# Patient Record
Sex: FEMALE | Race: WHITE | NOT HISPANIC OR LATINO | Employment: OTHER | URBAN - METROPOLITAN AREA
[De-identification: names, ages, dates, MRNs, and addresses within clinical notes are randomized per-mention and may not be internally consistent; named-entity substitution may affect disease eponyms.]

---

## 2017-01-06 ENCOUNTER — OFFICE VISIT (OUTPATIENT)
Dept: FAMILY MEDICINE CLINIC | Facility: CLINIC | Age: 77
End: 2017-01-06

## 2017-01-06 VITALS
TEMPERATURE: 97.7 F | HEIGHT: 64 IN | HEART RATE: 80 BPM | SYSTOLIC BLOOD PRESSURE: 126 MMHG | DIASTOLIC BLOOD PRESSURE: 74 MMHG | OXYGEN SATURATION: 98 % | BODY MASS INDEX: 18.95 KG/M2 | WEIGHT: 111 LBS

## 2017-01-06 DIAGNOSIS — L25.9 CONTACT DERMATITIS, UNSPECIFIED CONTACT DERMATITIS TYPE, UNSPECIFIED TRIGGER: Primary | ICD-10-CM

## 2017-01-06 PROCEDURE — 99213 OFFICE O/P EST LOW 20 MIN: CPT | Performed by: NURSE PRACTITIONER

## 2017-01-06 RX ORDER — METHYLPREDNISOLONE 4 MG/1
TABLET ORAL
Qty: 21 TABLET | Refills: 0 | Status: SHIPPED | OUTPATIENT
Start: 2017-01-06 | End: 2017-06-19

## 2017-01-06 RX ORDER — HYDROXYZINE HYDROCHLORIDE 25 MG/1
25 TABLET, FILM COATED ORAL 3 TIMES DAILY PRN
Qty: 30 TABLET | Refills: 0 | Status: SHIPPED | OUTPATIENT
Start: 2017-01-06 | End: 2017-11-24 | Stop reason: ALTCHOICE

## 2017-01-06 NOTE — MR AVS SNAPSHOT
Bailee Garza   1/6/2017 11:30 AM   Office Visit    Provider:  TERESA Lopez   Department:  [facility]   Dept Phone:  [phone]                Your Full Care Plan              Today's Medication Changes          These changes are accurate as of: 1/6/17 11:42 AM.  If you have any questions, ask your nurse or doctor.               New Medication(s)Ordered:     hydrOXYzine 25 MG tablet   Commonly known as:  ATARAX   Take 1 tablet by mouth 3 (Three) Times a Day As Needed for itching.   Started by:  TERESA Lopez       MethylPREDNISolone 4 MG tablet   Commonly known as:  MEDROL (IBAN)   Take as directed on package instructions.   Started by:  TERESA Lopez            Where to Get Your Medications      These medications were sent to [address] AT [facility] [phone] [facility] [phone] [address], [place] [address]     Phone:  [phone]     hydrOXYzine 25 MG tablet    MethylPREDNISolone 4 MG tablet                  Your Updated Medication List          This list is accurate as of: 1/6/17 11:42 AM.  Always use your most recent med list.                hydrOXYzine 25 MG tablet   Commonly known as:  ATARAX   Take 1 tablet by mouth 3 (Three) Times a Day As Needed for itching.       MethylPREDNISolone 4 MG tablet   Commonly known as:  MEDROL (IBAN)   Take as directed on package instructions.       SM FIBER LAXATIVE 500 MG tablet tablet   Generic drug:  methylcellulose (Laxative)       SYNTHROID 100 MCG tablet   Generic drug:  levothyroxine   Take 1 tablet by mouth Daily.       traMADol-acetaminophen 37.5-325 MG per tablet   Commonly known as:  ULTRACET   Take one tablet in am, one tablet at noon and 2 tablets at hs by po route       triamterene-hydrochlorothiazide 75-50 MG per tablet   Commonly known as:  MAXZIDE   Take 1 tablet by  "mouth Daily.               You Were Diagnosed With        Codes Comments    Rash    -  Primary ICD-10-CM: R21  ICD-9-CM: 782.1       Instructions     None    Patient Instructions History      [employer] Signup     [facility] [employer] allows you to send messages to your doctor, view your test results, renew your prescriptions, schedule appointments, and more. To sign up, go to [employer] and click on the Sign Up Now link in the New User? box. Enter your [employer] Activation Code exactly as it appears below along with the last four digits of your Social Security Number and your Date of Birth () to complete the sign-up process. If you do not sign up before the expiration date, you must request a new code.    [employer] Activation Code: [email or web address]  Expires: 2017  5:37 AM    If you have questions, you can email [email or web address] or call [phone] to talk to our [employer] staff. Remember, [employer] is NOT to be used for urgent needs. For medical emergencies, dial 911.               Other Info from Your Visit           Your Appointments     2017  8:30 AM EDT   Follow Up with Eddie Araya MD   [facility] (--)    [address] [place] [id number] [phone]           Arrive 15 minutes prior to appointment.              Allergies     Ketek [Telithromycin]      Nsaids      Sulphur [Sulfur]        Reason for Visit     Rash both legs, before mary      Vital Signs     Blood Pressure Pulse Temperature Height Weight Last Menstrual Period    126/74 80 97.7 °F (36.5 °C) 64\" (162.6 cm) 111 lb (50.3 kg) (Approximate)    Oxygen Saturation Body Mass Index Smoking Status             98% 19.05 kg/m2 Never Smoker         Problems and Diagnoses Noted     Rash and nonspecific skin eruption    -  Primary      "

## 2017-01-06 NOTE — PROGRESS NOTES
Subjective   Bailee Garza is a 76 y.o. female.     History of Present Illness   Bailee Garza 76 y.o. female presents for evaluation of a rash involving the bilateral lower extremitites. Rash has been present for: 2 weeks. Lesions are erythematous, and are described as papular. Rash has changed over time. Rash is pruritic. Associated symptoms  .rash started after working in the yard.  Patient denies:current pustular drainage..  Treatment to date includes: OTC Hydrocortisone cream with improvement. Symptoms are stable. Patient has not had contacts with similar rash. Patient has not had new exposures (soaps, lotions, laundry detergents, foods, medications, plants, insects or animals).    The following portions of the patient's history were reviewed and updated as appropriate: allergies, current medications, past family history, past medical history, past social history, past surgical history and problem list.    Review of Systems   Skin: Positive for rash.       Objective   Physical Exam   Constitutional: She is oriented to person, place, and time. She appears well-developed and well-nourished.   HENT:   Head: Normocephalic and atraumatic.   Pulmonary/Chest: Effort normal.   Neurological: She is alert and oriented to person, place, and time.   Skin: Skin is warm and dry. Rash noted. Rash is maculopapular. There is erythema.   Patient has maculopapular eruption to BLE with some raised linear lesions.     Psychiatric: She has a normal mood and affect. Judgment normal.   Vitals reviewed.      Assessment/Plan   Bailee was seen today for rash.    Diagnoses and all orders for this visit:    Contact dermatitis, unspecified contact dermatitis type, unspecified trigger  -     MethylPREDNISolone (MEDROL, IBAN,) 4 MG tablet; Take as directed on package instructions.  -     hydrOXYzine (ATARAX) 25 MG tablet; Take 1 tablet by mouth 3 (Three) Times a Day As Needed for itching.

## 2017-01-06 NOTE — LETTER
January 6, 2017     Patient: Bailee Garza   YOB: 1940   Date of Visit: 1/6/2017       To Whom It May Concern:    It is my medical opinion that Bailee Garza may return to work on 1/9/17.           Sincerely,        TERESA Lopez    CC: No Recipients

## 2017-01-09 ENCOUNTER — TELEPHONE (OUTPATIENT)
Dept: GASTROENTEROLOGY | Facility: CLINIC | Age: 77
End: 2017-01-09

## 2017-01-09 NOTE — TELEPHONE ENCOUNTER
Spoke with pt and informed her that Dr Zamora said that she will not be due a Colonoscopy until 2019 unless she is having problems. Pt denied any current issues.  Informed pt that if she does develop any GI issues to call and make an appt with Dr Zamora. Voiced understanding.

## 2017-01-09 NOTE — TELEPHONE ENCOUNTER
----- Message from Lawrence Zamora MD sent at 12/12/2016  6:31 PM EST -----  Regarding: RE: OA PAPERWORK  She last had a c/s in 1/14. Her dad had colon cancer. We usually perform c/s every 5 yrs with this history. Have her f/u with me in the office to discuss further. [facility]  ----- Message -----     From: Fabby Schmid RN     Sent: 12/12/2016   9:29 AM       To: Lawrence Zamora MD  Subject: OA PAPERWORK                                     Open Access/Recall paperwork scanned in under the media tab.  Please review and return to Fabby.

## 2017-03-20 ENCOUNTER — OFFICE VISIT (OUTPATIENT)
Dept: FAMILY MEDICINE CLINIC | Facility: CLINIC | Age: 77
End: 2017-03-20

## 2017-03-20 VITALS
RESPIRATION RATE: 16 BRPM | SYSTOLIC BLOOD PRESSURE: 124 MMHG | WEIGHT: 110 LBS | DIASTOLIC BLOOD PRESSURE: 77 MMHG | BODY MASS INDEX: 18.78 KG/M2 | HEART RATE: 81 BPM | HEIGHT: 64 IN | TEMPERATURE: 97.9 F

## 2017-03-20 DIAGNOSIS — G89.29 CHRONIC LEFT-SIDED LOW BACK PAIN WITHOUT SCIATICA: ICD-10-CM

## 2017-03-20 DIAGNOSIS — M54.50 CHRONIC LEFT-SIDED LOW BACK PAIN WITHOUT SCIATICA: ICD-10-CM

## 2017-03-20 PROCEDURE — 99212 OFFICE O/P EST SF 10 MIN: CPT | Performed by: FAMILY MEDICINE

## 2017-05-18 ENCOUNTER — RESULTS ENCOUNTER (OUTPATIENT)
Dept: FAMILY MEDICINE CLINIC | Facility: CLINIC | Age: 77
End: 2017-05-18

## 2017-05-18 DIAGNOSIS — I10 ESSENTIAL HYPERTENSION: ICD-10-CM

## 2017-05-18 DIAGNOSIS — N18.30 CHRONIC KIDNEY DISEASE, STAGE III (MODERATE) (HCC): ICD-10-CM

## 2017-06-16 LAB
ALBUMIN SERPL-MCNC: 4.5 G/DL (ref 3.5–5.2)
ALBUMIN/GLOB SERPL: 1.7 G/DL
ALP SERPL-CCNC: 76 U/L (ref 39–117)
ALT SERPL-CCNC: 20 U/L (ref 1–33)
AST SERPL-CCNC: 26 U/L (ref 1–32)
BASOPHILS # BLD AUTO: 0.06 10*3/MM3 (ref 0–0.2)
BASOPHILS NFR BLD AUTO: 1.2 % (ref 0–1.5)
BILIRUB SERPL-MCNC: 0.3 MG/DL (ref 0.1–1.2)
BUN SERPL-MCNC: 38 MG/DL (ref 8–23)
BUN/CREAT SERPL: 31.1 (ref 7–25)
CALCIUM SERPL-MCNC: 9.5 MG/DL (ref 8.6–10.5)
CHLORIDE SERPL-SCNC: 101 MMOL/L (ref 98–107)
CO2 SERPL-SCNC: 24.9 MMOL/L (ref 22–29)
CREAT SERPL-MCNC: 1.22 MG/DL (ref 0.57–1)
EOSINOPHIL # BLD AUTO: 0.01 10*3/MM3 (ref 0–0.7)
EOSINOPHIL NFR BLD AUTO: 0.2 % (ref 0.3–6.2)
ERYTHROCYTE [DISTWIDTH] IN BLOOD BY AUTOMATED COUNT: 13.3 % (ref 11.7–13)
GLOBULIN SER CALC-MCNC: 2.7 GM/DL
GLUCOSE SERPL-MCNC: 89 MG/DL (ref 65–99)
HCT VFR BLD AUTO: 40.1 % (ref 35.6–45.5)
HGB BLD-MCNC: 12.8 G/DL (ref 11.9–15.5)
IMM GRANULOCYTES # BLD: 0 10*3/MM3 (ref 0–0.03)
IMM GRANULOCYTES NFR BLD: 0 % (ref 0–0.5)
LYMPHOCYTES # BLD AUTO: 1.12 10*3/MM3 (ref 0.9–4.8)
LYMPHOCYTES NFR BLD AUTO: 22.7 % (ref 19.6–45.3)
MCH RBC QN AUTO: 30.3 PG (ref 26.9–32)
MCHC RBC AUTO-ENTMCNC: 31.9 G/DL (ref 32.4–36.3)
MCV RBC AUTO: 94.8 FL (ref 80.5–98.2)
MONOCYTES # BLD AUTO: 0.37 10*3/MM3 (ref 0.2–1.2)
MONOCYTES NFR BLD AUTO: 7.5 % (ref 5–12)
NEUTROPHILS # BLD AUTO: 3.37 10*3/MM3 (ref 1.9–8.1)
NEUTROPHILS NFR BLD AUTO: 68.4 % (ref 42.7–76)
PLATELET # BLD AUTO: 224 10*3/MM3 (ref 140–500)
POTASSIUM SERPL-SCNC: 4.1 MMOL/L (ref 3.5–5.2)
PROT SERPL-MCNC: 7.2 G/DL (ref 6–8.5)
RBC # BLD AUTO: 4.23 10*6/MM3 (ref 3.9–5.2)
SODIUM SERPL-SCNC: 141 MMOL/L (ref 136–145)
WBC # BLD AUTO: 4.93 10*3/MM3 (ref 4.5–10.7)

## 2017-06-19 ENCOUNTER — OFFICE VISIT (OUTPATIENT)
Dept: FAMILY MEDICINE CLINIC | Facility: CLINIC | Age: 77
End: 2017-06-19

## 2017-06-19 VITALS
DIASTOLIC BLOOD PRESSURE: 74 MMHG | HEIGHT: 64 IN | HEART RATE: 73 BPM | WEIGHT: 108 LBS | TEMPERATURE: 97.7 F | SYSTOLIC BLOOD PRESSURE: 116 MMHG | RESPIRATION RATE: 16 BRPM | BODY MASS INDEX: 18.44 KG/M2

## 2017-06-19 DIAGNOSIS — N18.30 CHRONIC KIDNEY DISEASE, STAGE III (MODERATE) (HCC): ICD-10-CM

## 2017-06-19 DIAGNOSIS — G89.29 CHRONIC LEFT-SIDED LOW BACK PAIN WITHOUT SCIATICA: Primary | ICD-10-CM

## 2017-06-19 DIAGNOSIS — L98.9 SKIN LESION OF CHEST WALL: ICD-10-CM

## 2017-06-19 DIAGNOSIS — M54.50 CHRONIC LEFT-SIDED LOW BACK PAIN WITHOUT SCIATICA: Primary | ICD-10-CM

## 2017-06-19 PROCEDURE — 99214 OFFICE O/P EST MOD 30 MIN: CPT | Performed by: FAMILY MEDICINE

## 2017-06-19 NOTE — PROGRESS NOTES
"Chief Complaint   Patient presents with   • Pain       Subjective   This patient presents the office to refill her medication for her chronic back pain.  She continues to take tramadol as prescribed.  She had a refill on June 11, 2017.  Today she forgot to bring her pill bottle and she will remember to do that at future CSA visits.  Shaji report has been reviewed.  The medication is effective in no side effects are reported. Labs reviewed and show stable chronic kidney dysfunction. She is concerned about a cyst on her left upper chest just below the collar bone. She has been picking at the spot.      Social History   Substance Use Topics   • Smoking status: Never Smoker   • Smokeless tobacco: Never Used   • Alcohol use No       Review of Systems   Cardiovascular: Negative for chest pain.   Musculoskeletal: Positive for back pain.       Objective   /74  Pulse 73  Temp 97.7 °F (36.5 °C) (Oral)   Resp 16  Ht 64\" (162.6 cm)  Wt 108 lb (49 kg)  LMP  (Approximate)  BMI 18.54 kg/m2  Body mass index is 18.54 kg/(m^2).  Physical Exam   Constitutional: No distress.   Pulmonary/Chest:       Musculoskeletal:        Arms:  Vitals reviewed.      Data Reviewed:    CMP:  Lab Results   Component Value Date    GLU 89 06/16/2017    BUN 38 (H) 06/16/2017    CREATININE 1.22 (H) 06/16/2017    EGFRIFNONA 43 (L) 06/16/2017    EGFRIFAFRI 52 (L) 06/16/2017     06/16/2017    K 4.1 06/16/2017     06/16/2017    CALCIUM 9.5 06/16/2017    PROTENTOTREF 7.2 06/16/2017    ALBUMIN 4.50 06/16/2017    LABGLOBREF 2.7 06/16/2017    BILITOT 0.3 06/16/2017    ALKPHOS 76 06/16/2017    AST 26 06/16/2017    ALT 20 06/16/2017     CBC w/ diff:   Lab Results   Component Value Date    WBC 4.93 06/16/2017    RBC 4.23 06/16/2017    HGB 12.8 06/16/2017    HCT 40.1 06/16/2017    MCV 94.8 06/16/2017    MCH 30.3 06/16/2017    MCHC 31.9 (L) 06/16/2017    RDW 13.3 (H) 06/16/2017     06/16/2017    NEUTRORELPCT 68.4 06/16/2017    " LYMPHORELPCT 22.7 06/16/2017    MONORELPCT 7.5 06/16/2017    EOSRELPCT 0.2 (L) 06/16/2017    BASORELPCT 1.2 06/16/2017     TSH:  Lab Results   Component Value Date    TSH 2.350 11/18/2016       Assessment/Plan     Problem List Items Addressed This Visit        Nervous and Auditory    Chronic back pain - Primary    Relevant Medications    traMADol-acetaminophen (ULTRACET) 37.5-325 MG per tablet       Genitourinary    Chronic kidney disease, stage III (moderate)      Other Visit Diagnoses     Skin lesion of chest wall       Wear a bandaid over the pick lesion, if it is not resolved in one week then make a follow up appt.        Outpatient Encounter Prescriptions as of 6/19/2017   Medication Sig Dispense Refill   • hydrOXYzine (ATARAX) 25 MG tablet Take 1 tablet by mouth 3 (Three) Times a Day As Needed for itching. 30 tablet 0   • Methylcellulose, Laxative, (SM FIBER LAXATIVE) 500 MG tablet Take 1 tablet by oral route daily     • Multiple Vitamins-Minerals (PRESERVISION AREDS PO) Take  by mouth.     • SYNTHROID 100 MCG tablet Take 1 tablet by mouth Daily. 30 tablet 11   • traMADol-acetaminophen (ULTRACET) 37.5-325 MG per tablet Take one tablet po q am, one tablet po q noon and 2 tablets po q hs as needed for pain 110 tablet 2   • triamterene-hydrochlorothiazide (MAXZIDE) 75-50 MG per tablet Take 1 tablet by mouth Daily. 30 tablet 11   • [DISCONTINUED] traMADol-acetaminophen (ULTRACET) 37.5-325 MG per tablet Take one tablet po q am, one tablet po q noon and 2 tablets po q hs as needed for pain 110 tablet 2   • [DISCONTINUED] MethylPREDNISolone (MEDROL, IBAN,) 4 MG tablet Take as directed on package instructions. 21 tablet 0     No facility-administered encounter medications on file as of 6/19/2017.        No orders of the defined types were placed in this encounter.      Continue with current treatment plan.         F/U in 3 months for CSA visit

## 2017-07-11 ENCOUNTER — OFFICE VISIT (OUTPATIENT)
Dept: RETAIL CLINIC | Facility: CLINIC | Age: 77
End: 2017-07-11

## 2017-07-11 VITALS
TEMPERATURE: 98 F | HEART RATE: 76 BPM | SYSTOLIC BLOOD PRESSURE: 100 MMHG | DIASTOLIC BLOOD PRESSURE: 68 MMHG | RESPIRATION RATE: 20 BRPM | OXYGEN SATURATION: 97 %

## 2017-07-11 DIAGNOSIS — T15.92XA FOREIGN BODY OF LEFT EYE, INITIAL ENCOUNTER: Primary | ICD-10-CM

## 2017-07-11 PROCEDURE — 99213 OFFICE O/P EST LOW 20 MIN: CPT | Performed by: NURSE PRACTITIONER

## 2017-07-11 NOTE — PATIENT INSTRUCTIONS
[facility] and appt made. Understands to go right away. Understands failure to go to [facility] could result in loss of vision.

## 2017-07-11 NOTE — PROGRESS NOTES
Subjective   Bailee Garza is a 77 y.o. female.     HPI Comments: Reports she was walking her dog 3 days ago. A bug flew into her eye and feels like the bug is still in there. Denies any loss of vision, double vision    Foreign Body in Eye   Incident onset: 3 days ago. Suspected object: bug. Intake: left eye. The incident was reported. The incident was witnessed/reported by the patient. Pertinent negatives include no abdominal pain, chest pain, cough, difficulty breathing, drooling, fever, sore throat or vomiting.        The following portions of the patient's history were reviewed and updated as appropriate: allergies, current medications, past family history, past medical history, past social history, past surgical history and problem list.    Review of Systems   Constitutional: Negative.  Negative for chills and fever.   HENT: Negative.  Negative for drooling and sore throat.    Eyes: Positive for discharge and redness. Negative for photophobia, pain, itching and visual disturbance.        Left eye watering. Redness. Feels like bug is still in her eye   Respiratory: Negative.  Negative for cough.    Cardiovascular: Negative.  Negative for chest pain.   Gastrointestinal: Negative.  Negative for abdominal pain and vomiting.   Endocrine: Negative.    Genitourinary: Negative.    Musculoskeletal: Negative.    Skin: Negative.    Allergic/Immunologic: Negative.    Neurological: Negative.    Hematological: Negative.    Psychiatric/Behavioral: Negative.        Objective   Physical Exam   Constitutional: She is oriented to person, place, and time. Vital signs are normal. She appears well-developed and well-nourished.   HENT:   Head: Normocephalic and atraumatic.   Right Ear: Hearing, tympanic membrane, external ear and ear canal normal.   Left Ear: Hearing, tympanic membrane, external ear and ear canal normal.   Nose: Nose normal. Right sinus exhibits no maxillary sinus tenderness and no frontal sinus tenderness. Left  sinus exhibits no maxillary sinus tenderness and no frontal sinus tenderness.   Mouth/Throat: Uvula is midline, oropharynx is clear and moist and mucous membranes are normal. No tonsillar exudate.   Eyes: Pupils are equal, round, and reactive to light. Right eye exhibits no discharge. Left eye exhibits no discharge. Right conjunctiva is not injected. Left conjunctiva is injected.   Left lid everted no foreign body seen with the naked eye.    Neck: Trachea normal and normal range of motion. Neck supple.   Cardiovascular: Normal rate, regular rhythm, S1 normal, S2 normal and normal heart sounds.    Pulmonary/Chest: Effort normal and breath sounds normal.   Abdominal: Soft. Normal appearance and bowel sounds are normal. There is no tenderness.   Musculoskeletal: Normal range of motion.   Lymphadenopathy:     She has no cervical adenopathy.   Neurological: She is alert and oriented to person, place, and time.   Skin: Skin is warm, dry and intact. No rash noted.   Psychiatric: She has a normal mood and affect. Her speech is normal and behavior is normal.   Vitals reviewed.      Assessment/Plan   Problems Addressed this Visit     None      Visit Diagnoses     Foreign body of left eye, initial encounter    -  Primary

## 2017-08-03 ENCOUNTER — OFFICE VISIT (OUTPATIENT)
Dept: FAMILY MEDICINE CLINIC | Facility: CLINIC | Age: 77
End: 2017-08-03

## 2017-08-03 VITALS
OXYGEN SATURATION: 98 % | WEIGHT: 109 LBS | HEART RATE: 68 BPM | TEMPERATURE: 97.6 F | SYSTOLIC BLOOD PRESSURE: 110 MMHG | HEIGHT: 64 IN | BODY MASS INDEX: 18.61 KG/M2 | DIASTOLIC BLOOD PRESSURE: 62 MMHG | RESPIRATION RATE: 16 BRPM

## 2017-08-03 DIAGNOSIS — N39.0 ACUTE UTI: Primary | ICD-10-CM

## 2017-08-03 LAB
BILIRUB BLD-MCNC: NEGATIVE MG/DL
CLARITY, POC: CLEAR
COLOR UR: YELLOW
GLUCOSE UR STRIP-MCNC: NEGATIVE MG/DL
KETONES UR QL: NEGATIVE
LEUKOCYTE EST, POC: ABNORMAL
NITRITE UR-MCNC: NEGATIVE MG/ML
PH UR: 6 [PH] (ref 5–8)
PROT UR STRIP-MCNC: NEGATIVE MG/DL
RBC # UR STRIP: ABNORMAL /UL
SP GR UR: 1.02 (ref 1–1.03)
UROBILINOGEN UR QL: NORMAL

## 2017-08-03 PROCEDURE — 99213 OFFICE O/P EST LOW 20 MIN: CPT | Performed by: NURSE PRACTITIONER

## 2017-08-03 PROCEDURE — 81003 URINALYSIS AUTO W/O SCOPE: CPT | Performed by: NURSE PRACTITIONER

## 2017-08-03 RX ORDER — NITROFURANTOIN 25; 75 MG/1; MG/1
100 CAPSULE ORAL 2 TIMES DAILY
Qty: 14 CAPSULE | Refills: 0 | Status: SHIPPED | OUTPATIENT
Start: 2017-08-03 | End: 2017-08-10

## 2017-08-03 NOTE — PROGRESS NOTES
Subjective   Bailee Garza is a 77 y.o. female.     History of Present Illness   Bailee Garza 77 y.o.female complains of urinary symptoms. she complains of dysuria, urgency and frequency. She has had symptoms for a few days. The symptoms are moderate.  Patient denies fever, flank pain, gross blood in urine, nausea, vomiting and abdominal pain.  Patient has tried  nothing for relief of symptoms.  Patient does not have a history of recurrent UTI. Patient does not have a history of pyelonephritis.         The following portions of the patient's history were reviewed and updated as appropriate: allergies, current medications, past family history, past medical history, past social history, past surgical history and problem list.    Review of Systems   Constitutional: Positive for chills. Negative for fever.   Genitourinary: Positive for dysuria, frequency and urgency. Negative for decreased urine volume, flank pain and pelvic pain.       Objective   Physical Exam   Constitutional: She is oriented to person, place, and time. She appears well-developed and well-nourished.   Pulmonary/Chest: Effort normal.   Abdominal: There is no CVA tenderness.   Neurological: She is alert and oriented to person, place, and time.   Psychiatric: She has a normal mood and affect. Judgment normal.   Nursing note and vitals reviewed.      Assessment/Plan   Bailee was seen today for urinary tract infection.    Diagnoses and all orders for this visit:    Acute UTI  -     POCT urinalysis dipstick, automated  -     Urine Culture  -     nitrofurantoin, macrocrystal-monohydrate, (MACROBID) 100 MG capsule; Take 1 capsule by mouth 2 (Two) Times a Day for 7 days. For UTI

## 2017-08-06 LAB
BACTERIA UR CULT: ABNORMAL
BACTERIA UR CULT: ABNORMAL
OTHER ANTIBIOTIC SUSC ISLT: ABNORMAL

## 2017-10-11 ENCOUNTER — OFFICE VISIT (OUTPATIENT)
Dept: FAMILY MEDICINE CLINIC | Facility: CLINIC | Age: 77
End: 2017-10-11

## 2017-10-11 VITALS
HEIGHT: 64 IN | SYSTOLIC BLOOD PRESSURE: 103 MMHG | WEIGHT: 113 LBS | HEART RATE: 75 BPM | TEMPERATURE: 96.9 F | DIASTOLIC BLOOD PRESSURE: 65 MMHG | BODY MASS INDEX: 19.29 KG/M2 | RESPIRATION RATE: 16 BRPM

## 2017-10-11 DIAGNOSIS — Z23 IMMUNIZATION DUE: ICD-10-CM

## 2017-10-11 DIAGNOSIS — M25.561 ARTHRALGIA OF RIGHT KNEE: Primary | ICD-10-CM

## 2017-10-11 PROCEDURE — 90662 IIV NO PRSV INCREASED AG IM: CPT | Performed by: FAMILY MEDICINE

## 2017-10-11 PROCEDURE — 90471 IMMUNIZATION ADMIN: CPT | Performed by: FAMILY MEDICINE

## 2017-10-11 PROCEDURE — 99213 OFFICE O/P EST LOW 20 MIN: CPT | Performed by: FAMILY MEDICINE

## 2017-10-11 NOTE — PROGRESS NOTES
"Chief Complaint   Patient presents with   • Joint Swelling     knee       Subjective   This patient presents the office with swollen right medial knee.  It has been noticeable for a long time.  Recently she just bought a brace for the knee which does help the condition.  It did cause some distal edema of her foot and ankle on the same side.  She will begin to use that only during the day and leave it off at night.  If her symptoms do not resolve satisfactorily, then we will refer her to Dr. Mann whom she has seen in the past.  I have reviewed and updated her medications, medical history and problem list during today's office visit.        Social History   Substance Use Topics   • Smoking status: Never Smoker   • Smokeless tobacco: Never Used   • Alcohol use No       Review of Systems   Cardiovascular: Positive for leg swelling.       Objective   /65  Pulse 75  Temp 96.9 °F (36.1 °C) (Oral)   Resp 16  Ht 64\" (162.6 cm)  Wt 113 lb (51.3 kg)  BMI 19.4 kg/m2  Body mass index is 19.4 kg/(m^2).  Physical Exam   Constitutional: No distress.   Musculoskeletal:        Legs:  Vitals reviewed.      Data Reviewed:        Assessment/Plan     Problem List Items Addressed This Visit        Musculoskeletal and Integument    Arthralgia of right knee      Other Visit Diagnoses     Immunization due    -  Primary    Relevant Orders    Flu Vaccine High Dose PF 65YR+ ([id number]) (Completed)          Outpatient Encounter Prescriptions as of 10/11/2017   Medication Sig Dispense Refill   • hydrOXYzine (ATARAX) 25 MG tablet Take 1 tablet by mouth 3 (Three) Times a Day As Needed for itching. 30 tablet 0   • Methylcellulose, Laxative, (SM FIBER LAXATIVE) 500 MG tablet Take 1 tablet by oral route daily     • Multiple Vitamins-Minerals (PRESERVISION AREDS PO) Take  by mouth.     • SYNTHROID 100 MCG tablet Take 1 tablet by mouth Daily. 30 tablet 11   • traMADol-acetaminophen (ULTRACET) 37.5-325 MG per tablet Take one tablet po q am, " one tablet po q noon and 2 tablets po q hs as needed for pain 110 tablet 2   • triamterene-hydrochlorothiazide (MAXZIDE) 75-50 MG per tablet Take 1 tablet by mouth Daily. 30 tablet 11     No facility-administered encounter medications on file as of 10/11/2017.        Orders Placed This Encounter   Procedures   • Flu Vaccine High Dose PF 65YR+ ([id number])       Continue with current treatment plan.         RTC as needed or sooner if symptoms worsen or change

## 2017-10-13 ENCOUNTER — OFFICE VISIT (OUTPATIENT)
Dept: FAMILY MEDICINE CLINIC | Facility: CLINIC | Age: 77
End: 2017-10-13

## 2017-10-13 VITALS
HEIGHT: 64 IN | BODY MASS INDEX: 19.29 KG/M2 | HEART RATE: 97 BPM | DIASTOLIC BLOOD PRESSURE: 67 MMHG | OXYGEN SATURATION: 94 % | TEMPERATURE: 98.5 F | SYSTOLIC BLOOD PRESSURE: 125 MMHG | WEIGHT: 113 LBS | RESPIRATION RATE: 18 BRPM

## 2017-10-13 DIAGNOSIS — J40 BRONCHITIS: Primary | ICD-10-CM

## 2017-10-13 LAB
EXPIRATION DATE: NORMAL
INTERNAL CONTROL: NORMAL
Lab: NORMAL

## 2017-10-13 PROCEDURE — 87804 INFLUENZA ASSAY W/OPTIC: CPT | Performed by: NURSE PRACTITIONER

## 2017-10-13 PROCEDURE — 99213 OFFICE O/P EST LOW 20 MIN: CPT | Performed by: NURSE PRACTITIONER

## 2017-10-13 RX ORDER — CEFDINIR 300 MG/1
600 CAPSULE ORAL DAILY
Qty: 20 CAPSULE | Refills: 0 | Status: SHIPPED | OUTPATIENT
Start: 2017-10-13 | End: 2017-10-23

## 2017-10-13 RX ORDER — PREDNISONE 20 MG/1
20 TABLET ORAL 2 TIMES DAILY
Qty: 10 TABLET | Refills: 0 | Status: SHIPPED | OUTPATIENT
Start: 2017-10-13 | End: 2017-11-24

## 2017-10-13 NOTE — PROGRESS NOTES
Subjective   Bailee Garza is a 77 y.o. female.     History of Present Illness   Bailee Garza 77 y.o. female who presents for evaluation of cough. Symptoms include productive cough and exertional SOA.  Onset of symptoms was 2 days ago, gradually worsening since that time. Patient denies fever.   Evaluation to date: none Treatment to date:  coug drops.  Patient states it started after flu vaccine and believes it is related.     The following portions of the patient's history were reviewed and updated as appropriate: allergies, current medications, past family history, past medical history, past social history, past surgical history and problem list.    Review of Systems   Constitutional: Positive for chills. Negative for fever.   HENT: Negative for congestion, postnasal drip and sinus pain.    Respiratory: Positive for cough, chest tightness and shortness of breath.    Musculoskeletal: Positive for myalgias.       Objective   Physical Exam   Constitutional: She is oriented to person, place, and time. She appears well-developed and well-nourished.   Cardiovascular: Normal rate and regular rhythm.    Pulmonary/Chest: Effort normal and breath sounds normal.   Neurological: She is alert and oriented to person, place, and time.   Psychiatric: She has a normal mood and affect. Judgment normal.   Nursing note and vitals reviewed.      Assessment/Plan   Bailee was seen today for uri.    Diagnoses and all orders for this visit:    Bronchitis  -     cefdinir (OMNICEF) 300 MG capsule; Take 2 capsules by mouth Daily for 10 days.  -     predniSONE (DELTASONE) 20 MG tablet; Take 1 tablet by mouth 2 (Two) Times a Day.  -     POC Influenza A / B

## 2017-11-02 ENCOUNTER — OFFICE VISIT (OUTPATIENT)
Dept: FAMILY MEDICINE CLINIC | Facility: CLINIC | Age: 77
End: 2017-11-02

## 2017-11-02 VITALS
WEIGHT: 113 LBS | TEMPERATURE: 96.9 F | DIASTOLIC BLOOD PRESSURE: 66 MMHG | RESPIRATION RATE: 16 BRPM | HEART RATE: 72 BPM | BODY MASS INDEX: 19.29 KG/M2 | HEIGHT: 64 IN | SYSTOLIC BLOOD PRESSURE: 108 MMHG

## 2017-11-02 DIAGNOSIS — I10 ESSENTIAL HYPERTENSION: ICD-10-CM

## 2017-11-02 DIAGNOSIS — E03.9 HYPOTHYROIDISM, ACQUIRED: ICD-10-CM

## 2017-11-02 DIAGNOSIS — J06.9 ACUTE URI: Primary | ICD-10-CM

## 2017-11-02 DIAGNOSIS — N18.30 CHRONIC KIDNEY DISEASE, STAGE III (MODERATE) (HCC): ICD-10-CM

## 2017-11-02 DIAGNOSIS — G89.29 CHRONIC LEFT-SIDED LOW BACK PAIN WITHOUT SCIATICA: ICD-10-CM

## 2017-11-02 DIAGNOSIS — M54.50 CHRONIC LEFT-SIDED LOW BACK PAIN WITHOUT SCIATICA: ICD-10-CM

## 2017-11-02 PROCEDURE — 99214 OFFICE O/P EST MOD 30 MIN: CPT | Performed by: FAMILY MEDICINE

## 2017-11-02 RX ORDER — CEFDINIR 300 MG/1
600 CAPSULE ORAL DAILY
Qty: 14 CAPSULE | Refills: 0 | Status: SHIPPED | OUTPATIENT
Start: 2017-11-02 | End: 2017-11-09

## 2017-11-02 NOTE — PROGRESS NOTES
"Chief Complaint   Patient presents with   • Back Pain       Subjective   This patient presents to the office to refill her tramadol. Pain is controlled and is rated  4 out of 10. . The medicine is effective and no side effects are reported.  Shaji report has been reviewed. The latest prescription bottle (dated September 30, 2017) has 18 pills/capsules remaining.  She also would like refill of cefdinir. She still has productive cough of yellow phlegm. She was seen and treated by Guera at our office. Medication was effective. No side effects noted.  Labs are due. BP is stable. Thyroid is stable. CKD is stable. All labs are pending        I have reviewed and updated her medications, medical history and problem list during today's office visit.        Social History   Substance Use Topics   • Smoking status: Never Smoker   • Smokeless tobacco: Never Used   • Alcohol use No       Review of Systems   Constitutional: Negative for fatigue.   HENT: Positive for congestion.    Respiratory: Positive for cough.    Musculoskeletal: Positive for back pain.   Neurological: Negative for dizziness.   Psychiatric/Behavioral: The patient is not nervous/anxious.        Objective   /66  Pulse 72  Temp 96.9 °F (36.1 °C) (Oral)   Resp 16  Ht 64\" (162.6 cm)  Wt 113 lb (51.3 kg)  BMI 19.4 kg/m2  Body mass index is 19.4 kg/(m^2).  Physical Exam   Constitutional: She appears well-developed. She is cooperative. No distress.   Slightly congested voice   Eyes: Conjunctivae and lids are normal.   Neck: Carotid bruit is not present. No tracheal deviation present.   Cardiovascular: Normal rate, regular rhythm and normal heart sounds.    No murmur heard.  Pulmonary/Chest: Effort normal and breath sounds normal.   Musculoskeletal:        Lumbar back: She exhibits tenderness. She exhibits normal range of motion.   Neurological: She is alert. She is not disoriented.   Skin: Skin is warm and dry.   Psychiatric: She has a normal mood and " affect. Her speech is normal and behavior is normal.   Vitals reviewed.      Data Reviewed:        Assessment/Plan     Problem List Items Addressed This Visit        Cardiovascular and Mediastinum    Essential hypertension       Endocrine    Hypothyroidism, acquired       Nervous and Auditory    Chronic back pain    Relevant Medications    traMADol-acetaminophen (ULTRACET) 37.5-325 MG per tablet       Genitourinary    Chronic kidney disease, stage III (moderate)      Other Visit Diagnoses     Acute URI    -  Primary    Relevant Medications    cefdinir (OMNICEF) 300 MG capsule          Outpatient Encounter Prescriptions as of 11/2/2017   Medication Sig Dispense Refill   • hydrOXYzine (ATARAX) 25 MG tablet Take 1 tablet by mouth 3 (Three) Times a Day As Needed for itching. 30 tablet 0   • Methylcellulose, Laxative, (SM FIBER LAXATIVE) 500 MG tablet Take 1 tablet by oral route daily     • Multiple Vitamins-Minerals (PRESERVISION AREDS PO) Take  by mouth.     • predniSONE (DELTASONE) 20 MG tablet Take 1 tablet by mouth 2 (Two) Times a Day. 10 tablet 0   • SYNTHROID 100 MCG tablet Take 1 tablet by mouth Daily. 30 tablet 11   • traMADol-acetaminophen (ULTRACET) 37.5-325 MG per tablet Take one tablet po q am, and 2 tablets po q hs as needed for pain 100 tablet 2   • triamterene-hydrochlorothiazide (MAXZIDE) 75-50 MG per tablet Take 1 tablet by mouth Daily. 30 tablet 11   • [DISCONTINUED] traMADol-acetaminophen (ULTRACET) 37.5-325 MG per tablet Take one tablet po q am, one tablet po q noon and 2 tablets po q hs as needed for pain 110 tablet 2   • cefdinir (OMNICEF) 300 MG capsule Take 2 capsules by mouth Daily for 7 days. 14 capsule 0     No facility-administered encounter medications on file as of 11/2/2017.        No orders of the defined types were placed in this encounter.             F/U in 3 months for csa, and regular medication visit in December after labs.

## 2017-11-24 ENCOUNTER — OFFICE VISIT (OUTPATIENT)
Dept: RETAIL CLINIC | Facility: CLINIC | Age: 77
End: 2017-11-24

## 2017-11-24 VITALS
TEMPERATURE: 97.4 F | OXYGEN SATURATION: 97 % | SYSTOLIC BLOOD PRESSURE: 122 MMHG | HEART RATE: 75 BPM | RESPIRATION RATE: 18 BRPM | DIASTOLIC BLOOD PRESSURE: 69 MMHG

## 2017-11-24 DIAGNOSIS — J06.9 ACUTE URI: Primary | ICD-10-CM

## 2017-11-24 PROCEDURE — 99213 OFFICE O/P EST LOW 20 MIN: CPT | Performed by: NURSE PRACTITIONER

## 2017-11-24 RX ORDER — PREDNISONE 20 MG/1
20 TABLET ORAL 2 TIMES DAILY
Qty: 14 TABLET | Refills: 0 | Status: SHIPPED | OUTPATIENT
Start: 2017-11-24 | End: 2017-12-01

## 2017-11-24 RX ORDER — BENZONATATE 100 MG/1
100 CAPSULE ORAL 3 TIMES DAILY PRN
Qty: 21 CAPSULE | Refills: 0 | Status: SHIPPED | OUTPATIENT
Start: 2017-11-24 | End: 2017-12-01

## 2017-11-24 NOTE — PATIENT INSTRUCTIONS
"Upper Respiratory Infection, Adult  Most upper respiratory infections (URIs) are a viral infection of the air passages leading to the lungs. A URI affects the nose, throat, and upper air passages. The most common type of URI is nasopharyngitis and is typically referred to as \"the common cold.\"  URIs run their course and usually go away on their own. Most of the time, a URI does not require medical attention, but sometimes a bacterial infection in the upper airways can follow a viral infection. This is called a secondary infection. Sinus and middle ear infections are common types of secondary upper respiratory infections.  Bacterial pneumonia can also complicate a URI. A URI can worsen asthma and chronic obstructive pulmonary disease (COPD). Sometimes, these complications can require emergency medical care and may be life threatening.   CAUSES  Almost all URIs are caused by viruses. A virus is a type of germ and can spread from one person to another.   RISKS FACTORS  You may be at risk for a URI if:   · You smoke.    · You have chronic heart or lung disease.  · You have a weakened defense (immune) system.    · You are very young or very old.    · You have nasal allergies or asthma.  · You work in crowded or poorly ventilated areas.  · You work in health care facilities or schools.  SIGNS AND SYMPTOMS   Symptoms typically develop 2-3 days after you come in contact with a cold virus. Most viral URIs last 7-10 days. However, viral URIs from the influenza virus (flu virus) can last 14-18 days and are typically more severe. Symptoms may include:   · Runny or stuffy (congested) nose.    · Sneezing.    · Cough.    · Sore throat.    · Headache.    · Fatigue.    · Fever.    · Loss of appetite.    · Pain in your forehead, behind your eyes, and over your cheekbones (sinus pain).  · Muscle aches.    DIAGNOSIS   Your health care provider may diagnose a URI by:  · Physical exam.  · Tests to check that your symptoms are not due to " another condition such as:  ¨ Strep throat.  ¨ Sinusitis.  ¨ Pneumonia.  ¨ Asthma.  TREATMENT   A URI goes away on its own with time. It cannot be cured with medicines, but medicines may be prescribed or recommended to relieve symptoms. Medicines may help:  · Reduce your fever.  · Reduce your cough.  · Relieve nasal congestion.  HOME CARE INSTRUCTIONS   · Take medicines only as directed by your health care provider.    · Gargle warm saltwater or take cough drops to comfort your throat as directed by your health care provider.  · Use a warm mist humidifier or inhale steam from a shower to increase air moisture. This may make it easier to breathe.  · Drink enough fluid to keep your urine clear or pale yellow.    · Eat soups and other clear broths and maintain good nutrition.    · Rest as needed.    · Return to work when your temperature has returned to normal or as your health care provider advises. You may need to stay home longer to avoid infecting others. You can also use a face mask and careful hand washing to prevent spread of the virus.  · Increase the usage of your inhaler if you have asthma.    · Do not use any tobacco products, including cigarettes, chewing tobacco, or electronic cigarettes. If you need help quitting, ask your health care provider.  PREVENTION   The best way to protect yourself from getting a cold is to practice good hygiene.   · Avoid oral or hand contact with people with cold symptoms.    · Wash your hands often if contact occurs.    There is no clear evidence that vitamin C, vitamin E, echinacea, or exercise reduces the chance of developing a cold. However, it is always recommended to get plenty of rest, exercise, and practice good nutrition.   SEEK MEDICAL CARE IF:   · You are getting worse rather than better.    · Your symptoms are not controlled by medicine.    · You have chills.  · You have worsening shortness of breath.  · You have brown or red mucus.  · You have yellow or brown nasal  discharge.  · You have pain in your face, especially when you bend forward.  · You have a fever.  · You have swollen neck glands.  · You have pain while swallowing.  · You have white areas in the back of your throat.  SEEK IMMEDIATE MEDICAL CARE IF:   · You have severe or persistent:    Headache.    Ear pain.    Sinus pain.    Chest pain.  · You have chronic lung disease and any of the following:    Wheezing.    Prolonged cough.    Coughing up blood.    A change in your usual mucus.  · You have a stiff neck.  · You have changes in your:    Vision.    Hearing.    Thinking.    Mood.  MAKE SURE YOU:   · Understand these instructions.  · Will watch your condition.  · Will get help right away if you are not doing well or get worse.     This information is not intended to replace advice given to you by your health care provider. Make sure you discuss any questions you have with your health care provider.     Document Released: 06/13/2002 Document Revised: 05/03/2016 Document Reviewed: 03/25/2015  [email or web address] Interactive Patient Education ©2017 [employer]

## 2017-11-24 NOTE — PROGRESS NOTES
Subjective   Bailee Garza is a 77 y.o. female.     Cough   This is a new problem. Episode onset: 6 days ago. The problem has been unchanged. The problem occurs hourly. The cough is non-productive. Associated symptoms include postnasal drip. Pertinent negatives include no chest pain, chills, ear congestion, ear pain, eye redness, fever, headaches, heartburn, hemoptysis, myalgias, nasal congestion, rash, rhinorrhea, sore throat, shortness of breath, sweats or wheezing. Nothing aggravates the symptoms. She has tried nothing for the symptoms. There is no history of asthma, bronchitis, COPD, environmental allergies or pneumonia.       The following portions of the patient's history were reviewed and updated as appropriate: allergies, current medications, past family history, past medical history, past social history, past surgical history and problem list.    Review of Systems   Constitutional: Negative for appetite change, chills, diaphoresis, fatigue and fever.   HENT: Positive for postnasal drip. Negative for congestion, ear discharge, ear pain, facial swelling, hearing loss, mouth sores, nosebleeds, rhinorrhea, sinus pressure, sneezing, sore throat, tinnitus, trouble swallowing and voice change.    Eyes: Negative for pain, discharge, redness and itching.   Respiratory: Positive for cough and chest tightness. Negative for hemoptysis, shortness of breath, wheezing and stridor.    Cardiovascular: Negative for chest pain and palpitations.   Gastrointestinal: Negative for abdominal pain, constipation, diarrhea, heartburn, nausea and vomiting.   Genitourinary: Negative for decreased urine volume.   Musculoskeletal: Negative for myalgias, neck pain and neck stiffness.   Skin: Negative for rash.   Allergic/Immunologic: Negative for environmental allergies and immunocompromised state.   Neurological: Negative for dizziness, syncope, weakness and headaches.       Objective   Physical Exam   Constitutional: She is oriented  to person, place, and time. She appears well-developed and well-nourished. She is cooperative.  Non-toxic appearance. She does not appear ill. No distress.   HENT:   Right Ear: Hearing, tympanic membrane, external ear and ear canal normal.   Left Ear: Hearing, tympanic membrane, external ear and ear canal normal.   Nose: Nose normal. Right sinus exhibits no maxillary sinus tenderness and no frontal sinus tenderness. Left sinus exhibits no maxillary sinus tenderness and no frontal sinus tenderness.   Mouth/Throat: Uvula is midline, oropharynx is clear and moist and mucous membranes are normal. She has dentures. No oral lesions. No oropharyngeal exudate, posterior oropharyngeal edema or posterior oropharyngeal erythema. Tonsils are 2+ on the right. Tonsils are 2+ on the left. No tonsillar exudate.   Eyes: Conjunctivae and lids are normal.   Cardiovascular: Normal rate, regular rhythm, S1 normal and S2 normal.    Pulmonary/Chest: Effort normal. She has no decreased breath sounds. She has wheezes in the right upper field and the left upper field. She has no rhonchi. She has no rales.   Abdominal: Bowel sounds are normal. There is no tenderness.   Lymphadenopathy:     She has no cervical adenopathy.   Neurological: She is alert and oriented to person, place, and time.   Skin: Skin is warm and dry. She is not diaphoretic. No pallor.   Vitals reviewed.      Assessment/Plan   Bailee was seen today for cough.    Diagnoses and all orders for this visit:    Acute URI  -     predniSONE (DELTASONE) 20 MG tablet; Take 1 tablet by mouth 2 (Two) Times a Day for 7 days.  -     benzonatate (TESSALON PERLES) 100 MG capsule; Take 1 capsule by mouth 3 (Three) Times a Day As Needed for Cough for up to 7 days.          -     Follow up with PCP or urgent treatment for persistent or worsening symptoms

## 2017-11-25 LAB
ALBUMIN SERPL-MCNC: 4.8 G/DL (ref 3.5–4.8)
ALBUMIN/GLOB SERPL: 1.9 {RATIO} (ref 1.2–2.2)
ALP SERPL-CCNC: 85 IU/L (ref 39–117)
ALT SERPL-CCNC: 13 IU/L (ref 0–32)
AST SERPL-CCNC: 24 IU/L (ref 0–40)
BASOPHILS # BLD AUTO: 0.1 X10E3/UL (ref 0–0.2)
BASOPHILS NFR BLD AUTO: 1 %
BILIRUB SERPL-MCNC: 0.3 MG/DL (ref 0–1.2)
BUN SERPL-MCNC: 35 MG/DL (ref 8–27)
BUN/CREAT SERPL: 28 (ref 12–28)
CALCIUM SERPL-MCNC: 9.6 MG/DL (ref 8.7–10.3)
CHLORIDE SERPL-SCNC: 96 MMOL/L (ref 96–106)
CHOLEST SERPL-MCNC: 195 MG/DL (ref 100–199)
CO2 SERPL-SCNC: 27 MMOL/L (ref 18–29)
CREAT SERPL-MCNC: 1.23 MG/DL (ref 0.57–1)
EOSINOPHIL # BLD AUTO: 0 X10E3/UL (ref 0–0.4)
EOSINOPHIL NFR BLD AUTO: 0 %
ERYTHROCYTE [DISTWIDTH] IN BLOOD BY AUTOMATED COUNT: 13.4 % (ref 12.3–15.4)
GFR SERPLBLD CREATININE-BSD FMLA CKD-EPI: 43 ML/MIN/1.73
GFR SERPLBLD CREATININE-BSD FMLA CKD-EPI: 49 ML/MIN/1.73
GLOBULIN SER CALC-MCNC: 2.5 G/DL (ref 1.5–4.5)
GLUCOSE SERPL-MCNC: 93 MG/DL (ref 65–99)
HCT VFR BLD AUTO: 41.6 % (ref 34–46.6)
HDLC SERPL-MCNC: 59 MG/DL
HGB BLD-MCNC: 13.5 G/DL (ref 11.1–15.9)
IMM GRANULOCYTES # BLD: 0 X10E3/UL (ref 0–0.1)
IMM GRANULOCYTES NFR BLD: 0 %
LDLC SERPL CALC-MCNC: 112 MG/DL (ref 0–99)
LDLC/HDLC SERPL: 1.9 RATIO UNITS (ref 0–3.2)
LYMPHOCYTES # BLD AUTO: 1 X10E3/UL (ref 0.7–3.1)
LYMPHOCYTES NFR BLD AUTO: 17 %
MCH RBC QN AUTO: 30.1 PG (ref 26.6–33)
MCHC RBC AUTO-ENTMCNC: 32.5 G/DL (ref 31.5–35.7)
MCV RBC AUTO: 93 FL (ref 79–97)
MONOCYTES # BLD AUTO: 0.7 X10E3/UL (ref 0.1–0.9)
MONOCYTES NFR BLD AUTO: 12 %
NEUTROPHILS # BLD AUTO: 4 X10E3/UL (ref 1.4–7)
NEUTROPHILS NFR BLD AUTO: 70 %
PLATELET # BLD AUTO: 229 X10E3/UL (ref 150–379)
POTASSIUM SERPL-SCNC: 3.7 MMOL/L (ref 3.5–5.2)
PROT SERPL-MCNC: 7.3 G/DL (ref 6–8.5)
RBC # BLD AUTO: 4.48 X10E6/UL (ref 3.77–5.28)
SODIUM SERPL-SCNC: 143 MMOL/L (ref 134–144)
TRIGL SERPL-MCNC: 119 MG/DL (ref 0–149)
TSH SERPL DL<=0.005 MIU/L-ACNC: 0.77 UIU/ML (ref 0.45–4.5)
VLDLC SERPL CALC-MCNC: 24 MG/DL (ref 5–40)
WBC # BLD AUTO: 5.8 X10E3/UL (ref 3.4–10.8)

## 2017-11-27 ENCOUNTER — OFFICE VISIT (OUTPATIENT)
Dept: FAMILY MEDICINE CLINIC | Facility: CLINIC | Age: 77
End: 2017-11-27

## 2017-11-27 VITALS
HEIGHT: 64 IN | DIASTOLIC BLOOD PRESSURE: 65 MMHG | SYSTOLIC BLOOD PRESSURE: 118 MMHG | TEMPERATURE: 97 F | RESPIRATION RATE: 16 BRPM | HEART RATE: 65 BPM | WEIGHT: 113 LBS | BODY MASS INDEX: 19.29 KG/M2

## 2017-11-27 DIAGNOSIS — N18.30 CHRONIC KIDNEY DISEASE, STAGE III (MODERATE) (HCC): ICD-10-CM

## 2017-11-27 DIAGNOSIS — J40 BRONCHITIS: Primary | ICD-10-CM

## 2017-11-27 DIAGNOSIS — E03.9 HYPOTHYROIDISM, ACQUIRED: ICD-10-CM

## 2017-11-27 DIAGNOSIS — I10 ESSENTIAL HYPERTENSION: ICD-10-CM

## 2017-11-27 PROCEDURE — 99214 OFFICE O/P EST MOD 30 MIN: CPT | Performed by: FAMILY MEDICINE

## 2017-11-27 RX ORDER — LEVOTHYROXINE SODIUM 100 MCG
100 TABLET ORAL DAILY
Qty: 30 TABLET | Refills: 11 | Status: SHIPPED | OUTPATIENT
Start: 2017-11-27 | End: 2017-12-18 | Stop reason: SDUPTHER

## 2017-11-27 RX ORDER — TRIAMTERENE AND HYDROCHLOROTHIAZIDE 75; 50 MG/1; MG/1
1 TABLET ORAL DAILY
Qty: 30 TABLET | Refills: 11 | Status: SHIPPED | OUTPATIENT
Start: 2017-11-27 | End: 2017-12-18 | Stop reason: SDUPTHER

## 2017-11-27 RX ORDER — CEFDINIR 300 MG/1
300 CAPSULE ORAL 2 TIMES DAILY
Qty: 20 CAPSULE | Refills: 0 | Status: SHIPPED | OUTPATIENT
Start: 2017-11-27 | End: 2017-12-07

## 2017-11-27 NOTE — PROGRESS NOTES
"Chief Complaint   Patient presents with   • Cough   • URI       Subjective   This patient presents the office with a one to two-week history of productive cough.  Recently the cough mucus has been yellow in color.  She has not run fever.  It is interfering with her work because it affects her voice quality.  She has had take off work early.  She was seen in urgent care center on Friday and treated with prednisone.  Symptoms are continuing.  No sweats or chills are noted.  She also had recent labs which have been reviewed and are seen below.  They are satisfactory.  Chronic kidney disease is stable.  Blood pressures control with current triamterene medication.  Thyroid is stable on current dose of Synthroid.  I have reviewed and updated her medications, medical history and problem list during today's office visit.        Social History   Substance Use Topics   • Smoking status: Never Smoker   • Smokeless tobacco: Never Used   • Alcohol use No       Review of Systems   Constitutional: Negative for chills and fever.   Respiratory: Positive for cough.        Objective   /65  Pulse 65  Temp 97 °F (36.1 °C) (Oral)   Resp 16  Ht 64\" (162.6 cm)  Wt 113 lb (51.3 kg)  BMI 19.4 kg/m2  Body mass index is 19.4 kg/(m^2).  Physical Exam   Constitutional: She is cooperative. No distress.   HENT:   Right Ear: Tympanic membrane normal.   Left Ear: Tympanic membrane normal.   Nose: Right sinus exhibits no maxillary sinus tenderness and no frontal sinus tenderness. Left sinus exhibits no maxillary sinus tenderness and no frontal sinus tenderness.   Mouth/Throat: Uvula is midline, oropharynx is clear and moist and mucous membranes are normal.   Eyes: Conjunctivae and lids are normal.   Neck: Carotid bruit is not present. No tracheal deviation present.   Cardiovascular: Normal rate, regular rhythm and normal heart sounds.    No murmur heard.  Pulmonary/Chest: Effort normal. She has no decreased breath sounds. She has wheezes. " She has no rhonchi. She has no rales.   Lymphadenopathy:     She has no cervical adenopathy.   Neurological: She is alert. She is not disoriented.   Skin: Skin is warm and dry.   Psychiatric: She has a normal mood and affect. Her behavior is normal.   Scratchy voice   Vitals reviewed.      Data Reviewed:    CMP:  Lab Results   Component Value Date    GLU 93 11/24/2017    BUN 35 (H) 11/24/2017    CREATININE 1.23 (H) 11/24/2017    EGFRIFNONA 43 (L) 11/24/2017    EGFRIFAFRI 49 (L) 11/24/2017     11/24/2017    K 3.7 11/24/2017    CL 96 11/24/2017    CALCIUM 9.6 11/24/2017    PROTENTOTREF 7.3 11/24/2017    ALBUMIN 4.8 11/24/2017    LABGLOBREF 2.5 11/24/2017    BILITOT 0.3 11/24/2017    ALKPHOS 85 11/24/2017    AST 24 11/24/2017    ALT 13 11/24/2017     CBC w/ diff:   Lab Results   Component Value Date    WBC 5.8 11/24/2017    RBC 4.48 11/24/2017    HGB 13.5 11/24/2017    HCT 41.6 11/24/2017    MCV 93 11/24/2017    MCH 30.1 11/24/2017    MCHC 32.5 11/24/2017    RDW 13.4 11/24/2017     11/24/2017    NEUTRORELPCT 70 11/24/2017    LYMPHORELPCT 17 11/24/2017    MONORELPCT 12 11/24/2017    EOSRELPCT 0 11/24/2017    BASORELPCT 1 11/24/2017     LIPID PANEL:  Lab Results   Component Value Date    CHLPL 195 11/24/2017    TRIG 119 11/24/2017    HDL 59 11/24/2017    VLDL 24 11/24/2017     (H) 11/24/2017    LDLHDL 1.9 11/24/2017     TSH:  Lab Results   Component Value Date    TSH 0.772 11/24/2017       Assessment/Plan     Problem List Items Addressed This Visit        Cardiovascular and Mediastinum    Essential hypertension    Relevant Medications    triamterene-hydrochlorothiazide (MAXZIDE) 75-50 MG per tablet       Endocrine    Hypothyroidism, acquired    Relevant Medications    SYNTHROID 100 MCG tablet       Genitourinary    Chronic kidney disease, stage III (moderate)    Relevant Medications    triamterene-hydrochlorothiazide (MAXZIDE) 75-50 MG per tablet      Other Visit Diagnoses     Bronchitis    -  Primary           Outpatient Encounter Prescriptions as of 11/27/2017   Medication Sig Dispense Refill   • benzonatate (TESSALON PERLES) 100 MG capsule Take 1 capsule by mouth 3 (Three) Times a Day As Needed for Cough for up to 7 days. 21 capsule 0   • Methylcellulose, Laxative, (SM FIBER LAXATIVE) 500 MG tablet Take 1 tablet by oral route daily     • Multiple Vitamins-Minerals (PRESERVISION AREDS PO) Take  by mouth.     • predniSONE (DELTASONE) 20 MG tablet Take 1 tablet by mouth 2 (Two) Times a Day for 7 days. 14 tablet 0   • SYNTHROID 100 MCG tablet Take 1 tablet by mouth Daily. 30 tablet 11   • traMADol-acetaminophen (ULTRACET) 37.5-325 MG per tablet Take one tablet po q am, and 2 tablets po q hs as needed for pain 100 tablet 2   • triamterene-hydrochlorothiazide (MAXZIDE) 75-50 MG per tablet Take 1 tablet by mouth Daily. 30 tablet 11   • [DISCONTINUED] SYNTHROID 100 MCG tablet Take 1 tablet by mouth Daily. 30 tablet 11   • [DISCONTINUED] triamterene-hydrochlorothiazide (MAXZIDE) 75-50 MG per tablet Take 1 tablet by mouth Daily. 30 tablet 11   • cefdinir (OMNICEF) 300 MG capsule Take 1 capsule by mouth 2 (Two) Times a Day for 10 days. 20 capsule 0     No facility-administered encounter medications on file as of 11/27/2017.        No orders of the defined types were placed in this encounter.             F/U in one year or RTC as needed or sooner if symptoms worsen or change

## 2017-12-12 ENCOUNTER — OFFICE VISIT (OUTPATIENT)
Dept: FAMILY MEDICINE CLINIC | Facility: CLINIC | Age: 77
End: 2017-12-12

## 2017-12-12 VITALS
HEIGHT: 64 IN | BODY MASS INDEX: 18.1 KG/M2 | OXYGEN SATURATION: 93 % | TEMPERATURE: 97.8 F | SYSTOLIC BLOOD PRESSURE: 127 MMHG | WEIGHT: 106 LBS | HEART RATE: 103 BPM | DIASTOLIC BLOOD PRESSURE: 77 MMHG | RESPIRATION RATE: 18 BRPM

## 2017-12-12 DIAGNOSIS — J18.9 PNEUMONIA OF LEFT LOWER LOBE DUE TO INFECTIOUS ORGANISM: Primary | ICD-10-CM

## 2017-12-12 PROCEDURE — 71020 XR CHEST PA AND LATERAL: CPT | Performed by: NURSE PRACTITIONER

## 2017-12-12 PROCEDURE — 99213 OFFICE O/P EST LOW 20 MIN: CPT | Performed by: NURSE PRACTITIONER

## 2017-12-12 RX ORDER — BENZONATATE 200 MG/1
200 CAPSULE ORAL 3 TIMES DAILY PRN
Qty: 30 CAPSULE | Refills: 0 | Status: ON HOLD | OUTPATIENT
Start: 2017-12-12 | End: 2017-12-19 | Stop reason: SINTOL

## 2017-12-12 RX ORDER — LEVOFLOXACIN 500 MG/1
500 TABLET, FILM COATED ORAL DAILY
Qty: 10 TABLET | Refills: 0 | Status: SHIPPED | OUTPATIENT
Start: 2017-12-12 | End: 2017-12-18 | Stop reason: ALTCHOICE

## 2017-12-12 NOTE — PROGRESS NOTES
Subjective   Bailee Garza is a 77 y.o. female.     History of Present Illness   Bailee Garza 77 y.o. female who presents for evaluation of cough. Symptoms include productive cough, cough described as deep, fatigue, shortness of breath, exertional SOA and wheezing.  Onset of symptoms was 2 months ago, unchanged since that time. Patient denies fever.   Evaluation to date: has been seen in this office 3 times since October. Has been prescribed cefdinir 3 times and prednisone.     The following portions of the patient's history were reviewed and updated as appropriate: allergies, current medications, past family history, past medical history, past social history, past surgical history and problem list.    Review of Systems   Constitutional: Negative for chills and fever.   HENT: Negative for congestion, postnasal drip, rhinorrhea, sinus pain, sinus pressure and sore throat.    Respiratory: Positive for cough, chest tightness, shortness of breath and wheezing.        Objective   Physical Exam   Constitutional: She is oriented to person, place, and time. She appears well-developed and well-nourished.   HENT:   Right Ear: Tympanic membrane, external ear and ear canal normal.   Left Ear: Tympanic membrane, external ear and ear canal normal.   Nose: Right sinus exhibits no maxillary sinus tenderness and no frontal sinus tenderness. Left sinus exhibits no maxillary sinus tenderness and no frontal sinus tenderness.   Mouth/Throat: Uvula is midline and oropharynx is clear and moist.   Cardiovascular: Normal rate and regular rhythm.    Pulmonary/Chest: Effort normal. She has wheezes in the right upper field, the right middle field, the right lower field, the left upper field and the left lower field. She has rhonchi in the left lower field.   Neurological: She is alert and oriented to person, place, and time.   Skin: Skin is warm.   Psychiatric: She has a normal mood and affect. Judgment normal.   Nursing note and vitals  reviewed.      Assessment/Plan   Bailee was seen today for uri.    Diagnoses and all orders for this visit:    Pneumonia of left lower lobe due to infectious organism  -     XR Chest PA & Lateral (In Office)  -     benzonatate (TESSALON) 200 MG capsule; Take 1 capsule by mouth 3 (Three) Times a Day As Needed for Cough.  -     levoFLOXacin (LEVAQUIN) 500 MG tablet; Take 1 tablet by mouth Daily. For Respiratory infection             No significant change in xray from 1/2016.

## 2017-12-18 ENCOUNTER — OFFICE VISIT (OUTPATIENT)
Dept: FAMILY MEDICINE CLINIC | Facility: CLINIC | Age: 77
End: 2017-12-18

## 2017-12-18 ENCOUNTER — APPOINTMENT (OUTPATIENT)
Dept: LAB | Facility: HOSPITAL | Age: 77
End: 2017-12-18

## 2017-12-18 VITALS
SYSTOLIC BLOOD PRESSURE: 118 MMHG | BODY MASS INDEX: 18.1 KG/M2 | HEIGHT: 64 IN | OXYGEN SATURATION: 95 % | DIASTOLIC BLOOD PRESSURE: 73 MMHG | RESPIRATION RATE: 16 BRPM | WEIGHT: 106 LBS | TEMPERATURE: 96.7 F | HEART RATE: 106 BPM

## 2017-12-18 DIAGNOSIS — R06.02 EXERTIONAL SHORTNESS OF BREATH: ICD-10-CM

## 2017-12-18 DIAGNOSIS — E03.9 HYPOTHYROIDISM, ACQUIRED: Primary | ICD-10-CM

## 2017-12-18 DIAGNOSIS — R63.4 ABNORMAL WEIGHT LOSS: ICD-10-CM

## 2017-12-18 DIAGNOSIS — N18.30 CHRONIC KIDNEY DISEASE, STAGE III (MODERATE) (HCC): ICD-10-CM

## 2017-12-18 DIAGNOSIS — I10 ESSENTIAL HYPERTENSION: ICD-10-CM

## 2017-12-18 DIAGNOSIS — R05.3 CHRONIC COUGH: ICD-10-CM

## 2017-12-18 DIAGNOSIS — R93.89 ABNORMAL CXR: ICD-10-CM

## 2017-12-18 PROCEDURE — 86481 TB AG RESPONSE T-CELL SUSP: CPT | Performed by: FAMILY MEDICINE

## 2017-12-18 PROCEDURE — 87116 MYCOBACTERIA CULTURE: CPT | Performed by: FAMILY MEDICINE

## 2017-12-18 PROCEDURE — 87070 CULTURE OTHR SPECIMN AEROBIC: CPT | Performed by: FAMILY MEDICINE

## 2017-12-18 PROCEDURE — 87205 SMEAR GRAM STAIN: CPT | Performed by: FAMILY MEDICINE

## 2017-12-18 PROCEDURE — 99215 OFFICE O/P EST HI 40 MIN: CPT | Performed by: FAMILY MEDICINE

## 2017-12-18 PROCEDURE — 36415 COLL VENOUS BLD VENIPUNCTURE: CPT | Performed by: FAMILY MEDICINE

## 2017-12-18 PROCEDURE — 87206 SMEAR FLUORESCENT/ACID STAI: CPT | Performed by: FAMILY MEDICINE

## 2017-12-18 RX ORDER — TRIAMTERENE AND HYDROCHLOROTHIAZIDE 75; 50 MG/1; MG/1
1 TABLET ORAL DAILY
Qty: 30 TABLET | Refills: 11 | Status: SHIPPED | OUTPATIENT
Start: 2017-12-18 | End: 2017-12-23 | Stop reason: HOSPADM

## 2017-12-18 RX ORDER — AMOXICILLIN AND CLAVULANATE POTASSIUM 875; 125 MG/1; MG/1
1 TABLET, FILM COATED ORAL 2 TIMES DAILY
Qty: 20 TABLET | Refills: 0 | Status: ON HOLD | OUTPATIENT
Start: 2017-12-18 | End: 2017-12-19 | Stop reason: ALTCHOICE

## 2017-12-18 RX ORDER — LEVOTHYROXINE SODIUM 100 MCG
100 TABLET ORAL DAILY
Qty: 30 TABLET | Refills: 11 | Status: SHIPPED | OUTPATIENT
Start: 2017-12-18 | End: 2018-12-03 | Stop reason: SDUPTHER

## 2017-12-18 NOTE — PROGRESS NOTES
"Chief Complaint   Patient presents with   • Hypothyroidism   • Cough       Subjective   She presents the office to review labs and refill medications.  Her blood pressure is controlled.  Chronic kidney disease stage III is stable.  Thyroid is controlled.  Medicines have been sent to the pharmacy.    She has continued with cough since October.  That occurred soon after her pneumonia vaccine.  Cough is productive, less than a quarter of a cup of phlegm daily.  X-ray done on December 12.  Results reviewed.  Radiographic interpretation included some changes at both lung bases and apices and emphysema cannot be ruled out.  The patient does not have history of smoking or exposure to noxious fumes.  She does have significant exertional component to her shortness of breath.  Going from one room to the next  causes her to be short winded.  She has not run fever.  Weight loss has been noted.  Referral indicated to pulmonology.  I have reviewed and updated her medications, medical history and problem list during today's office visit.        Social History   Substance Use Topics   • Smoking status: Never Smoker   • Smokeless tobacco: Never Used   • Alcohol use No       Review of Systems   Constitutional: Positive for fatigue and unexpected weight change. Negative for fever.   HENT: Negative.    Respiratory: Positive for cough and shortness of breath. Negative for wheezing.    Cardiovascular: Negative for chest pain.   Gastrointestinal: Positive for nausea.        Poor appetite   Endocrine: Negative.    Neurological: Positive for dizziness.   All other systems reviewed and are negative.      Objective   /73  Pulse 106  Temp 96.7 °F (35.9 °C) (Oral)   Resp 16  Ht 162.6 cm (64\")  Wt 48.1 kg (106 lb)  SpO2 95%  BMI 18.19 kg/m2  Body mass index is 18.19 kg/(m^2).  Physical Exam   Constitutional: She is cooperative. No distress.   Moderately ill   Eyes: Conjunctivae and lids are normal.   Neck: Carotid bruit is not " present. No tracheal deviation present.   Cardiovascular: Normal rate, regular rhythm and normal heart sounds.    No murmur heard.  Pulmonary/Chest: Effort normal. No tachypnea. She has no wheezes. She has rales in the right lower field.   Neurological: She is alert. She is not disoriented.   Skin: Skin is warm and dry.   Psychiatric: She has a normal mood and affect. Her speech is normal and behavior is normal.   Vitals reviewed.      Data Reviewed:    CMP:  Lab Results   Component Value Date    GLU 93 11/24/2017    BUN 35 (H) 11/24/2017    CREATININE 1.23 (H) 11/24/2017    EGFRIFNONA 43 (L) 11/24/2017    EGFRIFAFRI 49 (L) 11/24/2017     11/24/2017    K 3.7 11/24/2017    CL 96 11/24/2017    CALCIUM 9.6 11/24/2017    PROTENTOTREF 7.3 11/24/2017    ALBUMIN 4.8 11/24/2017    LABGLOBREF 2.5 11/24/2017    BILITOT 0.3 11/24/2017    ALKPHOS 85 11/24/2017    AST 24 11/24/2017    ALT 13 11/24/2017     CBC w/ diff:   Lab Results   Component Value Date    WBC 5.8 11/24/2017    RBC 4.48 11/24/2017    HGB 13.5 11/24/2017    HCT 41.6 11/24/2017    MCV 93 11/24/2017    MCH 30.1 11/24/2017    MCHC 32.5 11/24/2017    RDW 13.4 11/24/2017     11/24/2017    NEUTRORELPCT 70 11/24/2017    LYMPHORELPCT 17 11/24/2017    MONORELPCT 12 11/24/2017    EOSRELPCT 0 11/24/2017    BASORELPCT 1 11/24/2017     LIPID PANEL:  Lab Results   Component Value Date    CHLPL 195 11/24/2017    TRIG 119 11/24/2017    HDL 59 11/24/2017    VLDL 24 11/24/2017     (H) 11/24/2017    LDLHDL 1.9 11/24/2017     TSH:  Lab Results   Component Value Date    TSH 0.772 11/24/2017       Assessment/Plan     Problem List Items Addressed This Visit        Cardiovascular and Mediastinum    Essential hypertension    Relevant Medications    triamterene-hydrochlorothiazide (MAXZIDE) 75-50 MG per tablet       Respiratory    Chronic cough    Relevant Medications    amoxicillin-clavulanate (AUGMENTIN) 875-125 MG per tablet    Other Relevant Orders    TSPOT     Respiratory Culture - Sputum, Cough    Ambulatory Referral to Pulmonology    AFB Culture - Sputum, Cough    Exertional shortness of breath    Relevant Orders    Ambulatory Referral to Pulmonology       Endocrine    Hypothyroidism, acquired - Primary    Relevant Medications    SYNTHROID 100 MCG tablet       Genitourinary    Chronic kidney disease, stage III (moderate)    Relevant Medications    triamterene-hydrochlorothiazide (MAXZIDE) 75-50 MG per tablet       Other    Abnormal weight loss    Relevant Orders    Ambulatory Referral to Pulmonology    Abnormal CXR    Relevant Orders    Ambulatory Referral to Pulmonology          Outpatient Encounter Prescriptions as of 12/18/2017   Medication Sig Dispense Refill   • benzonatate (TESSALON) 200 MG capsule Take 1 capsule by mouth 3 (Three) Times a Day As Needed for Cough. 30 capsule 0   • Methylcellulose, Laxative, (SM FIBER LAXATIVE) 500 MG tablet Take 1 tablet by oral route daily     • Multiple Vitamins-Minerals (PRESERVISION AREDS PO) Take  by mouth.     • SYNTHROID 100 MCG tablet Take 1 tablet by mouth Daily. 30 tablet 11   • traMADol-acetaminophen (ULTRACET) 37.5-325 MG per tablet Take one tablet po q am, and 2 tablets po q hs as needed for pain 100 tablet 2   • triamterene-hydrochlorothiazide (MAXZIDE) 75-50 MG per tablet Take 1 tablet by mouth Daily. 30 tablet 11   • [DISCONTINUED] levoFLOXacin (LEVAQUIN) 500 MG tablet Take 1 tablet by mouth Daily. For Respiratory infection 10 tablet 0   • [DISCONTINUED] SYNTHROID 100 MCG tablet Take 1 tablet by mouth Daily. 30 tablet 11   • [DISCONTINUED] triamterene-hydrochlorothiazide (MAXZIDE) 75-50 MG per tablet Take 1 tablet by mouth Daily. 30 tablet 11   • amoxicillin-clavulanate (AUGMENTIN) 875-125 MG per tablet Take 1 tablet by mouth 2 (Two) Times a Day for 10 days. 20 tablet 0     No facility-administered encounter medications on file as of 12/18/2017.        Orders Placed This Encounter   Procedures   • Respiratory  Culture - Sputum, Cough     Standing Status:   Future     Standing Expiration Date:   12/18/2018   • AFB Culture - Sputum, Cough     Standing Status:   Future     Standing Expiration Date:   12/18/2018   • TSPOT     Standing Status:   Future     Standing Expiration Date:   12/18/2018   • Ambulatory Referral to Pulmonology     Referral Priority:   Urgent     Referral Type:   Consultation     Referral Reason:   Specialty Services Required     Referred to Provider:   Son Mohamud MD     Requested Specialty:   Pulmonary Disease     Number of Visits Requested:   1              RTC as needed or sooner if symptoms worsen or change

## 2017-12-19 ENCOUNTER — APPOINTMENT (OUTPATIENT)
Dept: CT IMAGING | Facility: HOSPITAL | Age: 77
End: 2017-12-19

## 2017-12-19 ENCOUNTER — HOSPITAL ENCOUNTER (INPATIENT)
Facility: HOSPITAL | Age: 77
LOS: 4 days | Discharge: HOME | End: 2017-12-23
Attending: EMERGENCY MEDICINE | Admitting: HOSPITALIST

## 2017-12-19 DIAGNOSIS — J47.1 BRONCHIECTASIS WITH ACUTE EXACERBATION (HCC): ICD-10-CM

## 2017-12-19 DIAGNOSIS — R53.1 GENERAL WEAKNESS: ICD-10-CM

## 2017-12-19 DIAGNOSIS — A41.9 SEPSIS, DUE TO UNSPECIFIED ORGANISM: ICD-10-CM

## 2017-12-19 DIAGNOSIS — J18.9 PNEUMONIA OF BOTH LOWER LOBES DUE TO INFECTIOUS ORGANISM: Primary | ICD-10-CM

## 2017-12-19 LAB
ALBUMIN SERPL-MCNC: 4.1 G/DL (ref 3.5–5.2)
ALBUMIN/GLOB SERPL: 1.2 G/DL
ALP SERPL-CCNC: 78 U/L (ref 39–117)
ALT SERPL W P-5'-P-CCNC: 14 U/L (ref 1–33)
ANION GAP SERPL CALCULATED.3IONS-SCNC: 14.3 MMOL/L
AST SERPL-CCNC: 23 U/L (ref 1–32)
B PERT DNA SPEC QL NAA+PROBE: NOT DETECTED
BASOPHILS # BLD AUTO: 0.02 10*3/MM3 (ref 0–0.2)
BASOPHILS NFR BLD AUTO: 0.3 % (ref 0–1.5)
BILIRUB SERPL-MCNC: 0.3 MG/DL (ref 0.1–1.2)
BILIRUB UR QL STRIP: NEGATIVE
BUN BLD-MCNC: 35 MG/DL (ref 8–23)
BUN/CREAT SERPL: 22.4 (ref 7–25)
C PNEUM DNA NPH QL NAA+NON-PROBE: NOT DETECTED
CALCIUM SPEC-SCNC: 10.2 MG/DL (ref 8.6–10.5)
CHLORIDE SERPL-SCNC: 98 MMOL/L (ref 98–107)
CLARITY UR: ABNORMAL
CO2 SERPL-SCNC: 28.7 MMOL/L (ref 22–29)
COLOR UR: YELLOW
CREAT BLD-MCNC: 1.56 MG/DL (ref 0.57–1)
D-LACTATE SERPL-SCNC: 1.3 MMOL/L (ref 0.5–2)
D-LACTATE SERPL-SCNC: 2.7 MMOL/L (ref 0.5–2)
DEPRECATED RDW RBC AUTO: 44.3 FL (ref 37–54)
EOSINOPHIL # BLD AUTO: 0.02 10*3/MM3 (ref 0–0.7)
EOSINOPHIL NFR BLD AUTO: 0.3 % (ref 0.3–6.2)
ERYTHROCYTE [DISTWIDTH] IN BLOOD BY AUTOMATED COUNT: 13.3 % (ref 11.7–13)
FLUAV H1 2009 PAND RNA NPH QL NAA+PROBE: NOT DETECTED
FLUAV H1 HA GENE NPH QL NAA+PROBE: NOT DETECTED
FLUAV H3 RNA NPH QL NAA+PROBE: NOT DETECTED
FLUAV SUBTYP SPEC NAA+PROBE: NOT DETECTED
FLUBV RNA ISLT QL NAA+PROBE: NOT DETECTED
GFR SERPL CREATININE-BSD FRML MDRD: 32 ML/MIN/1.73
GLOBULIN UR ELPH-MCNC: 3.5 GM/DL
GLUCOSE BLD-MCNC: 118 MG/DL (ref 65–99)
GLUCOSE UR STRIP-MCNC: NEGATIVE MG/DL
HADV DNA SPEC NAA+PROBE: NOT DETECTED
HCOV 229E RNA SPEC QL NAA+PROBE: NOT DETECTED
HCOV HKU1 RNA SPEC QL NAA+PROBE: NOT DETECTED
HCOV NL63 RNA SPEC QL NAA+PROBE: NOT DETECTED
HCOV OC43 RNA SPEC QL NAA+PROBE: NOT DETECTED
HCT VFR BLD AUTO: 40.6 % (ref 35.6–45.5)
HGB BLD-MCNC: 13.7 G/DL (ref 11.9–15.5)
HGB UR QL STRIP.AUTO: NEGATIVE
HMPV RNA NPH QL NAA+NON-PROBE: NOT DETECTED
HOLD SPECIMEN: NORMAL
HPIV1 RNA SPEC QL NAA+PROBE: NOT DETECTED
HPIV2 RNA SPEC QL NAA+PROBE: NOT DETECTED
HPIV3 RNA NPH QL NAA+PROBE: NOT DETECTED
HPIV4 P GENE NPH QL NAA+PROBE: NOT DETECTED
IMM GRANULOCYTES # BLD: 0 10*3/MM3 (ref 0–0.03)
IMM GRANULOCYTES NFR BLD: 0 % (ref 0–0.5)
INR PPP: 1.05 (ref 0.9–1.1)
KETONES UR QL STRIP: NEGATIVE
LEUKOCYTE ESTERASE UR QL STRIP.AUTO: NEGATIVE
LIPASE SERPL-CCNC: 50 U/L (ref 13–60)
LYMPHOCYTES # BLD AUTO: 0.74 10*3/MM3 (ref 0.9–4.8)
LYMPHOCYTES NFR BLD AUTO: 9.9 % (ref 19.6–45.3)
M PNEUMO IGG SER IA-ACNC: NOT DETECTED
MCH RBC QN AUTO: 31.1 PG (ref 26.9–32)
MCHC RBC AUTO-ENTMCNC: 33.7 G/DL (ref 32.4–36.3)
MCV RBC AUTO: 92.1 FL (ref 80.5–98.2)
MONOCYTES # BLD AUTO: 0.54 10*3/MM3 (ref 0.2–1.2)
MONOCYTES NFR BLD AUTO: 7.2 % (ref 5–12)
NEUTROPHILS # BLD AUTO: 6.18 10*3/MM3 (ref 1.9–8.1)
NEUTROPHILS NFR BLD AUTO: 82.3 % (ref 42.7–76)
NITRITE UR QL STRIP: NEGATIVE
NT-PROBNP SERPL-MCNC: 199.3 PG/ML (ref 0–1800)
PH UR STRIP.AUTO: 7.5 [PH] (ref 5–8)
PLATELET # BLD AUTO: 257 10*3/MM3 (ref 140–500)
PMV BLD AUTO: 10.7 FL (ref 6–12)
POTASSIUM BLD-SCNC: 4.3 MMOL/L (ref 3.5–5.2)
PROCALCITONIN SERPL-MCNC: 0.38 NG/ML (ref 0.1–0.25)
PROT SERPL-MCNC: 7.6 G/DL (ref 6–8.5)
PROT UR QL STRIP: NEGATIVE
PROTHROMBIN TIME: 13.3 SECONDS (ref 11.7–14.2)
RBC # BLD AUTO: 4.41 10*6/MM3 (ref 3.9–5.2)
RHINOVIRUS RNA SPEC NAA+PROBE: NOT DETECTED
RSV RNA NPH QL NAA+NON-PROBE: NOT DETECTED
SODIUM BLD-SCNC: 141 MMOL/L (ref 136–145)
SP GR UR STRIP: 1.01 (ref 1–1.03)
TROPONIN T SERPL-MCNC: <0.01 NG/ML (ref 0–0.03)
TSPOT INTERPRETATION: NORMAL
TSPOT NIL CONTROL: NORMAL
TSPOT PANEL A: NORMAL
TSPOT PANEL B: NORMAL
TSPOT POS CONTROL: NORMAL
UROBILINOGEN UR QL STRIP: ABNORMAL
WBC NRBC COR # BLD: 7.5 10*3/MM3 (ref 4.5–10.7)

## 2017-12-19 PROCEDURE — 84484 ASSAY OF TROPONIN QUANT: CPT | Performed by: EMERGENCY MEDICINE

## 2017-12-19 PROCEDURE — 94760 N-INVAS EAR/PLS OXIMETRY 1: CPT

## 2017-12-19 PROCEDURE — 99284 EMERGENCY DEPT VISIT MOD MDM: CPT

## 2017-12-19 PROCEDURE — 94799 UNLISTED PULMONARY SVC/PX: CPT

## 2017-12-19 PROCEDURE — 87581 M.PNEUMON DNA AMP PROBE: CPT | Performed by: EMERGENCY MEDICINE

## 2017-12-19 PROCEDURE — 84145 PROCALCITONIN (PCT): CPT | Performed by: EMERGENCY MEDICINE

## 2017-12-19 PROCEDURE — 87633 RESP VIRUS 12-25 TARGETS: CPT | Performed by: EMERGENCY MEDICINE

## 2017-12-19 PROCEDURE — 83605 ASSAY OF LACTIC ACID: CPT | Performed by: EMERGENCY MEDICINE

## 2017-12-19 PROCEDURE — 87040 BLOOD CULTURE FOR BACTERIA: CPT | Performed by: EMERGENCY MEDICINE

## 2017-12-19 PROCEDURE — 80053 COMPREHEN METABOLIC PANEL: CPT | Performed by: EMERGENCY MEDICINE

## 2017-12-19 PROCEDURE — 25810000003 SODIUM CHLORIDE 0.9 % WITH KCL 20 MEQ 20-0.9 MEQ/L-% SOLUTION: Performed by: HOSPITALIST

## 2017-12-19 PROCEDURE — 85610 PROTHROMBIN TIME: CPT | Performed by: EMERGENCY MEDICINE

## 2017-12-19 PROCEDURE — 85025 COMPLETE CBC W/AUTO DIFF WBC: CPT | Performed by: EMERGENCY MEDICINE

## 2017-12-19 PROCEDURE — 71250 CT THORAX DX C-: CPT

## 2017-12-19 PROCEDURE — 87486 CHLMYD PNEUM DNA AMP PROBE: CPT | Performed by: EMERGENCY MEDICINE

## 2017-12-19 PROCEDURE — 86481 TB AG RESPONSE T-CELL SUSP: CPT | Performed by: EMERGENCY MEDICINE

## 2017-12-19 PROCEDURE — 87798 DETECT AGENT NOS DNA AMP: CPT | Performed by: EMERGENCY MEDICINE

## 2017-12-19 PROCEDURE — 94640 AIRWAY INHALATION TREATMENT: CPT

## 2017-12-19 PROCEDURE — 25010000002 PIPERACILLIN SOD-TAZOBACTAM PER 1 G: Performed by: EMERGENCY MEDICINE

## 2017-12-19 PROCEDURE — 25010000002 METHYLPREDNISOLONE PER 125 MG: Performed by: EMERGENCY MEDICINE

## 2017-12-19 PROCEDURE — 81003 URINALYSIS AUTO W/O SCOPE: CPT | Performed by: EMERGENCY MEDICINE

## 2017-12-19 PROCEDURE — 83690 ASSAY OF LIPASE: CPT | Performed by: EMERGENCY MEDICINE

## 2017-12-19 PROCEDURE — 83880 ASSAY OF NATRIURETIC PEPTIDE: CPT | Performed by: EMERGENCY MEDICINE

## 2017-12-19 PROCEDURE — 74176 CT ABD & PELVIS W/O CONTRAST: CPT

## 2017-12-19 RX ORDER — ONDANSETRON 4 MG/1
4 TABLET, FILM COATED ORAL EVERY 6 HOURS PRN
Status: DISCONTINUED | OUTPATIENT
Start: 2017-12-19 | End: 2017-12-23 | Stop reason: HOSPADM

## 2017-12-19 RX ORDER — LEVOFLOXACIN 500 MG/1
500 TABLET, FILM COATED ORAL DAILY
COMMUNITY
Start: 2017-12-12 | End: 2017-12-23 | Stop reason: HOSPADM

## 2017-12-19 RX ORDER — SODIUM CHLORIDE 0.9 % (FLUSH) 0.9 %
1-10 SYRINGE (ML) INJECTION AS NEEDED
Status: DISCONTINUED | OUTPATIENT
Start: 2017-12-19 | End: 2017-12-23 | Stop reason: HOSPADM

## 2017-12-19 RX ORDER — BISACODYL 10 MG
10 SUPPOSITORY, RECTAL RECTAL DAILY PRN
Status: DISCONTINUED | OUTPATIENT
Start: 2017-12-19 | End: 2017-12-23 | Stop reason: HOSPADM

## 2017-12-19 RX ORDER — SODIUM CHLORIDE 9 MG/ML
125 INJECTION, SOLUTION INTRAVENOUS CONTINUOUS
Status: DISCONTINUED | OUTPATIENT
Start: 2017-12-19 | End: 2017-12-19

## 2017-12-19 RX ORDER — ONDANSETRON 4 MG/1
4 TABLET, ORALLY DISINTEGRATING ORAL EVERY 6 HOURS PRN
Status: DISCONTINUED | OUTPATIENT
Start: 2017-12-19 | End: 2017-12-23 | Stop reason: HOSPADM

## 2017-12-19 RX ORDER — BISACODYL 5 MG/1
5 TABLET, DELAYED RELEASE ORAL DAILY PRN
Status: DISCONTINUED | OUTPATIENT
Start: 2017-12-19 | End: 2017-12-23 | Stop reason: HOSPADM

## 2017-12-19 RX ORDER — CALCIUM POLYCARBOPHIL 625 MG 625 MG/1
625 TABLET ORAL DAILY
COMMUNITY
End: 2018-07-17

## 2017-12-19 RX ORDER — ONDANSETRON 2 MG/ML
4 INJECTION INTRAMUSCULAR; INTRAVENOUS EVERY 6 HOURS PRN
Status: DISCONTINUED | OUTPATIENT
Start: 2017-12-19 | End: 2017-12-23 | Stop reason: HOSPADM

## 2017-12-19 RX ORDER — IPRATROPIUM BROMIDE AND ALBUTEROL SULFATE 2.5; .5 MG/3ML; MG/3ML
3 SOLUTION RESPIRATORY (INHALATION)
Status: DISCONTINUED | OUTPATIENT
Start: 2017-12-19 | End: 2017-12-20

## 2017-12-19 RX ORDER — METHYLPREDNISOLONE SODIUM SUCCINATE 125 MG/2ML
125 INJECTION, POWDER, LYOPHILIZED, FOR SOLUTION INTRAMUSCULAR; INTRAVENOUS ONCE
Status: COMPLETED | OUTPATIENT
Start: 2017-12-19 | End: 2017-12-19

## 2017-12-19 RX ORDER — NITROGLYCERIN 0.4 MG/1
0.4 TABLET SUBLINGUAL
Status: DISCONTINUED | OUTPATIENT
Start: 2017-12-19 | End: 2017-12-23 | Stop reason: HOSPADM

## 2017-12-19 RX ORDER — ACETAMINOPHEN 325 MG/1
650 TABLET ORAL EVERY 4 HOURS PRN
Status: DISCONTINUED | OUTPATIENT
Start: 2017-12-19 | End: 2017-12-23 | Stop reason: HOSPADM

## 2017-12-19 RX ORDER — SODIUM CHLORIDE AND POTASSIUM CHLORIDE 150; 900 MG/100ML; MG/100ML
100 INJECTION, SOLUTION INTRAVENOUS CONTINUOUS
Status: DISCONTINUED | OUTPATIENT
Start: 2017-12-19 | End: 2017-12-21

## 2017-12-19 RX ORDER — IPRATROPIUM BROMIDE AND ALBUTEROL SULFATE 2.5; .5 MG/3ML; MG/3ML
3 SOLUTION RESPIRATORY (INHALATION) ONCE
Status: COMPLETED | OUTPATIENT
Start: 2017-12-19 | End: 2017-12-19

## 2017-12-19 RX ADMIN — SODIUM CHLORIDE 1428 ML: 9 INJECTION, SOLUTION INTRAVENOUS at 12:37

## 2017-12-19 RX ADMIN — DOXYCYCLINE 100 MG: 100 INJECTION, POWDER, LYOPHILIZED, FOR SOLUTION INTRAVENOUS at 15:23

## 2017-12-19 RX ADMIN — SODIUM CHLORIDE 125 ML/HR: 9 INJECTION, SOLUTION INTRAVENOUS at 11:27

## 2017-12-19 RX ADMIN — IPRATROPIUM BROMIDE AND ALBUTEROL SULFATE 3 ML: .5; 3 SOLUTION RESPIRATORY (INHALATION) at 19:40

## 2017-12-19 RX ADMIN — POTASSIUM CHLORIDE AND SODIUM CHLORIDE 100 ML/HR: 900; 150 INJECTION, SOLUTION INTRAVENOUS at 17:38

## 2017-12-19 RX ADMIN — SODIUM CHLORIDE 500 ML: 9 INJECTION, SOLUTION INTRAVENOUS at 11:25

## 2017-12-19 RX ADMIN — IPRATROPIUM BROMIDE AND ALBUTEROL SULFATE 3 ML: .5; 3 SOLUTION RESPIRATORY (INHALATION) at 11:17

## 2017-12-19 RX ADMIN — SODIUM CHLORIDE 125 ML/HR: 9 INJECTION, SOLUTION INTRAVENOUS at 14:26

## 2017-12-19 RX ADMIN — TAZOBACTAM SODIUM AND PIPERACILLIN SODIUM 3.38 G: 375; 3 INJECTION, SOLUTION INTRAVENOUS at 14:38

## 2017-12-19 RX ADMIN — METHYLPREDNISOLONE SODIUM SUCCINATE 125 MG: 125 INJECTION, POWDER, FOR SOLUTION INTRAMUSCULAR; INTRAVENOUS at 11:28

## 2017-12-19 NOTE — ED TRIAGE NOTES
"Patient to er with c/o she wad dx with pneumonia last Monday, patient reported nausea and vomiting and unable to keep her med down.\"   "

## 2017-12-19 NOTE — PLAN OF CARE
Problem: Patient Care Overview (Adult)  Goal: Plan of Care Review  Outcome: Ongoing (interventions implemented as appropriate)    12/19/17 8860   Coping/Psychosocial Response Interventions   Plan Of Care Reviewed With patient   Patient Care Overview   Progress no change   Outcome Evaluation   Outcome Summary/Follow up Plan VSS. Still feeling very weak. Continue to monitor.        Goal: Adult Individualization and Mutuality  Outcome: Ongoing (interventions implemented as appropriate)  Goal: Discharge Needs Assessment  Outcome: Ongoing (interventions implemented as appropriate)    Problem: Pneumonia (Adult)  Goal: Signs and Symptoms of Listed Potential Problems Will be Absent or Manageable (Pneumonia)  Outcome: Ongoing (interventions implemented as appropriate)

## 2017-12-19 NOTE — ED PROVIDER NOTES
EMERGENCY DEPARTMENT ENCOUNTER    CHIEF COMPLAINT  Chief Complaint: N/V  History given by: Patient  History limited by: Nothing  Room Number: 12/12  PMD: Eddie Araya MD      HPI:  Pt is a 77 y.o. female who presents to the ED after the patient developed nausea and emesis (bile) production beginning yesterday afternoon. She explains that she was unable to keep down any of her medications down so she came to the ED due to her persistent symptoms.  Patient explains that she's had a productive cough since she received a pneumonia shot at the end of October which has progressively worsened over the past two weeks. She explains that she was diagnosed with pneumonia by her PCP and she's taken Omnicef, Levaquin and started Augmentin yesterday, with no relief of her symptoms. Patient has since developed decreased appetite, secondary to the nausea / vomiting, and she's subsequently had slight weight loss of 5lbs. The patient was given a referral to a Pulmonologist although she presents to the ED due to her persistent N/V and fatigue. No other complaints at this time.      Duration:  2 days  Onset: Gradual  Timing: Constant  Radiation: None  Quality: Nauseated  Intensity/Severity: Moderate  Progression: No Change  Associated Symptoms: Nausea, vomiting, cough, weight loss, decreased appetite, fatigue  Aggravating Factors: Unknown  Alleviating Factors: None  Previous Episodes: None mentioned  Treatment before arrival: Omnicef, Levaquin and Augmentin      PAST MEDICAL HISTORY  Active Ambulatory Problems     Diagnosis Date Noted   • Chronic back pain 07/13/2009   • Essential hypertension 11/20/2007   • Hearing loss 03/18/2008   • Hypothyroidism, acquired 11/20/2007   • IBS (irritable bowel syndrome) 04/25/2013   • Rhinitis, allergic 11/20/2007   • Chronic kidney disease, stage III (moderate) 12/19/2016   • Arthralgia of right knee 10/11/2017   • Chronic cough 12/18/2017   • Abnormal weight loss 12/18/2017   • Abnormal CXR  12/18/2017   • Exertional shortness of breath 12/18/2017     Resolved Ambulatory Problems     Diagnosis Date Noted   • 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   • Back pain 11/20/2007   • Constipation 03/24/2015   • Depression 01/28/2015   • Dyspepsia 03/18/2008   • Grief reaction 07/05/2011   • Insomnia 01/28/2015   • Intervertebral disc disorder with myelopathy, cervical region 07/22/2010   • Stress 04/27/2015   • Screening breast examination 11/20/2007   • Urticaria 06/29/2015     Past Medical History:   Diagnosis Date   • 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   • Back pain 11/20/2007   • Back pain 04/03/2013   • Chronic back pain 07/13/2009   • Constipation 03/24/2015   • Depression 01/28/2015   • Dyspepsia 03/18/2008   • Essential hypertension 11/20/2007   • Grief reaction 07/05/2011   • Hearing loss 03/18/2008   • History of bone density study 09/16/2015   • Hypothyroidism, acquired 11/20/2007   • IBS (irritable bowel syndrome) 04/25/2013   • Insomnia 01/28/2015   • Intervertebral disc disorder with myelopathy, cervical region 07/22/2010   • Rhinitis, allergic 11/20/2007   • Screening breast examination 11/20/2007   • Stress 04/27/2015   • Urticaria 06/29/2015       PAST SURGICAL HISTORY  Past Surgical History:   Procedure Laterality Date   • BREAST BIOPSY Right     50+ years ago   • BREAST LUMPECTOMY     • HYSTERECTOMY         FAMILY HISTORY  Family History   Problem Relation Age of Onset   • Heart disease Mother    • Cancer Father    • Asthma Paternal Grandfather        SOCIAL HISTORY  Social History     Social History   • Marital status:      Spouse name: N/A   • Number of children: N/A   • Years of education: N/A     Occupational History   • Not on file.     Social History Main Topics   • Smoking status: Never Smoker   • Smokeless tobacco: Never Used   • Alcohol use No   • Drug use: No   • Sexual activity: Not on file     Other Topics Concern   • Not on file     Social History  Narrative       ALLERGIES  Ketek [telithromycin]; Nsaids; and Sulphur [sulfur]    REVIEW OF SYSTEMS  Review of Systems   Constitutional: Positive for appetite change (decreased), fatigue and unexpected weight change (loss). Negative for chills and fever.   HENT: Negative.  Negative for sore throat.    Eyes: Negative.    Respiratory: Positive for cough (productive).    Cardiovascular: Negative.  Negative for chest pain.   Gastrointestinal: Positive for nausea and vomiting.   Genitourinary: Negative.  Negative for dysuria.   Musculoskeletal: Negative.  Negative for back pain.   Skin: Negative.  Negative for rash.   Neurological: Negative.  Negative for headaches.       PHYSICAL EXAM  ED Triage Vitals   Temp Heart Rate Resp BP SpO2   12/19/17 1057 12/19/17 1054 12/19/17 1054 12/19/17 1054 12/19/17 1054   99.4 °F (37.4 °C) 90 22 124/70 96 %      Temp src Heart Rate Source Patient Position BP Location FiO2 (%)   12/19/17 1057 -- 12/19/17 1054 -- --   Tympanic  Sitting         Physical Exam   Constitutional: She is oriented to person, place, and time and well-developed, well-nourished, and in no distress. She appears distressed (Mild).   HENT:   Head: Normocephalic and atraumatic.   Mouth/Throat: Mucous membranes are dry.   Eyes: EOM are normal. Pupils are equal, round, and reactive to light.   Pale conjunctiva   Neck: Normal range of motion.   Cardiovascular: Normal rate, regular rhythm and normal heart sounds.    Pulmonary/Chest: Effort normal. No respiratory distress. She has wheezes in the right lower field and the left lower field. She has rhonchi in the right lower field and the left lower field.   Abdominal: Soft. She exhibits distension (softly). Bowel sounds are hypoactive. There is no tenderness. There is no rebound and no guarding.   Neurological: She is alert and oriented to person, place, and time. She has normal sensation and normal strength.   Normal neuro exam   Skin: Skin is warm and dry.   Psychiatric:  Affect normal.   Nursing note and vitals reviewed.      LAB RESULTS  Lab Results (last 24 hours)     Procedure Component Value Units Date/Time    CBC & Differential [[id number]] Collected:  12/19/17 1128    Specimen:  Blood Updated:  12/19/17 1143    Narrative:       The following orders were created for panel order CBC & Differential.  Procedure                               Abnormality         Status                     ---------                               -----------         ------                     CBC Auto Differential[[id number]]        Abnormal            Final result                 Please view results for these tests on the individual orders.    Comprehensive Metabolic Panel [[id number]]  (Abnormal) Collected:  12/19/17 1128    Specimen:  Blood Updated:  12/19/17 1209     Glucose 118 (H) mg/dL      BUN 35 (H) mg/dL      Creatinine 1.56 (H) mg/dL      Sodium 141 mmol/L      Potassium 4.3 mmol/L      Chloride 98 mmol/L      CO2 28.7 mmol/L      Calcium 10.2 mg/dL      Total Protein 7.6 g/dL      Albumin 4.10 g/dL      ALT (SGPT) 14 U/L      AST (SGOT) 23 U/L      Alkaline Phosphatase 78 U/L      Total Bilirubin 0.3 mg/dL      eGFR Non African Amer 32 (L) mL/min/1.73      Globulin 3.5 gm/dL      A/G Ratio 1.2 g/dL      BUN/Creatinine Ratio 22.4     Anion Gap 14.3 mmol/L     Narrative:       The MDRD GFR formula is only valid for adults with stable renal function between ages 18 and 70.    Protime-INR [[id number]]  (Normal) Collected:  12/19/17 1128    Specimen:  Blood Updated:  12/19/17 1159     Protime 13.3 Seconds      INR 1.05    Lipase [[id number]]  (Normal) Collected:  12/19/17 1128    Specimen:  Blood Updated:  12/19/17 1209     Lipase 50 U/L     BNP [[id number]]  (Normal) Collected:  12/19/17 1128    Specimen:  Blood Updated:  12/19/17 1209     proBNP 199.3 pg/mL     Narrative:       Among patients with dyspnea, NT-proBNP is highly sensitive for the detection of acute congestive heart failure. In  "addition NT-proBNP of <300 pg/ml effectively rules out acute congestive heart failure with 99% negative predictive value.    Troponin [[id number]]  (Normal) Collected:  12/19/17 1128    Specimen:  Blood Updated:  12/19/17 1209     Troponin T <0.010 ng/mL     Narrative:       Troponin T Reference Ranges:  Less than 0.03 ng/mL:    Negative for AMI  0.03 to 0.09 ng/mL:      Indeterminant for AMI  Greater than 0.09 ng/mL: Positive for AMI    Blood Culture - Blood, [[id number]] Collected:  12/19/17 1128    Specimen:  Blood from Arm, Right Updated:  12/19/17 1136    Lactic Acid, Plasma [[id number]]  (Abnormal) Collected:  12/19/17 1128    Specimen:  Blood Updated:  12/19/17 1159     Lactate 2.7 (C) mmol/L     Procalcitonin [[id number]]  (Abnormal) Collected:  12/19/17 1128    Specimen:  Blood Updated:  12/19/17 1214     Procalcitonin 0.38 (H) ng/mL     Narrative:       As a Marker for Sepsis (Non-Neonates):   1. <0.5 ng/mL represents a low risk of severe sepsis and/or septic shock.  1. >2 ng/mL represents a high risk of severe sepsis and/or septic shock.    As a Marker for Lower Respiratory Tract Infections that require antibiotic therapy:  PCT on Admission     Antibiotic Therapy             6-12 Hrs later  > 0.5                Strongly Recommended            >0.25 - <0.5         Recommended  0.1 - 0.25           Discouraged                   Remeasure/reassess PCT  <0.1                 Strongly Discouraged          Remeasure/reassess PCT      As 28 day mortality risk marker: \"Change in Procalcitonin Result\" (> 80 % or <=80 %) if Day 0 (or Day 1) and Day 4 values are available. Refer to http://www.[employer]-pct-calculator.com/   Change in PCT <=80 %   A decrease of PCT levels below or equal to 80 % defines a positive change in PCT test result representing a higher risk for 28-day all-cause mortality of patients diagnosed with severe sepsis or septic shock.  Change in PCT > 80 %   A decrease of PCT levels of more than 80 % " defines a negative change in PCT result representing a lower risk for 28-day all-cause mortality of patients diagnosed with severe sepsis or septic shock.                CBC Auto Differential [[id number]]  (Abnormal) Collected:  12/19/17 1128    Specimen:  Blood Updated:  12/19/17 1143     WBC 7.50 10*3/mm3      RBC 4.41 10*6/mm3      Hemoglobin 13.7 g/dL      Hematocrit 40.6 %      MCV 92.1 fL      MCH 31.1 pg      MCHC 33.7 g/dL      RDW 13.3 (H) %      RDW-SD 44.3 fl      MPV 10.7 fL      Platelets 257 10*3/mm3      Neutrophil % 82.3 (H) %      Lymphocyte % 9.9 (L) %      Monocyte % 7.2 %      Eosinophil % 0.3 %      Basophil % 0.3 %      Immature Grans % 0.0 %      Neutrophils, Absolute 6.18 10*3/mm3      Lymphocytes, Absolute 0.74 (L) 10*3/mm3      Monocytes, Absolute 0.54 10*3/mm3      Eosinophils, Absolute 0.02 10*3/mm3      Basophils, Absolute 0.02 10*3/mm3      Immature Grans, Absolute 0.00 10*3/mm3     Lactic Acid, Reflex Timer [[id number]] Collected:  12/19/17 1128    Specimen:  Blood Updated:  12/19/17 1159    Respiratory Panel, PCR - Swab, Nasopharynx [[id number]]  (Normal) Collected:  12/19/17 1129    Specimen:  Swab from Nasopharynx Updated:  12/19/17 1325     ADENOVIRUS, PCR Not Detected     Coronavirus 229E Not Detected     Coronavirus HKU1 Not Detected     Coronavirus NL63 Not Detected     Coronavirus OC43 Not Detected     Human Metapneumovirus Not Detected     Human Rhinovirus/Enterovirus Not Detected     Influenza B PCR Not Detected     Parainfluenza Virus 1 Not Detected     Parainfluenza Virus 2 Not Detected     Parainfluenza Virus 3 Not Detected     Parainfluenza Virus 4 Not Detected     Bordetella pertussis pcr Not Detected     Influenza 2009 H1N1 by PCR Not Detected     Chlamydophila pneumoniae PCR Not Detected     Mycoplasma pneumo by PCR Not Detected     Influenza A PCR Not Detected     Influenza A H3 Not Detected     Influenza A H1 Not Detected     RSV, PCR Not Detected    TSPOT [[id number]]  Collected:  12/19/17 1154    Specimen:  Blood Updated:  12/19/17 1157    Blood Culture - Blood, [[id number]] Collected:  12/19/17 1305    Specimen:  Blood from Arm, Left Updated:  12/19/17 1312    Urinalysis With / Culture If Indicated - Urine, Clean Catch [[id number]]  (Abnormal) Collected:  12/19/17 1320    Specimen:  Urine from Urine, Clean Catch Updated:  12/19/17 1333     Color, UA Yellow     Appearance, UA Cloudy (A)     pH, UA 7.5     Specific Gravity, UA 1.014     Glucose, UA Negative     Ketones, UA Negative     Bilirubin, UA Negative     Blood, UA Negative     Protein, UA Negative     Leuk Esterase, UA Negative     Nitrite, UA Negative     Urobilinogen, UA 0.2 E.U./dL    Narrative:       Urine microscopic not indicated.          I ordered the above labs and reviewed the results    RADIOLOGY  CT Chest Without Contrast         CT Abdomen Pelvis Without Contrast            1328  Reviewed CTA Chest - bibasilar pneumonia, left greater than right. Independently viewed by me. Interpreted by radiologist. Discussed with .    Reviewed CT abd/pel - Negative acute. Independently viewed by me. Interpreted by radiologist. Discussed with .    I ordered the above noted radiological studies. Interpreted by radiologist. Discussed with radiologist (). Reviewed by me in PACS.       PROCEDURES  Procedures      PROGRESS AND CONSULTS  ED Course     1111  Ordered TSPOT per family request. Also ordered labs, CT abd/pel and CTA Chest for further evaluation. I also ordered Duo-Neb breathing treatment and Solu-medrol for her dyspnea.      1202  Ordered the patient 30/kilo fluids bolus per sepsis protocol.     1348  Rechecked the patient and she is resting comfortably. I placed the patient on 2L of O2. Discussed pertinent lab and imaging results, including CTA chest shows bibasilar pneumonia and her CT abd/pel shows nothing acute so I discussed the plan to place the patient on IV antibiotics and fluids to  help treat her pneumonia. Patient agrees with plan and all questions were addressed.     1356  Placed call out to [facility] for admission.     1418  Discussed case with . He would like me to start the patient on Zosyn and Doxycycline. Reviewed history, exam, results and treatments. Discussed concerns and plan of care.  accepts pt to be admitted.      MEDICAL DECISION MAKING  Results were reviewed/discussed with the patient and they were also made aware of online access. Pt also made aware that some labs, such as cultures, will not be resulted during ER visit and follow up with PMD is necessary.     MDM  Number of Diagnoses or Management Options     Amount and/or Complexity of Data Reviewed  Clinical lab tests: reviewed and ordered (Lactic 2.7)  Tests in the radiology section of CPT®: reviewed and ordered (CTA Chest - bibasilar pneumonia, left greater than right. CT Abd/pel - negative acute)  Discussion of test results with the performing providers: yes  Decide to obtain previous medical records or to obtain history from someone other than the patient: yes  Review and summarize past medical records: yes (Patient saw her PCP 11/27, 12/12 and 12/18 and was dx with pneumonia on the 12th. She was referred to Pulmonology yesterday. Sputum culture yesterday showed normal james. )  Discuss the patient with other providers: yes (Discussed case with .)    Patient Progress  Patient progress: stable         DIAGNOSIS  Final diagnoses:   Pneumonia of both lower lobes due to infectious organism   Sepsis, due to unspecified organism       DISPOSITION  ADMISSION    Discussed treatment plan and reason for admission with pt/family and admitting physician.  Pt/family voiced understanding of the plan for admission for further testing/treatment as needed.       Latest Documented Vital Signs:  As of 2:20 PM  BP- 126/63 HR- 77 Temp- 99.5 °F (37.5 °C) (Tympanic) O2 sat- 98%    --  Documentation assistance  provided by anaya Canas for .  Information recorded by the scribe was done at my direction and has been verified and validated by me.        Myron Canas  12/19/17 4585       Ajay López MD  12/19/17 9054

## 2017-12-19 NOTE — PROGRESS NOTES
Clinical Pharmacy Services: Medication History    Bailee Garza is a 77 y.o. female presenting to [facility] for   Chief Complaint   Patient presents with   • Vomiting   • Nausea   • Fatigue       She  has a past medical history of Arthropathy of pelvic region and thigh (12/13/2012); Back pain (11/20/2007); Back pain (04/03/2013); Chronic back pain (07/13/2009); Constipation (03/24/2015); Depression (01/28/2015); Dyspepsia (03/18/2008); Essential hypertension (11/20/2007); Grief reaction (07/05/2011); Hearing loss (03/18/2008); History of bone density study (09/16/2015); Hypothyroidism, acquired (11/20/2007); IBS (irritable bowel syndrome) (04/25/2013); Insomnia (01/28/2015); Intervertebral disc disorder with myelopathy, cervical region (07/22/2010); Rhinitis, allergic (11/20/2007); Screening breast examination (11/20/2007); Stress (04/27/2015); and Urticaria (06/29/2015).    Allergies as of 12/19/2017 - Deven as Reviewed 12/19/2017   Allergen Reaction Noted   • Ketek [telithromycin]  10/13/2015   • Nsaids  10/13/2015   • Sulphur [sulfur]  10/13/2015       Medication information was obtained from: Patient, family, medication bottles, pharmacy  Pharmacy and Phone Number: [facility] [phone]    Prior to Admission Medications     Prescriptions Last Dose Informant Patient Reported? Taking?    levoFLOXacin (LEVAQUIN) 500 MG tablet 12/18/2017 Medication Bottle Yes Yes    Take 500 mg by mouth Daily.    Multiple Vitamins-Minerals (PRESERVISION AREDS PO) 12/18/2017 Self Yes Yes    Take 1 tablet by mouth Daily.    SYNTHROID 100 MCG tablet 12/18/2017 Medication Bottle No Yes    Take 1 tablet by mouth Daily.    triamterene-hydrochlorothiazide (MAXZIDE) 75-50 MG per tablet 12/18/2017 Medication Bottle No Yes    Take 1 tablet by mouth Daily.    calcium polycarbophil (FIBERCON) 625 MG tablet 12/18/2017 Self Yes Yes    Take 625 mg by mouth Daily.    traMADol-acetaminophen (ULTRACET) 37.5-325 MG per tablet  12/18/2017 Pharmacy Yes Yes    Take 1 tablet by mouth Every Morning.    traMADol-acetaminophen (ULTRACET) 37.5-325 MG per tablet 12/18/2017 Pharmacy Yes Yes    Take 2 tablets by mouth every night at bedtime.            Medication notes: None    This medication list is complete to the best of my knowledge as of 12/19/2017    Please call if questions.    Bettie Unger, Medication History Technician  12/19/2017 4:15 PM

## 2017-12-19 NOTE — CONSULTS
Group: [facility]         PULMONARY CONSULT NOTE    Patient Identification:  Bailee Garza  77 y.o.  female  1940  [id number]            Requesting physician: Dr. Anupam Le    Reason for Consultation:  Chronic cough, abnormal CT chest    CC: Cough and weakness    History of Present Illness:  77-year-old female who presents with chronic cough.  The cough has been present for many months, she says at least 3-4 months.  She has lost an undetermined amount of weight, involuntarily over the past 3 months.  In the spring and summer time she usually gardens and works around soil.  Her cough happens every day.  It is productive of yellow sputum.  Sometimes she has wheezing.  She describes moderate dyspnea on exertion.  She denies any lung disease in her parents or siblings.  She denies any recent sick contact.  She was recently prescribed 3 separate antibiotics by her primary care physician and due to the persistent cough she was referred to a pulmonologist but her appointment was going to be next week.    I reviewed history and physical dictated by Dr. Ajay López earlier today.    Review of Systems   Constitutional: Positive for appetite change, chills, diaphoresis, fatigue, fever and unexpected weight change.   HENT: Negative for ear discharge and sore throat.    Eyes: Negative for pain and visual disturbance.   Respiratory: Positive for cough, shortness of breath and wheezing.    Cardiovascular: Negative for chest pain and leg swelling.   Gastrointestinal: Negative for abdominal pain and diarrhea.   Endocrine: Negative for cold intolerance and polyuria.   Genitourinary: Negative for dysuria and hematuria.   Musculoskeletal: Negative for joint swelling and myalgias.   Skin: Negative for rash and wound.   Neurological: Positive for weakness. Negative for speech difficulty and numbness.   Hematological: Negative for adenopathy. Does not bruise/bleed easily.   Psychiatric/Behavioral: Negative  for agitation and confusion.       Past Medical History:  Past Medical History:   Diagnosis Date   • Arthropathy of pelvic region and thigh 2012    unspecified   • Back pain 2007   • Back pain 2013    unspecified   • Chronic back pain 2009   • Constipation 2015    unspecified   • Depression 2015   • Dyspepsia 2008   • Essential hypertension 2007   • 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)   • Hearing loss 2008   • History of bone density study 2015   • Hypothyroidism, acquired 2007   • IBS (irritable bowel syndrome) 2013   • Insomnia 2015    unspecified   • Intervertebral disc disorder with myelopathy, cervical region 2010   • Rhinitis, allergic 2007   • Screening breast examination 2007   • Stress 2015    other acute reactions to stress   • Urticaria 2015       Past Surgical History:  Past Surgical History:   Procedure Laterality Date   • BREAST BIOPSY Right     50+ years ago   • BREAST LUMPECTOMY     • HYSTERECTOMY          Home Meds:  Prescriptions Prior to Admission   Medication Sig Dispense Refill Last Dose   • calcium polycarbophil (FIBERCON) 625 MG tablet Take 625 mg by mouth Daily.   2017   • levoFLOXacin (LEVAQUIN) 500 MG tablet Take 500 mg by mouth Daily.   2017   • Multiple Vitamins-Minerals (PRESERVISION AREDS PO) Take 1 tablet by mouth Daily.   2017   • SYNTHROID 100 MCG tablet Take 1 tablet by mouth Daily. 30 tablet 2017   • traMADol-acetaminophen (ULTRACET) 37.5-325 MG per tablet Take 1 tablet by mouth Every Morning.   2017   • traMADol-acetaminophen (ULTRACET) 37.5-325 MG per tablet Take 2 tablets by mouth every night at bedtime.   2017   • triamterene-hydrochlorothiazide (MAXZIDE) 75-50 MG per tablet Take 1 tablet by mouth Daily. 30 tablet 11 2017       Allergies:  Allergies   Allergen Reactions   • Danielle  "[Telithromycin]    • Nsaids    • Sulphur [Sulfur]        Social History:   Social History     Social History   • Marital status:      Spouse name: N/A   • Number of children: N/A   • Years of education: N/A     Occupational History   • Not on file.     Social History Main Topics   • Smoking status: Never Smoker   • Smokeless tobacco: Never Used   • Alcohol use No   • Drug use: No   • Sexual activity: Not on file     Other Topics Concern   • Not on file     Social History Narrative       Family History:  Family History   Problem Relation Age of Onset   • Heart disease Mother    • Cancer Father    • Asthma Paternal Grandfather        Physical Exam:  /60 (BP Location: Left arm, Patient Position: Lying)  Pulse 94  Temp 97.9 °F (36.6 °C) (Oral)   Resp 18  Ht 162.6 cm (64\")  Wt 49.6 kg (109 lb 4.8 oz)  SpO2 95%  BMI 18.76 kg/m2 Body mass index is 18.76 kg/(m^2). 95% 49.6 kg (109 lb 4.8 oz)  Physical Exam   Constitutional: She appears well-developed. No distress.   HENT:   Head: Normocephalic.   Mouth/Throat: Oropharynx is clear and moist.   Eyes: Conjunctivae and EOM are normal. No scleral icterus.   Neck: No tracheal deviation present. No thyromegaly present.   Cardiovascular: Normal rate, regular rhythm and normal heart sounds.    Pulmonary/Chest: No respiratory distress. She has no wheezes. She exhibits no tenderness.   Some scattered rhonchi right greater than left   Abdominal: She exhibits no distension and no mass. There is no tenderness.   Musculoskeletal: Normal range of motion. She exhibits no deformity.   Neurological: She is alert. No cranial nerve deficit. She exhibits normal muscle tone.   Skin: Skin is warm. No rash noted. No erythema.   Psychiatric: She has a normal mood and affect. Thought content normal.       LABS:  Lab Results   Component Value Date    CALCIUM 10.2 12/19/2017     Results from last 7 days  Lab Units 12/19/17  1128   SODIUM mmol/L 141   POTASSIUM mmol/L 4.3   CHLORIDE " mmol/L 98   CO2 mmol/L 28.7   BUN mg/dL 35*   CREATININE mg/dL 1.56*   GLUCOSE mg/dL 118*   CALCIUM mg/dL 10.2   WBC 10*3/mm3 7.50   HEMOGLOBIN g/dL 13.7   PLATELETS 10*3/mm3 257   ALT (SGPT) U/L 14   AST (SGOT) U/L 23   PROBNP pg/mL 199.3   PROCALCITONIN ng/mL 0.38*     Lab Results   Component Value Date    TROPONINT <0.010 12/19/2017       Results from last 7 days  Lab Units 12/19/17  1128   TROPONIN T ng/mL <0.010       Results from last 7 days  Lab Units 12/18/17  1051   RESPCX  Heavy growth (4+) Normal Respiratory Linda   GRAM STAIN RESULT  Rare (1+) WBCs seen  Occasional Epithelial cells per low power field  Mixed bacterial morphotypes seen on Gram Stain       Results from last 7 days  Lab Units 12/19/17  1524 12/19/17  1128   PROCALCITONIN ng/mL  --  0.38*   LACTATE mmol/L 1.3 2.7*             Results from last 7 days  Lab Units 12/19/17  1128   INR  1.05       Results from last 7 days  Lab Units 12/18/17  1051   RESPCX  Heavy growth (4+) Normal Respiratory Linda   GRAM STAIN RESULT  Rare (1+) WBCs seen  Occasional Epithelial cells per low power field  Mixed bacterial morphotypes seen on Gram Stain     Lab Results   Component Value Date    TSH 0.772 11/24/2017     Estimated Creatinine Clearance: 24 mL/min (by C-G formula based on Cr of 1.56).    Results from last 7 days  Lab Units 12/19/17  1320   NITRITE UA  Negative        Imaging: I personally visualized the images of CT of the chest.  It shows bilateral bronchiectasis of the lower lobes, left greater than right.  These are chronic changes of some mild reticular nodular changes at the bases bilaterally suggestive of either chronic recurrent aspiration or else atypical mycobacterial infection.      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  She warrants bronchoscopy.  However recurrent microaspiration can also cause bronchiectasis  bilaterally.  I suggest barium esophagram study.  I have consented her for bronchoscopy.  We discussed risks, benefits and alternatives to bronchoscopy and she wishes to proceed on Thursday.  The purpose of bronchoscopy will be for microbiologic studies of the lower respiratory tract james.  Stop all antibiotics at this time.  She has already taken 3 antibiotics without any improvement.  This is most likely atypical infection.  She needs aggressive pulmonary hygiene protocol with flutter valve and scheduled DuoNeb treatments via nebulizer.          Mario Alanis MD  12/19/2017  5:22 PM      Much of this encounter note is an electronic transcription/translation of spoken language to printed text using Dragon Software.

## 2017-12-19 NOTE — H&P
Name: Bailee Garza ADMIT: 2017   : 1940  PCP: Eddie Araya MD    MRN: [id number] LOS: 0 days   AGE/SEX: 77 y.o. female  ROOM: [facility]     Chief Complaint   Patient presents with   • Vomiting   • Nausea   • Fatigue       Subjective   Patient is a 77 y.o. female who presents to [facility] with the above chief complaint.  Her symptoms started in late October.  She's been coughing pretty consistently since then.  She saw her primary care doctor after cough for 2 weeks and was diagnosed with pneumonia after chest x-ray and was placed on Omnicef.  She completed a full course of Omnicef in the outpatient setting with minimal improvement.  She continued to cough and went back to see her primary care doctor after continued and didn't go away.  This time they placed her on Levaquin.  She completed a seven-day course of Levaquin without much improvement.  She continues to have a productive cough and continues to be short of breath especially with activity.  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  At the last doctor's visit they put her on Augmentin she's been on this for about 3 or 4 days with no improvement and this is what brought her to the emergency room today.  She's had some subjective fevers and chills and was febrile in the emergency room but is otherwise been able to go about her normal activity.  She's concerned that this is related to her Pneumovax vaccine which she received a few days prior to the cough beginning.  She's tried a Liat pot at home as well as other over-the-counter remedies without any relief.    History of Present Illness    Past Medical History:   Diagnosis Date   • Arthropathy of pelvic region and thigh 2012    unspecified   • Back pain 2007   • Back pain 2013    unspecified   • Chronic back pain 2009   • Constipation 2015     unspecified   • Depression 2015   • Dyspepsia 2008   • Essential hypertension 2007   • 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)   • Hearing loss 2008   • History of bone density study 2015   • Hypothyroidism, acquired 2007   • IBS (irritable bowel syndrome) 2013   • Insomnia 2015    unspecified   • Intervertebral disc disorder with myelopathy, cervical region 2010   • Rhinitis, allergic 2007   • Screening breast examination 2007   • Stress 2015    other acute reactions to stress   • Urticaria 2015     Past Surgical History:   Procedure Laterality Date   • BREAST BIOPSY Right     50+ years ago   • BREAST LUMPECTOMY     • HYSTERECTOMY       Family History   Problem Relation Age of Onset   • Heart disease Mother    • Cancer Father    • Asthma Paternal Grandfather      Social History   Substance Use Topics   • Smoking status: Never Smoker   • Smokeless tobacco: Never Used   • Alcohol use No     Prescriptions Prior to Admission   Medication Sig Dispense Refill Last Dose   • amoxicillin-clavulanate (AUGMENTIN) 875-125 MG per tablet Take 1 tablet by mouth 2 (Two) Times a Day for 10 days. 20 tablet 0    • benzonatate (TESSALON) 200 MG capsule Take 1 capsule by mouth 3 (Three) Times a Day As Needed for Cough. 30 capsule 0 Taking   • levoFLOXacin (LEVAQUIN) 500 MG tablet Take 500 mg by mouth Daily.      • Methylcellulose, Laxative, (SM FIBER LAXATIVE) 500 MG tablet Take 1 tablet by oral route daily   Taking   • Multiple Vitamins-Minerals (PRESERVISION AREDS PO) Take  by mouth.   Taking   • SYNTHROID 100 MCG tablet Take 1 tablet by mouth Daily. 30 tablet 11    • traMADol-acetaminophen (ULTRACET) 37.5-325 MG per tablet Take one tablet po q am, and 2 tablets po q hs as needed for pain 100 tablet 2 Taking   • triamterene-hydrochlorothiazide (MAXZIDE) 75-50 MG per tablet Take 1 tablet by mouth Daily. 30 tablet  11      Allergies:  Ketek [telithromycin]; Nsaids; and Sulphur [sulfur]    Review of Systems   Constitutional: Negative for chills, fatigue and fever.   HENT: Positive for congestion. Negative for rhinorrhea and sore throat.    Eyes: Negative for photophobia, redness and visual disturbance.   Respiratory: Positive for cough, chest tightness and shortness of breath.    Cardiovascular: Negative for chest pain, palpitations and leg swelling.   Gastrointestinal: Negative for abdominal distention, abdominal pain, constipation and diarrhea.   Endocrine: Negative for polydipsia, polyphagia and polyuria.   Genitourinary: Negative for difficulty urinating, hematuria and urgency.   Musculoskeletal: Negative for arthralgias, back pain and gait problem.   Neurological: Negative for dizziness, tremors and numbness.   Hematological: Negative for adenopathy. Does not bruise/bleed easily.   Psychiatric/Behavioral: Negative for agitation, behavioral problems and confusion.        Objective    Vital Signs  Temp:  [99.4 °F (37.4 °C)-99.7 °F (37.6 °C)] 99.5 °F (37.5 °C)  Heart Rate:  [73-90] 81  Resp:  [15-22] 15  BP: (117-127)/(58-71) 121/67  SpO2:  [95 %-99 %] 96 %  on  Flow (L/min):  [2] 2;   O2 Device: nasal cannula  Body mass index is 18.02 kg/(m^2).    Physical Exam   Constitutional: She is oriented to person, place, and time. She appears well-developed and well-nourished. No distress.   HENT:   Head: Normocephalic and atraumatic.   Nose: Nose normal.   Eyes: Conjunctivae and EOM are normal. No scleral icterus.   Neck: Neck supple. No JVD present.   Cardiovascular: Normal rate, regular rhythm and normal heart sounds.    Pulmonary/Chest: No accessory muscle usage. No respiratory distress. She has no wheezes. She has rales in the right middle field, the right lower field and the left lower field.   Abdominal: Soft. Bowel sounds are normal. She exhibits no distension.   Neurological: She is alert and oriented to person, place, and  time.   Skin: Skin is warm and dry.   Psychiatric: She has a normal mood and affect. Her behavior is normal.   Nursing note and vitals reviewed.      Results Review:   I reviewed the patient's new clinical results.    Results from last 7 days  Lab Units 12/19/17  1128   WBC 10*3/mm3 7.50   HEMOGLOBIN g/dL 13.7   PLATELETS 10*3/mm3 257     Results from last 7 days  Lab Units 12/19/17  1128   SODIUM mmol/L 141   POTASSIUM mmol/L 4.3   CHLORIDE mmol/L 98   CO2 mmol/L 28.7   BUN mg/dL 35*   CREATININE mg/dL 1.56*   GLUCOSE mg/dL 118*   ALBUMIN g/dL 4.10   BILIRUBIN mg/dL 0.3   ALK PHOS U/L 78   AST (SGOT) U/L 23   ALT (SGPT) U/L 14   Estimated Creatinine Clearance: 23.1 mL/min (by C-G formula based on Cr of 1.56).  Results from last 7 days  Lab Units 12/19/17  1128   INR  1.05   TROPONIN T ng/mL <0.010   PROBNP pg/mL 199.3       Results from last 7 days  Lab Units 12/19/17  1320   NITRITE UA  Negative     CT Chest Without Contrast   Final Result      CT Abdomen Pelvis Without Contrast   Final Result        Assessment/Plan     Active Problems:    Pneumonia of both lower lobes due to infectious organism      Assessment & Plan  His had a pneumonia essentially since October.  She's been treated initially with Omnicef and then treated again with Levaquin and Augmentin and has not gotten any better.  She still has crackles on exam and her chest CT shows bibasilar changes.  I started her on Zosyn here in the hospital and will continue that treatment for the time being.  I've asked pulmonary to come by and see her.  Given the persistence with these infiltrates and pneumonia think it may warrant bronchoscopy for cultures and further evaluation.  The other possibility is that she have chronic silent aspiration issue ongoing.  I've asked speech therapy to see her for this as well.  Her chest CT was without contrast given her chronic kidney disease.  We may need to consider doing one with contrast for further imaging and evaluation  however at this point we'll continue with the present plan.  Her lactate was initially elevated in the emergency room but is now within normal limits after fluid bolus will continue IV fluids overnight.  Her pro-calcitonin is elevated consistent with a pneumonia diagnosis.      I discussed the patients findings and my recommendations with patient, family and nursing staff.          Anupam Le MD  [facility]  12/19/17  3:34 PM

## 2017-12-20 LAB
ANION GAP SERPL CALCULATED.3IONS-SCNC: 13.4 MMOL/L
BACTERIA SPEC RESP CULT: NORMAL
BACTERIA SPEC RESP CULT: NORMAL
BUN BLD-MCNC: 28 MG/DL (ref 8–23)
BUN/CREAT SERPL: 23.1 (ref 7–25)
CALCIUM SPEC-SCNC: 8.2 MG/DL (ref 8.6–10.5)
CHLORIDE SERPL-SCNC: 109 MMOL/L (ref 98–107)
CO2 SERPL-SCNC: 22.6 MMOL/L (ref 22–29)
CREAT BLD-MCNC: 1.21 MG/DL (ref 0.57–1)
DEPRECATED RDW RBC AUTO: 47.1 FL (ref 37–54)
ERYTHROCYTE [DISTWIDTH] IN BLOOD BY AUTOMATED COUNT: 13.4 % (ref 11.7–13)
GFR SERPL CREATININE-BSD FRML MDRD: 43 ML/MIN/1.73
GLUCOSE BLD-MCNC: 114 MG/DL (ref 65–99)
GRAM STN SPEC: NORMAL
HCT VFR BLD AUTO: 34 % (ref 35.6–45.5)
HGB BLD-MCNC: 10.9 G/DL (ref 11.9–15.5)
MAGNESIUM SERPL-MCNC: 1.8 MG/DL (ref 1.6–2.4)
MCH RBC QN AUTO: 31.1 PG (ref 26.9–32)
MCHC RBC AUTO-ENTMCNC: 32.1 G/DL (ref 32.4–36.3)
MCV RBC AUTO: 96.9 FL (ref 80.5–98.2)
PHOSPHATE SERPL-MCNC: 2.3 MG/DL (ref 2.5–4.5)
PLATELET # BLD AUTO: 235 10*3/MM3 (ref 140–500)
PMV BLD AUTO: 10.8 FL (ref 6–12)
POTASSIUM BLD-SCNC: 3.4 MMOL/L (ref 3.5–5.2)
RBC # BLD AUTO: 3.51 10*6/MM3 (ref 3.9–5.2)
SODIUM BLD-SCNC: 145 MMOL/L (ref 136–145)
TSPOT INTERPRETATION: NEGATIVE
TSPOT NIL CONTROL: 1
TSPOT PANEL A: 0
TSPOT PANEL B: 0
TSPOT POS CONTROL: 78
WBC NRBC COR # BLD: 9.06 10*3/MM3 (ref 4.5–10.7)

## 2017-12-20 PROCEDURE — 97161 PT EVAL LOW COMPLEX 20 MIN: CPT

## 2017-12-20 PROCEDURE — 97110 THERAPEUTIC EXERCISES: CPT

## 2017-12-20 PROCEDURE — 25810000003 SODIUM CHLORIDE 0.9 % WITH KCL 20 MEQ 20-0.9 MEQ/L-% SOLUTION: Performed by: HOSPITALIST

## 2017-12-20 PROCEDURE — 94799 UNLISTED PULMONARY SVC/PX: CPT

## 2017-12-20 PROCEDURE — 83735 ASSAY OF MAGNESIUM: CPT | Performed by: HOSPITALIST

## 2017-12-20 PROCEDURE — 84100 ASSAY OF PHOSPHORUS: CPT | Performed by: HOSPITALIST

## 2017-12-20 PROCEDURE — 85027 COMPLETE CBC AUTOMATED: CPT | Performed by: HOSPITALIST

## 2017-12-20 PROCEDURE — 80048 BASIC METABOLIC PNL TOTAL CA: CPT | Performed by: HOSPITALIST

## 2017-12-20 PROCEDURE — 92610 EVALUATE SWALLOWING FUNCTION: CPT

## 2017-12-20 RX ORDER — POTASSIUM CHLORIDE 1.5 G/1.77G
40 POWDER, FOR SOLUTION ORAL ONCE
Status: COMPLETED | OUTPATIENT
Start: 2017-12-20 | End: 2017-12-20

## 2017-12-20 RX ORDER — IPRATROPIUM BROMIDE AND ALBUTEROL SULFATE 2.5; .5 MG/3ML; MG/3ML
3 SOLUTION RESPIRATORY (INHALATION)
Status: DISCONTINUED | OUTPATIENT
Start: 2017-12-20 | End: 2017-12-23 | Stop reason: HOSPADM

## 2017-12-20 RX ORDER — LORAZEPAM 1 MG/1
1 TABLET ORAL ONCE
Status: COMPLETED | OUTPATIENT
Start: 2017-12-20 | End: 2017-12-20

## 2017-12-20 RX ADMIN — IPRATROPIUM BROMIDE AND ALBUTEROL SULFATE 3 ML: .5; 3 SOLUTION RESPIRATORY (INHALATION) at 14:51

## 2017-12-20 RX ADMIN — IPRATROPIUM BROMIDE AND ALBUTEROL SULFATE 3 ML: .5; 3 SOLUTION RESPIRATORY (INHALATION) at 07:43

## 2017-12-20 RX ADMIN — POTASSIUM CHLORIDE AND SODIUM CHLORIDE 100 ML/HR: 900; 150 INJECTION, SOLUTION INTRAVENOUS at 04:01

## 2017-12-20 RX ADMIN — POTASSIUM CHLORIDE AND SODIUM CHLORIDE 100 ML/HR: 900; 150 INJECTION, SOLUTION INTRAVENOUS at 23:38

## 2017-12-20 RX ADMIN — IPRATROPIUM BROMIDE AND ALBUTEROL SULFATE 3 ML: .5; 3 SOLUTION RESPIRATORY (INHALATION) at 19:09

## 2017-12-20 RX ADMIN — POTASSIUM CHLORIDE AND SODIUM CHLORIDE 100 ML/HR: 900; 150 INJECTION, SOLUTION INTRAVENOUS at 14:05

## 2017-12-20 RX ADMIN — LORAZEPAM 1 MG: 1 TABLET ORAL at 23:38

## 2017-12-20 RX ADMIN — IPRATROPIUM BROMIDE AND ALBUTEROL SULFATE 3 ML: .5; 3 SOLUTION RESPIRATORY (INHALATION) at 12:27

## 2017-12-20 RX ADMIN — POTASSIUM CHLORIDE 40 MEQ: 1.5 POWDER, FOR SOLUTION ORAL at 23:49

## 2017-12-20 NOTE — PROGRESS NOTES
[facility]     LOS: 1 day     Subjective:  Cough    No cp  No soa      Objective:    Vital Signs:  Temp:  [97.7 °F (36.5 °C)-98.2 °F (36.8 °C)] 97.7 °F (36.5 °C)  Heart Rate:  [] 89  Resp:  [18] 18  BP: ()/(49-65) 98/49    General Appearance: no acute distress, appears comfortable  Heart: regular rate and rhythm  Lungs: few crackles, respirations unlabored, good air entry  Abdomen: soft, non-tender, no guarding, no rebound, non-distended  Extremities: no edema, no cyanosis  Neurology: speech normal, CN grossly normal  Psychiatric: normal mood and affect    Results Review:    Glucose   Date Value Ref Range Status   12/20/2017 114 (H) 65 - 99 mg/dL Final   12/19/2017 118 (H) 65 - 99 mg/dL Final       Results from last 7 days  Lab Units 12/20/17  0442   WBC 10*3/mm3 9.06   HEMOGLOBIN g/dL 10.9*   HEMATOCRIT % 34.0*   PLATELETS 10*3/mm3 235       Results from last 7 days  Lab Units 12/20/17  0442 12/19/17  1128   SODIUM mmol/L 145 141   POTASSIUM mmol/L 3.4* 4.3   CHLORIDE mmol/L 109* 98   CO2 mmol/L 22.6 28.7   BUN mg/dL 28* 35*   CREATININE mg/dL 1.21* 1.56*   CALCIUM mg/dL 8.2* 10.2   BILIRUBIN mg/dL  --  0.3   ALK PHOS U/L  --  78   ALT (SGPT) U/L  --  14   AST (SGOT) U/L  --  23   GLUCOSE mg/dL 114* 118*       Results from last 7 days  Lab Units 12/19/17  1128   INR  1.05       Results from last 7 days  Lab Units 12/20/17  0442   MAGNESIUM mg/dL 1.8       Results from last 7 days  Lab Units 12/19/17  1128   TROPONIN T ng/mL <0.010     Cultures:  Blood Culture   Date Value Ref Range Status   12/19/2017 No growth at 24 hours  Preliminary   12/19/2017 No growth at 24 hours  Preliminary     Respiratory Culture   Date Value Ref Range Status   12/18/2017 Heavy growth (4+) Normal Respiratory Linda  Final       I have reviewed daily medications and changes in CPOE    Scheduled meds    ipratropium-albuterol 3 mL Nebulization TID - RT         sodium chloride 0.9 % with KCl 20 mEq 100  mL/hr Last Rate: 100 mL/hr (12/20/17 1405)     PRN meds  •  acetaminophen  •  bisacodyl  •  bisacodyl  •  nitroglycerin  •  ondansetron **OR** ondansetron ODT **OR** ondansetron  •  sodium chloride      Principal Problem:    Pneumonia of both lower lobes due to infectious organism  Active Problems:    Essential hypertension    Hypothyroidism, acquired    Chronic kidney disease, stage III (moderate)        Assessment/Plan:      Pneumonia of both lower lobes due to infectious organism- doxy  -bronch      Essential hypertension      Hypothyroidism, acquired      Chronic kidney disease, stage III (moderate)    >25 minutes spent, > 1/2 time spent counseling and coordination of care pneumonia, d/w Dr Sunni Worley MD  12/20/17  5:26 PM

## 2017-12-20 NOTE — THERAPY EVALUATION
"Acute Care - Speech Language Pathology   Swallow Initial Evaluation [facility]     Patient Name: Bailee Garza  : 1940  MRN: [id number]  Today's Date: 2017  Onset of Illness/Injury or Date of Surgery Date: 17            Admit Date: 2017      SPEECH-LANGUAGE PATHOLOGY EVALUATION - SWALLOW  Subjective: The patient was seen on this date for a Clinical Swallow evaluation.  Patient was alert and cooperative.  Significant history: Chronic cough (3months) w/ bilateral pneumonia, weight loss.  Pt reports vomiting due to coughing and occasionally \"reaching back in throat w/tissue to pull out mucous\".   Objective: Textures given included thin liquid, mechanical soft consistency and regular consistency.  Assessment: Difficulties were noted with none of the above consistencies;however, inconsistent vocal changes noted w/all trials, unknown if due to mucous buildup or PO trials. Pt w/timely swallow, adequate laryngeal elevation, and oral motor exam WNL.   Observations:  wet vocal quality questioned inconsistently  SLP Findings:  Patient presents with suspected minimal oropharyngeal dysphagia, without esophageal component.   Recommendations: Diet Textures: thin liquid, regular consistency food.  Medications should be taken whole with thin liquids.   Recommended Strategies: Upright for PO and may use straw. Oral care before breakfast, after all meals and PRN.  Other Recommended Evaluations: VFSS pending results of  Bronchoscopy    Dysphagia therapy is recommended. Rationale: monitor diet tolerance and assess need for instrumental exam.        Visit Dx:     ICD-10-CM ICD-9-CM   1. Pneumonia of both lower lobes due to infectious organism J18.9 483.8   2. Sepsis, due to unspecified organism A41.9 038.9     995.91   3. General weakness R53.1 780.79     Patient Active Problem List   Diagnosis   • Chronic back pain   • Essential hypertension   • Hearing loss   • Hypothyroidism, acquired   • IBS " (irritable bowel syndrome)   • Rhinitis, allergic   • Chronic kidney disease, stage III (moderate)   • Arthralgia of right knee   • Chronic cough   • Abnormal weight loss   • Abnormal CXR   • Exertional shortness of breath   • Pneumonia of both lower lobes due to infectious organism     Past Medical History:   Diagnosis Date   • Arthropathy of pelvic region and thigh 2012    unspecified   • Back pain 2007   • Back pain 2013    unspecified   • Chronic back pain 2009   • Constipation 2015    unspecified   • Depression 2015   • Dyspepsia 2008   • Essential hypertension 2007   • 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)   • Hearing loss 2008   • History of bone density study 2015   • Hypothyroidism, acquired 2007   • IBS (irritable bowel syndrome) 2013   • Insomnia 2015    unspecified   • Intervertebral disc disorder with myelopathy, cervical region 2010   • Rhinitis, allergic 2007   • Screening breast examination 2007   • Stress 2015    other acute reactions to stress   • Urticaria 2015     Past Surgical History:   Procedure Laterality Date   • BREAST BIOPSY Right     50+ years ago   • BREAST LUMPECTOMY     • HYSTERECTOMY            SWALLOW EVALUATION (last 72 hours)      Swallow Evaluation       17 1157                Rehab Evaluation    Document Type evaluation  -JT        Subjective Information no complaints;agree to therapy  -JT        Patient Effort, Rehab Treatment good  -JT        Symptoms Noted During/After Treatment fatigue  -JT        General Information    Patient Profile Review yes  -JT        Subjective Patient Observations alert, up in bed  -JT        Pertinent History Of Current Problem Pna, weight loss  -JT        Current Diet Limitations thin liquids;regular solid  -JT        Precautions/Limitations, Vision WNL  -JT        Precautions/Limitations,  Hearing other (see comments)   mild hearing loss  -JT        Prior Level of Function- Communication functional in all spheres  -JT        Prior Level of Function- Swallowing no diet consistency restrictions  -JT        Plans/Goals Discussed With patient and family;agreed upon  -JT        Barriers to Rehab hearing deficit  -JT        Clinical Impression    Patient's Goals For Discharge patient did not state  -JT        Family Goals For Discharge family did not state  -JT        Rehab Potential/Prognosis, Swallowing good, to achieve stated therapy goals  -JT        Criteria for Skilled Therapeutic Interventions Met demonstrates skilled criteria for intervention  -JT        FCM, Swallowing 7-->Level 7  -JT        Therapy Frequency PRN  -JT        Predicted Duration Therapy Interv (days) until discharge  -JT        Expected Duration Therapy Session (min) 15-30 minutes  -JT        SLP Diet Recommendation regular textures;thin liquids  -JT        Recommended Diagnostics VFSS (MBS);other (see comments)   VFSS if no findings on bronchoscopy 12/21  -JT        SLP Rec. for Method of Medication Administration meds whole with thin liquid  -JT        Monitor For Signs Of Aspiration none - silent aspiration present;gurgly voice  -JT        Anticipated Discharge Disposition home  -JT        Pain Assessment    Pain Assessment No/denies pain  -JT        Cognitive Assessment/Intervention    Current Cognitive/Communication Assessment functional  -JT        Orientation Status oriented x 4  -JT        Follows Commands/Answers Questions 100% of the time;able to follow single-step instructions  -JT        Oral Motor Structure and Function    Oral Motor Anatomy and Physiology patient demonstrates anatomy and physiology that is WNL  -JT        Dentition Assessment present and adequate  -JT        Secretion Management WNL/WFL  -JT        Mucosal Quality moist, healthy  -JT        Velar Elevation WNL (within normal limits)  -JT        Gag  "Response diminished;other (see comments)   pt \"reaches back in throat w/tissue to pull out mucous\"  -JT        Volitional Swallow no difficulties initiating volitional swallow  -JT        Volitional Cough no difficulties initiating volitional cough  -JT        Oral Musculature General Assessment WNL (within normal limits)  -JT        Clinical Swallow Exam    Mode of Presentation self fed;cup;spoon;straw  -JT        Oral Phase Results intact oral phase without signs of dysfunction  -JT        Pharyngeal Phase Results --   ? vocal changes but could be d/t mucus  -JT        Summary of Clinical Exam Pt w/possible pharyngeal dysphagia w/ questionable vocal changes with thin liquid by straw.  -JT          User Key  (r) = Recorded By, (t) = Taken By, (c) = Cosigned By    Initials Name Effective Dates    JT Marta Omalley Roberto Carlos 12/11/15 -         EDUCATION  The patient has been educated in the following areas:   Dysphagia (Swallowing Impairment).    SLP Recommendation and Plan     SLP Diet Recommendation: regular textures, thin liquids     SLP Rec. for Method of Medication Administration: meds whole with thin liquid  Monitor For Signs Of Aspiration: none - silent aspiration present, gurgly voice  Recommended Diagnostics: VFSS (MBS), other (see comments) (VFSS if no findings on bronchoscopy 12/21)  Criteria for Skilled Therapeutic Interventions Met: demonstrates skilled criteria for intervention  Anticipated Discharge Disposition: home  Rehab Potential/Prognosis, Swallowing: good, to achieve stated therapy goals  Therapy Frequency: PRN             Plan of Care Review  Plan Of Care Reviewed With: patient, family  Progress: progress toward functional goals as expected  Outcome Summary/Follow up Plan: Pt seen for bedside swallow eval. No overt s/s aspiration, however, pt did have inconsistent vocal changes w/PO trials which may/may not be related to liquids or d/t mucous buildup. Pt scheduled for bronch 12/21. Rec wait for " results. If no findings or more concern for silent asp, rec VFSS 12/22 to rule out aspiration. St to follow for POC and Diet mirela.           IP SLP Goals       12/20/17 1203          Safely Consume Diet    Safely Consume Diet- SLP, Time to Achieve by discharge  -JT      Safely Consume Diet- SLP, Outcome goal ongoing  -JT        User Key  (r) = Recorded By, (t) = Taken By, (c) = Cosigned By    Initials Name Provider Type    JUAN Azevedo Speech and Language Pathologist             SLP Outcome Measures (last 72 hours)      SLP Outcome Measures       12/20/17 1200          SLP Outcome Measures    Outcome Measure Used? Adult NOMS  -JT      FCM Scores    FCM Chosen Swallowing  -JT      Swallowing FCM Score 7  -JT        User Key  (r) = Recorded By, (t) = Taken By, (c) = Cosigned By    Initials Name Effective Dates    JUAN Azevedo 12/11/15 -            Time Calculation:         Time Calculation- SLP       12/20/17 1206          Time Calculation- SLP    SLP Start Time 1130  -JT      SLP Stop Time 1215  -JT      SLP Time Calculation (min) 45 min  -JT      SLP Received On 12/20/17  -JT        User Key  (r) = Recorded By, (t) = Taken By, (c) = Cosigned By    Initials Name Provider Type    JUAN Azevedo Speech and Language Pathologist          Therapy Charges for Today     Code Description Service Date Service Provider Modifiers Qty    54108778306  ST EVAL ORAL PHARYNG SWALLOW 3 12/20/2017 Marta Azevedo GN 1               Marta Azevedo  12/20/2017

## 2017-12-20 NOTE — PROGRESS NOTES
Dr. NOE Alanis    [address]    12/20/2017    Patient ID:  Name:  Bailee Garza  MRN:  [id number]  1940  77 y.o.  female            CC/Reason for visit: Follow-up for bronchiectasis, chronic cough    HPI: Patient is still coughing but says that she is unable to produce much sputum.  She is receiving DuoNeb scheduled.  She denies hemoptysis or fever    Vitals:  Vitals:    12/20/17 0743 12/20/17 1227 12/20/17 1308 12/20/17 1451   BP:   98/49    BP Location:   Left arm    Patient Position:   Lying    Pulse: 80 87 103 89   Resp: 18 18 18 18   Temp:   97.7 °F (36.5 °C)    TempSrc:   Oral    SpO2: 96% 98% 97%    Weight:       Height:               Body mass index is 20.91 kg/(m^2).    Intake/Output Summary (Last 24 hours) at 12/20/17 1649  Last data filed at 12/19/17 1738   Gross per 24 hour   Intake             2324 ml   Output                0 ml   Net             2324 ml       Exam:  GEN:  No distress, Alert, follows all commands  LUNGS: Some mild rhonchi over the bases right greater than left, nonlabored breathing  CV:  Normal S1S2, without murmur, no edema  ABD:  Non tender, no enlarged liver or masses  EXT:  No cyanosis or clubbing    Scheduled meds:    ipratropium-albuterol 3 mL Nebulization 4x Daily - RT     IV meds:                        sodium chloride 0.9 % with KCl 20 mEq 100 mL/hr Last Rate: 100 mL/hr (12/20/17 1405)       Data Review:   I reviewed the patient's medications and new clinical results.  Lab Results   Component Value Date    CALCIUM 8.2 (L) 12/20/2017    PHOS 2.3 (L) 12/20/2017    MG 1.8 12/20/2017       Results from last 7 days  Lab Units 12/20/17  0442 12/19/17  1128   SODIUM mmol/L 145 141   POTASSIUM mmol/L 3.4* 4.3   CHLORIDE mmol/L 109* 98   CO2 mmol/L 22.6 28.7   BUN mg/dL 28* 35*   CREATININE mg/dL 1.21* 1.56*   CALCIUM mg/dL 8.2* 10.2   BILIRUBIN mg/dL  --  0.3   ALK PHOS U/L  --  78   ALT (SGPT) U/L  --  14   AST (SGOT) U/L  --  23   GLUCOSE mg/dL 114* 118*    WBC 10*3/mm3 9.06 7.50   HEMOGLOBIN g/dL 10.9* 13.7   PLATELETS 10*3/mm3 235 257   INR   --  1.05   PROBNP pg/mL  --  199.3   PROCALCITONIN ng/mL  --  0.38*             ASSESSMENT:   Bronchiectasis  Chronic cough  Weight loss    Essential hypertension    Hypothyroidism, acquired    Chronic kidney disease, stage III (moderate)    Pneumonia of both lower lobes due to infectious organism      PLAN:  I visualized images of the CAT scan myself.  The patient has signet ring sign in her lower lobes, suggesting bronchiectasis.  Due to her chronic symptoms in the bronchiectasis, as well as the failure of recent multiple outpatient antibiotic regimens, she needs bronchoscopy tomorrow.        Mario Alanis MD  12/20/2017

## 2017-12-20 NOTE — THERAPY EVALUATION
Acute Care - Physical Therapy Initial Evaluation  [facility]     Patient Name: Bailee Garza  : 1940  MRN: [id number]  Today's Date: 2017   Onset of Illness/Injury or Date of Surgery Date: 17  Date of Referral to PT: 17  Referring Physician: Rey      Admit Date: 2017     Visit Dx:    ICD-10-CM ICD-9-CM   1. Pneumonia of both lower lobes due to infectious organism J18.9 483.8   2. Sepsis, due to unspecified organism A41.9 038.9     995.91   3. General weakness R53.1 780.79     Patient Active Problem List   Diagnosis   • Chronic back pain   • Essential hypertension   • Hearing loss   • Hypothyroidism, acquired   • IBS (irritable bowel syndrome)   • Rhinitis, allergic   • Chronic kidney disease, stage III (moderate)   • Arthralgia of right knee   • Chronic cough   • Abnormal weight loss   • Abnormal CXR   • Exertional shortness of breath   • Pneumonia of both lower lobes due to infectious organism     Past Medical History:   Diagnosis Date   • Arthropathy of pelvic region and thigh 2012    unspecified   • Back pain 2007   • Back pain 2013    unspecified   • Chronic back pain 2009   • Constipation 2015    unspecified   • Depression 2015   • Dyspepsia 2008   • Essential hypertension 2007   • 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)   • Hearing loss 2008   • History of bone density study 2015   • Hypothyroidism, acquired 2007   • IBS (irritable bowel syndrome) 2013   • Insomnia 2015    unspecified   • Intervertebral disc disorder with myelopathy, cervical region 2010   • Rhinitis, allergic 2007   • Screening breast examination 2007   • Stress 2015    other acute reactions to stress   • Urticaria 2015     Past Surgical History:   Procedure Laterality Date   • BREAST BIOPSY Right     50+ years ago   • BREAST LUMPECTOMY     •  HYSTERECTOMY            PT ASSESSMENT (last 72 hours)      PT Evaluation       12/20/17 0947 12/19/17 1544    Rehab Evaluation    Document Type evaluation  -PC     Subjective Information agree to therapy  -PC     Patient Effort, Rehab Treatment good  -PC     Patient Effort, Rehab Treatment Comment fatigued quickly  -PC     Symptoms Noted During/After Treatment none  -PC     General Information    Patient Profile Review yes  -PC     Onset of Illness/Injury or Date of Surgery Date 12/19/17  -PC     Referring Physician Rey  -PC     General Observations pt is in bed, no acute distress  -PC     Pertinent History Of Current Problem adm with PNA, sepsis, cough, work up ongoing, pt to have bronch  -PC     Prior Level of Function independent:;all household mobility;community mobility;work  -PC     Equipment Currently Used at Home  none  -KB    Plans/Goals Discussed With patient  -PC     Living Environment    Lives With  alone  -KB    Living Arrangements  house  -KB    Home Accessibility  no concerns  -KB    Stair Railings at Home  none  -KB    Type of Financial/Environmental Concern  none  -KB    Transportation Available  car;family or friend will provide  -KB    Clinical Impression    Date of Referral to PT 12/20/17  -PC     Criteria for Skilled Therapeutic Interventions Met yes;treatment indicated  -PC     Impairments Found (describe specific impairments) gait, locomotion, and balance  -PC     Rehab Potential good, to achieve stated therapy goals  -PC     Pain Assessment    Pain Assessment No/denies pain  -PC     Cognitive Assessment/Intervention    Current Cognitive/Communication Assessment functional  -PC     Orientation Status oriented x 4  -PC     Follows Commands/Answers Questions 100% of the time  -PC     Personal Safety WNL/WFL  -PC     Personal Safety Interventions fall prevention program maintained;gait belt  -PC     ROM (Range of Motion)    General ROM no range of motion deficits identified  -PC     MMT  (Manual Muscle Testing)    General MMT Assessment Detail no focal deficits, however fatigues quickly  -PC     Bed Mobility, Assessment/Treatment    Bed Mob, Supine to Sit, [place] independent  -PC     Bed Mob, Sit to Supine, [place] independent  -PC     Transfer Assessment/Treatment    Transfers, Sit-Stand [place] independent  -PC     Transfers, Stand-Sit [place] independent  -PC     Gait Assessment/Treatment    Gait, [place] Level contact guard assist  -PC     Gait, Distance (Feet) 80  -PC     Gait, Comment pt shaky on her feet, holding to IV pole, fatigued quickly  -PC     Positioning and Restraints    Pre-Treatment Position in bed  -PC     Post Treatment Position bed  -PC     In Bed supine;call light within reach;encouraged to call for assist  -PC       User Key  (r) = Recorded By, (t) = Taken By, (c) = Cosigned By    Initials Name Provider Type    CARLI Armstrong, RN Registered Nurse    PC Camille Ruiz PT Physical Therapist          Physical Therapy Education     Title: PT OT SLP Therapies (Done)     Topic: Physical Therapy (Done)     Point: Mobility training (Done)    Learning Progress Summary    Learner Readiness Method Response Comment Documented by Status   Patient Acceptance E,JULIETTE FOFANA  PC 12/20/17 0954 Done                      User Key     Initials Effective Dates Name Provider Type Discipline     12/01/15 -  Camille Ruiz, PT Physical Therapist PT                PT Recommendation and Plan  Anticipated Discharge Disposition: home  Planned Therapy Interventions: balance training, gait training  PT Frequency: daily  Plan of Care Review  Plan Of Care Reviewed With: patient  Outcome Summary/Follow up Plan: pt presents with weakness, impaired balance, and impaired activity tolerance with rapid fatigue. Will benefit from PT to address as hospital course progressess          IP PT Goals       12/20/17 0954          Gait Training PT LTG    Gait Training Goal PT LTG, Date  Established 12/20/17  -PC      Gait Training Goal PT LTG, Time to Achieve 1 wk  -PC      Gait Training Goal PT LTG, [place] Level conditional independence  -PC      Gait Training Goal PT LTG, Distance to Achieve 300 ft  -PC        User Key  (r) = Recorded By, (t) = Taken By, (c) = Cosigned By    Initials Name Provider Type    PC Camille Ruiz PT Physical Therapist                Outcome Measures       12/20/17 0900          How much help from another person do you currently need...    Turning from your back to your side while in flat bed without using bedrails? 4  -PC      Moving from lying on back to sitting on the side of a flat bed without bedrails? 4  -PC      Moving to and from a bed to a chair (including a wheelchair)? 4  -PC      Standing up from a chair using your arms (e.g., wheelchair, bedside chair)? 4  -PC      Climbing 3-5 steps with a railing? 3  -PC      To walk in hospital room? 3  -PC      AM-PAC 6 Clicks Score 22  -PC      Functional Assessment    Outcome Measure Options AM-PAC 6 Clicks Basic Mobility (PT)  -PC        User Key  (r) = Recorded By, (t) = Taken By, (c) = Cosigned By    Initials Name Provider Type    PC Camille Ruiz PT Physical Therapist           Time Calculation:         PT Charges       12/20/17 0957          Time Calculation    Start Time 0931  -PC      Stop Time 0946  -PC      Time Calculation (min) 15 min  -PC      PT Received On 12/20/17  -PC      PT - Next Appointment 12/21/17  -PC      PT Goal Re-Cert Due Date 12/27/17  -PC        User Key  (r) = Recorded By, (t) = Taken By, (c) = Cosigned By    Initials Name Provider Type    PC Camille Ruiz PT Physical Therapist          Therapy Charges for Today     Code Description Service Date Service Provider Modifiers Qty    08878922646 HC PT EVAL LOW COMPLEXITY 2 12/20/2017 Camille Ruiz, PT GP 1    30039701397 HC PT THER PROC EA 15 MIN 12/20/2017 Camille Ruiz, PT GP 1          PT G-Codes  Outcome Measure Options: AM-PAC  6 Clicks Basic Mobility (PT)      Camille Ruiz, PT  12/20/2017

## 2017-12-20 NOTE — PLAN OF CARE
Problem: Patient Care Overview (Adult)  Goal: Plan of Care Review  Outcome: Ongoing (interventions implemented as appropriate)   12/20/17 1833   Coping/Psychosocial Response Interventions   Plan Of Care Reviewed With patient   Patient Care Overview   Progress improving   Outcome Evaluation   Outcome Summary/Follow up Plan Pt scheduled for bronchoscopy tomorrow at 1240, IVF continued, NPO midnight, consent needs to be signed, will monitor.      Goal: Adult Individualization and Mutuality  Outcome: Ongoing (interventions implemented as appropriate)      Problem: Pneumonia (Adult)  Goal: Signs and Symptoms of Listed Potential Problems Will be Absent or Manageable (Pneumonia)  Outcome: Ongoing (interventions implemented as appropriate)   12/20/17 0442   Pneumonia   Problems Assessed (Pneumonia) all   Problems Present (Pneumonia) progression of infection       Problem: Infection, Risk/Actual (Adult)  Goal: Infection Prevention/Resolution  Outcome: Ongoing (interventions implemented as appropriate)   12/20/17 1833   Infection, Risk/Actual (Adult)   Infection Prevention/Resolution making progress toward outcome

## 2017-12-20 NOTE — PLAN OF CARE
Problem: Patient Care Overview (Adult)  Goal: Plan of Care Review  Outcome: Ongoing (interventions implemented as appropriate)   12/20/17 0442   Coping/Psychosocial Response Interventions   Plan Of Care Reviewed With patient   Patient Care Overview   Progress improving   Outcome Evaluation   Outcome Summary/Follow up Plan no complaints of pain, IV fluids continued, VSS, will continue to monitor.       Problem: Pneumonia (Adult)  Goal: Signs and Symptoms of Listed Potential Problems Will be Absent or Manageable (Pneumonia)  Outcome: Ongoing (interventions implemented as appropriate)      Problem: Infection, Risk/Actual (Adult)  Goal: Identify Related Risk Factors and Signs and Symptoms  Outcome: Outcome(s) achieved Date Met: 12/20/17    Goal: Infection Prevention/Resolution  Outcome: Ongoing (interventions implemented as appropriate)

## 2017-12-20 NOTE — PLAN OF CARE
Problem: Patient Care Overview (Adult)  Goal: Plan of Care Review  Outcome: Ongoing (interventions implemented as appropriate)   12/20/17 0954   Coping/Psychosocial Response Interventions   Plan Of Care Reviewed With patient   Outcome Evaluation   Outcome Summary/Follow up Plan pt presents with weakness, impaired balance, and impaired activity tolerance with rapid fatigue. Will benefit from PT to address as hospital course progressess       Problem: Inpatient Physical Therapy  Goal: Gait Training Goal LTG- PT  Outcome: Ongoing (interventions implemented as appropriate)   12/20/17 0954   Gait Training PT LTG   Gait Training Goal PT LTG, Date Established 12/20/17   Gait Training Goal PT LTG, Time to Achieve 1 wk   Gait Training Goal PT LTG, [place] Level conditional independence   Gait Training Goal PT LTG, Distance to Achieve 300 ft

## 2017-12-20 NOTE — PLAN OF CARE
Problem: Patient Care Overview (Adult)  Goal: Plan of Care Review  Outcome: Ongoing (interventions implemented as appropriate)   12/20/17 1203   Coping/Psychosocial Response Interventions   Plan Of Care Reviewed With patient;family   Patient Care Overview   Progress progress toward functional goals as expected   Outcome Evaluation   Outcome Summary/Follow up Plan Pt seen for bedside swallow eval. No overt s/s aspiration, however, pt did have inconsistent vocal changes w/PO trials which may/may not be related to liquids or d/t mucous buildup. Pt scheduled for bronch 12/21. Rec wait for results. If no findings or more concern for silent asp, rec VFSS 12/22 to rule out aspiration. St to follow for POC and Diet mirela.        Problem: Inpatient SLP  Goal: Dysphagia- Patient will safely consume diet as per recommendation with no signs/symptoms of aspiration  Outcome: Ongoing (interventions implemented as appropriate)   12/20/17 1203   Safely Consume Diet   Safely Consume Diet- SLP, Time to Achieve by discharge   Safely Consume Diet- SLP, Outcome goal ongoing

## 2017-12-20 NOTE — PROGRESS NOTES
Discharge Planning Assessment  [facility]     Patient Name: Bailee Garza  MRN: [id number]  Today's Date: 12/20/2017    Admit Date: 12/19/2017          Discharge Needs Assessment       12/20/17 1026    Living Environment    Lives With alone    Living Arrangements house    Home Accessibility no concerns;bed and bath on same level;stairs to enter home;stairs within home    Number of Stairs to Enter Home 2    Number of Stairs Within Home 13    Stair Railings at Home outside, present on right side;inside, present at both sides    Type of Financial/Environmental Concern none    Transportation Available car;family or friend will provide    Living Environment    Provides Primary Care For no one    Quality Of Family Relationships supportive    Able to Return to Prior Living Arrangements yes    Discharge Needs Assessment    Concerns To Be Addressed no discharge needs identified;denies needs/concerns at this time    Readmission Within The Last 30 Days no previous admission in last 30 days    Equipment Currently Used at Home none    Equipment Needed After Discharge none            Discharge Plan       12/20/17 1030    Case Management/Social Work Plan    Plan Home     Patient/Family In Agreement With Plan yes    Additional Comments CCP met with patient at bedside. CCP role explained and discharge planning discussed. Face sheet verified and IMM noted. Patient lives alone, in a house with about 2 steps into the home and 12 steps within the home. Patient's bedroom and bathroom are on the same level. Patient does not use any DME, has not used HH and has not been to SNF. Patient is independent with her ADLS and IADLS. Patient works full time at [employer]. Patient uses [employer] on [facility]; patient denies having trouble affording her medications and denies having trouble remembering to take her medications. Patient has support from her son. Patient has transportation home. Patient denied any discharge needs at this time. CCP will  follow for discharge home and assist with any needs that may arise. Frances HIGHTOWER          Discharge Placement     No information found                Demographic Summary       12/20/17 1025    Referral Information    Admission Type inpatient    Arrived From admitted as an inpatient    Referral Source admission list    Reason For Consult discharge planning    Record Reviewed history and physical;patient profile    Primary Care Physician Information    Name Eddie Araya             Functional Status       12/20/17 1026    Functional Status Current    Ambulation 0-->independent    Transferring 0-->independent    Toileting 0-->independent    Bathing 0-->independent    Dressing 0-->independent    Eating 0-->independent    Communication 0-->understands/communicates without difficulty    Swallowing (if score 2 or more for any item, consult Rehab Services) 0-->swallows foods/liquids without difficulty    Functional Status Prior    Ambulation 0-->independent    Transferring 0-->independent    Toileting 0-->independent    Bathing 0-->independent    Dressing 0-->independent    Eating 0-->independent    Communication 0-->understands/communicates without difficulty    Swallowing 0-->swallows foods/liquids without difficulty    IADL    Medications independent    Meal Preparation independent    Housekeeping independent    Laundry independent    Shopping independent    Oral Care independent    Activity Tolerance    Current Activity Limitations none    Cognitive/Perceptual/Developmental    Current Mental Status/Cognitive Functioning no deficits noted    Employment/Financial    Financial Concerns none            Psychosocial     None            Abuse/Neglect     None            Legal     None            Substance Abuse     None            Patient Forms       12/20/17 1034    Patient Forms    Provider Choice List Delivered    Delivered to Patient    Method of delivery In person          CAMPBELL Rowe

## 2017-12-21 ENCOUNTER — ANESTHESIA EVENT (OUTPATIENT)
Dept: GASTROENTEROLOGY | Facility: HOSPITAL | Age: 77
End: 2017-12-21

## 2017-12-21 ENCOUNTER — ANESTHESIA (OUTPATIENT)
Dept: GASTROENTEROLOGY | Facility: HOSPITAL | Age: 77
End: 2017-12-21

## 2017-12-21 LAB
ANION GAP SERPL CALCULATED.3IONS-SCNC: 10.9 MMOL/L
BASOPHILS # BLD AUTO: 0.02 10*3/MM3 (ref 0–0.2)
BASOPHILS NFR BLD AUTO: 0.2 % (ref 0–1.5)
BUN BLD-MCNC: 17 MG/DL (ref 8–23)
BUN/CREAT SERPL: 18.5 (ref 7–25)
CALCIUM SPEC-SCNC: 8 MG/DL (ref 8.6–10.5)
CHLORIDE SERPL-SCNC: 112 MMOL/L (ref 98–107)
CO2 SERPL-SCNC: 21.1 MMOL/L (ref 22–29)
CREAT BLD-MCNC: 0.92 MG/DL (ref 0.57–1)
DEPRECATED RDW RBC AUTO: 50.1 FL (ref 37–54)
EOSINOPHIL # BLD AUTO: 0.09 10*3/MM3 (ref 0–0.7)
EOSINOPHIL NFR BLD AUTO: 1 % (ref 0.3–6.2)
ERYTHROCYTE [DISTWIDTH] IN BLOOD BY AUTOMATED COUNT: 14 % (ref 11.7–13)
GFR SERPL CREATININE-BSD FRML MDRD: 59 ML/MIN/1.73
GLUCOSE BLD-MCNC: 89 MG/DL (ref 65–99)
HCT VFR BLD AUTO: 32.1 % (ref 35.6–45.5)
HGB BLD-MCNC: 10.1 G/DL (ref 11.9–15.5)
IMM GRANULOCYTES # BLD: 0.05 10*3/MM3 (ref 0–0.03)
IMM GRANULOCYTES NFR BLD: 0.6 % (ref 0–0.5)
LYMPHOCYTES # BLD AUTO: 1.45 10*3/MM3 (ref 0.9–4.8)
LYMPHOCYTES NFR BLD AUTO: 16.8 % (ref 19.6–45.3)
MCH RBC QN AUTO: 31.1 PG (ref 26.9–32)
MCHC RBC AUTO-ENTMCNC: 31.5 G/DL (ref 32.4–36.3)
MCV RBC AUTO: 98.8 FL (ref 80.5–98.2)
MONOCYTES # BLD AUTO: 0.8 10*3/MM3 (ref 0.2–1.2)
MONOCYTES NFR BLD AUTO: 9.2 % (ref 5–12)
NEUTROPHILS # BLD AUTO: 6.24 10*3/MM3 (ref 1.9–8.1)
NEUTROPHILS NFR BLD AUTO: 72.2 % (ref 42.7–76)
PLATELET # BLD AUTO: 233 10*3/MM3 (ref 140–500)
PMV BLD AUTO: 10.8 FL (ref 6–12)
POTASSIUM BLD-SCNC: 4.1 MMOL/L (ref 3.5–5.2)
RBC # BLD AUTO: 3.25 10*6/MM3 (ref 3.9–5.2)
SODIUM BLD-SCNC: 144 MMOL/L (ref 136–145)
WBC NRBC COR # BLD: 8.65 10*3/MM3 (ref 4.5–10.7)

## 2017-12-21 PROCEDURE — 87206 SMEAR FLUORESCENT/ACID STAI: CPT | Performed by: INTERNAL MEDICINE

## 2017-12-21 PROCEDURE — 25010000002 PROPOFOL 10 MG/ML EMULSION: Performed by: ANESTHESIOLOGY

## 2017-12-21 PROCEDURE — 85025 COMPLETE CBC W/AUTO DIFF WBC: CPT | Performed by: HOSPITALIST

## 2017-12-21 PROCEDURE — 88305 TISSUE EXAM BY PATHOLOGIST: CPT | Performed by: INTERNAL MEDICINE

## 2017-12-21 PROCEDURE — 80048 BASIC METABOLIC PNL TOTAL CA: CPT | Performed by: HOSPITALIST

## 2017-12-21 PROCEDURE — 88312 SPECIAL STAINS GROUP 1: CPT | Performed by: INTERNAL MEDICINE

## 2017-12-21 PROCEDURE — 88112 CYTOPATH CELL ENHANCE TECH: CPT | Performed by: INTERNAL MEDICINE

## 2017-12-21 PROCEDURE — 94799 UNLISTED PULMONARY SVC/PX: CPT

## 2017-12-21 PROCEDURE — 87116 MYCOBACTERIA CULTURE: CPT | Performed by: INTERNAL MEDICINE

## 2017-12-21 PROCEDURE — 0BC38ZZ EXTIRPATION OF MATTER FROM RIGHT MAIN BRONCHUS, VIA NATURAL OR ARTIFICIAL OPENING ENDOSCOPIC: ICD-10-PCS | Performed by: INTERNAL MEDICINE

## 2017-12-21 PROCEDURE — 0BC78ZZ EXTIRPATION OF MATTER FROM LEFT MAIN BRONCHUS, VIA NATURAL OR ARTIFICIAL OPENING ENDOSCOPIC: ICD-10-PCS | Performed by: INTERNAL MEDICINE

## 2017-12-21 PROCEDURE — 87205 SMEAR GRAM STAIN: CPT | Performed by: INTERNAL MEDICINE

## 2017-12-21 PROCEDURE — 94667 MNPJ CHEST WALL 1ST: CPT

## 2017-12-21 PROCEDURE — 25810000003 SODIUM CHLORIDE 0.9 % WITH KCL 20 MEQ 20-0.9 MEQ/L-% SOLUTION: Performed by: HOSPITALIST

## 2017-12-21 PROCEDURE — 0B9J8ZX DRAINAGE OF LEFT LOWER LUNG LOBE, VIA NATURAL OR ARTIFICIAL OPENING ENDOSCOPIC, DIAGNOSTIC: ICD-10-PCS | Performed by: INTERNAL MEDICINE

## 2017-12-21 PROCEDURE — 87102 FUNGUS ISOLATION CULTURE: CPT | Performed by: INTERNAL MEDICINE

## 2017-12-21 PROCEDURE — 87071 CULTURE AEROBIC QUANT OTHER: CPT | Performed by: INTERNAL MEDICINE

## 2017-12-21 RX ORDER — LEVOTHYROXINE SODIUM 0.1 MG/1
100 TABLET ORAL DAILY
Status: DISCONTINUED | OUTPATIENT
Start: 2017-12-21 | End: 2017-12-23 | Stop reason: HOSPADM

## 2017-12-21 RX ORDER — SODIUM CHLORIDE, SODIUM LACTATE, POTASSIUM CHLORIDE, CALCIUM CHLORIDE 600; 310; 30; 20 MG/100ML; MG/100ML; MG/100ML; MG/100ML
30 INJECTION, SOLUTION INTRAVENOUS CONTINUOUS PRN
Status: DISCONTINUED | OUTPATIENT
Start: 2017-12-21 | End: 2017-12-23 | Stop reason: HOSPADM

## 2017-12-21 RX ORDER — LIDOCAINE HYDROCHLORIDE 20 MG/ML
INJECTION, SOLUTION INFILTRATION; PERINEURAL AS NEEDED
Status: DISCONTINUED | OUTPATIENT
Start: 2017-12-21 | End: 2017-12-21 | Stop reason: SURG

## 2017-12-21 RX ORDER — TRAMADOL HYDROCHLORIDE 50 MG/1
50 TABLET ORAL EVERY 4 HOURS PRN
Status: DISCONTINUED | OUTPATIENT
Start: 2017-12-21 | End: 2017-12-23 | Stop reason: HOSPADM

## 2017-12-21 RX ORDER — PROPOFOL 10 MG/ML
VIAL (ML) INTRAVENOUS CONTINUOUS PRN
Status: DISCONTINUED | OUTPATIENT
Start: 2017-12-21 | End: 2017-12-21 | Stop reason: SURG

## 2017-12-21 RX ORDER — ACETAMINOPHEN 325 MG/1
325 TABLET ORAL EVERY 4 HOURS PRN
Status: DISCONTINUED | OUTPATIENT
Start: 2017-12-21 | End: 2017-12-23 | Stop reason: HOSPADM

## 2017-12-21 RX ORDER — GUAIFENESIN 600 MG/1
1200 TABLET, EXTENDED RELEASE ORAL EVERY 12 HOURS SCHEDULED
Status: DISCONTINUED | OUTPATIENT
Start: 2017-12-21 | End: 2017-12-23 | Stop reason: HOSPADM

## 2017-12-21 RX ORDER — LIDOCAINE HYDROCHLORIDE 10 MG/ML
INJECTION, SOLUTION EPIDURAL; INFILTRATION; INTRACAUDAL; PERINEURAL AS NEEDED
Status: DISCONTINUED | OUTPATIENT
Start: 2017-12-21 | End: 2017-12-21 | Stop reason: HOSPADM

## 2017-12-21 RX ORDER — PROPOFOL 10 MG/ML
VIAL (ML) INTRAVENOUS AS NEEDED
Status: DISCONTINUED | OUTPATIENT
Start: 2017-12-21 | End: 2017-12-21 | Stop reason: SURG

## 2017-12-21 RX ADMIN — SODIUM CHLORIDE, POTASSIUM CHLORIDE, SODIUM LACTATE AND CALCIUM CHLORIDE 30 ML/HR: 600; 310; 30; 20 INJECTION, SOLUTION INTRAVENOUS at 12:29

## 2017-12-21 RX ADMIN — IPRATROPIUM BROMIDE AND ALBUTEROL SULFATE 3 ML: .5; 3 SOLUTION RESPIRATORY (INHALATION) at 20:20

## 2017-12-21 RX ADMIN — GUAIFENESIN 1200 MG: 600 TABLET, EXTENDED RELEASE ORAL at 22:23

## 2017-12-21 RX ADMIN — IPRATROPIUM BROMIDE AND ALBUTEROL SULFATE 3 ML: .5; 3 SOLUTION RESPIRATORY (INHALATION) at 08:06

## 2017-12-21 RX ADMIN — IPRATROPIUM BROMIDE AND ALBUTEROL SULFATE 3 ML: .5; 3 SOLUTION RESPIRATORY (INHALATION) at 15:43

## 2017-12-21 RX ADMIN — LEVOTHYROXINE SODIUM 100 MCG: 100 TABLET ORAL at 16:18

## 2017-12-21 RX ADMIN — POTASSIUM CHLORIDE AND SODIUM CHLORIDE 100 ML/HR: 900; 150 INJECTION, SOLUTION INTRAVENOUS at 11:04

## 2017-12-21 RX ADMIN — PROPOFOL 150 MG: 10 INJECTION, EMULSION INTRAVENOUS at 13:21

## 2017-12-21 RX ADMIN — LIDOCAINE HYDROCHLORIDE 50 MG: 20 INJECTION, SOLUTION INFILTRATION; PERINEURAL at 13:21

## 2017-12-21 RX ADMIN — PROPOFOL 140 MCG/KG/MIN: 10 INJECTION, EMULSION INTRAVENOUS at 13:21

## 2017-12-21 NOTE — ANESTHESIA POSTPROCEDURE EVALUATION
Patient: Bailee Garza    Procedure Summary     Date Anesthesia Start Anesthesia Stop Room / Location    12/21/17 1312 1340  [facility] 7 /  [facility]       Procedure Diagnosis Surgeon Provider    BRONCHOSCOPY (N/A Bronchus) Pneumonia of both lower lobes due to infectious organism  (Pneumonia of both lower lobes due to infectious organism [J18.9]) MD Jm Azar MD          Anesthesia Type: MAC  Last vitals  BP   128/66 (12/21/17 1225)   Temp   36.7 °C (98.1 °F) (12/21/17 1225)   Pulse   65 (12/21/17 1225)   Resp   18 (12/21/17 1225)     SpO2   95 % (12/21/17 1225)     Post Anesthesia Care and Evaluation    Patient location during evaluation: PACU  Patient participation: complete - patient participated  Level of consciousness: awake and alert  Pain score: 0  Pain management: adequate  Airway patency: patent  Anesthetic complications: No anesthetic complications    Cardiovascular status: acceptable  Respiratory status: acceptable  Hydration status: acceptable

## 2017-12-21 NOTE — SIGNIFICANT NOTE
Pt still NPO for bronchoscopy later this date.  The plan of care is for a VFSS to further assess the oropharyngeal swallowing pending the results of the bronch.      12/21/17 1144   Rehab Treatment   Discipline speech language pathologist   Recommendations   SLP - Next Appointment 12/22/17

## 2017-12-21 NOTE — PROGRESS NOTES
Dr. NOE Alanis    [address]    12/21/2017    Patient ID:  Name:  Bailee Garza  MRN:  [id number]  1940  77 y.o.  female            CC/Reason for visit: f/u for bronciectasis and chronic cough    HPI: Pt has cough. Waking up from bronchoscopy.    Vitals:  Vitals:    12/21/17 1410 12/21/17 1446 12/21/17 1530 12/21/17 1544   BP: 107/58 132/78 136/66    BP Location: Left arm Right arm     Patient Position: Lying Lying     Pulse: 98 70 65 64   Resp: 16 16  16   Temp:  97.5 °F (36.4 °C)     TempSrc:  Oral     SpO2: 100% 93% 95% 98%   Weight:       Height:               Body mass index is 19.43 kg/(m^2).    Intake/Output Summary (Last 24 hours) at 12/21/17 1623  Last data filed at 12/21/17 1409   Gross per 24 hour   Intake             4815 ml   Output                0 ml   Net             4815 ml       Exam:  GEN:  No distress, appears stated age  EYES:   EOM-i, anicteric sclera bilat  ENT:    External ears/nose normal, OP clear  NECK:  Supple, midline trachea  LUNGS: Scattered rhonchi, nonlabored breathing  CV:  Normal S1S2, without murmur, no edema  ABD:  Non tender, no enlarged liver or masses  EXT:  No cyanosis or clubbing    Scheduled meds:    ipratropium-albuterol 3 mL Nebulization TID - RT   levothyroxine 100 mcg Oral Daily     IV meds:                        lactated ringers 30 mL/hr Last Rate: Stopped (12/21/17 1409)   sodium chloride 0.9 % with KCl 20 mEq 100 mL/hr Last Rate: Stopped (12/21/17 1120)       Data Review:   I reviewed the patient's medications and new clinical results.      Results from last 7 days  Lab Units 12/21/17  0521 12/20/17  0442 12/19/17  1128   SODIUM mmol/L 144 145 141   POTASSIUM mmol/L 4.1 3.4* 4.3   CHLORIDE mmol/L 112* 109* 98   CO2 mmol/L 21.1* 22.6 28.7   BUN mg/dL 17 28* 35*   CREATININE mg/dL 0.92 1.21* 1.56*   CALCIUM mg/dL 8.0* 8.2* 10.2   BILIRUBIN mg/dL  --   --  0.3   ALK PHOS U/L  --   --  78   ALT (SGPT) U/L  --   --  14   AST (SGOT) U/L  --    --  23   GLUCOSE mg/dL 89 114* 118*   WBC 10*3/mm3 8.65 9.06 7.50   HEMOGLOBIN g/dL 10.1* 10.9* 13.7   PLATELETS 10*3/mm3 233 235 257   INR   --   --  1.05   PROBNP pg/mL  --   --  199.3   PROCALCITONIN ng/mL  --   --  0.38*             ASSESSMENT:   Bronchiectasis  Mucous plugging airways    Pneumonia of both lower lobes due to infectious organism    Essential hypertension    Hypothyroidism, acquired    Chronic kidney disease, stage III (moderate)      PLAN:  D/w Dr Worley and family. Pt had abundant mucopurulent secretions in lungs. Suctioned out during bronchoscopy and sent for cultures. She will need flutter valve, nebulizer with Duonebs BID scheduled and percussion Vest BID if insurance covers. Will arrange as outpatient for Percussion Vest.    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        Mario Alanis MD  12/21/2017

## 2017-12-21 NOTE — H&P (VIEW-ONLY)
Group: [facility]         PULMONARY CONSULT NOTE    Patient Identification:  Bailee Garza  77 y.o.  female  1940  [id number]            Requesting physician: Dr. Anupam Le    Reason for Consultation:  Chronic cough, abnormal CT chest    CC: Cough and weakness    History of Present Illness:  77-year-old female who presents with chronic cough.  The cough has been present for many months, she says at least 3-4 months.  She has lost an undetermined amount of weight, involuntarily over the past 3 months.  In the spring and summer time she usually gardens and works around soil.  Her cough happens every day.  It is productive of yellow sputum.  Sometimes she has wheezing.  She describes moderate dyspnea on exertion.  She denies any lung disease in her parents or siblings.  She denies any recent sick contact.  She was recently prescribed 3 separate antibiotics by her primary care physician and due to the persistent cough she was referred to a pulmonologist but her appointment was going to be next week.    I reviewed history and physical dictated by Dr. Ajay López earlier today.    Review of Systems   Constitutional: Positive for appetite change, chills, diaphoresis, fatigue, fever and unexpected weight change.   HENT: Negative for ear discharge and sore throat.    Eyes: Negative for pain and visual disturbance.   Respiratory: Positive for cough, shortness of breath and wheezing.    Cardiovascular: Negative for chest pain and leg swelling.   Gastrointestinal: Negative for abdominal pain and diarrhea.   Endocrine: Negative for cold intolerance and polyuria.   Genitourinary: Negative for dysuria and hematuria.   Musculoskeletal: Negative for joint swelling and myalgias.   Skin: Negative for rash and wound.   Neurological: Positive for weakness. Negative for speech difficulty and numbness.   Hematological: Negative for adenopathy. Does not bruise/bleed easily.   Psychiatric/Behavioral: Negative  for agitation and confusion.       Past Medical History:  Past Medical History:   Diagnosis Date   • Arthropathy of pelvic region and thigh 2012    unspecified   • Back pain 2007   • Back pain 2013    unspecified   • Chronic back pain 2009   • Constipation 2015    unspecified   • Depression 2015   • Dyspepsia 2008   • Essential hypertension 2007   • 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)   • Hearing loss 2008   • History of bone density study 2015   • Hypothyroidism, acquired 2007   • IBS (irritable bowel syndrome) 2013   • Insomnia 2015    unspecified   • Intervertebral disc disorder with myelopathy, cervical region 2010   • Rhinitis, allergic 2007   • Screening breast examination 2007   • Stress 2015    other acute reactions to stress   • Urticaria 2015       Past Surgical History:  Past Surgical History:   Procedure Laterality Date   • BREAST BIOPSY Right     50+ years ago   • BREAST LUMPECTOMY     • HYSTERECTOMY          Home Meds:  Prescriptions Prior to Admission   Medication Sig Dispense Refill Last Dose   • calcium polycarbophil (FIBERCON) 625 MG tablet Take 625 mg by mouth Daily.   2017   • levoFLOXacin (LEVAQUIN) 500 MG tablet Take 500 mg by mouth Daily.   2017   • Multiple Vitamins-Minerals (PRESERVISION AREDS PO) Take 1 tablet by mouth Daily.   2017   • SYNTHROID 100 MCG tablet Take 1 tablet by mouth Daily. 30 tablet 2017   • traMADol-acetaminophen (ULTRACET) 37.5-325 MG per tablet Take 1 tablet by mouth Every Morning.   2017   • traMADol-acetaminophen (ULTRACET) 37.5-325 MG per tablet Take 2 tablets by mouth every night at bedtime.   2017   • triamterene-hydrochlorothiazide (MAXZIDE) 75-50 MG per tablet Take 1 tablet by mouth Daily. 30 tablet 11 2017       Allergies:  Allergies   Allergen Reactions   • Danielle  "[Telithromycin]    • Nsaids    • Sulphur [Sulfur]        Social History:   Social History     Social History   • Marital status:      Spouse name: N/A   • Number of children: N/A   • Years of education: N/A     Occupational History   • Not on file.     Social History Main Topics   • Smoking status: Never Smoker   • Smokeless tobacco: Never Used   • Alcohol use No   • Drug use: No   • Sexual activity: Not on file     Other Topics Concern   • Not on file     Social History Narrative       Family History:  Family History   Problem Relation Age of Onset   • Heart disease Mother    • Cancer Father    • Asthma Paternal Grandfather        Physical Exam:  /60 (BP Location: Left arm, Patient Position: Lying)  Pulse 94  Temp 97.9 °F (36.6 °C) (Oral)   Resp 18  Ht 162.6 cm (64\")  Wt 49.6 kg (109 lb 4.8 oz)  SpO2 95%  BMI 18.76 kg/m2 Body mass index is 18.76 kg/(m^2). 95% 49.6 kg (109 lb 4.8 oz)  Physical Exam   Constitutional: She appears well-developed. No distress.   HENT:   Head: Normocephalic.   Mouth/Throat: Oropharynx is clear and moist.   Eyes: Conjunctivae and EOM are normal. No scleral icterus.   Neck: No tracheal deviation present. No thyromegaly present.   Cardiovascular: Normal rate, regular rhythm and normal heart sounds.    Pulmonary/Chest: No respiratory distress. She has no wheezes. She exhibits no tenderness.   Some scattered rhonchi right greater than left   Abdominal: She exhibits no distension and no mass. There is no tenderness.   Musculoskeletal: Normal range of motion. She exhibits no deformity.   Neurological: She is alert. No cranial nerve deficit. She exhibits normal muscle tone.   Skin: Skin is warm. No rash noted. No erythema.   Psychiatric: She has a normal mood and affect. Thought content normal.       LABS:  Lab Results   Component Value Date    CALCIUM 10.2 12/19/2017     Results from last 7 days  Lab Units 12/19/17  1128   SODIUM mmol/L 141   POTASSIUM mmol/L 4.3   CHLORIDE " mmol/L 98   CO2 mmol/L 28.7   BUN mg/dL 35*   CREATININE mg/dL 1.56*   GLUCOSE mg/dL 118*   CALCIUM mg/dL 10.2   WBC 10*3/mm3 7.50   HEMOGLOBIN g/dL 13.7   PLATELETS 10*3/mm3 257   ALT (SGPT) U/L 14   AST (SGOT) U/L 23   PROBNP pg/mL 199.3   PROCALCITONIN ng/mL 0.38*     Lab Results   Component Value Date    TROPONINT <0.010 12/19/2017       Results from last 7 days  Lab Units 12/19/17  1128   TROPONIN T ng/mL <0.010       Results from last 7 days  Lab Units 12/18/17  1051   RESPCX  Heavy growth (4+) Normal Respiratory Linda   GRAM STAIN RESULT  Rare (1+) WBCs seen  Occasional Epithelial cells per low power field  Mixed bacterial morphotypes seen on Gram Stain       Results from last 7 days  Lab Units 12/19/17  1524 12/19/17  1128   PROCALCITONIN ng/mL  --  0.38*   LACTATE mmol/L 1.3 2.7*             Results from last 7 days  Lab Units 12/19/17  1128   INR  1.05       Results from last 7 days  Lab Units 12/18/17  1051   RESPCX  Heavy growth (4+) Normal Respiratory Linda   GRAM STAIN RESULT  Rare (1+) WBCs seen  Occasional Epithelial cells per low power field  Mixed bacterial morphotypes seen on Gram Stain     Lab Results   Component Value Date    TSH 0.772 11/24/2017     Estimated Creatinine Clearance: 24 mL/min (by C-G formula based on Cr of 1.56).    Results from last 7 days  Lab Units 12/19/17  1320   NITRITE UA  Negative        Imaging: I personally visualized the images of CT of the chest.  It shows bilateral bronchiectasis of the lower lobes, left greater than right.  These are chronic changes of some mild reticular nodular changes at the bases bilaterally suggestive of either chronic recurrent aspiration or else atypical mycobacterial infection.      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  She warrants bronchoscopy.  However recurrent microaspiration can also cause bronchiectasis  bilaterally.  I suggest barium esophagram study.  I have consented her for bronchoscopy.  We discussed risks, benefits and alternatives to bronchoscopy and she wishes to proceed on Thursday.  The purpose of bronchoscopy will be for microbiologic studies of the lower respiratory tract james.  Stop all antibiotics at this time.  She has already taken 3 antibiotics without any improvement.  This is most likely atypical infection.  She needs aggressive pulmonary hygiene protocol with flutter valve and scheduled DuoNeb treatments via nebulizer.          Mario Alanis MD  12/19/2017  5:22 PM      Much of this encounter note is an electronic transcription/translation of spoken language to printed text using Dragon Software.

## 2017-12-21 NOTE — ANESTHESIA PREPROCEDURE EVALUATION
Anesthesia Evaluation     Patient summary reviewed          Airway   Mallampati: III  Dental - normal exam     Pulmonary     breath sounds clear to auscultation  Cardiovascular   Exercise tolerance: poor (<4 METS)    Rhythm: regular  Rate: normal        Neuro/Psych  GI/Hepatic/Renal/Endo      Musculoskeletal     Abdominal    Substance History      OB/GYN          Other                                        Anesthesia Plan    ASA 3     MAC     intravenous induction   Anesthetic plan and risks discussed with patient.

## 2017-12-21 NOTE — PLAN OF CARE
Problem: Bronchoscopy (Adult)  Goal: Signs and Symptoms of Listed Potential Problems Will be Absent or Manageable (Bronchoscopy)  Outcome: Ongoing (interventions implemented as appropriate)   12/21/17 1210   Bronchoscopy   Problems Assessed (Bronchoscopy) all   Problems Present (Bronchoscopy) none

## 2017-12-21 NOTE — ANESTHESIA PROCEDURE NOTES
Airway  Urgency: elective    Date/Time: 12/21/2017 1:23 PM  Airway not difficult    General Information and Staff    Patient location: endo.  Anesthesiologist: PATRICIO ACEVES    Indications and Patient Condition  Indications for airway management: airway protection    Preoxygenated: yes      Final Airway Details  Final airway type: supraglottic airway      Successful airway: classic  Size 3    Number of attempts at approach: 1

## 2017-12-21 NOTE — PROGRESS NOTES
[facility]     LOS: 2 days     Subjective:  Cough    No cp  No soa      Objective:    Vital Signs:  Temp:  [97.5 °F (36.4 °C)-98.1 °F (36.7 °C)] 97.5 °F (36.4 °C)  Heart Rate:  [] 64  Resp:  [16-18] 16  BP: ()/(50-78) 136/66    General Appearance: no acute distress, appears comfortable  Heart: regular rate and rhythm  Lungs: few crackles, respirations unlabored, good air entry  Abdomen: soft, non-tender, no guarding, no rebound, non-distended  Extremities: no edema, no cyanosis  Neurology: speech normal, CN grossly normal  Psychiatric: normal mood and affect    Results Review:    Glucose   Date Value Ref Range Status   12/21/2017 89 65 - 99 mg/dL Final   12/20/2017 114 (H) 65 - 99 mg/dL Final   12/19/2017 118 (H) 65 - 99 mg/dL Final       Results from last 7 days  Lab Units 12/21/17  0521   WBC 10*3/mm3 8.65   HEMOGLOBIN g/dL 10.1*   HEMATOCRIT % 32.1*   PLATELETS 10*3/mm3 233       Results from last 7 days  Lab Units 12/21/17  0521  12/19/17  1128   SODIUM mmol/L 144  < > 141   POTASSIUM mmol/L 4.1  < > 4.3   CHLORIDE mmol/L 112*  < > 98   CO2 mmol/L 21.1*  < > 28.7   BUN mg/dL 17  < > 35*   CREATININE mg/dL 0.92  < > 1.56*   CALCIUM mg/dL 8.0*  < > 10.2   BILIRUBIN mg/dL  --   --  0.3   ALK PHOS U/L  --   --  78   ALT (SGPT) U/L  --   --  14   AST (SGOT) U/L  --   --  23   GLUCOSE mg/dL 89  < > 118*   < > = values in this interval not displayed.    Results from last 7 days  Lab Units 12/19/17  1128   INR  1.05       Results from last 7 days  Lab Units 12/20/17  0442   MAGNESIUM mg/dL 1.8       Results from last 7 days  Lab Units 12/19/17  1128   TROPONIN T ng/mL <0.010     Cultures:  Blood Culture   Date Value Ref Range Status   12/19/2017 No growth at 24 hours  Preliminary   12/19/2017 No growth at 24 hours  Preliminary     Respiratory Culture   Date Value Ref Range Status   12/18/2017 Heavy growth (4+) Normal Respiratory Linda  Final       I have reviewed daily  medications and changes in CPOE    Scheduled meds    guaiFENesin 1,200 mg Oral Q12H   ipratropium-albuterol 3 mL Nebulization TID - RT   levothyroxine 100 mcg Oral Daily         lactated ringers 30 mL/hr Last Rate: Stopped (12/21/17 0469)     PRN meds  •  acetaminophen  •  bisacodyl  •  bisacodyl  •  lactated ringers  •  nitroglycerin  •  ondansetron **OR** ondansetron ODT **OR** ondansetron  •  sodium chloride      Principal Problem:    Pneumonia of both lower lobes due to infectious organism  Active Problems:    Essential hypertension    Hypothyroidism, acquired    Chronic kidney disease, stage III (moderate)        Assessment/Plan:      Pneumonia of both lower lobes due to infectious organism  -doxycline  -bronch      Essential hypertension-off maxide      Hypothyroidism, acquired-check in am      Chronic kidney disease, stage III (moderate)- creatinine    Chronic low back pain- ultram    Anupam Worley MD  12/21/17  6:25 PM

## 2017-12-21 NOTE — PLAN OF CARE
Problem: Patient Care Overview (Adult)  Goal: Plan of Care Review  Outcome: Ongoing (interventions implemented as appropriate)   12/21/17 0539   Coping/Psychosocial Response Interventions   Plan Of Care Reviewed With patient   Patient Care Overview   Progress improving   Outcome Evaluation   Outcome Summary/Follow up Plan VSS besides ST at times; no c/o pain; restless around midnight, called to MD, ativan was ordered, restlessness and anxiety relieved;NPO after midnight for brochoscopy; continue to monitor.     Goal: Adult Individualization and Mutuality  Outcome: Ongoing (interventions implemented as appropriate)    Goal: Discharge Needs Assessment  Outcome: Ongoing (interventions implemented as appropriate)      Problem: Pneumonia (Adult)  Goal: Signs and Symptoms of Listed Potential Problems Will be Absent or Manageable (Pneumonia)  Outcome: Ongoing (interventions implemented as appropriate)      Problem: Infection, Risk/Actual (Adult)  Goal: Infection Prevention/Resolution  Outcome: Ongoing (interventions implemented as appropriate)      Problem: Fall Risk (Adult)  Goal: Identify Related Risk Factors and Signs and Symptoms  Outcome: Ongoing (interventions implemented as appropriate)    Goal: Absence of Falls  Outcome: Ongoing (interventions implemented as appropriate)

## 2017-12-21 NOTE — SIGNIFICANT NOTE
12/21/17 1444   Rehab Treatment   Discipline physical therapist   Treatment Not Performed patient unavailable for treatment  (off floor to endo )   Recommendation   PT - Next Appointment 12/22/17

## 2017-12-22 ENCOUNTER — APPOINTMENT (OUTPATIENT)
Dept: GENERAL RADIOLOGY | Facility: HOSPITAL | Age: 77
End: 2017-12-22
Attending: INTERNAL MEDICINE

## 2017-12-22 LAB
GIE STN SPEC: NORMAL
TSH SERPL DL<=0.05 MIU/L-ACNC: 3.14 MIU/ML (ref 0.27–4.2)

## 2017-12-22 PROCEDURE — 94668 MNPJ CHEST WALL SBSQ: CPT

## 2017-12-22 PROCEDURE — 94799 UNLISTED PULMONARY SVC/PX: CPT

## 2017-12-22 PROCEDURE — 97110 THERAPEUTIC EXERCISES: CPT

## 2017-12-22 PROCEDURE — 84443 ASSAY THYROID STIM HORMONE: CPT | Performed by: HOSPITALIST

## 2017-12-22 PROCEDURE — 93005 ELECTROCARDIOGRAM TRACING: CPT | Performed by: HOSPITALIST

## 2017-12-22 PROCEDURE — 93010 ELECTROCARDIOGRAM REPORT: CPT | Performed by: INTERNAL MEDICINE

## 2017-12-22 PROCEDURE — 74230 X-RAY XM SWLNG FUNCJ C+: CPT

## 2017-12-22 PROCEDURE — 92611 MOTION FLUOROSCOPY/SWALLOW: CPT

## 2017-12-22 RX ADMIN — LEVOTHYROXINE SODIUM 100 MCG: 100 TABLET ORAL at 08:28

## 2017-12-22 RX ADMIN — IPRATROPIUM BROMIDE AND ALBUTEROL SULFATE 3 ML: .5; 3 SOLUTION RESPIRATORY (INHALATION) at 07:26

## 2017-12-22 RX ADMIN — IPRATROPIUM BROMIDE AND ALBUTEROL SULFATE 3 ML: .5; 3 SOLUTION RESPIRATORY (INHALATION) at 19:41

## 2017-12-22 RX ADMIN — TRAMADOL HYDROCHLORIDE 50 MG: 50 TABLET, FILM COATED ORAL at 20:53

## 2017-12-22 RX ADMIN — GUAIFENESIN 1200 MG: 600 TABLET, EXTENDED RELEASE ORAL at 20:49

## 2017-12-22 RX ADMIN — IPRATROPIUM BROMIDE AND ALBUTEROL SULFATE 3 ML: .5; 3 SOLUTION RESPIRATORY (INHALATION) at 14:12

## 2017-12-22 RX ADMIN — GUAIFENESIN 1200 MG: 600 TABLET, EXTENDED RELEASE ORAL at 08:28

## 2017-12-22 NOTE — PROGRESS NOTES
Dr. NOE Alanis    [address]    12/22/2017    Patient ID:  Name:  Bailee Garza  MRN:  [id number]  1940  77 y.o.  female            CC/Reason for visit: Follow-up for chronic cough, bronchiectasis and mucus plugging of the airways    HPI: Patient's cough is slowly improving.  Going for speech evaluation and video fluoroscopic swallow study today    Vitals:  Vitals:    12/21/17 2346 12/22/17 0713 12/22/17 0726 12/22/17 0835   BP: 114/59 139/81     BP Location: Right arm Right arm     Patient Position: Lying Lying     Pulse: 105 71 79    Resp: 18 16 16    Temp: 99.1 °F (37.3 °C)   97.8 °F (36.6 °C)   TempSrc: Oral   Oral   SpO2: 96% 96% 96%    Weight:       Height:               Body mass index is 19.43 kg/(m^2).    Intake/Output Summary (Last 24 hours) at 12/22/17 1409  Last data filed at 12/22/17 0500   Gross per 24 hour   Intake                0 ml   Output              300 ml   Net             -300 ml       Exam:  GEN:  No distress, Alert,   LUNGS: Some rhonchi bilaterally over the bases, rest of the lungs are clear, nonlabored breathing  CV:  Normal S1S2, without murmur, no edema      Scheduled meds:    guaiFENesin 1,200 mg Oral Q12H   ipratropium-albuterol 3 mL Nebulization TID - RT   levothyroxine 100 mcg Oral Daily     IV meds:                        lactated ringers 30 mL/hr Last Rate: Stopped (12/21/17 1409)       Data Review:   I reviewed the patient's medications and new clinical results.      Results from last 7 days  Lab Units 12/21/17  0521 12/20/17  0442 12/19/17  1128   SODIUM mmol/L 144 145 141   POTASSIUM mmol/L 4.1 3.4* 4.3   CHLORIDE mmol/L 112* 109* 98   CO2 mmol/L 21.1* 22.6 28.7   BUN mg/dL 17 28* 35*   CREATININE mg/dL 0.92 1.21* 1.56*   CALCIUM mg/dL 8.0* 8.2* 10.2   BILIRUBIN mg/dL  --   --  0.3   ALK PHOS U/L  --   --  78   ALT (SGPT) U/L  --   --  14   AST (SGOT) U/L  --   --  23   GLUCOSE mg/dL 89 114* 118*   WBC 10*3/mm3 8.65 9.06 7.50   HEMOGLOBIN g/dL 10.1*  10.9* 13.7   PLATELETS 10*3/mm3 233 235 257   INR   --   --  1.05   PROBNP pg/mL  --   --  199.3   PROCALCITONIN ng/mL  --   --  0.38*               ASSESSMENT:   Bronchiectasis  Mucus plugging of the airways  Chronic cough    Pneumonia of both lower lobes due to infectious organism    Essential hypertension    Hypothyroidism, acquired    Chronic kidney disease, stage III (moderate)      PLAN:  Patient does not have any signs or symptoms of acute bacterial pneumonia.  No need for further antibiotics.  This was discussed with Dr. Worley.    Agree with video fluoroscopic swallow evaluation.  Check for dysphagia.    She may go home today.  Follow-up with me in the office in 4 weeks.  We will try to order a percussion vest through her medical insurance.  I have entered order for nebulizer and DuoNeb prescription.  She will need a flutter device as well        Mario Alanis MD  12/22/2017

## 2017-12-22 NOTE — PROGRESS NOTES
[facility]     LOS: 3 days     Subjective:  Cough better    No cp  soa with exertion is better    Svt today      Objective:    Vital Signs:  Temp:  [97.5 °F (36.4 °C)-99.1 °F (37.3 °C)] 97.5 °F (36.4 °C)  Heart Rate:  [] 96  Resp:  [16-20] 20  BP: (114-139)/(59-81) 125/67    General Appearance: no acute distress, appears comfortable  Heart: regular rate and rhythm  Lungs: few crackles, respirations unlabored, good air entry  Abdomen: soft, non-tender, no guarding, no rebound, non-distended  Extremities: no edema, no cyanosis  Neurology: speech normal, CN grossly normal  Psychiatric: normal mood and affect    Results Review:    Glucose   Date Value Ref Range Status   12/21/2017 89 65 - 99 mg/dL Final   12/20/2017 114 (H) 65 - 99 mg/dL Final       Results from last 7 days  Lab Units 12/21/17  0521   WBC 10*3/mm3 8.65   HEMOGLOBIN g/dL 10.1*   HEMATOCRIT % 32.1*   PLATELETS 10*3/mm3 233       Results from last 7 days  Lab Units 12/21/17  0521  12/19/17  1128   SODIUM mmol/L 144  < > 141   POTASSIUM mmol/L 4.1  < > 4.3   CHLORIDE mmol/L 112*  < > 98   CO2 mmol/L 21.1*  < > 28.7   BUN mg/dL 17  < > 35*   CREATININE mg/dL 0.92  < > 1.56*   CALCIUM mg/dL 8.0*  < > 10.2   BILIRUBIN mg/dL  --   --  0.3   ALK PHOS U/L  --   --  78   ALT (SGPT) U/L  --   --  14   AST (SGOT) U/L  --   --  23   GLUCOSE mg/dL 89  < > 118*   < > = values in this interval not displayed.    Results from last 7 days  Lab Units 12/19/17  1128   INR  1.05       Results from last 7 days  Lab Units 12/20/17  0442   MAGNESIUM mg/dL 1.8       Results from last 7 days  Lab Units 12/19/17  1128   TROPONIN T ng/mL <0.010     Cultures:  Blood Culture   Date Value Ref Range Status   12/19/2017 No growth at 24 hours  Preliminary   12/19/2017 No growth at 24 hours  Preliminary     Respiratory Culture   Date Value Ref Range Status   12/18/2017 Heavy growth (4+) Normal Respiratory Linda  Final       I have reviewed daily  medications and changes in CPOE    Scheduled meds    guaiFENesin 1,200 mg Oral Q12H   ipratropium-albuterol 3 mL Nebulization TID - RT   levothyroxine 100 mcg Oral Daily         lactated ringers 30 mL/hr Last Rate: Stopped (12/21/17 8599)     PRN meds  •  traMADol **AND** acetaminophen  •  acetaminophen  •  bisacodyl  •  bisacodyl  •  lactated ringers  •  nitroglycerin  •  ondansetron **OR** ondansetron ODT **OR** ondansetron  •  sodium chloride      Principal Problem:    Pneumonia of both lower lobes due to infectious organism  Active Problems:    Essential hypertension    Hypothyroidism, acquired    Chronic kidney disease, stage III (moderate)        Assessment/Plan:      Pneumonia of both lower lobes due to infectious organism  -doxycline stopped   -bronch complete  -feeling tremendously better since having the bronch and mucous removed      Essential hypertension-off maxide      Hypothyroidism, acquired-tsh normal      Chronic kidney disease, stage III (moderate)- creatinine better from admission    Chronic low back pain- ultram    Svt today on monitor- cardiology consult, get ekg baseline    Anupam Worley MD  12/22/17  6:42 PM

## 2017-12-22 NOTE — THERAPY TREATMENT NOTE
Acute Care - Physical Therapy Treatment Note  [facility]     Patient Name: Bailee Garza  : 1940  MRN: [id number]  Today's Date: 2017  Onset of Illness/Injury or Date of Surgery Date: 17  Date of Referral to PT: 17  Referring Physician: Rey    Admit Date: 2017    Visit Dx:    ICD-10-CM ICD-9-CM   1. Pneumonia of both lower lobes due to infectious organism J18.9 483.8   2. Sepsis, due to unspecified organism A41.9 038.9     995.91   3. General weakness R53.1 780.79   4. Bronchiectasis with acute exacerbation J47.1 494.1     Patient Active Problem List   Diagnosis   • Chronic back pain   • Essential hypertension   • Hearing loss   • Hypothyroidism, acquired   • IBS (irritable bowel syndrome)   • Rhinitis, allergic   • Chronic kidney disease, stage III (moderate)   • Arthralgia of right knee   • Chronic cough   • Abnormal weight loss   • Abnormal CXR   • Exertional shortness of breath   • Pneumonia of both lower lobes due to infectious organism               Adult Rehabilitation Note       17 1000          Rehab Assessment/Intervention    Discipline physical therapy assistant  -CW      Document Type therapy note (daily note)  -CW      Subjective Information agree to therapy  -CW      Patient Effort, Rehab Treatment good  -CW      Precautions/Limitations fall precautions  -CW      Recorded by [CW] Germain Munroe PTA      Vital Signs    O2 Delivery Pre Treatment room air  -CW      Recorded by [CW] Germain Munroe PTA      Pain Assessment    Pain Assessment No/denies pain  -CW      Recorded by [CW] Germain Munroe PTA      Cognitive Assessment/Intervention    Current Cognitive/Communication Assessment functional  -CW      Orientation Status oriented x 4  -CW      Follows Commands/Answers Questions 100% of the time  -CW      Personal Safety WNL/WFL  -CW      Personal Safety Interventions fall prevention program maintained;gait belt;muscle strengthening  facilitated;nonskid shoes/slippers when out of bed  -CW      Recorded by [CW] Germain Munroe PTA      Bed Mobility, Assessment/Treatment    Bed Mob, Supine to Sit, [place] independent  -CW      Bed Mob, Sit to Supine, [place] independent  -CW      Recorded by [CW] Germain Munroe PTA      Transfer Assessment/Treatment    Transfers, Sit-Stand [place] independent  -CW      Transfers, Stand-Sit [place] independent  -CW      Recorded by [CW] Germain Munroe PTA      Gait Assessment/Treatment    Gait, [place] Level supervision required  -CW      Gait, Distance (Feet) 200  -CW      Recorded by [CW] Germain Munroe PTA      Stairs Assessment/Treatment    Number of Stairs 12  -CW      Stairs, Handrail Location left side (ascending)  -CW      Stairs, [place] Level supervision required  -CW      Stairs, Technique Used step over step (ascending);step over step (descending)  -CW      Recorded by [CW] Germain Munroe PTA      Positioning and Restraints    Pre-Treatment Position in bed  -CW      Post Treatment Position bed  -CW      In Bed notified nsg;supine;call light within reach;encouraged to call for assist;with family/caregiver  -CW      Recorded by [CW] Germain Munroe PTA        User Key  (r) = Recorded By, (t) = Taken By, (c) = Cosigned By    Initials Name Effective Dates     Germain Munroe PTA 12/13/16 -                 IP PT Goals       12/20/17 0954          Gait Training PT LTG    Gait Training Goal PT LTG, Date Established 12/20/17  -PC      Gait Training Goal PT LTG, Time to Achieve 1 wk  -PC      Gait Training Goal PT LTG, [place] Level conditional independence  -PC      Gait Training Goal PT LTG, Distance to Achieve 300 ft  -PC        User Key  (r) = Recorded By, (t) = Taken By, (c) = Cosigned By    Initials Name Provider Type    PC Camille Ruiz PT Physical Therapist          Physical Therapy Education     Title: PT OT SLP Therapies (Done)      Topic: Physical Therapy (Done)     Point: Mobility training (Done)    Learning Progress Summary    Learner Readiness Method Response Comment Documented by Status   Patient Acceptance HAYDEN RICE DU   12/22/17 1038 Done    Acceptance ANDERSON RICE DU   12/20/17 0954 Done                      User Key     Initials Effective Dates Name Provider Type Discipline    PC 12/01/15 -  Camille Ruiz, PT Physical Therapist PT    CW 12/13/16 -  Germain Munroe PTA Physical Therapy Assistant PT                    PT Recommendation and Plan  Anticipated Discharge Disposition: home  Planned Therapy Interventions: balance training, gait training  PT Frequency: daily  Plan of Care Review  Plan Of Care Reviewed With: patient  Progress: progress toward functional goals as expected  Outcome Summary/Follow up Plan: Pt increasing with amb safety and I.  Pt able to do stair navigation with 1 HR and S           Outcome Measures       12/22/17 1000 12/20/17 0900       How much help from another person do you currently need...    Turning from your back to your side while in flat bed without using bedrails? 4  -CW 4  -PC     Moving from lying on back to sitting on the side of a flat bed without bedrails? 4  -CW 4  -PC     Moving to and from a bed to a chair (including a wheelchair)? 4  -CW 4  -PC     Standing up from a chair using your arms (e.g., wheelchair, bedside chair)? 4  -CW 4  -PC     Climbing 3-5 steps with a railing? 3  -CW 3  -PC     To walk in hospital room? 3  -CW 3  -PC     AM-PAC 6 Clicks Score 22  -CW 22  -PC     Functional Assessment    Outcome Measure Options AM-PAC 6 Clicks Basic Mobility (PT)  -CW AM-PAC 6 Clicks Basic Mobility (PT)  -PC       User Key  (r) = Recorded By, (t) = Taken By, (c) = Cosigned By    Initials Name Provider Type    PC Camille Ruiz, PT Physical Therapist    CW Germain Munroe, RADHA Physical Therapy Assistant           Time Calculation:         PT Charges       12/22/17 1039          Time Calculation     Start Time 1012  -CW      Stop Time 1039  -CW      Time Calculation (min) 27 min  -CW      PT Received On 12/22/17  -CW      PT - Next Appointment 12/23/17  -CW        User Key  (r) = Recorded By, (t) = Taken By, (c) = Cosigned By    Initials Name Provider Type    CW Germain Munroe PTA Physical Therapy Assistant          Therapy Charges for Today     Code Description Service Date Service Provider Modifiers Qty    85653940249 HC PT THER PROC EA 15 MIN 12/22/2017 Germain Munroe PTA GP 2    14119022066 HC PT THER SUPP EA 15 MIN 12/22/2017 Germain Munroe PTA GP 2          PT G-Codes  Outcome Measure Options: AM-PAC 6 Clicks Basic Mobility (PT)    Germain Munroe PTA  12/22/2017

## 2017-12-22 NOTE — PLAN OF CARE
Problem: Patient Care Overview (Adult)  Goal: Plan of Care Review   12/22/17 9423   Coping/Psychosocial Response Interventions   Plan Of Care Reviewed With patient   Patient Care Overview   Progress progress toward functional goals as expected   Outcome Evaluation   Outcome Summary/Follow up Plan VFSS shows no penetration or aspiration, likely NOT the cause of her lung infection. SLP to sign off       Problem: Inpatient SLP  Goal: Dysphagia- Patient will safely consume diet as per recommendation with no signs/symptoms of aspiration  Outcome: Outcome(s) achieved Date Met: 12/22/17

## 2017-12-22 NOTE — PLAN OF CARE
Problem: Patient Care Overview (Adult)  Goal: Plan of Care Review  Outcome: Ongoing (interventions implemented as appropriate)   12/22/17 1038   Coping/Psychosocial Response Interventions   Plan Of Care Reviewed With patient   Patient Care Overview   Progress progress toward functional goals as expected   Outcome Evaluation   Outcome Summary/Follow up Plan Pt increasing with amb safety and I. Pt able to do stair navigation with 1 HR and S

## 2017-12-22 NOTE — PROGRESS NOTES
Continued Stay Note  [facility]     Patient Name: Bailee Garza  MRN: [id number]  Today's Date: 12/22/2017    Admit Date: 12/19/2017          Discharge Plan       12/22/17 8799    Case Management/Social Work Plan    Plan Home    Additional Comments [employer] [facility] and has delivered nebulizer. All paperwork and information submitted as well as MD order for percussion vest. Per Padmini from [employer] they will measure pt for vest and deliver to pts home. Plan at [place] remains home [employer] to provide equipment....[facility]              Discharge Codes     None            Joan Landis RN

## 2017-12-22 NOTE — PLAN OF CARE
Problem: Patient Care Overview (Adult)  Goal: Plan of Care Review  Outcome: Ongoing (interventions implemented as appropriate)   12/22/17 0515   Coping/Psychosocial Response Interventions   Plan Of Care Reviewed With patient   Patient Care Overview   Progress improving   Outcome Evaluation   Outcome Summary/Follow up Plan vss; no c/o pain; mucinex was given as ordered; pulm hygiene education given; continue to monitor.     Goal: Adult Individualization and Mutuality  Outcome: Ongoing (interventions implemented as appropriate)    Goal: Discharge Needs Assessment  Outcome: Ongoing (interventions implemented as appropriate)      Problem: Pneumonia (Adult)  Goal: Signs and Symptoms of Listed Potential Problems Will be Absent or Manageable (Pneumonia)  Outcome: Ongoing (interventions implemented as appropriate)      Problem: Infection, Risk/Actual (Adult)  Goal: Infection Prevention/Resolution  Outcome: Ongoing (interventions implemented as appropriate)      Problem: Fall Risk (Adult)  Goal: Identify Related Risk Factors and Signs and Symptoms  Outcome: Ongoing (interventions implemented as appropriate)    Goal: Absence of Falls  Outcome: Ongoing (interventions implemented as appropriate)

## 2017-12-22 NOTE — MBS/VFSS/FEES
Acute Care - Speech Language Pathology   Swallow Initial Evaluation [facility] - VFSS     Patient Name: Bailee Garza  : 1940  MRN: [id number]  Today's Date: 2017  Onset of Illness/Injury or Date of Surgery Date: 17            Admit Date: 2017  SPEECH-LANGUAGE PATHOLOGY EVALUTION - VFSS  Subjective: The patient was seen on this date for a VFSS(Videofluoroscopic Swallowing Study).  Patient was alert and cooperative.    Significant history:   Objective: Risks/benefits were reviewed with the patient, and consent was obtained. The study was completed with SLP present and Radiologist review. The patient was seen in lateral view(s). Textures given included thin liquid, nectar thick liquid, puree consistency, mechanical soft consistency and regular consistency.  Assessment: Difficulties were noted with none of the above consistencies, characterized by minimal to mild residue in vallecula and pyriforms, no functional impact on swallow..   The patient demonstrated no penetration or aspiration and penetration.    Residue was minimal and mild.  Esophageal screen was performed and demonstrated functional esophageal swallow.     SLP Findings: Patient presents with functional swallow.   Comments:   Recommendations: Diet Textures: thin liquid, regular consistency food. Medications should be taken whole with thin liquids.  Recommended Strategies: Upright for PO. Oral care BID and PRN.  Other Recommended Evaluations:     Dysphagia therapy is not recommended. Rationale: Functional swallow.        Visit Dx:     ICD-10-CM ICD-9-CM   1. Pneumonia of both lower lobes due to infectious organism J18.9 483.8   2. Sepsis, due to unspecified organism A41.9 038.9     995.91   3. General weakness R53.1 780.79   4. Bronchiectasis with acute exacerbation J47.1 494.1     Patient Active Problem List   Diagnosis   • Chronic back pain   • Essential hypertension   • Hearing loss   • Hypothyroidism, acquired   • IBS  (irritable bowel syndrome)   • Rhinitis, allergic   • Chronic kidney disease, stage III (moderate)   • Arthralgia of right knee   • Chronic cough   • Abnormal weight loss   • Abnormal CXR   • Exertional shortness of breath   • Pneumonia of both lower lobes due to infectious organism     Past Medical History:   Diagnosis Date   • Arthropathy of pelvic region and thigh 2012    unspecified   • Back pain 2007   • Back pain 2013    unspecified   • Chronic back pain 2009   • Constipation 2015    unspecified   • Depression 2015   • Dyspepsia 2008   • Essential hypertension 2007   • 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)   • Hearing loss 2008   • History of bone density study 2015   • Hypothyroidism, acquired 2007   • IBS (irritable bowel syndrome) 2013   • Insomnia 2015    unspecified   • Intervertebral disc disorder with myelopathy, cervical region 2010   • Rhinitis, allergic 2007   • Screening breast examination 2007   • Stress 2015    other acute reactions to stress   • Urticaria 2015     Past Surgical History:   Procedure Laterality Date   • BREAST BIOPSY Right     50+ years ago   • BREAST LUMPECTOMY     • BRONCHOSCOPY N/A 2017    Procedure: BRONCHOSCOPY;  Surgeon: Mario Alanis MD;  Location: [facility] ENDOSCOPY;  Service:    • HYSTERECTOMY            SWALLOW EVALUATION (last 72 hours)      Swallow Evaluation       17 1300 17 1157             Rehab Evaluation    Document Type evaluation  -MT evaluation  -JT       Subjective Information no complaints  -MT no complaints;agree to therapy  -JT       Patient Effort, Rehab Treatment excellent  -MT good  -JT       Symptoms Noted During/After Treatment  fatigue  -JT       General Information    Patient Profile Review yes  -MT yes  -JT       Subjective Patient Observations  alert, up in bed  -JT        Pertinent History Of Current Problem bronchiectasis  -MT Pna, weight loss  -JT       Current Diet Limitations regular solid;thin liquids  -MT thin liquids;regular solid  -JT       Precautions/Limitations, Vision WFL  -MT WNL  -JT       Precautions/Limitations, Hearing WFL  -MT other (see comments)   mild hearing loss  -JT       Prior Level of Function- Communication functional in all spheres  -MT functional in all spheres  -JT       Prior Level of Function- Swallowing no diet consistency restrictions  -MT no diet consistency restrictions  -JT       Plans/Goals Discussed With patient  -MT patient and family;agreed upon  -JT       Barriers to Rehab none identified  -MT hearing deficit  -JT       Clinical Impression    Patient's Goals For Discharge take care of myself at home  -MT patient did not state  -JT       Family Goals For Discharge  family did not state  -JT       SLP Swallowing Diagnosis --   WFL  -MT        Rehab Potential/Prognosis, Swallowing  good, to achieve stated therapy goals  -JT       Criteria for Skilled Therapeutic Interventions Met no problems identified which require skilled intervention  -MT demonstrates skilled criteria for intervention  -JT       FCM, Swallowing 7-->Level 7  -MT 7-->Level 7  -JT       Therapy Frequency  PRN  -JT       Predicted Duration Therapy Interv (days)  until discharge  -JT       Expected Duration Therapy Session (min)  15-30 minutes  -JT       SLP Diet Recommendation regular textures;thin liquids  -MT regular textures;thin liquids  -JT       Recommended Diagnostics  VFSS (MBS);other (see comments)   VFSS if no findings on bronchoscopy 12/21  -JT       SLP Rec. for Method of Medication Administration meds whole with thin liquid  -MT meds whole with thin liquid  -JT       Monitor For Signs Of Aspiration  none - silent aspiration present;gurgly voice  -JT       Anticipated Discharge Disposition home  -MT home  -JT       Pain Assessment    Pain Assessment  No/denies pain   "-JT       Cognitive Assessment/Intervention    Current Cognitive/Communication Assessment functional  -MT functional  -JT       Orientation Status  oriented x 4  -JT       Follows Commands/Answers Questions  100% of the time;able to follow single-step instructions  -JT       Oral Motor Structure and Function    Oral Motor Anatomy and Physiology patient demonstrates anatomy and physiology that is WNL  -MT patient demonstrates anatomy and physiology that is WNL  -JT       Dentition Assessment present and adequate  -MT present and adequate  -JT       Secretion Management WNL/WFL  -MT WNL/WFL  -JT       Mucosal Quality moist, healthy  -MT moist, healthy  -JT       Velar Elevation  WNL (within normal limits)  -JT       Gag Response  diminished;other (see comments)   pt \"reaches back in throat w/tissue to pull out mucous\"  -JT       Volitional Swallow no difficulties initiating volitional swallow  -MT no difficulties initiating volitional swallow  -JT       Volitional Cough no difficulties initiating volitional cough  -MT no difficulties initiating volitional cough  -JT       Oral Musculature General Assessment  WNL (within normal limits)  -JT       Clinical Swallow Exam    Mode of Presentation  self fed;cup;spoon;straw  -JT       Oral Phase Results  intact oral phase without signs of dysfunction  -JT       Pharyngeal Phase Results  --   ? vocal changes but could be d/t mucus  -JT       Summary of Clinical Exam  Pt w/possible pharyngeal dysphagia w/ questionable vocal changes with thin liquid by straw.  -JT       Videofluoroscopic Swallowing Exam    Risks/Benefits Reviewed risks/benefits explained;agreed to eval;patient  -MT        Radiologic Views Used for Examination left lateral view  -MT        VFSS Swallow Recommendations    Eating Assistance no assistance needed with self eating  -MT        Oral Care oral care with toothbrush and dentifrice BID and PRN  -MT        Recommended Diet regular textures;thin liquids  -MT  "         User Key  (r) = Recorded By, (t) = Taken By, (c) = Cosigned By    Initials Name Effective Dates    JORDYN Patton MS CCC-SLP 04/13/15 -     JUAN Aezvedo 12/11/15 -         EDUCATION  The patient has been educated in the following areas:   Dysphagia (Swallowing Impairment).    SLP Recommendation and Plan  SLP Swallowing Diagnosis:  (WFL)  SLP Diet Recommendation: regular textures, thin liquids     SLP Rec. for Method of Medication Administration: meds whole with thin liquid        Criteria for Skilled Therapeutic Interventions Met: no problems identified which require skilled intervention  Anticipated Discharge Disposition: home                   Plan of Care Review  Plan Of Care Reviewed With: patient  Progress: progress toward functional goals as expected  Outcome Summary/Follow up Plan: VFSS shows no penetration or aspiration, likely NOT the cause of her lung infection. SLP to sign off          IP SLP Goals       12/22/17 1345 12/20/17 1203       Safely Consume Diet    Safely Consume Diet- SLP, Date Established 12/22/17  -MT      Safely Consume Diet- SLP, Time to Achieve  by discharge  -JT     Safely Consume Diet- SLP, Additional Goal VFSS to further assess, goal: tolerate regular diet  -MT      Safely Consume Diet- SLP, Outcome goal met  -MT goal ongoing  -JT       User Key  (r) = Recorded By, (t) = Taken By, (c) = Cosigned By    Initials Name Provider Type    JORDYN Patton MS CCC-SLP Speech and Language Pathologist    JUAN Azevedo Speech and Language Pathologist             SLP Outcome Measures (last 72 hours)      SLP Outcome Measures       12/22/17 1300 12/20/17 1200       SLP Outcome Measures    Outcome Measure Used?  Adult NOMS  -JT     FCM Scores    FCM Chosen Swallowing  -MT Swallowing  -JT     Swallowing FCM Score 7  -MT 7  -JT       User Key  (r) = Recorded By, (t) = Taken By, (c) = Cosigned By    Initials Name Effective Dates    JORDYN KinseyurmanMS POWELL-SLP  04/13/15 -     JUAN Marta Omalley [place] 12/11/15 -            Time Calculation:         Time Calculation- SLP       12/22/17 1347          Time Calculation- SLP    SLP Start Time 1300  -MT      SLP Stop Time 1347  -MT      SLP Time Calculation (min) 47 min  -MT      SLP Received On 12/22/17  -MT        User Key  (r) = Recorded By, (t) = Taken By, (c) = Cosigned By    Initials Name Provider Type    JORDYN Patton MS CCC-SLP Speech and Language Pathologist          Therapy Charges for Today     Code Description Service Date Service Provider Modifiers Qty    [id number] HC ST MOTION FLUORO EVAL SWALLOW 3 12/22/2017 Catherine Patton MS CCC-SLP GN 1               Catherine Patton MS CCC-ABISAI  12/22/2017

## 2017-12-23 VITALS
HEIGHT: 64 IN | TEMPERATURE: 98 F | OXYGEN SATURATION: 96 % | RESPIRATION RATE: 18 BRPM | DIASTOLIC BLOOD PRESSURE: 70 MMHG | WEIGHT: 118.6 LBS | HEART RATE: 93 BPM | BODY MASS INDEX: 20.25 KG/M2 | SYSTOLIC BLOOD PRESSURE: 120 MMHG

## 2017-12-23 LAB
BACTERIA SPEC AEROBE CULT: NORMAL
BACTERIA SPEC AEROBE CULT: NORMAL
GRAM STN SPEC: NORMAL

## 2017-12-23 PROCEDURE — 94668 MNPJ CHEST WALL SBSQ: CPT

## 2017-12-23 PROCEDURE — 94799 UNLISTED PULMONARY SVC/PX: CPT

## 2017-12-23 RX ORDER — METOPROLOL SUCCINATE 25 MG/1
12.5 TABLET, EXTENDED RELEASE ORAL DAILY
Qty: 15 TABLET | Refills: 0 | Status: SHIPPED | OUTPATIENT
Start: 2017-12-23 | End: 2018-01-22

## 2017-12-23 RX ORDER — IPRATROPIUM BROMIDE AND ALBUTEROL SULFATE 2.5; .5 MG/3ML; MG/3ML
3 SOLUTION RESPIRATORY (INHALATION)
Qty: 360 ML | Refills: 0 | Status: SHIPPED | OUTPATIENT
Start: 2017-12-23 | End: 2018-01-01 | Stop reason: HOSPADM

## 2017-12-23 RX ORDER — GUAIFENESIN 600 MG/1
1200 TABLET, EXTENDED RELEASE ORAL EVERY 12 HOURS SCHEDULED
Qty: 120 TABLET | Refills: 0 | Status: SHIPPED | OUTPATIENT
Start: 2017-12-23 | End: 2018-01-22

## 2017-12-23 RX ADMIN — IPRATROPIUM BROMIDE AND ALBUTEROL SULFATE 3 ML: .5; 3 SOLUTION RESPIRATORY (INHALATION) at 07:01

## 2017-12-23 RX ADMIN — LEVOTHYROXINE SODIUM 100 MCG: 100 TABLET ORAL at 06:44

## 2017-12-23 RX ADMIN — GUAIFENESIN 1200 MG: 600 TABLET, EXTENDED RELEASE ORAL at 09:04

## 2017-12-23 NOTE — PLAN OF CARE
Problem: Patient Care Overview (Adult)  Goal: Plan of Care Review  Outcome: Ongoing (interventions implemented as appropriate)   12/22/17 1345 12/22/17 2055 12/23/17 0518   Coping/Psychosocial Response Interventions   Plan Of Care Reviewed With --  patient --    Patient Care Overview   Progress progress toward functional goals as expected --  --    Outcome Evaluation   Outcome Summary/Follow up Plan --  --  VSS; complained of mild headache; no additional heart irregularities; possible dischange on 12/23     Goal: Adult Individualization and Mutuality  Outcome: Ongoing (interventions implemented as appropriate)   12/23/17 0518   Individualization   Patient Specific Interventions Monitor pain;      Goal: Discharge Needs Assessment  Outcome: Ongoing (interventions implemented as appropriate)      Problem: Pneumonia (Adult)  Goal: Signs and Symptoms of Listed Potential Problems Will be Absent or Manageable (Pneumonia)  Outcome: Ongoing (interventions implemented as appropriate)      Problem: Infection, Risk/Actual (Adult)  Goal: Infection Prevention/Resolution  Outcome: Ongoing (interventions implemented as appropriate)      Problem: Fall Risk (Adult)  Goal: Identify Related Risk Factors and Signs and Symptoms  Outcome: Ongoing (interventions implemented as appropriate)    Goal: Absence of Falls  Outcome: Ongoing (interventions implemented as appropriate)      Problem: Bronchoscopy (Adult)  Goal: Signs and Symptoms of Listed Potential Problems Will be Absent or Manageable (Bronchoscopy)  Outcome: Ongoing (interventions implemented as appropriate)

## 2017-12-23 NOTE — DISCHARGE SUMMARY
[facility]  [phone]    DISCHARGE SUMMARY  [facility]    Patient Identification:  Name: Bailee Garza  Age: 77 y.o.  Sex: female  :  1940  MRN: [id number]  Primary Care Physician: Eddie Araya MD    Admit date: 2017  Discharge date and time:          Pneumonia of both lower lobes due to infectious organism (atypical infection)      Essential hypertension    Hypothyroidism, acquired    Chronic kidney disease, stage III (moderate)       History of present illness from H&P:    Patient is a 77 y.o. female who presents to [facility] with the above chief complaint.  Her symptoms started in late October.  She's been coughing pretty consistently since then.  She saw her primary care doctor after cough for 2 weeks and was diagnosed with pneumonia after chest x-ray and was placed on Omnicef.  She completed a full course of Omnicef in the outpatient setting with minimal improvement.  She continued to cough and went back to see her primary care doctor after continued and didn't go away.  This time they placed her on Levaquin.  She completed a seven-day course of Levaquin without much improvement.  She continues to have a productive cough and continues to be short of breath especially with activity.  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  At the last doctor's visit they put her on Augmentin she's been on this for about 3 or 4 days with no improvement and this is what brought her to the emergency room today.  She's had some subjective fevers and chills and was febrile in the emergency room but is otherwise been able to go about her normal activity.  She's concerned that this is related to her Pneumovax vaccine which she received a few days prior to the cough beginning.  She's tried a Liat pot at home as well as other over-the-counter  remedies without any relief.    Hospital Course:     77-year-old female who is mention been on multiple antibiotics for the last month and comes in the hospital for further evaluation.  Here bronchoscopy reveals thick mucus.  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  The patient following the bronchoscopy feels much improved.  She is able to walk farther than she's walked in couple months.    Anum recommends that the patient have a percussion vest discharge.  Patient is to follow-up with Dr. Alanis or weeks.  Dr. Alanis's entered orders for duo nebs discharge.      The patient had SVT yesterday has been evaluated by [facility] cardiology for this.  We are deciding about possible echo for the patient leaves versus outpatient.  Likely the patient will be prescribed low-dose beta blocker.        Problem list:  Pneumonia of both lower lobes due to infectious organism, atypical  -doxycline stopped   -bronch complete  -feeling tremendously better since having the bronch and mucous removed      Order Current Status    Fungus Culture - Lavage, Lung, Left Lower Lobe In process    Non-gynecologic Cytology - Lavage, Lung, Left Lower Lobe In process    AFB Culture - Lavage, Lung, Left Lower Lobe Preliminary result    Blood Culture - Blood, Preliminary result    Blood Culture - Blood, Preliminary result            Essential hypertension-off maxide, and this could potentially cause mucous to be slightly thicker so would try and stay off this for now.    EP is currently stable.        Hypothyroidism, acquired-tsh normal       Chronic kidney disease, stage III (moderate)- creatinine better from admission     Chronic low back pain- ultram     Svt with aberency on monitor yesterday- EKg is ok      Consults:     Consults     Date and Time Order Name Status Description    12/22/2017 0024 Inpatient Consult to Cardiology      12/19/2017 1532 Inpatient Consult to Pulmonology Completed      2017 1356 [facility] (on-call MD unless specified) Completed             Results from last 7 days  Lab Units 17  0521   WBC 10*3/mm3 8.65   HEMOGLOBIN g/dL 10.1*   HEMATOCRIT % 32.1*   PLATELETS 10*3/mm3 233         Results from last 7 days  Lab Units 17  0521   SODIUM mmol/L 144   POTASSIUM mmol/L 4.1   CHLORIDE mmol/L 112*   CO2 mmol/L 21.1*   BUN mg/dL 17   CREATININE mg/dL 0.92   GLUCOSE mg/dL 89   CALCIUM mg/dL 8.0*       Significant Diagnostic Studies:   Lab Results   Component Value Date    WBC 8.65 2017    HGB 10.1 (L) 2017    HCT 32.1 (L) 2017     2017     Lab Results   Component Value Date     2017    K 4.1 2017     (H) 2017    CO2 21.1 (L) 2017    BUN 17 2017    CREATININE 0.92 2017    GLUCOSE 89 2017     Lab Results   Component Value Date    CALCIUM 8.0 (L) 2017     No results found for: AST, ALT, ALKPHOS  No results found for: APTT, INR  No results found for: COLORU, CLARITYU, SPECGRAV, PHUR, PROTEINUR, GLUCOSEU, KETONESU, BLOODU, NITRITE, LEUKOCYTESUR, BILIRUBINUR, UROBILINOGEN, RBCUA, WBCUA, BACTERIA  No results found for: TROPONINT, TROPONINI, BNP  No components found for: HGBA1C;2  No components found for: TSH;2    Imaging Results (all)     Procedure Component Value Units Date/Time    CT Chest Without Contrast [[id number]] Collected:  17 1359     Updated:  17 1528    Narrative:       CT SCANS OF THE CHEST, ABDOMEN, AND PELVIS WITHOUT CONTRAST     HISTORY: Chest and abdominal pain and biliary emesis. Productive cough.     FINDINGS:   The CT scan was performed as an emergency procedure through the chest,  abdomen, and pelvis without contrast and demonstrates the followin. The lungs are somewhat hyperinflated with some bibasilar changes  consistent with a combination of atelectasis and developing pneumonia,  particularly at the left base posteriorly. There is also some  minimal  involvement of the right middle lobe.     2. There is no pericardial effusion. There are several slightly  prominent mediastinal lymph nodes measuring up to 1.3 cm which are  nonspecific and likely reactive. There is no definite hilar adenopathy.  There is no axillary adenopathy.     3. The liver, spleen, pancreas, and both adrenal glands are unremarkable  without intravenous contrast. The gallbladder shows no gallstones or  wall thickening by CT scan.     4. There is a prominent peripelvic left renal cyst measuring up to 4.6  cm which is unchanged from 2013. There is also a smaller cortical  cyst. The kidneys are otherwise unremarkable. There is no evidence of  renal obstruction.     5. There is no aortic aneurysm or retroperitoneal lymphadenopathy. The  large and small bowel loops are normal in caliber and show no  inflammatory change. No abnormality is seen in the pelvis.     Radiation dose reduction techniques were utilized, including automated  exposure control and exposure modulation based on body size.     This report was finalized on 2017 3:25 PM by Dr. Kavon Celestin MD.       CT Abdomen Pelvis Without Contrast [[id number]] Collected:  17 1359     Updated:  17 1528    Narrative:       CT SCANS OF THE CHEST, ABDOMEN, AND PELVIS WITHOUT CONTRAST     HISTORY: Chest and abdominal pain and biliary emesis. Productive cough.     FINDINGS:   The CT scan was performed as an emergency procedure through the chest,  abdomen, and pelvis without contrast and demonstrates the followin. The lungs are somewhat hyperinflated with some bibasilar changes  consistent with a combination of atelectasis and developing pneumonia,  particularly at the left base posteriorly. There is also some minimal  involvement of the right middle lobe.     2. There is no pericardial effusion. There are several slightly  prominent mediastinal lymph nodes measuring up to 1.3 cm which are  nonspecific and  likely reactive. There is no definite hilar adenopathy.  There is no axillary adenopathy.     3. The liver, spleen, pancreas, and both adrenal glands are unremarkable  without intravenous contrast. The gallbladder shows no gallstones or  wall thickening by CT scan.     4. There is a prominent peripelvic left renal cyst measuring up to 4.6  cm which is unchanged from 11/23/2013. There is also a smaller cortical  cyst. The kidneys are otherwise unremarkable. There is no evidence of  renal obstruction.     5. There is no aortic aneurysm or retroperitoneal lymphadenopathy. The  large and small bowel loops are normal in caliber and show no  inflammatory change. No abnormality is seen in the pelvis.     Radiation dose reduction techniques were utilized, including automated  exposure control and exposure modulation based on body size.     This report was finalized on 12/19/2017 3:25 PM by Dr. Kavon Celestin MD.       FL Video Swallow [[id number]] Collected:  12/22/17 1642     Updated:  12/22/17 1646    Narrative:       FL VIDEO SWALLOW-     Clinical: Dysphasia     1 minute 17 seconds fluoroscopy time     FINDINGS:Functional swallow, no penetration or aspiration     Refer to full speech pathology evaluation.     This report was finalized on 12/22/2017 4:43 PM by Dr. Odin Paulino MD.             TEST  RESULTS PENDING AT DISCHARGE   Order Current Status    Fungus Culture - Lavage, Lung, Left Lower Lobe In process    Non-gynecologic Cytology - Lavage, Lung, Left Lower Lobe In process    AFB Culture - Lavage, Lung, Left Lower Lobe Preliminary result    Blood Culture - Blood, Preliminary result    Blood Culture - Blood, Preliminary result          Patient Instructions:    Bailee Garza   Home Medication Instructions FRANCO:[id number]    Printed on:12/23/17 1410   Medication Information                      calcium polycarbophil (FIBERCON) 625 MG tablet  Take 625 mg by mouth Daily.             guaiFENesin (MUCINEX) 600 MG  12 hr tablet  Take 2 tablets by mouth Every 12 (Twelve) Hours for 30 days.             ipratropium-albuterol (DUO-NEB) 0.5-2.5 mg/mL nebulizer  Take 3 mL by nebulization 3 (Three) Times a Day for 30 days.             metoprolol succinate XL (TOPROL XL) 25 MG 24 hr tablet  Take 0.5 tablets by mouth Daily for 30 days.             Multiple Vitamins-Minerals (PRESERVISION AREDS PO)  Take 1 tablet by mouth Daily.             SYNTHROID 100 MCG tablet  Take 1 tablet by mouth Daily.             traMADol-acetaminophen (ULTRACET) 37.5-325 MG per tablet  Take 1 tablet by mouth Every Morning.               No future appointments.  Follow-up Information     Follow up with Mario Alanis MD Follow up in 4 week(s).    Specialty:  Pulmonary Disease    Why:  3rd week of January    Contact information:    [address] [facility] [address]  [place] [id number] [phone]          Follow up with Eddie Araya MD .    Specialty:  Family Medicine    Contact information:    [address] [facility] [address]  [place] [id number]  [phone]          Discharge Order     Start     Ordered    12/23/17 1310  Discharge patient  Once     Comments:  Make arrangement for chest physiotherapy vest   Expected Discharge Date:  12/23/17    Discharge Disposition:  Home or Self Care        12/23/17 1310          pmd 1-2 weeks  F/u cardiology 4 weeks  F/u with pulmonary 4 weeks  Call pulmonary in 2 weeks for culture results      Total time spent discharging patient including evaluation, post hospitalization follow up,  medication and post hospitalization instructions and education, total time exceeds 30 minutes.    Signed:  Anupam Worley MD  12/23/2017  1:12 PM

## 2017-12-23 NOTE — CONSULTS
Reason for consultation: Arrhythmia  Chief complaint: Fatigue and cough/dyspnea nausea     HPI:   Patient is a 77 y.o. female who presents to [facility] with  the above chief complaint.  Her symptoms started in late October.  She has been treated with multiple rounds of antibiotics for respiratory infections.  Yesterday in the morning she went into an abnormal rhythm.  Telemetry has been noted for that.  They're both episodes of artifact noted and also episodes of SVT.  No clear ventricular tachycardia or atrial fibrillation noted.  Cardiology was consulted for same.  There is an episode of SVT which might represent atrial flutter.  There were short lived.  Since these episodes no further episodes and happened.    Patient denies any chest pain or decrease in functional capacity.  She denies any prior cardiac workup.  She denies stroke or heart failure in past.  Denies any heart attack in past.  She does take Dyazide medications for blood pressure.    She denies any symptoms of orthopnea, PND, pedal edema and syncope.    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  She underwent bronchoscopy and currently is being treated for pneumonia.  She had mucous plugging was removed.    Her current EKG shows normal sinus rhythm with normal NV and QRS intervals.         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)   • Hearing loss 2008   • History of bone density study 2015   • Hypothyroidism, acquired 2007   • IBS (irritable bowel syndrome) 2013   • Insomnia 2015     unspecified   • Intervertebral  disc disorder with myelopathy, cervical region 07/22/2010   • Rhinitis, allergic 11/20/2007   • Screening breast examination 11/20/2007   • Stress 04/27/2015     other acute reactions to stress   • Urticaria 06/29/2015          Surgical History          Past Surgical History:   Procedure Laterality Date   • BREAST BIOPSY Right       50+ years ago   • BREAST LUMPECTOMY       • HYSTERECTOMY                   Family History   Problem Relation Age of Onset   • Heart disease Mother     • Cancer Father     • Asthma Paternal Grandfather             Social History   Substance Use Topics   • Smoking status: Never Smoker   • Smokeless tobacco: Never Used   • Alcohol use No       Prescriptions Prior to Admission            Prescriptions Prior to Admission   Medication Sig Dispense Refill Last Dose   • amoxicillin-clavulanate (AUGMENTIN) 875-125 MG per tablet Take 1 tablet by mouth 2 (Two) Times a Day for 10 days. 20 tablet 0     • benzonatate (TESSALON) 200 MG capsule Take 1 capsule by mouth 3 (Three) Times a Day As Needed for Cough. 30 capsule 0 Taking   • levoFLOXacin (LEVAQUIN) 500 MG tablet Take 500 mg by mouth Daily.         • Methylcellulose, Laxative, (SM FIBER LAXATIVE) 500 MG tablet Take 1 tablet by oral route daily     Taking   • Multiple Vitamins-Minerals (PRESERVISION AREDS PO) Take  by mouth.     Taking   • SYNTHROID 100 MCG tablet Take 1 tablet by mouth Daily. 30 tablet 11     • traMADol-acetaminophen (ULTRACET) 37.5-325 MG per tablet Take one tablet po q am, and 2 tablets po q hs as needed for pain 100 tablet 2 Taking   • triamterene-hydrochlorothiazide (MAXZIDE) 75-50 MG per tablet Take 1 tablet by mouth Daily. 30 tablet 11           Allergies:  Ketek [telithromycin]; Nsaids; and Sulphur [sulfur]     Review of Systems   Constitutional: Negative for chills, and fever.   HENT: Positive for congestion. Negative for rhinorrhea and sore throat.    Eyes: Negative for photophobia, redness and visual disturbance.    Respiratory: Positive for cough, and shortness of breath.    Cardiovascular: Negative for chest pain, palpitations and leg swelling.   Gastrointestinal: Negative for abdominal distention, abdominal pain, constipation and diarrhea.   Endocrine: Negative for polydipsia, polyphagia and polyuria.   Genitourinary: Negative for difficulty urinating, hematuria and urgency.   Musculoskeletal: Negative for arthralgias, back pain and gait problem.   Neurological: Negative for dizziness, tremors and numbness.   Hematological: Negative for adenopathy. Does not bruise/bleed easily.   Psychiatric/Behavioral: Negative for agitation, behavioral problems and confusion.            Objective       Vital Signs  Temp:  [98  Heart Rate: 93  Resp:   18  BP: 120/70       Physical Exam   Constitutional: She is oriented to person, place, and time. She appears well-developed and well-nourished. No distress.   HENT:   Head: Normocephalic and atraumatic.   Nose: Nose normal.   Eyes: Conjunctivae and EOM are normal. No scleral icterus.   Neck: Neck supple. No JVD present.   Cardiovascular: Normal rate, regular rhythm and normal heart sounds.  No edema. NO JVD.   Pulmonary/Chest: No accessory muscle usage. No respiratory distress. She has no wheezes. She has b/l crackles , faint.   Abdominal: Soft. Bowel sounds are normal. She exhibits no distension.   Neurological: She is alert and oriented to person, place, and time.   Skin: Skin is warm and dry.   Psychiatric: She has a normal mood and affect. Her behavior is normal.   Nursing note and vitals reviewed.       Allergies: As noted in the system : NSAIDs, sulfur     Results Review:                        I reviewed the patient's new clinical results.     Results from last 7 days  Lab Units 12/21/17  1128   WBC 10*3/mm3 8.65   HEMOGLOBIN g/dL 10.1   PLATELETS 10*3/mm3 233      Results from last 7 days  Lab Units 12/21/17  1128   SODIUM mmol/L 144   POTASSIUM mmol/L 4.1   CHLORIDE mmol/L 112   CO2  mmol/L 28.7   BUN mg/dL 17   CREATININE mg/dL 0.92   GLUCOSE mg/dL 89                              Estimated Creatinine Clearance: 23.1 mL/min (by C-G formula based on Cr of 1.56).    Results from last 7 days  Lab Units 12/19/17  1128   INR   1.05   TROPONIN T ng/mL <0.010   PROBNP pg/mL 199.3       Results from last 7 days  Lab Units 12/19/17  1320   NITRITE UA   Negative      CT Chest Without Contrast   Final Result       CT Abdomen Pelvis Without Contrast   Final Result             Assessment/Plan       1. Pneumonia with mucous plugging: As per primary pulmonology.  2.  SVT: Patient currently not in heart failure.  Telemetry rhythm most likely representing SVT.  No ventricular arrhythmias noted.  Likely driven by respiratory infection.  Start beta blockade / Metoprolol Succinate 12.5 mg po daily. Short brief run. Safe to be discharged from cardiac standpoint with outpatient follow up.  As an outpatient patient would require a 2-D echocardiogram to look for structural abnormalities.  Currently stable and this can be performed as an outpatient.  If continues to have persistent symptoms she would also benefit from event monitor which also can be done as an outpatient.  We will wait for her respiratory infection to be treated before we perform an event monitor.   3.  Hypertension: Optimally controlled.  She appears euvolemic, being off the diuretics, continue to hold.  Start Toprol-XL                  I discussed the patients findings and my recommendations with patient, family and nursing staff.

## 2017-12-24 LAB
BACTERIA SPEC AEROBE CULT: NORMAL
BACTERIA SPEC AEROBE CULT: NORMAL

## 2017-12-26 LAB
CYTO UR: NORMAL
LAB AP CASE REPORT: NORMAL
LAB AP CLINICAL INFORMATION: NORMAL
Lab: NORMAL
PATH REPORT.FINAL DX SPEC: NORMAL
PATH REPORT.GROSS SPEC: NORMAL

## 2017-12-26 NOTE — PROGRESS NOTES
Case Management Discharge Note    Final Note: Home with family support. Nebulizer and percussion vest set up thru [facility] at pt request    Discharge Placement     No information found        Other: Other (car)    Discharge Codes: 01  Discharge to home

## 2017-12-27 LAB
FUNGUS WND CULT: ABNORMAL
FUNGUS WND CULT: ABNORMAL

## 2017-12-28 ENCOUNTER — OFFICE VISIT (OUTPATIENT)
Dept: FAMILY MEDICINE CLINIC | Facility: CLINIC | Age: 77
End: 2017-12-28

## 2017-12-28 VITALS
OXYGEN SATURATION: 99 % | SYSTOLIC BLOOD PRESSURE: 88 MMHG | HEIGHT: 64 IN | BODY MASS INDEX: 18.27 KG/M2 | TEMPERATURE: 97.7 F | DIASTOLIC BLOOD PRESSURE: 60 MMHG | WEIGHT: 107 LBS | HEART RATE: 91 BPM

## 2017-12-28 DIAGNOSIS — Z09 HOSPITAL DISCHARGE FOLLOW-UP: ICD-10-CM

## 2017-12-28 DIAGNOSIS — D64.9 LOW HEMOGLOBIN: ICD-10-CM

## 2017-12-28 DIAGNOSIS — J18.9 PNEUMONIA OF BOTH LOWER LOBES DUE TO INFECTIOUS ORGANISM: Primary | ICD-10-CM

## 2017-12-28 LAB
BASOPHILS # BLD AUTO: 0.05 10*3/MM3 (ref 0–0.2)
BASOPHILS NFR BLD AUTO: 0.6 % (ref 0–1.5)
BUN SERPL-MCNC: 25 MG/DL (ref 8–23)
BUN/CREAT SERPL: 25.8 (ref 7–25)
CALCIUM SERPL-MCNC: 9.9 MG/DL (ref 8.6–10.5)
CHLORIDE SERPL-SCNC: 103 MMOL/L (ref 98–107)
CO2 SERPL-SCNC: 26.5 MMOL/L (ref 22–29)
CREAT SERPL-MCNC: 0.97 MG/DL (ref 0.57–1)
EOSINOPHIL # BLD AUTO: 0 10*3/MM3 (ref 0–0.7)
EOSINOPHIL NFR BLD AUTO: 0 % (ref 0.3–6.2)
ERYTHROCYTE [DISTWIDTH] IN BLOOD BY AUTOMATED COUNT: 13.8 % (ref 11.7–13)
GLUCOSE SERPL-MCNC: 86 MG/DL (ref 65–99)
HCT VFR BLD AUTO: 36.3 % (ref 35.6–45.5)
HGB BLD-MCNC: 11.9 G/DL (ref 11.9–15.5)
IMM GRANULOCYTES # BLD: 0.05 10*3/MM3 (ref 0–0.03)
IMM GRANULOCYTES NFR BLD: 0.6 % (ref 0–0.5)
LYMPHOCYTES # BLD AUTO: 1.03 10*3/MM3 (ref 0.9–4.8)
LYMPHOCYTES NFR BLD AUTO: 11.6 % (ref 19.6–45.3)
MCH RBC QN AUTO: 32.7 PG (ref 26.9–32)
MCHC RBC AUTO-ENTMCNC: 32.8 G/DL (ref 32.4–36.3)
MCV RBC AUTO: 99.7 FL (ref 80.5–98.2)
MONOCYTES # BLD AUTO: 0.83 10*3/MM3 (ref 0.2–1.2)
MONOCYTES NFR BLD AUTO: 9.3 % (ref 5–12)
NEUTROPHILS # BLD AUTO: 6.95 10*3/MM3 (ref 1.9–8.1)
NEUTROPHILS NFR BLD AUTO: 77.9 % (ref 42.7–76)
PLATELET # BLD AUTO: 361 10*3/MM3 (ref 140–500)
POTASSIUM SERPL-SCNC: 4.7 MMOL/L (ref 3.5–5.2)
RBC # BLD AUTO: 3.64 10*6/MM3 (ref 3.9–5.2)
SODIUM SERPL-SCNC: 145 MMOL/L (ref 136–145)
WBC # BLD AUTO: 8.91 10*3/MM3 (ref 4.5–10.7)

## 2017-12-28 PROCEDURE — 99214 OFFICE O/P EST MOD 30 MIN: CPT | Performed by: NURSE PRACTITIONER

## 2017-12-28 NOTE — PROGRESS NOTES
Subjective   Bailee Garza is a 77 y.o. female.     History of Present Illness   Bailee Garza 77 y.o. female presents today for hosptial follow up.  she was treated [facility] for pneumonia.  I reviewed all of the labs and diagnostic testing.  The patient's medications were changed:  Current outpatient and discharge medications have been reconciled for the patient.  TERESA Lopez    she does have a follow up appointment with a specialist:      Hospital note as follows:    Patient is a 77 y.o. female who presents to [facility] with the above chief complaint.  Her symptoms started in late October.  She's been coughing pretty consistently since then.  She saw her primary care doctor after cough for 2 weeks and was diagnosed with pneumonia after chest x-ray and was placed on Omnicef.  She completed a full course of Omnicef in the outpatient setting with minimal improvement.  She continued to cough and went back to see her primary care doctor after continued and didn't go away.  This time they placed her on Levaquin.  She completed a seven-day course of Levaquin without much improvement.  She continues to have a productive cough and continues to be short of breath especially with activity.  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  At the last doctor's visit they put her on Augmentin she's been on this for about 3 or 4 days with no improvement and this is what brought her to the emergency room today.  She's had some subjective fevers and chills and was febrile in the emergency room but is otherwise been able to go about her normal activity.  She's concerned that this is related to her Pneumovax vaccine which she received a few days prior to the cough beginning.  She's tried a Liat pot at home as well as other over-the-counter remedies without any relief.  Hospital Course:   77-year-old  female who is mention been on multiple antibiotics for the last month and comes in the hospital for further evaluation.  Here bronchoscopy reveals thick mucus.  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  The patient following the bronchoscopy feels much improved.  She is able to walk farther than she's walked in couple months.  Anum recommends that the patient have a percussion vest discharge.  Patient is to follow-up with Dr. Alanis or miroslava.  Dr. Alanis's entered orders for duo nebs discharge.    The patient had SVT yesterday has been evaluated by [facility] cardiology for this.  We are deciding about possible echo for the patient leaves versus outpatient.  Likely the patient will be prescribed low-dose beta blocker.        Patient reports feeling much improvement since discharge.  She has been using incentive spirometer and nebulizer as instructed.  She denies fever, chills, shortness of breath or chest pain.  She states she does not cough as much.  She has f/u appt scheduled with pulmonologist in 3 weeks and will cardiologist in 2 weeks.  Still awaiting results of cultures from bronchoscopy.  Patient's maxide was stopped and she was startedon 12.5 mg of toprol for SVT.  Patient has been taking as prescribed. Her BP is low today.    The following portions of the patient's history were reviewed and updated as appropriate: allergies, current medications, past family history, past medical history, past social history, past surgical history and problem list.    Review of Systems   Constitutional: Negative for unexpected weight change.   Respiratory: Negative for shortness of breath.    Cardiovascular: Negative for chest pain and palpitations.   Psychiatric/Behavioral: Negative for behavioral problems.       Objective   Physical Exam   Constitutional: She is oriented to person, place, and time. She appears well-developed and well-nourished.   Cardiovascular: Normal rate  and regular rhythm.    Pulmonary/Chest: Effort normal. She has rales in the right lower field and the left lower field.   Fine crackles to bilateral bases.    Neurological: She is alert and oriented to person, place, and time.   Psychiatric: She has a normal mood and affect. Judgment normal.   Nursing note and vitals reviewed.      Assessment/Plan   Bailee was seen today for medication problem, shortness of breath and fatigue.    Diagnoses and all orders for this visit:    Pneumonia of both lower lobes due to infectious organism    Low hemoglobin  -     Basic metabolic panel  -     CBC & Differential    Hospital discharge follow-up  -     Basic metabolic panel  -     CBC & Differential        Patient to hold metoprolol until she follows up with cardiology.  Will check BMP and CBC today.  Son states they are going to purchase BP monitor.  Will f/u if BP continues to run below 100s systolic or if elevated to 130s or higher systolic or 90s diastolic.

## 2017-12-29 ENCOUNTER — APPOINTMENT (OUTPATIENT)
Dept: GENERAL RADIOLOGY | Facility: HOSPITAL | Age: 77
End: 2017-12-29

## 2017-12-29 ENCOUNTER — TELEPHONE (OUTPATIENT)
Dept: FAMILY MEDICINE CLINIC | Facility: CLINIC | Age: 77
End: 2017-12-29

## 2017-12-29 ENCOUNTER — HOSPITAL ENCOUNTER (INPATIENT)
Facility: HOSPITAL | Age: 77
LOS: 3 days | Discharge: HOME | End: 2018-01-01
Attending: EMERGENCY MEDICINE | Admitting: INTERNAL MEDICINE

## 2017-12-29 DIAGNOSIS — J18.9 HCAP (HEALTHCARE-ASSOCIATED PNEUMONIA): Primary | ICD-10-CM

## 2017-12-29 LAB
ALBUMIN SERPL-MCNC: 3.3 G/DL (ref 3.5–5.2)
ALBUMIN/GLOB SERPL: 0.8 G/DL
ALP SERPL-CCNC: 75 U/L (ref 39–117)
ALT SERPL W P-5'-P-CCNC: 35 U/L (ref 1–33)
ANION GAP SERPL CALCULATED.3IONS-SCNC: 14 MMOL/L
AST SERPL-CCNC: 35 U/L (ref 1–32)
BASOPHILS # BLD AUTO: 0.02 10*3/MM3 (ref 0–0.2)
BASOPHILS NFR BLD AUTO: 0.1 % (ref 0–1.5)
BILIRUB SERPL-MCNC: 0.6 MG/DL (ref 0.1–1.2)
BUN BLD-MCNC: 33 MG/DL (ref 8–23)
BUN/CREAT SERPL: 28.4 (ref 7–25)
CALCIUM SPEC-SCNC: 9.3 MG/DL (ref 8.6–10.5)
CHLORIDE SERPL-SCNC: 100 MMOL/L (ref 98–107)
CO2 SERPL-SCNC: 24 MMOL/L (ref 22–29)
CREAT BLD-MCNC: 1.16 MG/DL (ref 0.57–1)
D-LACTATE SERPL-SCNC: 1.2 MMOL/L (ref 0.5–2)
DEPRECATED RDW RBC AUTO: 46.8 FL (ref 37–54)
EOSINOPHIL # BLD AUTO: 0 10*3/MM3 (ref 0–0.7)
EOSINOPHIL NFR BLD AUTO: 0 % (ref 0.3–6.2)
ERYTHROCYTE [DISTWIDTH] IN BLOOD BY AUTOMATED COUNT: 13.4 % (ref 11.7–13)
FLUAV AG NPH QL: NEGATIVE
FLUBV AG NPH QL IA: NEGATIVE
GFR SERPL CREATININE-BSD FRML MDRD: 45 ML/MIN/1.73
GLOBULIN UR ELPH-MCNC: 3.9 GM/DL
GLUCOSE BLD-MCNC: 122 MG/DL (ref 65–99)
HCT VFR BLD AUTO: 34.5 % (ref 35.6–45.5)
HGB BLD-MCNC: 10.9 G/DL (ref 11.9–15.5)
IMM GRANULOCYTES # BLD: 0.08 10*3/MM3 (ref 0–0.03)
IMM GRANULOCYTES NFR BLD: 0.4 % (ref 0–0.5)
LYMPHOCYTES # BLD AUTO: 1.05 10*3/MM3 (ref 0.9–4.8)
LYMPHOCYTES NFR BLD AUTO: 4.6 % (ref 19.6–45.3)
MCH RBC QN AUTO: 30.3 PG (ref 26.9–32)
MCHC RBC AUTO-ENTMCNC: 31.6 G/DL (ref 32.4–36.3)
MCV RBC AUTO: 95.8 FL (ref 80.5–98.2)
MONOCYTES # BLD AUTO: 0.68 10*3/MM3 (ref 0.2–1.2)
MONOCYTES NFR BLD AUTO: 3 % (ref 5–12)
NEUTROPHILS # BLD AUTO: 20.77 10*3/MM3 (ref 1.9–8.1)
NEUTROPHILS NFR BLD AUTO: 91.9 % (ref 42.7–76)
PLATELET # BLD AUTO: 314 10*3/MM3 (ref 140–500)
PMV BLD AUTO: 10.8 FL (ref 6–12)
POTASSIUM BLD-SCNC: 3.6 MMOL/L (ref 3.5–5.2)
PROCALCITONIN SERPL-MCNC: 7.11 NG/ML (ref 0.1–0.25)
PROT SERPL-MCNC: 7.2 G/DL (ref 6–8.5)
RBC # BLD AUTO: 3.6 10*6/MM3 (ref 3.9–5.2)
SODIUM BLD-SCNC: 138 MMOL/L (ref 136–145)
WBC NRBC COR # BLD: 22.6 10*3/MM3 (ref 4.5–10.7)

## 2017-12-29 PROCEDURE — 71020 HC CHEST PA AND LATERAL: CPT

## 2017-12-29 PROCEDURE — 99284 EMERGENCY DEPT VISIT MOD MDM: CPT

## 2017-12-29 PROCEDURE — 80053 COMPREHEN METABOLIC PANEL: CPT | Performed by: EMERGENCY MEDICINE

## 2017-12-29 PROCEDURE — 87086 URINE CULTURE/COLONY COUNT: CPT | Performed by: EMERGENCY MEDICINE

## 2017-12-29 PROCEDURE — 25010000002 PIPERACILLIN SOD-TAZOBACTAM PER 1 G: Performed by: EMERGENCY MEDICINE

## 2017-12-29 PROCEDURE — 81001 URINALYSIS AUTO W/SCOPE: CPT | Performed by: EMERGENCY MEDICINE

## 2017-12-29 PROCEDURE — 85025 COMPLETE CBC W/AUTO DIFF WBC: CPT | Performed by: EMERGENCY MEDICINE

## 2017-12-29 PROCEDURE — 83605 ASSAY OF LACTIC ACID: CPT | Performed by: EMERGENCY MEDICINE

## 2017-12-29 PROCEDURE — 36415 COLL VENOUS BLD VENIPUNCTURE: CPT | Performed by: EMERGENCY MEDICINE

## 2017-12-29 PROCEDURE — 25010000002 VANCOMYCIN PER 500 MG: Performed by: EMERGENCY MEDICINE

## 2017-12-29 PROCEDURE — 84145 PROCALCITONIN (PCT): CPT | Performed by: EMERGENCY MEDICINE

## 2017-12-29 PROCEDURE — 87040 BLOOD CULTURE FOR BACTERIA: CPT | Performed by: EMERGENCY MEDICINE

## 2017-12-29 PROCEDURE — 87804 INFLUENZA ASSAY W/OPTIC: CPT | Performed by: EMERGENCY MEDICINE

## 2017-12-29 RX ORDER — MULTIPLE VITAMINS W/ MINERALS TAB 9MG-400MCG
1 TAB ORAL DAILY
Status: DISCONTINUED | OUTPATIENT
Start: 2017-12-30 | End: 2018-01-01 | Stop reason: HOSPADM

## 2017-12-29 RX ORDER — IPRATROPIUM BROMIDE AND ALBUTEROL SULFATE 2.5; .5 MG/3ML; MG/3ML
3 SOLUTION RESPIRATORY (INHALATION)
Status: DISCONTINUED | OUTPATIENT
Start: 2017-12-29 | End: 2017-12-29

## 2017-12-29 RX ORDER — TRAMADOL HYDROCHLORIDE 50 MG/1
50 TABLET ORAL DAILY
Status: DISCONTINUED | OUTPATIENT
Start: 2017-12-30 | End: 2018-01-01 | Stop reason: HOSPADM

## 2017-12-29 RX ORDER — NITROGLYCERIN 0.4 MG/1
0.4 TABLET SUBLINGUAL
Status: DISCONTINUED | OUTPATIENT
Start: 2017-12-29 | End: 2018-01-01 | Stop reason: HOSPADM

## 2017-12-29 RX ORDER — CALCIUM POLYCARBOPHIL 625 MG 625 MG/1
625 TABLET ORAL DAILY
Status: DISCONTINUED | OUTPATIENT
Start: 2017-12-30 | End: 2018-01-01 | Stop reason: HOSPADM

## 2017-12-29 RX ORDER — VANCOMYCIN HYDROCHLORIDE 1 G/200ML
20 INJECTION, SOLUTION INTRAVENOUS ONCE
Status: COMPLETED | OUTPATIENT
Start: 2017-12-29 | End: 2017-12-29

## 2017-12-29 RX ORDER — METOPROLOL SUCCINATE 25 MG/1
12.5 TABLET, EXTENDED RELEASE ORAL DAILY
Status: DISCONTINUED | OUTPATIENT
Start: 2017-12-30 | End: 2018-01-01 | Stop reason: HOSPADM

## 2017-12-29 RX ORDER — ACETAMINOPHEN 325 MG/1
325 TABLET ORAL DAILY
Status: DISCONTINUED | OUTPATIENT
Start: 2017-12-30 | End: 2018-01-01 | Stop reason: HOSPADM

## 2017-12-29 RX ORDER — SODIUM CHLORIDE 0.9 % (FLUSH) 0.9 %
1-10 SYRINGE (ML) INJECTION AS NEEDED
Status: DISCONTINUED | OUTPATIENT
Start: 2017-12-29 | End: 2018-01-01 | Stop reason: HOSPADM

## 2017-12-29 RX ORDER — LEVOTHYROXINE SODIUM 0.1 MG/1
100 TABLET ORAL DAILY
Status: DISCONTINUED | OUTPATIENT
Start: 2017-12-30 | End: 2018-01-01 | Stop reason: HOSPADM

## 2017-12-29 RX ORDER — GUAIFENESIN 600 MG/1
1200 TABLET, EXTENDED RELEASE ORAL EVERY 12 HOURS SCHEDULED
Status: DISCONTINUED | OUTPATIENT
Start: 2017-12-29 | End: 2018-01-01 | Stop reason: HOSPADM

## 2017-12-29 RX ORDER — IPRATROPIUM BROMIDE AND ALBUTEROL SULFATE 2.5; .5 MG/3ML; MG/3ML
3 SOLUTION RESPIRATORY (INHALATION)
Status: DISCONTINUED | OUTPATIENT
Start: 2017-12-29 | End: 2018-01-01 | Stop reason: HOSPADM

## 2017-12-29 RX ADMIN — VANCOMYCIN HYDROCHLORIDE 1000 MG: 1 INJECTION, SOLUTION INTRAVENOUS at 22:21

## 2017-12-29 RX ADMIN — TAZOBACTAM SODIUM AND PIPERACILLIN SODIUM 4.5 G: 500; 4 INJECTION, SOLUTION INTRAVENOUS at 20:44

## 2017-12-30 LAB
ALBUMIN SERPL-MCNC: 3.3 G/DL (ref 3.5–5.2)
ALBUMIN/GLOB SERPL: 0.8 G/DL
ALP SERPL-CCNC: 79 U/L (ref 39–117)
ALT SERPL W P-5'-P-CCNC: 30 U/L (ref 1–33)
ANION GAP SERPL CALCULATED.3IONS-SCNC: 15 MMOL/L
AST SERPL-CCNC: 26 U/L (ref 1–32)
BACTERIA UR QL AUTO: NORMAL /HPF
BASOPHILS # BLD AUTO: 0.02 10*3/MM3 (ref 0–0.2)
BASOPHILS NFR BLD AUTO: 0.1 % (ref 0–1.5)
BILIRUB SERPL-MCNC: 0.7 MG/DL (ref 0.1–1.2)
BILIRUB UR QL STRIP: NEGATIVE
BUN BLD-MCNC: 27 MG/DL (ref 8–23)
BUN/CREAT SERPL: 21.1 (ref 7–25)
CALCIUM SPEC-SCNC: 9.1 MG/DL (ref 8.6–10.5)
CHLORIDE SERPL-SCNC: 101 MMOL/L (ref 98–107)
CLARITY UR: ABNORMAL
CO2 SERPL-SCNC: 25 MMOL/L (ref 22–29)
COD CRY URNS QL: NORMAL /HPF
COLOR UR: YELLOW
CREAT BLD-MCNC: 1.28 MG/DL (ref 0.57–1)
DEPRECATED RDW RBC AUTO: 48.4 FL (ref 37–54)
EOSINOPHIL # BLD AUTO: 0 10*3/MM3 (ref 0–0.7)
EOSINOPHIL NFR BLD AUTO: 0 % (ref 0.3–6.2)
ERYTHROCYTE [DISTWIDTH] IN BLOOD BY AUTOMATED COUNT: 13.5 % (ref 11.7–13)
GFR SERPL CREATININE-BSD FRML MDRD: 41 ML/MIN/1.73
GLOBULIN UR ELPH-MCNC: 4 GM/DL
GLUCOSE BLD-MCNC: 105 MG/DL (ref 65–99)
GLUCOSE UR STRIP-MCNC: NEGATIVE MG/DL
HCT VFR BLD AUTO: 34.4 % (ref 35.6–45.5)
HGB BLD-MCNC: 10.7 G/DL (ref 11.9–15.5)
HGB UR QL STRIP.AUTO: NEGATIVE
HYALINE CASTS UR QL AUTO: NORMAL /LPF
IMM GRANULOCYTES # BLD: 0.08 10*3/MM3 (ref 0–0.03)
IMM GRANULOCYTES NFR BLD: 0.4 % (ref 0–0.5)
KETONES UR QL STRIP: ABNORMAL
LEUKOCYTE ESTERASE UR QL STRIP.AUTO: ABNORMAL
LYMPHOCYTES # BLD AUTO: 1.63 10*3/MM3 (ref 0.9–4.8)
LYMPHOCYTES NFR BLD AUTO: 7.8 % (ref 19.6–45.3)
MCH RBC QN AUTO: 30.1 PG (ref 26.9–32)
MCHC RBC AUTO-ENTMCNC: 31.1 G/DL (ref 32.4–36.3)
MCV RBC AUTO: 96.9 FL (ref 80.5–98.2)
MONOCYTES # BLD AUTO: 0.56 10*3/MM3 (ref 0.2–1.2)
MONOCYTES NFR BLD AUTO: 2.7 % (ref 5–12)
NEUTROPHILS # BLD AUTO: 18.55 10*3/MM3 (ref 1.9–8.1)
NEUTROPHILS NFR BLD AUTO: 89 % (ref 42.7–76)
NITRITE UR QL STRIP: NEGATIVE
PH UR STRIP.AUTO: 5.5 [PH] (ref 5–8)
PLATELET # BLD AUTO: 316 10*3/MM3 (ref 140–500)
PMV BLD AUTO: 11.3 FL (ref 6–12)
POTASSIUM BLD-SCNC: 4.2 MMOL/L (ref 3.5–5.2)
PROCALCITONIN SERPL-MCNC: 3.66 NG/ML (ref 0.1–0.25)
PROT SERPL-MCNC: 7.3 G/DL (ref 6–8.5)
PROT UR QL STRIP: ABNORMAL
RBC # BLD AUTO: 3.55 10*6/MM3 (ref 3.9–5.2)
RBC # UR: NORMAL /HPF
REF LAB TEST METHOD: NORMAL
SODIUM BLD-SCNC: 141 MMOL/L (ref 136–145)
SP GR UR STRIP: 1.02 (ref 1–1.03)
SQUAMOUS #/AREA URNS HPF: NORMAL /HPF
UROBILINOGEN UR QL STRIP: ABNORMAL
VANCOMYCIN SERPL-MCNC: 9.5 MCG/ML (ref 5–40)
WBC NRBC COR # BLD: 20.84 10*3/MM3 (ref 4.5–10.7)
WBC UR QL AUTO: NORMAL /HPF

## 2017-12-30 PROCEDURE — 94640 AIRWAY INHALATION TREATMENT: CPT

## 2017-12-30 PROCEDURE — 25010000002 VANCOMYCIN PER 500 MG: Performed by: NURSE PRACTITIONER

## 2017-12-30 PROCEDURE — 94799 UNLISTED PULMONARY SVC/PX: CPT

## 2017-12-30 PROCEDURE — 25010000002 PIPERACILLIN SOD-TAZOBACTAM PER 1 G: Performed by: NURSE PRACTITIONER

## 2017-12-30 PROCEDURE — 87449 NOS EACH ORGANISM AG IA: CPT | Performed by: NURSE PRACTITIONER

## 2017-12-30 PROCEDURE — 87081 CULTURE SCREEN ONLY: CPT | Performed by: INTERNAL MEDICINE

## 2017-12-30 PROCEDURE — 25010000002 ENOXAPARIN PER 10 MG: Performed by: INTERNAL MEDICINE

## 2017-12-30 PROCEDURE — 84145 PROCALCITONIN (PCT): CPT | Performed by: INTERNAL MEDICINE

## 2017-12-30 PROCEDURE — 80202 ASSAY OF VANCOMYCIN: CPT | Performed by: NURSE PRACTITIONER

## 2017-12-30 PROCEDURE — 85025 COMPLETE CBC W/AUTO DIFF WBC: CPT | Performed by: INTERNAL MEDICINE

## 2017-12-30 PROCEDURE — 80053 COMPREHEN METABOLIC PANEL: CPT | Performed by: INTERNAL MEDICINE

## 2017-12-30 RX ORDER — IPRATROPIUM BROMIDE AND ALBUTEROL SULFATE 2.5; .5 MG/3ML; MG/3ML
3 SOLUTION RESPIRATORY (INHALATION) EVERY 4 HOURS PRN
Status: DISCONTINUED | OUTPATIENT
Start: 2017-12-30 | End: 2018-01-01 | Stop reason: HOSPADM

## 2017-12-30 RX ORDER — VANCOMYCIN HYDROCHLORIDE 1 G/200ML
20 INJECTION, SOLUTION INTRAVENOUS ONCE
Status: COMPLETED | OUTPATIENT
Start: 2017-12-30 | End: 2017-12-30

## 2017-12-30 RX ADMIN — IPRATROPIUM BROMIDE AND ALBUTEROL SULFATE 3 ML: .5; 3 SOLUTION RESPIRATORY (INHALATION) at 20:46

## 2017-12-30 RX ADMIN — METOPROLOL SUCCINATE 12.5 MG: 25 TABLET, FILM COATED, EXTENDED RELEASE ORAL at 08:52

## 2017-12-30 RX ADMIN — ENOXAPARIN SODIUM 40 MG: 40 INJECTION SUBCUTANEOUS at 00:52

## 2017-12-30 RX ADMIN — TAZOBACTAM SODIUM AND PIPERACILLIN SODIUM 3.38 G: 375; 3 INJECTION, SOLUTION INTRAVENOUS at 06:41

## 2017-12-30 RX ADMIN — TAZOBACTAM SODIUM AND PIPERACILLIN SODIUM 3.38 G: 375; 3 INJECTION, SOLUTION INTRAVENOUS at 17:08

## 2017-12-30 RX ADMIN — IPRATROPIUM BROMIDE AND ALBUTEROL SULFATE 3 ML: .5; 3 SOLUTION RESPIRATORY (INHALATION) at 00:01

## 2017-12-30 RX ADMIN — TAZOBACTAM SODIUM AND PIPERACILLIN SODIUM 3.38 G: 375; 3 INJECTION, SOLUTION INTRAVENOUS at 00:52

## 2017-12-30 RX ADMIN — IPRATROPIUM BROMIDE AND ALBUTEROL SULFATE 3 ML: .5; 3 SOLUTION RESPIRATORY (INHALATION) at 07:53

## 2017-12-30 RX ADMIN — VANCOMYCIN HYDROCHLORIDE 1000 MG: 1 INJECTION, SOLUTION INTRAVENOUS at 14:51

## 2017-12-30 RX ADMIN — IPRATROPIUM BROMIDE AND ALBUTEROL SULFATE 3 ML: .5; 3 SOLUTION RESPIRATORY (INHALATION) at 11:21

## 2017-12-30 RX ADMIN — GUAIFENESIN 1200 MG: 600 TABLET, EXTENDED RELEASE ORAL at 08:52

## 2017-12-30 RX ADMIN — CALCIUM POLYCARBOPHIL 625 MG: 625 TABLET, FILM COATED ORAL at 08:52

## 2017-12-30 RX ADMIN — LEVOTHYROXINE SODIUM 100 MCG: 100 TABLET ORAL at 08:52

## 2017-12-30 RX ADMIN — ENOXAPARIN SODIUM 30 MG: 30 INJECTION SUBCUTANEOUS at 20:18

## 2017-12-30 RX ADMIN — GUAIFENESIN 1200 MG: 600 TABLET, EXTENDED RELEASE ORAL at 20:18

## 2017-12-30 RX ADMIN — MULTIPLE VITAMINS W/ MINERALS TAB 1 TABLET: TAB at 08:52

## 2017-12-30 RX ADMIN — IPRATROPIUM BROMIDE AND ALBUTEROL SULFATE 3 ML: .5; 3 SOLUTION RESPIRATORY (INHALATION) at 15:16

## 2017-12-31 LAB
ANION GAP SERPL CALCULATED.3IONS-SCNC: 15.8 MMOL/L
BACTERIA SPEC AEROBE CULT: NO GROWTH
BUN BLD-MCNC: 20 MG/DL (ref 8–23)
BUN/CREAT SERPL: 19.8 (ref 7–25)
CALCIUM SPEC-SCNC: 9 MG/DL (ref 8.6–10.5)
CHLORIDE SERPL-SCNC: 104 MMOL/L (ref 98–107)
CO2 SERPL-SCNC: 24.2 MMOL/L (ref 22–29)
CREAT BLD-MCNC: 1.01 MG/DL (ref 0.57–1)
DEPRECATED RDW RBC AUTO: 46.8 FL (ref 37–54)
ERYTHROCYTE [DISTWIDTH] IN BLOOD BY AUTOMATED COUNT: 13.4 % (ref 11.7–13)
GFR SERPL CREATININE-BSD FRML MDRD: 53 ML/MIN/1.73
GLUCOSE BLD-MCNC: 121 MG/DL (ref 65–99)
HCT VFR BLD AUTO: 30.9 % (ref 35.6–45.5)
HGB BLD-MCNC: 9.8 G/DL (ref 11.9–15.5)
MCH RBC QN AUTO: 30.2 PG (ref 26.9–32)
MCHC RBC AUTO-ENTMCNC: 31.7 G/DL (ref 32.4–36.3)
MCV RBC AUTO: 95.4 FL (ref 80.5–98.2)
PLATELET # BLD AUTO: 293 10*3/MM3 (ref 140–500)
PMV BLD AUTO: 11.1 FL (ref 6–12)
POTASSIUM BLD-SCNC: 3.3 MMOL/L (ref 3.5–5.2)
RBC # BLD AUTO: 3.24 10*6/MM3 (ref 3.9–5.2)
SODIUM BLD-SCNC: 144 MMOL/L (ref 136–145)
VANCOMYCIN SERPL-MCNC: 15.7 MCG/ML (ref 5–40)
WBC NRBC COR # BLD: 10.95 10*3/MM3 (ref 4.5–10.7)

## 2017-12-31 PROCEDURE — 80202 ASSAY OF VANCOMYCIN: CPT | Performed by: NURSE PRACTITIONER

## 2017-12-31 PROCEDURE — 94799 UNLISTED PULMONARY SVC/PX: CPT

## 2017-12-31 PROCEDURE — 25010000002 PIPERACILLIN SOD-TAZOBACTAM PER 1 G: Performed by: NURSE PRACTITIONER

## 2017-12-31 PROCEDURE — 80048 BASIC METABOLIC PNL TOTAL CA: CPT | Performed by: INTERNAL MEDICINE

## 2017-12-31 PROCEDURE — 85027 COMPLETE CBC AUTOMATED: CPT | Performed by: INTERNAL MEDICINE

## 2017-12-31 PROCEDURE — 25010000002 ENOXAPARIN PER 10 MG: Performed by: INTERNAL MEDICINE

## 2017-12-31 RX ORDER — VANCOMYCIN HYDROCHLORIDE 1 G/200ML
1000 INJECTION, SOLUTION INTRAVENOUS ONCE
Status: DISCONTINUED | OUTPATIENT
Start: 2017-12-31 | End: 2017-12-31

## 2017-12-31 RX ADMIN — TAZOBACTAM SODIUM AND PIPERACILLIN SODIUM 3.38 G: 375; 3 INJECTION, SOLUTION INTRAVENOUS at 15:09

## 2017-12-31 RX ADMIN — LEVOTHYROXINE SODIUM 100 MCG: 100 TABLET ORAL at 09:49

## 2017-12-31 RX ADMIN — METOPROLOL SUCCINATE 12.5 MG: 25 TABLET, FILM COATED, EXTENDED RELEASE ORAL at 09:50

## 2017-12-31 RX ADMIN — GUAIFENESIN 1200 MG: 600 TABLET, EXTENDED RELEASE ORAL at 09:49

## 2017-12-31 RX ADMIN — ENOXAPARIN SODIUM 30 MG: 30 INJECTION SUBCUTANEOUS at 21:39

## 2017-12-31 RX ADMIN — CALCIUM POLYCARBOPHIL 625 MG: 625 TABLET, FILM COATED ORAL at 09:49

## 2017-12-31 RX ADMIN — IPRATROPIUM BROMIDE AND ALBUTEROL SULFATE 3 ML: .5; 3 SOLUTION RESPIRATORY (INHALATION) at 08:27

## 2017-12-31 RX ADMIN — GUAIFENESIN 1200 MG: 600 TABLET, EXTENDED RELEASE ORAL at 21:39

## 2017-12-31 RX ADMIN — IPRATROPIUM BROMIDE AND ALBUTEROL SULFATE 3 ML: .5; 3 SOLUTION RESPIRATORY (INHALATION) at 19:42

## 2017-12-31 RX ADMIN — MULTIPLE VITAMINS W/ MINERALS TAB 1 TABLET: TAB at 09:49

## 2017-12-31 RX ADMIN — TAZOBACTAM SODIUM AND PIPERACILLIN SODIUM 3.38 G: 375; 3 INJECTION, SOLUTION INTRAVENOUS at 21:39

## 2017-12-31 RX ADMIN — IPRATROPIUM BROMIDE AND ALBUTEROL SULFATE 3 ML: .5; 3 SOLUTION RESPIRATORY (INHALATION) at 16:24

## 2017-12-31 RX ADMIN — TAZOBACTAM SODIUM AND PIPERACILLIN SODIUM 3.38 G: 375; 3 INJECTION, SOLUTION INTRAVENOUS at 00:04

## 2017-12-31 RX ADMIN — TAZOBACTAM SODIUM AND PIPERACILLIN SODIUM 3.38 G: 375; 3 INJECTION, SOLUTION INTRAVENOUS at 06:15

## 2017-12-31 RX ADMIN — IPRATROPIUM BROMIDE AND ALBUTEROL SULFATE 3 ML: .5; 3 SOLUTION RESPIRATORY (INHALATION) at 11:34

## 2018-01-01 VITALS
BODY MASS INDEX: 18.1 KG/M2 | RESPIRATION RATE: 18 BRPM | HEART RATE: 81 BPM | TEMPERATURE: 98.3 F | HEIGHT: 64 IN | SYSTOLIC BLOOD PRESSURE: 124 MMHG | OXYGEN SATURATION: 93 % | WEIGHT: 106 LBS | DIASTOLIC BLOOD PRESSURE: 70 MMHG

## 2018-01-01 LAB — MRSA SPEC QL CULT: NORMAL

## 2018-01-01 PROCEDURE — 94799 UNLISTED PULMONARY SVC/PX: CPT

## 2018-01-01 PROCEDURE — 25010000002 PIPERACILLIN SOD-TAZOBACTAM PER 1 G: Performed by: NURSE PRACTITIONER

## 2018-01-01 RX ORDER — IPRATROPIUM BROMIDE AND ALBUTEROL SULFATE 2.5; .5 MG/3ML; MG/3ML
3 SOLUTION RESPIRATORY (INHALATION)
Qty: 360 ML | Refills: 1 | Status: SHIPPED | OUTPATIENT
Start: 2018-01-01 | End: 2018-07-17

## 2018-01-01 RX ORDER — AMOXICILLIN AND CLAVULANATE POTASSIUM 875; 125 MG/1; MG/1
1 TABLET, FILM COATED ORAL EVERY 12 HOURS SCHEDULED
Qty: 10 TABLET | Refills: 0 | Status: SHIPPED | OUTPATIENT
Start: 2018-01-01 | End: 2018-01-05 | Stop reason: DRUGHIGH

## 2018-01-01 RX ADMIN — CALCIUM POLYCARBOPHIL 625 MG: 625 TABLET, FILM COATED ORAL at 09:14

## 2018-01-01 RX ADMIN — METOPROLOL SUCCINATE 12.5 MG: 25 TABLET, FILM COATED, EXTENDED RELEASE ORAL at 09:13

## 2018-01-01 RX ADMIN — GUAIFENESIN 1200 MG: 600 TABLET, EXTENDED RELEASE ORAL at 09:14

## 2018-01-01 RX ADMIN — TRAMADOL HYDROCHLORIDE 50 MG: 50 TABLET, FILM COATED ORAL at 09:13

## 2018-01-01 RX ADMIN — ACETAMINOPHEN 325 MG: 325 TABLET ORAL at 09:13

## 2018-01-01 RX ADMIN — TAZOBACTAM SODIUM AND PIPERACILLIN SODIUM 3.38 G: 375; 3 INJECTION, SOLUTION INTRAVENOUS at 06:29

## 2018-01-01 RX ADMIN — IPRATROPIUM BROMIDE AND ALBUTEROL SULFATE 3 ML: .5; 3 SOLUTION RESPIRATORY (INHALATION) at 11:18

## 2018-01-01 RX ADMIN — LEVOTHYROXINE SODIUM 100 MCG: 100 TABLET ORAL at 09:13

## 2018-01-01 RX ADMIN — IPRATROPIUM BROMIDE AND ALBUTEROL SULFATE 3 ML: .5; 3 SOLUTION RESPIRATORY (INHALATION) at 07:37

## 2018-01-01 NOTE — PLAN OF CARE
Problem: Patient Care Overview (Adult)  Goal: Plan of Care Review  Outcome: Outcome(s) achieved Date Met: 01/01/18 01/01/18 1243   Coping/Psychosocial Response Interventions   Plan Of Care Reviewed With patient   Patient Care Overview   Progress improving   Outcome Evaluation   Outcome Summary/Follow up Plan Denies SOA. Occ nonproductive cough. 02 sats > 90% on room air. Pt states is ready for discharge.     Goal: Adult Individualization and Mutuality  Outcome: Outcome(s) achieved Date Met: 01/01/18    Goal: Discharge Needs Assessment  Outcome: Outcome(s) achieved Date Met: 01/01/18 01/01/18 1243   Discharge Needs Assessment   Concerns To Be Addressed no discharge needs identified   Discharge Disposition home or self-care   Self-Care   Equipment Currently Used at Home nebulizer       Problem: Pneumonia (Adult)  Goal: Signs and Symptoms of Listed Potential Problems Will be Absent or Manageable (Pneumonia)  Outcome: Outcome(s) achieved Date Met: 01/01/18 01/01/18 1243   Pneumonia   Problems Assessed (Pneumonia) respiratory compromise   Problems Present (Pneumonia) respiratory compromise       Problem: Fall Risk (Adult)  Goal: Identify Related Risk Factors and Signs and Symptoms  Outcome: Outcome(s) achieved Date Met: 01/01/18    Goal: Absence of Falls  Outcome: Outcome(s) achieved Date Met: 01/01/18

## 2018-01-01 NOTE — DISCHARGE SUMMARY
PHYSICIAN DISCHARGE SUMMARY                                                                        Psychiatric    Date of admit: 2017  Date of Discharge:  2018    PCP: Eddie Araya MD    Discharge Diagnosis:   Active Problems:    HCAP (healthcare-associated pneumonia)  Left lower lobe pneumonia  Chronic cough  Sepsis due to pneumonia  Chronic low back pain  MIKE  Hypothyroidism  History of SVT    Secondary Diagnoses:  Past Medical History:   Diagnosis Date   • Arthropathy of pelvic region and thigh 2012    unspecified   • Back pain 2007   • Back pain 2013    unspecified   • Chronic back pain 2009   • Constipation 2015    unspecified   • Depression 2015   • Dyspepsia 2008   • Essential hypertension 2007   • 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 female presented with per H&P:    Chief Complaint: fever and cough     History of Present Illness:  Bailee Garza is a 77 y.o. female who normally follows with Dr. Alanis and was recently admitted to the hospital from 17 to 17 with bilateral lower lobe atypical pneumonia. She had been treated prior to that admission with Omnicef then Levaquin then Augmentin from her PCP for Community acquired pneumonia. While in the hospital she was treated with doxycycline, but was  not discharged on any antibiotics. She ended up having a bronchoscopy on 12/21/17 with thick mucous that was sent for culture and came back positive for Penicillium on fungal culture.      She was initially doing well at home. She went to see Dr. Alcocer for follow up after hospital and was noted to be hypotensive and encouraged to hold her beta blocker. This morning she ended up running a fever of 102.5 and developed a worse cough with minimal sputum production even with her flutter device. She reports having only thin clear secretions and a runny nose that started today. 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 She denies having any pesticides or lawn treatments.      In the ER she was found to have leukocytosis with WBC count of 22,000 is up from 8000 yesterday.  She also had an elevated pro-calcitonin of 7.11 with a normal lactic acid.  Chest x-ray showed new left lower lobe infiltrate and right middle lobe atelectasis    Patient admitted with left lower lobe pneumonia with recent hospitalization.  Penicillium on bronchoscopy positive for penicillium but felt to be contamination.  Further follow-up labs pending.  Treated with Zosyn and her leukocytosis and bacterial infectious symptoms have improved.  No significant growth in sputum culture as of yet.  Plan for discharge home with close follow-up with repeat chest x-ray in one week with Dr. Alanis.    Condition on Discharge:  Stable.      Vital Signs  Temp:  [97.7 °F (36.5 °C)-99.1 °F (37.3 °C)] 98.3 °F (36.8 °C)  Heart Rate:  [81-98] 81  Resp:  [18-20] 18  BP: (110-124)/(58-70) 124/70    Physical Exam:  General Appearance: no acute distress, appears comfortable  Heart: regular rate and rhythm  Lungs: clear to auscultation bilaterally, respirations unlabored  Abdomen: soft, non-tender, non-distended, no guarding/rebound, nl BS  Extremeties: no edema, no cyanosis  Neurology: speech normal, mental status normal, moving all four  extremeties  Mental Status: alert, oriented        --  Consults:   IP CONSULT TO PULMONOLOGY    Significant Discharge Diagnostics   Procedures Performed:         Pertinent Lab Results:    Results from last 7 days  Lab Units 12/31/17  0741 12/30/17 0612 12/29/17 1821 12/28/17  0921   SODIUM mmol/L 144 141 138 145   POTASSIUM mmol/L 3.3* 4.2 3.6 4.7   CHLORIDE mmol/L 104 101 100 103   CO2 mmol/L 24.2 25.0 24.0  --    TOTAL CO2, ARTERIAL mmol/L  --   --   --  26.5   BUN mg/dL 20 27* 33* 25*   CREATININE mg/dL 1.01* 1.28* 1.16* 0.97   GLUCOSE mg/dL 121* 105* 122*  --    CALCIUM mg/dL 9.0 9.1 9.3 9.9   AST (SGOT) U/L  --  26 35*  --    ALT (SGPT) U/L  --  30 35*  --            Results from last 7 days  Lab Units 12/31/17  0741 12/30/17 0612 12/29/17 1821 12/28/17  0921   WBC 10*3/mm3 10.95* 20.84* 22.60*  --  8.91   HEMOGLOBIN g/dL 9.8* 10.7* 10.9*  --  11.9   HEMATOCRIT % 30.9* 34.4* 34.5*  --  36.3   PLATELETS 10*3/mm3 293 316 314  --  361   MCV fL 95.4 96.9 95.8  < > 99.7*   MCH pg 30.2 30.1 30.3  < > 32.7*   MCHC g/dL 31.7* 31.1* 31.6*  < > 32.8   RDW % 13.4* 13.5* 13.4*  < > 13.8*   RDW-SD fl 46.8 48.4 46.8  < >  --    MPV fL 11.1 11.3 10.8  < >  --    NEUTROPHIL % %  --  89.0* 91.9*  < > 77.9*   LYMPHOCYTE % %  --  7.8* 4.6*  < > 11.6*   MONOCYTES % %  --  2.7* 3.0*  < > 9.3   EOSINOPHIL % %  --  0.0* 0.0*  < > 0.0*   BASOPHIL % %  --  0.1 0.1  < > 0.6   IMM GRAN % %  --  0.4 0.4  < >  --    NEUTROS ABS 10*3/mm3  --  18.55* 20.77*  < > 6.95   LYMPHS ABS 10*3/mm3  --  1.63 1.05  < > 1.03   MONOS ABS 10*3/mm3  --  0.56 0.68  < > 0.83   EOS ABS 10*3/mm3  --  0.00 0.00  < > 0.00   BASOS ABS 10*3/mm3  --  0.02 0.02  < > 0.05   IMMATURE GRANS (ABS) 10*3/mm3  --  0.08* 0.08*  < >  --    < > = values in this interval not displayed.                            Results from last 7 days  Lab Units 12/30/17  1616 12/29/17  1957 12/29/17  1821   PROCALCITONIN ng/mL 3.66*  --  7.11*   LACTATE mmol/L  --  1.2  --                 Results from last 7 days  Lab Units 12/30/17  0642 12/29/17  2347 12/29/17 2042 12/29/17 1957   BLOODCX   --   --  No growth at 2 days No growth at 2 days   URINECX   --  No growth  --   --    MRSA SCREEN CX  No Methicillin Resistant Staphylococcus aureus isolated  --   --   --        Results from last 7 days  Lab Units 12/29/17  2347   NITRITE UA  Negative   WBC UA /HPF 0-2   BACTERIA UA /HPF None Seen   SQUAM EPITHEL UA /HPF 0-2   URINECX  No growth               Imaging Results:  Imaging Results (all)     Procedure Component Value Units Date/Time    XR Chest 2 View [[id number]] Collected:  12/29/17 1843     Updated:  12/29/17 2035    Narrative:       EXAMINATION: 2 VIEWS OF THE CHEST     HISTORY: 77-year-old female with history of fever, shortness of breath,  cough with a history of hypertension.     TECHNIQUE: PA and lateral chest radiographs were obtained.      COMPARISON: Comparison is made to a prior examination dated 07/31/2013.      FINDINGS: There is hazy opacification of the base of the left lower lobe  and to a lesser degree at the base of the right lower lobe. The  cardiomediastinal silhouette is normal in appearance. There is mild mid  thoracic spine degenerative disease.       Impression:       Left lower lobe pneumonia with mild right lower lobe  atelectasis.     This report was finalized on 12/29/2017 8:32 PM by Dr. Viet Palmer MD.           --    Discharge Disposition  Home or Self Care    Discharge Medications   Bailee Garza   Home Medication Instructions FRANCO:[id number]    Printed on:01/01/18 1226   Medication Information                      amoxicillin-clavulanate (AUGMENTIN) 875-125 MG per tablet  Take 1 tablet by mouth Every 12 (Twelve) Hours.             calcium polycarbophil (FIBERCON) 625 MG tablet  Take 625 mg by mouth Daily.             guaiFENesin (MUCINEX) 600 MG 12 hr tablet  Take 2 tablets by mouth Every 12 (Twelve) Hours for 30 days.             ipratropium-albuterol  (DUO-NEB) 0.5-2.5 mg/mL nebulizer  Take 3 mL by nebulization 4 (Four) Times a Day.             metoprolol succinate XL (TOPROL XL) 25 MG 24 hr tablet  Take 0.5 tablets by mouth Daily for 30 days.             Multiple Vitamins-Minerals (PRESERVISION AREDS PO)  Take 1 tablet by mouth Daily.             SYNTHROID 100 MCG tablet  Take 1 tablet by mouth Daily.             traMADol-acetaminophen (ULTRACET) 37.5-325 MG per tablet  Take 1 tablet by mouth Every Morning.                 Discharge Diet: regular diet    Activity at Discharge:     Follow-up Information     Follow up with Eddie Araya MD .    Specialty:  Family Medicine    Contact information:    [address]  [facility] [id number] [phone]          Follow up with Mario Alanis MD Follow up in 1 week(s).    Specialty:  Pulmonary Disease    Why:  with CXR    Contact information:    [address] [address]  [place] [facility] [id number] [phone]          See primary care provider, Eddie Araya MD, in 1-2 weeks        Test Results Pending at Discharge   Order Current Status    Fungitell B-D Glucan In process    Blood Culture - Blood, Preliminary result    Blood Culture - Blood, Preliminary result           Deven Kang MD  [facility], [facility]  Pulmonary and Critical Care Medicine  01/01/18  12:26 PM    Time: greater than 30 minutes.        Total time spent discharging patient including evaluation,post hospitalization follow up,  medication and post hospitalization instructions and education total time exceeds 30 minutes.

## 2018-01-02 ENCOUNTER — TELEPHONE (OUTPATIENT)
Dept: ORTHOPEDIC SURGERY | Facility: CLINIC | Age: 78
End: 2018-01-02

## 2018-01-02 NOTE — PROGRESS NOTES
Case Management Discharge Note    Final Note: Discharged home per MD note.  GOVIND Morris    Discharge Placement     No information found             Discharge Codes: 01  Discharge to home

## 2018-01-03 ENCOUNTER — APPOINTMENT (OUTPATIENT)
Dept: GENERAL RADIOLOGY | Facility: HOSPITAL | Age: 78
End: 2018-01-03

## 2018-01-03 ENCOUNTER — TELEPHONE (OUTPATIENT)
Dept: ORTHOPEDIC SURGERY | Facility: CLINIC | Age: 78
End: 2018-01-03

## 2018-01-03 ENCOUNTER — HOSPITAL ENCOUNTER (EMERGENCY)
Facility: HOSPITAL | Age: 78
Discharge: HOME | End: 2018-01-03
Admitting: EMERGENCY MEDICINE

## 2018-01-03 VITALS
SYSTOLIC BLOOD PRESSURE: 96 MMHG | BODY MASS INDEX: 17.93 KG/M2 | RESPIRATION RATE: 16 BRPM | OXYGEN SATURATION: 96 % | DIASTOLIC BLOOD PRESSURE: 52 MMHG | HEIGHT: 64 IN | WEIGHT: 105 LBS | HEART RATE: 95 BPM | TEMPERATURE: 99.1 F

## 2018-01-03 DIAGNOSIS — S92.302A CLOSED AVULSION FRACTURE OF METATARSAL BONE OF LEFT FOOT, INITIAL ENCOUNTER: Primary | ICD-10-CM

## 2018-01-03 LAB
BACTERIA SPEC AEROBE CULT: NORMAL
BACTERIA SPEC AEROBE CULT: NORMAL
RESULT: <31 PG/ML

## 2018-01-03 PROCEDURE — 99282 EMERGENCY DEPT VISIT SF MDM: CPT

## 2018-01-03 PROCEDURE — 73630 X-RAY EXAM OF FOOT: CPT

## 2018-01-03 NOTE — ED PROVIDER NOTES
" EMERGENCY DEPARTMENT ENCOUNTER    CHIEF COMPLAINT  Chief Complaint: foot pain  History given by: patient, family  History limited by: nothing  Room Number: DIS/DISASTER  PMD: Eddie Araya MD      HPI:  Pt is a 77 y.o. female who presents complaining of left foot pain and swelling that began two nights ago while walking her dog. Pt states that she was wearing slippers when her right foot slipped out the slipper and believes that she twisted her left foot. Pt states that her pain is worse with ambulation and movement of her left foot. Pt states that she has been taking Tramadol with moderate relief.     Duration:  2.5 days  Onset: gradual  Timing: constant  Location: left foot  Radiation: none  Quality: \"pain\"  Intensity/Severity: moderate  Progression: worsening  Associated Symptoms: left foot swelling  Aggravating Factors: ambulation, movement of left foot  Alleviating Factors: none  Previous Episodes: none mentioned  Treatment before arrival: Pt states that she has been taking Tramadol with moderate relief.    PAST MEDICAL HISTORY  Active Ambulatory Problems     Diagnosis Date Noted   • Chronic back pain 07/13/2009   • Essential hypertension 11/20/2007   • Hearing loss 03/18/2008   • Hypothyroidism, acquired 11/20/2007   • IBS (irritable bowel syndrome) 04/25/2013   • Rhinitis, allergic 11/20/2007   • Chronic kidney disease, stage III (moderate) 12/19/2016   • Arthralgia of right knee 10/11/2017   • Chronic cough 12/18/2017   • Abnormal weight loss 12/18/2017   • Abnormal CXR 12/18/2017   • Exertional shortness of breath 12/18/2017   • Pneumonia of both lower lobes due to infectious organism 12/19/2017   • HCAP (healthcare-associated pneumonia) 12/29/2017     Resolved Ambulatory Problems     Diagnosis Date Noted   • 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   • Back pain 11/20/2007   • Constipation 03/24/2015   • Depression 01/28/2015   • Dyspepsia 03/18/2008   • Grief reaction 07/05/2011   • Insomnia " 01/28/2015   • Intervertebral disc disorder with myelopathy, cervical region 07/22/2010   • Stress 04/27/2015   • Screening breast examination 11/20/2007   • Urticaria 06/29/2015     Past Medical History:   Diagnosis Date   • 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   • Back pain 11/20/2007   • Back pain 04/03/2013   • Chronic back pain 07/13/2009   • Constipation 03/24/2015   • Depression 01/28/2015   • Dyspepsia 03/18/2008   • Essential hypertension 11/20/2007   • Grief reaction 07/05/2011   • Hearing loss 03/18/2008   • History of bone density study 09/16/2015   • Hypothyroidism, acquired 11/20/2007   • IBS (irritable bowel syndrome) 04/25/2013   • Insomnia 01/28/2015   • Intervertebral disc disorder with myelopathy, cervical region 07/22/2010   • Pneumonia    • Rhinitis, allergic 11/20/2007   • Screening breast examination 11/20/2007   • Stress 04/27/2015   • Urticaria 06/29/2015       PAST SURGICAL HISTORY  Past Surgical History:   Procedure Laterality Date   • BREAST BIOPSY Right     50+ years ago   • BREAST LUMPECTOMY     • BRONCHOSCOPY N/A 12/21/2017    Procedure: BRONCHOSCOPY;  Surgeon: Mario Alanis MD;  Location: [facility] ENDOSCOPY;  Service:    • HYSTERECTOMY         FAMILY HISTORY  Family History   Problem Relation Age of Onset   • Heart disease Mother    • Cancer Father    • Asthma Paternal Grandfather        SOCIAL HISTORY  Social History     Social History   • Marital status:      Spouse name: N/A   • Number of children: N/A   • Years of education: N/A     Occupational History   • Not on file.     Social History Main Topics   • Smoking status: Never Smoker   • Smokeless tobacco: Never Used   • Alcohol use No   • Drug use: No   • Sexual activity: Defer     Other Topics Concern   • Not on file     Social History Narrative   • No narrative on file       ALLERGIES  Ketek [telithromycin]; Nsaids; and Sulfa antibiotics    REVIEW OF SYSTEMS  Review of Systems   Constitutional: Negative for  chills and fever.   HENT: Negative for sore throat.    Respiratory: Negative for cough.    Gastrointestinal: Negative for nausea and vomiting.   Musculoskeletal: Positive for arthralgias (L foot). Negative for back pain.        L foot swelling       PHYSICAL EXAM  ED Triage Vitals   Temp Heart Rate Resp BP SpO2   01/03/18 1002 01/03/18 1002 01/03/18 1023 01/03/18 1023 01/03/18 1002   99.1 °F (37.3 °C) 95 16 96/52 96 %      Temp src Heart Rate Source Patient Position BP Location FiO2 (%)   01/03/18 1002 01/03/18 1002 01/03/18 1023 01/03/18 1023 --   Tympanic Monitor Sitting Right arm        Physical Exam   Constitutional: No distress.   HENT:   Head: Normocephalic and atraumatic.   Cardiovascular: Regular rhythm.    Pulses:       Dorsalis pedis pulses are 2+ on the right side   Pulmonary/Chest: No respiratory distress.   Abdominal: There is no tenderness.   Musculoskeletal:        Left ankle: She exhibits normal range of motion and no swelling. No tenderness.        Left foot: There is decreased range of motion (due to pain), tenderness and swelling. There is no deformity.   Significant soft tissue swelling and tenderness to the dorsum of left foot. The pt has pain with extension of the left great toe and left 2nd toe without obvious deformity   Skin: No rash noted.   Nursing note and vitals reviewed.      RADIOLOGY  XR Foot 3+ View Left   Final Result       Soft tissue swelling. No acute fracture is identified. There is further   clinical concern, MRI could be considered for further evaluation.       This report was finalized on 1/3/2018 12:06 PM by Dr. Omari Monson MD.          XR Ankle 3+ View Left    (Results Pending)        I ordered the above noted radiological studies. Interpreted by radiologist. Reviewed by me in PACS.       PROCEDURES  Procedures      PROGRESS AND CONSULTS  ED Course     1301- Notified pt and family of the pt's L foot XR and while there is no definite fracture, I believe that there  could be a small avulsion fracture to the base of the second metatarsal based on the pt's exam. Discussed the plan to discharge the pt home in a post-op shoe and a prescription for pain medication. Pt declined the prescription but agrees with the plan. I instructed the pt to take Tylenol or Ibuprofen in addition to her Tramadol for pain and to ice her left foot as needed. All questions were addressed.      MEDICAL DECISION MAKING  Results were reviewed/discussed with the patient and they were also made aware of online access. Pt also made aware that follow up with PMD is necessary.     MDM  Number of Diagnoses or Management Options  Closed avulsion fracture of metatarsal bone of left foot, initial encounter:      Amount and/or Complexity of Data Reviewed  Tests in the radiology section of CPT®: ordered and reviewed (L foot XR shows soft tissue swelling but no definite fracture)  Obtain history from someone other than the patient: yes (family)  Independent visualization of images, tracings, or specimens: yes    Patient Progress  Patient progress: stable         DIAGNOSIS  Final diagnoses:   Closed avulsion fracture of metatarsal bone of left foot, initial encounter       DISPOSITION  DISCHARGE    Patient discharged in stable condition.    Reviewed implications of results, diagnosis, meds, responsibility to follow up, warning signs and symptoms of possible worsening, potential complications and reasons to return to ER, including fever, worsening pain or any concerns.    Patient/Family voiced understanding of above instructions.    Discussed plan for discharge, as there is no emergent indication for admission.  Pt/family is agreeable and understands need for follow up and repeat testing.  Pt is aware that discharge does not mean that nothing is wrong but it indicates no emergency is present that requires admission and they must continue care with follow-up as given below or physician of their choice.      FOLLOW-UP  Justen Mann MD  [address] [address]  [phone]    Call  As needed, If symptoms worsen         Medication List      Notice     No changes were made to your prescriptions during this visit.            Latest Documented Vital Signs:  As of 1:35 PM  BP- 96/52 HR- 95 Temp- 99.1 °F (37.3 °C) (Tympanic) O2 sat- 96%    --  Documentation assistance provided by anaya Neil for Dr. Ramirez.  Information recorded by the anaya was done at my direction and has been verified and validated by me.     Cindy Neil  01/03/18 [id number]       Julio Cesar Ramirez MD  01/04/18 [id number]

## 2018-01-03 NOTE — TELEPHONE ENCOUNTER
Patient called stating she went to [facility] ER today for her foot because of the pain. She does have a foot fracture and is keeping appt with [facility] on Friday. They offered her pain Rx and she thought she would be ok and refused it. But now is regretting that. She is asking if [facility] would prescribe her Rx.

## 2018-01-04 ENCOUNTER — TELEPHONE (OUTPATIENT)
Dept: SOCIAL WORK | Facility: HOSPITAL | Age: 78
End: 2018-01-04

## 2018-01-04 NOTE — TELEPHONE ENCOUNTER
ER F/U phone call:   Pt states that she is doing better. She is to see her orthopedist on 1-5-18. Using ice for pain control. No other questions or concerns voiced at this time. Fabienne Diop RN

## 2018-01-04 NOTE — TELEPHONE ENCOUNTER
I'm sorry, but since I have not yet establish treatment, and am not in a position to  the patient regarding the risks and pitfalls of narcotic use, I will not be able to prescribe anything until I see her on Friday.

## 2018-01-04 NOTE — TELEPHONE ENCOUNTER
Patient returned call and was informed of reply from [facility]. She understands. She found some Tramadol she already had and took that which helped some. She will keep appt with [facility] on 1/5./Tetlin

## 2018-01-05 ENCOUNTER — OFFICE VISIT (OUTPATIENT)
Dept: ORTHOPEDIC SURGERY | Facility: CLINIC | Age: 78
End: 2018-01-05

## 2018-01-05 VITALS — WEIGHT: 105 LBS | BODY MASS INDEX: 17.93 KG/M2 | HEIGHT: 64 IN

## 2018-01-05 DIAGNOSIS — S99.922A FOOT INJURY, LEFT, INITIAL ENCOUNTER: ICD-10-CM

## 2018-01-05 DIAGNOSIS — M25.475 SWELLING OF FOOT JOINT, LEFT: ICD-10-CM

## 2018-01-05 DIAGNOSIS — M79.672 PAIN OF LEFT MIDFOOT: Primary | ICD-10-CM

## 2018-01-05 PROCEDURE — 99203 OFFICE O/P NEW LOW 30 MIN: CPT | Performed by: ORTHOPAEDIC SURGERY

## 2018-01-05 RX ORDER — CEFDINIR 300 MG/1
300 CAPSULE ORAL DAILY
COMMUNITY
End: 2018-01-18

## 2018-01-05 NOTE — PROGRESS NOTES
Patient:  Bailee Garza is a 77 y.o. female    Chief Complaint/ Reason for Visit:    Chief Complaint   Patient presents with   • Left Foot - Establish Care, Pain       HPI:  The patient presents today, accompanied by her daughter, complaining of pain in the dorsal aspect of her left midfoot.  She tells me that she had just been discharged from the hospital on Monday for after being admitted for pneumonia, and she decided she wanted to walk her dog.  Instead of putting on her walking shoes, she simply put on her house shoes.  When she was going out of the house she somehow slipped off of her right hallux shoe and then came down forcefully on her left foot.  She had the immediate onset of sharp pain and swelling in the dorsal left midfoot.  She says she thought she could tell if it out.  She called here and we worked her in for an appointment today.  In the meantime, however, she went to an urgent care facility where x-rays revealed what may be a fracture in the proximal metatarsus.  She was placed in a stiff soled shoe, but says that she actually feels better in her walking athletic shoes than she does in the stiff soled shoe.  The pain is moderate in intensity though it can escalate to severe with weightbearing.  It's constant, causing throbbing even at rest.  She has focal swelling over the dorsal midfoot.  She does not have any calf pain.  She was otherwise uninjured.  She does not have any numbness, tingling, or sensory derangements.      PMH:    Past Medical History:   Diagnosis Date   • Arthropathy of pelvic region and thigh 2012    unspecified   • Back pain 2007   • Back pain 2013    unspecified   • Chronic back pain 2009   • Constipation 2015    unspecified   • Depression 2015   • Dyspepsia 2008   • Essential hypertension 2007   • 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)   • Hearing loss 2008   • History of  bone density study 09/16/2015   • Hypothyroidism, acquired 11/20/2007   • IBS (irritable bowel syndrome) 04/25/2013   • Insomnia 01/28/2015    unspecified   • Intervertebral disc disorder with myelopathy, cervical region 07/22/2010   • Pneumonia    • Rhinitis, allergic 11/20/2007   • Screening breast examination 11/20/2007   • Stress 04/27/2015    other acute reactions to stress   • Urticaria 06/29/2015       PSH:    Past Surgical History:   Procedure Laterality Date   • BREAST BIOPSY Right     50+ years ago   • BREAST LUMPECTOMY     • BRONCHOSCOPY N/A 12/21/2017    Procedure: BRONCHOSCOPY;  Surgeon: Mario Alanis MD;  Location: [facility];  Service:    • HYSTERECTOMY         Social Hx:    Social History     Social History   • Marital status:      Spouse name: N/A   • Number of children: N/A   • Years of education: N/A     Occupational History   • Not on file.     Social History Main Topics   • Smoking status: Never Smoker   • Smokeless tobacco: Never Used   • Alcohol use No   • Drug use: No   • Sexual activity: Defer     Other Topics Concern   • Not on file     Social History Narrative       Family Hx:    Family History   Problem Relation Age of Onset   • Heart disease Mother    • Cancer Father    • Asthma Paternal Grandfather        Meds:    Current Outpatient Prescriptions:   •  calcium polycarbophil (FIBERCON) 625 MG tablet, Take 625 mg by mouth Daily., Disp: , Rfl:   •  cefdinir (OMNICEF) 300 MG capsule, Take 300 mg by mouth Daily., Disp: , Rfl:   •  guaiFENesin (MUCINEX) 600 MG 12 hr tablet, Take 2 tablets by mouth Every 12 (Twelve) Hours for 30 days., Disp: 120 tablet, Rfl: 0  •  ipratropium-albuterol (DUO-NEB) 0.5-2.5 mg/mL nebulizer, Take 3 mL by nebulization 4 (Four) Times a Day., Disp: 360 mL, Rfl: 1  •  metoprolol succinate XL (TOPROL XL) 25 MG 24 hr tablet, Take 0.5 tablets by mouth Daily for 30 days., Disp: 15 tablet, Rfl: 0  •  Multiple Vitamins-Minerals (PRESERVISION AREDS PO), Take 1  "tablet by mouth Daily., Disp: , Rfl:   •  SYNTHROID 100 MCG tablet, Take 1 tablet by mouth Daily., Disp: 30 tablet, Rfl: 11  •  traMADol-acetaminophen (ULTRACET) 37.5-325 MG per tablet, Take 1 tablet by mouth Every Morning., Disp: , Rfl:     Allergies:    Allergies   Allergen Reactions   • Ketek [Telithromycin]    • Nsaids    • Sulfa Antibiotics Hives       ROS:  Review of Systems   Constitutional: Positive for activity change and fatigue. Negative for fever.   HENT: Positive for congestion.    Respiratory: Positive for chest tightness and shortness of breath.    Musculoskeletal: Positive for arthralgias, gait problem and joint swelling.   Neurological: Positive for weakness. Negative for dizziness and syncope.   Psychiatric/Behavioral: Positive for sleep disturbance. Negative for agitation. The patient is nervous/anxious.        Vitals:    01/05/18 0850   Weight: 47.6 kg (105 lb)   Height: 162.6 cm (64\")     Body mass index is 18.02 kg/(m^2).    Physical Exam    The patient is awake, alert, and oriented ×3.  The patient is in no acute distress.  Breathing is regular and unlabored with a respiratory rate of 14/m.  Extraocular movements and pupillary responses are symmetrically intact. Sclerae are anicteric.   Hearing is within normal limits.  Speech is within normal limits.  There is no jugular venous distention.    Left foot: The patient has mild swelling with focal swelling in the dorsal left midfoot.  She has focal tenderness over the area of the proximal second metatarsal or the second TMT joint.  She does have some tenderness in the area of Lisfranc's articulation.  Alignment, rotation, and cascade of her toes appears normal and symmetrical in compared to the other foot.  She has 1+ dorsalis pedis pulses symmetrically present both feet.  Her left calf is soft and nontender.  There is no cellulitis, no lymphangitis, and no popliteal lymphadenopathy.        Radiology: X-rays: 3 views of the patient's left foot " were reviewed on the [facility], [place], operated by [facility] radiology system from January 3.  The patient has an irregularity in the proximal left second metatarsal.  This may be an incomplete fracture.  I'm also concerned that Lisfranc's articulation may be slightly wide.  There is also something that seems unusual in the first tarsometatarsal joint that could be degenerative change or possibly an occult fracture.  Comparison images were not available.          Assessment:     Diagnosis Plan   1. Pain of left midfoot  MRI Foot Left Without Contrast   2. Foot injury, left, initial encounter  MRI Foot Left Without Contrast   3. Swelling of foot joint, left  MRI Foot Left Without Contrast           Plan:  I discussed everything with the patient and her daughter.  I explained that she can wear whatever shoe she wanted for comfort, but also explained that she needed to continue to use her walker and keep all of her weight off of her left foot until we knew what was going on.  She needs an MRI to discern the nature of the injury in her left midfoot.  I'm concerned that she may have a Lisfranc injury or a variant thereof.  All of these things were discussed the patient and her daughter and they voice understanding.  The patient will follow-up once the MRI has been completed.      Orders Placed This Encounter   Procedures   • MRI Foot Left Without Contrast     Standing Status:   Future     Standing Expiration Date:   1/5/2019     Order Specific Question:   Reason for Exam:     Answer:   Exquisite pain and tenderness in the area of Lisfranc joint.  Subtle bony irregularity noted on plain imaging.  Evaluate for bony versus ligamentous injury.

## 2018-01-06 PROBLEM — M25.475 SWELLING OF FOOT JOINT, LEFT: Status: ACTIVE | Noted: 2018-01-06

## 2018-01-06 PROBLEM — M79.672 PAIN OF LEFT MIDFOOT: Status: ACTIVE | Noted: 2018-01-06

## 2018-01-06 PROBLEM — S99.922A FOOT INJURY, LEFT, INITIAL ENCOUNTER: Status: ACTIVE | Noted: 2018-01-06

## 2018-01-08 ENCOUNTER — OFFICE VISIT (OUTPATIENT)
Dept: FAMILY MEDICINE CLINIC | Facility: CLINIC | Age: 78
End: 2018-01-08

## 2018-01-08 ENCOUNTER — TELEPHONE (OUTPATIENT)
Dept: FAMILY MEDICINE CLINIC | Facility: CLINIC | Age: 78
End: 2018-01-08

## 2018-01-08 VITALS
OXYGEN SATURATION: 93 % | DIASTOLIC BLOOD PRESSURE: 72 MMHG | TEMPERATURE: 97.7 F | HEIGHT: 64 IN | BODY MASS INDEX: 17.93 KG/M2 | WEIGHT: 105 LBS | RESPIRATION RATE: 18 BRPM | HEART RATE: 79 BPM | SYSTOLIC BLOOD PRESSURE: 117 MMHG

## 2018-01-08 DIAGNOSIS — J18.9 HCAP (HEALTHCARE-ASSOCIATED PNEUMONIA): ICD-10-CM

## 2018-01-08 DIAGNOSIS — M79.672 PAIN OF LEFT MIDFOOT: ICD-10-CM

## 2018-01-08 DIAGNOSIS — J18.9 PNEUMONIA OF BOTH LOWER LOBES DUE TO INFECTIOUS ORGANISM: Primary | ICD-10-CM

## 2018-01-08 DIAGNOSIS — R06.02 EXERTIONAL SHORTNESS OF BREATH: ICD-10-CM

## 2018-01-08 DIAGNOSIS — Z09 HOSPITAL DISCHARGE FOLLOW-UP: ICD-10-CM

## 2018-01-08 DIAGNOSIS — R05.3 CHRONIC COUGH: ICD-10-CM

## 2018-01-08 LAB
BASOPHILS # BLD AUTO: 0.03 10*3/MM3 (ref 0–0.2)
BASOPHILS NFR BLD AUTO: 0.3 % (ref 0–1.5)
EOSINOPHIL # BLD AUTO: 0 10*3/MM3 (ref 0–0.7)
EOSINOPHIL NFR BLD AUTO: 0 % (ref 0.3–6.2)
ERYTHROCYTE [DISTWIDTH] IN BLOOD BY AUTOMATED COUNT: 14 % (ref 11.7–13)
HCT VFR BLD AUTO: 35.3 % (ref 35.6–45.5)
HGB BLD-MCNC: 11.1 G/DL (ref 11.9–15.5)
IMM GRANULOCYTES # BLD: 0.03 10*3/MM3 (ref 0–0.03)
IMM GRANULOCYTES NFR BLD: 0.3 % (ref 0–0.5)
LYMPHOCYTES # BLD AUTO: 1.02 10*3/MM3 (ref 0.9–4.8)
LYMPHOCYTES NFR BLD AUTO: 11.4 % (ref 19.6–45.3)
MCH RBC QN AUTO: 31.4 PG (ref 26.9–32)
MCHC RBC AUTO-ENTMCNC: 31.4 G/DL (ref 32.4–36.3)
MCV RBC AUTO: 99.7 FL (ref 80.5–98.2)
MONOCYTES # BLD AUTO: 0.51 10*3/MM3 (ref 0.2–1.2)
MONOCYTES NFR BLD AUTO: 5.7 % (ref 5–12)
NEUTROPHILS # BLD AUTO: 7.33 10*3/MM3 (ref 1.9–8.1)
NEUTROPHILS NFR BLD AUTO: 82.3 % (ref 42.7–76)
PLATELET # BLD AUTO: 417 10*3/MM3 (ref 140–500)
RBC # BLD AUTO: 3.54 10*6/MM3 (ref 3.9–5.2)
WBC # BLD AUTO: 8.92 10*3/MM3 (ref 4.5–10.7)

## 2018-01-08 PROCEDURE — 99214 OFFICE O/P EST MOD 30 MIN: CPT | Performed by: NURSE PRACTITIONER

## 2018-01-08 NOTE — TELEPHONE ENCOUNTER
----- Message from TERESA Lopez sent at 1/8/2018  6:36 AM EST -----  This nice lady is coming in today for another hospital follow up.  She has been very ill and in the hospital with some unknown respiratory pathogen.  She has pulmonologist that she is following up with but need to refer her to infectious disease.  I wanted to get a head start on this when I saw she was coming in Friday but did not get a message to you.  Can we go ahead and get the process started?

## 2018-01-08 NOTE — PROGRESS NOTES
Subjective   Bailee Garza is a 77 y.o. female.     History of Present Illness   Bailee Garza 77 y.o. female presents today for hosptial follow up.  she was treated [facility] for pneumonia.  I reviewed all of the labs and diagnostic testing.  =  Current outpatient and discharge medications have been reconciled for the patient.  Guera Alcocer, TERESA    she does have a follow up appointment with a specialist:       Hospital note as follows:  History of Present Illness:  Bailee Garza is a 77 y.o. female who normally follows with Dr. Alanis and was recently admitted to the hospital from 12/19/17 to 12/23/17 with bilateral lower lobe atypical pneumonia. She had been treated prior to that admission with Omnicef then Levaquin then Augmentin from her PCP for Community acquired pneumonia. While in the hospital she was treated with doxycycline, but was not discharged on any antibiotics. She ended up having a bronchoscopy on 12/21/17 with thick mucous that was sent for culture and came back positive for Penicillium on fungal culture.       She was initially doing well at home. She went to see Dr. Alcocer for follow up after hospital and was noted to be hypotensive and encouraged to hold her beta blocker. This morning she ended up running a fever of 102.5 and developed a worse cough with minimal sputum production even with her flutter device. She reports having only thin clear secretions and a runny nose that started today. 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 She denies having any pesticides or lawn treatments.       In the ER she was found to have leukocytosis with WBC count of 22,000 is up from 8000 yesterday.  She also had an elevated pro-calcitonin of 7.11 with a normal lactic acid.  Chest x-ray showed new left lower lobe infiltrate and right middle lobe atelectasis     Patient admitted with left lower lobe pneumonia with recent hospitalization.  Penicillium on bronchoscopy positive  for penicillium but felt to be contamination.  Further follow-up labs pending.  Treated with Zosyn and her leukocytosis and bacterial infectious symptoms have improved.  No significant growth in sputum culture as of yet.  Plan for discharge home with close follow-up with repeat chest x-ray in one week with Dr. Alanis.     Patient finished cefdinir 3 days ago.  She has appt with Dr. Alanis tomorrow.  She is scheduled to have MRI of left foot to determine extend of injury. Lesfranc fracture suspected.  Patient states she has not been resting as much as she should.  She reports feeling much improved.  She has been using albuterol, neubulizer, flutter valve and incentive spirometer as instructed.  She has some shortness of breath with exertion.      The following portions of the patient's history were reviewed and updated as appropriate: allergies, current medications, past family history, past medical history, past social history, past surgical history and problem list.    Review of Systems   Constitutional: Negative for chills and fever.   HENT: Negative for congestion, postnasal drip, sinus pain and sinus pressure.    Eyes: Negative for visual disturbance.   Respiratory: Positive for cough and shortness of breath. Negative for chest tightness and wheezing.    Cardiovascular: Negative for chest pain, palpitations and leg swelling.   Musculoskeletal: Positive for arthralgias and joint swelling.   Neurological: Negative for dizziness, light-headedness and headaches.       Objective   Physical Exam   Constitutional: She is oriented to person, place, and time. She appears well-developed and well-nourished.   Cardiovascular: Normal rate and regular rhythm.    Pulmonary/Chest: Effort normal and breath sounds normal.   Musculoskeletal:        Left foot: There is tenderness and swelling. There is normal range of motion.        Neurological: She is alert and oriented to person, place, and time.   Psychiatric: She has a normal  mood and affect. Judgment normal.   Nursing note and vitals reviewed.      Assessment/Plan   Bailee was seen today for uri.    Diagnoses and all orders for this visit:    Pneumonia of both lower lobes due to infectious organism  -     CBC & Differential    Hospital discharge follow-up    Pain of left midfoot          Will recheck CBC.

## 2018-01-09 ENCOUNTER — HOSPITAL ENCOUNTER (OUTPATIENT)
Dept: MRI IMAGING | Facility: HOSPITAL | Age: 78
Discharge: HOME | End: 2018-01-09
Attending: ORTHOPAEDIC SURGERY | Admitting: ORTHOPAEDIC SURGERY

## 2018-01-09 DIAGNOSIS — M25.475 SWELLING OF FOOT JOINT, LEFT: ICD-10-CM

## 2018-01-09 DIAGNOSIS — S99.922A FOOT INJURY, LEFT, INITIAL ENCOUNTER: ICD-10-CM

## 2018-01-09 DIAGNOSIS — M79.672 PAIN OF LEFT MIDFOOT: ICD-10-CM

## 2018-01-09 PROCEDURE — 73718 MRI LOWER EXTREMITY W/O DYE: CPT

## 2018-01-11 ENCOUNTER — TELEPHONE (OUTPATIENT)
Dept: INFECTIOUS DISEASES | Facility: CLINIC | Age: 78
End: 2018-01-11

## 2018-01-11 ENCOUNTER — TELEPHONE (OUTPATIENT)
Dept: FAMILY MEDICINE CLINIC | Facility: CLINIC | Age: 78
End: 2018-01-11

## 2018-01-11 NOTE — TELEPHONE ENCOUNTER
As we are uncertain what is causing her recurrent illness, we are trying to be thorough in the workup.  She can see pulmonology first if she wants.

## 2018-01-11 NOTE — TELEPHONE ENCOUNTER
----- Message from TERESA Lopez sent at 1/9/2018  8:12 AM EST -----  CBC improving since discharge

## 2018-01-11 NOTE — TELEPHONE ENCOUNTER
Called patient to setup new pt appt with Dr Castillo. She said she wasn't sure why her doctor sent a referral to us, she said cultures were not done last time she saw her doctor and she just didn't want to take up another doctor's time. She is going to reach out to her doctor to see if she really needs this appt and call me back. Soledad

## 2018-01-11 NOTE — TELEPHONE ENCOUNTER
Pt called and stated that she received a call from infectious disease for an appt. She stated that her f/u appt with the pulmonologist  is not until 2/5/18. And she wanted to know why or if she still needed to see the infectious disease, because pulmonary didn't grow any cultures.

## 2018-01-18 ENCOUNTER — OFFICE VISIT (OUTPATIENT)
Dept: ORTHOPEDIC SURGERY | Facility: CLINIC | Age: 78
End: 2018-01-18

## 2018-01-18 ENCOUNTER — TELEPHONE (OUTPATIENT)
Dept: ORTHOPEDIC SURGERY | Facility: CLINIC | Age: 78
End: 2018-01-18

## 2018-01-18 VITALS — BODY MASS INDEX: 18.64 KG/M2 | HEIGHT: 63 IN | TEMPERATURE: 98.3 F | WEIGHT: 105.2 LBS

## 2018-01-18 DIAGNOSIS — T14.8XXA CONTUSION OF BONE: ICD-10-CM

## 2018-01-18 DIAGNOSIS — S92.242A CLOSED DISPLACED FRACTURE OF MEDIAL CUNEIFORM OF LEFT FOOT, INITIAL ENCOUNTER: Primary | ICD-10-CM

## 2018-01-18 PROCEDURE — 99213 OFFICE O/P EST LOW 20 MIN: CPT | Performed by: ORTHOPAEDIC SURGERY

## 2018-01-18 RX ORDER — LEVOFLOXACIN 250 MG/1
250 TABLET ORAL DAILY
COMMUNITY
End: 2018-01-26

## 2018-01-18 NOTE — PROGRESS NOTES
Patient:  Bailee Garza is a 77 y.o. female    Chief Complaint/ Reason for Visit:    Chief Complaint   Patient presents with   • Left Foot - Follow-up, Pain       HPI:  Patient returns today for recheck on her left foot pain and swelling.  She had an injury on New Year's Day in which she stumbled out of one of her house shoes causing her to fall forcefully and awkwardly landing on her left foot.  She had pain and swelling.  She says that her foot actually feels quite a bit better now than it did at the previous visit.  We ordered an MRI, as I was concerned about the possibility of an occult injury in the region of the Lisfranc's ligament.  This is the location of her pain though her pain has improved to the level of mild and her swelling has reduced.  She still feels comfortable in her athletic shoes and in fact says she's been sleeping with her left shoe on because it makes her foot feel better.  Otherwise, her history of present illness has not changed substantially from the previous visit which, for review, is summarized below:    The patient presents today, accompanied by her daughter, complaining of pain in the dorsal aspect of her left midfoot.  She tells me that she had just been discharged from the hospital on Monday for after being admitted for pneumonia, and she decided she wanted to walk her dog.  Instead of putting on her walking shoes, she simply put on her house shoes.  When she was going out of the house she somehow slipped off of her right hallux shoe and then came down forcefully on her left foot.  She had the immediate onset of sharp pain and swelling in the dorsal left midfoot.  She says she thought she could tell if it out.  She called here and we worked her in for an appointment today.  In the meantime, however, she went to an urgent care facility where x-rays revealed what may be a fracture in the proximal metatarsus.  She was placed in a stiff soled shoe, but says that she actually feels  better in her walking athletic shoes than she does in the stiff soled shoe.  The pain is moderate in intensity though it can escalate to severe with weightbearing.  It's constant, causing throbbing even at rest.  She has focal swelling over the dorsal midfoot.  She does not have any calf pain.  She was otherwise uninjured.  She does not have any numbness, tingling, or sensory derangements.         PMH:    Past Medical History:   Diagnosis Date   • Arthropathy of pelvic region and thigh 2012    unspecified   • Back pain 2007   • Back pain 2013    unspecified   • Chronic back pain 2009   • Constipation 2015    unspecified   • Depression 2015   • Dyspepsia 2008   • Essential hypertension 2007   • 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)   • Hearing loss 2008   • History of bone density study 2015   • Hypothyroidism, acquired 2007   • IBS (irritable bowel syndrome) 2013   • Insomnia 2015    unspecified   • Intervertebral disc disorder with myelopathy, cervical region 2010   • Pneumonia    • Pneumonia    • Rhinitis, allergic 2007   • Screening breast examination 2007   • Stress 2015    other acute reactions to stress   • Urticaria 2015       PSH:    Past Surgical History:   Procedure Laterality Date   • BREAST BIOPSY Right     50+ years ago   • BREAST LUMPECTOMY     • BRONCHOSCOPY N/A 2017    Procedure: BRONCHOSCOPY;  Surgeon: Mario Alanis MD;  Location: [facility] ENDOSCOPY;  Service:    • HYSTERECTOMY         Social Hx:    Social History     Social History   • Marital status:      Spouse name: N/A   • Number of children: N/A   • Years of education: N/A     Occupational History   • Not on file.     Social History Main Topics   • Smoking status: Never Smoker   • Smokeless tobacco: Never Used   • Alcohol use No   • Drug use: No   • Sexual activity: Defer  "    Other Topics Concern   • Not on file     Social History Narrative       Family Hx:    Family History   Problem Relation Age of Onset   • Heart disease Mother    • Cancer Father    • Asthma Paternal Grandfather        Meds:    Current Outpatient Prescriptions:   •  calcium polycarbophil (FIBERCON) 625 MG tablet, Take 625 mg by mouth Daily., Disp: , Rfl:   •  guaiFENesin (MUCINEX) 600 MG 12 hr tablet, Take 2 tablets by mouth Every 12 (Twelve) Hours for 30 days., Disp: 120 tablet, Rfl: 0  •  ipratropium-albuterol (DUO-NEB) 0.5-2.5 mg/mL nebulizer, Take 3 mL by nebulization 4 (Four) Times a Day., Disp: 360 mL, Rfl: 1  •  levoFLOXacin (LEVAQUIN) 250 MG tablet, Take 250 mg by mouth Daily., Disp: , Rfl:   •  metoprolol succinate XL (TOPROL XL) 25 MG 24 hr tablet, Take 0.5 tablets by mouth Daily for 30 days., Disp: 15 tablet, Rfl: 0  •  Multiple Vitamins-Minerals (PRESERVISION AREDS PO), Take 1 tablet by mouth Daily., Disp: , Rfl:   •  SYNTHROID 100 MCG tablet, Take 1 tablet by mouth Daily., Disp: 30 tablet, Rfl: 11  •  traMADol-acetaminophen (ULTRACET) 37.5-325 MG per tablet, Take 1 tablet by mouth Every Morning., Disp: , Rfl:     Allergies:    Allergies   Allergen Reactions   • Ketek [Telithromycin]    • Nsaids    • Sulfa Antibiotics Hives       ROS:  Review of Systems   Constitutional: Positive for fatigue.   Respiratory: Positive for cough and shortness of breath.    Musculoskeletal: Positive for arthralgias, gait problem and joint swelling.       Vitals:    01/18/18 0903   Temp: 98.3 °F (36.8 °C)   TempSrc: Temporal Artery    Weight: 47.7 kg (105 lb 3.2 oz)   Height: 160 cm (63\")     Body mass index is 18.64 kg/(m^2).    Physical Exam    The patient is awake, alert, and oriented ×3.  The patient is in no acute distress.  Breathing is regular and unlabored with a respiratory rate of 12/m.  Extraocular movements and pupillary responses are symmetrically intact. Sclerae are anicteric.   Hearing is within normal " limits.  Speech is within normal limits.  There is no jugular venous distention.    She has an improved gait today compared previous visit.    Left foot: The patient has less swelling.  She still has tenderness dorsally in the area of the first tarsometatarsal joint.  There is no crepitus or instability.  Pulses and sensory exam remained intact.  Her left calf is soft and nontender with no venous cord.  Her skin is unremarkable.        Radiology: MRI left foot: I reviewed the images personally on the [employer] system.  I also reviewed the radiologist's report.  The patient does have a subtle impaction fracture of the medial cuneiform in the articular surface adjacent to the base of the first metatarsal.  There is some bone contusion in the base of the first metatarsal.  Lisfranc injury is not noted.  The patient has some arthritic change in the midfoot.  Essentially my reading is the same as that of the interpreting radiologist.  Comparison MRI was not available.          Assessment:     Diagnosis Plan   1. Closed displaced fracture of medial cuneiform of left foot, initial encounter     2. Contusion of bone      Proximal left first metatarsal           Plan:  I discussed everything with the patient and reviewed the findings with her.  For now, she seems to be improving wearing her very well made stiff soled comfortable supportive athletic-type shoes.  I encouraged her to continue to work on stretching and flexibility.  I used a 3-dimensional model of the foot to explain exactly what was going on and she voiced understanding.  Her questions are answered her satisfaction.  Activity precautions and recommendations were discussed as well.

## 2018-01-23 ENCOUNTER — OFFICE VISIT (OUTPATIENT)
Dept: FAMILY MEDICINE CLINIC | Facility: CLINIC | Age: 78
End: 2018-01-23

## 2018-01-23 VITALS
OXYGEN SATURATION: 98 % | HEIGHT: 63 IN | RESPIRATION RATE: 16 BRPM | HEART RATE: 90 BPM | SYSTOLIC BLOOD PRESSURE: 117 MMHG | WEIGHT: 104 LBS | TEMPERATURE: 98.4 F | DIASTOLIC BLOOD PRESSURE: 66 MMHG | BODY MASS INDEX: 18.43 KG/M2

## 2018-01-23 DIAGNOSIS — S76.012A STRAIN OF LEFT HIP, INITIAL ENCOUNTER: Primary | ICD-10-CM

## 2018-01-23 DIAGNOSIS — M70.62 TROCHANTERIC BURSITIS OF LEFT HIP: ICD-10-CM

## 2018-01-23 PROCEDURE — 99213 OFFICE O/P EST LOW 20 MIN: CPT | Performed by: NURSE PRACTITIONER

## 2018-01-23 RX ORDER — PREDNISONE 20 MG/1
TABLET ORAL
Qty: 20 TABLET | Refills: 0 | Status: SHIPPED | OUTPATIENT
Start: 2018-01-23 | End: 2018-03-09

## 2018-01-23 NOTE — PROGRESS NOTES
Subjective   Bailee Garza is a 77 y.o. female.     History of Present Illness   Patient complains of left hip pain. The symptoms began 2 weeks ago.  Pain is a result of no known event. Pain is located hip region. Discomfort is described as aching and stiffness. Symptoms are exacerbated by flexion, weight bearing and getting up to stand.  Evaluation to date: none. Therapy to date includes: nothing specific   Patient has left foot fracture for which she is seeing orthopedic specialist Dr. Mann.   Patient believes she has injured her hip due to walking on the side of her left foot to avoid pain.  She states pain was better until this weekend.  She was walking more this weekend.         Patient still has cough but reports she is otherwise feeling ok.  She has f/u appt with pulmonology next week and ID next week.   The following portions of the patient's history were reviewed and updated as appropriate: allergies, current medications, past family history, past medical history, past social history, past surgical history and problem list.    Review of Systems   Musculoskeletal: Positive for arthralgias. Negative for gait problem and joint swelling.   Neurological: Positive for weakness. Negative for numbness.       Objective   Physical Exam   Constitutional: She is oriented to person, place, and time. She appears well-developed and well-nourished.   Pulmonary/Chest: Effort normal.   Musculoskeletal:        Left hip: She exhibits decreased range of motion, decreased strength and tenderness. She exhibits no swelling.        Legs:  Decreased strength with flexion.  Pain with flexion and abduction.  Tenderness to palpation of greater trochanter.    Neurological: She is alert and oriented to person, place, and time.   Psychiatric: She has a normal mood and affect. Judgment normal.   Nursing note and vitals reviewed.      Assessment/Plan   Bailee was seen today for hip pain and cough.    Diagnoses and all orders for this  visit:    Strain of left hip, initial encounter  -     Ambulatory Referral to Physical Therapy Evaluate and treat  -     predniSONE (DELTASONE) 20 MG tablet; 3 tablets daily for 3 days, then 2 tablets daily for 4 days then 1 tablet daily for 3 days.    Trochanteric bursitis of left hip          Patient has f/u appt with Dr. Mann on , but may move her appt up as she reports increased pain in left foot.  She was at a  over the weekend and was on her feet for extended period of time. She was also wearing dress shoes. She will ice and elevate overnight and contact his office if not improved tomorrow.

## 2018-01-24 NOTE — TELEPHONE ENCOUNTER
I do not recall what her job entails.  If it were seated work only, she should be able to return on February 5, as long as she is not having any foot pain and is not walking on her foot.  If her job requires walking and standing, then it is unlikely that she would be able to return before the February 19 appointment.

## 2018-01-25 ENCOUNTER — TELEPHONE (OUTPATIENT)
Dept: ORTHOPEDIC SURGERY | Facility: CLINIC | Age: 78
End: 2018-01-25

## 2018-01-25 NOTE — TELEPHONE ENCOUNTER
"Patient called stating she is having some new swelling since Monday on the inside of her arch. She went to her PCP on Tuesday for \"hip pain\" and the doctor told her the ligament was inflamed probably from altered gait. She was put on Prednisone. Does YECENIA want to check her? Please advise.  "

## 2018-01-26 ENCOUNTER — HOSPITAL ENCOUNTER (OUTPATIENT)
Dept: CARDIOLOGY | Facility: HOSPITAL | Age: 78
Discharge: HOME | End: 2018-01-26
Attending: ORTHOPAEDIC SURGERY | Admitting: ORTHOPAEDIC SURGERY

## 2018-01-26 ENCOUNTER — OFFICE VISIT (OUTPATIENT)
Dept: INFECTIOUS DISEASES | Facility: CLINIC | Age: 78
End: 2018-01-26

## 2018-01-26 ENCOUNTER — APPOINTMENT (OUTPATIENT)
Dept: LAB | Facility: HOSPITAL | Age: 78
End: 2018-01-26

## 2018-01-26 VITALS
HEART RATE: 74 BPM | DIASTOLIC BLOOD PRESSURE: 65 MMHG | HEIGHT: 63 IN | WEIGHT: 106.8 LBS | SYSTOLIC BLOOD PRESSURE: 112 MMHG | BODY MASS INDEX: 18.92 KG/M2 | TEMPERATURE: 97.6 F

## 2018-01-26 DIAGNOSIS — M79.605 LEFT LEG PAIN: Primary | ICD-10-CM

## 2018-01-26 DIAGNOSIS — J18.9 PNEUMONIA OF BOTH LOWER LOBES DUE TO INFECTIOUS ORGANISM: Primary | ICD-10-CM

## 2018-01-26 DIAGNOSIS — M79.605 LEFT LEG PAIN: ICD-10-CM

## 2018-01-26 LAB
BASOPHILS # BLD AUTO: 0.01 10*3/MM3 (ref 0–0.2)
BASOPHILS NFR BLD AUTO: 0.1 % (ref 0–1.5)
BH CV LOWER VASCULAR LEFT COMMON FEMORAL AUGMENT: NORMAL
BH CV LOWER VASCULAR LEFT COMMON FEMORAL COMPETENT: NORMAL
BH CV LOWER VASCULAR LEFT COMMON FEMORAL COMPRESS: NORMAL
BH CV LOWER VASCULAR LEFT COMMON FEMORAL PHASIC: NORMAL
BH CV LOWER VASCULAR LEFT COMMON FEMORAL SPONT: NORMAL
BH CV LOWER VASCULAR LEFT DISTAL FEMORAL COMPRESS: NORMAL
BH CV LOWER VASCULAR LEFT GASTRONEMIUS COMPRESS: NORMAL
BH CV LOWER VASCULAR LEFT GREATER SAPH AK COMPRESS: NORMAL
BH CV LOWER VASCULAR LEFT GREATER SAPH BK COMPRESS: NORMAL
BH CV LOWER VASCULAR LEFT LESSER SAPH COMPRESS: NORMAL
BH CV LOWER VASCULAR LEFT MID FEMORAL AUGMENT: NORMAL
BH CV LOWER VASCULAR LEFT MID FEMORAL COMPETENT: NORMAL
BH CV LOWER VASCULAR LEFT MID FEMORAL COMPRESS: NORMAL
BH CV LOWER VASCULAR LEFT MID FEMORAL PHASIC: NORMAL
BH CV LOWER VASCULAR LEFT MID FEMORAL SPONT: NORMAL
BH CV LOWER VASCULAR LEFT PERONEAL COMPRESS: NORMAL
BH CV LOWER VASCULAR LEFT POPLITEAL AUGMENT: NORMAL
BH CV LOWER VASCULAR LEFT POPLITEAL COMPETENT: NORMAL
BH CV LOWER VASCULAR LEFT POPLITEAL COMPRESS: NORMAL
BH CV LOWER VASCULAR LEFT POPLITEAL PHASIC: NORMAL
BH CV LOWER VASCULAR LEFT POPLITEAL SPONT: NORMAL
BH CV LOWER VASCULAR LEFT POSTERIOR TIBIAL COMPRESS: NORMAL
BH CV LOWER VASCULAR LEFT PROXIMAL FEMORAL COMPRESS: NORMAL
BH CV LOWER VASCULAR LEFT SAPHENOFEMORAL JUNCTION AUGMENT: NORMAL
BH CV LOWER VASCULAR LEFT SAPHENOFEMORAL JUNCTION COMPETENT: NORMAL
BH CV LOWER VASCULAR LEFT SAPHENOFEMORAL JUNCTION COMPRESS: NORMAL
BH CV LOWER VASCULAR LEFT SAPHENOFEMORAL JUNCTION PHASIC: NORMAL
BH CV LOWER VASCULAR LEFT SAPHENOFEMORAL JUNCTION SPONT: NORMAL
BH CV LOWER VASCULAR RIGHT COMMON FEMORAL AUGMENT: NORMAL
BH CV LOWER VASCULAR RIGHT COMMON FEMORAL COMPETENT: NORMAL
BH CV LOWER VASCULAR RIGHT COMMON FEMORAL COMPRESS: NORMAL
BH CV LOWER VASCULAR RIGHT COMMON FEMORAL PHASIC: NORMAL
BH CV LOWER VASCULAR RIGHT COMMON FEMORAL SPONT: NORMAL
DEPRECATED RDW RBC AUTO: 50.4 FL (ref 37–54)
EOSINOPHIL # BLD AUTO: 0 10*3/MM3 (ref 0–0.7)
EOSINOPHIL NFR BLD AUTO: 0 % (ref 0.3–6.2)
ERYTHROCYTE [DISTWIDTH] IN BLOOD BY AUTOMATED COUNT: 14.2 % (ref 11.7–13)
HCT VFR BLD AUTO: 34.7 % (ref 35.6–45.5)
HGB BLD-MCNC: 10.5 G/DL (ref 11.9–15.5)
HIV1 P24 AG SER QL: NORMAL
HIV1+2 AB SER QL: NORMAL
IGA1 MFR SER: 110 MG/DL (ref 70–400)
IGG1 SER-MCNC: 719 MG/DL (ref 700–1600)
IGM SERPL-MCNC: 147 MG/DL (ref 40–230)
IMM GRANULOCYTES # BLD: 0.06 10*3/MM3 (ref 0–0.03)
IMM GRANULOCYTES NFR BLD: 0.4 % (ref 0–0.5)
LYMPHOCYTES # BLD AUTO: 0.76 10*3/MM3 (ref 0.9–4.8)
LYMPHOCYTES NFR BLD AUTO: 5.7 % (ref 19.6–45.3)
MCH RBC QN AUTO: 29.3 PG (ref 26.9–32)
MCHC RBC AUTO-ENTMCNC: 30.3 G/DL (ref 32.4–36.3)
MCV RBC AUTO: 96.9 FL (ref 80.5–98.2)
MONOCYTES # BLD AUTO: 0.37 10*3/MM3 (ref 0.2–1.2)
MONOCYTES NFR BLD AUTO: 2.8 % (ref 5–12)
NEUTROPHILS # BLD AUTO: 12.25 10*3/MM3 (ref 1.9–8.1)
NEUTROPHILS NFR BLD AUTO: 91 % (ref 42.7–76)
PLATELET # BLD AUTO: 327 10*3/MM3 (ref 140–500)
PMV BLD AUTO: 10.9 FL (ref 6–12)
PROCALCITONIN SERPL-MCNC: 0.11 NG/ML (ref 0.1–0.25)
RBC # BLD AUTO: 3.58 10*6/MM3 (ref 3.9–5.2)
WBC NRBC COR # BLD: 13.45 10*3/MM3 (ref 4.5–10.7)

## 2018-01-26 PROCEDURE — 86609 BACTERIUM ANTIBODY: CPT | Performed by: INTERNAL MEDICINE

## 2018-01-26 PROCEDURE — 85025 COMPLETE CBC W/AUTO DIFF WBC: CPT | Performed by: INTERNAL MEDICINE

## 2018-01-26 PROCEDURE — 84145 PROCALCITONIN (PCT): CPT | Performed by: INTERNAL MEDICINE

## 2018-01-26 PROCEDURE — 99205 OFFICE O/P NEW HI 60 MIN: CPT | Performed by: INTERNAL MEDICINE

## 2018-01-26 PROCEDURE — 87449 NOS EACH ORGANISM AG IA: CPT | Performed by: INTERNAL MEDICINE

## 2018-01-26 PROCEDURE — G0432 EIA HIV-1/HIV-2 SCREEN: HCPCS | Performed by: INTERNAL MEDICINE

## 2018-01-26 PROCEDURE — 87899 AGENT NOS ASSAY W/OPTIC: CPT | Performed by: INTERNAL MEDICINE

## 2018-01-26 PROCEDURE — 36415 COLL VENOUS BLD VENIPUNCTURE: CPT | Performed by: INTERNAL MEDICINE

## 2018-01-26 PROCEDURE — 93971 EXTREMITY STUDY: CPT

## 2018-01-26 PROCEDURE — 82784 ASSAY IGA/IGD/IGG/IGM EACH: CPT | Performed by: INTERNAL MEDICINE

## 2018-01-26 PROCEDURE — 90471 IMMUNIZATION ADMIN: CPT | Performed by: INTERNAL MEDICINE

## 2018-01-26 PROCEDURE — 90686 IIV4 VACC NO PRSV 0.5 ML IM: CPT | Performed by: INTERNAL MEDICINE

## 2018-01-26 PROCEDURE — 87385 HISTOPLASMA CAPSUL AG IA: CPT | Performed by: INTERNAL MEDICINE

## 2018-01-26 RX ORDER — PHENOL 1.4 %
600 AEROSOL, SPRAY (ML) MUCOUS MEMBRANE DAILY
COMMUNITY
End: 2018-03-09

## 2018-01-26 NOTE — TELEPHONE ENCOUNTER
Please call and make sure that she does not have any calf or leg swelling.  Obviously, if she does, then she should be sent for Doppler ultrasound.  If the only new swelling she has is in her foot itself, then my recommendation would be that she simply keep her follow-up appointment with me that she already has on February 19.  Thank you.

## 2018-01-26 NOTE — TELEPHONE ENCOUNTER
I spoke with patient today about return to work.  She would like a letter stating that she is unable to work until she sees YECENIA on feb. 19th.  Faxed to [employer] disability [phone] attn to [place].  I can take care of this if you approve.    She does have a second question in new telephone encounter 1/25/2018.

## 2018-01-26 NOTE — PROGRESS NOTES
"Referring Provider: Guera Alcocer, APRN  [address]  [address]  [place] [address]    Reason for Consultation: \"recurrent pneumonia\"    History of present illness:  Mrs Garza is a 76 YO who I am asked to evaluate and give opinion for \"recurrent pneumonia.\" History is obtained from the patient, stepdaughter, and review of the old medical records which I summarize/synthesize as follows: She was hospitalized here at [facility], [place] 12/19/17 with about 2 months of cough. She is concerned it all started after the Pneumovax shot.     She had a CXR done around October she tells me was positive for pneumonia so was given cefdinir with mild improvement but the cough returned so ws given levofloxacin without any change. She later received a course of Augmentin. Upon hospitalization here at [facility], [place] she had a bronch with thick mucus. 1 of 2 fungal culture returned with Penicillium and all other testing was negative including bacterial cultures and AFB cultures. She has followed in the pulmonary clinic since that time. She completed a course of doxycycline while in the hospital and discharged on 12/23/17.  Then she was readmitted 12/29-1/1/18 for persistent cough with clear sputum and fever. Labs at that time were notable for WBC 22k and procal 7.1. CXR again w/ B infiltrates. She improved with Zosyn and was discharge on Augmentin.    Her PCP has referred her to me for evaluation of \"recurrent pneumonia.\"     She says that overall she is feeling much better. She still has a daily intermittent cough. The sputum has changed from green to clear though. She is not having any fevers, sweats, or shaking chills. She did recently take about a 7 day course of levofloxacin due to the persistent cough. She is a steroid taper for some musculoskeletal pain.    Of note, she had a swallow study during the first hospital stay which was normal. She denies recurrent pneumonia. She says she hasn't been sick in 26 years. " "    PMH:  Back pain  Depression  GERD  HTN  Hypothyroidism  IBS  Lumpectomy  Hysterectomy  Appy  Bronchoscopy    Social History:    Non smoker  1 cat and 1 dog  [place]posure [place] her [place]  Lives in [place] in house alone  Works for [employer]    Family History:  Mom: CAD  Dad: colon CA    Allergies:    Sulfa (hives)  Telithromycin    Medications:    Current Outpatient Prescriptions:   •  calcium carbonate (OS-CURTIS) 600 MG tablet, Take 600 mg by mouth Daily., Disp: , Rfl:   •  calcium polycarbophil (FIBERCON) 625 MG tablet, Take 625 mg by mouth Daily., Disp: , Rfl:   •  ipratropium-albuterol (DUO-NEB) 0.5-2.5 mg/mL nebulizer, Take 3 mL by nebulization 4 (Four) Times a Day., Disp: 360 mL, Rfl: 1  •  metoprolol tartrate (LOPRESSOR) 25 MG tablet, Take 25 mg by mouth Take As Directed. .5 tab daily, Disp: , Rfl:   •  Multiple Vitamins-Minerals (PRESERVISION AREDS PO), Take 1 tablet by mouth Daily., Disp: , Rfl:   •  predniSONE (DELTASONE) 20 MG tablet, 3 tablets daily for 3 days, then 2 tablets daily for 4 days then 1 tablet daily for 3 days., Disp: 20 tablet, Rfl: 0  •  SYNTHROID 100 MCG tablet, Take 1 tablet by mouth Daily., Disp: 30 tablet, Rfl: 11  •  traMADol-acetaminophen (ULTRACET) 37.5-325 MG per tablet, Take 1 tablet by mouth Every Morning., Disp: , Rfl:     Review of Systems  All systems were reviewed and are negative unless otherwise stated above in the HPI    Objective   Vital Signs   /65  Pulse 74  Temp 97.6 °F (36.4 °C)  Ht 160 cm (62.99\")  Wt 48.4 kg (106 lb 12.8 oz)  BMI 18.92 kg/m2    Physical Exam:   General: awake, alert, NAD   Head: Normocephalic, atraumatic  Eyes: PERRL, EOMI, no scleral icterus, no conjunctival pallor, no conjunctival hemorrhages.   ENT: MMM, OP clear, no thrush. Dentures  Neck: Supple, no visible thyromegaly  Cardiovascular: NR, RR, no murmurs, rubs, or gallops; trace LE edema  Respiratory: Lungs w/ B wheezing; normal work of breathing on ambient air  GI: " Abdomen is soft, non-tender, non-distended  : no Adams catheter present  Musculoskeletal: thin musculature; no obvious joint effusions  Skin: No rashes, lesions, or embolic phenomenon; easy bruising  Neurological: Alert and oriented x 3, motor strength 5/5 in all four extremities, normal sensation  Psychiatric: Normal mood and affect   Lymph: no pre-auricular, post-auricular, submandibular, cervical, supraclavicular  LAD  Vasc: no cyanosis    Labs:     Lab Results   Component Value Date    WBC 8.92 01/08/2018    HGB 11.1 (L) 01/08/2018    HCT 35.3 (L) 01/08/2018    MCV 99.7 (H) 01/08/2018     01/08/2018       Lab Results   Component Value Date    GLUCOSE 121 (H) 12/31/2017    BUN 20 12/31/2017    CREATININE 1.01 (H) 12/31/2017    EGFRIFNONA 53 (L) 12/31/2017    EGFRIFAFRI 68 12/28/2017    BCR 19.8 12/31/2017    CO2 24.2 12/31/2017    CALCIUM 9.0 12/31/2017    PROTENTOTREF 7.3 11/24/2017    ALBUMIN 3.30 (L) 12/30/2017    LABIL2 0.8 12/30/2017    AST 26 12/30/2017    ALT 30 12/30/2017     Procal 7-->3.6  Fungitell negative  Tspot negative  LLL Cytology negative for cancer and GMS stain negative    Microbiology:  12/18 AFB Cx: negative  12/18 Respiratory culture: negative  12/19 RVP: negative  12/21 Bronch Bacterial Cx: negative  12/21 Bronch Fungal Cx: Penicillium species on culture; fungal smear negative  12/21 bronch AFB Cx: negative  12/29 BCx negative    Radiology (personally reviewed images/report):  CT chest 12/19 with some bibasilar changes consistent with either atelectasis or PNA per radiologist    Assessment/Plan   1. Bilateral lower lobe pneumonia  -she certainly had it based on labs, imaging back in 12/2017 but thankfully is overall improving  -I think the Penicillium is likely a contaminant  -new onset immunodeficiency in a 77 year old is exceedingly uncommon; HIV and chemotherapy are really the only 2 causes in this age group and HIV is very unlikely in her case  -check immunoglobulins and HIV  Ab  -check Pneumovax-23 antibodies since she was just vaccinated  -repeat CBC and procal  -repeat CT chest without contrast to see if infiltrates resolved  -repeat fungal sputum culture; if Penicillium grows again, I might treat it at that point  -check Histo Ag and repeat Fungitell (previously negative)    Thank you for this consult. ID will follow.

## 2018-01-29 ENCOUNTER — TREATMENT (OUTPATIENT)
Dept: PHYSICAL THERAPY | Facility: CLINIC | Age: 78
End: 2018-01-29

## 2018-01-29 ENCOUNTER — APPOINTMENT (OUTPATIENT)
Dept: LAB | Facility: HOSPITAL | Age: 78
End: 2018-01-29
Attending: INTERNAL MEDICINE

## 2018-01-29 DIAGNOSIS — R26.9 GAIT DIFFICULTY: ICD-10-CM

## 2018-01-29 DIAGNOSIS — M25.552 LEFT HIP PAIN: Primary | ICD-10-CM

## 2018-01-29 LAB
MYCOBACTERIUM SPEC CULT: NORMAL
NIGHT BLUE STAIN TISS: NORMAL

## 2018-01-29 PROCEDURE — 97110 THERAPEUTIC EXERCISES: CPT | Performed by: PHYSICAL THERAPIST

## 2018-01-29 PROCEDURE — 87102 FUNGUS ISOLATION CULTURE: CPT | Performed by: INTERNAL MEDICINE

## 2018-01-29 PROCEDURE — 97161 PT EVAL LOW COMPLEX 20 MIN: CPT | Performed by: PHYSICAL THERAPIST

## 2018-01-29 NOTE — TELEPHONE ENCOUNTER
Patient was called as per YECENIA's advice.  She did have calf pain.  Doppler was ordered via verbal order from YECENIA.

## 2018-01-29 NOTE — PROGRESS NOTES
Physical Therapy Initial Evaluation and Plan of Care    Patient: Bailee Garza   : 1940  Diagnosis/ICD-10 Code:  Left hip pain [M25.552]  Referring practitioner: Guera Alcocer,*  Past medical Hx reviewed: 2018     Subjective Evaluation    History of Present Illness  Date of onset: 1/3/2018  Mechanism of injury: I started having hip pain a little after fracturing my L foot (metatarsal) on new year's day.  I had just got out of the hospital for pneumonia (4 days x 2) and went to walk my dog down my driveway and in the yard and lost one of my slippers.  When hopping on one leg to get the shoe back on, I felt some foot pain.   I saw the doctor and they didn't recommend a boot or cast and to just let it heal on it's own.  Since the foot injury and favoring the foot, the hip has begun to hurt more.  I have more pain on the front on the hip (groin area).   PLOF: Independent with gait, self care and home activity.  Working at [employer].     Currently taking prednisone (dose pack) and it has really helped.  I have noticed that the hip isn't as flexible as it used to be.  I have the most difficulty with transfers to standing and walking.  I have to lead with my R leg going up stairs because the L leg feels like it's going to give out.          Patient Occupation: [employer] ( Full time)    Precautions and Work Restrictions: off work for now.  (projected return to work 2018)Quality of life: good    Pain  Current pain ratin (L hip )  At best pain ratin (At night + medication.  Leg supported)  At worst pain rating: 10 (L anterior hip)  Location: L hip groin area down thigh.    Quality: dull ache and tight  Relieving factors: medications, heat, change in position and rest    Social Support  Lives in: multiple-level home (basement with laundry.  Railings. 4 steps to enter home)  Lives with: alone    Diagnostic Tests  No diagnostic tests performed  Abnormal x-ray: Foot X-ray only at onset.  No  new x-rays recentt.      Treatments  Previous treatment: medication  Patient Goals  Patient goals for therapy: increased strength, independence with ADLs/IADLs, increased motion, improved balance and decreased pain       Objective       Passive Range of Motion   Left Hip   Flexion: WFL  Extension: with pain  Abduction: 18 degrees with pain    Strength/Myotome Testing     Left Hip   Planes of Motion   Flexion: 3+  Extension: 4-  Abduction: 3+  Adduction: 5  External rotation: 4-  Internal rotation: 4-    Right Hip   Planes of Motion   Flexion: 4+  External rotation: 4+  Internal rotation: 4+    Left Knee   Flexion: 4  Extension: 4    Right Knee   Flexion: 5  Extension: 5    Left Ankle/Foot   Dorsiflexion: 5  Plantar flexion: 4+  Inversion: 4  Eversion: 4+    Right Ankle/Foot   Dorsiflexion: 5  Plantar flexion: 5  Inversion: 5    Ambulation   Weight-Bearing Status   Weight-Bearing Status (Left): weight-bearing as tolerated   Weight-Bearing Status (Right): weight-bearing as tolerated    Assistive device used: none    Ambulation: Level Surfaces   Ambulation without assistive device: independent    Additional Level Surfaces Ambulation Details  Pt does ambulate with bilateral metatarsal bars on shoes.        Ambulation: Stairs   Ascend stairs: independent  Pattern: non-reciprocal  Railings: one rail  Descend stairs: independent  Pattern: non-reciprocal  Railings: one rail  Curbs: independent    Observational Gait   Gait: antalgic   Increased right stance time. Decreased walking speed, stride length, left stance time, left swing time and right step length.   Left foot contact pattern: heel to toe  Right foot contact pattern: heel to toe  Base of support: normal    Functional Assessment     Single Leg Stance   Left: 15 (Moderate instability noted) seconds  Right: 12 seconds    Comments  Sit to stand: x1 without UE.  With UE 10x.    TU.16 sec.  11.58 sec.          Assessment & Plan     Assessment  Impairments: abnormal  gait, abnormal or restricted ROM, activity intolerance, impaired balance, impaired physical strength, lacks appropriate home exercise program, pain with function and weight-bearing intolerance  Assessment details: Pt presents to PT with L hip pain as a result of prolonged altered gait pattern from foot injury.  Her restrictions are soft tissue in nature and demonstrates mild fear/avoidance behaviors, contributing to ongoing restriction of the hip.  She does have noted mm weakness to the hip that will improve with specific training.  Pt will benefit from skilled PT intervention to improve function and return to work unrestricted by hip pain.    Prognosis: good  Functional Limitations: walking, uncomfortable because of pain, moving in bed and unable to perform repetitive tasks  Goals  Plan Goals:  SHORT TERM GOALS: 5-6 visits  1.  Patient to be compliant with HEP and demo good efficiency with TE  2.  Report < 2 sleep disturbances 2° hip pain.     3.  Increased Lumbar and SIJ mobility to allow for improved pelvic alignment and gait mechanics (equal step length and level pelvis throughout gait).    4.  Increased hip ER/IR ROM to WFL (IR to 30°) degrees to allow for increased ease with bed mobility and squatting.  5. Pt will report minimal-no tenderness to palpation with firm pressure.   6. Pt. Able to ambulate up to 20 min with pain < 5/10 with acceptable pattern.      LONG TERM GOALS: 8-10  visits  1.  Pt. to score > 10% perceived ability on LEFS  2.  Pain level < 2/10 in hips with all activities including walking/standing > 45 min continuously.   3.  Hip  AROM to WNL to allow for return to ADL's/IADLS and functional activities without increased symptoms  4. Hip strength to 4+/5  to allow for pushing, pulling and more strenuous activities to occur without pain.    5. No palpable tenderness to the hip.   6.  Pt to perform 30 sec sit to stand 10x without UE.    7. TUG < 10 sec with acceptable gait pattern.        Plan  Therapy options: will be seen for skilled physical therapy services  Planned modality interventions: cryotherapy, electrical stimulation/Russian stimulation, iontophoresis, TENS, thermotherapy (hydrocollator packs), traction and ultrasound  Planned therapy interventions: abdominal trunk stabilization, ADL retraining, body mechanics training, balance/weight-bearing training, flexibility, functional ROM exercises, gait training, home exercise program, joint mobilization, manual therapy, neuromuscular re-education, postural training, soft tissue mobilization, spinal/joint mobilization, strengthening, stretching and therapeutic activities  Frequency: 2x week  Duration in visits: 12  Treatment plan discussed with: patient      Manual Therapy:    -     mins  65310;  Therapeutic Exercise:    15     mins  93245;     Neuromuscular Sofya:    -    mins  91839;    Therapeutic Activity:     -     mins  61756;     Gait Training:      -     mins  81931;     Ultrasound:     -     mins  95375;    Electrical Stimulation:    -     mins  25755 ( );  Dry Needling     -     mins self-pay    Timed Treatment:   15   mins   Total Treatment:     60   mins    PT SIGNATURE: SARAH Dykes License #: [id number]    DATE TREATMENT INITIATED: 1/29/2018    Initial Certification  Certification Period: 4/29/2018  I certify that the therapy services are furnished while this patient is under my care.  The services outlined above are required by this patient, and will be reviewed every 90 days.     PHYSICIAN: Guera Alcocer, TERESA      DATE:     Please sign and return via fax to [phone].. Thank you, [facility].

## 2018-01-30 LAB — RESULT: <31 PG/ML

## 2018-01-31 ENCOUNTER — HOSPITAL ENCOUNTER (OUTPATIENT)
Dept: CT IMAGING | Facility: HOSPITAL | Age: 78
Discharge: HOME | End: 2018-01-31
Attending: INTERNAL MEDICINE | Admitting: INTERNAL MEDICINE

## 2018-01-31 DIAGNOSIS — J18.9 PNEUMONIA OF BOTH LOWER LOBES DUE TO INFECTIOUS ORGANISM: ICD-10-CM

## 2018-01-31 LAB — REF LAB TEST METHOD: NORMAL

## 2018-01-31 PROCEDURE — 71250 CT THORAX DX C-: CPT

## 2018-02-01 LAB
DEPRECATED S PNEUM 1 IGG SER-MCNC: <0.1 UG/ML
DEPRECATED S PNEUM12 IGG SER-MCNC: 0.1 UG/ML
DEPRECATED S PNEUM14 IGG SER-MCNC: 10.1 UG/ML
DEPRECATED S PNEUM19 IGG SER-MCNC: 4.9 UG/ML
DEPRECATED S PNEUM23 IGG SER-MCNC: 3.2 UG/ML
DEPRECATED S PNEUM3 IGG SER-MCNC: 0.9 UG/ML
DEPRECATED S PNEUM4 IGG SER-MCNC: 0.1 UG/ML
DEPRECATED S PNEUM8 IGG SER-MCNC: <0.1 UG/ML
DEPRECATED S PNEUM9 IGG SER-MCNC: <0.1 UG/ML
MYCOBACTERIUM SPEC CULT: NORMAL
NIGHT BLUE STAIN TISS: NORMAL
PNEUMO AB TYPE 17 (17F)*: <0.1 UG/ML
PNEUMO AB TYPE 2*: 0.1 UG/ML
PNEUMO AB TYPE 20*: 1.2 UG/ML
PNEUMO AB TYPE 22 (22F)*: 0.3 UG/ML
PNEUMO AB TYPE 34 (10A)*: 0.6 UG/ML
PNEUMO AB TYPE 43 (11A)*: 0.8 UG/ML
PNEUMO AB TYPE 5*: 0.3 UG/ML
PNEUMO AB TYPE 54 (15B)*: 0.4 UG/ML
PNEUMO AB TYPE 70 (33F)*: 0.2 UG/ML
S PNEUM DA 18C IGG SER-MCNC: 0.5 UG/ML
S PNEUM DA 19A IGG SER-MCNC: 4.5 UG/ML
S PNEUM DA 6B IGG SER-MCNC: 0.7 UG/ML
S PNEUM DA 7F IGG SER-MCNC: 7.5 UG/ML
S PNEUM DA 9V IGG SER-MCNC: 1.2 UG/ML

## 2018-02-02 ENCOUNTER — TREATMENT (OUTPATIENT)
Dept: PHYSICAL THERAPY | Facility: CLINIC | Age: 78
End: 2018-02-02

## 2018-02-02 DIAGNOSIS — M25.552 LEFT HIP PAIN: Primary | ICD-10-CM

## 2018-02-02 DIAGNOSIS — R26.9 GAIT DIFFICULTY: ICD-10-CM

## 2018-02-02 PROCEDURE — 97110 THERAPEUTIC EXERCISES: CPT | Performed by: PHYSICAL THERAPIST

## 2018-02-05 ENCOUNTER — OFFICE VISIT (OUTPATIENT)
Dept: FAMILY MEDICINE CLINIC | Facility: CLINIC | Age: 78
End: 2018-02-05

## 2018-02-05 VITALS
HEIGHT: 63 IN | SYSTOLIC BLOOD PRESSURE: 112 MMHG | BODY MASS INDEX: 17.89 KG/M2 | DIASTOLIC BLOOD PRESSURE: 69 MMHG | RESPIRATION RATE: 16 BRPM | HEART RATE: 76 BPM | TEMPERATURE: 96.8 F | WEIGHT: 101 LBS

## 2018-02-05 DIAGNOSIS — M16.10 ARTHRITIS OF HIP: ICD-10-CM

## 2018-02-05 DIAGNOSIS — M25.475 SWELLING OF FOOT JOINT, LEFT: ICD-10-CM

## 2018-02-05 DIAGNOSIS — M54.50 CHRONIC MIDLINE LOW BACK PAIN WITHOUT SCIATICA: ICD-10-CM

## 2018-02-05 DIAGNOSIS — M25.552 LEFT HIP PAIN: Primary | ICD-10-CM

## 2018-02-05 DIAGNOSIS — G89.29 CHRONIC MIDLINE LOW BACK PAIN WITHOUT SCIATICA: ICD-10-CM

## 2018-02-05 PROBLEM — J18.9 PNEUMONIA OF BOTH LOWER LOBES DUE TO INFECTIOUS ORGANISM: Status: RESOLVED | Noted: 2017-12-19 | Resolved: 2018-02-05

## 2018-02-05 PROBLEM — S99.922A FOOT INJURY, LEFT, INITIAL ENCOUNTER: Status: RESOLVED | Noted: 2018-01-06 | Resolved: 2018-02-05

## 2018-02-05 PROBLEM — M79.672 PAIN OF LEFT MIDFOOT: Status: RESOLVED | Noted: 2018-01-06 | Resolved: 2018-02-05

## 2018-02-05 PROBLEM — J18.9 HCAP (HEALTHCARE-ASSOCIATED PNEUMONIA): Status: RESOLVED | Noted: 2017-12-29 | Resolved: 2018-02-05

## 2018-02-05 PROCEDURE — 99214 OFFICE O/P EST MOD 30 MIN: CPT | Performed by: FAMILY MEDICINE

## 2018-02-05 PROCEDURE — 73502 X-RAY EXAM HIP UNI 2-3 VIEWS: CPT | Performed by: FAMILY MEDICINE

## 2018-02-05 RX ORDER — METHYLPREDNISOLONE 4 MG/1
TABLET ORAL
Qty: 21 TABLET | Refills: 0 | Status: SHIPPED | OUTPATIENT
Start: 2018-02-05 | End: 2018-02-11

## 2018-02-05 NOTE — PROGRESS NOTES
"Chief Complaint   Patient presents with   • Leg Pain   • Hip Pain       Subjective   Patient is here today complaining of left hip pain.  Her pain began after a left foot fracture which occurred in early January 2018.  The foot fracture was a result walking her dog.  Since the fracture, she has been walking on the outside of her foot and she thinks this is contributing to her left hip pain. Hip pain is 7/10 and worse with walking. It causes a limp.     This patient presents to the office to refill her tramadol. Back Pain is controlled and is rated  4 out of 10. . The medicine is effective and no side effects are reported.  Shaji report has been reviewed. The latest prescription bottle (dated January 15, 2018) has 20 pills remaining. She is taking 2 in the morning and 2 at bedtime          I have reviewed and updated her medications, medical history and problem list during today's office visit.        Social History   Substance Use Topics   • Smoking status: Never Smoker   • Smokeless tobacco: Never Used   • Alcohol use No       Review of Systems   Musculoskeletal: Positive for back pain.        Left hip pain       Objective   /69  Pulse 76  Temp 96.8 °F (36 °C) (Oral)   Resp 16  Ht 160 cm (62.99\")  Wt 45.8 kg (101 lb)  BMI 17.9 kg/m2  Body mass index is 17.9 kg/(m^2).  Physical Exam   Constitutional: No distress.   Musculoskeletal:        Left hip: She exhibits decreased range of motion and tenderness.        Lumbar back: She exhibits normal range of motion and no tenderness.   Vitals reviewed.      Data Reviewed:  Views: lateral and posterior-anterior    Relevant Clinical Issues/Diagnoses/Indications for XRay: see HPI  Clinical Findings: Extremities: Hip DJD    Compared with previous XRay? No comparison or previous xray was looked at today    Date of Previous Xray:unknown date    Changes on current Xray? not applicable        Assessment/Plan     Problem List Items Addressed This Visit        Nervous " and Auditory    Chronic midline low back pain without sciatica    Relevant Medications    traMADol-acetaminophen (ULTRACET) 37.5-325 MG per tablet       Musculoskeletal and Integument    Arthritis of hip, left    Swelling of foot joint, left      Other Visit Diagnoses     Left hip pain    -  Primary    Relevant Medications    MethylPREDNISolone (MEDROL, IBAN,) 4 MG tablet    Other Relevant Orders    XR Hip With or Without Pelvis 2 - 3 View Left          Outpatient Encounter Prescriptions as of 2/5/2018   Medication Sig Dispense Refill   • calcium carbonate (OS-CURTIS) 600 MG tablet Take 600 mg by mouth Daily.     • calcium polycarbophil (FIBERCON) 625 MG tablet Take 625 mg by mouth Daily.     • ipratropium-albuterol (DUO-NEB) 0.5-2.5 mg/mL nebulizer Take 3 mL by nebulization 4 (Four) Times a Day. 360 mL 1   • metoprolol tartrate (LOPRESSOR) 25 MG tablet Take 25 mg by mouth Take As Directed. .5 tab daily     • Multiple Vitamins-Minerals (PRESERVISION AREDS PO) Take 1 tablet by mouth Daily.     • predniSONE (DELTASONE) 20 MG tablet 3 tablets daily for 3 days, then 2 tablets daily for 4 days then 1 tablet daily for 3 days. 20 tablet 0   • SYNTHROID 100 MCG tablet Take 1 tablet by mouth Daily. 30 tablet 11   • traMADol-acetaminophen (ULTRACET) 37.5-325 MG per tablet Take 2 tablets by mouth Every Morning for 90 days. 60 tablet 2   • [DISCONTINUED] traMADol-acetaminophen (ULTRACET) 37.5-325 MG per tablet Take 1 tablet by mouth Every Morning.     • MethylPREDNISolone (MEDROL, IBAN,) 4 MG tablet Take as directed on package instructions. 21 tablet 0     No facility-administered encounter medications on file as of 2/5/2018.        Orders Placed This Encounter   Procedures   • XR Hip With or Without Pelvis 2 - 3 View Left     Order Specific Question:   Reason for Exam:     Answer:   pain since January., altered gait since fracture of left foot in January       RTC as needed or sooner if symptoms worsen or change; may need ortho  referral.  3 months visit for CSA visit.        med

## 2018-02-06 ENCOUNTER — TREATMENT (OUTPATIENT)
Dept: PHYSICAL THERAPY | Facility: CLINIC | Age: 78
End: 2018-02-06

## 2018-02-06 DIAGNOSIS — R26.9 GAIT DIFFICULTY: ICD-10-CM

## 2018-02-06 DIAGNOSIS — M25.552 LEFT HIP PAIN: Primary | ICD-10-CM

## 2018-02-06 PROCEDURE — 97110 THERAPEUTIC EXERCISES: CPT | Performed by: PHYSICAL THERAPIST

## 2018-02-06 PROCEDURE — 97140 MANUAL THERAPY 1/> REGIONS: CPT | Performed by: PHYSICAL THERAPIST

## 2018-02-06 NOTE — PROGRESS NOTES
"Physical Therapy Daily Progress Note  Visits:3    Subjective : Bailee Garza reports: she received the results of her hip x-ray yesterday.  She reports that she has \"bone on bone\" hip arthritis.  She indicated that the doctor wants to provide an injection to the joint but refused, opting for alternatives.      Objective:    See Exercise, Manual, and Modality Logs for complete treatment.     Assessment/Plan:Bailee tolerated manual therapy exercises with no pain or discomfort.  She was educated on continuing with more manual therapy upon next visit.  She reported mild discomfort on her left hip anteriorly while completing activities, requiring verbal and tactile cues for correct positioning. Pt educated and performed new HEP exercise tolerating well.    Progress per Plan of Care       Manual Therapy:    9     mins  89752;  Therapeutic Exercise:    35     mins  84567;     Neuromuscular Sofya:    3    mins  68625;    Therapeutic Activity:     -     mins  54939;     Gait Training:      -     mins  14577;     Ultrasound:     -     mins  09296;    Electrical Stimulation:    -     mins  36498 ( );  Dry Needling     -     mins self-pay    Timed Treatment:   47   mins   Total Treatment:     65   mins    I was present in the PT Department guiding the student by approving, concurring, and confirming the skilled judgement for all services rendered.     Horacio Myers PT  [place] License #[id number]    Physical Therapist  "

## 2018-02-07 ENCOUNTER — TREATMENT (OUTPATIENT)
Dept: PHYSICAL THERAPY | Facility: CLINIC | Age: 78
End: 2018-02-07

## 2018-02-07 DIAGNOSIS — R26.9 GAIT DIFFICULTY: ICD-10-CM

## 2018-02-07 DIAGNOSIS — M25.552 LEFT HIP PAIN: Primary | ICD-10-CM

## 2018-02-07 PROBLEM — M51.37 DDD (DEGENERATIVE DISC DISEASE), LUMBOSACRAL: Status: ACTIVE | Noted: 2018-02-07

## 2018-02-07 PROBLEM — M51.379 DDD (DEGENERATIVE DISC DISEASE), LUMBOSACRAL: Status: ACTIVE | Noted: 2018-02-07

## 2018-02-07 PROCEDURE — 97110 THERAPEUTIC EXERCISES: CPT | Performed by: PHYSICAL THERAPIST

## 2018-02-07 PROCEDURE — 97140 MANUAL THERAPY 1/> REGIONS: CPT | Performed by: PHYSICAL THERAPIST

## 2018-02-07 NOTE — PROGRESS NOTES
Physical Therapy Daily Progress Note  Visits:4    Subjective : Bailee Greg reports: I'm feeling better with my walking.  I think the therapy has helped some.      Objective   See Exercise, Manual, and Modality Logs for complete treatment.     Assessment/Plan:Pt tolerated treatment well and is showing promise with progressive treatment.    Progress per Plan of Care       Manual Therapy:    10     mins  91414;  Therapeutic Exercise:    40     mins  88180;     Neuromuscular Sofya:    -    mins  26471;    Therapeutic Activity:     -     mins  95877;     Gait Training:      -     mins  33452;     Ultrasound:     -     mins  46673;    Electrical Stimulation:    -     mins  92953 ( );  Dry Needling     -     mins self-pay    Timed Treatment:   50   mins   Total Treatment:     63   mins      Horacio Myers PT  [place] License #[id number]    Physical Therapist

## 2018-02-09 ENCOUNTER — TELEPHONE (OUTPATIENT)
Dept: FAMILY MEDICINE CLINIC | Facility: CLINIC | Age: 78
End: 2018-02-09

## 2018-02-09 NOTE — TELEPHONE ENCOUNTER
Pt called stating her Qty in the RX was incorrect; she has been taking the Tramadol this was (Take one tablet po q am, and 2 tablets po q hs as needed for pain). SHe was getting a Qty of 100 now its 60.     Please advise

## 2018-02-12 ENCOUNTER — TELEPHONE (OUTPATIENT)
Dept: FAMILY MEDICINE CLINIC | Facility: CLINIC | Age: 78
End: 2018-02-12

## 2018-02-12 DIAGNOSIS — G89.29 CHRONIC MIDLINE LOW BACK PAIN WITHOUT SCIATICA: ICD-10-CM

## 2018-02-12 DIAGNOSIS — M54.50 CHRONIC MIDLINE LOW BACK PAIN WITHOUT SCIATICA: ICD-10-CM

## 2018-02-12 NOTE — TELEPHONE ENCOUNTER
New prescription printed, call the correction to pharmacy of choice. If she has not filled the prescription then have her send the incorrect prescription back to us. If she has filled it, then make sure she has 120 tablets for the first month and then the correct amount for month 2 and 3. Apologize to her for my mistake in the prescription. Thanks, Dr. Araya

## 2018-02-15 ENCOUNTER — TREATMENT (OUTPATIENT)
Dept: PHYSICAL THERAPY | Facility: CLINIC | Age: 78
End: 2018-02-15

## 2018-02-15 DIAGNOSIS — R26.9 GAIT DIFFICULTY: ICD-10-CM

## 2018-02-15 DIAGNOSIS — M25.552 LEFT HIP PAIN: Primary | ICD-10-CM

## 2018-02-15 PROCEDURE — 97110 THERAPEUTIC EXERCISES: CPT | Performed by: PHYSICAL THERAPIST

## 2018-02-15 NOTE — PROGRESS NOTES
Physical Therapy Daily Progress Note  Visits:5    Subjective : Bailee Garza reports: The hip is doing better but I still feel like the hip wants to give out on me a little bit.  If I don't stretch for a while it can tighten up on me.      Objective : Gait: Less antalgia overall and longer stance time on L LE.  R step length normalizing.    See Exercise, Manual, and Modality Logs for complete treatment.     Assessment/Plan:  Continue to progress program to involve more WB'ing activity.  She did tolerate progression well overall.      Progress per Plan of Care     Manual Therapy:    -     mins  06235;  Therapeutic Exercise:    23     mins  49128;     Neuromuscular Sofya:    6    mins  20526;    Therapeutic Activity:     -     mins  62895;     Gait Training:      -     mins  60496;     Ultrasound:     -     mins  04534;    Electrical Stimulation:    -     mins  41078 ( );  Dry Needling     -     mins self-pay    Timed Treatment:   29   mins   Total Treatment:     60   mins      SARAH Dykes License #[id number]    Physical Therapist

## 2018-02-19 ENCOUNTER — OFFICE VISIT (OUTPATIENT)
Dept: ORTHOPEDIC SURGERY | Facility: CLINIC | Age: 78
End: 2018-02-19

## 2018-02-19 VITALS — HEIGHT: 64 IN | BODY MASS INDEX: 17.24 KG/M2 | TEMPERATURE: 98.7 F | WEIGHT: 101 LBS

## 2018-02-19 DIAGNOSIS — M16.12 PRIMARY OSTEOARTHRITIS OF LEFT HIP: Primary | ICD-10-CM

## 2018-02-19 DIAGNOSIS — S92.245D CLOSED NONDISPLACED FRACTURE OF MEDIAL CUNEIFORM OF LEFT FOOT WITH ROUTINE HEALING, SUBSEQUENT ENCOUNTER: ICD-10-CM

## 2018-02-19 PROBLEM — S92.245A CLOSED NONDISPLACED FRACTURE OF MEDIAL CUNEIFORM OF LEFT FOOT: Status: ACTIVE | Noted: 2018-02-19

## 2018-02-19 PROCEDURE — 73630 X-RAY EXAM OF FOOT: CPT | Performed by: ORTHOPAEDIC SURGERY

## 2018-02-19 PROCEDURE — 99213 OFFICE O/P EST LOW 20 MIN: CPT | Performed by: ORTHOPAEDIC SURGERY

## 2018-02-19 NOTE — PROGRESS NOTES
Patient:  Bailee Garza is a 77 y.o. female    Chief Complaint/ Reason for Visit:    Chief Complaint   Patient presents with   • Left Foot - Follow-up   • Left Hip - Establish Care, Pain       HPI:  The patient presents today for scheduled follow-up on her left midfoot fracture.  She has an MRI confirmed a fracture of the medial cuneiform sustained an injury on New Year's Day.  She says her foot is feeling better, but today, she has a new complaint which is new to this examiner.  She says that due to the gait changes caused by her foot injury, she now is having left hip and groin pain.  It is moderate in intensity, is activity related, and is worse with walking, standing, and particularly going up steps.  She saw her primary care physician, Dr. Araya, who ordered x-rays and told her that she had arthritis in her hip.  She was then sent to physical therapy which she has already initiated for her left hip arthritis, and says that the therapy already seems to be helping her left hip pain a bit.    The pain in left hip and groin is nonradiating.  Is primarily aching.  It's exacerbated by standing and alleviated by rest.  The patient prefers to avoid oral medications for arthritic pain and possible.          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)   • Hearing loss 2008   • History of bone density study 2015   • Hypothyroidism, acquired 2007   • IBS (irritable bowel syndrome) 2013   • Insomnia 2015    unspecified   • Intervertebral disc disorder with myelopathy, cervical region 2010    • Pneumonia    • Pneumonia    • Rhinitis, allergic 11/20/2007   • Screening breast examination 11/20/2007   • Stress 04/27/2015    other acute reactions to stress   • Urticaria 06/29/2015       PSH:    Past Surgical History:   Procedure Laterality Date   • BREAST BIOPSY Right     50+ years ago   • BREAST LUMPECTOMY     • BRONCHOSCOPY N/A 12/21/2017    Procedure: BRONCHOSCOPY;  Surgeon: Mario Alanis MD;  Location: [facility] ENDOSCOPY;  Service:    • HYSTERECTOMY         Social Hx:    Social History     Social History   • Marital status:      Spouse name: N/A   • Number of children: N/A   • Years of education: N/A     Occupational History   • Not on file.     Social History Main Topics   • Smoking status: Never Smoker   • Smokeless tobacco: Never Used   • Alcohol use No   • Drug use: No   • Sexual activity: Defer     Other Topics Concern   • Not on file     Social History Narrative       Family Hx:    Family History   Problem Relation Age of Onset   • Heart disease Mother    • Cancer Father    • Asthma Paternal Grandfather        Meds:    Current Outpatient Prescriptions:   •  calcium polycarbophil (FIBERCON) 625 MG tablet, Take 625 mg by mouth Daily., Disp: , Rfl:   •  metoprolol tartrate (LOPRESSOR) 25 MG tablet, Take 25 mg by mouth Take As Directed. .5 tab daily, Disp: , Rfl:   •  Multiple Vitamins-Minerals (PRESERVISION AREDS PO), Take 1 tablet by mouth Daily., Disp: , Rfl:   •  SYNTHROID 100 MCG tablet, Take 1 tablet by mouth Daily., Disp: 30 tablet, Rfl: 11  •  traMADol-acetaminophen (ULTRACET) 37.5-325 MG per tablet, Take 2 tablets by mouth 2 (Two) Times a Day for 90 days., Disp: 120 tablet, Rfl: 2  •  calcium carbonate (OS-CURTIS) 600 MG tablet, Take 600 mg by mouth Daily., Disp: , Rfl:   •  ipratropium-albuterol (DUO-NEB) 0.5-2.5 mg/mL nebulizer, Take 3 mL by nebulization 4 (Four) Times a Day., Disp: 360 mL, Rfl: 1  •  predniSONE (DELTASONE) 20 MG tablet, 3 tablets daily for 3 days, then 2 tablets daily  "for 4 days then 1 tablet daily for 3 days., Disp: 20 tablet, Rfl: 0    Allergies:    Allergies   Allergen Reactions   • Ketek [Telithromycin]    • Nsaids    • Sulfa Antibiotics Hives       ROS:  Review of Systems    Vitals:    02/19/18 0922   Temp: 98.7 °F (37.1 °C)   TempSrc: Temporal Artery    Weight: 45.8 kg (101 lb)   Height: 162.6 cm (64\")     Body mass index is 17.34 kg/(m^2).    Physical Exam    The patient is awake, alert, and oriented ×3.  The patient is in no acute distress.  Breathing is regular and unlabored with a respiratory rate of 14/m.  Extraocular movements and pupillary responses are symmetrically intact. Sclerae are anicteric.   Hearing is within normal limits.  Speech is within normal limits.  There is no jugular venous distention.    The patient does have a limp antalgic from the left.  Leg lengths appear grossly equal.  She has no obvious atrophy or asymmetry of the musculature lower extremities.    Left hip: The patient has a positive Stinchfield test.  Logroll test is positive for left groin pain.  The patient has pain on flexion, adduction, and internal rotation.  She has no tenderness laterally over the greater trochanter.  She has no popliteal or inguinal lymphadenopathy in the left lower extremity.  She has no skin changes around left hip.    Left foot: Patient's left foot has no obvious swelling.  She has minimal tenderness in the area of the medial cuneiform.  The midfoot is stable.  Pulses are intact and regular with satisfactory amplitude.  Sensory exams intact light touch.  Left calf is soft and nontender with no venous cord.        Radiology:X-rays: 3 views of the patient's left foot were ordered and reviewed today to assess her history of persistent left midfoot pain and review for evidence of fracture healing area comparison images were not immediately available in the form of plain films.  Today's images reveal no obvious acute malalignments.  I do think there is subtle bone " density change in the medial cuneiform that suggest fracture healing, though this fracture was radiographically occult having had to have been confirmed by MRI.    X-ray report: The patient did have hip and pelvis x-rays at an outside facility.  I could not see the images, but I did review the x-ray report and it did suggest the patient has joint line narrowing of the left hip consistent with osteoarthritis.  I have asked the patient to obtain a copy of the images for my review for her next visit.        Assessment:     Diagnosis Plan   1. Primary osteoarthritis of left hip     2. Closed nondisplaced fracture of medial cuneiform of left foot with routine healing, subsequent encounter  XR Foot 3+ View Left           Plan:  I discussed everything with the patient length.  I explained that I felt physical therapy was the right thing to do for her left hip arthritis.  Hopefully they'll work with her foot as well.  She is going to bring the x-rays to the next visit so that I can review them myself.  Activity recommendations and precautions were discussed and she voiced understanding.  I will see her back in about 8 weeks to see how she is doing from the standpoint of both her left hip and her left foot.        Orders Placed This Encounter   Procedures   • XR Foot 3+ View Left     Order Specific Question:   Reason for Exam:     Answer:   foot

## 2018-02-20 ENCOUNTER — TREATMENT (OUTPATIENT)
Dept: PHYSICAL THERAPY | Facility: CLINIC | Age: 78
End: 2018-02-20

## 2018-02-20 DIAGNOSIS — R26.9 GAIT DIFFICULTY: ICD-10-CM

## 2018-02-20 DIAGNOSIS — M25.552 LEFT HIP PAIN: Primary | ICD-10-CM

## 2018-02-20 PROCEDURE — 97140 MANUAL THERAPY 1/> REGIONS: CPT | Performed by: PHYSICAL THERAPIST

## 2018-02-20 PROCEDURE — 97110 THERAPEUTIC EXERCISES: CPT | Performed by: PHYSICAL THERAPIST

## 2018-02-20 NOTE — PROGRESS NOTES
Physical Therapy Daily Progress Note  Visits:6    Subjective : Bailee Garza reports: Doing okay.  The hip still feels tight through the front but seems a little better.  I'm still undecided about getting an injection to the hip joint.    I did get back to work last night and worked 8 hours.  I had the most discomfort after sitting then standing to walk after.  I didn't take any medicine until it was time to leave.  I am back to working 5x/week.    I saw my doctor and he wants me to keep coming to PT and did not want to inject the hip until he took some images.      Objective :  See Exercise, Manual, and Modality Logs for complete treatment.     Assessment/Plan:Pt tolerated treatment well today and we will continue to focus on hip mobility improvements.  We have been able to progress her strengthening program today.      Progress per Plan of Care     Manual Therapy:    8     mins  54106;  Therapeutic Exercise:    25     mins  26626;     Neuromuscular Sofya:    8    mins  23946;    Therapeutic Activity:     -     mins  22802;     Gait Training:      -     mins  67187;     Ultrasound:     -     mins  99753;    Electrical Stimulation:    -     mins  19972 ( );  Dry Needling     -     mins self-pay    Timed Treatment:   41   mins   Total Treatment:     58   mins    I was present in the PT Department guiding the student by approving, concurring, and confirming the skilled judgement for all services rendered.     SARAH Dykes License #[id number]    Physical Therapist

## 2018-02-23 ENCOUNTER — TREATMENT (OUTPATIENT)
Dept: PHYSICAL THERAPY | Facility: CLINIC | Age: 78
End: 2018-02-23

## 2018-02-23 DIAGNOSIS — R26.9 GAIT DIFFICULTY: ICD-10-CM

## 2018-02-23 DIAGNOSIS — M25.552 LEFT HIP PAIN: Primary | ICD-10-CM

## 2018-02-23 PROCEDURE — 97140 MANUAL THERAPY 1/> REGIONS: CPT | Performed by: PHYSICAL THERAPIST

## 2018-02-23 PROCEDURE — 97110 THERAPEUTIC EXERCISES: CPT | Performed by: PHYSICAL THERAPIST

## 2018-02-23 NOTE — PROGRESS NOTES
Physical Therapy Daily Progress Note  Visits:7    Subjective : Bailee Taylorcharly reports: The hip is bothering me a little bit more today.  I realized that I cannot sit too long at work which seems to lock up the hip some.  I am able to stand more at work because the desk can move up and down.    I did try some water exercise/activity.  That felt really good on the hip joint.      Objective : more antalgic gait pattern noticed upon entering PT gym.  Shortened stance time on L, and decreased stride on the R.    See Exercise, Manual, and Modality Logs for complete treatment.     Assessment/Plan:Pt continues to respond well to PT intervention but having difficulty with prolonged hours at work.  We did provide stretching for the hip to be performed in standing to help tolerate work better.  I continue to believe that an injection to the hip will help with some joint pain alleviation but patient is still reluctant to attempt.      Progress per Plan of Care and Progress strengthening /stabilization /functional activity     Manual Therapy:    15     mins  33785;  Therapeutic Exercise:    10     mins  32377;     Neuromuscular Sofya:    -    mins  76457;    Therapeutic Activity:     -     mins  60824;     Gait Training:      -     mins  89896;     Ultrasound:     -     mins  67668;    Electrical Stimulation:    -     mins  78621 ( );  Dry Needling     -     mins self-pay    Timed Treatment:   25   mins   Total Treatment:     38   mins      SARAH Dykes License #[id number]    Physical Therapist

## 2018-02-26 ENCOUNTER — TREATMENT (OUTPATIENT)
Dept: PHYSICAL THERAPY | Facility: CLINIC | Age: 78
End: 2018-02-26

## 2018-02-26 DIAGNOSIS — R26.9 GAIT DIFFICULTY: ICD-10-CM

## 2018-02-26 DIAGNOSIS — M25.552 LEFT HIP PAIN: Primary | ICD-10-CM

## 2018-02-26 LAB — FUNGUS WND CULT: NORMAL

## 2018-02-26 PROCEDURE — 97110 THERAPEUTIC EXERCISES: CPT | Performed by: PHYSICAL THERAPIST

## 2018-02-26 PROCEDURE — 97140 MANUAL THERAPY 1/> REGIONS: CPT | Performed by: PHYSICAL THERAPIST

## 2018-02-26 NOTE — PROGRESS NOTES
Physical Therapy Daily Progress Note  Visits:8    Subjective : Bailee Greg reports: I had a tough time yesterday.  I feel like the leg wanted to buckle at least5-6 times.  I didn't have to work this weekend but still had a little trouble with the leg.  I am still not convinced that my trouble is not coming from my hip muscles Vs the joint.       Objective:  L step length 2x the distance of R step length.    See Exercise, Manual, and Modality Logs for complete treatment.     Assessment/Plan:Pt tolerated treatment well. We did emphasize gait training today to even step lengths to reduce hip irritation.  Pt reported less pain and improved walking capacity with corrected gait mechanics.        Progress per Plan of Care         Manual Therapy:    18     mins  09701;  Therapeutic Exercise:    15     mins  04254;     Neuromuscular Sofya:    3    mins  87518;    Therapeutic Activity:     -     mins  26649;     Gait Training:      3     mins  28992;     Ultrasound:     -     mins  92346;    Electrical Stimulation:    -     mins  24089 ( );  Dry Needling     -     mins self-pay    Timed Treatment:   39   mins   Total Treatment:     65   mins      SARAH Dykes License #[id number]    Physical Therapist

## 2018-03-01 ENCOUNTER — TREATMENT (OUTPATIENT)
Dept: PHYSICAL THERAPY | Facility: CLINIC | Age: 78
End: 2018-03-01

## 2018-03-01 DIAGNOSIS — R26.9 GAIT DIFFICULTY: ICD-10-CM

## 2018-03-01 DIAGNOSIS — M25.552 LEFT HIP PAIN: Primary | ICD-10-CM

## 2018-03-01 PROCEDURE — 97110 THERAPEUTIC EXERCISES: CPT | Performed by: PHYSICAL THERAPIST

## 2018-03-01 NOTE — PROGRESS NOTES
"Physical Therapy Daily Progress Note  Visits:9     Subjective : Bailee Greg reports: The last time I was here I think the deeper massage really helped the hip for the following day or so.  I still feel like the hip wants to go out on me from time to time.  I still have to use arm rails for stairs and I don't trust the L leg going up stairs.      Objective:    See Exercise, Manual, and Modality Logs for complete treatment.     Assessment/Plan:Pt responded well to treatment today and we did initiate ultrasound to the L rectus mm origin and TFL mm.  She responded nicely and we were also able to start modified stair ambulation with 4\" steps leading with the L.       Progress per Plan of Care     Manual Therapy:    -     mins  54347;  Therapeutic Exercise:    25     mins  90731;     Neuromuscular Sofya:    -    mins  97310;    Therapeutic Activity:     -     mins  17390;     Gait Trainin     mins  46841;     Ultrasound:     8     mins  68097;    Electrical Stimulation:    -     mins  24022 ( );  Dry Needling     -     mins self-pay    Timed Treatment:   38   mins   Total Treatment:     65   mins        Horacio Myers PT  [place] License #[id number]    Physical Therapist  "

## 2018-03-05 ENCOUNTER — TREATMENT (OUTPATIENT)
Dept: PHYSICAL THERAPY | Facility: CLINIC | Age: 78
End: 2018-03-05

## 2018-03-05 ENCOUNTER — TELEPHONE (OUTPATIENT)
Dept: FAMILY MEDICINE CLINIC | Facility: CLINIC | Age: 78
End: 2018-03-05

## 2018-03-05 DIAGNOSIS — R26.9 GAIT DIFFICULTY: ICD-10-CM

## 2018-03-05 DIAGNOSIS — M25.552 LEFT HIP PAIN: Primary | ICD-10-CM

## 2018-03-05 PROCEDURE — 97110 THERAPEUTIC EXERCISES: CPT | Performed by: PHYSICAL THERAPIST

## 2018-03-05 PROCEDURE — 97140 MANUAL THERAPY 1/> REGIONS: CPT | Performed by: PHYSICAL THERAPIST

## 2018-03-05 NOTE — TELEPHONE ENCOUNTER
----- Message from Lilia Holley sent at 3/5/2018  9:49 AM EST -----  Contact: [phone]  MRS. ROMERO CAME BY REQUESTING IF SHE COULD GET A PERMANENT HANDICAP STICKER FROM [facility]. SHE SAID SHE HAS ALREADY SPOKEN TO ERICA ABOUT A PREVIOUS STICKER THAT SHE HAS. JUST PASSING THE MESSAGE ALONG.      THANKS    K

## 2018-03-05 NOTE — PROGRESS NOTES
Physical Therapy Daily Progress Note  Visits:10    Subjective : Bailee Greg reports: I still go to work and I am tying to keep up with all of my exercises but its difficult to tolerate all the standing at work.  I don't know if the ultrasound was helpful or not.  I would rather not continue with that.  The hands on stretching seems to help the most.      Objective   Passive Range of Motion   Left Hip   Flexion: WFL, 125  Extension: 18 in supine, with pain at end range.  (R: 35)  Abduction: 20 degrees with pain at end range.  (R hip 40 degrees)   Hip IR: at 90/90 position: 25 Degrees (R: 40)   Hip ER: at 90/90 position: 60 degrees (R: 60)  Strength/Myotome Testing      Left Hip   Planes of Motion   Flexion: 3+ to 4-  Extension: 4-  Abduction: 3+  Adduction: 5  External rotation: 4-  Internal rotation: 4-     Right Hip   Planes of Motion   Flexion: 4+  External rotation: 4+  Internal rotation: 4+     Left Knee   Flexion: 4+  Extension: 4+     Right Knee   Flexion: 5  Extension: 5     Left Ankle/Foot   Dorsiflexion: 5  Plantar flexion: 4+  Inversion: 4  Eversion: 4+     Right Ankle/Foot   Dorsiflexion: 5  Plantar flexion: 5  Inversion: 5     Ambulation   Weight-Bearing Status   Weight-Bearing Status (Left): weight-bearing as tolerated   Weight-Bearing Status (Right): weight-bearing as tolerated    Assistive device used: none     Ambulation: Level Surfaces   Ambulation without assistive device: independent    Additional Level Surfaces Ambulation Details  Pt does ambulate with bilateral metatarsal bars on shoes.        Ambulation: Stairs   Ascend stairs: independent  Pattern: reciprocal  Railings: one rail  Descend stairs: independent  Pattern: reciprocal, noted slight antalgia but able.  Railings: one rail  Curbs: independent     Observational Gait   Gait: antalgic   Increased right stance time slightly. Average walking speed, evening stride length, left stance time, left swing time and right step length.   Left foot  contact pattern: heel to toe  Right foot contact pattern: heel to toe  Base of support: normal     Functional Assessment      Single Leg Stance   Left: 18 (Moderate instability noted) seconds  Right: 13 seconds     Comments      At eval: TU.16 sec.  11.58 sec.   3-5-18:  TU.40 sec. 11.76 Sec.  (Quality: step lengths nearly equal, min. To no noticeable limping, and limted trunk sway.      30 sec sit to stand: 10x    Functional squat: sit to stand x 20 without UE.    See Exercise, Manual, and Modality Logs for complete treatment.     Assessment & Plan     Assessment  Impairments: abnormal gait, abnormal or restricted ROM, activity intolerance, impaired physical strength, pain with function and weight-bearing intolerance  Assessment details: Brigitte has done well with PT thus far.  She demonstrates good understanding of HEP and the direction of her program but continues to have functional limitations and pain.  She believes and hopes all of her issues are originating for hip musculature but due to significant joint restriction the hip joint appears to be more apparent as source of pain.  She continues to benefit from skilled PT intervention including joint mobilization and soft tissue mobilizations.  She does have a follow up with MD and I have discussed possible medical interventions that may help promote improved function but she is still reluctant to explore.    Prognosis: fair  Functional Limitations: lifting, walking, uncomfortable because of pain and standing  Plan  Therapy options: will not be seen for skilled physical therapy services      Progress per Plan of Care and Progress strengthening /stabilization /functional activity       Manual Therapy:    12     mins  80607;  Therapeutic Exercise:    15     mins  59493;     Neuromuscular Sofya:    -    mins  36763;    Therapeutic Activity:     -     mins  69486;     Gait Training:      -     mins  53562;     Ultrasound:     -     mins  81469;    Electrical  Stimulation:    -     mins  01127 ( );  Dry Needling     -     mins self-pay    Timed Treatment:   27   mins   Total Treatment:     56   mins    EMIGDIO Myers, PT  [place] License #[id number]    Physical Therapist

## 2018-03-08 ENCOUNTER — TREATMENT (OUTPATIENT)
Dept: PHYSICAL THERAPY | Facility: CLINIC | Age: 78
End: 2018-03-08

## 2018-03-08 DIAGNOSIS — M25.552 LEFT HIP PAIN: Primary | ICD-10-CM

## 2018-03-08 DIAGNOSIS — R26.9 GAIT DIFFICULTY: ICD-10-CM

## 2018-03-08 PROCEDURE — 97140 MANUAL THERAPY 1/> REGIONS: CPT | Performed by: PHYSICAL THERAPIST

## 2018-03-08 PROCEDURE — 97110 THERAPEUTIC EXERCISES: CPT | Performed by: PHYSICAL THERAPIST

## 2018-03-08 NOTE — PROGRESS NOTES
Physical Therapy Daily Progress Note  Visits:11    Subjective : Bailee Garza reports: I think I did something to my foot (L) at work yesterday.  I have noticed some swelling in the foot as well.  I think I might have rolled it or something.  I still have hip pain and not sure how much better its going to get.      Objective:  Gait antalgia increased today with increased step length on L, and step to pattern on R with shortened step length.    See Exercise, Manual, and Modality Logs for complete treatment.     Assessment/Plan:Visit modified this date due to recent irritation of the L foot.  Ice applied with elevation to help reduce discomfort.  No WB'ing activity provided today and educated on shoe lacing technique to take pressure off the mid-foot.  She continues to be encouraged to see MD about possibility of pain relieving injection to the hip since she is not interested in taking time off of her full time job to rest the leg.  I think she is seriously considering the procedure now and reports that she will call doctors office today. Pt was gait trained on cane to help off-load L foot and hip in gait.  It also helped reduce gait abnormalities.  She was informed that having a can could help to have one as needed.      Progress per Plan of Care       Manual Therapy:    10     mins  44927;  Therapeutic Exercise:    18     mins  59256;     Neuromuscular Sofya:    -    mins  61166;    Therapeutic Activity:     -     mins  92713;     Gait Trainin     mins  13445;     Ultrasound:     -     mins  20517;    Electrical Stimulation:    -     mins  92775 ( );  Dry Needling     -     mins self-pay    Timed Treatment:   33   mins   Total Treatment:     45   mins        SARAH Dykes License #[id number]    Physical Therapist

## 2018-03-09 ENCOUNTER — CLINICAL SUPPORT (OUTPATIENT)
Dept: ORTHOPEDIC SURGERY | Facility: CLINIC | Age: 78
End: 2018-03-09

## 2018-03-09 VITALS — HEIGHT: 64 IN | TEMPERATURE: 97.3 F | WEIGHT: 103.2 LBS | BODY MASS INDEX: 17.62 KG/M2

## 2018-03-09 DIAGNOSIS — M16.12 PRIMARY OSTEOARTHRITIS OF LEFT HIP: Primary | ICD-10-CM

## 2018-03-09 PROCEDURE — 99213 OFFICE O/P EST LOW 20 MIN: CPT | Performed by: ORTHOPAEDIC SURGERY

## 2018-03-09 NOTE — PROGRESS NOTES
Patient:  Bailee Garza is a 78 y.o. female    Chief Complaint/ Reason for Visit:    Chief Complaint   Patient presents with   • Left Hip - Follow-up, Pain       HPI:  Patient returns today and wants to talk about options for the osteoarthritis in her left hip.  She says that she has been considering her prior discussions and has been thinking about an injection.  She still has moderate occasionally severe pain in left groin area.  It's exacerbated by walking and standing and by rotation.  It's alleviated by rest.  It is nonradiating.  She does not have any acute weakness.  She has had no trouble with bowel or bladder control.      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)   • Hearing loss 2008   • History of bone density study 2015   • Hypothyroidism, acquired 2007   • IBS (irritable bowel syndrome) 2013   • Insomnia 2015    unspecified   • Intervertebral disc disorder with myelopathy, cervical region 2010   • Pneumonia    • Pneumonia    • Rhinitis, allergic 2007   • Screening breast examination 2007   • Stress 2015    other acute reactions to stress   • Urticaria 2015       PSH:    Past Surgical History:   Procedure Laterality Date   • BREAST BIOPSY Right     50+ years ago   • BREAST LUMPECTOMY     • BRONCHOSCOPY N/A 2017    Procedure: BRONCHOSCOPY;  Surgeon: Mario Alanis MD;  Location: [facility] ENDOSCOPY;  Service:    • HYSTERECTOMY         Social Hx:    Social History     Social History   • Marital status:      Spouse name: N/A   • Number  "of children: N/A   • Years of education: N/A     Occupational History   • Not on file.     Social History Main Topics   • Smoking status: Never Smoker   • Smokeless tobacco: Never Used   • Alcohol use No   • Drug use: No   • Sexual activity: Defer     Other Topics Concern   • Not on file     Social History Narrative       Family Hx:    Family History   Problem Relation Age of Onset   • Heart disease Mother    • Cancer Father    • Asthma Paternal Grandfather        Meds:    Current Outpatient Prescriptions:   •  calcium polycarbophil (FIBERCON) 625 MG tablet, Take 625 mg by mouth Daily., Disp: , Rfl:   •  ipratropium-albuterol (DUO-NEB) 0.5-2.5 mg/mL nebulizer, Take 3 mL by nebulization 4 (Four) Times a Day., Disp: 360 mL, Rfl: 1  •  metoprolol tartrate (LOPRESSOR) 25 MG tablet, Take 25 mg by mouth Take As Directed. .5 tab daily, Disp: , Rfl:   •  Multiple Vitamins-Minerals (PRESERVISION AREDS PO), Take 1 tablet by mouth Daily., Disp: , Rfl:   •  SYNTHROID 100 MCG tablet, Take 1 tablet by mouth Daily., Disp: 30 tablet, Rfl: 11  •  traMADol-acetaminophen (ULTRACET) 37.5-325 MG per tablet, Take 2 tablets by mouth 2 (Two) Times a Day for 90 days., Disp: 120 tablet, Rfl: 2    Allergies:    Allergies   Allergen Reactions   • Ketek [Telithromycin]    • Nsaids    • Sulfa Antibiotics Hives       ROS:  Review of Systems   Musculoskeletal: Positive for arthralgias and gait problem.       Vitals:    03/09/18 0833   Temp: 97.3 °F (36.3 °C)   TempSrc: Temporal Artery    Weight: 46.8 kg (103 lb 3.2 oz)   Height: 162.6 cm (64\")     Body mass index is 17.71 kg/(m^2).    Physical Exam    The patient is awake, alert, and oriented ×3.  The patient is in no acute distress.  Breathing is regular and unlabored with a respiratory rate of 12/m.  Extraocular movements and pupillary responses are symmetrically intact. Sclerae are anicteric.   Hearing is within normal limits.  Speech is within normal limits.  There is no jugular venous " distention.    She does have a mild limp antalgic from left.  However, as previously advised, she is using a cane in her right hand.  That seems to help her gait.  She still has pain on flexion and internal and external rotation left hip.  Her calves are soft and nontender neurovascular exam is intact and stable both lower extremities was symmetrically present pulses and a current regular heart rate of about 72 beats are minute.              Assessment:     Diagnosis Plan   1. Primary osteoarthritis of left hip  [place] Guide For Pain Meds Inj Major Joint           Plan:  I discussed the options with the patient length.  She's been continue her cane and continue her home physical therapy.  I explained that the injection was not something that we would do in the office but Romero she would go to the radiology suite in the hospital and they would do this.  She was comfortable with that.  I will make these arrangements.  I will see her back as needed.  She understands that the injection could be repeated but not sooner than 3 months.      Orders Placed This Encounter   Procedures   • [place] Guide For Pain Meds Inj Major Joint     Standing Status:   Future     Standing Expiration Date:   3/9/2019     Order Specific Question:   Laterality?     Answer:   Left     Order Specific Question:   Which Joint?     Answer:   Hip     Order Specific Question:   Reason for Exam:     Answer:   Therapeutic steroid injection for moderate to advanced osteoarthritis of the left hip.  Thank you

## 2018-03-14 ENCOUNTER — HOSPITAL ENCOUNTER (OUTPATIENT)
Dept: GENERAL RADIOLOGY | Facility: HOSPITAL | Age: 78
Discharge: HOME | End: 2018-03-14
Attending: ORTHOPAEDIC SURGERY | Admitting: ORTHOPAEDIC SURGERY

## 2018-03-14 DIAGNOSIS — M16.12 PRIMARY OSTEOARTHRITIS OF LEFT HIP: ICD-10-CM

## 2018-03-14 PROCEDURE — 25010000002 METHYLPREDNISOLONE PER 125 MG: Performed by: RADIOLOGY

## 2018-03-14 PROCEDURE — 0 IOPAMIDOL 61 % SOLUTION: Performed by: RADIOLOGY

## 2018-03-14 PROCEDURE — 77002 NEEDLE LOCALIZATION BY XRAY: CPT

## 2018-03-14 RX ORDER — BUPIVACAINE HYDROCHLORIDE 2.5 MG/ML
10 INJECTION, SOLUTION EPIDURAL; INFILTRATION; INTRACAUDAL ONCE
Status: COMPLETED | OUTPATIENT
Start: 2018-03-14 | End: 2018-03-14

## 2018-03-14 RX ORDER — METHYLPREDNISOLONE SODIUM SUCCINATE 125 MG/2ML
80 INJECTION, POWDER, LYOPHILIZED, FOR SOLUTION INTRAMUSCULAR; INTRAVENOUS
Status: COMPLETED | OUTPATIENT
Start: 2018-03-14 | End: 2018-03-14

## 2018-03-14 RX ORDER — LIDOCAINE HYDROCHLORIDE 10 MG/ML
10 INJECTION, SOLUTION INFILTRATION; PERINEURAL ONCE
Status: COMPLETED | OUTPATIENT
Start: 2018-03-14 | End: 2018-03-14

## 2018-03-14 RX ADMIN — BUPIVACAINE HYDROCHLORIDE 5 ML: 2.5 INJECTION, SOLUTION EPIDURAL; INFILTRATION; INTRACAUDAL; PERINEURAL at 11:20

## 2018-03-14 RX ADMIN — METHYLPREDNISOLONE SODIUM SUCCINATE 80 MG: 125 INJECTION, POWDER, LYOPHILIZED, FOR SOLUTION INTRAMUSCULAR; INTRAVENOUS at 11:20

## 2018-03-14 RX ADMIN — IOPAMIDOL 1 ML: 612 INJECTION, SOLUTION INTRAVENOUS at 11:20

## 2018-03-14 RX ADMIN — LIDOCAINE HYDROCHLORIDE 2 ML: 10 INJECTION, SOLUTION INFILTRATION; PERINEURAL at 11:20

## 2018-03-15 ENCOUNTER — TREATMENT (OUTPATIENT)
Dept: PHYSICAL THERAPY | Facility: CLINIC | Age: 78
End: 2018-03-15

## 2018-03-15 DIAGNOSIS — M25.552 LEFT HIP PAIN: Primary | ICD-10-CM

## 2018-03-15 DIAGNOSIS — R26.9 GAIT DIFFICULTY: ICD-10-CM

## 2018-03-15 PROCEDURE — 97110 THERAPEUTIC EXERCISES: CPT | Performed by: PHYSICAL THERAPIST

## 2018-03-15 NOTE — PROGRESS NOTES
Physical Therapy Daily Progress Note  Visits:12    Subjective : Bailee Greg reports: I finally agreed to have the cortisone injection into the L hip.  I got over my fear and had it done.  It was not nearly as bad as I thought it would be and has helped tremendously.  I have been able to do much more for myself, walking better and having less issues.  The doctor did say that if the shot didn't help for a long time and pain returns just as bad, I should consider having it replaced.  He does want me to continue with PT though.      Objective   See Exercise, Manual, and Modality Logs for complete treatment.     Assessment/Plan:Pt did present to PT with less observable antalgia with gait and more even step lengths without verbal cuing.  She tolerated her stretching and mild WBing activity with less discomfort.  We will hope to progress her program to provide more stabilization and mobility activity, now that her pain has been reduced with injection.  She has been educated to continue to protect the hip and not over-exert herself since she is feeling better.      Progress per Plan of Care and Progress strengthening /stabilization /functional activity       Manual Therapy:    -     mins  06612;  Therapeutic Exercise:    25     mins  21590;     Neuromuscular Sofya:    4    mins  36668;    Therapeutic Activity:     -     mins  76302;     Gait Trainin     mins  08914;     Ultrasound:     -     mins  48303;    Electrical Stimulation:    -     mins  83485 ( );  Dry Needling     -     mins self-pay    Timed Treatment:   29   mins   Total Treatment:     50   mins      SARAH Dykes License #[id number]    Physical Therapist

## 2018-03-19 ENCOUNTER — TREATMENT (OUTPATIENT)
Dept: PHYSICAL THERAPY | Facility: CLINIC | Age: 78
End: 2018-03-19

## 2018-03-19 DIAGNOSIS — M25.552 LEFT HIP PAIN: Primary | ICD-10-CM

## 2018-03-19 DIAGNOSIS — R26.9 GAIT DIFFICULTY: ICD-10-CM

## 2018-03-19 PROCEDURE — 97112 NEUROMUSCULAR REEDUCATION: CPT | Performed by: PHYSICAL THERAPIST

## 2018-03-19 PROCEDURE — 97110 THERAPEUTIC EXERCISES: CPT | Performed by: PHYSICAL THERAPIST

## 2018-03-19 NOTE — PROGRESS NOTES
Physical Therapy Daily Progress Note  Visits:13    Subjective : Bailee Taylorcharly reports: The hip still feels much better since having the shot but I still feel like the hip wants to give out from time time.  I don't really have much pain.      Objective   See Exercise, Manual, and Modality Logs for complete treatment.     Assessment/Plan:Pt treatment was progressed to include more WBing and strength training activity with CKC activity being the focus.      Progress per Plan of Care and Progress strengthening /stabilization /functional activity         Manual Therapy:    4     mins  20295;  Therapeutic Exercise:    30     mins  65181;     Neuromuscular Sofya:    5    mins  56911;    Therapeutic Activity:     -     mins  24951;     Gait Trainin     mins  81295;     Ultrasound:     -     mins  99293;    Electrical Stimulation:    -     mins  27158 ( );  Dry Needling     -     mins self-pay    Timed Treatment:   44   mins   Total Treatment:     62   mins      SARAH Dykes License #[id number]    Physical Therapist

## 2018-03-26 ENCOUNTER — OFFICE VISIT (OUTPATIENT)
Dept: FAMILY MEDICINE CLINIC | Facility: CLINIC | Age: 78
End: 2018-03-26

## 2018-03-26 ENCOUNTER — TREATMENT (OUTPATIENT)
Dept: PHYSICAL THERAPY | Facility: CLINIC | Age: 78
End: 2018-03-26

## 2018-03-26 VITALS
WEIGHT: 103 LBS | BODY MASS INDEX: 17.58 KG/M2 | SYSTOLIC BLOOD PRESSURE: 120 MMHG | TEMPERATURE: 97.8 F | HEART RATE: 83 BPM | RESPIRATION RATE: 18 BRPM | DIASTOLIC BLOOD PRESSURE: 65 MMHG | OXYGEN SATURATION: 96 % | HEIGHT: 64 IN

## 2018-03-26 DIAGNOSIS — J06.9 ACUTE URI: Primary | ICD-10-CM

## 2018-03-26 DIAGNOSIS — R26.9 GAIT DIFFICULTY: ICD-10-CM

## 2018-03-26 DIAGNOSIS — M25.552 LEFT HIP PAIN: Primary | ICD-10-CM

## 2018-03-26 PROCEDURE — 97140 MANUAL THERAPY 1/> REGIONS: CPT | Performed by: PHYSICAL THERAPIST

## 2018-03-26 PROCEDURE — 99213 OFFICE O/P EST LOW 20 MIN: CPT | Performed by: NURSE PRACTITIONER

## 2018-03-26 PROCEDURE — 97110 THERAPEUTIC EXERCISES: CPT | Performed by: PHYSICAL THERAPIST

## 2018-03-26 NOTE — PROGRESS NOTES
Physical Therapy Daily Progress Note  Visits:14    Subjective : Bailee Greg reports: Sorry I missed last weeks visit because of the snow.  I'm still doing pretty well overall but the hip still feels a little unsteady.     Objective: Gait continues to to be mildly limited but the step length is approaching equal on both sides.   See Exercise, Manual, and Modality Logs for complete treatment.     Assessment/Plan:Pt tolerated treatment progression well and focus continues to be placed on strength and stability training of the hip.      Progress per Plan of Care and Progress strengthening /stabilization /functional activity       Manual Therapy:    8     mins  53649;  Therapeutic Exercise:    45     mins  26854;     Neuromuscular Sofya:    -    mins  49896;    Therapeutic Activity:     -     mins  00347;     Gait Training:      -     mins  26024;     Ultrasound:     -     mins  80985;    Electrical Stimulation:    -     mins  03388 ( );  Dry Needling     -     mins self-pay    Timed Treatment:   53   mins   Total Treatment:     65   mins       SARAH Dykes License #[id number]    Physical Therapist

## 2018-03-26 NOTE — PROGRESS NOTES
Subjective   Bailee Garza is a 78 y.o. female.     History of Present Illness   Bailee Garza 78 y.o. female who presents for evaluation of cough. Symptoms include sore throat and productive cough.  Onset of symptoms was a few days ago, unchanged since that time. Patient denies shortness of breath, wheezing.   Evaluation to date: none Treatment to date:  none.     The following portions of the patient's history were reviewed and updated as appropriate: allergies, current medications, past family history, past medical history, past social history, past surgical history and problem list.    Review of Systems   Constitutional: Positive for fatigue. Negative for chills and fever.   HENT: Positive for postnasal drip, rhinorrhea and sore throat. Negative for congestion, sinus pain and sinus pressure.    Respiratory: Positive for cough. Negative for chest tightness, shortness of breath and wheezing.        Objective   Physical Exam   Constitutional: She is oriented to person, place, and time. She appears well-developed and well-nourished.   HENT:   Right Ear: Tympanic membrane, external ear and ear canal normal.   Left Ear: Tympanic membrane, external ear and ear canal normal.   Nose: Mucosal edema and rhinorrhea present. Right sinus exhibits no maxillary sinus tenderness and no frontal sinus tenderness. Left sinus exhibits no maxillary sinus tenderness and no frontal sinus tenderness.   Mouth/Throat: Uvula is midline. Posterior oropharyngeal erythema present.   Consistent with post nasal drainage   Cardiovascular: Normal rate and regular rhythm.    Pulmonary/Chest: Effort normal and breath sounds normal.   Neurological: She is alert and oriented to person, place, and time.   Skin: Skin is warm.   Psychiatric: She has a normal mood and affect. Judgment normal.   Nursing note and vitals reviewed.      Assessment/Plan   Bailee was seen today for cough.    Diagnoses and all orders for this visit:    Acute URI  -      Chlorcyclizine-Pseudoephed (STAHIST AD) 25-60 MG tablet; Take 1 tablet by mouth Every 8 (Eight) Hours As Needed (0).

## 2018-03-29 ENCOUNTER — TREATMENT (OUTPATIENT)
Dept: PHYSICAL THERAPY | Facility: CLINIC | Age: 78
End: 2018-03-29

## 2018-03-29 DIAGNOSIS — R26.9 GAIT DIFFICULTY: ICD-10-CM

## 2018-03-29 DIAGNOSIS — M25.552 LEFT HIP PAIN: Primary | ICD-10-CM

## 2018-03-29 PROCEDURE — 97110 THERAPEUTIC EXERCISES: CPT | Performed by: PHYSICAL THERAPIST

## 2018-03-29 PROCEDURE — 97140 MANUAL THERAPY 1/> REGIONS: CPT | Performed by: PHYSICAL THERAPIST

## 2018-03-29 NOTE — PROGRESS NOTES
Physical Therapy Daily Progress Note  Visits:15    Subjective : Bailee Garza reports: Doing okay.  Nothing new to report.  I do feel stronger.      Objective   See Exercise, Manual, and Modality Logs for complete treatment.     Assessment/Plan:Pt tolerated treatment very well and she is demonstrating more strength as noted in her ability to perform strengthening activity and increasing resistance.     Progress per Plan of Care and Progress strengthening /stabilization /functional activity       Manual Therapy:    8     mins  82873;  Therapeutic Exercise:    40     mins  25741;     Neuromuscular Sofya:    4    mins  78745;    Therapeutic Activity:     -     mins  80573;     Gait Trainin     mins  32267;     Ultrasound:     -     mins  34268;    Electrical Stimulation:    -     mins  34673 ( );  Dry Needling     -     mins self-pay    Timed Treatment:   56   mins   Total Treatment:     64   mins      SARAH Dykes License #[id number]    Physical Therapist

## 2018-04-16 ENCOUNTER — OFFICE VISIT (OUTPATIENT)
Dept: ORTHOPEDIC SURGERY | Facility: CLINIC | Age: 78
End: 2018-04-16

## 2018-04-16 VITALS — TEMPERATURE: 98.2 F | WEIGHT: 101.6 LBS | HEIGHT: 63 IN | BODY MASS INDEX: 18 KG/M2

## 2018-04-16 DIAGNOSIS — S92.245D CLOSED NONDISPLACED FRACTURE OF MEDIAL CUNEIFORM OF LEFT FOOT WITH ROUTINE HEALING, SUBSEQUENT ENCOUNTER: ICD-10-CM

## 2018-04-16 DIAGNOSIS — M16.12 PRIMARY OSTEOARTHRITIS OF LEFT HIP: Primary | ICD-10-CM

## 2018-04-16 PROBLEM — M25.475 SWELLING OF FOOT JOINT, LEFT: Status: RESOLVED | Noted: 2018-01-06 | Resolved: 2018-04-16

## 2018-04-16 PROCEDURE — 99213 OFFICE O/P EST LOW 20 MIN: CPT | Performed by: ORTHOPAEDIC SURGERY

## 2018-04-16 NOTE — PROGRESS NOTES
"Patient:  Bailee Garza is a 78 y.o. female    Chief Complaint/ Reason for Visit:    Chief Complaint   Patient presents with   • Left Foot - Follow-up, Fracture   • Left Hip - Follow-up       HPI:  Patient returns today for scheduled follow-up on 2 problems.  The first is her left foot.  She sustained a fracture of her medial cuneiform on New Year's Day.  She says her left foot is now \"fine\".  She goes on to say \"I don't have any pain in it.\"    The second reason for her visit today is her left hip where we know she has advanced osteoarthritis.  She did receive an intra-articular injection about a month ago and does think that it helped.  She says her once all, however, it feels like her left hip \"catches\" and feels like it might want to give out on her.  It has not given out on her.  When she told me this, I immediately recommended that she get a cane or walking stick.  She voiced understanding.      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)   • Hearing loss 2008   • History of bone density study 2015   • Hypothyroidism, acquired 2007   • IBS (irritable bowel syndrome) 2013   • Insomnia 2015    unspecified   • Intervertebral disc disorder with myelopathy, cervical region 2010   • Pneumonia    • Pneumonia    • Rhinitis, allergic 2007   • Screening breast examination 2007   • Stress 2015    other acute reactions to stress   • Urticaria 2015       PSH:    Past Surgical History:   Procedure Laterality Date   • BREAST BIOPSY Right     50+ years ago   • BREAST " LUMPECTOMY     • BRONCHOSCOPY N/A 12/21/2017    Procedure: BRONCHOSCOPY;  Surgeon: Mario Alanis MD;  Location: [facility] ENDOSCOPY;  Service:    • HYSTERECTOMY         Social Hx:    Social History     Social History   • Marital status:      Spouse name: N/A   • Number of children: N/A   • Years of education: N/A     Occupational History   • Not on file.     Social History Main Topics   • Smoking status: Never Smoker   • Smokeless tobacco: Never Used   • Alcohol use No   • Drug use: No   • Sexual activity: Defer     Other Topics Concern   • Not on file     Social History Narrative   • No narrative on file       Family Hx:    Family History   Problem Relation Age of Onset   • Heart disease Mother    • Cancer Father    • Asthma Paternal Grandfather        Meds:    Current Outpatient Prescriptions:   •  Chlorcyclizine-Pseudoephed (STAHIST AD) 25-60 MG tablet, Take 1 tablet by mouth Every 8 (Eight) Hours As Needed (0)., Disp: 42 tablet, Rfl: 0  •  metoprolol tartrate (LOPRESSOR) 25 MG tablet, Take 25 mg by mouth Take As Directed. .5 tab daily, Disp: , Rfl:   •  Multiple Vitamins-Minerals (PRESERVISION AREDS PO), Take 1 tablet by mouth Daily., Disp: , Rfl:   •  SYNTHROID 100 MCG tablet, Take 1 tablet by mouth Daily., Disp: 30 tablet, Rfl: 11  •  traMADol-acetaminophen (ULTRACET) 37.5-325 MG per tablet, Take 2 tablets by mouth 2 (Two) Times a Day for 90 days., Disp: 120 tablet, Rfl: 2  •  calcium polycarbophil (FIBERCON) 625 MG tablet, Take 625 mg by mouth Daily., Disp: , Rfl:   •  ipratropium-albuterol (DUO-NEB) 0.5-2.5 mg/mL nebulizer, Take 3 mL by nebulization 4 (Four) Times a Day., Disp: 360 mL, Rfl: 1    Allergies:    Allergies   Allergen Reactions   • Ketek [Telithromycin] Hives   • Nsaids Hives   • Sulfa Antibiotics Hives       ROS:  Review of Systems   Musculoskeletal: Positive for arthralgias and gait problem.   All other systems reviewed and are negative.      Vitals:    04/16/18 0925   Temp: 98.2 °F (36.8  "°C)   TempS: Temporal Artery    Weight: 46.1 kg (101 lb 9.6 oz)   Height: 158.8 cm (62.5\")     Body mass index is 18.29 kg/m².    Physical Exam    The patient is awake, alert, and oriented ×3.  The patient is in no acute distress.  Breathing is regular and unlabored with a respiratory rate of 12/m.  Extraocular movements and pupillary responses are symmetrically intact. Sclerae are anicteric.   Hearing is within normal limits.  Speech is within normal limits.  There is no jugular venous distention.    She is actually walking fairly well with perhaps just the slightest limp antalgic from the left.  She does have pain on flexion and internal rotation of the left hip.  This reproduces groin pain.  She has no tenderness laterally over the greater trochanter.    Left foot: Left foot has no swelling, discoloration, or tenderness.  It is stable in all planes.  Pedal pulses are intact and regular with satisfactory amplitude.  Sensory exams intact light touch.      Radiology: I did review Dr. Genao's report as well as the images from the patient's intra-articular steroid injection into the left hip.  Clearly, as evidence by these images, the intra-articular injection was successful.  I did not compare these images to other images during this review.        Assessment:     Diagnosis Plan   1. Primary osteoarthritis of left hip     2. Closed nondisplaced fracture of medial cuneiform of left foot with routine healing, subsequent encounter             Plan:  I discussed everything with the patient at length.  I think her foot has healed clinically and radiographically.  With respect to her left hip, she does have fairly balanced arthritis.  At some point she is likely require total hip replacement.  She is continuing to do the physical therapy exercises for her hip on her own.    I encouraged her as noted above to get a walking stick or a cane.    We'll see her back in a couple months.  If at that time she would like to " schedule another injection we can certainly facilitate that for her.  She has trouble in the meantime, I'll be happy to work her in sooner.

## 2018-05-10 ENCOUNTER — OFFICE VISIT (OUTPATIENT)
Dept: FAMILY MEDICINE CLINIC | Facility: CLINIC | Age: 78
End: 2018-05-10

## 2018-05-10 VITALS
WEIGHT: 101 LBS | TEMPERATURE: 97 F | DIASTOLIC BLOOD PRESSURE: 70 MMHG | HEART RATE: 75 BPM | SYSTOLIC BLOOD PRESSURE: 107 MMHG | HEIGHT: 63 IN | BODY MASS INDEX: 17.89 KG/M2 | OXYGEN SATURATION: 96 %

## 2018-05-10 DIAGNOSIS — G89.29 CHRONIC MIDLINE LOW BACK PAIN WITHOUT SCIATICA: ICD-10-CM

## 2018-05-10 DIAGNOSIS — M54.50 CHRONIC MIDLINE LOW BACK PAIN WITHOUT SCIATICA: ICD-10-CM

## 2018-05-10 DIAGNOSIS — M16.10 ARTHRITIS OF HIP: Primary | ICD-10-CM

## 2018-05-10 PROCEDURE — 99213 OFFICE O/P EST LOW 20 MIN: CPT | Performed by: FAMILY MEDICINE

## 2018-05-10 RX ORDER — ACETAMINOPHEN 500 MG
TABLET ORAL
Qty: 180 TABLET | Refills: 11
Start: 2018-05-10 | End: 2018-07-17

## 2018-05-10 RX ORDER — TRAMADOL HYDROCHLORIDE 50 MG/1
100 TABLET ORAL EVERY 8 HOURS PRN
Qty: 180 TABLET | Refills: 0 | Status: SHIPPED | OUTPATIENT
Start: 2018-05-10 | End: 2018-06-11 | Stop reason: SDUPTHER

## 2018-05-10 NOTE — PROGRESS NOTES
"Chief Complaint   Patient presents with   • Arthritis   • Hip Pain       Subjective   Patient returns the office with ongoing left hip pain.  The pain is 6-8 out of 10.  She gets some relief with tramadol with acetaminophen.  She does request increased frequency of medication because of breakthrough pain.  We will change therapy to tramadol 50 mg 2 tablets every 8 hours and also allow her to take 1000 mg of Tylenol every 8 hours.  She will contact orthopedist for further intervention for her left hip discomfort. Shaji report is pending. She has 4 pills remaining in prescription bottle dated 4/8/2018  I have reviewed and updated her medications, medical history and problem list during today's office visit.     Patient Care Team:  Eddie Araya MD as PCP - General  Eddie Araya MD as PCP - Family Medicine    Social History   Substance Use Topics   • Smoking status: Never Smoker   • Smokeless tobacco: Never Used   • Alcohol use No       Review of Systems   Musculoskeletal: Positive for arthralgias (left hip) and back pain.       Objective     /70   Pulse 75   Temp 97 °F (36.1 °C)   Ht 158.8 cm (62.52\")   Wt 45.8 kg (101 lb)   SpO2 96%   BMI 18.17 kg/m²     Body mass index is 18.17 kg/m².    Physical Exam   Constitutional: She appears well-developed.   Musculoskeletal:        Left hip: She exhibits decreased range of motion (external rotation is limited) and tenderness.        Data Reviewed:             Assessment/Plan     Problem List Items Addressed This Visit        Nervous and Auditory    Chronic midline low back pain without sciatica    Relevant Medications    traMADol (ULTRAM) 50 MG tablet    acetaminophen (TYLENOL) 500 MG tablet    Other Relevant Orders    [id number] 9+Oxycodone+Crt-Unbund - Urine, Clean Catch       Musculoskeletal and Integument    Arthritis of hip, left - Primary    Relevant Medications    traMADol (ULTRAM) 50 MG tablet    acetaminophen (TYLENOL) 500 MG tablet    Other Relevant Orders "    [id number] 9+Oxycodone+Crt-Unbund - Urine, Clean Catch      Other Visit Diagnoses    None.         Orders Placed This Encounter   Procedures   • [id number] 9+Oxycodone+Crt-Unbund - Urine, Clean Catch         Current Outpatient Prescriptions:   •  Chlorcyclizine-Pseudoephed (STAHIST AD) 25-60 MG tablet, Take 1 tablet by mouth Every 8 (Eight) Hours As Needed (0)., Disp: 42 tablet, Rfl: 0  •  metoprolol tartrate (LOPRESSOR) 25 MG tablet, Take 25 mg by mouth Take As Directed. .5 tab daily, Disp: , Rfl:   •  Multiple Vitamins-Minerals (PRESERVISION AREDS PO), Take 1 tablet by mouth Daily., Disp: , Rfl:   •  SYNTHROID 100 MCG tablet, Take 1 tablet by mouth Daily., Disp: 30 tablet, Rfl: 11  •  acetaminophen (TYLENOL) 500 MG tablet, Take 2 tablets po every 8 hours as needed for pain not to exceed 6 tablets/24 hours, Disp: 180 tablet, Rfl: 11  •  calcium polycarbophil (FIBERCON) 625 MG tablet, Take 625 mg by mouth Daily., Disp: , Rfl:   •  ipratropium-albuterol (DUO-NEB) 0.5-2.5 mg/mL nebulizer, Take 3 mL by nebulization 4 (Four) Times a Day., Disp: 360 mL, Rfl: 1  •  traMADol (ULTRAM) 50 MG tablet, Take 2 tablets by mouth Every 8 (Eight) Hours As Needed for Moderate Pain  for up to 90 days., Disp: 180 tablet, Rfl: 0    Return in about 1 month (around 6/10/2018) for Recheck.

## 2018-05-11 LAB
AMPHETAMINES UR QL SCN: NEGATIVE NG/ML
BARBITURATES UR QL SCN: NEGATIVE NG/ML
BENZODIAZ UR QL SCN: NEGATIVE NG/ML
BZE UR QL SCN: NEGATIVE NG/ML
CANNABINOIDS UR QL SCN: NEGATIVE NG/ML
CREAT UR-MCNC: 122.9 MG/DL (ref 20–300)
LABORATORY COMMENT REPORT: NORMAL
METHADONE UR QL SCN: NEGATIVE NG/ML
OPIATES UR QL SCN: NEGATIVE NG/ML
OXYCODONE+OXYMORPHONE UR QL SCN: NEGATIVE NG/ML
PCP UR QL: NEGATIVE NG/ML
PH UR: 5.3 [PH] (ref 4.5–8.9)
PROPOXYPH UR QL SCN: NEGATIVE NG/ML

## 2018-05-30 ENCOUNTER — OFFICE VISIT (OUTPATIENT)
Dept: ORTHOPEDIC SURGERY | Facility: CLINIC | Age: 78
End: 2018-05-30

## 2018-05-30 VITALS — BODY MASS INDEX: 19.32 KG/M2 | HEIGHT: 62 IN | TEMPERATURE: 97.8 F | WEIGHT: 105 LBS

## 2018-05-30 DIAGNOSIS — R20.0 NUMBNESS OF LEFT ANTERIOR THIGH: ICD-10-CM

## 2018-05-30 DIAGNOSIS — M41.50 SCOLIOSIS DUE TO DEGENERATIVE DISEASE OF SPINE IN ADULT PATIENT: ICD-10-CM

## 2018-05-30 DIAGNOSIS — M51.36 DEGENERATIVE DISC DISEASE, LUMBAR: ICD-10-CM

## 2018-05-30 DIAGNOSIS — M54.16 LEFT LUMBAR RADICULOPATHY: Primary | ICD-10-CM

## 2018-05-30 DIAGNOSIS — M16.10 ARTHRITIS OF HIP: ICD-10-CM

## 2018-05-30 PROBLEM — M51.369 DEGENERATIVE DISC DISEASE, LUMBAR: Status: ACTIVE | Noted: 2018-05-30

## 2018-05-30 PROCEDURE — 99214 OFFICE O/P EST MOD 30 MIN: CPT | Performed by: ORTHOPAEDIC SURGERY

## 2018-05-30 NOTE — PROGRESS NOTES
"Patient:  Bailee Garza is a 78 y.o. female    Chief Complaint/ Reason for Visit:    Chief Complaint   Patient presents with   • Left Hip - Follow-up, Pain       HPI:  The patient returns for follow-up on her left hip and groin pain.  The groin pain is getting worse.  She has had physical therapy, injection, and is tried multiple oral medications, but nothing seems to help.  She is getting worse and is now requiring the use of a cane 100% of the time for ambulation due to the severe aching and stabbing pain in her left groin.  However, she has now developed some new problems and new symptoms in the left lower extremity.  She has numbness and occasionally stabbing pain that radiates down her left thigh to the level of the left knee.  It seems to \"wraparound\" the thigh.  She says occasionally the left hip and knee feel weak and she is worried she might fall, which is another reason she uses the cane.  She has to go up and down steps one at a time, and must use a railing to do so due to the pain and problems she is experiencing in her left hip and groin area as well as the weakness and problems she is having in her left thigh.  She's having no pain or problems in her right hip or groin or right lower extremity in general.  She denies any acute changes in bowel or bladder control or habits.      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)   • Hearing loss 2008   • History of bone density study 2015   • Hypothyroidism, acquired 2007   • IBS (irritable bowel syndrome) 2013   • Insomnia 2015    " unspecified   • Intervertebral disc disorder with myelopathy, cervical region 07/22/2010   • Pneumonia    • Pneumonia    • Rhinitis, allergic 11/20/2007   • Screening breast examination 11/20/2007   • Stress 04/27/2015    other acute reactions to stress   • Urticaria 06/29/2015       PSH:    Past Surgical History:   Procedure Laterality Date   • BREAST BIOPSY Right     50+ years ago   • BREAST LUMPECTOMY     • BRONCHOSCOPY N/A 12/21/2017    Procedure: BRONCHOSCOPY;  Surgeon: Mario Alanis MD;  Location: [facility] ENDOSCOPY;  Service:    • HYSTERECTOMY         Social Hx:    Social History     Social History   • Marital status:      Spouse name: N/A   • Number of children: N/A   • Years of education: N/A     Occupational History   • Not on file.     Social History Main Topics   • Smoking status: Never Smoker   • Smokeless tobacco: Never Used   • Alcohol use No   • Drug use: No   • Sexual activity: Defer     Other Topics Concern   • Not on file     Social History Narrative   • No narrative on file       Family Hx:    Family History   Problem Relation Age of Onset   • Heart disease Mother    • Cancer Father    • Asthma Paternal Grandfather        Meds:    Current Outpatient Prescriptions:   •  acetaminophen (TYLENOL) 500 MG tablet, Take 2 tablets po every 8 hours as needed for pain not to exceed 6 tablets/24 hours, Disp: 180 tablet, Rfl: 11  •  Chlorcyclizine-Pseudoephed (STAHIST AD) 25-60 MG tablet, Take 1 tablet by mouth Every 8 (Eight) Hours As Needed (0)., Disp: 42 tablet, Rfl: 0  •  metoprolol tartrate (LOPRESSOR) 25 MG tablet, Take 25 mg by mouth Take As Directed. .5 tab daily, Disp: , Rfl:   •  SYNTHROID 100 MCG tablet, Take 1 tablet by mouth Daily., Disp: 30 tablet, Rfl: 11  •  traMADol (ULTRAM) 50 MG tablet, Take 2 tablets by mouth Every 8 (Eight) Hours As Needed for Moderate Pain  for up to 90 days., Disp: 180 tablet, Rfl: 0  •  calcium polycarbophil (FIBERCON) 625 MG tablet, Take 625 mg by mouth Daily.,  "Disp: , Rfl:   •  ipratropium-albuterol (DUO-NEB) 0.5-2.5 mg/mL nebulizer, Take 3 mL by nebulization 4 (Four) Times a Day., Disp: 360 mL, Rfl: 1  •  Multiple Vitamins-Minerals (PRESERVISION AREDS PO), Take 1 tablet by mouth Daily., Disp: , Rfl:     Allergies:    Allergies   Allergen Reactions   • Ketek [Telithromycin] Hives   • Nsaids Hives   • Sulfa Antibiotics Hives       ROS:  Review of Systems   Constitutional: Positive for activity change and appetite change.   Musculoskeletal: Positive for arthralgias, back pain, gait problem, joint swelling and myalgias.   Neurological: Positive for numbness.   Psychiatric/Behavioral: Positive for dysphoric mood and sleep disturbance. The patient is nervous/anxious.    All other systems reviewed and are negative.      Vitals:    05/30/18 1545   Temp: 97.8 °F (36.6 °C)   Weight: 47.6 kg (105 lb)   Height: 157.5 cm (62\")     Body mass index is 19.2 kg/m².    Physical Exam    The patient is awake, alert, and oriented ×3.  The patient is in no acute distress.  Breathing is regular and unlabored with a respiratory rate of 12/m.  Extraocular movements and pupillary responses are symmetrically intact. Sclerae are anicteric.   Hearing is within normal limits.  Speech is within normal limits.  There is no jugular venous distention.    She has a pronounced limp antalgic from the left, and walks with a crouched gait, requiring a cane for ambulation.  She has no obvious atrophy or asymmetry of the musculature of the lower extremities.    Left lower extremity: Patient has positive Stinchfield test.  Logroll test is positive.  Straight leg raise is markedly positive.  Her calves are both soft and nontender.  Left knee is stable and nontender.    Distally, she has palpable 1+ dorsalis pedis pulses present symmetrically in both feet with a current heart rate of about 78 beats are minute.  Skin and nails are unremarkable.            Radiology: X-rays: AP pelvis AP and lateral left hip were " ordered and reviewed to assess patient's complaints of steadily worsening left hip and groin pain.  I did review these, and I did compare them to images we had from February of this year.  The patient and I went over the x-rays together.  The patient's left hip joint line narrowing, which was pronounced previously, has progressed even more on today's images.  Another notable finding on today's images, however, is that the patient has significant degenerative disc disease at multiple levels in the lower lumbar spine noted incidentally on the AP pelvis view.  She has osteophyte formation that is quite pronounced projecting towards the left side, as well as significant disc space narrowing, especially at L4-L5 again more so towards the left side.  I did show all of these findings to the patient.          Assessment:     Diagnosis Plan   1. Left lumbar radiculopathy  MRI Lumbar Spine Without Contrast   2. Arthritis of hip, left     3. Numbness of left anterior thigh  MRI Lumbar Spine Without Contrast   4. Degenerative disc disease, lumbar  MRI Lumbar Spine Without Contrast   5. 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  Clearly, she is a candidate for and desires a left total hip replacement.  However, I'm concerned that she is experiencing progressive left lumbar radiculopathy.  I explained all this to her, and went on to explain that if she did experience an acute problem with her lumbar spine and that it could tremendously compromises her ability to recover from a left total hip replacement.  She says she understands.    Though we are doubt was going to need to schedule her for left total hip in the near future due to the rapid progression and severity of her pain as well as the rapid diminution of her activity level, we need to workup what sounds like a lumbar radiculopathy.  The patient agrees.   We will get an MRI of her lumbar spine and she will follow-up as soon as is done.  In the meantime, I have strongly encouraged her to avoid stairs is much as possible, and he use her cane all the time for gait.  She voiced understanding and agreement.      Orders Placed This Encounter   Procedures   • MRI Lumbar Spine Without Contrast     Standing Status:   Future     Standing Expiration Date:   5/30/2019     I spent 26 minutes in the evaluation and management of this patient here in the office today, 20 minutes of which were spent in direct face-to-face contact, discussion, counseling, and question and answer session.

## 2018-05-31 ENCOUNTER — HOSPITAL ENCOUNTER (OUTPATIENT)
Dept: MRI IMAGING | Facility: HOSPITAL | Age: 78
Discharge: HOME | End: 2018-05-31
Attending: ORTHOPAEDIC SURGERY | Admitting: ORTHOPAEDIC SURGERY

## 2018-05-31 DIAGNOSIS — M54.16 LEFT LUMBAR RADICULOPATHY: ICD-10-CM

## 2018-05-31 DIAGNOSIS — M51.36 DEGENERATIVE DISC DISEASE, LUMBAR: ICD-10-CM

## 2018-05-31 DIAGNOSIS — M41.50 SCOLIOSIS DUE TO DEGENERATIVE DISEASE OF SPINE IN ADULT PATIENT: ICD-10-CM

## 2018-05-31 DIAGNOSIS — R20.0 NUMBNESS OF LEFT ANTERIOR THIGH: ICD-10-CM

## 2018-05-31 PROCEDURE — 72148 MRI LUMBAR SPINE W/O DYE: CPT

## 2018-06-11 ENCOUNTER — OFFICE VISIT (OUTPATIENT)
Dept: FAMILY MEDICINE CLINIC | Facility: CLINIC | Age: 78
End: 2018-06-11

## 2018-06-11 VITALS
DIASTOLIC BLOOD PRESSURE: 65 MMHG | SYSTOLIC BLOOD PRESSURE: 121 MMHG | WEIGHT: 104 LBS | TEMPERATURE: 97 F | HEART RATE: 69 BPM | BODY MASS INDEX: 19.14 KG/M2 | RESPIRATION RATE: 16 BRPM | HEIGHT: 62 IN

## 2018-06-11 DIAGNOSIS — G89.29 CHRONIC MIDLINE LOW BACK PAIN WITHOUT SCIATICA: Primary | ICD-10-CM

## 2018-06-11 DIAGNOSIS — M54.50 CHRONIC MIDLINE LOW BACK PAIN WITHOUT SCIATICA: Primary | ICD-10-CM

## 2018-06-11 DIAGNOSIS — M16.10 ARTHRITIS OF HIP: ICD-10-CM

## 2018-06-11 PROCEDURE — 99213 OFFICE O/P EST LOW 20 MIN: CPT | Performed by: FAMILY MEDICINE

## 2018-06-11 RX ORDER — TRAMADOL HYDROCHLORIDE 50 MG/1
50 TABLET ORAL 4 TIMES DAILY PRN
Qty: 120 TABLET | Refills: 2 | Status: SHIPPED | OUTPATIENT
Start: 2018-06-11 | End: 2018-07-25 | Stop reason: HOSPADM

## 2018-06-11 NOTE — PROGRESS NOTES
"Chief Complaint   Patient presents with   • Pain     left hip        Subjective     Bailee Garza presents to the office today to refill her medications. No medication side effects are reported.    Her left hip continues to be a problem.  She is still considering surgical intervention in the near future.  Back continues to be a chronic Problem.  With treatment her pain is 6-7 out of 10.  Shaji report is pending at the time of this dictation.  She did bring in her recent pill bottle and it has about 25% of the bottle still filled.  She is taking the tramadol 4 times daily along with Tylenol.  I have reviewed and updated her medications, medical history and problem list during today's office visit.     Patient Care Team:  Eddie Araya MD as PCP - General  Eddie Araya MD as PCP - Family Medicine    Social History   Substance Use Topics   • Smoking status: Never Smoker   • Smokeless tobacco: Never Used   • Alcohol use No       Review of Systems   Musculoskeletal: Positive for arthralgias.       Objective     /65   Pulse 69   Temp 97 °F (36.1 °C) (Oral)   Resp 16   Ht 157.5 cm (62\")   Wt 47.2 kg (104 lb)   BMI 19.02 kg/m²     Body mass index is 19.02 kg/m².    Physical Exam   Constitutional: She appears well-developed. No distress.   Musculoskeletal:        Left hip: She exhibits decreased range of motion.        Lumbar back: She exhibits tenderness.       Data Reviewed:    Mri Lumbar Spine Without Contrast    Result Date: 6/1/2018  Impression: Dextroscoliotic curvature of the lumbar spine with multilevel degenerative disc disease. There is endplate spurring and facet overgrowth with mild narrowing of the central canal and neural foramina at L4-5. 4 mm anterolisthesis at L5 with respect to S1 where there is endplate and facet spurring and moderate right neural foraminal narrowing.  This report was finalized on 6/1/2018 11:54 AM by Dr. Ajay Garcia M.D.            Assessment/Plan     Problem List " Items Addressed This Visit     Chronic midline low back pain without sciatica - Primary    Relevant Medications    traMADol (ULTRAM) 50 MG tablet    Arthritis of hip, left    Relevant Medications    traMADol (ULTRAM) 50 MG tablet          No orders of the defined types were placed in this encounter.        Current Outpatient Prescriptions:   •  acetaminophen (TYLENOL) 500 MG tablet, Take 2 tablets po every 8 hours as needed for pain not to exceed 6 tablets/24 hours, Disp: 180 tablet, Rfl: 11  •  calcium polycarbophil (FIBERCON) 625 MG tablet, Take 625 mg by mouth Daily., Disp: , Rfl:   •  Chlorcyclizine-Pseudoephed (STAHIST AD) 25-60 MG tablet, Take 1 tablet by mouth Every 8 (Eight) Hours As Needed (0)., Disp: 42 tablet, Rfl: 0  •  ipratropium-albuterol (DUO-NEB) 0.5-2.5 mg/mL nebulizer, Take 3 mL by nebulization 4 (Four) Times a Day., Disp: 360 mL, Rfl: 1  •  metoprolol tartrate (LOPRESSOR) 25 MG tablet, Take 25 mg by mouth Take As Directed. .5 tab daily, Disp: , Rfl:   •  Multiple Vitamins-Minerals (PRESERVISION AREDS PO), Take 1 tablet by mouth Daily., Disp: , Rfl:   •  SYNTHROID 100 MCG tablet, Take 1 tablet by mouth Daily., Disp: 30 tablet, Rfl: 11  •  traMADol (ULTRAM) 50 MG tablet, Take 1 tablet by mouth 4 (Four) Times a Day As Needed for Moderate Pain  for up to 90 days., Disp: 120 tablet, Rfl: 2    Return in about 3 months (around 9/11/2018) for Controlled Substance Visit.

## 2018-06-15 ENCOUNTER — TELEPHONE (OUTPATIENT)
Dept: ORTHOPEDIC SURGERY | Facility: CLINIC | Age: 78
End: 2018-06-15

## 2018-06-15 ENCOUNTER — PREP FOR SURGERY (OUTPATIENT)
Dept: ORTHOPEDIC SURGERY | Facility: CLINIC | Age: 78
End: 2018-06-15

## 2018-06-15 DIAGNOSIS — M16.12 PRIMARY OSTEOARTHRITIS OF LEFT HIP: Primary | ICD-10-CM

## 2018-06-15 DIAGNOSIS — M54.16 LUMBAR RADICULOPATHY: Primary | ICD-10-CM

## 2018-06-15 RX ORDER — PREGABALIN 75 MG/1
150 CAPSULE ORAL ONCE
Status: CANCELLED | OUTPATIENT
Start: 2018-07-24 | End: 2018-07-24

## 2018-06-15 RX ORDER — CEFAZOLIN SODIUM 2 G/100ML
2 INJECTION, SOLUTION INTRAVENOUS ONCE
Status: CANCELLED | OUTPATIENT
Start: 2018-07-24 | End: 2018-07-24

## 2018-06-15 NOTE — TELEPHONE ENCOUNTER
"06/15/18  4:56 PM      I just called the patient at her request to review her lumbar MRI findings.  I explained that she may well have some \"pinched nerves\" in her back as she does have some evidence of some degree of stenosis.  I'm not sure that it is playing a big role in the left lower extremity symptoms, but we certainly, as discussed, do not want to compromise the patient's recovery from the total hip she would like me to schedule.  The patient also tells me that she can hardly walk most days due to the pain in her left hip, and it's causing her now to need a cane or walking stick essentially all the time.  She wants to have her left hip replacement as soon as possible.    I recommended that she allow me to have her see our spine specialist, Dr. Jurado, before we operate on her left hip.  She voiced understanding and agreement.  I will assist her in those arrangements and see her thereafter.  We will go ahead and tentatively schedule her left total hip replacement by her request.    Justen Mann MD    "

## 2018-06-19 ENCOUNTER — OFFICE VISIT (OUTPATIENT)
Dept: ORTHOPEDIC SURGERY | Facility: CLINIC | Age: 78
End: 2018-06-19

## 2018-06-19 VITALS — TEMPERATURE: 97.6 F | HEIGHT: 62 IN | WEIGHT: 103 LBS | BODY MASS INDEX: 18.95 KG/M2

## 2018-06-19 DIAGNOSIS — M16.10 ARTHRITIS OF HIP: Primary | ICD-10-CM

## 2018-06-19 PROCEDURE — 99214 OFFICE O/P EST MOD 30 MIN: CPT | Performed by: ORTHOPAEDIC SURGERY

## 2018-06-19 NOTE — PROGRESS NOTES
Patient:  Bailee Garza is a 78 y.o. female    Chief Complaint/ Reason for Visit:    Chief Complaint   Patient presents with   • Left Hip - Follow-up, Pain       HPI:  The patient returns today to discuss her worsening left hip and groin pain.  She is accompanied by her son, Mr. Diaz, who also would like to be brought up-to-date regarding his mother's left hip arthritis.  The patient is also still having the radicular type symptoms radiating down the left lower extremity, and has an upcoming appointment with Dr. Jurado, our spine specialist, for evaluation in that respect.  She is using a cane 100% of the time for gait due to the progressively worsening pain in her osteoarthritic left hip.      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)   • Hearing loss 2008   • History of bone density study 2015   • Hypothyroidism, acquired 2007   • IBS (irritable bowel syndrome) 2013   • Insomnia 2015    unspecified   • Intervertebral disc disorder with myelopathy, cervical region 2010   • Pneumonia    • Pneumonia    • Rhinitis, allergic 2007   • Screening breast examination 2007   • Stress 2015    other acute reactions to stress   • Urticaria 2015       PSH:    Past Surgical History:   Procedure Laterality Date   • BREAST BIOPSY Right     50+ years ago   • BREAST LUMPECTOMY     • BRONCHOSCOPY N/A 2017    Procedure: BRONCHOSCOPY;  Surgeon: Mario Alanis MD;  Location: [facility] ENDOSCOPY;  Service:    • HYSTERECTOMY         Social Hx:    Social History     Social  History   • Marital status:      Spouse name: N/A   • Number of children: N/A   • Years of education: N/A     Occupational History   • Not on file.     Social History Main Topics   • Smoking status: Never Smoker   • Smokeless tobacco: Never Used   • Alcohol use No   • Drug use: No   • Sexual activity: Defer     Other Topics Concern   • Not on file     Social History Narrative   • No narrative on file       Family Hx:    Family History   Problem Relation Age of Onset   • Heart disease Mother    • Cancer Father    • Asthma Paternal Grandfather        Meds:    Current Outpatient Prescriptions:   •  acetaminophen (TYLENOL) 500 MG tablet, Take 2 tablets po every 8 hours as needed for pain not to exceed 6 tablets/24 hours, Disp: 180 tablet, Rfl: 11  •  calcium polycarbophil (FIBERCON) 625 MG tablet, Take 625 mg by mouth Daily., Disp: , Rfl:   •  Chlorcyclizine-Pseudoephed (STAHIST AD) 25-60 MG tablet, Take 1 tablet by mouth Every 8 (Eight) Hours As Needed (0)., Disp: 42 tablet, Rfl: 0  •  ipratropium-albuterol (DUO-NEB) 0.5-2.5 mg/mL nebulizer, Take 3 mL by nebulization 4 (Four) Times a Day., Disp: 360 mL, Rfl: 1  •  metoprolol tartrate (LOPRESSOR) 25 MG tablet, Take 25 mg by mouth Take As Directed. .5 tab daily, Disp: , Rfl:   •  Multiple Vitamins-Minerals (PRESERVISION AREDS PO), Take 1 tablet by mouth Daily., Disp: , Rfl:   •  SYNTHROID 100 MCG tablet, Take 1 tablet by mouth Daily., Disp: 30 tablet, Rfl: 11  •  traMADol (ULTRAM) 50 MG tablet, Take 1 tablet by mouth 4 (Four) Times a Day As Needed for Moderate Pain  for up to 90 days., Disp: 120 tablet, Rfl: 2    Allergies:    Allergies   Allergen Reactions   • Ketek [Telithromycin] Hives   • Nsaids Hives   • Sulfa Antibiotics Hives       ROS:  Review of Systems   Musculoskeletal: Positive for gait problem.        L hip pain   All other systems reviewed and are negative.      Vitals:    06/19/18 0847   Temp: 97.6 °F (36.4 °C)   Weight: 46.7 kg (103 lb)   Height:  "157.5 cm (62\")     Body mass index is 18.84 kg/m².    Physical Exam    The patient is awake, alert, and oriented ×3.  The patient is in no acute distress.  Breathing is regular and unlabored with a respiratory rate of 14/m.  Extraocular movements and pupillary responses are symmetrically intact. Sclerae are anicteric.   Hearing is within normal limits.  Speech is within normal limits.  There is no jugular venous distention.       She has a pronounced limp antalgic from the left, and walks with a crouched gait, requiring a cane for ambulation.  She has no obvious atrophy or asymmetry of the musculature of the lower extremities.     Left lower extremity: Patient has positive Stinchfield test.  Logroll test is positive.  Straight leg raise is markedly positive.  Her calves are both soft and nontender.  Left knee is stable and nontender.     Distally, she has palpable 1+ dorsalis pedis pulses present symmetrically in both feet with a current heart rate of about 78 beats per minute.  Skin and nails are unremarkable.           Assessment:     Diagnosis Plan   1. Arthritis of hip, left             Plan:  I discussed everything with the patient and her son at length.  I brought him \"up to speed\", so to speak, regarding his mother's condition.  I spent 25 minutes discussing everything with the patient and her son.  20 minutes of this visit were in face-to-face contact, question and answer and discussion.  I did leave the room a bit to investigate and confirm the patient's upcoming appointment with Dr. Jurado, our spine specialist, and also to consult with the surgery scheduler regarding the scheduling of the patient's total hip replacement.    The patient and her son voiced understanding, and the patient wants to schedule her total hip replacement after she has received Dr. Jurado's recommendations and evaluation.    "

## 2018-07-12 NOTE — PROGRESS NOTES
Pre-Operative Orthopedic Assessment      Patient: Bailee Garza    YOB: 1940      Age/Gender: 78 y.o. female  Medical Record Number: [id number]  Surgical Procedure Planned: TOTAL HIP ARTHROPLASTY     Surgeon: Justen Mann MD    Date of Surgery Planned: 07/24/2018    Question Value Score    Patient Score   1. What is your age group? 50-65 years  66-75 years  >75 years =2  =1  =0 0   2. Gender? Male  Female =2  =1 1   3. How far on average can you walk? (a block is 200 meters) Two blocks or more (+\- rest)  1-2 blocks (+\- rest)  Housebound (most of time) =2  =1  =0 1   4. Which gait aid to you use? (more often than not) None  Single-Point Stick  Crutches/Frame =2  =1  =0 1   5. Do you use community  supports? (home help, meals on wheels, district nursing) None or one per week  Two or more per week =1  =0 1   6. Will you live with someone who can care for you after your operation? Yes  No =3  =0 3      Your Score (out of 12)  7     Key Destination at Discharge from acute care predicted by score   Scores < 6  -- extended inpatient rehabilitation   Scores 6-9 -- additional intervention to discharge directly home (Rehabilitation in home)   Scores > 9 -- directly home         Discharge Disposition/Planning:     Patient Response   Discussed the Predicted discharge disposition needed based on RAPT Assessment with the patient.    yes   Patient selected discharge disposition:   home   Out Patient Rehabilitation Facility of Choice:    [facility] Preferred:   Undecided/will need provider choice list   Has the patient completed or been scheduled to complete pre-operative testing? yes   Post-Operative Care Giver Name and Phone Number:    Daughter will be coming to stay with her  Emergency contact is kaye White  [phone]     Subacute Inpatient Rehabilitation:  Complete this section only if planning inpatient services at a Subacute Facility     Patient Response    Subacute Facility Preferred (Please list 2 facilities:      Requires pre-certification for inpatient rehabilitation services?         Planned source of transportation to inpatient rehabilitation facility?       If choosing inpatient services at an Acute or Subacute Facility please list a subsequent back-up plan (in case patient fails to qualify for inpatient rehabilitation). Back-up plans should include caregiver (family member or friend) for first 24-48 post- -operatively.       Home Equipment Patient Response   Does patient have a walker for home use?    yes   Does patient have a 3 in 1 commode for home use?    May have   Does patient have a shower chair for home use?    yes   Does patient have an elevated commode seat for home use? no   Does patient have a cane for home use?    yes   Is there any other medical equipment in the home? If so,  List in comment section below      Pre-Operative Class Attendance Patient Response   Attended or scheduled to attend the pre-operative class within 1 year of total joint replacement? yes   Not planning to attend the pre-operative class (see comments below)    scheduled   Patient Education  Completed   Expected time of discharge discussed yes   Encouraged to attend Pre-Operative Class    yes   Education re: Back-up plan for patients planning discharge to subacute facilities n/a   Education re: Predicted Discharge Disposition based on RAPT score    yes   Patient receptive and voiced understanding of information given    yes                                                                                                            Comments:    Address verified.  Patient resides alone and has a ramp followed by four steps with handrail to enter home.  Single story home with basement.  Patient plans home health at [place] and will need to review provider choice list.  Family will assist at [place].                                       Signature: Skylar Maynard RN    Date:   7/12/2018

## 2018-07-16 ENCOUNTER — PREP FOR SURGERY (OUTPATIENT)
Dept: OTHER | Facility: HOSPITAL | Age: 78
End: 2018-07-16

## 2018-07-16 DIAGNOSIS — M16.12 PRIMARY OSTEOARTHRITIS OF LEFT HIP: Primary | ICD-10-CM

## 2018-07-17 ENCOUNTER — APPOINTMENT (OUTPATIENT)
Dept: PREADMISSION TESTING | Facility: HOSPITAL | Age: 78
End: 2018-07-17

## 2018-07-17 VITALS
HEIGHT: 64 IN | BODY MASS INDEX: 18.01 KG/M2 | RESPIRATION RATE: 18 BRPM | SYSTOLIC BLOOD PRESSURE: 117 MMHG | TEMPERATURE: 97.1 F | DIASTOLIC BLOOD PRESSURE: 67 MMHG | WEIGHT: 105.5 LBS | OXYGEN SATURATION: 98 % | HEART RATE: 65 BPM

## 2018-07-17 DIAGNOSIS — M16.12 PRIMARY OSTEOARTHRITIS OF LEFT HIP: ICD-10-CM

## 2018-07-17 LAB
ANION GAP SERPL CALCULATED.3IONS-SCNC: 12.7 MMOL/L
BACTERIA UR QL AUTO: ABNORMAL /HPF
BILIRUB UR QL STRIP: NEGATIVE
BUN BLD-MCNC: 27 MG/DL (ref 8–23)
BUN/CREAT SERPL: 33.3 (ref 7–25)
CALCIUM SPEC-SCNC: 9.2 MG/DL (ref 8.6–10.5)
CHLORIDE SERPL-SCNC: 98 MMOL/L (ref 98–107)
CLARITY UR: CLEAR
CO2 SERPL-SCNC: 27.3 MMOL/L (ref 22–29)
COLOR UR: YELLOW
CREAT BLD-MCNC: 0.81 MG/DL (ref 0.57–1)
DEPRECATED RDW RBC AUTO: 45.5 FL (ref 37–54)
ERYTHROCYTE [DISTWIDTH] IN BLOOD BY AUTOMATED COUNT: 13 % (ref 11.7–13)
GFR SERPL CREATININE-BSD FRML MDRD: 69 ML/MIN/1.73
GLUCOSE BLD-MCNC: 86 MG/DL (ref 65–99)
GLUCOSE UR STRIP-MCNC: NEGATIVE MG/DL
HCT VFR BLD AUTO: 44 % (ref 35.6–45.5)
HGB BLD-MCNC: 13.7 G/DL (ref 11.9–15.5)
HGB UR QL STRIP.AUTO: NEGATIVE
HYALINE CASTS UR QL AUTO: ABNORMAL /LPF
KETONES UR QL STRIP: NEGATIVE
LEUKOCYTE ESTERASE UR QL STRIP.AUTO: ABNORMAL
MCH RBC QN AUTO: 30 PG (ref 26.9–32)
MCHC RBC AUTO-ENTMCNC: 31.1 G/DL (ref 32.4–36.3)
MCV RBC AUTO: 96.3 FL (ref 80.5–98.2)
NITRITE UR QL STRIP: NEGATIVE
PH UR STRIP.AUTO: 5.5 [PH] (ref 5–8)
PLATELET # BLD AUTO: 224 10*3/MM3 (ref 140–500)
PMV BLD AUTO: 10.7 FL (ref 6–12)
POTASSIUM BLD-SCNC: 4.1 MMOL/L (ref 3.5–5.2)
PROT UR QL STRIP: NEGATIVE
RBC # BLD AUTO: 4.57 10*6/MM3 (ref 3.9–5.2)
RBC # UR: ABNORMAL /HPF
REF LAB TEST METHOD: ABNORMAL
SODIUM BLD-SCNC: 138 MMOL/L (ref 136–145)
SP GR UR STRIP: 1.02 (ref 1–1.03)
SQUAMOUS #/AREA URNS HPF: ABNORMAL /HPF
UROBILINOGEN UR QL STRIP: ABNORMAL
WBC NRBC COR # BLD: 4.88 10*3/MM3 (ref 4.5–10.7)
WBC UR QL AUTO: ABNORMAL /HPF

## 2018-07-17 PROCEDURE — 93010 ELECTROCARDIOGRAM REPORT: CPT | Performed by: INTERNAL MEDICINE

## 2018-07-17 PROCEDURE — 81001 URINALYSIS AUTO W/SCOPE: CPT | Performed by: ORTHOPAEDIC SURGERY

## 2018-07-17 PROCEDURE — 87086 URINE CULTURE/COLONY COUNT: CPT | Performed by: ORTHOPAEDIC SURGERY

## 2018-07-17 PROCEDURE — 85027 COMPLETE CBC AUTOMATED: CPT | Performed by: ORTHOPAEDIC SURGERY

## 2018-07-17 PROCEDURE — 93005 ELECTROCARDIOGRAM TRACING: CPT

## 2018-07-17 PROCEDURE — 36415 COLL VENOUS BLD VENIPUNCTURE: CPT

## 2018-07-17 PROCEDURE — 80048 BASIC METABOLIC PNL TOTAL CA: CPT | Performed by: ORTHOPAEDIC SURGERY

## 2018-07-17 RX ORDER — ACETAMINOPHEN 500 MG
500 TABLET ORAL EVERY 4 HOURS PRN
COMMUNITY
End: 2020-02-05

## 2018-07-17 RX ORDER — TRIAMCINOLONE ACETONIDE 1 MG/G
1 CREAM TOPICAL AS NEEDED
COMMUNITY
End: 2019-02-13

## 2018-07-17 RX ORDER — CHLORHEXIDINE GLUCONATE 500 MG/1
1 CLOTH TOPICAL TAKE AS DIRECTED
COMMUNITY
End: 2018-07-25 | Stop reason: HOSPADM

## 2018-07-17 RX ORDER — METOPROLOL SUCCINATE 25 MG/1
12.5 TABLET, EXTENDED RELEASE ORAL DAILY
COMMUNITY
End: 2018-12-03 | Stop reason: SDUPTHER

## 2018-07-17 ASSESSMENT — HOOS JR
HOOS JR SCORE: 6
HOOS JR SCORE: 70.426

## 2018-07-17 NOTE — DISCHARGE INSTRUCTIONS
Take the following medications the morning of surgery with a small sip of water:  METOPROLOL    Arrive to hospital on your day of surgery at 8:00 am.      General Instructions:  • Do not eat solid food after midnight the night before surgery.  • You may drink clear liquids day of surgery but must stop at least one hour before your hospital arrival time.  • It is beneficial for you to have a clear drink that contains carbohydrates the day of surgery.  We suggest a 12 to 20 ounce bottle of Gatorade or Powerade for non-diabetic patients or a 12 to 20 ounce bottle of G2 or Powerade Zero for diabetic patients. (Pediatric patients, are not advised to drink a 12 to 20 ounce carbohydrate drink)    Clear liquids are liquids you can see through.  Nothing red in color.     Plain water                               Sports drinks  Sodas                                   Gelatin (Jell-O)  Fruit juices without pulp such as white grape juice and apple juice  Popsicles that contain no fruit or yogurt  Tea or coffee (no cream or milk added)  Gatorade / Powerade  G2 / Powerade Zero    • Infants may have breast milk up to four hours before surgery.  • Infants drinking formula may drink formula up to six hours before surgery.   • Patients who avoid smoking, chewing tobacco and alcohol for 4 weeks prior to surgery have a reduced risk of post-operative complications.  Quit smoking as many days before surgery as you can.  • Do not smoke, use chewing tobacco or drink alcohol the day of surgery.   • If applicable bring your C-PAP/ BI-PAP machine.  • Bring any papers given to you in the doctor’s office.  • Wear clean comfortable clothes and socks.  • Do not wear contact lenses or make-up.  Bring a case for your glasses.   • Bring crutches or walker if applicable.  • Remove all piercings.  Leave jewelry and any other valuables at home.  • Hair extensions with metal clips must be removed prior to surgery.  • The Pre-Admission Testing nurse will  instruct you to bring medications if unable to obtain an accurate list in Pre-Admission Testing.        If you were given a blood bank ID arm band remember to bring it with you the day of surgery.    Preventing a Surgical Site Infection:  • For 2 to 3 days before surgery, avoid shaving with a razor because the razor can irritate skin and make it easier to develop an infection.    • Any areas of open skin can increase the risk of a post-operative wound infection by allowing bacteria to enter and travel throughout the body.  Notify your surgeon if you have any skin wounds / rashes even if it is not near the expected surgical site.  The area will need assessed to determine if surgery should be delayed until it is healed.  • The night prior to surgery sleep in a clean bed with clean clothing.  Do not allow pets to sleep with you.  • Shower on the morning of surgery using a fresh bar of anti-bacterial soap (such as Dial) and clean washcloth.  Dry with a clean towel and dress in clean clothing.  • Ask your surgeon if you will be receiving antibiotics prior to surgery.  • Make sure you, your family, and all healthcare providers clean their hands with soap and water or an alcohol based hand  before caring for you or your wound.    Day of surgery:  Upon arrival, a Pre-op nurse and Anesthesiologist will review your health history, obtain vital signs, and answer questions you may have.  The only belongings needed at this time will be your home medications and if applicable your C-PAP/BI-PAP machine.  If you are staying overnight your family can leave the rest of your belongings in the car and bring them to your room later.  A Pre-op nurse will start an IV and you may receive medication in preparation for surgery, including something to help you relax.  Your family will be able to see you in the Pre-op area.  While you are in surgery your family should notify the waiting room  if they leave the waiting room  area and provide a contact phone number.    Please be aware that surgery does come with discomfort.  We want to make every effort to control your discomfort so please discuss any uncontrolled symptoms with your nurse.   Your doctor will most likely have prescribed pain medications.      If you are going home after surgery you will receive individualized written care instructions before being discharged.  A responsible adult must drive you to and from the hospital on the day of your surgery and stay with you for 24 hours.    If you are staying overnight following surgery, you will be transported to your hospital room following the recovery period.  [facility] has all private rooms.    You have received a list of surgical assistants for your reference.  If you have any questions please call Pre-Admission Testing at [phone].  Deductibles and co-payments are collected on the day of service. Please be prepared to pay the required co-pay, deductible or deposit on the day of service as defined by your plan.      2% CHLORAHEXIDINE GLUCONATE* CLOTH  Preparing or “prepping” skin before surgery can reduce the risk of infection at the surgical site. To make the process easier, [facility] has chosen disposable cloths moistened with a rinse-free, 2% Chlorhexidine Gluconate (CHG) antiseptic solution. The steps below outline the prepping process and should be carefully followed.        Use the prep cloth on the area that is circled in the diagram             Directions Night before Surgery  1) Shower using a fresh bar of anti-bacterial soap (such as Dial) and clean washcloth.  Use a clean towel to completely dry your skin.  2) Do not use any lotions, oils or creams on your skin.  3) Open the package and remove 1 cloth, wipe your skin for 30 seconds in a circular motion.  Allow to dry for 3 minutes.  4) Repeat #3 with second cloth.  5) Do not touch your eyes, ears, or mouth with the prep  cloth.  6) Allow the wet prep solution to air dry.  7) Discard the prep cloth and wash your hands with soap and water.   8) Dress in clean bed clothes and sleep on fresh clean bed sheets.   9) You may experience some temporary itching after the prep.    Directions Day of Surgery  1) Repeat steps 1,2,3,4,5,6,7, and 9.   2) Dress in clean clothes before coming to the hospital.    BACTROBAN NASAL OINTMENT  There are many germs normally in your nose. Bactroban is an ointment that will help reduce these germs. Please follow these instructions for Bactroban use:      ____The day before surgery in the morning  Date________    ____The day before surgery in the evening              Date________    ____The day of surgery in the morning    Date________    **Squirt ½ package of Bactroban Ointment onto a cotton applicator and apply to inside of 1st nostril.  Squirt the remaining Bactroban and apply to the inside of the other nostril.

## 2018-07-18 ENCOUNTER — OFFICE VISIT (OUTPATIENT)
Dept: ORTHOPEDIC SURGERY | Facility: CLINIC | Age: 78
End: 2018-07-18

## 2018-07-18 VITALS — HEIGHT: 62 IN | BODY MASS INDEX: 19.69 KG/M2 | WEIGHT: 107 LBS | TEMPERATURE: 98.1 F

## 2018-07-18 DIAGNOSIS — M54.50 SPINE PAIN, LUMBAR: Primary | ICD-10-CM

## 2018-07-18 DIAGNOSIS — M41.9 ACQUIRED SCOLIOSIS: ICD-10-CM

## 2018-07-18 PROCEDURE — 72100 X-RAY EXAM L-S SPINE 2/3 VWS: CPT | Performed by: ORTHOPAEDIC SURGERY

## 2018-07-18 PROCEDURE — 99214 OFFICE O/P EST MOD 30 MIN: CPT | Performed by: ORTHOPAEDIC SURGERY

## 2018-07-18 NOTE — PROGRESS NOTES
"New patient or new problem visit    Chief Complaint   Patient presents with   • Lumbar Spine - Pain       HPI: Dr. Mann asked me to see her for left thigh pain.  She has a history of chronic mild low back pain that she thought was simply \"life \"occasionally she rubs the top of her thigh and Dr. Mann noticed this and she admitted to some discomfort there.  She thinks after she injured her foot she walked a little differently further aggravating the left hip for which upcoming hip replacement as planned.  No balance difficulties bowel or bladder complaints    PFSH: See chart- reviewed    Review of Systems   Constitutional: Negative for chills, fever and unexpected weight change.   HENT: Negative for trouble swallowing and voice change.    Eyes: Negative for visual disturbance.   Respiratory: Negative for cough and shortness of breath.    Cardiovascular: Negative for chest pain and leg swelling.   Gastrointestinal: Negative for abdominal pain, nausea and vomiting.   Endocrine: Negative for cold intolerance and heat intolerance.   Genitourinary: Negative for difficulty urinating, frequency and urgency.   Musculoskeletal: Positive for joint swelling.   Skin: Negative for rash and wound.   Allergic/Immunologic: Negative for immunocompromised state.   Neurological: Negative for weakness and numbness (occ arms).   Hematological: Does not bruise/bleed easily.   Psychiatric/Behavioral: Negative for dysphoric mood. The patient is not nervous/anxious.        PE: Constitutional: Vital signs above-noted.  Awake, alert and oriented    Psychiatric: Affect and insight do not appear grossly disturbed.    Pulmonary: Breathing is unlabored, color is good.    Skin: Warm, dry and normal turgor    Cardiac:  pedal pulses intact.  No edema.    Eyesight and hearing appear adequate for examination purposes      Musculoskeletal:  There is no tenderness to percussion and palpation of the spine. Motion appears undisturbed.  Posture is " unremarkable to coronal and sagittal inspection.    The skin about the area is intact.  There is no palpable or visible deformity.  There is no local spasm.       Neurologic:   Reflexes are 2+ and symmetrical in the patellae and achilles.   Motor function is undisturbed in quadriceps, EHL, and gastrocnemius      Sensation appears symmetrically intact to light touch   .  In the bilateral lower extremities there is no evidence of atrophy.   Clonus is absent..  Gait appears undisturbed.  SLR test negative      MEDICAL DECISION MAKING    XRAY: Plain film x-rays of lumbar spine do show small well-balanced scoliosis.  Excellent sagittal posture.  Multilevel mild degenerative changes noted.  MRI scan really doesn't show much in way of central stenosis there may be mild foraminal changes.  I reviewed the radiologist's report with which I agree. k    Other: n/a    Impression: I think her main pain complaint is the intrinsic to the hip and agree with the upcoming planned hip replacement I don't think she needs any special attention for the back    Plan: Follow-up when necessary

## 2018-07-19 ENCOUNTER — OFFICE VISIT (OUTPATIENT)
Dept: ORTHOPEDIC SURGERY | Facility: CLINIC | Age: 78
End: 2018-07-19

## 2018-07-19 VITALS
DIASTOLIC BLOOD PRESSURE: 70 MMHG | SYSTOLIC BLOOD PRESSURE: 130 MMHG | BODY MASS INDEX: 19.07 KG/M2 | WEIGHT: 107.6 LBS | TEMPERATURE: 97.7 F | HEIGHT: 63 IN

## 2018-07-19 DIAGNOSIS — M16.12 PRIMARY OSTEOARTHRITIS OF LEFT HIP: Primary | ICD-10-CM

## 2018-07-19 LAB — BACTERIA SPEC AEROBE CULT: NORMAL

## 2018-07-19 PROCEDURE — 99214 OFFICE O/P EST MOD 30 MIN: CPT | Performed by: ORTHOPAEDIC SURGERY

## 2018-07-19 NOTE — PROGRESS NOTES
Patient:  Bailee Garza is a 78 y.o. female    Chief Complaint/ Reason for Visit:    Chief Complaint   Patient presents with   • Left Hip - Pain, Pre-op Exam       HPI:  The patient presents today for preoperative discussion and history and physical examination.  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  As is well documented in her chart, she has undergone extensive and comprehensive nonsurgical management of this left hip and groin pain.  She is also seen Dr. Jurado, our spine specialist, who does not believe that her lumbar spine pathology is the major contributing factor to her left hip and groin pain, but rather believes that she should proceed with total hip replacement as planned.  The patient must use a cane 100% of the time for gait now due to her discomfort, and is becoming increasingly concerned about the deleterious effect that the left hip osteoarthritic pain is having on her comfort, quality life, activity level, and ability to maintain her previously relatively high levels of activity and fitness.    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   • Hearing loss 2008   • Hip pain, left    • History of bone density study 2015   • History of pneumonia     2017   • History of skin cancer    • Hypothyroidism, acquired 2007   • Insomnia 2015    unspecified   • Intervertebral disc disorder with myelopathy, cervical region 2010   • Rhinitis, allergic 2007   • Scoliosis    • Screening breast examination 2007    • Stress 04/27/2015    other acute reactions to stress   • Stress incontinence    • Urticaria 06/29/2015       PSH:    Past Surgical History:   Procedure Laterality Date   • BREAST BIOPSY Right     50+ years ago   • BREAST LUMPECTOMY      BENIGN   • BRONCHOSCOPY N/A 12/21/2017    Procedure: BRONCHOSCOPY;  Surgeon: Mario Alanis MD;  Location: [facility] ENDOSCOPY;  Service:    • CATARACT EXTRACTION, BILATERAL     • HYSTERECTOMY         Social Hx:    Social History     Social History   • Marital status:      Spouse name: N/A   • Number of children: N/A   • Years of education: N/A     Occupational History   • Not on file.     Social History Main Topics   • Smoking status: Never Smoker   • Smokeless tobacco: Never Used   • Alcohol use No   • Drug use: No   • Sexual activity: Defer     Other Topics Concern   • Not on file     Social History Narrative   • No narrative on file       Family Hx:    Family History   Problem Relation Age of Onset   • Heart disease Mother    • Cancer Father    • Asthma Paternal Grandfather    • Malig Hyperthermia Neg Hx        Meds:    Current Outpatient Prescriptions:   •  acetaminophen (TYLENOL) 500 MG tablet, Take 500 mg by mouth Every 4 (Four) Hours As Needed for Mild Pain ., Disp: , Rfl:   •  Chlorcyclizine-Pseudoephed (STAHIST AD) 25-60 MG tablet, Take 1 tablet by mouth Every 8 (Eight) Hours As Needed (0)., Disp: 42 tablet, Rfl: 0  •  metoprolol succinate XL (TOPROL-XL) 25 MG 24 hr tablet, Take 12.5 mg by mouth Daily., Disp: , Rfl:   •  SYNTHROID 100 MCG tablet, Take 1 tablet by mouth Daily., Disp: 30 tablet, Rfl: 11  •  traMADol (ULTRAM) 50 MG tablet, Take 1 tablet by mouth 4 (Four) Times a Day As Needed for Moderate Pain  for up to 90 days., Disp: 120 tablet, Rfl: 2  •  triamcinolone (KENALOG) 0.1 % cream, Apply 1 application topically As Needed. ITCHING, Disp: , Rfl:   •  Chlorhexidine Gluconate Cloth 2 % pads, Apply 1 application topically Take As Directed. Use as directed  "prior to OR, Disp: , Rfl:   •  Multiple Vitamins-Minerals (PRESERVISION AREDS PO), Take 1 tablet by mouth Daily As Needed. TAKES ONLY OCCASIONALLY, Disp: , Rfl:   •  mupirocin (BACTROBAN) 2 % nasal ointment, 1 application into each nostril Take As Directed. Use as directed prior to OR, Disp: , Rfl:   No current facility-administered medications for this visit.     Allergies:    Allergies   Allergen Reactions   • Ketek [Telithromycin] Hives   • Nsaids Hives   • Sulfa Antibiotics Hives       ROS:  Review of Systems   Musculoskeletal: Positive for arthralgias, gait problem and myalgias.   All other systems reviewed and are negative.      Vitals:    07/19/18 1037   BP: 130/70   BP Location: Left arm   Patient Position: Sitting   Cuff Size: Adult   Temp: 97.7 °F (36.5 °C)   TempSrc: Temporal Artery    Weight: 48.8 kg (107 lb 9.6 oz)   Height: 158.8 cm (62.5\")     Body mass index is 19.37 kg/m².    Physical Exam   Constitutional: She is oriented to person, place, and time. She appears well-developed and well-nourished.   HENT:   Head: Normocephalic and atraumatic.   Mouth/Throat: Oropharynx is clear and moist.   Eyes: Pupils are equal, round, and reactive to light. Conjunctivae and EOM are normal. No scleral icterus.   Neck: No JVD present. Carotid bruit is not present. No tracheal deviation present. No thyromegaly present.   Cardiovascular: Normal rate, regular rhythm, normal heart sounds and intact distal pulses.    Pulses:       Dorsalis pedis pulses are 2+ on the right side, and 2+ on the left side.        Posterior tibial pulses are 2+ on the right side, and 2+ on the left side.   Pulmonary/Chest: Effort normal. No respiratory distress.   Musculoskeletal:        Left hip: She exhibits decreased range of motion, decreased strength and crepitus. She exhibits no swelling.   Left hip has a markedly positive Stinchfield test and a markedly positive logroll test.  Range of motion, particularly internal and external " rotation are very uncomfortable and limited.  Her examination is consistent with end stage osteoarthritis of the left hip.   Neurological: She is alert and oriented to person, place, and time.   Skin: Skin is warm and dry. No rash noted. No cyanosis or erythema. Nails show no clubbing.   Psychiatric: She has a normal mood and affect. Her speech is normal and behavior is normal. Judgment and thought content normal.   Nursing note and vitals reviewed.                Assessment:     Diagnosis Plan   1. Primary osteoarthritis of left hip             Plan:  The patient and I have discussed the procedure, the alternatives including nonsurgical management, the pluses and minuses, risks and benefits of the nonsurgical versus surgical options.  The patient has voiced understanding and wishes to proceed with surgery as scheduled.  We have reviewed that surgery has risks and that surgery has no guarantees.  We have discussed that the risks of surgery include but are not limited to bleeding, infection, injury to nerves, blood vessels, tendons, ligaments or other soft tissue structures, possibly causing permanent pain, numbness, swelling or other problems, as well as iatrogenic fracture.  I advised the patient additionally that there could be problems with the hip, including leg length discrepancy, dislocation or instability, malalignment, malrotation, loosening of the components, any of which could cause the need for additional surgical procedures.  I explained the possibility of skin wound problems, skin breakdown, blood clots, pulmonary embolism, pneumonia, stroke, heart attack, loss of limb, or even death due to these or other complications.  The patient voiced understanding.    We additionally discussed that the alignment and rotation, and possibly the length, of the leg would change.  We discussed that in spite of what appeared to be properly performed surgical procedure the patient may experience permanent pain permanent  stiffness permanent limping and/or permanent swelling.  I also reviewed that if the patient did not do their part to rehabilitate the hip including performing aggressive constant daily work on range of motion that the patient would have a potentially poor outcome.  The patient voiced understanding.  She says that she feels as though she has exhausted nonsurgical management, and wishes to proceed with a left total hip replacement as scheduled next week.  Narcotic counseling was performed in the usual fashion, and I emphasized the risks of narcotic use and the dangers. We discussed the potential for dependency and addiction, and we discussed things to avoid when taking these medications including specific activities to avoid, as well as avoidance of other sedating substances including alcohol or other medications.     A Shaji report was checked and personally reviewed by me, and the patient confirmed that they had reviewed the house bill 1 warnings provided. Appropriate narcotic pain medication was issued.

## 2018-07-24 ENCOUNTER — ANESTHESIA (OUTPATIENT)
Dept: PERIOP | Facility: HOSPITAL | Age: 78
End: 2018-07-24

## 2018-07-24 ENCOUNTER — APPOINTMENT (OUTPATIENT)
Dept: GENERAL RADIOLOGY | Facility: HOSPITAL | Age: 78
End: 2018-07-24

## 2018-07-24 ENCOUNTER — ANESTHESIA EVENT (OUTPATIENT)
Dept: PERIOP | Facility: HOSPITAL | Age: 78
End: 2018-07-24

## 2018-07-24 ENCOUNTER — HOSPITAL ENCOUNTER (INPATIENT)
Facility: HOSPITAL | Age: 78
LOS: 1 days | Discharge: HOME HEALTH | End: 2018-07-25
Attending: ORTHOPAEDIC SURGERY | Admitting: ORTHOPAEDIC SURGERY

## 2018-07-24 DIAGNOSIS — R26.2 DIFFICULTY WALKING: Primary | ICD-10-CM

## 2018-07-24 DIAGNOSIS — M16.12 PRIMARY OSTEOARTHRITIS OF LEFT HIP: ICD-10-CM

## 2018-07-24 PROBLEM — M16.9 OA (OSTEOARTHRITIS) OF HIP: Status: ACTIVE | Noted: 2018-07-24

## 2018-07-24 LAB
ABO GROUP BLD: NORMAL
BLD GP AB SCN SERPL QL: NEGATIVE
RH BLD: POSITIVE
T&S EXPIRATION DATE: NORMAL

## 2018-07-24 PROCEDURE — 25010000002 PROPOFOL 10 MG/ML EMULSION: Performed by: ANESTHESIOLOGY

## 2018-07-24 PROCEDURE — 86900 BLOOD TYPING SEROLOGIC ABO: CPT | Performed by: ORTHOPAEDIC SURGERY

## 2018-07-24 PROCEDURE — 86850 RBC ANTIBODY SCREEN: CPT | Performed by: ORTHOPAEDIC SURGERY

## 2018-07-24 PROCEDURE — 25010000002 DEXAMETHASONE PER 1 MG: Performed by: ANESTHESIOLOGY

## 2018-07-24 PROCEDURE — 94799 UNLISTED PULMONARY SVC/PX: CPT

## 2018-07-24 PROCEDURE — C1776 JOINT DEVICE (IMPLANTABLE): HCPCS | Performed by: ORTHOPAEDIC SURGERY

## 2018-07-24 PROCEDURE — 25010000002 VANCOMYCIN 750 MG RECONSTITUTED SOLUTION: Performed by: ORTHOPAEDIC SURGERY

## 2018-07-24 PROCEDURE — 25010000002 NEOSTIGMINE PER 0.5 MG: Performed by: NURSE ANESTHETIST, CERTIFIED REGISTERED

## 2018-07-24 PROCEDURE — 0SRB06A REPLACEMENT OF LEFT HIP JOINT WITH OXIDIZED ZIRCONIUM ON POLYETHYLENE SYNTHETIC SUBSTITUTE, UNCEMENTED, OPEN APPROACH: ICD-10-PCS | Performed by: ORTHOPAEDIC SURGERY

## 2018-07-24 PROCEDURE — 86901 BLOOD TYPING SEROLOGIC RH(D): CPT | Performed by: ORTHOPAEDIC SURGERY

## 2018-07-24 PROCEDURE — 73501 X-RAY EXAM HIP UNI 1 VIEW: CPT

## 2018-07-24 PROCEDURE — 97110 THERAPEUTIC EXERCISES: CPT

## 2018-07-24 PROCEDURE — 97161 PT EVAL LOW COMPLEX 20 MIN: CPT

## 2018-07-24 PROCEDURE — 25010000002 FENTANYL CITRATE (PF) 100 MCG/2ML SOLUTION: Performed by: ANESTHESIOLOGY

## 2018-07-24 PROCEDURE — 25010000002 PHENYLEPHRINE PER 1 ML: Performed by: ANESTHESIOLOGY

## 2018-07-24 PROCEDURE — 25010000003 CEFAZOLIN IN DEXTROSE 2-4 GM/100ML-% SOLUTION: Performed by: ORTHOPAEDIC SURGERY

## 2018-07-24 PROCEDURE — 27130 TOTAL HIP ARTHROPLASTY: CPT | Performed by: ORTHOPAEDIC SURGERY

## 2018-07-24 PROCEDURE — 25010000002 HYDROMORPHONE PER 4 MG: Performed by: ANESTHESIOLOGY

## 2018-07-24 PROCEDURE — 25010000002 MIDAZOLAM PER 1 MG: Performed by: ANESTHESIOLOGY

## 2018-07-24 PROCEDURE — 25010000002 HYDROMORPHONE PER 4 MG: Performed by: NURSE ANESTHETIST, CERTIFIED REGISTERED

## 2018-07-24 PROCEDURE — 25010000002 ONDANSETRON PER 1 MG: Performed by: NURSE ANESTHETIST, CERTIFIED REGISTERED

## 2018-07-24 DEVICE — SYNERGY POROUS HIGH OFFSET FEMORAL                                    SIZE 10
Type: IMPLANTABLE DEVICE | Site: HIP | Status: FUNCTIONAL
Brand: SYNERGY

## 2018-07-24 DEVICE — OXINIUM FEMORAL HEAD 12/14 TAPER                                    28 MM +0
Type: IMPLANTABLE DEVICE | Site: HIP | Status: FUNCTIONAL
Brand: OXINIUM

## 2018-07-24 DEVICE — REFLECTION SPHERICAL HEAD SCREW 20MM
Type: IMPLANTABLE DEVICE | Site: HIP | Status: FUNCTIONAL
Brand: REFLECTION

## 2018-07-24 DEVICE — R3 20 DEGREE XLPE ACETABULAR LINER                                    28MM INNER DIAMETER X OUTER DIAMETER 46MM
Type: IMPLANTABLE DEVICE | Site: HIP | Status: FUNCTIONAL
Brand: R3

## 2018-07-24 DEVICE — REFLECTION SPHERICAL HEAD SCREW 15MM
Type: IMPLANTABLE DEVICE | Site: HIP | Status: FUNCTIONAL
Brand: REFLECTION

## 2018-07-24 DEVICE — IMPLANTABLE DEVICE: Type: IMPLANTABLE DEVICE | Site: HIP | Status: FUNCTIONAL

## 2018-07-24 DEVICE — R3 3 HOLE ACETABULAR SHELL 46MM
Type: IMPLANTABLE DEVICE | Site: HIP | Status: FUNCTIONAL
Brand: R3, STIKTITE

## 2018-07-24 RX ORDER — GLYCOPYRROLATE 0.2 MG/ML
INJECTION INTRAMUSCULAR; INTRAVENOUS AS NEEDED
Status: DISCONTINUED | OUTPATIENT
Start: 2018-07-24 | End: 2018-07-24 | Stop reason: SURG

## 2018-07-24 RX ORDER — CEFAZOLIN SODIUM 2 G/100ML
2 INJECTION, SOLUTION INTRAVENOUS ONCE
Status: DISCONTINUED | OUTPATIENT
Start: 2018-07-24 | End: 2018-07-24 | Stop reason: HOSPADM

## 2018-07-24 RX ORDER — PROMETHAZINE HYDROCHLORIDE 25 MG/1
12.5 TABLET ORAL ONCE AS NEEDED
Status: DISCONTINUED | OUTPATIENT
Start: 2018-07-24 | End: 2018-07-24 | Stop reason: HOSPADM

## 2018-07-24 RX ORDER — PROMETHAZINE HYDROCHLORIDE 25 MG/1
25 TABLET ORAL ONCE AS NEEDED
Status: DISCONTINUED | OUTPATIENT
Start: 2018-07-24 | End: 2018-07-24 | Stop reason: HOSPADM

## 2018-07-24 RX ORDER — LABETALOL HYDROCHLORIDE 5 MG/ML
5 INJECTION, SOLUTION INTRAVENOUS
Status: DISCONTINUED | OUTPATIENT
Start: 2018-07-24 | End: 2018-07-24 | Stop reason: HOSPADM

## 2018-07-24 RX ORDER — PROMETHAZINE HYDROCHLORIDE 25 MG/1
25 SUPPOSITORY RECTAL ONCE AS NEEDED
Status: DISCONTINUED | OUTPATIENT
Start: 2018-07-24 | End: 2018-07-24 | Stop reason: HOSPADM

## 2018-07-24 RX ORDER — PREGABALIN 75 MG/1
150 CAPSULE ORAL ONCE
Status: COMPLETED | OUTPATIENT
Start: 2018-07-24 | End: 2018-07-24

## 2018-07-24 RX ORDER — ONDANSETRON 2 MG/ML
4 INJECTION INTRAMUSCULAR; INTRAVENOUS EVERY 6 HOURS PRN
Status: DISCONTINUED | OUTPATIENT
Start: 2018-07-24 | End: 2018-07-25 | Stop reason: HOSPADM

## 2018-07-24 RX ORDER — ROCURONIUM BROMIDE 10 MG/ML
INJECTION, SOLUTION INTRAVENOUS AS NEEDED
Status: DISCONTINUED | OUTPATIENT
Start: 2018-07-24 | End: 2018-07-24 | Stop reason: SURG

## 2018-07-24 RX ORDER — DOCUSATE SODIUM 100 MG/1
100 CAPSULE, LIQUID FILLED ORAL 2 TIMES DAILY
Status: DISCONTINUED | OUTPATIENT
Start: 2018-07-24 | End: 2018-07-25 | Stop reason: HOSPADM

## 2018-07-24 RX ORDER — FENTANYL CITRATE 50 UG/ML
INJECTION, SOLUTION INTRAMUSCULAR; INTRAVENOUS AS NEEDED
Status: DISCONTINUED | OUTPATIENT
Start: 2018-07-24 | End: 2018-07-24 | Stop reason: SURG

## 2018-07-24 RX ORDER — METOPROLOL SUCCINATE 25 MG/1
12.5 TABLET, EXTENDED RELEASE ORAL DAILY
Status: DISCONTINUED | OUTPATIENT
Start: 2018-07-24 | End: 2018-07-25 | Stop reason: HOSPADM

## 2018-07-24 RX ORDER — LIDOCAINE HYDROCHLORIDE 10 MG/ML
0.5 INJECTION, SOLUTION EPIDURAL; INFILTRATION; INTRACAUDAL; PERINEURAL ONCE AS NEEDED
Status: DISCONTINUED | OUTPATIENT
Start: 2018-07-24 | End: 2018-07-24 | Stop reason: HOSPADM

## 2018-07-24 RX ORDER — HYDROCODONE BITARTRATE AND ACETAMINOPHEN 7.5; 325 MG/1; MG/1
2 TABLET ORAL EVERY 4 HOURS PRN
Status: DISCONTINUED | OUTPATIENT
Start: 2018-07-24 | End: 2018-07-25 | Stop reason: HOSPADM

## 2018-07-24 RX ORDER — NALOXONE HCL 0.4 MG/ML
0.2 VIAL (ML) INJECTION AS NEEDED
Status: DISCONTINUED | OUTPATIENT
Start: 2018-07-24 | End: 2018-07-24 | Stop reason: HOSPADM

## 2018-07-24 RX ORDER — ONDANSETRON 4 MG/1
4 TABLET, FILM COATED ORAL EVERY 6 HOURS PRN
Status: DISCONTINUED | OUTPATIENT
Start: 2018-07-24 | End: 2018-07-25 | Stop reason: HOSPADM

## 2018-07-24 RX ORDER — OXYCODONE AND ACETAMINOPHEN 7.5; 325 MG/1; MG/1
1 TABLET ORAL ONCE AS NEEDED
Status: DISCONTINUED | OUTPATIENT
Start: 2018-07-24 | End: 2018-07-24 | Stop reason: HOSPADM

## 2018-07-24 RX ORDER — DEXAMETHASONE SODIUM PHOSPHATE 10 MG/ML
INJECTION INTRAMUSCULAR; INTRAVENOUS AS NEEDED
Status: DISCONTINUED | OUTPATIENT
Start: 2018-07-24 | End: 2018-07-24 | Stop reason: SURG

## 2018-07-24 RX ORDER — HYDROMORPHONE HCL 110MG/55ML
PATIENT CONTROLLED ANALGESIA SYRINGE INTRAVENOUS AS NEEDED
Status: DISCONTINUED | OUTPATIENT
Start: 2018-07-24 | End: 2018-07-24 | Stop reason: SURG

## 2018-07-24 RX ORDER — TRANEXAMIC ACID 100 MG/ML
INJECTION, SOLUTION INTRAVENOUS AS NEEDED
Status: DISCONTINUED | OUTPATIENT
Start: 2018-07-24 | End: 2018-07-24 | Stop reason: SURG

## 2018-07-24 RX ORDER — FLUMAZENIL 0.1 MG/ML
0.2 INJECTION INTRAVENOUS AS NEEDED
Status: DISCONTINUED | OUTPATIENT
Start: 2018-07-24 | End: 2018-07-24 | Stop reason: HOSPADM

## 2018-07-24 RX ORDER — PROPOFOL 10 MG/ML
VIAL (ML) INTRAVENOUS AS NEEDED
Status: DISCONTINUED | OUTPATIENT
Start: 2018-07-24 | End: 2018-07-24 | Stop reason: SURG

## 2018-07-24 RX ORDER — MIDAZOLAM HYDROCHLORIDE 1 MG/ML
1 INJECTION INTRAMUSCULAR; INTRAVENOUS
Status: DISCONTINUED | OUTPATIENT
Start: 2018-07-24 | End: 2018-07-24 | Stop reason: HOSPADM

## 2018-07-24 RX ORDER — ACETAMINOPHEN 500 MG
500 TABLET ORAL EVERY 4 HOURS PRN
Status: DISCONTINUED | OUTPATIENT
Start: 2018-07-24 | End: 2018-07-25 | Stop reason: HOSPADM

## 2018-07-24 RX ORDER — ONDANSETRON 2 MG/ML
INJECTION INTRAMUSCULAR; INTRAVENOUS AS NEEDED
Status: DISCONTINUED | OUTPATIENT
Start: 2018-07-24 | End: 2018-07-24 | Stop reason: SURG

## 2018-07-24 RX ORDER — UREA 10 %
3 LOTION (ML) TOPICAL NIGHTLY PRN
Status: DISCONTINUED | OUTPATIENT
Start: 2018-07-24 | End: 2018-07-25 | Stop reason: HOSPADM

## 2018-07-24 RX ORDER — FENTANYL CITRATE 50 UG/ML
50 INJECTION, SOLUTION INTRAMUSCULAR; INTRAVENOUS
Status: DISCONTINUED | OUTPATIENT
Start: 2018-07-24 | End: 2018-07-24 | Stop reason: HOSPADM

## 2018-07-24 RX ORDER — EPHEDRINE SULFATE 50 MG/ML
5 INJECTION, SOLUTION INTRAVENOUS ONCE AS NEEDED
Status: DISCONTINUED | OUTPATIENT
Start: 2018-07-24 | End: 2018-07-24 | Stop reason: HOSPADM

## 2018-07-24 RX ORDER — PROMETHAZINE HYDROCHLORIDE 25 MG/ML
12.5 INJECTION, SOLUTION INTRAMUSCULAR; INTRAVENOUS ONCE AS NEEDED
Status: DISCONTINUED | OUTPATIENT
Start: 2018-07-24 | End: 2018-07-24 | Stop reason: HOSPADM

## 2018-07-24 RX ORDER — SODIUM CHLORIDE, SODIUM LACTATE, POTASSIUM CHLORIDE, CALCIUM CHLORIDE 600; 310; 30; 20 MG/100ML; MG/100ML; MG/100ML; MG/100ML
9 INJECTION, SOLUTION INTRAVENOUS CONTINUOUS
Status: DISCONTINUED | OUTPATIENT
Start: 2018-07-24 | End: 2018-07-25 | Stop reason: HOSPADM

## 2018-07-24 RX ORDER — HYDROCODONE BITARTRATE AND ACETAMINOPHEN 7.5; 325 MG/1; MG/1
1 TABLET ORAL ONCE AS NEEDED
Status: DISCONTINUED | OUTPATIENT
Start: 2018-07-24 | End: 2018-07-24 | Stop reason: HOSPADM

## 2018-07-24 RX ORDER — LEVOTHYROXINE SODIUM 0.1 MG/1
100 TABLET ORAL DAILY
Status: DISCONTINUED | OUTPATIENT
Start: 2018-07-24 | End: 2018-07-25 | Stop reason: HOSPADM

## 2018-07-24 RX ORDER — HYDROCODONE BITARTRATE AND ACETAMINOPHEN 7.5; 325 MG/1; MG/1
1 TABLET ORAL EVERY 4 HOURS PRN
Status: DISCONTINUED | OUTPATIENT
Start: 2018-07-24 | End: 2018-07-25 | Stop reason: HOSPADM

## 2018-07-24 RX ORDER — ONDANSETRON 4 MG/1
4 TABLET, ORALLY DISINTEGRATING ORAL EVERY 6 HOURS PRN
Status: DISCONTINUED | OUTPATIENT
Start: 2018-07-24 | End: 2018-07-25 | Stop reason: HOSPADM

## 2018-07-24 RX ORDER — HYDROMORPHONE HYDROCHLORIDE 1 MG/ML
0.5 INJECTION, SOLUTION INTRAMUSCULAR; INTRAVENOUS; SUBCUTANEOUS
Status: DISCONTINUED | OUTPATIENT
Start: 2018-07-24 | End: 2018-07-24 | Stop reason: HOSPADM

## 2018-07-24 RX ORDER — ONDANSETRON 2 MG/ML
4 INJECTION INTRAMUSCULAR; INTRAVENOUS ONCE AS NEEDED
Status: DISCONTINUED | OUTPATIENT
Start: 2018-07-24 | End: 2018-07-24 | Stop reason: HOSPADM

## 2018-07-24 RX ORDER — MIDAZOLAM HYDROCHLORIDE 1 MG/ML
2 INJECTION INTRAMUSCULAR; INTRAVENOUS
Status: DISCONTINUED | OUTPATIENT
Start: 2018-07-24 | End: 2018-07-24 | Stop reason: HOSPADM

## 2018-07-24 RX ORDER — DIPHENHYDRAMINE HYDROCHLORIDE 50 MG/ML
12.5 INJECTION INTRAMUSCULAR; INTRAVENOUS
Status: DISCONTINUED | OUTPATIENT
Start: 2018-07-24 | End: 2018-07-24 | Stop reason: HOSPADM

## 2018-07-24 RX ORDER — CEFAZOLIN SODIUM 2 G/100ML
2 INJECTION, SOLUTION INTRAVENOUS EVERY 8 HOURS
Status: COMPLETED | OUTPATIENT
Start: 2018-07-24 | End: 2018-07-24

## 2018-07-24 RX ORDER — SODIUM CHLORIDE 0.9 % (FLUSH) 0.9 %
1-10 SYRINGE (ML) INJECTION AS NEEDED
Status: DISCONTINUED | OUTPATIENT
Start: 2018-07-24 | End: 2018-07-24 | Stop reason: HOSPADM

## 2018-07-24 RX ORDER — FAMOTIDINE 10 MG/ML
20 INJECTION, SOLUTION INTRAVENOUS ONCE
Status: COMPLETED | OUTPATIENT
Start: 2018-07-24 | End: 2018-07-24

## 2018-07-24 RX ADMIN — GLYCOPYRROLATE 0.4 MG: 0.2 INJECTION INTRAMUSCULAR; INTRAVENOUS at 11:06

## 2018-07-24 RX ADMIN — FENTANYL CITRATE 25 MCG: 50 INJECTION INTRAMUSCULAR; INTRAVENOUS at 08:42

## 2018-07-24 RX ADMIN — PHENYLEPHRINE HYDROCHLORIDE 50 MCG: 10 INJECTION INTRAVENOUS at 09:20

## 2018-07-24 RX ADMIN — POLYETHYLENE GLYCOL 3350 17 G: 17 POWDER, FOR SOLUTION ORAL at 20:19

## 2018-07-24 RX ADMIN — HYDROMORPHONE HYDROCHLORIDE 0.25 MG: 2 INJECTION INTRAMUSCULAR; INTRAVENOUS; SUBCUTANEOUS at 11:25

## 2018-07-24 RX ADMIN — CEFAZOLIN SODIUM 2 G: 2 INJECTION, SOLUTION INTRAVENOUS at 23:09

## 2018-07-24 RX ADMIN — HYDROMORPHONE HYDROCHLORIDE 0.25 MG: 2 INJECTION INTRAMUSCULAR; INTRAVENOUS; SUBCUTANEOUS at 11:20

## 2018-07-24 RX ADMIN — PHENYLEPHRINE HYDROCHLORIDE 100 MCG: 10 INJECTION INTRAVENOUS at 09:50

## 2018-07-24 RX ADMIN — DOCUSATE SODIUM 100 MG: 100 CAPSULE, LIQUID FILLED ORAL at 20:19

## 2018-07-24 RX ADMIN — MUPIROCIN 10 APPLICATION: 20 OINTMENT TOPICAL at 20:19

## 2018-07-24 RX ADMIN — MIDAZOLAM 2 MG: 1 INJECTION INTRAMUSCULAR; INTRAVENOUS at 08:17

## 2018-07-24 RX ADMIN — ROCURONIUM BROMIDE 5 MG: 10 INJECTION INTRAVENOUS at 09:20

## 2018-07-24 RX ADMIN — HYDROMORPHONE HYDROCHLORIDE 0.5 MG: 1 INJECTION, SOLUTION INTRAMUSCULAR; INTRAVENOUS; SUBCUTANEOUS at 13:46

## 2018-07-24 RX ADMIN — HYDROMORPHONE HYDROCHLORIDE 0.5 MG: 1 INJECTION, SOLUTION INTRAMUSCULAR; INTRAVENOUS; SUBCUTANEOUS at 11:36

## 2018-07-24 RX ADMIN — PHENYLEPHRINE HYDROCHLORIDE 100 MCG: 10 INJECTION INTRAVENOUS at 09:43

## 2018-07-24 RX ADMIN — SODIUM CHLORIDE, POTASSIUM CHLORIDE, SODIUM LACTATE AND CALCIUM CHLORIDE: 600; 310; 30; 20 INJECTION, SOLUTION INTRAVENOUS at 10:03

## 2018-07-24 RX ADMIN — PHENYLEPHRINE HYDROCHLORIDE 100 MCG: 10 INJECTION INTRAVENOUS at 10:10

## 2018-07-24 RX ADMIN — ONDANSETRON 4 MG: 2 INJECTION INTRAMUSCULAR; INTRAVENOUS at 10:46

## 2018-07-24 RX ADMIN — SODIUM CHLORIDE, POTASSIUM CHLORIDE, SODIUM LACTATE AND CALCIUM CHLORIDE: 600; 310; 30; 20 INJECTION, SOLUTION INTRAVENOUS at 08:34

## 2018-07-24 RX ADMIN — PREGABALIN 150 MG: 75 CAPSULE ORAL at 08:00

## 2018-07-24 RX ADMIN — SODIUM CHLORIDE, POTASSIUM CHLORIDE, SODIUM LACTATE AND CALCIUM CHLORIDE 9 ML/HR: 600; 310; 30; 20 INJECTION, SOLUTION INTRAVENOUS at 12:22

## 2018-07-24 RX ADMIN — SODIUM CHLORIDE 750 MG: 900 INJECTION, SOLUTION INTRAVENOUS at 23:11

## 2018-07-24 RX ADMIN — DEXAMETHASONE SODIUM PHOSPHATE 4 MG: 10 INJECTION INTRAMUSCULAR; INTRAVENOUS at 08:49

## 2018-07-24 RX ADMIN — ROCURONIUM BROMIDE 20 MG: 10 INJECTION INTRAVENOUS at 08:36

## 2018-07-24 RX ADMIN — TRANEXAMIC ACID 1000 MG: 100 INJECTION, SOLUTION INTRAVENOUS at 09:06

## 2018-07-24 RX ADMIN — HYDROMORPHONE HYDROCHLORIDE 0.5 MG: 1 INJECTION, SOLUTION INTRAMUSCULAR; INTRAVENOUS; SUBCUTANEOUS at 12:16

## 2018-07-24 RX ADMIN — PHENYLEPHRINE HYDROCHLORIDE 100 MCG: 10 INJECTION INTRAVENOUS at 09:34

## 2018-07-24 RX ADMIN — SODIUM CHLORIDE, POTASSIUM CHLORIDE, SODIUM LACTATE AND CALCIUM CHLORIDE 500 ML: 600; 310; 30; 20 INJECTION, SOLUTION INTRAVENOUS at 08:01

## 2018-07-24 RX ADMIN — FAMOTIDINE 20 MG: 10 INJECTION, SOLUTION INTRAVENOUS at 08:16

## 2018-07-24 RX ADMIN — PHENYLEPHRINE HYDROCHLORIDE 100 MCG: 10 INJECTION INTRAVENOUS at 09:04

## 2018-07-24 RX ADMIN — HYDROCODONE BITARTRATE AND ACETAMINOPHEN 1 TABLET: 7.5; 325 TABLET ORAL at 20:17

## 2018-07-24 RX ADMIN — FENTANYL CITRATE 25 MCG: 50 INJECTION INTRAMUSCULAR; INTRAVENOUS at 11:05

## 2018-07-24 RX ADMIN — PHENYLEPHRINE HYDROCHLORIDE 50 MCG: 10 INJECTION INTRAVENOUS at 09:01

## 2018-07-24 RX ADMIN — FENTANYL CITRATE 25 MCG: 50 INJECTION INTRAMUSCULAR; INTRAVENOUS at 08:34

## 2018-07-24 RX ADMIN — NEOSTIGMINE METHYLSULFATE 3 MG: 1 INJECTION INTRAMUSCULAR; INTRAVENOUS; SUBCUTANEOUS at 11:06

## 2018-07-24 RX ADMIN — PHENYLEPHRINE HYDROCHLORIDE 100 MCG: 10 INJECTION INTRAVENOUS at 11:14

## 2018-07-24 RX ADMIN — SODIUM CHLORIDE 750 MG: 900 INJECTION, SOLUTION INTRAVENOUS at 08:00

## 2018-07-24 RX ADMIN — FENTANYL CITRATE 25 MCG: 50 INJECTION INTRAMUSCULAR; INTRAVENOUS at 10:28

## 2018-07-24 RX ADMIN — CEFAZOLIN SODIUM 2 G: 2 INJECTION, SOLUTION INTRAVENOUS at 16:21

## 2018-07-24 RX ADMIN — PHENYLEPHRINE HYDROCHLORIDE 200 MCG: 10 INJECTION INTRAVENOUS at 10:48

## 2018-07-24 RX ADMIN — PROPOFOL 100 MG: 10 INJECTION, EMULSION INTRAVENOUS at 08:36

## 2018-07-24 NOTE — THERAPY EVALUATION
Acute Care - Physical Therapy Initial Evaluation  [facility]     Patient Name: Bailee Garza  : 1940  MRN: [id number]  Today's Date: 2018   Onset of Illness/Injury or Date of Surgery: 18  Date of Referral to PT: 18  Referring Physician: Justen Erazo      Admit Date: 2018    Visit Dx:     ICD-10-CM ICD-9-CM   1. Difficulty walking R26.2 719.7   2. Primary osteoarthritis of left hip M16.12 715.15     Patient Active Problem List   Diagnosis   • Chronic midline low back pain without sciatica   • Essential hypertension   • Hearing loss   • Hypothyroidism, acquired   • IBS (irritable bowel syndrome)   • Chronic nonseasonal allergic rhinitis due to pollen   • Chronic kidney disease, stage III (moderate)   • Arthralgia of right knee   • Chronic cough   • Abnormal weight loss   • Abnormal CXR   • Exertional shortness of breath   • Arthritis of hip, left   • DDD (degenerative disc disease), lumbosacral   • Primary osteoarthritis of left hip   • Closed nondisplaced fracture of medial cuneiform of left foot   • Left lumbar radiculopathy   • Numbness of left anterior thigh   • Scoliosis due to degenerative disease of spine in adult patient   • Degenerative disc disease, lumbar   • OA (osteoarthritis) of hip     Past Medical History:   Diagnosis Date   • Allergic    • Arthropathy of pelvic region and thigh 2012    unspecified   • Back pain 2007   • Chronic back pain 2009   • Constipation 2015    unspecified   • Diverticulosis    • Dyspepsia 2008   • Essential hypertension 2007   • 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), 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   • Hearing loss 2008   • Hip pain, left    • History of bone density study 2015   • History of pneumonia     2017   • History of skin cancer    • Hypothyroidism, acquired 2007   • Insomnia 2015    unspecified   • Intervertebral disc disorder  with myelopathy, cervical region 07/22/2010   • Rhinitis, allergic 11/20/2007   • Scoliosis    • Screening breast examination 11/20/2007   • Stress 04/27/2015    other acute reactions to stress   • Stress incontinence    • Urticaria 06/29/2015     Past Surgical History:   Procedure Laterality Date   • BREAST BIOPSY Right     50+ years ago   • BREAST LUMPECTOMY      BENIGN   • BRONCHOSCOPY N/A 12/21/2017    Procedure: BRONCHOSCOPY;  Surgeon: Mario Alanis MD;  Location: [facility] ENDOSCOPY;  Service:    • CATARACT EXTRACTION, BILATERAL     • HYSTERECTOMY          PT ASSESSMENT (last 12 hours)      Physical Therapy Evaluation     Row Name 07/24/18 1551          PT Evaluation Time/Intention    Subjective Information complains of;weakness;fatigue;pain;dizziness  -MS     Document Type evaluation  -MS     Patient Effort fair  -MS     Comment Pt. very groggy this PM, barely able to keep her eyes open and follow commands.  -MS     Row Name 07/24/18 1559          General Information    Onset of Illness/Injury or Date of Surgery 07/24/18  -MS     Referring Physician Justen Erazo  -MS     Patient Observations agree to therapy;lethargic  -MS     Prior Level of Function independent:  -MS     Equipment Currently Used at Home none  -MS     Pertinent History of Current Functional Problem Pt. is s/p Left Posterior THR  -MS     Existing Precautions/Restrictions fall;hip, posterior   Left L.E.  -MS     Equipment Ordered for Patient --   Pt. already owns a Rwx for home use.  -MS     Risks Reviewed patient and family:  -MS     Benefits Reviewed patient and family:  -MS     Barriers to Rehab --   Pt. is very groggy this PM.  -MS     Row Name 07/24/18 1559          Cognitive Assessment/Intervention- PT/OT    Orientation Status (Cognition) oriented to;person;verbal cues/prompts needed for orientation  -MS     Follows Commands (Cognition) follows one step commands;75-90% accuracy;delayed response/completion;physical/tactile prompts  required;verbal cues/prompting required  -MS     Personal Safety Interventions fall prevention program maintained;gait belt;nonskid shoes/slippers when out of bed;supervised activity  -MS     [facility] Name 07/24/18 1551          Mobility Assessment/Treatment    Extremity Weight-bearing Status left lower extremity  -MS     Left Lower Extremity (Weight-bearing Status) weight-bearing as tolerated (WBAT)  -MS     [facility] Name 07/24/18 1551          Bed Mobility Assessment/Treatment    Bed Mobility Assessment/Treatment supine-sit;sit-supine  -MS     Supine-Sit [place] (Bed Mobility) moderate assist (50% patient effort);2 person assist  -MS     Sit-Supine [place] (Bed Mobility) moderate assist (50% patient effort);2 person assist  -MS     Assistive Device (Bed Mobility) draw sheet  -MS     Comment (Bed Mobility) Once sitting EOB, pt. could not keep her eyes open enough to maintain a sitting balance without Mod. assist.  Had to return pt. to supine in bed. Nursing notified.  -MS     [facility] Name 07/24/18 1551          General ROM    GENERAL ROM COMMENTS BUE/RLE (WFL's)  -MS     [facility] Name 07/24/18 1551          General Assessment (Manual Muscle Testing)    Comment, General Manual Muscle Testing (MMT) Assessment BUE/RLE (>/= 3/5)  -MS     [facility] Name 07/24/18 1551          Therapeutic Exercise    Comment (Therapeutic Exercise) Left THR protocol x 10 reps completed with assist.  -MS     [facility] Name 07/24/18 1551          Pain Assessment    Additional Documentation Pain Scale: Numbers Pre/Post-Treatment (Group);Pain Scale: FACES Pre/Post-Treatment (Group)  -MS     Row Name 07/24/18 1551          Pain Scale: Numbers Pre/Post-Treatment    Pain Location - Side Left  -MS     Pain Location hip  -MS     Pain Intervention(s) Medication (See MAR);Cold applied;Repositioned;Elevated;Rest  -MS     [facility] Name 07/24/18 1551          Pain Scale: FACES Pre/Post-Treatment    Pain: FACES Scale, Pretreatment 2-->hurts little bit  -MS     Pain: FACES Scale,  Post-Treatment 2-->hurts little bit  -MS     Row Name             Wound 07/24/18 1030 Left hip incision    Wound - Properties Group Date first assessed: 07/24/18  -SW Time first assessed: 1030  -SW Side: Left  -SW Location: hip  -SW Type: incision  -SW    Row Name 07/24/18 1551          Physical Therapy Clinical Impression    Date of Referral to PT 07/24/18  -MS     Criteria for Skilled Interventions Met (PT Clinical Impression) treatment indicated  -MS     Rehab Potential (PT Clinical Summary) good, to achieve stated therapy goals  -MS     Row Name 07/24/18 1551          Physical Therapy Goals    Bed Mobility Goal Selection (PT) bed mobility, PT goal 1  -MS     Transfer Goal Selection (PT) transfer, PT goal 1  -MS     Gait Training Goal Selection (PT) gait training, PT goal 1  -MS     Stairs Goal Selection (PT) stairs, PT goal 1  -MS     Additional Documentation Stairs Goal Selection (PT) (Row)  -MS     Row Name 07/24/18 1551          Bed Mobility Goal 1 (PT)    Activity/Assistive Device (Bed Mobility Goal 1, PT) bed mobility activities, all  -MS     [place] Level/Cues Needed (Bed Mobility Goal 1, PT) contact guard assist  -MS     Time Frame (Bed Mobility Goal 1, PT) long term goal (LTG);3 days  -MS     Row Name 07/24/18 1551          Transfer Goal 1 (PT)    Activity/Assistive Device (Transfer Goal 1, PT) transfers, all;walker, rolling  -MS     [place] Level/Cues Needed (Transfer Goal 1, PT) independent  -MS     Time Frame (Transfer Goal 1, PT) long term goal (LTG);2 - 3 days  -MS     Row Name 07/24/18 1551          Gait Training Goal 1 (PT)    Activity/Assistive Device (Gait Training Goal 1, PT) gait (walking locomotion);walker, rolling  -MS     [place] Level (Gait Training Goal 1, PT) independent  -MS     Distance (Gait Goal 1, PT) 100 feet  -MS     Time Frame (Gait Training Goal 1, PT) long term goal (LTG);2 - 3 days  -MS     Row Name 07/24/18 1551          Stairs Goal 1 (PT)    Activity/Assistive  Device (Stairs Goal 1, PT) stairs, all skills  -MS     [place] Level/Cues Needed (Stairs Goal 1, PT) contact guard assist  -MS     Number of Stairs (Stairs Goal 1, PT) 3   with handrail  -MS     Time Frame (Stairs Goal 1, PT) long term goal (LTG);3 days  -MS     Row Name 07/24/18 9178          Positioning and Restraints    Pre-Treatment Position in bed  -MS     Post Treatment Position bed  -MS     In Bed notified nsg;supine;call light within reach;encouraged to call for assist;exit alarm on;with family/caregiver;ABD pillow   All lines intact. Ice pack to Left hip.  -MS       User Key  (r) = Recorded By, (t) = Taken By, (c) = Cosigned By    Initials Name Provider Type    MS Shaquille Donnelly, PT Physical Therapist    NADIRA Robles RN Registered Nurse          Physical Therapy Education     Title: PT OT SLP Therapies (Done)     Topic: Physical Therapy (Done)     Point: Mobility training (Done)    Learning Progress Summary     Learner Status Readiness Method Response Comment Documented by    Patient Done Acceptance ANDERSON RICE NR  MS 07/24/18 1600          Point: Home exercise program (Done)    Learning Progress Summary     Learner Status Readiness Method Response Comment Documented by    Patient Done Acceptance ANDERSON RICE NR  MS 07/24/18 1600          Point: Body mechanics (Done)    Learning Progress Summary     Learner Status Readiness Method Response Comment Documented by    Patient Done Acceptance ANDERSON RICE NR  MS 07/24/18 1600          Point: Precautions (Done)    Learning Progress Summary     Learner Status Readiness Method Response Comment Documented by    Patient Done Acceptance ANDERSON RICE NR  MS 07/24/18 1600                      User Key     Initials Effective Dates Name Provider Type Discipline    MS 04/03/18 -  Shaquille Donnelly, PT Physical Therapist PT                PT Recommendation and Plan  Anticipated Discharge Disposition (PT): home with home health, home with assist  Planned Therapy Interventions (PT  Lidiaal): balance training, bed mobility training, gait training, home exercise program, patient/family education, postural re-education, strengthening, transfer training  Therapy Frequency (PT Clinical Impression): 2 times/day  Outcome Summary/Treatment Plan (PT)  Anticipated Discharge Disposition (PT): home with home health, home with assist  Plan of Care Reviewed With: patient, family  Outcome Summary: Pt. will benefit from skilled inpt. P.T. to address her functional deficits and to assist pt. in regaining her maximum level of independence with functional mobility.           Outcome Measures     Row Name 07/24/18 1600             How much help from another person do you currently need...    Turning from your back to your side while in flat bed without using bedrails? 2  -MS      Moving from lying on back to sitting on the side of a flat bed without bedrails? 2  -MS      Moving to and from a bed to a chair (including a wheelchair)? 2  -MS      Standing up from a chair using your arms (e.g., wheelchair, bedside chair)? 2  -MS      Climbing 3-5 steps with a railing? 2  -MS      To walk in hospital room? 2  -MS      AM-PAC 6 Clicks Score 12  -MS         Functional Assessment    Outcome Measure Options AM-PAC 6 Clicks Basic Mobility (PT)  -MS        User Key  (r) = Recorded By, (t) = Taken By, (c) = Cosigned By    Initials Name Provider Type    MS Shaquille Donnelly, PT Physical Therapist           Time Calculation:         PT Charges     Row Name 07/24/18 1602             Time Calculation    Start Time 1535  -MS      Stop Time 1554  -MS      Time Calculation (min) 19 min  -MS      PT Received On 07/24/18  -MS      PT - Next Appointment 07/25/18  -MS      PT Goal Re-Cert Due Date 07/26/18  -MS         Time Calculation- PT    Total Timed Code Minutes- PT 12 minute(s)  -MS        User Key  (r) = Recorded By, (t) = Taken By, (c) = Cosigned By    Initials Name Provider Type    MS Shaquille Donnelly, PT Physical Therapist         Therapy Suggested Charges     Code   Minutes Charges    None           Therapy Charges for Today     Code Description Service Date Service Provider Modifiers Qty    59661477831 HC PT EVAL LOW COMPLEXITY 1 7/24/2018 Shaquille Donnelly, PT GP 1    44154409743 HC PT THER PROC EA 15 MIN 7/24/2018 hSaquille Donnelly, PT GP 1    42536203615 HC PT THER SUPP EA 15 MIN 7/24/2018 Shaquille Donnelly, PT GP 1          PT G-Codes  Outcome Measure Options: AM-PAC 6 Clicks Basic Mobility (PT)      Shaquille Donnelly, PT  7/24/2018

## 2018-07-24 NOTE — ANESTHESIA PROCEDURE NOTES
Airway  Urgency: elective      General Information and Staff    Patient location during procedure: OR  Anesthesiologist: NEELAM CLIFTON    Indications and Patient Condition  Indications for airway management: airway protection    Preoxygenated: yes  MILS maintained throughout  Mask difficulty assessment: 1 - vent by mask    Final Airway Details  Final airway type: endotracheal airway      Successful airway: ETT  Cuffed: yes   Successful intubation technique: direct laryngoscopy  Endotracheal tube insertion site: oral  Blade: Hans  Blade size: #3  ETT size: 7.0 mm  Cormack-Lehane Classification: grade I - full view of glottis  Placement verified by: capnometry   Measured from: teeth  ETT to teeth (cm): 19  Number of attempts at approach: 1

## 2018-07-24 NOTE — PLAN OF CARE
Problem: Patient Care Overview  Goal: Plan of Care Review  Outcome: Ongoing (interventions implemented as appropriate)   07/24/18 6933   Coping/Psychosocial   Plan of Care Reviewed With patient   OTHER   Outcome Summary pain controlled with PO pain medication at this time. unable to ambulate with PT r/t drowziness. DC home with home health when ready. educated about BP monitoring.    Plan of Care Review   Progress no change     Goal: Individualization and Mutuality  Outcome: Ongoing (interventions implemented as appropriate)    Goal: Discharge Needs Assessment  Outcome: Ongoing (interventions implemented as appropriate)    Goal: Interprofessional Rounds/Family Conf  Outcome: Ongoing (interventions implemented as appropriate)      Problem: Fall Risk (Adult)  Goal: Identify Related Risk Factors and Signs and Symptoms  Outcome: Outcome(s) achieved Date Met: 07/24/18    Goal: Absence of Fall  Outcome: Ongoing (interventions implemented as appropriate)      Problem: Hip Arthroplasty (Total, Partial) (Adult)  Goal: Signs and Symptoms of Listed Potential Problems Will be Absent, Minimized or Managed (Hip Arthroplasty)  Outcome: Ongoing (interventions implemented as appropriate)    Goal: Anesthesia/Sedation Recovery  Outcome: Ongoing (interventions implemented as appropriate)

## 2018-07-24 NOTE — H&P (VIEW-ONLY)
Patient:  Bailee Garza is a 78 y.o. female    Chief Complaint/ Reason for Visit:    Chief Complaint   Patient presents with   • Left Hip - Pain, Pre-op Exam       HPI:  The patient presents today for preoperative discussion and history and physical examination.  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  As is well documented in her chart, she has undergone extensive and comprehensive nonsurgical management of this left hip and groin pain.  She is also seen Dr. Jurado, our spine specialist, who does not believe that her lumbar spine pathology is the major contributing factor to her left hip and groin pain, but rather believes that she should proceed with total hip replacement as planned.  The patient must use a cane 100% of the time for gait now due to her discomfort, and is becoming increasingly concerned about the deleterious effect that the left hip osteoarthritic pain is having on her comfort, quality life, activity level, and ability to maintain her previously relatively high levels of activity and fitness.    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   • Hearing loss 2008   • Hip pain, left    • History of bone density study 2015   • History of pneumonia     2017   • History of skin cancer    • Hypothyroidism, acquired 2007   • Insomnia 2015    unspecified   • Intervertebral disc disorder with myelopathy, cervical region 2010   • Rhinitis, allergic 2007   • Scoliosis    • Screening breast examination 2007    • Stress 04/27/2015    other acute reactions to stress   • Stress incontinence    • Urticaria 06/29/2015       PSH:    Past Surgical History:   Procedure Laterality Date   • BREAST BIOPSY Right     50+ years ago   • BREAST LUMPECTOMY      BENIGN   • BRONCHOSCOPY N/A 12/21/2017    Procedure: BRONCHOSCOPY;  Surgeon: Mario Alanis MD;  Location: [facility] ENDOSCOPY;  Service:    • CATARACT EXTRACTION, BILATERAL     • HYSTERECTOMY         Social Hx:    Social History     Social History   • Marital status:      Spouse name: N/A   • Number of children: N/A   • Years of education: N/A     Occupational History   • Not on file.     Social History Main Topics   • Smoking status: Never Smoker   • Smokeless tobacco: Never Used   • Alcohol use No   • Drug use: No   • Sexual activity: Defer     Other Topics Concern   • Not on file     Social History Narrative   • No narrative on file       Family Hx:    Family History   Problem Relation Age of Onset   • Heart disease Mother    • Cancer Father    • Asthma Paternal Grandfather    • Malig Hyperthermia Neg Hx        Meds:    Current Outpatient Prescriptions:   •  acetaminophen (TYLENOL) 500 MG tablet, Take 500 mg by mouth Every 4 (Four) Hours As Needed for Mild Pain ., Disp: , Rfl:   •  Chlorcyclizine-Pseudoephed (STAHIST AD) 25-60 MG tablet, Take 1 tablet by mouth Every 8 (Eight) Hours As Needed (0)., Disp: 42 tablet, Rfl: 0  •  metoprolol succinate XL (TOPROL-XL) 25 MG 24 hr tablet, Take 12.5 mg by mouth Daily., Disp: , Rfl:   •  SYNTHROID 100 MCG tablet, Take 1 tablet by mouth Daily., Disp: 30 tablet, Rfl: 11  •  traMADol (ULTRAM) 50 MG tablet, Take 1 tablet by mouth 4 (Four) Times a Day As Needed for Moderate Pain  for up to 90 days., Disp: 120 tablet, Rfl: 2  •  triamcinolone (KENALOG) 0.1 % cream, Apply 1 application topically As Needed. ITCHING, Disp: , Rfl:   •  Chlorhexidine Gluconate Cloth 2 % pads, Apply 1 application topically Take As Directed. Use as directed  "prior to OR, Disp: , Rfl:   •  Multiple Vitamins-Minerals (PRESERVISION AREDS PO), Take 1 tablet by mouth Daily As Needed. TAKES ONLY OCCASIONALLY, Disp: , Rfl:   •  mupirocin (BACTROBAN) 2 % nasal ointment, 1 application into each nostril Take As Directed. Use as directed prior to OR, Disp: , Rfl:   No current facility-administered medications for this visit.     Allergies:    Allergies   Allergen Reactions   • Ketek [Telithromycin] Hives   • Nsaids Hives   • Sulfa Antibiotics Hives       ROS:  Review of Systems   Musculoskeletal: Positive for arthralgias, gait problem and myalgias.   All other systems reviewed and are negative.      Vitals:    07/19/18 1037   BP: 130/70   BP Location: Left arm   Patient Position: Sitting   Cuff Size: Adult   Temp: 97.7 °F (36.5 °C)   TempSrc: Temporal Artery    Weight: 48.8 kg (107 lb 9.6 oz)   Height: 158.8 cm (62.5\")     Body mass index is 19.37 kg/m².    Physical Exam   Constitutional: She is oriented to person, place, and time. She appears well-developed and well-nourished.   HENT:   Head: Normocephalic and atraumatic.   Mouth/Throat: Oropharynx is clear and moist.   Eyes: Pupils are equal, round, and reactive to light. Conjunctivae and EOM are normal. No scleral icterus.   Neck: No JVD present. Carotid bruit is not present. No tracheal deviation present. No thyromegaly present.   Cardiovascular: Normal rate, regular rhythm, normal heart sounds and intact distal pulses.    Pulses:       Dorsalis pedis pulses are 2+ on the right side, and 2+ on the left side.        Posterior tibial pulses are 2+ on the right side, and 2+ on the left side.   Pulmonary/Chest: Effort normal. No respiratory distress.   Musculoskeletal:        Left hip: She exhibits decreased range of motion, decreased strength and crepitus. She exhibits no swelling.   Left hip has a markedly positive Stinchfield test and a markedly positive logroll test.  Range of motion, particularly internal and external " rotation are very uncomfortable and limited.  Her examination is consistent with end stage osteoarthritis of the left hip.   Neurological: She is alert and oriented to person, place, and time.   Skin: Skin is warm and dry. No rash noted. No cyanosis or erythema. Nails show no clubbing.   Psychiatric: She has a normal mood and affect. Her speech is normal and behavior is normal. Judgment and thought content normal.   Nursing note and vitals reviewed.                Assessment:     Diagnosis Plan   1. Primary osteoarthritis of left hip             Plan:  The patient and I have discussed the procedure, the alternatives including nonsurgical management, the pluses and minuses, risks and benefits of the nonsurgical versus surgical options.  The patient has voiced understanding and wishes to proceed with surgery as scheduled.  We have reviewed that surgery has risks and that surgery has no guarantees.  We have discussed that the risks of surgery include but are not limited to bleeding, infection, injury to nerves, blood vessels, tendons, ligaments or other soft tissue structures, possibly causing permanent pain, numbness, swelling or other problems, as well as iatrogenic fracture.  I advised the patient additionally that there could be problems with the hip, including leg length discrepancy, dislocation or instability, malalignment, malrotation, loosening of the components, any of which could cause the need for additional surgical procedures.  I explained the possibility of skin wound problems, skin breakdown, blood clots, pulmonary embolism, pneumonia, stroke, heart attack, loss of limb, or even death due to these or other complications.  The patient voiced understanding.    We additionally discussed that the alignment and rotation, and possibly the length, of the leg would change.  We discussed that in spite of what appeared to be properly performed surgical procedure the patient may experience permanent pain permanent  stiffness permanent limping and/or permanent swelling.  I also reviewed that if the patient did not do their part to rehabilitate the hip including performing aggressive constant daily work on range of motion that the patient would have a potentially poor outcome.  The patient voiced understanding.  She says that she feels as though she has exhausted nonsurgical management, and wishes to proceed with a left total hip replacement as scheduled next week.  Narcotic counseling was performed in the usual fashion, and I emphasized the risks of narcotic use and the dangers. We discussed the potential for dependency and addiction, and we discussed things to avoid when taking these medications including specific activities to avoid, as well as avoidance of other sedating substances including alcohol or other medications.     A Shaji report was checked and personally reviewed by me, and the patient confirmed that they had reviewed the house bill 1 warnings provided. Appropriate narcotic pain medication was issued.

## 2018-07-24 NOTE — OP NOTE
TOTAL HIP ARTHROPLASTY  Progress Note    Bailee Garza  7/24/2018    Pre-op Diagnosis:   Primary osteoarthritis of left hip [M16.12]       Post-Op Diagnosis Codes:     * Primary osteoarthritis of left hip [M16.12]    Procedure/CPT® Codes:      Procedure(s):  LEFT TOTAL HIP ARTHROPLASTY, using Perry and nephew components with a size 10 high offset Synergy femoral component, a 46 mm R3 acetabular component stabilized with 2 screws, a 28 mm polyethylene liner with a 20° offset lip, and a +0 28 mm Oxinium femoral head component      Implant Name Type Inv. Item Serial No.  Lot No. LRB No. Used   SHLL ACET R3 DARCIE  3H 46MM - [id number] Implant SHLL ACET R3 DARCIE  3H 46MM  [employer] [id number] Left 1   LINER ACET R3 XLPE 20D [id number] - [id number] Implant LINER ACET R3 XLPE 20D [id number]  [employer] [id number] Left 1   SCRW SPH HD REFLECTION 6X15MM - [id number] Implant SCRW SPH HD REFLECTION 6X15MM  [employer] [id number] Left 1   SCRW SPH HD REFLECTION 6.5X20MM - [id number] Implant SCRW SPH HD REFLECTION 6.5X20MM  [employer] [id number] Left 1   STEM FEM SYNERGY POR CMTLS HO TI SZ10 140 - [id number] Implant STEM FEM SYNERGY POR CMTLS HO TI SZ10 140  [employer] [id number] Left 1   HD FEM OXINIUM TPR 12 14 28MM PLS0 - [id number] Implant HD FEM OXINIUM TPR 12 14 28MM PLS0   [employer] [id number] Left 1         Surgeon(s):  Justen Mann MD    Anesthesia: General    Staff:   Circulator: Robert Whitaker RN; Fabienne Robles RN  Scrub Person: Suhail Babin  Vendor Representative: Cristian Talley  Assistant: Rubens Salcido Sr., MD    Estimated Blood Loss: 250 mL    Urine Voided: * No values recorded between 7/24/2018  8:29 AM and 7/24/2018 11:20 AM *    Specimens:                None      Drains:  none    Findings: End stage osteoarthritis was noted.  The patient's bone quality was satisfactory.    Complications: none noted      DESCRIPTION OF PROCEDURE:    The patient was taken to the  operating room and placed in the supine position.  Preoperative antibiotics were administered. Surgical time out was performed. After adequate induction of anesthesia the patient was then transferred onto the  table and positioned appropriately in the lateral decubitus position. The hip was then prepped and draped in the usual sterile fashion.      A posterior lateral surgical incision was then made.  The gluteus kalia fascia was then divided the gluteus kalia muscle was then bluntly dissected.  A Charnley self-retaining retractor was then placed.  A posterior capsulotomy was then performed.  The superior limb was divided in line with the piriformis tendon.  The hip was then dislocated.  There were end-stage arthritic findings.  The femoral neck osteotomy was performed according to the level dictated by the template.  The acetabulum was exposed with standard retractors.  The labrum and pulvinar were then excised.  Periacetabular osteophytes were removed as necessary.  The cup was then medialized with the starting acetabular reamer to the medial wall.  I then progressively reamed up to the appropriate size and 45° of abduction and 20° of anteversion. Line to line reaming was performed. At this point the bone was nicely prepared there was excellent bleeding bone.  A superior acetabular dome cyst was identified, curetted, and I used bone from the last reamer to carefully graft of this defect.  No other obvious cystic defects were identified.  We then impacted the acetabular component in 45 of abduction and 20 of anteversion the cup was stable.  Per routine we augmented the fixation with 2 screws in the posterior and superior quadrant  The final liner was then placed and it locked in nicely.  We then turned our attention to the femur.     Standard retractors were placed to expose the femur.  The box osteotome was used to create a starting point.  The canal finder was then used to sound the canal.  The lateralizing  reamer was then used to lateralize into the greater trochanter.  We progressively reamed and broached until the broach was very solid to axial and rotational stresses.  At this point we placed the trial neck and head hip was very stable to flexion and internal rotation as well as extension and external rotation.  The leg lengths were measured to be equal.  We then removed the trial components,copiously irrigated the hip, and then impacted the final stem.  At this point we then trialed and chose the final head. The head was placed on a clean dry taper.  The leg lengths were again measured to be equal.  At this point the hip was copiously irrigated with pulse lavage.  The capsule was securely repaired with interrupted #1 Vicryl suture.  The wound was again irrigated, and the piriformis, and short external rotators were also repaired as closely as possible to their anatomic insertion using interrupted #1 Vicryl suture.  The wound was again copiously irrigated, and the gluteus muscle and fascia, as well as the deep subcutaneous fascia were approximated with interrupted #0 Vicryl suture.  The subcuticular fascia was approximated with 3-0 Stratafixl, and the skin edges were held in final approximation and sealed with Dermabond surgical adhesive.   When the Dermabond had thoroughly dried, a sterile SABRINA dressing was applied. At the end of the case, all sponge and needle and equipment counts were reported as being correct. There were no known complications. The patient was awakened from anesthesia without incident, and was then transported to the recovery room.      Justen Mann MD     Date: 7/24/2018  Time: 11:31 AM

## 2018-07-24 NOTE — BRIEF OP NOTE
TOTAL HIP ARTHROPLASTY  Progress Note    Bailee Garza  7/24/2018    Pre-op Diagnosis:   Primary osteoarthritis of left hip [M16.12]       Post-Op Diagnosis Codes:     * Primary osteoarthritis of left hip [M16.12]    Procedure/CPT® Codes:      Procedure(s):  LEFT TOTAL HIP ARTHROPLASTY, using Perry and nephew components with a size 10 high offset Synergy femoral component, a 46 mm R3 acetabular component stabilized with 2 screws, a 28 mm polyethylene liner with a 20° offset lip, and a +0 28 mm Oxinium femoral head component      Implant Name Type Inv. Item Serial No.  Lot No. LRB No. Used   SHLL ACET R3 DARCIE  3H 46MM - [id number] Implant SHLL ACET R3 DARCIE  3H 46MM  [employer] [id number] Left 1   LINER ACET R3 XLPE 20D [id number] - [id number] Implant LINER ACET R3 XLPE 20D [id number]  [employer] [id number] Left 1   SCRW SPH HD REFLECTION 6X15MM - [id number] Implant SCRW SPH HD REFLECTION 6X15MM  [employer] [id number] Left 1   SCRW SPH HD REFLECTION 6.5X20MM - [id number] Implant SCRW SPH HD REFLECTION 6.5X20MM  [employer] [id number] Left 1   STEM FEM SYNERGY POR CMTLS HO TI SZ10 140 - [id number] Implant STEM FEM SYNERGY POR CMTLS HO TI SZ10 140  [employer] [id number] Left 1   HD FEM OXINIUM TPR 12 14 28MM PLS0 - [id number] Implant HD FEM OXINIUM TPR 12 14 28MM PLS0   [employer] [id number] Left 1         Surgeon(s):  Justen Mann MD    Anesthesia: General    Staff:   Circulator: Robert Whitaker RN; Fabienne Robles RN  Scrub Person: Suhail Babin  Vendor Representative: Cristian Talley  Assistant: Rubens Salcido Sr., MD    Estimated Blood Loss: 250 mL    Urine Voided: * No values recorded between 7/24/2018  8:29 AM and 7/24/2018 11:20 AM *    Specimens:                None      Drains:  none    Findings: End stage osteoarthritis was noted.  The patient's bone quality was satisfactory.    Complications: none noted      Justen Mann MD     Date: 7/24/2018  Time: 11:31  AM

## 2018-07-24 NOTE — PLAN OF CARE
Problem: Patient Care Overview  Goal: Plan of Care Review   07/24/18 1600   Coping/Psychosocial   Plan of Care Reviewed With patient;family   OTHER   Outcome Summary Pt. will benefit from skilled inpt. P.T. to address her functional deficits and to assist pt. in regaining her maximum level of independence with functional mobility.

## 2018-07-24 NOTE — ANESTHESIA POSTPROCEDURE EVALUATION
"Patient: Bailee Garza    Procedure Summary     Date:  07/24/18 Room / Location:  [facility] OR [address] [place] [address] MAIN OR    Anesthesia Start:  0832 Anesthesia Stop:  1132    Procedure:  LEFT TOTAL HIP ARTHROPLASTY (Left Hip) Diagnosis:       Primary osteoarthritis of left hip      (Primary osteoarthritis of left hip [M16.12])    Surgeon:  Justen Mann MD Provider:  Omari Amaro MD    Anesthesia Type:  general ASA Status:  3          Anesthesia Type: general  Last vitals  BP   100/53 (07/24/18 1248)   Temp   34.6 °C (94.3 °F) (07/24/18 1145)   Pulse   54 (07/24/18 1248)   Resp   12 (07/24/18 1248)     SpO2   100 % (07/24/18 1248)     Post Anesthesia Care and Evaluation    Patient location during evaluation: bedside  Patient participation: complete - patient participated  Level of consciousness: awake  Pain management: adequate  Airway patency: patent  Anesthetic complications: No anesthetic complications    Cardiovascular status: acceptable  Respiratory status: acceptable  Hydration status: acceptable    Comments: /53   Pulse 54   Temp 34.6 °C (94.3 °F) (Oral)   Resp 12   Ht 157.5 cm (62\")   Wt 47.9 kg (105 lb 9 oz)   SpO2 100%   BMI 19.31 kg/m²         "

## 2018-07-24 NOTE — ANESTHESIA PREPROCEDURE EVALUATION
Anesthesia Evaluation     Patient summary reviewed and Nursing notes reviewed   NPO Solid Status: > 8 hours  NPO Liquid Status: > 2 hours           Airway   Mallampati: II  TM distance: >3 FB  Neck ROM: full  Dental - normal exam   (+) upper dentures    Pulmonary - negative pulmonary ROS and normal exam    breath sounds clear to auscultation  Cardiovascular - normal exam    ECG reviewed  Patient on routine beta blocker  Rhythm: regular  Rate: normal    (+) hypertension, JARA,   (-) angina, orthopnea, PND      Neuro/Psych  (+) numbness (Left lumbar radiculopathy),     GI/Hepatic/Renal/Endo    (+)   renal disease (Chronic kidney disease, stage III (moderate)) CRI, hypothyroidism,     Musculoskeletal     (+) back pain, chronic pain,   Abdominal    Substance History - negative use     OB/GYN negative ob/gyn ROS         Other   (+) arthritis                   Anesthesia Plan    ASA 3     general     intravenous induction   Anesthetic plan and risks discussed with patient.

## 2018-07-25 VITALS
BODY MASS INDEX: 19.42 KG/M2 | TEMPERATURE: 98 F | HEART RATE: 76 BPM | DIASTOLIC BLOOD PRESSURE: 49 MMHG | HEIGHT: 62 IN | OXYGEN SATURATION: 98 % | WEIGHT: 105.56 LBS | RESPIRATION RATE: 16 BRPM | SYSTOLIC BLOOD PRESSURE: 90 MMHG

## 2018-07-25 LAB
HCT VFR BLD AUTO: 33 % (ref 35.6–45.5)
HGB BLD-MCNC: 10.4 G/DL (ref 11.9–15.5)

## 2018-07-25 PROCEDURE — 97110 THERAPEUTIC EXERCISES: CPT

## 2018-07-25 PROCEDURE — 97150 GROUP THERAPEUTIC PROCEDURES: CPT

## 2018-07-25 PROCEDURE — 99024 POSTOP FOLLOW-UP VISIT: CPT | Performed by: ORTHOPAEDIC SURGERY

## 2018-07-25 PROCEDURE — 85018 HEMOGLOBIN: CPT | Performed by: ORTHOPAEDIC SURGERY

## 2018-07-25 PROCEDURE — 85014 HEMATOCRIT: CPT | Performed by: ORTHOPAEDIC SURGERY

## 2018-07-25 RX ORDER — PSEUDOEPHEDRINE HCL 30 MG
100 TABLET ORAL 2 TIMES DAILY
Qty: 60 CAPSULE | Refills: 0 | Status: SHIPPED | OUTPATIENT
Start: 2018-07-25 | End: 2018-08-07

## 2018-07-25 RX ORDER — HYDROCODONE BITARTRATE AND ACETAMINOPHEN 7.5; 325 MG/1; MG/1
TABLET ORAL
Qty: 60 TABLET | Refills: 0 | Status: SHIPPED | OUTPATIENT
Start: 2018-07-25 | End: 2018-09-10

## 2018-07-25 RX ADMIN — HYDROCODONE BITARTRATE AND ACETAMINOPHEN 2 TABLET: 7.5; 325 TABLET ORAL at 14:32

## 2018-07-25 RX ADMIN — POLYETHYLENE GLYCOL 3350 17 G: 17 POWDER, FOR SOLUTION ORAL at 08:20

## 2018-07-25 RX ADMIN — HYDROCODONE BITARTRATE AND ACETAMINOPHEN 2 TABLET: 7.5; 325 TABLET ORAL at 00:41

## 2018-07-25 RX ADMIN — HYDROCODONE BITARTRATE AND ACETAMINOPHEN 2 TABLET: 7.5; 325 TABLET ORAL at 09:51

## 2018-07-25 RX ADMIN — APIXABAN 2.5 MG: 2.5 TABLET, FILM COATED ORAL at 05:58

## 2018-07-25 RX ADMIN — DOCUSATE SODIUM 100 MG: 100 CAPSULE, LIQUID FILLED ORAL at 08:20

## 2018-07-25 RX ADMIN — MUPIROCIN 10 APPLICATION: 20 OINTMENT TOPICAL at 08:20

## 2018-07-25 RX ADMIN — HYDROCODONE BITARTRATE AND ACETAMINOPHEN 2 TABLET: 7.5; 325 TABLET ORAL at 06:04

## 2018-07-25 RX ADMIN — LEVOTHYROXINE SODIUM 100 MCG: 100 TABLET ORAL at 08:19

## 2018-07-25 NOTE — PROGRESS NOTES
Continued Stay Note  [facility]     Patient Name: Bailee Garza  MRN: [id number]  Today's Date: 7/25/2018    Admit Date: 7/24/2018          Discharge Plan     Row Name 07/25/18 1140       Plan    Plan home with [facility]    Patient/Family in Agreement with Plan yes    Plan Comments Facesheet verified.  Spoke with patient and son, Christopher Diaz ( [phone]), in room.  Patient confirms that she plans to return home and would like to use [facility].  She is interested in finding out the cost of private paying for additonal PT visits.  Referral called to Bill/[facility] and he will meet with patient.  CCP will follow. Lara Matthews RN    Final Discharge Disposition Code 06 - home with home health care    Final Note DC home via private auto with [facility] to follow. Lara Matthews RN              Discharge Codes    No documentation.       Expected Discharge Date and Time     Expected Discharge Date Expected Discharge Time    Jul 25, 2018             Lara Matthews RN

## 2018-07-25 NOTE — DISCHARGE SUMMARY
Patient Name: Bailee Garza  Patient YOB: 1940    Date of Admission:  7/24/2018  Date of Discharge:  7/25/2018  Discharge Diagnosis: TOTAL HIP ARTHROPLASTY  Presenting Problem/History of Present Illness: Primary osteoarthritis of left hip [M16.12]  OA (osteoarthritis) of hip [M16.9]  Admitting Physician: Dr Justen Mann  Consults:   Consults     No orders found for last 30 day(s).          DETAILS OF HOSPITAL STAY:  Patient is a 78 y.o. female was admitted to the floor following the above procedure and underwent an uncomplicated hospital stay.  Patient did well with physical therapy and was ambulating well without problems.  On the day of discharge the wound was clean, dry and intact and calf was soft and non tender and Homans sign was negative.  Patient was tolerating  without problems.  Patient will be discharged home.    Condition on Discharge:  Stable    Vital Signs  Temp:  [94.3 °F (34.6 °C)-98.8 °F (37.1 °C)] 98.8 °F (37.1 °C)  Heart Rate:  [49-97] 97  Resp:  [12-18] 18  BP: ()/(49-87) 101/58    LABS:   Admission on 07/24/2018   Component Date Value Ref Range Status   • ABO Type 07/24/2018 AB   Final   • RH type 07/24/2018 Positive   Final   • Antibody Screen 07/24/2018 Negative   Final   • T&S Expiration Date 07/24/2018 7/27/2018 11:59:59 PM   Final   • Hemoglobin 07/25/2018 10.4* 11.9 - 15.5 g/dL Final   • Hematocrit 07/25/2018 33.0* 35.6 - 45.5 % Final       No results found.        Discharge Medications     Discharge Medications      New Medications      Instructions Start Date   apixaban 2.5 MG tablet tablet  Commonly known as:  ELIQUIS   2.5 mg, Oral, Every 12 Hours Scheduled      docusate sodium 100 MG capsule   100 mg, Oral, 2 Times Daily      HYDROcodone-acetaminophen 7.5-325 MG per tablet  Commonly known as:  NORCO   Take 1-2 tabs po q 4 hours prn pain      polyethylene glycol pack packet  Commonly known as:  MIRALAX   17 g, Oral, 2 Times Daily         Continue These  Medications      Instructions Start Date   acetaminophen 500 MG tablet  Commonly known as:  TYLENOL   500 mg, Oral, Every 4 Hours PRN      Chlorcyclizine-Pseudoephed 25-60 MG tablet  Commonly known as:  STAHIST AD   1 tablet, Oral, Every 8 Hours PRN      metoprolol succinate XL 25 MG 24 hr tablet  Commonly known as:  TOPROL-XL   12.5 mg, Oral, Daily      PRESERVISION AREDS PO   1 tablet, Oral, Daily PRN, TAKES ONLY OCCASIONALLY      SYNTHROID 100 MCG tablet  Generic drug:  levothyroxine   100 mcg, Oral, Daily      triamcinolone 0.1 % cream  Commonly known as:  KENALOG   1 application, Topical, As Needed, ITCHING          Stop These Medications    Chlorhexidine Gluconate Cloth 2 % pads     mupirocin 2 % nasal ointment  Commonly known as:  BACTROBAN     traMADol 50 MG tablet  Commonly known as:  ULTRAM            Activity at Discharge:     Discharge Instructions:   1)  Patient is to continue with physical therapy exercises daily and continue working with the physical therapist as ordered.  2)  Follow Posterior hip precautions.   3)  Patient may weight bear as tolerated.   4)  Apply ice regularly. You may ice for long periods of time as long as ice is not directly on the skin. Patient instructed on frequent calf pumping exercises.  Patient also instructed on incentive spirometer during hospitalization and encouraged to continue to use at home regularly.   5)  The dressing should be left in place. If waterproof dressing is intact the patient may shower immediately following discharge. If the dressing becomes disloged or saturated it should be changed. Please refer to the [facility] information sheet if you have any questions about the dressing.  If you have a home health nurse or therapist they can be contacted to assist with dressing change or repair. You may also call the [facility] dressing hotline for questions related to the dressing ([phone]). If there still other problems or questions related to the dressing despite  these measures then you can contact Trisha at our office [phone].   6)  Follow up appointment in 2 weeks - patient to call the office at [phone] to schedule. 7)  Patient will be discharged on Eliquis for 2 weeks after surgery, once you have completed that prescription, then begin taking Aspirin 81mg 1 tab po after morning and evening meal X 1 month    Complete Discharge Diagnosis:    Patient Active Problem List   Diagnosis   • Chronic midline low back pain without sciatica   • Essential hypertension   • Hearing loss   • Hypothyroidism, acquired   • IBS (irritable bowel syndrome)   • Chronic nonseasonal allergic rhinitis due to pollen   • Chronic kidney disease, stage III (moderate)   • Arthralgia of right knee   • Chronic cough   • Abnormal weight loss   • Abnormal CXR   • Exertional shortness of breath   • Arthritis of hip, left   • DDD (degenerative disc disease), lumbosacral   • Primary osteoarthritis of left hip   • Closed nondisplaced fracture of medial cuneiform of left foot   • Left lumbar radiculopathy   • Numbness of left anterior thigh   • Scoliosis due to degenerative disease of spine in adult patient   • Degenerative disc disease, lumbar   • OA (osteoarthritis) of hip           Follow-up Appointments  Future Appointments  Date Time Provider Department Center   8/7/2018 9:40 AM Sari Verdugo MD [facility] None   9/10/2018 8:30 AM Eddie Araya MD [id number] None     Additional Instructions for the Follow-ups that You Need to Schedule     Ambulatory Referral to Home Health    As directed      Face to Face Visit Date:  7/25/2018    Follow-up Provider for Plan of Care?:  I will be treating the patient on an ongoing basis.  Please send me the Plan of Care for signature.    Follow-up Provider:  SARI VERDUGO [[id number]]    Reason/Clinical Findings:  Post surgical    Describe mobility limitations that make leaving home difficult:  Requires the assistance of another to leave home    Nursing/Therapeutic  Services Requested:  Physical Therapy    PT orders:  Total joint pathway    Frequency:  1 Week 1         Discharge Follow-up with Specialty: Orthopedics; 2 Weeks    As directed      Specialty:  Orthopedics    Follow Up:  2 Weeks    Follow Up Details:  Return to the office to see Dr. Justen Whaley RN  07/25/18  9:23 AM

## 2018-07-25 NOTE — PLAN OF CARE
Problem: Patient Care Overview  Goal: Plan of Care Review  Outcome: Ongoing (interventions implemented as appropriate)   07/25/18 0231   Coping/Psychosocial   Plan of Care Reviewed With patient   OTHER   Outcome Summary VSS. PO PAIN MEDICATION HELPING WITH PAIN. UP ASSIST X2 TO CHAIR. VOIDING PER [facility]. PICCO IN PLACE. EDUCATION PROVIDED ON THE IMPORTANCE OF TAKING BLOOD PRESSURE MEDICATION AS ORDERED BY MD   Plan of Care Review   Progress improving     Goal: Individualization and Mutuality  Outcome: Ongoing (interventions implemented as appropriate)    Goal: Discharge Needs Assessment  Outcome: Ongoing (interventions implemented as appropriate)      Problem: Fall Risk (Adult)  Goal: Absence of Fall  Outcome: Ongoing (interventions implemented as appropriate)      Problem: Skin Injury Risk (Adult)  Goal: Identify Related Risk Factors and Signs and Symptoms  Outcome: Outcome(s) achieved Date Met: 07/25/18    Goal: Skin Health and Integrity  Outcome: Ongoing (interventions implemented as appropriate)

## 2018-07-26 ENCOUNTER — TELEPHONE (OUTPATIENT)
Dept: ORTHOPEDIC SURGERY | Facility: CLINIC | Age: 78
End: 2018-07-26

## 2018-07-26 NOTE — THERAPY TREATMENT NOTE
Acute Care - Physical Therapy Treatment Note  [facility]     Patient Name: Bailee Garza  : 1940  MRN: [id number]  Today's Date: 2018  Onset of Illness/Injury or Date of Surgery: 18  Date of Referral to PT: 18  Referring Physician: Justen Erazo    Admit Date: 2018    Visit Dx:    ICD-10-CM ICD-9-CM   1. Difficulty walking R26.2 719.7   2. Primary osteoarthritis of left hip M16.12 715.15     Patient Active Problem List   Diagnosis   • Chronic midline low back pain without sciatica   • Essential hypertension   • Hearing loss   • Hypothyroidism, acquired   • IBS (irritable bowel syndrome)   • Chronic nonseasonal allergic rhinitis due to pollen   • Chronic kidney disease, stage III (moderate)   • Arthralgia of right knee   • Chronic cough   • Abnormal weight loss   • Abnormal CXR   • Exertional shortness of breath   • Arthritis of hip, left   • DDD (degenerative disc disease), lumbosacral   • Primary osteoarthritis of left hip   • Closed nondisplaced fracture of medial cuneiform of left foot   • Left lumbar radiculopathy   • Numbness of left anterior thigh   • Scoliosis due to degenerative disease of spine in adult patient   • Degenerative disc disease, lumbar   • OA (osteoarthritis) of hip       Therapy Treatment          Rehabilitation Treatment Summary     Row Name 18 1709             Treatment Time/Intention    Discipline physical therapy assistant  -      Document Type therapy note (daily note);discharge treatment  -      Subjective Information complains of;weakness;fatigue;pain  -      Existing Precautions/Restrictions fall;hip, posterior;left  -      Recorded by [ISABELL] Zhane Talley PTA 18 017      Row Name 18 1709             Sit-Stand Transfer    Sit-Stand [place] (Transfers) contact guard;verbal cues  -      Assistive Device (Sit-Stand Transfers) walker, front-wheeled  -ISABELL      Recorded by [ISABELL] Zhane Talley PTA 18 228       Row Name 07/26/18 1709             Gait/Stairs Assessment/Training    [place] (Gait) contact guard;verbal cues  -      Assistive Device (Gait) walker, front-wheeled  -      Distance in Feet (Gait) 85  -JM      Deviations/Abnormal Patterns (Gait) antalgic;stride length decreased  -JM      Bilateral Gait Deviations forward flexed posture  -      [place] (Stairs) --   pt reports having a ramp to use  -JM      Recorded by [] Zhane Talley PTA 07/26/18 1852      Row Name 07/26/18 1709             Therapeutic Exercise    Comment (Therapeutic Exercise) THR protocol x 20 reps w/assist only to initiate first few hip abd/add  -JM      Recorded by [] Zhane Talley PTA 07/26/18 1852      Row Name 07/26/18 1709             Positioning and Restraints    Pre-Treatment Position sitting in chair/recliner  -JM      In Chair reclined;call light within reach;encouraged to call for assist;notified nsg  -JM      Recorded by [] Zhane Talley PTA 07/26/18 1852      Row Name 07/26/18 1709             Pain Scale: Numbers Pre/Post-Treatment    Pain Scale: Numbers, Pretreatment 4/10  -JM      Pain Scale: Numbers, Post-Treatment 5/10  -JM      Pain Location - Side Left  -      Pain Location hip  -JM      Pain Intervention(s) Medication (See MAR);Repositioned;Ambulation/increased activity  -JM      Recorded by [] Zhane Talley PTA 07/26/18 1852      Row Name                Wound 07/24/18 1030 Left hip incision    Wound - Properties Group Date first assessed: 07/24/18 [SW] Time first assessed: 1030 [SW] Side: Left [SW] Location: hip [SW] Type: incision [SW] Recorded by:  [SW] Fabienne Robles RN 07/24/18 1030      User Key  (r) = Recorded By, (t) = Taken By, (c) = Cosigned By    Initials Name Effective Dates Discipline    ISABELL Talley PTA 03/07/18 -  PT    NADIRA Robles RN 12/28/17 -  Nurse                     Physical Therapy Education     Title: PT OT SLP Therapies (Resolved)      Topic: Physical Therapy (Resolved)     Point: Mobility training (Resolved)    Learning Progress Summary     Learner Status Readiness Method Response Comment Documented by    Patient Done Acceptance ANDERSON RICE,NR  MS 07/24/18 1600          Point: Home exercise program (Resolved)    Learning Progress Summary     Learner Status Readiness Method Response Comment Documented by    Patient Done Acceptance ANDERSON RICE,NR  MS 07/24/18 1600          Point: Body mechanics (Resolved)    Learning Progress Summary     Learner Status Readiness Method Response Comment Documented by    Patient Done Acceptance ANDERSON RICE,NR  MS 07/24/18 1600          Point: Precautions (Resolved)    Learning Progress Summary     Learner Status Readiness Method Response Comment Documented by    Patient Done Acceptance ANDERSON RICE,NR  MS 07/24/18 1600                      User Key     Initials Effective Dates Name Provider Type Discipline    MS 04/03/18 -  Shaquille Donnelly, PT Physical Therapist PT                    PT Recommendation and Plan                Outcome Measures     Row Name 07/25/18 1600 07/24/18 1600          How much help from another person do you currently need...    Turning from your back to your side while in flat bed without using bedrails? 3  -JM 2  -MS     Moving from lying on back to sitting on the side of a flat bed without bedrails? 3  -JM 2  -MS     Moving to and from a bed to a chair (including a wheelchair)? 3  -JM 2  -MS     Standing up from a chair using your arms (e.g., wheelchair, bedside chair)? 3  -JM 2  -MS     Climbing 3-5 steps with a railing? 3  -JM 2  -MS     To walk in hospital room? 3  -JM 2  -MS     AM-PAC 6 Clicks Score 18  -JM 12  -MS        Functional Assessment    Outcome Measure Options  -- AM-PAC 6 Clicks Basic Mobility (PT)  -MS       User Key  (r) = Recorded By, (t) = Taken By, (c) = Cosigned By    Initials Name Provider Type    ISABELL Talley, PTA Physical Therapy Assistant    MS Shaquille Donnelly, PT Physical  Therapist           Time Calculation:     Therapy Suggested Charges     Code   Minutes Charges    None           Therapy Charges for Today     Code Description Service Date Service Provider Modifiers Qty    17795590913 HC PT THER PROC EA 15 MIN 7/25/2018 Zhane Talley, PTA GP 2    87041048466 HC PT THER PROC GROUP 7/25/2018 Zhane Talley, PTA GP 1    50016708693 HC PT THER PROC EA 15 MIN 7/25/2018 Zhane Talley, PTA GP 1    29448121316 HC PT THER PROC GROUP 7/25/2018 Zhane Talley, PTA GP 1          PT G-Codes  Outcome Measure Options: AM-PAC 6 Clicks Basic Mobility (PT)    Zhane Talley PTA  7/26/2018

## 2018-07-26 NOTE — THERAPY TREATMENT NOTE
Acute Care - Physical Therapy Treatment Note  [facility]     Patient Name: Bailee Garza  : 1940  MRN: [id number]  Today's Date: 2018  Onset of Illness/Injury or Date of Surgery: 18  Date of Referral to PT: 18  Referring Physician: Justen Erazo    Admit Date: 2018    Visit Dx:    ICD-10-CM ICD-9-CM   1. Difficulty walking R26.2 719.7   2. Primary osteoarthritis of left hip M16.12 715.15     Patient Active Problem List   Diagnosis   • Chronic midline low back pain without sciatica   • Essential hypertension   • Hearing loss   • Hypothyroidism, acquired   • IBS (irritable bowel syndrome)   • Chronic nonseasonal allergic rhinitis due to pollen   • Chronic kidney disease, stage III (moderate)   • Arthralgia of right knee   • Chronic cough   • Abnormal weight loss   • Abnormal CXR   • Exertional shortness of breath   • Arthritis of hip, left   • DDD (degenerative disc disease), lumbosacral   • Primary osteoarthritis of left hip   • Closed nondisplaced fracture of medial cuneiform of left foot   • Left lumbar radiculopathy   • Numbness of left anterior thigh   • Scoliosis due to degenerative disease of spine in adult patient   • Degenerative disc disease, lumbar   • OA (osteoarthritis) of hip       Therapy Treatment          Rehabilitation Treatment Summary     Row Name                Wound 18 1030 Left hip incision    Wound - Properties Group Date first assessed: 18 [SW] Time first assessed: 103 [SW] Side: Left [SW] Location: hip [SW] Type: incision [SW] Recorded by:  [SW] Fabienne Robles RN 18 1030      User Key  (r) = Recorded By, (t) = Taken By, (c) = Cosigned By    Initials Name Effective Dates Discipline    SW Fabienne Robles RN 17 -  Nurse                     Physical Therapy Education     Title: PT OT SLP Therapies (Resolved)     Topic: Physical Therapy (Resolved)     Point: Mobility training (Resolved)    Learning Progress Summary     Learner  Status Readiness Method Response Comment Documented by    Patient Done Acceptance ANDERSON RICE NR  MS 07/24/18 1600          Point: Home exercise program (Resolved)    Learning Progress Summary     Learner Status Readiness Method Response Comment Documented by    Patient Done Acceptance ANDERSON RICE NR  MS 07/24/18 1600          Point: Body mechanics (Resolved)    Learning Progress Summary     Learner Status Readiness Method Response Comment Documented by    Patient Done Acceptance ANDERSON RICE NR  MS 07/24/18 1600          Point: Precautions (Resolved)    Learning Progress Summary     Learner Status Readiness Method Response Comment Documented by    Patient Done Acceptance ANDERSON RICE NR  MS 07/24/18 1600                      User Key     Initials Effective Dates Name Provider Type Discipline    MS 04/03/18 -  Shaquille Donnelly, PT Physical Therapist PT                    PT Recommendation and Plan                Outcome Measures     Row Name 07/25/18 1600 07/24/18 1600          How much help from another person do you currently need...    Turning from your back to your side while in flat bed without using bedrails? 3  -JM 2  -MS     Moving from lying on back to sitting on the side of a flat bed without bedrails? 3  -JM 2  -MS     Moving to and from a bed to a chair (including a wheelchair)? 3  -JM 2  -MS     Standing up from a chair using your arms (e.g., wheelchair, bedside chair)? 3  -JM 2  -MS     Climbing 3-5 steps with a railing? 3  -JM 2  -MS     To walk in hospital room? 3  -JM 2  -MS     AM-PAC 6 Clicks Score 18  -JM 12  -MS        Functional Assessment    Outcome Measure Options  -- AM-PAC 6 Clicks Basic Mobility (PT)  -MS       User Key  (r) = Recorded By, (t) = Taken By, (c) = Cosigned By    Initials Name Provider Type    ISABELL Talley PTA Physical Therapy Assistant    MS Shaquille Donnelly, PT Physical Therapist           Time Calculation:     Therapy Suggested Charges     Code   Minutes Charges    None           Therapy  Charges for Today     Code Description Service Date Service Provider Modifiers Qty    35262616814 HC PT THER PROC EA 15 MIN 7/25/2018 Zhane Talley, PTA GP 2    95705292286 HC PT THER PROC GROUP 7/25/2018 Zhane Talley PTA GP 1          PT G-Codes  Outcome Measure Options: AM-PAC 6 Clicks Basic Mobility (PT)    Zhane Talley PTA  7/26/2018

## 2018-07-26 NOTE — TELEPHONE ENCOUNTER
Per MB Patient left a VM wanting to know how to take BP/Thyroid meds-    SX 07/24/2018 YECENIA   LEFT TOTAL HIP ARTHROPLASTY Left     Please advise

## 2018-07-26 NOTE — TELEPHONE ENCOUNTER
She should take her blood pressure and thyroid medications on their usual schedule.  There should be no change.

## 2018-07-30 ENCOUNTER — TELEPHONE (OUTPATIENT)
Dept: ORTHOPEDIC SURGERY | Facility: CLINIC | Age: 78
End: 2018-07-30

## 2018-07-30 NOTE — TELEPHONE ENCOUNTER
Have again attempted to contact the patient but was unable to reach her.  I did leave a message on her voice mail to contact me the office

## 2018-07-30 NOTE — TELEPHONE ENCOUNTER
"Received a call from the patient's daughter.  Its that her mother is hallucinating not only at night but also during the day.  She is seeing things does become very agitated and\" mean\".  Vies her to discontinue the hydrocodone altogether and would recommend that she take Tylenol Extra Strength every 4-6 hours as needed for pain.  Will continue with the ice.  Daughter states that she does have tramadol at home she was taking prior to her surgery and have advised them that it is okay to give her the tramadol only if the Tylenol does not help her pain.  Have left it open for them to call if they've any other questions or concerns  "

## 2018-08-07 ENCOUNTER — OFFICE VISIT (OUTPATIENT)
Dept: ORTHOPEDIC SURGERY | Facility: CLINIC | Age: 78
End: 2018-08-07

## 2018-08-07 VITALS — WEIGHT: 101 LBS | HEIGHT: 72 IN | TEMPERATURE: 97.2 F | BODY MASS INDEX: 13.68 KG/M2

## 2018-08-07 DIAGNOSIS — Z96.642 STATUS POST TOTAL REPLACEMENT OF LEFT HIP: Primary | ICD-10-CM

## 2018-08-07 PROCEDURE — 73502 X-RAY EXAM HIP UNI 2-3 VIEWS: CPT | Performed by: ORTHOPAEDIC SURGERY

## 2018-08-07 PROCEDURE — 99024 POSTOP FOLLOW-UP VISIT: CPT | Performed by: ORTHOPAEDIC SURGERY

## 2018-08-07 RX ORDER — TRAMADOL HYDROCHLORIDE 50 MG/1
50 TABLET ORAL EVERY 6 HOURS PRN
COMMUNITY
End: 2018-08-20 | Stop reason: SDUPTHER

## 2018-08-07 NOTE — PROGRESS NOTES
"Patient:  Bailee Garza is a 78 y.o. female    Chief Complaint/ Reason for Visit:    Chief Complaint   Patient presents with   • Left Hip - Post-op       HPI:  The patient is here, accompanied by her daughter, for scheduled 2 weeks postop check on her left total hip replacement.  The patient is walking with only a cane and says, \"I feel like I can already walking better than I did before surgery!\"  She has had no calf pain, chest pain, or shortness of breath.  She has had no fever, chills, sweats, or shakes.          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   • Hearing loss 2008   • Hip pain, left    • History of bone density study 2015   • History of pneumonia     2017   • History of skin cancer    • Hypothyroidism, acquired 2007   • Insomnia 2015    unspecified   • Intervertebral disc disorder with myelopathy, cervical region 2010   • Rhinitis, allergic 2007   • Scoliosis    • Screening breast examination 2007   • Stress 2015    other acute reactions to stress   • Stress incontinence    • Urticaria 2015       PSH:    Past Surgical History:   Procedure Laterality Date   • BREAST BIOPSY Right     50+ years ago   • BREAST LUMPECTOMY      BENIGN   • BRONCHOSCOPY N/A 2017    Procedure: BRONCHOSCOPY;  Surgeon: Mario Alanis MD;  Location: [facility] ENDOSCOPY;  Service:    • CATARACT EXTRACTION, BILATERAL     • HYSTERECTOMY     • TOTAL HIP ARTHROPLASTY Left 2018    Procedure: LEFT TOTAL HIP ARTHROPLASTY;  Surgeon: Justen Mann MD;  Location: [facility] MAIN OR;  Service: " Orthopedics       Social Hx:    Social History     Social History   • Marital status:      Spouse name: N/A   • Number of children: N/A   • Years of education: N/A     Occupational History   • Not on file.     Social History Main Topics   • Smoking status: Never Smoker   • Smokeless tobacco: Never Used   • Alcohol use No   • Drug use: No   • Sexual activity: Defer     Other Topics Concern   • Not on file     Social History Narrative   • No narrative on file       Family Hx:    Family History   Problem Relation Age of Onset   • Heart disease Mother    • Cancer Father    • Asthma Paternal Grandfather    • Malig Hyperthermia Neg Hx        Meds:    Current Outpatient Prescriptions:   •  acetaminophen (TYLENOL) 500 MG tablet, Take 500 mg by mouth Every 4 (Four) Hours As Needed for Mild Pain ., Disp: , Rfl:   •  apixaban (ELIQUIS) 2.5 MG tablet tablet, Take 1 tablet by mouth Every 12 (Twelve) Hours., Disp: 27 tablet, Rfl: 0  •  Chlorcyclizine-Pseudoephed (STAHIST AD) 25-60 MG tablet, Take 1 tablet by mouth Every 8 (Eight) Hours As Needed (0)., Disp: 42 tablet, Rfl: 0  •  metoprolol succinate XL (TOPROL-XL) 25 MG 24 hr tablet, Take 12.5 mg by mouth Daily., Disp: , Rfl:   •  Multiple Vitamins-Minerals (PRESERVISION AREDS PO), Take 1 tablet by mouth Daily As Needed. TAKES ONLY OCCASIONALLY, Disp: , Rfl:   •  SYNTHROID 100 MCG tablet, Take 1 tablet by mouth Daily., Disp: 30 tablet, Rfl: 11  •  traMADol (ULTRAM) 50 MG tablet, Take 50 mg by mouth Every 6 (Six) Hours As Needed for Moderate Pain ., Disp: , Rfl:   •  triamcinolone (KENALOG) 0.1 % cream, Apply 1 application topically As Needed. ITCHING, Disp: , Rfl:   •  HYDROcodone-acetaminophen (NORCO) 7.5-325 MG per tablet, Take 1-2 tabs po q 4 hours prn pain, Disp: 60 tablet, Rfl: 0    Allergies:    Allergies   Allergen Reactions   • Ketek [Telithromycin] Hives   • Nsaids Hives   • Sulfa Antibiotics Hives   • Hydrocodone Hallucinations       ROS:  Review of Systems   All  "other systems reviewed and are negative.      Vitals:    08/07/18 1007   Temp: 97.2 °F (36.2 °C)   Weight: 45.8 kg (101 lb)   Height: 189.2 cm (74.5\")     Body mass index is 12.79 kg/m².    Physical Exam    The patient is awake, alert, and oriented ×3.  The patient is in no acute distress.  Breathing is regular and unlabored with a respiratory rate of 12/m.  Extraocular movements and pupillary responses are symmetrically intact. Sclerae are anicteric.   Hearing is within normal limits.  Speech is within normal limits.  There is no jugular venous distention.    Left hip: The dressing was removed.  The incision is healing beautifully.  There is minimal swelling and absolutely no erythema.  There is no drainage.  The patient has excellent range of motion of her left hip.  Leg lengths are equal.  Her calves are both soft and nontender with no venous cord.  Pulses and sensory examination as well as motor function are symmetrically intact in both lower extremities.      Radiology: X-rays: AP pelvis AP and lateral left total hip were ordered and reviewed today to assess the patient's hip replacement.  Component position appears satisfactory.  There is no evidence of fracture or loosening.  In comparison to previous images here in the office, there has been interval implant placement.          Assessment:     Diagnosis Plan   1. Status post total replacement of left hip  XR Hip With or Without Pelvis 2 - 3 View Left    Ambulatory Referral to Physical Therapy Ortho, Evaluate and treat           Plan:  We will continue to advance the patient's activities steadily.  Dislocation precautions were reviewed with the patient and she voiced understanding.  She will continue her cane as long as her comfort and confidence necessitate.  All of her questions are answered her satisfaction.  I will see her back in about a month for a checkup.  We will need x-rays AP pelvis, AP and lateral left total hip that time.    She will discontinue " her Eliquis, and begin 81 mg coated aspirin twice daily, once after breakfast and once after the evening meal.  This will continue for a month.      Orders Placed This Encounter   Procedures   • XR Hip With or Without Pelvis 2 - 3 View Left     Order Specific Question:   Reason for Exam:     Answer:   F/U LT. CHAS   • Ambulatory Referral to Physical Therapy Ortho, Evaluate and treat     Referral Priority:   Routine     Referral Type:   Therapy     Referral Reason:   Specialty Services Required     Requested Specialty:   Physical Therapy     Number of Visits Requested:   1

## 2018-08-08 ENCOUNTER — TELEPHONE (OUTPATIENT)
Dept: ORTHOPEDIC SURGERY | Facility: CLINIC | Age: 78
End: 2018-08-08

## 2018-08-08 NOTE — TELEPHONE ENCOUNTER
STOP the eliquis NOW.    Begin Aspirin 81mg twice daily with meals, tomorrow, for 4 weeks.    OK to ride in car.    Thanks!

## 2018-08-16 ENCOUNTER — TREATMENT (OUTPATIENT)
Dept: PHYSICAL THERAPY | Facility: CLINIC | Age: 78
End: 2018-08-16

## 2018-08-16 DIAGNOSIS — Z96.642 HISTORY OF TOTAL LEFT HIP REPLACEMENT: ICD-10-CM

## 2018-08-16 DIAGNOSIS — M25.552 LEFT HIP PAIN: Primary | ICD-10-CM

## 2018-08-16 DIAGNOSIS — R26.9 GAIT DIFFICULTY: ICD-10-CM

## 2018-08-16 PROCEDURE — 97161 PT EVAL LOW COMPLEX 20 MIN: CPT | Performed by: PHYSICAL THERAPIST

## 2018-08-16 PROCEDURE — 97110 THERAPEUTIC EXERCISES: CPT | Performed by: PHYSICAL THERAPIST

## 2018-08-16 NOTE — PROGRESS NOTES
Physical Therapy Initial Evaluation and Plan of Care    Patient: Bailee Garza   : 1940  Diagnosis/ICD-10 Code:  Left hip pain [M25.552]  Referring practitioner: Justen Mann MD  Past Medical History Reviewed: 2018    PLOF: Independent and ambulating without AD     Subjective Evaluation    History of Present Illness  Date of surgery: 2018  Mechanism of injury: S/p left total hip replacement. I have been doing HH therapy and have been going well. I am having moderate pain, not as bad as it was. I am having pain in the groin. I have the hip joint precautions. I am able to transfer sit-stand, but I hate the raised toilet seat. I am having trouble maneuvering in bed and keeping my feet up and not turning in. I am walking with the cane and been walking the driveway and the dog. I have not gotten back to cleaning. I am having a lot of trouble sleeping and finding a good position.   No numbness or tingling. I do have some scoliosis in my low back.           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 Has steps to basement)  Lives with: alone (daughter is staying with her during the day)    Treatments  Discharged from (in last 30 days): home health care           Objective       Active Range of Motion     Right Hip   Normal active range of motion    Strength/Myotome Testing     Left Hip   Planes of Motion   Abduction: 3+ (in supine)  External rotation: 3    Right Hip   Planes of Motion   Flexion: 4-  External rotation: 4-  Internal rotation: 4-    Left Knee   Flexion: 3  Extension: 3    Right Knee   Flexion: 5  Extension: 5    Left Ankle/Foot    Dorsiflexion: 4-    Right Ankle/Foot   Dorsiflexion: 5    Ambulation   Weight-Bearing Status   Assistive device used: single point cane    Observational Gait   Gait: antalgic   Decreased walking speed.     Comments   TU sec     Functional Assessment     Single Leg Stance   Left: 0 seconds         Assessment & Plan     Assessment  Impairments: abnormal gait, abnormal or restricted ROM, activity intolerance, impaired balance, impaired physical strength and pain with function  Assessment details: Pt with recent L THR and L hip pain and weakness resulting in decreased ability to transfer, ambulate and climb stairs safely. Pt would benefit from PT services to address these deficits  Prognosis: good  Prognosis details: SHORT TERM GOALS: 2-3 weeks  1.  Patient to be compliant with HEP and demo good efficiency with TE  2.  Report < 2 sleep disturbances 2° hip pain.     3.  Increased Lumbar and SIJ mobility to allow for improved pelvic alignment and gait mechanics (equal step length and level pelvis throughout gait).    4. Pt will report minimal tenderness to palpation.   6. Pt. Able to ambulate up to 20 min with pain < 4/10 with acceptable pattern.      LONG TERM GOALS: 4-6 weeks  1.  Pt. to score >40/80 perceived ability on LEFS  2.  Pain level < 2/10 in hips with all activities including sitting > 1 hr continuously.   3. Hip strength to 4/5  to allow for pushing, pulling and more strenuous activities to occur without pain.  Walk > 45 min.  no pain    Functional Limitations: sleeping, walking, uncomfortable because of pain, moving in bed and standing  Goals  Plan Goals:        Plan  Therapy options: will be seen for skilled physical therapy services  Planned modality interventions: cryotherapy, electrical stimulation/Russian stimulation, iontophoresis, TENS, thermotherapy (hydrocollator packs), traction and ultrasound  Planned therapy interventions: abdominal trunk stabilization, ADL retraining, body mechanics  training, balance/weight-bearing training, flexibility, functional ROM exercises, gait training, home exercise program, manual therapy, neuromuscular re-education, postural training, strengthening, stretching, therapeutic activities and joint mobilization  Frequency: 3x week  Duration in weeks: 4  Treatment plan discussed with: patient and family        Manual Therapy:    -     mins  54981;  Therapeutic Exercise:    5     mins  73420;     Neuromuscular Sofya:    -    mins  91112;    Therapeutic Activity:     -     mins  32490;     Gait Training:      -     mins  33618;     Ultrasound:     -     mins  08070;    Electrical Stimulation:    -     mins  74136 ( );  Dry Needling     -     mins self-pay    Timed Treatment:   5   mins   Total Treatment:     45   mins    PT SIGNATURE: aMrta Reis, PT   DATE TREATMENT INITIATED: 8/16/2018    Initial Certification  Certification Period: 11/14/2018  I certify that the therapy services are furnished while this patient is under my care.  The services outlined above are required by this patient, and will be reviewed every 90 days.     PHYSICIAN: Justen Mann MD      DATE:     Please sign and return via fax to [phone].. Thank you, [facility].

## 2018-08-20 NOTE — TELEPHONE ENCOUNTER
Tramadol is fine, and she may continue to obtain this from Dr. mehta.  That will be fine.  She does not need to take any more hydrocodone.    The groin pain is normal and will subside with time.    Thanks!

## 2018-08-20 NOTE — TELEPHONE ENCOUNTER
Patient called stating she stopped taking hydrocodone, it made her talk crazy, see things, she is doing PT and is still having pain in her groin. She was prescribed tramadol by Dr. Araya. She is supposed to be taking it 1 PO q 4 hrs for moderate. She is at this point confused on if she needs to get pain meds from you or from Dr. Araya since he is the one who originally wrote the prescription. But either way she wanted to let you know what was going on.

## 2018-08-21 ENCOUNTER — TREATMENT (OUTPATIENT)
Dept: PHYSICAL THERAPY | Facility: CLINIC | Age: 78
End: 2018-08-21

## 2018-08-21 ENCOUNTER — TELEPHONE (OUTPATIENT)
Dept: ORTHOPEDIC SURGERY | Facility: CLINIC | Age: 78
End: 2018-08-21

## 2018-08-21 DIAGNOSIS — R26.9 GAIT DIFFICULTY: ICD-10-CM

## 2018-08-21 DIAGNOSIS — M25.552 LEFT HIP PAIN: Primary | ICD-10-CM

## 2018-08-21 DIAGNOSIS — Z96.642 HISTORY OF TOTAL LEFT HIP REPLACEMENT: ICD-10-CM

## 2018-08-21 PROCEDURE — 97110 THERAPEUTIC EXERCISES: CPT | Performed by: PHYSICAL THERAPIST

## 2018-08-21 RX ORDER — TRAMADOL HYDROCHLORIDE 50 MG/1
50 TABLET ORAL EVERY 6 HOURS PRN
Qty: 60 TABLET | Refills: 0 | Status: SHIPPED | OUTPATIENT
Start: 2018-08-21 | End: 2018-10-10 | Stop reason: SDUPTHER

## 2018-08-21 NOTE — TELEPHONE ENCOUNTER
Have called in a new prescription to [facility] pharmacy at [phone] for tramadol 50 mg, #60, directions her to take 1 tablet every 6 hours when necessary pain per YECENIA patient has been notified

## 2018-08-22 ENCOUNTER — TREATMENT (OUTPATIENT)
Dept: PHYSICAL THERAPY | Facility: CLINIC | Age: 78
End: 2018-08-22

## 2018-08-22 DIAGNOSIS — M25.552 LEFT HIP PAIN: Primary | ICD-10-CM

## 2018-08-22 DIAGNOSIS — R26.9 GAIT DIFFICULTY: ICD-10-CM

## 2018-08-22 DIAGNOSIS — Z96.642 HISTORY OF TOTAL LEFT HIP REPLACEMENT: ICD-10-CM

## 2018-08-22 PROCEDURE — 97110 THERAPEUTIC EXERCISES: CPT | Performed by: PHYSICAL THERAPIST

## 2018-08-22 NOTE — PROGRESS NOTES
Physical Therapy Daily Progress Note  Visit: 18    Bailee Taylorcharly reports: I am really sore today. I sat on ice all night    Subjective     Objective   See Exercise, Manual, and Modality Logs for complete treatment.       Assessment & Plan     Assessment  Assessment details: Deferred standing exercises today due to soreness and just being at therapy yesterday. Educated to ice frequently at home and to change positions frequently to reduce soreness and stiffness    Plan  Plan details: Progress per POC        Manual Therapy:    -     mins  89476;  Therapeutic Exercise:    30     mins  09427;     Neuromuscular Sofya:    -    mins  85247;    Therapeutic Activity:     -     mins  62130;     Gait Training:      -     mins  98337;     Ultrasound:     -     mins  82965;    Electrical Stimulation:    -     mins  25278 ( );  Dry Needling     -     mins self-pay    Timed Treatment:   30   mins   Total Treatment:     35   mins    Marta Reis, PT  [place] License #: [id number]    Physical Therapist

## 2018-08-24 ENCOUNTER — TREATMENT (OUTPATIENT)
Dept: PHYSICAL THERAPY | Facility: CLINIC | Age: 78
End: 2018-08-24

## 2018-08-24 DIAGNOSIS — R26.9 GAIT DIFFICULTY: ICD-10-CM

## 2018-08-24 DIAGNOSIS — Z96.642 HISTORY OF TOTAL LEFT HIP REPLACEMENT: ICD-10-CM

## 2018-08-24 DIAGNOSIS — M25.552 LEFT HIP PAIN: Primary | ICD-10-CM

## 2018-08-24 PROCEDURE — 97012 MECHANICAL TRACTION THERAPY: CPT | Performed by: PHYSICAL THERAPIST

## 2018-08-24 PROCEDURE — 97110 THERAPEUTIC EXERCISES: CPT | Performed by: PHYSICAL THERAPIST

## 2018-08-24 NOTE — PROGRESS NOTES
Physical Therapy Daily Progress Note  Visit: 19    Bailee Taylorcharly reports: I am doing better. I still feel like I need to change positions every hour or so    Subjective     Objective   See Exercise, Manual, and Modality Logs for complete treatment.       Assessment & Plan     Assessment  Assessment details: Educated pt that changing position of the hip every hour or so is good to prevent stiffness and encouraged at this time. Progressing well     Plan  Plan details: Would like to begin stair climbing training next session        Manual Therapy:    5     mins  86819;  Therapeutic Exercise:    25     mins  15749;     Neuromuscular Sofya:    -    mins  00986;    Therapeutic Activity:     10     mins  46409;     Gait Training:      -     mins  27121;     Ultrasound:     -     mins  52958;    Electrical Stimulation:    -     mins  59351 ( );  Dry Needling     -     mins self-pay    Timed Treatment:   40   mins   Total Treatment:     45   mins    SARAH Cabral License #: [id number]    Physical Therapist

## 2018-08-27 ENCOUNTER — TREATMENT (OUTPATIENT)
Dept: PHYSICAL THERAPY | Facility: CLINIC | Age: 78
End: 2018-08-27

## 2018-08-27 DIAGNOSIS — Z96.642 HISTORY OF TOTAL LEFT HIP REPLACEMENT: ICD-10-CM

## 2018-08-27 DIAGNOSIS — M25.552 LEFT HIP PAIN: Primary | ICD-10-CM

## 2018-08-27 DIAGNOSIS — R26.9 GAIT DIFFICULTY: ICD-10-CM

## 2018-08-27 PROCEDURE — 97112 NEUROMUSCULAR REEDUCATION: CPT | Performed by: PHYSICAL THERAPIST

## 2018-08-27 PROCEDURE — 97110 THERAPEUTIC EXERCISES: CPT | Performed by: PHYSICAL THERAPIST

## 2018-08-27 NOTE — PROGRESS NOTES
Physical Therapy Daily Progress Note  Visit: 20    Bailee Garza reports: I am doing well. I think the knot in my hip is less. I feel like I am getting stronger    Subjective     Objective   See Exercise, Manual, and Modality Logs for complete treatment.       Assessment & Plan     Assessment  Assessment details: Pt had to leave early due to meeting her granddaughter. Progressing well with gati and stair climbing. Noted improved and equal weight bearing with ambulation     Plan  Plan details: Continue to work on stair climbing and balance activities        Manual Therapy:    -     mins  95449;  Therapeutic Exercise:    25     mins  45931;     Neuromuscular Sofya:    10    mins  24511;    Therapeutic Activity:     -     mins  07266;     Gait Training:      -     mins  22507;     Ultrasound:     -     mins  00343;    Electrical Stimulation:    -     mins  25879 ( );  Dry Needling     -     mins self-pay    Timed Treatment:   35   mins   Total Treatment:     38   mins    Marta Reis PT  [place] License #: [id number]    Physical Therapist

## 2018-08-29 ENCOUNTER — TREATMENT (OUTPATIENT)
Dept: PHYSICAL THERAPY | Facility: CLINIC | Age: 78
End: 2018-08-29

## 2018-08-29 DIAGNOSIS — M25.552 LEFT HIP PAIN: ICD-10-CM

## 2018-08-29 DIAGNOSIS — Z96.642 HISTORY OF TOTAL LEFT HIP REPLACEMENT: Primary | ICD-10-CM

## 2018-08-29 DIAGNOSIS — R26.9 GAIT DIFFICULTY: ICD-10-CM

## 2018-08-29 PROCEDURE — 97112 NEUROMUSCULAR REEDUCATION: CPT | Performed by: PHYSICAL THERAPIST

## 2018-08-29 PROCEDURE — 97110 THERAPEUTIC EXERCISES: CPT | Performed by: PHYSICAL THERAPIST

## 2018-08-29 PROCEDURE — 97116 GAIT TRAINING THERAPY: CPT | Performed by: PHYSICAL THERAPIST

## 2018-08-29 NOTE — PROGRESS NOTES
Physical Therapy Daily Progress Note  Visit:     Bailee Taylorcharly reports: I am doing good. Been practicing stairs at home    Subjective     Objective   See Exercise, Manual, and Modality Logs for complete treatment.       Assessment & Plan     Assessment  Assessment details: Pt making great progress. Progress with balance activities next session    Plan  Plan details: Begin single leg balance        Manual Therapy:    -     mins  29531;  Therapeutic Exercise:    30     mins  30541;     Neuromuscular Sofya:    5    mins  61802;    Therapeutic Activity:     -     mins  16211;     Gait Trainin     mins  39671;     Ultrasound:     -     mins  17501;    Electrical Stimulation:    -     mins  03126 ( );  Dry Needling     -     mins self-pay    Timed Treatment:   40   mins   Total Treatment:     55   mins    Marta Reis PT  [place] License #: [id number]    Physical Therapist

## 2018-08-31 ENCOUNTER — TREATMENT (OUTPATIENT)
Dept: PHYSICAL THERAPY | Facility: CLINIC | Age: 78
End: 2018-08-31

## 2018-08-31 DIAGNOSIS — R26.9 GAIT DIFFICULTY: ICD-10-CM

## 2018-08-31 DIAGNOSIS — M25.552 LEFT HIP PAIN: ICD-10-CM

## 2018-08-31 DIAGNOSIS — Z96.642 HISTORY OF TOTAL LEFT HIP REPLACEMENT: Primary | ICD-10-CM

## 2018-08-31 PROCEDURE — 97110 THERAPEUTIC EXERCISES: CPT | Performed by: PHYSICAL THERAPIST

## 2018-08-31 PROCEDURE — 97112 NEUROMUSCULAR REEDUCATION: CPT | Performed by: PHYSICAL THERAPIST

## 2018-09-04 ENCOUNTER — TREATMENT (OUTPATIENT)
Dept: PHYSICAL THERAPY | Facility: CLINIC | Age: 78
End: 2018-09-04

## 2018-09-04 DIAGNOSIS — Z96.642 HISTORY OF TOTAL LEFT HIP REPLACEMENT: Primary | ICD-10-CM

## 2018-09-04 DIAGNOSIS — R26.9 GAIT DIFFICULTY: ICD-10-CM

## 2018-09-04 DIAGNOSIS — M25.552 LEFT HIP PAIN: ICD-10-CM

## 2018-09-04 PROCEDURE — 97110 THERAPEUTIC EXERCISES: CPT | Performed by: PHYSICAL THERAPIST

## 2018-09-04 PROCEDURE — 97112 NEUROMUSCULAR REEDUCATION: CPT | Performed by: PHYSICAL THERAPIST

## 2018-09-04 NOTE — PROGRESS NOTES
Physical Therapy Daily Progress Note  Visit: 23    Bailee Garza reports: My leg is doing good. I used it too much cooking yesterday.     Subjective     Objective   See Exercise, Manual, and Modality Logs for complete treatment.       Assessment & Plan     Assessment  Assessment details: Pt progressing well. Able to progress standing balance and stair climbing without increased pain    Plan  Plan details: Progress with strength and balance        Manual Therapy:    -     mins  05483;  Therapeutic Exercise:    30     mins  89606;     Neuromuscular Sofya:    10    mins  50687;    Therapeutic Activity:     -     mins  40123;     Gait Training:      -     mins  66532;     Ultrasound:     -     mins  84574;    Electrical Stimulation:    -     mins  77225 ( );  Dry Needling     -     mins self-pay    Timed Treatment:   40   mins   Total Treatment:     50   mins    Marta Reis PT  [place] License #: [id number]    Physical Therapist

## 2018-09-05 ENCOUNTER — TREATMENT (OUTPATIENT)
Dept: PHYSICAL THERAPY | Facility: CLINIC | Age: 78
End: 2018-09-05

## 2018-09-05 DIAGNOSIS — M25.552 LEFT HIP PAIN: ICD-10-CM

## 2018-09-05 DIAGNOSIS — R26.9 GAIT DIFFICULTY: ICD-10-CM

## 2018-09-05 DIAGNOSIS — Z96.642 HISTORY OF TOTAL LEFT HIP REPLACEMENT: Primary | ICD-10-CM

## 2018-09-05 PROCEDURE — 97110 THERAPEUTIC EXERCISES: CPT | Performed by: PHYSICAL THERAPIST

## 2018-09-05 PROCEDURE — 97112 NEUROMUSCULAR REEDUCATION: CPT | Performed by: PHYSICAL THERAPIST

## 2018-09-05 NOTE — PROGRESS NOTES
Physical Therapy Daily Progress Note  Visit: 24    Bailee Greg reports: I was a little sore after yesterday    Subjective     Objective   See Exercise, Manual, and Modality Logs for complete treatment.       Assessment & Plan     Assessment  Assessment details: Pt doing great. Continue to progress with strength and balance    Plan  Plan details: Progress per POC        Manual Therapy:    -     mins  29008;  Therapeutic Exercise:    30     mins  01838;     Neuromuscular Sofya:    12    mins  24116;    Therapeutic Activity:     -     mins  40815;     Gait Training:      -     mins  01929;     Ultrasound:     -     mins  27133;    Electrical Stimulation:    -     mins  45945 ( );  Dry Needling     -     mins self-pay    Timed Treatment:   42   mins   Total Treatment:     50   mins    Marta Reis PT  [place] License #: [id number]    Physical Therapist

## 2018-09-07 ENCOUNTER — TREATMENT (OUTPATIENT)
Dept: PHYSICAL THERAPY | Facility: CLINIC | Age: 78
End: 2018-09-07

## 2018-09-07 DIAGNOSIS — R26.9 GAIT DIFFICULTY: ICD-10-CM

## 2018-09-07 DIAGNOSIS — M25.552 LEFT HIP PAIN: ICD-10-CM

## 2018-09-07 DIAGNOSIS — Z96.642 HISTORY OF TOTAL LEFT HIP REPLACEMENT: Primary | ICD-10-CM

## 2018-09-07 PROCEDURE — 97110 THERAPEUTIC EXERCISES: CPT | Performed by: PHYSICAL THERAPIST

## 2018-09-07 PROCEDURE — 97112 NEUROMUSCULAR REEDUCATION: CPT | Performed by: PHYSICAL THERAPIST

## 2018-09-07 NOTE — PROGRESS NOTES
Physical Therapy Daily Progress Note  Visit: 25    Bailee Greg reports: I was hurting coming over here, but now it feels fine    Subjective     Objective   See Exercise, Manual, and Modality Logs for complete treatment.       Assessment & Plan     Assessment  Assessment details: Pt doing great. Able to tolerate lying on right side today to progress hip ABD and ER exercises to left hip    Plan  Plan details: Add lateral step ups next session        Manual Therapy:    -     mins  78828;  Therapeutic Exercise:    30     mins  07708;     Neuromuscular Sofya:    12    mins  26425;    Therapeutic Activity:     -     mins  97065;     Gait Training:      -     mins  36172;     Ultrasound:     -     mins  42207;    Electrical Stimulation:    -     mins  19635 ( );  Dry Needling     -     mins self-pay    Timed Treatment:   42   mins   Total Treatment:     47   mins    Marta Reis PT  [place] License #: [id number]    Physical Therapist

## 2018-09-10 ENCOUNTER — OFFICE VISIT (OUTPATIENT)
Dept: FAMILY MEDICINE CLINIC | Facility: CLINIC | Age: 78
End: 2018-09-10

## 2018-09-10 ENCOUNTER — TREATMENT (OUTPATIENT)
Dept: PHYSICAL THERAPY | Facility: CLINIC | Age: 78
End: 2018-09-10

## 2018-09-10 VITALS
HEIGHT: 62 IN | RESPIRATION RATE: 16 BRPM | BODY MASS INDEX: 19.69 KG/M2 | TEMPERATURE: 97.3 F | DIASTOLIC BLOOD PRESSURE: 77 MMHG | SYSTOLIC BLOOD PRESSURE: 123 MMHG | WEIGHT: 107 LBS | HEART RATE: 68 BPM

## 2018-09-10 DIAGNOSIS — G89.29 CHRONIC MIDLINE LOW BACK PAIN WITHOUT SCIATICA: Primary | ICD-10-CM

## 2018-09-10 DIAGNOSIS — M54.50 CHRONIC MIDLINE LOW BACK PAIN WITHOUT SCIATICA: Primary | ICD-10-CM

## 2018-09-10 DIAGNOSIS — Z96.642 STATUS POST TOTAL REPLACEMENT OF LEFT HIP: ICD-10-CM

## 2018-09-10 DIAGNOSIS — R26.9 GAIT DIFFICULTY: ICD-10-CM

## 2018-09-10 DIAGNOSIS — M25.552 LEFT HIP PAIN: ICD-10-CM

## 2018-09-10 DIAGNOSIS — Z96.642 HISTORY OF TOTAL LEFT HIP REPLACEMENT: Primary | ICD-10-CM

## 2018-09-10 PROBLEM — R63.4 ABNORMAL WEIGHT LOSS: Status: RESOLVED | Noted: 2017-12-18 | Resolved: 2018-09-10

## 2018-09-10 PROBLEM — S92.245A CLOSED NONDISPLACED FRACTURE OF MEDIAL CUNEIFORM OF LEFT FOOT: Status: RESOLVED | Noted: 2018-02-19 | Resolved: 2018-09-10

## 2018-09-10 PROBLEM — M51.369 DEGENERATIVE DISC DISEASE, LUMBAR: Status: RESOLVED | Noted: 2018-05-30 | Resolved: 2018-09-10

## 2018-09-10 PROBLEM — M51.36 DEGENERATIVE DISC DISEASE, LUMBAR: Status: RESOLVED | Noted: 2018-05-30 | Resolved: 2018-09-10

## 2018-09-10 PROBLEM — M16.10 ARTHRITIS OF HIP: Status: RESOLVED | Noted: 2018-02-05 | Resolved: 2018-09-10

## 2018-09-10 PROBLEM — M54.16 LEFT LUMBAR RADICULOPATHY: Status: RESOLVED | Noted: 2018-05-30 | Resolved: 2018-09-10

## 2018-09-10 PROBLEM — M16.12 PRIMARY OSTEOARTHRITIS OF LEFT HIP: Status: RESOLVED | Noted: 2018-02-19 | Resolved: 2018-09-10

## 2018-09-10 PROBLEM — R20.0 NUMBNESS OF LEFT ANTERIOR THIGH: Status: RESOLVED | Noted: 2018-05-30 | Resolved: 2018-09-10

## 2018-09-10 PROCEDURE — 99213 OFFICE O/P EST LOW 20 MIN: CPT | Performed by: FAMILY MEDICINE

## 2018-09-10 PROCEDURE — 97110 THERAPEUTIC EXERCISES: CPT | Performed by: PHYSICAL THERAPIST

## 2018-09-10 PROCEDURE — 97112 NEUROMUSCULAR REEDUCATION: CPT | Performed by: PHYSICAL THERAPIST

## 2018-09-10 NOTE — PROGRESS NOTES
"Chief Complaint   Patient presents with   • Pain     CSA       Subjective     I have reviewed and updated her medications, medical history and problem list during today's office visit. He returns today to review pain medicines.  She cannot tolerate hydrocodone due to severe depression and hallucinations.  She does tolerate tramadol and has been taking that along with Tylenol for recurrent pain.  Pain is in her low back and also her left hip.  She has some scoliosis of the back that has been identified.  Her left hip is improving after surgery on July 24, 2018.  Most recent bottle of tramadol filled on September 6, 2018.    Patient Care Team:  Eddie Araya MD as PCP - General  Eddie Araya MD as PCP - Family Medicine  [facility]ing Physician (Cardiology)    Social History   Substance Use Topics   • Smoking status: Never Smoker   • Smokeless tobacco: Never Used   • Alcohol use No       Review of Systems   Musculoskeletal: Positive for arthralgias and back pain.       Objective     /77   Pulse 68   Temp 97.3 °F (36.3 °C) (Oral)   Resp 16   Ht 157.5 cm (62\")   Wt 48.5 kg (107 lb)   BMI 19.57 kg/m²     Body mass index is 19.57 kg/m².    Physical Exam   Constitutional: She appears well-developed. No distress.   Musculoskeletal:        Legs:  Mild back scoliosis        Data Reviewed:             Assessment/Plan     Problem List Items Addressed This Visit     Chronic midline low back pain without sciatica - Primary     Continue same therapy         Status post total replacement of left hip          No orders of the defined types were placed in this encounter.        Current Outpatient Prescriptions:   •  acetaminophen (TYLENOL) 500 MG tablet, Take 500 mg by mouth Every 4 (Four) Hours As Needed for Mild Pain ., Disp: , Rfl:   •  Chlorcyclizine-Pseudoephed (STAHIST AD) 25-60 MG tablet, Take  by mouth., Disp: , Rfl:   •  metoprolol succinate XL (TOPROL-XL) 25 MG 24 hr tablet, Take 12.5 " mg by mouth Daily., Disp: , Rfl:   •  Multiple Vitamins-Minerals (PRESERVISION AREDS PO), Take 1 tablet by mouth Daily As Needed. TAKES ONLY OCCASIONALLY, Disp: , Rfl:   •  SYNTHROID 100 MCG tablet, Take 1 tablet by mouth Daily., Disp: 30 tablet, Rfl: 11  •  traMADol (ULTRAM) 50 MG tablet, Take 1 tablet by mouth Every 6 (Six) Hours As Needed for Moderate Pain ., Disp: 60 tablet, Rfl: 0  •  aspirin 81 MG tablet, Take 81 mg by mouth Daily., Disp: , Rfl:   •  triamcinolone (KENALOG) 0.1 % cream, Apply 1 application topically As Needed. ITCHING, Disp: , Rfl:     Return in about 1 month (around 10/10/2018) for Controlled Substance Visit.

## 2018-09-10 NOTE — PROGRESS NOTES
Physical Therapy Daily Progress Note  Visit: 26    Bailee Greg reports: I am feeling good.     Subjective     Objective   See Exercise, Manual, and Modality Logs for complete treatment.       Assessment & Plan     Assessment  Assessment details: Pt doing very well. Stair climbing and balance doing great    Plan  Plan details: Add leg press next session.  Reassess next session prior to MD appt        Manual Therapy:    4     mins  02374;  Therapeutic Exercise:    25     mins  10558;     Neuromuscular Sofya:    14    mins  56437;    Therapeutic Activity:     -     mins  61023;     Gait Training:      -     mins  17894;     Ultrasound:     -     mins  19595;    Electrical Stimulation:    -     mins  25234 ( );  Dry Needling     -     mins self-pay    Timed Treatment:   43   mins   Total Treatment:     50   mins    Marta Reis PT  [place] License #: [id number]    Physical Therapist

## 2018-09-12 ENCOUNTER — OFFICE VISIT (OUTPATIENT)
Dept: ORTHOPEDIC SURGERY | Facility: CLINIC | Age: 78
End: 2018-09-12

## 2018-09-12 VITALS — BODY MASS INDEX: 19.32 KG/M2 | WEIGHT: 105 LBS | HEIGHT: 62 IN | TEMPERATURE: 98.1 F

## 2018-09-12 DIAGNOSIS — Z96.642 STATUS POST TOTAL REPLACEMENT OF LEFT HIP: Primary | ICD-10-CM

## 2018-09-12 PROCEDURE — 99024 POSTOP FOLLOW-UP VISIT: CPT | Performed by: ORTHOPAEDIC SURGERY

## 2018-09-12 PROCEDURE — 73502 X-RAY EXAM HIP UNI 2-3 VIEWS: CPT | Performed by: ORTHOPAEDIC SURGERY

## 2018-09-12 NOTE — PROGRESS NOTES
"Patient:  Bailee Garza is a 78 y.o. female    Chief Complaint/ Reason for Visit:    Chief Complaint   Patient presents with   • Left Hip - Post-op, Pain       HPI:  This pleasant young lady is here for a scheduled postop check on her left total hip.  She is 7 weeks postop and occasionally has some mild groin pain when getting up from a chair or going up and down steps but otherwise has progressed very nicely since surgery.  She has had no trouble with her incision.  She did have a little soreness when she rolled onto her left hip in bed the other night and it woke her up.  She has not had any calf pain, chest pain, or shortness of breath.  She feels like she is \"getting stronger every day.\"      PMH:    Past Medical History:   Diagnosis Date   • Allergic    • Arthritis    • Arthropathy of pelvic region and thigh 2012    unspecified   • Back pain 2007   • Chronic back pain 2009   • Constipation 2015    unspecified   • Diverticulosis    • Dyspepsia 2008   • Essential hypertension 2007   • 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), 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   • Hearing loss 2008   • Hip pain, left    • History of bone density study 2015   • History of pneumonia     2017   • History of skin cancer    • Hypothyroidism, acquired 2007   • Insomnia 2015    unspecified   • Intervertebral disc disorder with myelopathy, cervical region 2010   • Rhinitis, allergic 2007   • Scoliosis    • Screening breast examination 2007   • Stress 2015    other acute reactions to stress   • Stress incontinence    • Urticaria 2015       PSH:    Past Surgical History:   Procedure Laterality Date   • BREAST BIOPSY Right     50+ years ago   • BREAST LUMPECTOMY      BENIGN   • BRONCHOSCOPY N/A 2017    Procedure: BRONCHOSCOPY;  Surgeon: Mario Alanis MD;  Location: [facility] ENDOSCOPY;  Service:    • CATARACT " EXTRACTION, BILATERAL     • HYSTERECTOMY     • TOTAL HIP ARTHROPLASTY Left 7/24/2018    Procedure: LEFT TOTAL HIP ARTHROPLASTY;  Surgeon: Justen Mann MD;  Location: [facility] OR;  Service: Orthopedics       Social Hx:    Social History     Social History   • Marital status:      Spouse name: N/A   • Number of children: N/A   • Years of education: N/A     Occupational History   • Not on file.     Social History Main Topics   • Smoking status: Never Smoker   • Smokeless tobacco: Never Used   • Alcohol use No   • Drug use: No   • Sexual activity: Defer     Other Topics Concern   • Not on file     Social History Narrative   • No narrative on file       Family Hx:    Family History   Problem Relation Age of Onset   • Heart disease Mother    • Cancer Father    • Asthma Paternal Grandfather    • Malig Hyperthermia Neg Hx        Meds:    Current Outpatient Prescriptions:   •  acetaminophen (TYLENOL) 500 MG tablet, Take 500 mg by mouth Every 4 (Four) Hours As Needed for Mild Pain ., Disp: , Rfl:   •  Chlorcyclizine-Pseudoephed (STAHIST AD) 25-60 MG tablet, Take  by mouth., Disp: , Rfl:   •  metoprolol succinate XL (TOPROL-XL) 25 MG 24 hr tablet, Take 12.5 mg by mouth Daily., Disp: , Rfl:   •  Multiple Vitamins-Minerals (PRESERVISION AREDS PO), Take 1 tablet by mouth Daily As Needed. TAKES ONLY OCCASIONALLY, Disp: , Rfl:   •  SYNTHROID 100 MCG tablet, Take 1 tablet by mouth Daily., Disp: 30 tablet, Rfl: 11  •  traMADol (ULTRAM) 50 MG tablet, Take 1 tablet by mouth Every 6 (Six) Hours As Needed for Moderate Pain ., Disp: 60 tablet, Rfl: 0  •  aspirin 81 MG tablet, Take 81 mg by mouth Daily., Disp: , Rfl:   •  triamcinolone (KENALOG) 0.1 % cream, Apply 1 application topically As Needed. ITCHING, Disp: , Rfl:     Allergies:    Allergies   Allergen Reactions   • Hydrocodone Hallucinations   • Ketek [Telithromycin] Hives   • Nsaids Hives   • Sulfa Antibiotics Hives       ROS:  Review of Systems    Vitals:    09/12/18  "1441   Temp: 98.1 °F (36.7 °C)   TempSrc: Temporal Artery    Weight: 47.6 kg (105 lb)   Height: 157.5 cm (62\")     Body mass index is 19.2 kg/m².    Physical Exam    The patient is awake, alert, and oriented ×3.  The patient is in no acute distress.  Breathing is regular and unlabored with a respiratory rate of 12/m.  Extraocular movements and pupillary responses are symmetrically intact. Sclerae are anicteric.   Hearing is within normal limits.  Speech is within normal limits.  There is no jugular venous distention.    She is actually walking quite well.  I'm very pleased with her gait.  She certainly has a better gait now than she did before surgery.  There is no pronounced limp at all.  She has an excellent range of motion the left hip.  Leg lengths are equal.  Neurovascular exam is symmetrically intact.  Her calves are both soft and nontender with no venous cord.      Radiology: X-rays: AP pelvis AP and lateral of the left hip were ordered and reviewed to assess the patient's postop status and alignment.  I did review these and I did compare them to the previous images from the last visit here in the office.  Today's images reveal a left total hip with satisfactory position.  There is no evidence of loosening.  In comparison to the previous images, I do not see any concerning interval change.  There has been no subsidence of the stem, and there is no lucency around any of the components.          Assessment:     Diagnosis Plan   1. Status post total replacement of left hip  XR Hip With or Without Pelvis 2 - 3 View Left           Plan:  I discussed everything with the patient and her daughter.  We reviewed the x-rays together.  She seems to be doing very well.  Activity recommendations and precautions and dislocation precautions in particular were reviewed and discussed.  The patient's questions, and those of her daughter, were answered to their satisfaction.  I will see her back in a couple months for progress " check.  We will need AP pelvis, AP and lateral x-rays of the left hip at that time.      Orders Placed This Encounter   Procedures   • XR Hip With or Without Pelvis 2 - 3 View Left     Order Specific Question:   Reason for Exam:     Answer:   F/U LT. CHAS

## 2018-09-14 ENCOUNTER — TREATMENT (OUTPATIENT)
Dept: PHYSICAL THERAPY | Facility: CLINIC | Age: 78
End: 2018-09-14

## 2018-09-14 ENCOUNTER — TELEPHONE (OUTPATIENT)
Dept: ORTHOPEDIC SURGERY | Facility: CLINIC | Age: 78
End: 2018-09-14

## 2018-09-14 DIAGNOSIS — Z96.642 HISTORY OF TOTAL LEFT HIP REPLACEMENT: Primary | ICD-10-CM

## 2018-09-14 DIAGNOSIS — R26.9 GAIT DIFFICULTY: ICD-10-CM

## 2018-09-14 DIAGNOSIS — M25.552 LEFT HIP PAIN: ICD-10-CM

## 2018-09-14 PROCEDURE — 97140 MANUAL THERAPY 1/> REGIONS: CPT | Performed by: PHYSICAL THERAPIST

## 2018-09-14 PROCEDURE — 97112 NEUROMUSCULAR REEDUCATION: CPT | Performed by: PHYSICAL THERAPIST

## 2018-09-14 PROCEDURE — 97110 THERAPEUTIC EXERCISES: CPT | Performed by: PHYSICAL THERAPIST

## 2018-09-14 NOTE — PROGRESS NOTES
Physical Therapy Daily Progress Note  Visit: 27    Bailee Grge reports: MD released me to drive. Said that I still need to follow my hip precautions. I am going back to work Monday. Waking up only about 1 time at night due to discomfort. Pain on average is about 4-5/10 and I am trying to wean off of the pain meds.     Subjective     Objective       Ambulation     Comments   Ambulating without AD and good gait mechanics  TU sec without AD  Single leg stance on (L)LE: 2 sec     See Exercise, Manual, and Modality Logs for complete treatment.       Assessment & Plan     Assessment  Assessment details: Pt is making great progress. Continues to have some weakness in LLE, but this is to be expected. Educated that she still needs to be mindful of her hip precautions.    Plan  Plan details: Continue 1x/wk for 3 weeks         1.  Patient to be compliant with HEP and demo good efficiency with TE (MET)  2.  Report < 2 sleep disturbances 2° hip pain. (MET)    3.  Increased Lumbar and SIJ mobility to allow for improved pelvic alignment and gait mechanics (equal step length and level pelvis throughout gait).  (MET)  4. Pt will report minimal tenderness to palpation.   6. Pt. Able to ambulate up to 20 min with pain < 4/10 with acceptable pattern.      LONG TERM GOALS: 4-6 weeks  1.  Pt. to score >40/80 perceived ability on LEFS  2.  Pain level < 2/10 in hips with all activities including sitting > 1 hr continuously. (NOT MET)   3. Hip strength to 4/5  to allow for pushing, pulling and more strenuous activities to occur without pain.  Walk > 45 min.  no pain    Manual Therapy:    -     mins  76934;  Therapeutic Exercise:    25     mins  40812;     Neuromuscular Sofya:    10    mins  49593;    Therapeutic Activity:     10     mins  77104;     Gait Training:      -     mins  15599;     Ultrasound:     -     mins  21201;    Electrical Stimulation:    -     mins  97226 ( );  Dry Needling     -     mins self-pay    Timed  Treatment:   45   mins   Total Treatment:     50   mins    Marta Reis, PT  [place] License #: [id number]    Physical Therapist

## 2018-09-17 ENCOUNTER — OFFICE VISIT (OUTPATIENT)
Dept: ORTHOPEDIC SURGERY | Facility: CLINIC | Age: 78
End: 2018-09-17

## 2018-09-17 VITALS — BODY MASS INDEX: 19.51 KG/M2 | TEMPERATURE: 98.4 F | HEIGHT: 62 IN | WEIGHT: 106 LBS
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
## 2018-09-17 DIAGNOSIS — Z96.642 STATUS POST TOTAL REPLACEMENT OF LEFT HIP: ICD-10-CM

## 2018-09-17 PROCEDURE — 99024 POSTOP FOLLOW-UP VISIT: CPT | Performed by: ORTHOPAEDIC SURGERY

## 2018-09-17 PROCEDURE — 73502 X-RAY EXAM HIP UNI 2-3 VIEWS: CPT | Performed by: ORTHOPAEDIC SURGERY
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
## 2018-09-24 ENCOUNTER — TREATMENT (OUTPATIENT)
Dept: PHYSICAL THERAPY | Facility: CLINIC | Age: 78
End: 2018-09-24

## 2018-09-24 DIAGNOSIS — Z96.642 HISTORY OF TOTAL LEFT HIP REPLACEMENT: Primary | ICD-10-CM

## 2018-09-24 DIAGNOSIS — M25.552 LEFT HIP PAIN: ICD-10-CM

## 2018-09-24 DIAGNOSIS — R26.9 GAIT DIFFICULTY: ICD-10-CM

## 2018-09-24 PROCEDURE — 97116 GAIT TRAINING THERAPY: CPT | Performed by: PHYSICAL THERAPIST

## 2018-09-24 PROCEDURE — 97112 NEUROMUSCULAR REEDUCATION: CPT | Performed by: PHYSICAL THERAPIST

## 2018-09-24 PROCEDURE — 97110 THERAPEUTIC EXERCISES: CPT | Performed by: PHYSICAL THERAPIST

## 2018-09-24 NOTE — PROGRESS NOTES
Physical Therapy Daily Progress Note  Visit: 28    Bailee Garza reports: I am doing well. I feel like my left hip is longer than my right    Subjective     Objective   See Exercise, Manual, and Modality Logs for complete treatment.       Assessment & Plan     Assessment  Assessment details: Pt is progressing very well. Educated her to continue going up and down stairs reciprocally to help build strength    Plan  Plan details: Progress with hip strengthening        Manual Therapy:    -     mins  81095;  Therapeutic Exercise:    20     mins  64498;     Neuromuscular Sofya:    12    mins  38914;    Therapeutic Activity:     -     mins  71958;     Gait Training:      10     mins  11525;     Ultrasound:     -     mins  59212;    Electrical Stimulation:    -     mins  45588 ( );  Dry Needling     -     mins self-pay    Timed Treatment:   42   mins   Total Treatment:     50   mins    Marta Reis PT  [place] License #: [id number]    Physical Therapist

## 2018-10-01 ENCOUNTER — TREATMENT (OUTPATIENT)
Dept: PHYSICAL THERAPY | Facility: CLINIC | Age: 78
End: 2018-10-01

## 2018-10-01 DIAGNOSIS — Z96.642 HISTORY OF TOTAL LEFT HIP REPLACEMENT: Primary | ICD-10-CM

## 2018-10-01 DIAGNOSIS — M25.552 LEFT HIP PAIN: ICD-10-CM

## 2018-10-01 DIAGNOSIS — R26.9 GAIT DIFFICULTY: ICD-10-CM

## 2018-10-01 PROCEDURE — 97110 THERAPEUTIC EXERCISES: CPT | Performed by: PHYSICAL THERAPIST

## 2018-10-01 PROCEDURE — 97112 NEUROMUSCULAR REEDUCATION: CPT | Performed by: PHYSICAL THERAPIST

## 2018-10-01 NOTE — PROGRESS NOTES
Physical Therapy Daily Progress Note  Visit: 29    Bailee Garza reports: I feel like I am walking different on my left leg.    Subjective     Objective   See Exercise, Manual, and Modality Logs for complete treatment.       Assessment & Plan     Assessment  Assessment details: Educated pt that it will take up to 1 year before the hip feels more normal, but each month should be better than the last. Pt showed great improvement in balance on shuttleboard    Plan  Plan details: Anticipate DC next session        Manual Therapy:    -     mins  42006;  Therapeutic Exercise:    30     mins  77926;     Neuromuscular Sofya:    12    mins  38018;    Therapeutic Activity:     -     mins  58084;     Gait Training:      -     mins  01660;     Ultrasound:     -     mins  42718;    Electrical Stimulation:    -     mins  75112 ( );  Dry Needling     -     mins self-pay    Timed Treatment:   42   mins   Total Treatment:     55   mins    Marta Reis PT  [place] License #: [id number]    Physical Therapist

## 2018-10-08 ENCOUNTER — TREATMENT (OUTPATIENT)
Dept: PHYSICAL THERAPY | Facility: CLINIC | Age: 78
End: 2018-10-08

## 2018-10-08 DIAGNOSIS — M25.552 LEFT HIP PAIN: ICD-10-CM

## 2018-10-08 DIAGNOSIS — Z96.642 HISTORY OF TOTAL LEFT HIP REPLACEMENT: Primary | ICD-10-CM

## 2018-10-08 DIAGNOSIS — R26.9 GAIT DIFFICULTY: ICD-10-CM

## 2018-10-08 PROCEDURE — 97110 THERAPEUTIC EXERCISES: CPT | Performed by: PHYSICAL THERAPIST

## 2018-10-08 PROCEDURE — 97112 NEUROMUSCULAR REEDUCATION: CPT | Performed by: PHYSICAL THERAPIST

## 2018-10-08 NOTE — PROGRESS NOTES
Physical Therapy Daily Progress Note  Visit: 30    Bailee Garza reports: I am still having trouble with stair climbing up my large steps and going up and down the hills    Subjective     Objective   See Exercise, Manual, and Modality Logs for complete treatment.       Assessment & Plan     Assessment  Assessment details: Pt doing very well. Educated her to go up the stairs using a rail and reciprocally in order to build strength.     Plan  Plan details: Reassess next week        Manual Therapy:    -     mins  05271;  Therapeutic Exercise:    32     mins  10193;     Neuromuscular Sofya:    16    mins  45677;    Therapeutic Activity:     -     mins  21365;     Gait Training:      -     mins  02416;     Ultrasound:     -     mins  64313;    Electrical Stimulation:    -     mins  41323 ( );  Dry Needling     -     mins self-pay    Timed Treatment:   48   mins   Total Treatment:     55   mins    Marta Reis, PT  [place] License #: [id number]    Physical Therapist

## 2018-10-10 ENCOUNTER — OFFICE VISIT (OUTPATIENT)
Dept: FAMILY MEDICINE CLINIC | Facility: CLINIC | Age: 78
End: 2018-10-10

## 2018-10-10 VITALS
DIASTOLIC BLOOD PRESSURE: 71 MMHG | TEMPERATURE: 97.3 F | SYSTOLIC BLOOD PRESSURE: 114 MMHG | BODY MASS INDEX: 19.88 KG/M2 | HEART RATE: 64 BPM | WEIGHT: 108 LBS | RESPIRATION RATE: 16 BRPM | HEIGHT: 62 IN

## 2018-10-10 DIAGNOSIS — G89.29 CHRONIC MIDLINE LOW BACK PAIN WITHOUT SCIATICA: Primary | ICD-10-CM

## 2018-10-10 DIAGNOSIS — I10 ESSENTIAL HYPERTENSION: ICD-10-CM

## 2018-10-10 DIAGNOSIS — M54.50 CHRONIC MIDLINE LOW BACK PAIN WITHOUT SCIATICA: Primary | ICD-10-CM

## 2018-10-10 PROCEDURE — 99213 OFFICE O/P EST LOW 20 MIN: CPT | Performed by: FAMILY MEDICINE

## 2018-10-10 RX ORDER — TRAMADOL HYDROCHLORIDE 50 MG/1
50 TABLET ORAL EVERY 6 HOURS PRN
Qty: 60 TABLET | Refills: 1 | Status: SHIPPED | OUTPATIENT
Start: 2018-10-10 | End: 2018-12-03 | Stop reason: SDUPTHER

## 2018-10-10 NOTE — PROGRESS NOTES
"Chief Complaint   Patient presents with   • Back Pain     CSA       Subjective     Bailee Garza presents to the office today to refill her medications. No medication side effects are reported. She is here today to refill medication for chronic low back pain.  That condition is stable.  She continues to take the medication 3-4 times daily.  She did bring in her last prescription bottle dated September 6, 2018.  She has 20 tablets remaining in her current prescription bottle.  Shaji report is pending at the time of this dictation    .  She has been stable on Toprol all since being treated in the hospital in December 2017.  She would like to get those refills here and that will be fine.  Blood pressure is controlled.    She had an episode of some fluttering in the right carotid area last Tuesday that lasted about 3 hours.  No symptoms since that time.     I have reviewed and updated her medications, medical history and problem list during today's office visit.     Patient Care Team:  Eddie Araya MD as PCP - General  Eddie Araya MD as PCP - Family Medicine  Wilfred Saavedra MD as Consulting Physician (Cardiology)    Social History   Substance Use Topics   • Smoking status: Never Smoker   • Smokeless tobacco: Never Used   • Alcohol use No       Review of Systems   Constitutional: Negative for fatigue.   Cardiovascular: Negative for chest pain.       Objective     /71   Pulse 64   Temp 97.3 °F (36.3 °C) (Oral)   Resp 16   Ht 157.5 cm (62\")   Wt 49 kg (108 lb)   BMI 19.75 kg/m²     Body mass index is 19.75 kg/m².    Physical Exam   Constitutional: She is oriented to person, place, and time. She appears well-developed. No distress.   Eyes: Conjunctivae and lids are normal.   Neck: Carotid bruit is not present.   Cardiovascular: Normal rate, regular rhythm and normal heart sounds.    Pulmonary/Chest: Effort normal and breath sounds normal.   Neurological: She is alert and oriented to person, " place, and time.   Skin: Skin is warm and dry.   Psychiatric: She has a normal mood and affect. Her behavior is normal.   Vitals reviewed.      Data Reviewed:    Xr Hip With Or Without Pelvis 2 - 3 View Left    Result Date: 9/17/2018  Impression: Ordering physician's impression is located in the Encounter Note dated 09/17/18. X-ray performed in the DR room.     Xr Hip With Or Without Pelvis 2 - 3 View Left    Result Date: 9/12/2018  Impression: Ordering physician's impression is located in the Encounter Note dated 09/12/18. X-ray performed in the DR room.           Assessment/Plan     Problem List Items Addressed This Visit     Chronic midline low back pain without sciatica - Primary    Relevant Medications    traMADol (ULTRAM) 50 MG tablet    Essential hypertension     Will take over metoprolol prescription                 No orders of the defined types were placed in this encounter.        Current Outpatient Prescriptions:   •  acetaminophen (TYLENOL) 500 MG tablet, Take 500 mg by mouth Every 4 (Four) Hours As Needed for Mild Pain ., Disp: , Rfl:   •  aspirin 81 MG tablet, Take 81 mg by mouth Daily., Disp: , Rfl:   •  Chlorcyclizine-Pseudoephed (STAHIST AD) 25-60 MG tablet, Take  by mouth., Disp: , Rfl:   •  metoprolol succinate XL (TOPROL-XL) 25 MG 24 hr tablet, Take 12.5 mg by mouth Daily., Disp: , Rfl:   •  Multiple Vitamins-Minerals (PRESERVISION AREDS PO), Take 1 tablet by mouth Daily As Needed. TAKES ONLY OCCASIONALLY, Disp: , Rfl:   •  SYNTHROID 100 MCG tablet, Take 1 tablet by mouth Daily., Disp: 30 tablet, Rfl: 11  •  traMADol (ULTRAM) 50 MG tablet, Take 1 tablet by mouth Every 6 (Six) Hours As Needed for Moderate Pain  for up to 60 days., Disp: 60 tablet, Rfl: 1  •  triamcinolone (KENALOG) 0.1 % cream, Apply 1 application topically As Needed. ITCHING, Disp: , Rfl:     Return in about 2 months (around 12/10/2018) for Recheck all meds, Controlled Substance Visit.

## 2018-10-17 ENCOUNTER — TREATMENT (OUTPATIENT)
Dept: PHYSICAL THERAPY | Facility: CLINIC | Age: 78
End: 2018-10-17

## 2018-10-17 DIAGNOSIS — Z96.642 HISTORY OF TOTAL LEFT HIP REPLACEMENT: Primary | ICD-10-CM

## 2018-10-17 DIAGNOSIS — M25.552 LEFT HIP PAIN: ICD-10-CM

## 2018-10-17 DIAGNOSIS — R26.9 GAIT DIFFICULTY: ICD-10-CM

## 2018-10-17 PROCEDURE — 97110 THERAPEUTIC EXERCISES: CPT | Performed by: PHYSICAL THERAPIST

## 2018-10-17 PROCEDURE — 97112 NEUROMUSCULAR REEDUCATION: CPT | Performed by: PHYSICAL THERAPIST

## 2018-10-17 NOTE — PROGRESS NOTES
Physical Therapy Daily Progress Note  Visit: 31    Bailee Garza reports: I am doing much better on the steps now. I am happy with the way the hip is progressing    Subjective     Objective   See Exercise, Manual, and Modality Logs for complete treatment.       Assessment & Plan     Assessment  Assessment details: Pt is doing very well. Her gait and stair climbing are normal and she has improving single leg balance. She reports she feels like she is no longer needing therapy due to no problems at home and I agreed that as long as she continued exercises at home she was good for DC at this time. Kept next week appt and suggested her call if she would like to cancel this one.     Plan  Plan details: Possibly will call to cancel next appt if feeling good still        Manual Therapy:    -     mins  38872;  Therapeutic Exercise:    32     mins  48486;     Neuromuscular Sofya:    15    mins  79748;    Therapeutic Activity:     -     mins  13604;     Gait Training:      -     mins  32964;     Ultrasound:     -     mins  02513;    Electrical Stimulation:    -     mins  36347 ( );  Dry Needling     -     mins self-pay    Timed Treatment:   47   mins   Total Treatment:     70   mins    Marta Reis PT  [place] License #: [id number]    Physical Therapist

## 2018-11-12 ENCOUNTER — OFFICE VISIT (OUTPATIENT)
Dept: ORTHOPEDIC SURGERY | Facility: CLINIC | Age: 78
End: 2018-11-12

## 2018-11-12 VITALS — WEIGHT: 101.2 LBS | BODY MASS INDEX: 17.93 KG/M2 | HEIGHT: 63 IN | TEMPERATURE: 97.9 F

## 2018-11-12 DIAGNOSIS — Z96.642 HISTORY OF HIP REPLACEMENT, TOTAL, LEFT: Primary | ICD-10-CM

## 2018-11-12 PROCEDURE — 73502 X-RAY EXAM HIP UNI 2-3 VIEWS: CPT | Performed by: ORTHOPAEDIC SURGERY

## 2018-11-12 PROCEDURE — 99212 OFFICE O/P EST SF 10 MIN: CPT | Performed by: ORTHOPAEDIC SURGERY

## 2018-11-12 NOTE — PROGRESS NOTES
Patient:  Bailee Garza is a 78 y.o. female    Chief Complaint/ Reason for Visit:    Chief Complaint   Patient presents with   • Left Hip - Follow-up       HPI:  This pleasant young lady is now about 4 months status post an uncemented left total hip replacement arthroplasty performed for end-stage osteoarthritis.  She will be 16 weeks postop tomorrow.  She has no complaints with her left hip.  She has no pain.  She does think that perhaps the left leg is slightly longer than it was before surgery.  I gave her some adjustable heel lifts to experiment within her right shoe, but explained that she was likely to adapt in the next 8 months postop.  I explained that on exam and on radiographs, her leg lengths were equal, but acknowledges that the left leg was likely short for many years due to the wear of the arthritis itself.      PMH:    Past Medical History:   Diagnosis Date   • Allergic    • Arthritis    • Arthropathy of pelvic region and thigh 2012    unspecified   • Back pain 2007   • Chronic back pain 2009   • Constipation 2015    unspecified   • Diverticulosis    • Dyspepsia 2008   • Essential hypertension 2007   • 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), 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   • Hearing loss 2008   • Hip pain, left    • History of bone density study 2015   • History of pneumonia     2017   • History of skin cancer    • Hypothyroidism, acquired 2007   • Insomnia 2015    unspecified   • Intervertebral disc disorder with myelopathy, cervical region 2010   • Rhinitis, allergic 2007   • Scoliosis    • Screening breast examination 2007   • Stress 2015    other acute reactions to stress   • Stress incontinence    • Urticaria 2015       PSH:    Past Surgical History:   Procedure Laterality Date   • BREAST BIOPSY Right     50+ years ago   • BREAST LUMPECTOMY      BENIGN   • CATARACT  EXTRACTION, BILATERAL     • HYSTERECTOMY         Social Hx:    Social History     Socioeconomic History   • Marital status:      Spouse name: Not on file   • Number of children: Not on file   • Years of education: Not on file   • Highest education level: Not on file   Social Needs   • Financial resource strain: Not on file   • Food insecurity - worry: Not on file   • Food insecurity - inability: Not on file   • Transportation needs - medical: Not on file   • Transportation needs - non-medical: Not on file   Occupational History   • Not on file   Tobacco Use   • Smoking status: Never Smoker   • Smokeless tobacco: Never Used   Substance and Sexual Activity   • Alcohol use: No   • Drug use: No   • Sexual activity: Defer   Other Topics Concern   • Not on file   Social History Narrative   • Not on file       Family Hx:    Family History   Problem Relation Age of Onset   • Heart disease Mother    • Cancer Father    • Asthma Paternal Grandfather    • Malig Hyperthermia Neg Hx        Meds:    Current Outpatient Medications:   •  acetaminophen (TYLENOL) 500 MG tablet, Take 500 mg by mouth Every 4 (Four) Hours As Needed for Mild Pain ., Disp: , Rfl:   •  aspirin 81 MG tablet, Take 81 mg by mouth Daily., Disp: , Rfl:   •  Chlorcyclizine-Pseudoephed (STAHIST AD) 25-60 MG tablet, Take  by mouth., Disp: , Rfl:   •  metoprolol succinate XL (TOPROL-XL) 25 MG 24 hr tablet, Take 12.5 mg by mouth Daily., Disp: , Rfl:   •  Multiple Vitamins-Minerals (PRESERVISION AREDS PO), Take 1 tablet by mouth Daily As Needed. TAKES ONLY OCCASIONALLY, Disp: , Rfl:   •  SYNTHROID 100 MCG tablet, Take 1 tablet by mouth Daily., Disp: 30 tablet, Rfl: 11  •  traMADol (ULTRAM) 50 MG tablet, Take 1 tablet by mouth Every 6 (Six) Hours As Needed for Moderate Pain  for up to 60 days., Disp: 60 tablet, Rfl: 1  •  triamcinolone (KENALOG) 0.1 % cream, Apply 1 application topically As Needed. ITCHING, Disp: , Rfl:     Allergies:    Allergies   Allergen  "Reactions   • Hydrocodone Hallucinations   • Ketek [Telithromycin] Hives   • Nsaids Hives   • Sulfa Antibiotics Hives       ROS:  Review of Systems    Vitals:    11/12/18 0826   Temp: 97.9 °F (36.6 °C)   TempSrc: Temporal   Weight: 45.9 kg (101 lb 3.2 oz)   Height: 160 cm (63\")     Body mass index is 17.93 kg/m².    Physical Exam    The patient is awake, alert, and oriented ×3.  The patient is in no acute distress.  Breathing is regular and unlabored with a respiratory rate of 12/m.  Extraocular movements and pupillary responses are symmetrically intact. Sclerae are anicteric.   Hearing is within normal limits.  Speech is within normal limits.  There is no jugular venous distention.    Left hip: Full smooth functional range.  Leg lengths appear equal.  Neurovascular exam symmetrically intact and normal distally.  Calves are soft and nontender with no venous cord.        Radiology: X-rays: AP pelvis AP lateral of the left total hip were ordered and reviewed today to assess postoperative status and alignment.  I did compare them to previous images here in the office.  Today's images reveal a well-positioned left total hip with no sign of loosening, excessive wear, or other concerning findings.  In comparison to the most recent previous films, there has been no interval change.      Assessment:     Diagnosis Plan   1. History of hip replacement, total, left  XR Hip With or Without Pelvis 2 - 3 View Left           Plan:  I discussed the ring with the patient length.  Activity recommendations and precautions were reviewed.  All of her questions are answered her satisfaction.  We will have her follow-up next July with one of my partners for 1 year postop check on her left total hip.      Orders Placed This Encounter   Procedures   • XR Hip With or Without Pelvis 2 - 3 View Left     Order Specific Question:   Reason for Exam:     Answer:   follow up     "

## 2018-11-29 ENCOUNTER — OFFICE VISIT (OUTPATIENT)
Dept: FAMILY MEDICINE CLINIC | Facility: CLINIC | Age: 78
End: 2018-11-29

## 2018-11-29 VITALS
TEMPERATURE: 97.8 F | DIASTOLIC BLOOD PRESSURE: 67 MMHG | HEART RATE: 62 BPM | SYSTOLIC BLOOD PRESSURE: 144 MMHG | HEIGHT: 63 IN | WEIGHT: 100 LBS | BODY MASS INDEX: 17.72 KG/M2

## 2018-11-29 DIAGNOSIS — J04.0 LARYNGITIS: ICD-10-CM

## 2018-11-29 DIAGNOSIS — J06.9 ACUTE URI: Primary | ICD-10-CM

## 2018-11-29 PROCEDURE — 99213 OFFICE O/P EST LOW 20 MIN: CPT | Performed by: NURSE PRACTITIONER

## 2018-11-29 RX ORDER — PREDNISONE 20 MG/1
20 TABLET ORAL DAILY
Qty: 7 TABLET | Refills: 0 | Status: SHIPPED | OUTPATIENT
Start: 2018-11-29 | End: 2019-03-06

## 2018-11-29 NOTE — PROGRESS NOTES
Subjective   Bailee Garza is a 78 y.o. female.     History of Present Illness   Bailee Garza 78 y.o. female who presents for evaluation of cough. Symptoms include post nasal drip, hoarseness and dry cough.  Onset of symptoms was 10 days ago, gradually improving since that time. Patient denies shortness of breath, wheezing, fever.   Evaluation to date: was seen at [facility] Treatment to date:  levaquin and stahist.     The following portions of the patient's history were reviewed and updated as appropriate: allergies, current medications, past family history, past medical history, past social history, past surgical history and problem list.    Review of Systems   Constitutional: Negative for chills and fever.   HENT: Positive for postnasal drip, rhinorrhea and voice change. Negative for congestion, ear pain, sinus pressure and trouble swallowing.    Respiratory: Positive for cough. Negative for chest tightness, shortness of breath and wheezing.        Objective   Physical Exam   Constitutional: She is oriented to person, place, and time. She appears well-developed and well-nourished.   HENT:   Right Ear: Tympanic membrane, external ear and ear canal normal.   Left Ear: Tympanic membrane, external ear and ear canal normal.   Nose: No mucosal edema or rhinorrhea. Right sinus exhibits no maxillary sinus tenderness and no frontal sinus tenderness. Left sinus exhibits no maxillary sinus tenderness and no frontal sinus tenderness.   Mouth/Throat: Uvula is midline. Posterior oropharyngeal erythema present.   Consistent with post nasal drainage    Cardiovascular: Normal rate and regular rhythm.   Pulmonary/Chest: Effort normal and breath sounds normal.   Neurological: She is alert and oriented to person, place, and time.   Skin: Skin is warm.   Psychiatric: She has a normal mood and affect. Judgment normal.   Nursing note and vitals reviewed.      Assessment/Plan   Bailee was seen today for cough and sinus  drainage.    Diagnoses and all orders for this visit:    Acute URI  Comments:  improved   Orders:  -     predniSONE (DELTASONE) 20 MG tablet; Take 1 tablet by mouth Daily.    Laryngitis  -     predniSONE (DELTASONE) 20 MG tablet; Take 1 tablet by mouth Daily.

## 2018-12-03 ENCOUNTER — OFFICE VISIT (OUTPATIENT)
Dept: FAMILY MEDICINE CLINIC | Facility: CLINIC | Age: 78
End: 2018-12-03

## 2018-12-03 VITALS
WEIGHT: 99 LBS | HEART RATE: 81 BPM | RESPIRATION RATE: 16 BRPM | SYSTOLIC BLOOD PRESSURE: 118 MMHG | TEMPERATURE: 97 F | BODY MASS INDEX: 17.54 KG/M2 | DIASTOLIC BLOOD PRESSURE: 74 MMHG | HEIGHT: 63 IN

## 2018-12-03 DIAGNOSIS — Z13.6 SCREENING FOR ISCHEMIC HEART DISEASE: ICD-10-CM

## 2018-12-03 DIAGNOSIS — N18.30 CHRONIC KIDNEY DISEASE, STAGE III (MODERATE) (HCC): ICD-10-CM

## 2018-12-03 DIAGNOSIS — E03.9 HYPOTHYROIDISM, ACQUIRED: ICD-10-CM

## 2018-12-03 DIAGNOSIS — I10 ESSENTIAL HYPERTENSION: Primary | ICD-10-CM

## 2018-12-03 DIAGNOSIS — M54.50 CHRONIC MIDLINE LOW BACK PAIN WITHOUT SCIATICA: ICD-10-CM

## 2018-12-03 DIAGNOSIS — G89.29 CHRONIC MIDLINE LOW BACK PAIN WITHOUT SCIATICA: ICD-10-CM

## 2018-12-03 PROCEDURE — 99214 OFFICE O/P EST MOD 30 MIN: CPT | Performed by: FAMILY MEDICINE

## 2018-12-03 RX ORDER — METOPROLOL SUCCINATE 25 MG/1
12.5 TABLET, EXTENDED RELEASE ORAL DAILY
Qty: 45 TABLET | Refills: 0 | Status: SHIPPED | OUTPATIENT
Start: 2018-12-03 | End: 2019-03-06 | Stop reason: SDUPTHER

## 2018-12-03 RX ORDER — LEVOTHYROXINE SODIUM 100 MCG
100 TABLET ORAL DAILY
Qty: 90 TABLET | Refills: 0 | Status: SHIPPED | OUTPATIENT
Start: 2018-12-03 | End: 2018-12-05 | Stop reason: DRUGHIGH

## 2018-12-03 RX ORDER — TRAMADOL HYDROCHLORIDE 50 MG/1
50 TABLET ORAL 2 TIMES DAILY
Qty: 180 TABLET | Refills: 0 | Status: SHIPPED | OUTPATIENT
Start: 2018-12-03 | End: 2019-02-11 | Stop reason: SDUPTHER

## 2018-12-03 NOTE — PROGRESS NOTES
"Chief Complaint   Patient presents with   • Back Pain       Subjective     Bailee Garza presents to the office today to refill her medications. No medication side effects are reported.  Her blood pressure is controlled with metoprolol.  She has in the past gotten this from cardiology and does not want to see cardiology further for this condition.  No recent episodes of heart racing.  Thyroid is controlled on current medication.  Chronic kidney disease is stable pending lab results.  Her back pain is controlled with tramadol.  She takes the medication twice daily.  No side effects are reported.  Shaji report is pending at the time of this dictation.  She has one tablet remaining in her prescription bottle dated November 6, 2018.    I have reviewed and updated her medications, medical history and problem list during today's office visit.     Patient Care Team:  Eddie Araya MD as PCP - General  Eddie Araya MD as PCP - Family Medicine  [facility]ing Physician (Cardiology)    Social History     Tobacco Use   • Smoking status: Never Smoker   • Smokeless tobacco: Never Used   Substance Use Topics   • Alcohol use: No       Review of Systems   Musculoskeletal: Positive for back pain.        Left hip pain       Objective     /74   Pulse 81   Temp 97 °F (36.1 °C) (Oral)   Resp 16   Ht 160 cm (63\")   Wt 44.9 kg (99 lb)   BMI 17.54 kg/m²     Body mass index is 17.54 kg/m².    Physical Exam   Constitutional: She is oriented to person, place, and time. She appears well-developed. No distress.   Eyes: Conjunctivae and lids are normal.   Neck: Carotid bruit is not present.   Cardiovascular: Normal rate, regular rhythm and normal heart sounds.   Pulmonary/Chest: Effort normal and breath sounds normal.   Neurological: She is alert and oriented to person, place, and time.   Skin: Skin is warm and dry.   Psychiatric: She has a normal mood and affect. Her behavior is normal.   Vitals " reviewed.      Data Reviewed:            Lab Results   Component Value Date    GLU 86 12/28/2017    BUN 27 (H) 07/17/2018    CREATININE 0.81 07/17/2018    EGFRIFNONA 69 07/17/2018    EGFRIFAFRI 68 12/28/2017     07/17/2018    K 4.1 07/17/2018    CL 98 07/17/2018    CO2 27.3 07/17/2018    CALCIUM 9.2 07/17/2018    ALBUMIN 3.30 (L) 12/30/2017    LABGLOBREF 2.5 11/24/2017    BILITOT 0.7 12/30/2017    ALKPHOS 79 12/30/2017    AST 26 12/30/2017    ALT 30 12/30/2017    CHLPL 195 11/24/2017    TRIG 119 11/24/2017    HDL 59 11/24/2017    VLDL 24 11/24/2017     (H) 11/24/2017    LDLHDL 1.9 11/24/2017    WBC 4.88 07/17/2018    RBC 4.57 07/17/2018    HCT 33.0 (L) 07/25/2018    MCV 96.3 07/17/2018    MCH 30.0 07/17/2018    MCHC 31.1 (L) 07/17/2018    RDW 13.0 07/17/2018    TSH 3.140 12/22/2017          Assessment/Plan     Problem List Items Addressed This Visit     Chronic midline low back pain without sciatica     The current medical regimen is effective;  continue present plan and medications.           Relevant Medications    traMADol (ULTRAM) 50 MG tablet    Essential hypertension - Primary     Hypertension is unchanged.  Continue current treatment regimen.  Blood pressure will be reassessed at the next regular appointment.         Relevant Medications    metoprolol succinate XL (TOPROL-XL) 25 MG 24 hr tablet    Other Relevant Orders    Comprehensive Metabolic Panel    CBC & Differential    Hypothyroidism, acquired     The current medical regimen is effective;  continue present plan and medications.           Relevant Medications    SYNTHROID 100 MCG tablet    metoprolol succinate XL (TOPROL-XL) 25 MG 24 hr tablet    Other Relevant Orders    TSH    Chronic kidney disease, stage III (moderate) (CMS/HCC)     Renal condition is unchanged.  Continue current treatment regimen.  Renal condition will be reassessed in 3 months.         Relevant Orders    Comprehensive Metabolic Panel      Other Visit Diagnoses      Screening for ischemic heart disease        Relevant Orders    Lipid Panel With / Chol / HDL Ratio          Orders Placed This Encounter   Procedures   • Comprehensive Metabolic Panel   • Lipid Panel With / Chol / HDL Ratio   • TSH   • CBC & Differential     Order Specific Question:   Manual Differential     Answer:   No         Current Outpatient Medications:   •  acetaminophen (TYLENOL) 500 MG tablet, Take 500 mg by mouth Every 4 (Four) Hours As Needed for Mild Pain ., Disp: , Rfl:   •  aspirin 81 MG tablet, Take 81 mg by mouth Daily., Disp: , Rfl:   •  Chlorcyclizine-Pseudoephed (STAHIST AD) 25-60 MG tablet, Take  by mouth., Disp: , Rfl:   •  metoprolol succinate XL (TOPROL-XL) 25 MG 24 hr tablet, Take 0.5 tablets by mouth Daily for 90 days., Disp: 45 tablet, Rfl: 0  •  Multiple Vitamins-Minerals (PRESERVISION AREDS PO), Take 1 tablet by mouth Daily As Needed. TAKES ONLY OCCASIONALLY, Disp: , Rfl:   •  predniSONE (DELTASONE) 20 MG tablet, Take 1 tablet by mouth Daily., Disp: 7 tablet, Rfl: 0  •  SYNTHROID 100 MCG tablet, Take 1 tablet by mouth Daily for 90 days., Disp: 90 tablet, Rfl: 0  •  traMADol (ULTRAM) 50 MG tablet, Take 1 tablet by mouth 2 (Two) Times a Day for 90 days., Disp: 180 tablet, Rfl: 0  •  triamcinolone (KENALOG) 0.1 % cream, Apply 1 application topically As Needed. ITCHING, Disp: , Rfl:     Return for Controlled Substance Visit, Adult Wellness Visit, 30 minute visit.

## 2018-12-03 NOTE — ASSESSMENT & PLAN NOTE
Renal condition is unchanged.  Continue current treatment regimen.  Renal condition will be reassessed in 3 months.

## 2018-12-04 LAB
ALBUMIN SERPL-MCNC: 4.2 G/DL (ref 3.5–5.2)
ALBUMIN/GLOB SERPL: 1.8 G/DL
ALP SERPL-CCNC: 77 U/L (ref 39–117)
ALT SERPL-CCNC: 6 U/L (ref 1–33)
AST SERPL-CCNC: 17 U/L (ref 1–32)
BASOPHILS # BLD AUTO: 0.03 10*3/MM3 (ref 0–0.2)
BASOPHILS NFR BLD AUTO: 0.6 % (ref 0–1.5)
BILIRUB SERPL-MCNC: 0.3 MG/DL (ref 0.1–1.2)
BUN SERPL-MCNC: 28 MG/DL (ref 8–23)
BUN/CREAT SERPL: 27.7 (ref 7–25)
CALCIUM SERPL-MCNC: 9.5 MG/DL (ref 8.6–10.5)
CHLORIDE SERPL-SCNC: 104 MMOL/L (ref 98–107)
CHOLEST SERPL-MCNC: 170 MG/DL (ref 0–200)
CHOLEST/HDLC SERPL: 3.09 {RATIO}
CO2 SERPL-SCNC: 27.9 MMOL/L (ref 22–29)
CREAT SERPL-MCNC: 1.01 MG/DL (ref 0.57–1)
EOSINOPHIL # BLD AUTO: 0.04 10*3/MM3 (ref 0–0.7)
EOSINOPHIL NFR BLD AUTO: 0.8 % (ref 0.3–6.2)
ERYTHROCYTE [DISTWIDTH] IN BLOOD BY AUTOMATED COUNT: 13.7 % (ref 11.7–13)
GLOBULIN SER CALC-MCNC: 2.3 GM/DL
GLUCOSE SERPL-MCNC: 95 MG/DL (ref 65–99)
HCT VFR BLD AUTO: 39.6 % (ref 35.6–45.5)
HDLC SERPL-MCNC: 55 MG/DL (ref 40–60)
HGB BLD-MCNC: 12.5 G/DL (ref 11.9–15.5)
IMM GRANULOCYTES # BLD: 0.01 10*3/MM3 (ref 0–0.03)
IMM GRANULOCYTES NFR BLD: 0.2 % (ref 0–0.5)
LDLC SERPL CALC-MCNC: 89 MG/DL (ref 0–100)
LYMPHOCYTES # BLD AUTO: 1.28 10*3/MM3 (ref 0.9–4.8)
LYMPHOCYTES NFR BLD AUTO: 24.1 % (ref 19.6–45.3)
MCH RBC QN AUTO: 30.8 PG (ref 26.9–32)
MCHC RBC AUTO-ENTMCNC: 31.6 G/DL (ref 32.4–36.3)
MCV RBC AUTO: 97.5 FL (ref 80.5–98.2)
MONOCYTES # BLD AUTO: 0.4 10*3/MM3 (ref 0.2–1.2)
MONOCYTES NFR BLD AUTO: 7.5 % (ref 5–12)
NEUTROPHILS # BLD AUTO: 3.56 10*3/MM3 (ref 1.9–8.1)
NEUTROPHILS NFR BLD AUTO: 67 % (ref 42.7–76)
PLATELET # BLD AUTO: 247 10*3/MM3 (ref 140–500)
POTASSIUM SERPL-SCNC: 4.1 MMOL/L (ref 3.5–5.2)
PROT SERPL-MCNC: 6.5 G/DL (ref 6–8.5)
RBC # BLD AUTO: 4.06 10*6/MM3 (ref 3.9–5.2)
SODIUM SERPL-SCNC: 144 MMOL/L (ref 136–145)
TRIGL SERPL-MCNC: 128 MG/DL (ref 0–150)
TSH SERPL DL<=0.005 MIU/L-ACNC: 0.18 MIU/ML (ref 0.27–4.2)
VLDLC SERPL CALC-MCNC: 25.6 MG/DL (ref 5–40)
WBC # BLD AUTO: 5.31 10*3/MM3 (ref 4.5–10.7)

## 2018-12-05 DIAGNOSIS — E03.9 HYPOTHYROIDISM, ACQUIRED: ICD-10-CM

## 2018-12-05 RX ORDER — LEVOTHYROXINE SODIUM 88 MCG
88 TABLET ORAL DAILY
Qty: 90 TABLET | Refills: 0 | Status: SHIPPED | OUTPATIENT
Start: 2018-12-05 | End: 2019-03-06 | Stop reason: SDUPTHER

## 2018-12-05 NOTE — PROGRESS NOTES
Dear Bailee,  I have reviewed your test results and they show some abnormalities.Specically, low TSH.  I have adjusted your thyroid medication. I sent synthroid 88 mcg to your pharmacy of choice.  Don't hesitate to contact us if you have any further questions. Sincerely,     Dr. Araya

## 2019-02-04 ENCOUNTER — TELEPHONE (OUTPATIENT)
Dept: FAMILY MEDICINE CLINIC | Facility: CLINIC | Age: 79
End: 2019-02-04

## 2019-02-04 ENCOUNTER — OFFICE VISIT (OUTPATIENT)
Dept: ORTHOPEDIC SURGERY | Facility: CLINIC | Age: 79
End: 2019-02-04

## 2019-02-04 VITALS — WEIGHT: 100 LBS | BODY MASS INDEX: 17.72 KG/M2 | TEMPERATURE: 98 F | HEIGHT: 63 IN

## 2019-02-04 DIAGNOSIS — Z96.642 H/O TOTAL HIP ARTHROPLASTY, LEFT: ICD-10-CM

## 2019-02-04 DIAGNOSIS — M25.552 HIP PAIN, LEFT: Primary | ICD-10-CM

## 2019-02-04 DIAGNOSIS — M70.62 TROCHANTERIC BURSITIS, LEFT HIP: ICD-10-CM

## 2019-02-04 DIAGNOSIS — Z79.899 LONG-TERM CURRENT USE OF HIGH RISK MEDICATION OTHER THAN ANTICOAGULANT: Primary | ICD-10-CM

## 2019-02-04 PROCEDURE — 73502 X-RAY EXAM HIP UNI 2-3 VIEWS: CPT | Performed by: ORTHOPAEDIC SURGERY

## 2019-02-04 PROCEDURE — 99213 OFFICE O/P EST LOW 20 MIN: CPT | Performed by: ORTHOPAEDIC SURGERY

## 2019-02-04 NOTE — TELEPHONE ENCOUNTER
Pt states that she is needing her rx tramadol more than twice daily. Dr. Araya gives V.O. For drug screen and appt.

## 2019-02-04 NOTE — PROGRESS NOTES
Patient Name: Bailee Garza   YOB: 1940  Referring Primary Care Physician: Eddie Araya MD  BMI: Body mass index is 17.71 kg/m².    Chief Complaint:    Chief Complaint   Patient presents with   • Left Hip - Establish Care, Pain        Subjective:    HPI:   Bailee Garza is a pleasant 78 y.o. year old who presents today for evaluation of   Chief Complaint   Patient presents with   • Left Hip - Establish Care, Pain    (Location). Duration: This has been going on for weeks. Timing: The pain is persistent. Context or causative factors: unknown.  Relieving Factors:  In the past has tried activtiy modification  Had left jocy from Dr Mann in July about -67 months ago.  VERY activ and works.  Has lateral hip pain that she can walk off.  No meds etc..     This problem is new to this examiner.     Medications:   Home Medications:  Current Outpatient Medications on File Prior to Visit   Medication Sig   • acetaminophen (TYLENOL) 500 MG tablet Take 500 mg by mouth Every 4 (Four) Hours As Needed for Mild Pain .   • aspirin 81 MG tablet Take 81 mg by mouth Daily.   • Chlorcyclizine-Pseudoephed (STAHIST AD) 25-60 MG tablet Take  by mouth.   • metoprolol succinate XL (TOPROL-XL) 25 MG 24 hr tablet Take 0.5 tablets by mouth Daily for 90 days.   • Multiple Vitamins-Minerals (PRESERVISION AREDS PO) Take 1 tablet by mouth Daily As Needed. TAKES ONLY OCCASIONALLY   • predniSONE (DELTASONE) 20 MG tablet Take 1 tablet by mouth Daily.   • SYNTHROID 88 MCG tablet Take 1 tablet by mouth Daily for 90 days.   • traMADol (ULTRAM) 50 MG tablet Take 1 tablet by mouth 2 (Two) Times a Day for 90 days.   • triamcinolone (KENALOG) 0.1 % cream Apply 1 application topically As Needed. ITCHING     No current facility-administered medications on file prior to visit.      Current Medications:  Scheduled Meds:  Continuous Infusions:  No current facility-administered medications for this visit.   PRN Meds:.    I have reviewed the  patient's medical history in detail and updated the computerized patient record.  Review and summarization of old records includes:    Past Medical History:   Diagnosis Date   • Allergic    • Arthritis    • Arthropathy of pelvic region and thigh 2012    unspecified   • Back pain 2007   • Chronic back pain 2009   • Constipation 2015    unspecified   • Diverticulosis    • Dyspepsia 2008   • Essential hypertension 2007   • 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), 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   • Hearing loss 2008   • Hip pain, left    • History of bone density study 2015   • History of pneumonia     2017   • History of skin cancer    • Hypothyroidism, acquired 2007   • Insomnia 2015    unspecified   • Intervertebral disc disorder with myelopathy, cervical region 2010   • Rhinitis, allergic 2007   • Scoliosis    • Screening breast examination 2007   • Stress 2015    other acute reactions to stress   • Stress incontinence    • Urticaria 2015        Past Surgical History:   Procedure Laterality Date   • BREAST BIOPSY Right     50+ years ago   • BREAST LUMPECTOMY      BENIGN   • BRONCHOSCOPY N/A 2017    Procedure: BRONCHOSCOPY;  Surgeon: Mario Alanis MD;  Location: [facility] ENDOSCOPY;  Service:    • CATARACT EXTRACTION, BILATERAL     • HYSTERECTOMY     • TOTAL HIP ARTHROPLASTY Left 2018    Procedure: LEFT TOTAL HIP ARTHROPLASTY;  Surgeon: Justen Mann MD;  Location: [facility];  Service: Orthopedics        Social History     Occupational History   • Not on file   Tobacco Use   • Smoking status: Never Smoker   • Smokeless tobacco: Never Used   Substance and Sexual Activity   • Alcohol use: No   • Drug use: No   • Sexual activity: Defer    Social History     Social History Narrative   • Not on file        Family History   Problem Relation Age of Onset   • Heart disease Mother   "  • Cancer Father    • Asthma Paternal Grandfather    • Malig Hyperthermia Neg Hx        ROS: 14 point review of systems was performed and all other systems were reviewed and are negative except for documented findings in HPI and today's encounter.     Allergies:   Allergies   Allergen Reactions   • Hydrocodone Hallucinations   • Ketek [Telithromycin] Hives   • Nsaids Hives   • Sulfa Antibiotics Hives     Constitutional:  Denies fever, shaking or chills   Eyes:  Denies change in visual acuity   HENT:  Denies nasal congestion or sore throat   Respiratory:  Denies cough or shortness of breath   Cardiovascular:  Denies chest pain or severe LE edema   GI:  Denies abdominal pain, nausea, vomiting, bloody stools or diarrhea   Musculoskeletal:  Numbness, tingling, pain, or loss of motor function only as noted above in history of present illness.  : Denies painful urination or hematuria  Integument:  Denies rash, lesion or ulceration   Neurologic:  Denies headache or focal weakness  Endocrine:  Denies lymphadenopathy  Psych:  Denies confusion or change in mental status   Hem:  Denies active bleeding    Subjective     Objective:    Physical Exam: 78 y.o. female  Wt Readings from Last 3 Encounters:   02/04/19 45.4 kg (100 lb)   12/03/18 44.9 kg (99 lb)   11/29/18 45.4 kg (100 lb)     Ht Readings from Last 3 Encounters:   02/04/19 160 cm (63\")   12/03/18 160 cm (63\")   11/29/18 160 cm (63\")     Body mass index is 17.71 kg/m².    Vitals:    02/04/19 1124   Temp: 98 °F (36.7 °C)       Vital signs reviewed.   General Appearance:    Alert, cooperative, in no acute distress                  Eyes: conjunctiva clear  ENT: external ears and nose atraumatic  CV: no peripheral edema  Resp: normal respiratory effort  Skin: no rashes or wounds; normal turgor  Psych: mood and affect appropriate  Lymph: no nodes appreciated  Neuro: gross sensation intact  Vascular:  Palpable peripheral pulse in noted extremity  Musculoskeletal " Extremities: HIP Exam: antalgic and when she first gets up for first few steps then walks well gait without assistive device left hip, Stinchfield negative, trochanteric bursa tenderness and non-radiating 2+ pedal pulses and brisk capillary refill Pedal edema none logroll nontender.  No fever chills etc      Radiology:   Imaging done today and discussed at length with the patient:    Indication: pain related symptoms,  Views: 2V AP&LAT left hip(s)   Findings: S/P left  Total Hip Replacement in good position and alignment  Comparison views: no significant change      Assessment:     ICD-10-CM ICD-9-CM   1. Hip pain, left M25.552 719.45   2. Trochanteric bursitis, left hip M70.62 726.5   3. H/O total hip arthroplasty, left Z96.642 V43.64        Procedures       Plan: Biomechanics of pertinent body area discussed.  Risks, benefits, alternatives, comparisons, and complications of accepted medicines, injections, recommendations, surgical procedures, and therapies explained and education provided in laymen's terms. The patient was given the opportunity to ask questions and they were answerved to their satisfaction.   Natural history and expected course of this patient's diagnosis discussed along with evaluation of therapies. Questions answered.  Cortisone Injection. See procedure note.  Cryotherapy/brachy therapy as indicated with instructions.   PT referral.  Biomechanics and proper use of ambulatory aids:  crutches, walker, cane, &/or trekking poles.       2/4/2019

## 2019-02-09 LAB
AMPHETAMINES UR QL SCN: NEGATIVE NG/ML
BARBITURATES UR QL SCN: NEGATIVE NG/ML
BENZODIAZ UR QL SCN: NEGATIVE NG/ML
BZE UR QL SCN: NEGATIVE NG/ML
CANNABINOIDS UR QL SCN: NEGATIVE NG/ML
CREAT UR-MCNC: 123.9 MG/DL (ref 20–300)
FENTANYL+NORFENTANYL UR QL SCN: NEGATIVE PG/ML
LABORATORY COMMENT REPORT: ABNORMAL
MEPERIDINE UR QL: NEGATIVE NG/ML
METHADONE UR QL SCN: NEGATIVE NG/ML
OPIATES UR QL SCN: NEGATIVE NG/ML
OXYCODONE+OXYMORPHONE UR QL SCN: NEGATIVE NG/ML
PCP UR QL: NEGATIVE NG/ML
PH UR: 5.4 [PH] (ref 4.5–8.9)
PROPOXYPH UR QL SCN: NEGATIVE NG/ML
SP GR UR: 1.03
TRAMADOL UR-MCNC: POSITIVE UG/ML

## 2019-02-11 ENCOUNTER — OFFICE VISIT (OUTPATIENT)
Dept: FAMILY MEDICINE CLINIC | Facility: CLINIC | Age: 79
End: 2019-02-11

## 2019-02-11 VITALS
WEIGHT: 100 LBS | BODY MASS INDEX: 17.72 KG/M2 | RESPIRATION RATE: 16 BRPM | TEMPERATURE: 96.9 F | HEIGHT: 63 IN | HEART RATE: 68 BPM | SYSTOLIC BLOOD PRESSURE: 90 MMHG | DIASTOLIC BLOOD PRESSURE: 60 MMHG

## 2019-02-11 DIAGNOSIS — G89.29 CHRONIC MIDLINE LOW BACK PAIN WITHOUT SCIATICA: Primary | ICD-10-CM

## 2019-02-11 DIAGNOSIS — M54.50 CHRONIC MIDLINE LOW BACK PAIN WITHOUT SCIATICA: Primary | ICD-10-CM

## 2019-02-11 PROCEDURE — 99213 OFFICE O/P EST LOW 20 MIN: CPT | Performed by: FAMILY MEDICINE

## 2019-02-11 RX ORDER — TRAMADOL HYDROCHLORIDE 50 MG/1
50 TABLET ORAL 2 TIMES DAILY
Qty: 60 TABLET | Refills: 0 | Status: SHIPPED | OUTPATIENT
Start: 2019-02-11 | End: 2019-03-06 | Stop reason: SDUPTHER

## 2019-02-11 NOTE — PROGRESS NOTES
"Chief Complaint   Patient presents with   • Pain     CSA       Subjective     Bailee Garza presents to the office today to refill her medications. No medication side effects are reported.  She returns the office today to follow-up on her chronic pain.  She had an exacerbation and needed to take tramadol 3 times daily.  We did confirmatory urine drug screen testing which did show tramadol only.  She has since seen orthopedist and received an injection into her left hip.  Her symptoms are much improved.  She has 2 tramadol pills left.  Shaji report is pending at the time of this dictation.  1 month supply will be given with follow-up on March 6, 2019    I have reviewed and updated her medications, medical history and problem list during today's office visit.     Patient Care Team:  Eddie Araya MD as PCP - General  Eddie Araya MD as PCP - Family Medicine  [facility] ([facility]iology[facility]    Social History     Tobacco Use   • Smoking status: Never Smoker   • Smokeless tobacco: Never Used   Substance Use Topics   • Alcohol use: No       Review of Systems   Musculoskeletal: Positive for back pain.       Objective     BP 90/60   Pulse 68   Temp 96.9 °F (36.1 °C) (Oral)   Resp 16   Ht 160 cm (63\")   Wt 45.4 kg (100 lb)   BMI 17.71 kg/m²     Body mass index is 17.71 kg/m².    Physical Exam   Constitutional: She appears well-developed. No distress.   Musculoskeletal:        Legs:  Psychiatric: She has a normal mood and affect. Her speech is normal and behavior is normal. Thought content normal. She is attentive.   Vitals reviewed.      Data Reviewed:  Urine drug screen positive for tramadol only                 Assessment/Plan     Problem List Items Addressed This Visit     Chronic midline low back pain without sciatica - Primary    Relevant Medications    traMADol (ULTRAM) 50 MG tablet          No orders of the defined types were placed in this encounter.        Current " Outpatient Medications:   •  acetaminophen (TYLENOL) 500 MG tablet, Take 500 mg by mouth Every 4 (Four) Hours As Needed for Mild Pain ., Disp: , Rfl:   •  aspirin 81 MG tablet, Take 81 mg by mouth Daily., Disp: , Rfl:   •  Chlorcyclizine-Pseudoephed (STAHIST AD) 25-60 MG tablet, Take  by mouth., Disp: , Rfl:   •  metoprolol succinate XL (TOPROL-XL) 25 MG 24 hr tablet, Take 0.5 tablets by mouth Daily for 90 days., Disp: 45 tablet, Rfl: 0  •  Multiple Vitamins-Minerals (PRESERVISION AREDS PO), Take 1 tablet by mouth Daily As Needed. TAKES ONLY OCCASIONALLY, Disp: , Rfl:   •  predniSONE (DELTASONE) 20 MG tablet, Take 1 tablet by mouth Daily., Disp: 7 tablet, Rfl: 0  •  SYNTHROID 88 MCG tablet, Take 1 tablet by mouth Daily for 90 days., Disp: 90 tablet, Rfl: 0  •  traMADol (ULTRAM) 50 MG tablet, Take 1 tablet by mouth 2 (Two) Times a Day for 30 days., Disp: 60 tablet, Rfl: 0  •  triamcinolone (KENALOG) 0.1 % cream, Apply 1 application topically As Needed. ITCHING, Disp: , Rfl:     Return in 3 weeks (on 3/6/2019) for Next scheduled follow up.

## 2019-02-13 ENCOUNTER — OFFICE VISIT (OUTPATIENT)
Dept: FAMILY MEDICINE CLINIC | Facility: CLINIC | Age: 79
End: 2019-02-13

## 2019-02-13 VITALS
RESPIRATION RATE: 16 BRPM | BODY MASS INDEX: 17.19 KG/M2 | DIASTOLIC BLOOD PRESSURE: 52 MMHG | OXYGEN SATURATION: 98 % | HEIGHT: 63 IN | SYSTOLIC BLOOD PRESSURE: 120 MMHG | TEMPERATURE: 97.9 F | HEART RATE: 90 BPM | WEIGHT: 97 LBS

## 2019-02-13 DIAGNOSIS — J06.9 ACUTE URI: Primary | ICD-10-CM

## 2019-02-13 PROCEDURE — 99213 OFFICE O/P EST LOW 20 MIN: CPT | Performed by: NURSE PRACTITIONER

## 2019-02-13 RX ORDER — BENZONATATE 200 MG/1
200 CAPSULE ORAL 3 TIMES DAILY PRN
Qty: 30 CAPSULE | Refills: 0 | Status: SHIPPED | OUTPATIENT
Start: 2019-02-13 | End: 2019-03-06

## 2019-02-13 RX ORDER — METHYLPREDNISOLONE 4 MG/1
TABLET ORAL
Qty: 21 TABLET | Refills: 0 | Status: SHIPPED | OUTPATIENT
Start: 2019-02-13 | End: 2019-03-06

## 2019-02-13 NOTE — PROGRESS NOTES
Subjective   Bailee Garza is a 78 y.o. female.     History of Present Illness   Bailee Garza 78 y.o. female who presents for evaluation of upper respiratory congestion, cough. Symptoms include congestion, post nasal drip, dry cough, fatigue and runny nose.  Onset of symptoms was a few days ago, gradually worsening since that time. Patient denies shortness of breath, fever.   Evaluation to date: none Treatment to date:  benzonatate .     The following portions of the patient's history were reviewed and updated as appropriate: allergies, current medications, past family history, past medical history, past social history, past surgical history and problem list.    Review of Systems   Constitutional: Positive for fatigue. Negative for chills and fever.   HENT: Positive for congestion and rhinorrhea. Negative for ear pain, sinus pressure, sinus pain and sore throat.    Respiratory: Positive for cough. Negative for chest tightness, shortness of breath and wheezing.        Objective   Physical Exam   Constitutional: She is oriented to person, place, and time. She appears well-developed and well-nourished.   HENT:   Right Ear: Tympanic membrane, external ear and ear canal normal.   Left Ear: Tympanic membrane, external ear and ear canal normal.   Nose: Rhinorrhea present. No mucosal edema. Right sinus exhibits no maxillary sinus tenderness and no frontal sinus tenderness. Left sinus exhibits no maxillary sinus tenderness and no frontal sinus tenderness.   Mouth/Throat: Uvula is midline. Posterior oropharyngeal erythema present.   Consistent with post nasal drainage    Cardiovascular: Normal rate and regular rhythm.   Pulmonary/Chest: Effort normal and breath sounds normal.   Neurological: She is alert and oriented to person, place, and time.   Skin: Skin is warm.   Psychiatric: She has a normal mood and affect. Judgment normal.   Nursing note and vitals reviewed.      Assessment/Plan   Bailee was seen today for  cough.    Diagnoses and all orders for this visit:    Acute URI  -     MethylPREDNISolone (MEDROL, IBAN,) 4 MG tablet; Take as directed on package instructions.  -     benzonatate (TESSALON) 200 MG capsule; Take 1 capsule by mouth 3 (Three) Times a Day As Needed for Cough.

## 2019-02-18 ENCOUNTER — OFFICE VISIT (OUTPATIENT)
Dept: FAMILY MEDICINE CLINIC | Facility: CLINIC | Age: 79
End: 2019-02-18

## 2019-02-18 VITALS
DIASTOLIC BLOOD PRESSURE: 60 MMHG | TEMPERATURE: 97.4 F | HEART RATE: 78 BPM | BODY MASS INDEX: 17.19 KG/M2 | HEIGHT: 63 IN | RESPIRATION RATE: 16 BRPM | SYSTOLIC BLOOD PRESSURE: 120 MMHG | WEIGHT: 97 LBS | OXYGEN SATURATION: 99 %

## 2019-02-18 DIAGNOSIS — J01.00 ACUTE MAXILLARY SINUSITIS, RECURRENCE NOT SPECIFIED: Primary | ICD-10-CM

## 2019-02-18 PROCEDURE — 99213 OFFICE O/P EST LOW 20 MIN: CPT | Performed by: NURSE PRACTITIONER

## 2019-02-18 RX ORDER — AMOXICILLIN AND CLAVULANATE POTASSIUM 875; 125 MG/1; MG/1
1 TABLET, FILM COATED ORAL 2 TIMES DAILY
Qty: 20 TABLET | Refills: 0 | Status: SHIPPED | OUTPATIENT
Start: 2019-02-18 | End: 2019-02-28

## 2019-02-18 NOTE — PROGRESS NOTES
Subjective   Bailee Garza is a 78 y.o. female.     History of Present Illness   Bailee Garza 78 y.o. female who presents for evaluation of sinus pressure and congestion, cough. Symptoms include productive cough, sinus pain and hoarseness.  Onset of symptoms was 8 days ago, gradually worsening since that time. Patient denies fever.   Evaluation to date: none Treatment to date:  medrol pack without improvement.     The following portions of the patient's history were reviewed and updated as appropriate: allergies, current medications, past family history, past medical history, past social history, past surgical history and problem list.    Review of Systems   Constitutional: Positive for fatigue. Negative for chills and fever.   HENT: Positive for congestion, postnasal drip, rhinorrhea, sinus pressure and sinus pain. Negative for ear pain.    Respiratory: Positive for cough. Negative for shortness of breath and wheezing.        Objective   Physical Exam   Constitutional: She is oriented to person, place, and time. She appears well-developed and well-nourished.   HENT:   Right Ear: Tympanic membrane, external ear and ear canal normal.   Left Ear: Tympanic membrane, external ear and ear canal normal.   Nose: Mucosal edema and rhinorrhea present. Right sinus exhibits maxillary sinus tenderness. Right sinus exhibits no frontal sinus tenderness. Left sinus exhibits maxillary sinus tenderness. Left sinus exhibits no frontal sinus tenderness.   Mouth/Throat: Uvula is midline. Posterior oropharyngeal erythema present.   Cardiovascular: Normal rate and regular rhythm.   Pulmonary/Chest: Effort normal and breath sounds normal.   Neurological: She is alert and oriented to person, place, and time.   Skin: Skin is warm.   Psychiatric: She has a normal mood and affect. Judgment normal.   Nursing note and vitals reviewed.      Assessment/Plan   Bailee was seen today for cough, nasal congestion and pain.    Diagnoses and all  orders for this visit:    Acute maxillary sinusitis, recurrence not specified  -     amoxicillin-clavulanate (AUGMENTIN) 875-125 MG per tablet; Take 1 tablet by mouth 2 (Two) Times a Day for 10 days.

## 2019-03-06 ENCOUNTER — OFFICE VISIT (OUTPATIENT)
Dept: FAMILY MEDICINE CLINIC | Facility: CLINIC | Age: 79
End: 2019-03-06

## 2019-03-06 VITALS
DIASTOLIC BLOOD PRESSURE: 68 MMHG | WEIGHT: 103 LBS | BODY MASS INDEX: 18.25 KG/M2 | HEART RATE: 56 BPM | TEMPERATURE: 96.9 F | HEIGHT: 63 IN | SYSTOLIC BLOOD PRESSURE: 128 MMHG | RESPIRATION RATE: 16 BRPM

## 2019-03-06 DIAGNOSIS — G89.29 CHRONIC MIDLINE LOW BACK PAIN WITHOUT SCIATICA: ICD-10-CM

## 2019-03-06 DIAGNOSIS — Z78.0 MENOPAUSE: ICD-10-CM

## 2019-03-06 DIAGNOSIS — N18.30 CHRONIC KIDNEY DISEASE, STAGE III (MODERATE) (HCC): Primary | ICD-10-CM

## 2019-03-06 DIAGNOSIS — M54.50 CHRONIC MIDLINE LOW BACK PAIN WITHOUT SCIATICA: ICD-10-CM

## 2019-03-06 DIAGNOSIS — E03.9 HYPOTHYROIDISM, ACQUIRED: ICD-10-CM

## 2019-03-06 DIAGNOSIS — Z12.39 SCREENING FOR BREAST CANCER: ICD-10-CM

## 2019-03-06 DIAGNOSIS — I10 ESSENTIAL HYPERTENSION: ICD-10-CM

## 2019-03-06 DIAGNOSIS — Z82.49 FAMILY HISTORY OF ABDOMINAL AORTIC ANEURYSM (AAA): ICD-10-CM

## 2019-03-06 DIAGNOSIS — Z91.81 AT LOW RISK FOR FALL: ICD-10-CM

## 2019-03-06 DIAGNOSIS — Z80.0 FAMILY HISTORY OF COLON CANCER IN FATHER: ICD-10-CM

## 2019-03-06 PROCEDURE — 99397 PER PM REEVAL EST PAT 65+ YR: CPT | Performed by: FAMILY MEDICINE

## 2019-03-06 PROCEDURE — 99214 OFFICE O/P EST MOD 30 MIN: CPT | Performed by: FAMILY MEDICINE

## 2019-03-06 RX ORDER — TRAMADOL HYDROCHLORIDE 50 MG/1
50 TABLET ORAL EVERY 8 HOURS PRN
Qty: 90 TABLET | Refills: 2 | Status: SHIPPED | OUTPATIENT
Start: 2019-03-06 | End: 2019-06-05 | Stop reason: SDUPTHER

## 2019-03-06 RX ORDER — METOPROLOL SUCCINATE 25 MG/1
12.5 TABLET, EXTENDED RELEASE ORAL DAILY
Qty: 45 TABLET | Refills: 0 | Status: SHIPPED | OUTPATIENT
Start: 2019-03-06 | End: 2019-06-05 | Stop reason: SDUPTHER

## 2019-03-06 RX ORDER — LEVOTHYROXINE SODIUM 88 MCG
88 TABLET ORAL DAILY
Qty: 90 TABLET | Refills: 0 | Status: SHIPPED | OUTPATIENT
Start: 2019-03-06 | End: 2019-03-06 | Stop reason: ALTCHOICE

## 2019-03-06 RX ORDER — LEVOTHYROXINE SODIUM 75 MCG
75 TABLET ORAL DAILY
Qty: 90 TABLET | Refills: 0 | Status: SHIPPED | OUTPATIENT
Start: 2019-03-06 | End: 2019-06-05 | Stop reason: DRUGHIGH

## 2019-03-06 NOTE — PATIENT INSTRUCTIONS
Check on insurance coverage and cost for adacel Tdap(tetanus plus whooping cough vaccine) and get the immunization at your local pharmacy  Check on insurance coverage and cost for Shingrix (newest shingles vaccine) and get the immunization at your local pharmacy. It is more effective than the old Zostavax vaccine and is recommended even if you have had the Zostavax vaccine in the past. For more information, please look at the website below:    Https://www.cdc.gov/vaccines/vpd/shingles/public/shingrix/index.html        Fall Prevention in the Home  Falls can cause injuries and can affect people from all age groups. There are many simple things that you can do to make your home safe and to help prevent falls.  What can I do on the outside of my home?  · Regularly repair the edges of walkways and driveways and fix any cracks.  · Remove high doorway thresholds.  · Trim any shrubbery on the main path into your home.  · Use bright outdoor lighting.  · Clear walkways of debris and clutter, including tools and rocks.  · Regularly check that handrails are securely fastened and in good repair. Both sides of any steps should have handrails.  · Install guardrails along the edges of any raised decks or porches.  · Have leaves, snow, and ice cleared regularly.  · Use sand or salt on walkways during winter months.  · In the garage, clean up any spills right away, including grease or oil spills.  What can I do in the bathroom?  · Use night lights.  · Install grab bars by the toilet and in the tub and shower. Do not use towel bars as grab bars.  · Use non-skid mats or decals on the floor of the tub or shower.  · If you need to sit down while you are in the shower, use a plastic, non-slip stool.  · Keep the floor dry. Immediately clean up any water that spills on the floor.  · Remove soap buildup in the tub or shower on a regular basis.  · Attach bath mats securely with double-sided non-slip rug tape.  · Remove throw rugs and other  tripping hazards from the floor.  What can I do in the bedroom?  · Use night lights.  · Make sure that a bedside light is easy to reach.  · Do not use oversized bedding that drapes onto the floor.  · Have a firm chair that has side arms to use for getting dressed.  · Remove throw rugs and other tripping hazards from the floor.  What can I do in the kitchen?  · Clean up any spills right away.  · Avoid walking on wet floors.  · Place frequently used items in easy-to-reach places.  · If you need to reach for something above you, use a sturdy step stool that has a grab bar.  · Keep electrical cables out of the way.  · Do not use floor polish or wax that makes floors slippery. If you have to use wax, make sure that it is non-skid floor wax.  · Remove throw rugs and other tripping hazards from the floor.  What can I do in the stairways?  · Do not leave any items on the stairs.  · Make sure that there are handrails on both sides of the stairs. Fix handrails that are broken or loose. Make sure that handrails are as long as the stairways.  · Check any carpeting to make sure that it is firmly attached to the stairs. Fix any carpet that is loose or worn.  · Avoid having throw rugs at the top or bottom of stairways, or secure the rugs with carpet tape to prevent them from moving.  · Make sure that you have a light switch at the top of the stairs and the bottom of the stairs. If you do not have them, have them installed.  What are some other fall prevention tips?  · Wear closed-toe shoes that fit well and support your feet. Wear shoes that have rubber soles or low heels.  · When you use a stepladder, make sure that it is completely opened and that the sides are firmly locked. Have someone hold the ladder while you are using it. Do not climb a closed stepladder.  · Add color or contrast paint or tape to grab bars and handrails in your home. Place contrasting color strips on the first and last steps.  · Use mobility aids as  needed, such as canes, walkers, scooters, and crutches.  · Turn on lights if it is dark. Replace any light bulbs that burn out.  · Set up furniture so that there are clear paths. Keep the furniture in the same spot.  · Fix any uneven floor surfaces.  · Choose a carpet design that does not hide the edge of steps of a stairway.  · Be aware of any and all pets.  · Review your medicines with your healthcare provider. Some medicines can cause dizziness or changes in blood pressure, which increase your risk of falling.  Talk with your health care provider about other ways that you can decrease your risk of falls. This may include working with a physical therapist or  to improve your strength, balance, and endurance.  This information is not intended to replace advice given to you by your health care provider. Make sure you discuss any questions you have with your health care provider.  Document Released: 12/08/2003 Document Revised: 05/16/2017 Document Reviewed: 01/22/2016  [employer] Interactive Patient Education © 2018 [employer]      Sit-to-Stand Exercise  The sit-to-stand exercise (also known as the chair stand or chair rise exercise) strengthens your lower body and helps you maintain or improve your mobility and independence. The goal is to do the sit-to-stand exercise without using your hands. This will be easier as you become stronger. You should always talk with your health care provider before starting any exercise program, especially if you have had recent surgery.  Do the exercise exactly as told by your health care provider and adjust it as directed. It is normal to feel mild stretching, pulling, tightness, or discomfort as you do this exercise, but you should stop right away if you feel sudden pain or your pain gets worse. Do not begin doing this exercise until told by your health care provider.  What the sit-to-stand exercise does  The sit-to-stand exercise helps to strengthen the muscles in your  thighs and the muscles in the center of your body that give you stability (core muscles). This exercise is especially helpful if:  · You have had knee or hip surgery.  · You have trouble getting up from a chair, out of a car, or off the toilet.    How to do the sit-to-stand exercise  1. Sit toward the front edge of a sturdy chair without armrests. Your knees should be bent and your feet should be flat on the floor and shoulder-width apart.  2. Place your hands lightly on each side of the seat. Keep your back and neck as straight as possible, with your chest slightly forward.  3. Breathe in slowly. Lean forward and slightly shift your weight to the front of your feet.  4. Breathe out as you slowly stand up. Use your hands as little as possible.  5. Stand and pause for a full breath in and out.  6. Breathe in as you sit down slowly. Tighten your core and abdominal muscles to control your lowering as much as possible.  7. Breathe out slowly.  8. Do this exercise 10-15 times. If needed, do it fewer times until you build up strength.  9. Rest for 1 minute, then do another set of 10-15 repetitions.  To change the difficulty of the sit-to-stand exercise  · If the exercise is too difficult, use a chair with sturdy armrests, and push off the armrests to help you come to the standing position. You can also use the armrests to help slowly lower yourself back to sitting. As this gets easier, try to use your arms less. You can also place a firm cushion or pillow on the chair to make the surface higher.  · If this exercise is too easy, do not use your arms to help raise or lower yourself. You can also wear a weighted vest, use hand weights, increase your repetitions, or try a lower chair.  General tips  · You may feel tired when starting an exercise routine. This is normal.  · You may have muscle soreness that lasts a few days. This is normal. As you get stronger, you may not feel muscle soreness.  · Use smooth, steady  movements.  · Do not  hold your breath during strength exercises. This can cause unsafe changes in your blood pressure.  · Breathe in slowly through your nose, and breathe out slowly through your mouth.  Summary  · Strengthening your lower body is an important step to help you move safely and independently.  · The sit-to-stand exercise helps strengthen the muscles in your thighs and core.  · You should always talk with your health care provider before starting any exercise program, especially if you have had recent surgery.  This information is not intended to replace advice given to you by your health care provider. Make sure you discuss any questions you have with your health care provider.  Document Released: 02/08/2018 Document Revised: 02/08/2018 Document Reviewed: 02/08/2018  [employer] Interactive Patient Education © 2018 [employer]

## 2019-03-06 NOTE — PROGRESS NOTES
"Chief Complaint   Patient presents with   • Pain     CSA   • Hypertension   • Hypothyroidism       Subjective   Patient is here for annual wellness visit.  She is also here for medication refill.  Her pain is not completely controlled with twice daily tramadol.  We will increase it to 3 times daily.  Shaji report is pending at the time of this dictation.  She has appropriate number of pills remaining in her current prescription bottle.  Labs have been reviewed and TSH is low.  We will decrease Synthroid dose to 75 mcg daily.  Her chronic kidney condition is reviewed and mild elevation of creatinine noted.  She will increase fluid intake and we will we monitor this in 3 months.  She is due for bone density and mammography.  She is also due for colonoscopy because of positive family history of colon cancer in her father.  Immunization needs have been addressed.  I have reviewed and updated her medications, medical history and problem list during today's office visit.     Patient Care Team:  Eddie Araya MD as PCP - General  Eddie Araya MD as PCP - Family Medicine  [facility], Wilfred Garner MD as Consulting Physician (Cardiology)    Social History     Tobacco Use   • Smoking status: Never Smoker   • Smokeless tobacco: Never Used   Substance Use Topics   • Alcohol use: No       Review of Systems   Constitutional: Negative for fatigue.   Cardiovascular: Negative for chest pain.       Objective     /68   Pulse 56   Temp 96.9 °F (36.1 °C) (Oral)   Resp 16   Ht 160 cm (63\")   Wt 46.7 kg (103 lb)   BMI 18.25 kg/m²     Body mass index is 18.25 kg/m².    Physical Exam   Constitutional: She is oriented to person, place, and time. She appears well-developed. No distress.   Eyes: Conjunctivae and lids are normal.   Neck: Carotid bruit is not present.   Cardiovascular: Normal rate, regular rhythm and normal heart sounds.   Pulmonary/Chest: Effort normal and breath sounds normal.   Neurological: She is alert and " oriented to person, place, and time.   Skin: Skin is warm and dry.   Psychiatric: She has a normal mood and affect. Her behavior is normal.   Vitals reviewed.       Data Reviewed:             Lab Results   Component Value Date    GLU 95 12/04/2018    BUN 28 (H) 12/04/2018    BUN 27 (H) 07/17/2018    CREATININE 1.01 (H) 12/04/2018    CREATININE 0.81 07/17/2018    EGFRIFNONA 53 (L) 12/04/2018    EGFRIFNONA 69 07/17/2018    EGFRIFAFRI 64 12/04/2018     12/04/2018     07/17/2018    K 4.1 12/04/2018    K 4.1 07/17/2018     12/04/2018    CL 98 07/17/2018    CO2 27.9 12/04/2018    CO2 27.3 07/17/2018    CALCIUM 9.5 12/04/2018    CALCIUM 9.2 07/17/2018    ALBUMIN 4.20 12/04/2018    ALBUMIN 3.30 (L) 12/30/2017    LABGLOBREF 2.3 12/04/2018    BILITOT 0.3 12/04/2018    BILITOT 0.7 12/30/2017    ALKPHOS 77 12/04/2018    ALKPHOS 79 12/30/2017    AST 17 12/04/2018    AST 26 12/30/2017    ALT 6 12/04/2018    ALT 30 12/30/2017    CHLPL 170 12/04/2018    TRIG 128 12/04/2018    HDL 55 12/04/2018    VLDL 25.6 12/04/2018    LDL 89 12/04/2018    LDLHDL 1.9 11/24/2017    WBC 5.31 12/04/2018    RBC 4.06 12/04/2018    HCT 39.6 12/04/2018    HCT 33.0 (L) 07/25/2018    MCV 97.5 12/04/2018    MCV 96.3 07/17/2018    MCH 30.8 12/04/2018    MCH 30.0 07/17/2018    MCHC 31.6 (L) 12/04/2018    MCHC 31.1 (L) 07/17/2018    RDW 13.7 (H) 12/04/2018    RDW 13.0 07/17/2018    TSH 0.181 (L) 12/04/2018    TSH 3.140 12/22/2017          Assessment/Plan     Problem List Items Addressed This Visit     Chronic midline low back pain without sciatica     Pain was not fully controlled with twice daily tramadol.  We will increase to 3 times daily as needed for back pain.         Relevant Medications    traMADol (ULTRAM) 50 MG tablet    Essential hypertension     Hypertension is improving with treatment.  Continue current treatment regimen.  Blood pressure will be reassessed in 3 months.         Relevant Medications    metoprolol succinate XL  (TOPROL-XL) 25 MG 24 hr tablet    Hypothyroidism, acquired     Will decrease Synthroid from 88 mcg to 75 mcg due to low TSH level on current labs.  Repeat labs in 3 months.           Relevant Medications    metoprolol succinate XL (TOPROL-XL) 25 MG 24 hr tablet    SYNTHROID 75 MCG tablet    Other Relevant Orders    TSH+Free T4    Chronic kidney disease, stage III (moderate) (CMS/HCC) - Primary     Renal condition is unchanged.  Continue current treatment regimen.  Renal condition will be reassessed in 3 months.         Relevant Orders    BUN    Creatinine, Serum    Electrolyte Panel    Family history of abdominal aortic aneurysm (AAA)    Relevant Orders    US AAA Screen Limited      Other Visit Diagnoses     At low risk for fall        Family history of colon cancer in father        Relevant Orders    Ambulatory Referral to Gastroenterology    Screening for breast cancer        Relevant Orders    Mammo Screening Bilateral With CAD    Menopause        Relevant Orders    DEXA Bone Density Axial          Orders Placed This Encounter   Procedures   • Mammo Screening Bilateral With CAD     Standing Status:   Future     Standing Expiration Date:   9/2/2019     Order Specific Question:   Reason for Exam:     Answer:   screening   • DEXA Bone Density Axial     Standing Status:   Future     Order Specific Question:   Reason for Exam:     Answer:   screen for osteoporosis, menopausal   • US AAA Screen Limited     Standing Status:   Future     Standing Expiration Date:   3/6/2020     Order Specific Question:   Is the patient a male between 65-75 years old and have smoked at least 100 cigarettes in their lifetime OR a male or female Medicare patient who has a family history of abdominal aoritc aneurysm?     Answer:   No     Order Specific Question:   Reason for Exam:     Answer:   family history of aaa   • TSH+Free T4     Standing Status:   Future     Standing Expiration Date:   3/6/2020   • BUN     Standing Status:   Future      Standing Expiration Date:   9/2/2019   • Creatinine, Serum     Standing Status:   Future     Standing Expiration Date:   9/2/2019   • Electrolyte Panel     Standing Status:   Future     Standing Expiration Date:   9/2/2019   • Ambulatory Referral to Gastroenterology     Referral Priority:   Routine     Referral Type:   Consultation     Referral Reason:   Specialty Services Required     Referred to Provider:   Avery Ruiz MD     Requested Specialty:   Gastroenterology     Number of Visits Requested:   1         Current Outpatient Medications:   •  acetaminophen (TYLENOL) 500 MG tablet, Take 500 mg by mouth Every 4 (Four) Hours As Needed for Mild Pain ., Disp: , Rfl:   •  aspirin 81 MG tablet, Take 81 mg by mouth Daily., Disp: , Rfl:   •  Multiple Vitamins-Minerals (PRESERVISION AREDS PO), Take 1 tablet by mouth Daily As Needed. TAKES ONLY OCCASIONALLY, Disp: , Rfl:   •  traMADol (ULTRAM) 50 MG tablet, Take 1 tablet by mouth Every 8 (Eight) Hours As Needed for Moderate Pain  for up to 30 days., Disp: 90 tablet, Rfl: 2  •  metoprolol succinate XL (TOPROL-XL) 25 MG 24 hr tablet, Take 0.5 tablets by mouth Daily for 90 days., Disp: 45 tablet, Rfl: 0  •  SYNTHROID 75 MCG tablet, Take 1 tablet by mouth Daily for 90 days., Disp: 90 tablet, Rfl: 0    Return in about 3 months (around 6/6/2019) for Recheck.

## 2019-03-06 NOTE — ASSESSMENT & PLAN NOTE
Pain was not fully controlled with twice daily tramadol.  We will increase to 3 times daily as needed for back pain.

## 2019-03-06 NOTE — ASSESSMENT & PLAN NOTE
Will decrease Synthroid from 88 mcg to 75 mcg due to low TSH level on current labs.  Repeat labs in 3 months.

## 2019-04-10 ENCOUNTER — HOSPITAL ENCOUNTER (OUTPATIENT)
Dept: BONE DENSITY | Facility: HOSPITAL | Age: 79
Discharge: HOME | End: 2019-04-10

## 2019-04-10 ENCOUNTER — HOSPITAL ENCOUNTER (OUTPATIENT)
Dept: ULTRASOUND IMAGING | Facility: HOSPITAL | Age: 79
Discharge: HOME | End: 2019-04-10

## 2019-04-10 ENCOUNTER — HOSPITAL ENCOUNTER (OUTPATIENT)
Dept: MAMMOGRAPHY | Facility: HOSPITAL | Age: 79
Discharge: HOME | End: 2019-04-10
Admitting: FAMILY MEDICINE

## 2019-04-10 DIAGNOSIS — Z82.49 FAMILY HISTORY OF ABDOMINAL AORTIC ANEURYSM (AAA): ICD-10-CM

## 2019-04-10 DIAGNOSIS — Z12.39 SCREENING FOR BREAST CANCER: ICD-10-CM

## 2019-04-10 DIAGNOSIS — Z78.0 MENOPAUSE: ICD-10-CM

## 2019-04-10 PROCEDURE — 77080 DXA BONE DENSITY AXIAL: CPT

## 2019-04-10 PROCEDURE — 77067 SCR MAMMO BI INCL CAD: CPT

## 2019-04-10 PROCEDURE — 77063 BREAST TOMOSYNTHESIS BI: CPT

## 2019-04-10 PROCEDURE — 76706 US ABDL AORTA SCREEN AAA: CPT

## 2019-04-11 NOTE — PROGRESS NOTES
Please call Bailee about the normal result.  Tell her there is no evidence of aneurysm.  Please send her a copy of the result.  Have her call us if there is any further questions. Thanks, Dr. Araya

## 2019-04-11 NOTE — PROGRESS NOTES
Please accept this copy of your normal bone density result and continue with current therapy. Repeat Dexa study will be indicated in 2 years. Continue to walk 5-7 days a week for 30 minutes as you are able. Continue to avoid caffeine. Stop smoking if this applies. Take 1200 IU of calcium and 800 IU of vitamin D3 daily ( example caltrate d 600/400 bid with breakfast and supper).                                                            Dr. Araya

## 2019-04-29 ENCOUNTER — AMBULATORY SURGICAL CENTER (OUTPATIENT)
Dept: URBAN - METROPOLITAN AREA SURGERY 20 | Facility: SURGERY | Age: 79
End: 2019-04-29

## 2019-04-29 DIAGNOSIS — Z80.9 FAMILY HISTORY OF MALIGNANT NEOPLASM, UNSPECIFIED: ICD-10-CM

## 2019-04-29 DIAGNOSIS — K57.30 DIVERTICULOSIS OF LARGE INTESTINE WITHOUT PERFORATION OR ABS: ICD-10-CM

## 2019-04-29 PROCEDURE — 45378 DIAGNOSTIC COLONOSCOPY: CPT | Mod: 33 | Performed by: INTERNAL MEDICINE

## 2019-05-05 ENCOUNTER — RESULTS ENCOUNTER (OUTPATIENT)
Dept: FAMILY MEDICINE CLINIC | Facility: CLINIC | Age: 79
End: 2019-05-05

## 2019-05-05 DIAGNOSIS — N18.30 CHRONIC KIDNEY DISEASE, STAGE III (MODERATE) (HCC): ICD-10-CM

## 2019-05-05 DIAGNOSIS — E03.9 HYPOTHYROIDISM, ACQUIRED: ICD-10-CM

## 2019-05-06 ENCOUNTER — OFFICE VISIT (OUTPATIENT)
Dept: ORTHOPEDIC SURGERY | Facility: CLINIC | Age: 79
End: 2019-05-06

## 2019-05-06 VITALS — BODY MASS INDEX: 18.61 KG/M2 | WEIGHT: 105 LBS | HEIGHT: 63 IN | TEMPERATURE: 97.3 F

## 2019-05-06 DIAGNOSIS — Z96.642 STATUS POST TOTAL REPLACEMENT OF LEFT HIP: Primary | ICD-10-CM

## 2019-05-06 PROCEDURE — 99213 OFFICE O/P EST LOW 20 MIN: CPT | Performed by: ORTHOPAEDIC SURGERY

## 2019-05-06 PROCEDURE — 73502 X-RAY EXAM HIP UNI 2-3 VIEWS: CPT | Performed by: ORTHOPAEDIC SURGERY

## 2019-05-06 NOTE — PROGRESS NOTES
Patient Name: Bailee Garza   YOB: 1940  Referring Primary Care Physician: Eddie Araya MD  BMI: Body mass index is 18.6 kg/m².    Chief Complaint:    Chief Complaint   Patient presents with   • Left Hip - Follow-up        HPI:     Bailee Garza is a 79 y.o. female who presents today for evaluation of   Chief Complaint   Patient presents with   • Left Hip - Follow-up   .  The patient is status post a left total hip by Dr. Mann in July 2018.  She is doing well with her left hip not having pain is very functional.  She is here for a follow-up on it    This problem is new to this examiner.     Subjective   Medications:   Home Medications:  Current Outpatient Medications on File Prior to Visit   Medication Sig   • acetaminophen (TYLENOL) 500 MG tablet Take 500 mg by mouth Every 4 (Four) Hours As Needed for Mild Pain .   • aspirin 81 MG tablet Take 81 mg by mouth Daily.   • metoprolol succinate XL (TOPROL-XL) 25 MG 24 hr tablet Take 0.5 tablets by mouth Daily for 90 days.   • Multiple Vitamins-Minerals (PRESERVISION AREDS PO) Take 1 tablet by mouth Daily As Needed. TAKES ONLY OCCASIONALLY   • SYNTHROID 75 MCG tablet Take 1 tablet by mouth Daily for 90 days.   • traMADol (ULTRAM) 50 MG tablet Take 1 tablet by mouth Every 8 (Eight) Hours As Needed for Moderate Pain  for up to 30 days.     No current facility-administered medications on file prior to visit.      Current Medications:  Scheduled Meds:  Continuous Infusions:  No current facility-administered medications for this visit.   PRN Meds:.    I have reviewed the patient's medical history in detail and updated the computerized patient record.  Review and summarization of old records includes:    Past Medical History:   Diagnosis Date   • Allergic    • Arthritis    • 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    unspecified   • Back pain 11/20/2007   • Chronic back pain 07/13/2009   • Constipation 03/24/2015    unspecified   •  Diverticulosis    • Dyspepsia 2008   • Essential hypertension 2007   • 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), 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   • Hearing loss 2008   • Hip pain, left    • History of bone density study 2015   • History of pneumonia     2017   • History of skin cancer    • Hypothyroidism, acquired 2007   • Insomnia 2015    unspecified   • Intervertebral disc disorder with myelopathy, cervical region 2010   • Rhinitis, allergic 2007   • Scoliosis    • Screening breast examination 2007   • Stress 2015    other acute reactions to stress   • Stress incontinence    • Urticaria 2015        Past Surgical History:   Procedure Laterality Date   • BREAST EXCISIONAL BIOPSY Right     50+ years ago   • BRONCHOSCOPY N/A 2017    Procedure: BRONCHOSCOPY;  Surgeon: Mario Alanis MD;  Location: [facility] ENDOSCOPY;  Service:    • CATARACT EXTRACTION, BILATERAL     • HYSTERECTOMY     • TOTAL HIP ARTHROPLASTY Left 2018    Procedure: LEFT TOTAL HIP ARTHROPLASTY;  Surgeon: Justen Mann MD;  Location: [facility] OR;  Service: Orthopedics        Social History     Occupational History   • Not on file   Tobacco Use   • Smoking status: Never Smoker   • Smokeless tobacco: Never Used   Substance and Sexual Activity   • Alcohol use: No   • Drug use: No   • Sexual activity: Defer    Social History     Social History Narrative   • Not on file        Family History   Problem Relation Age of Onset   • Heart disease Mother    • Aneurysm Mother    • Cancer Father    • Asthma Paternal Grandfather    • Aneurysm Brother    • Breast cancer Paternal Grandmother    • Malig Hyperthermia Neg Hx        ROS: 14 point review of systems was performed and all other systems were reviewed and are negative except for documented findings in HPI and today's encounter.     Allergies:   Allergies   Allergen Reactions   • Hydrocodone  "Hallucinations   • Ketek [Telithromycin] Hives   • Nsaids Hives   • Sulfa Antibiotics Hives     Constitutional:  Denies fever, shaking or chills   Eyes:  Denies change in visual acuity   HENT:  Denies nasal congestion or sore throat   Respiratory:  Denies cough or shortness of breath   Cardiovascular:  Denies chest pain or severe LE edema   GI:  Denies abdominal pain, nausea, vomiting, bloody stools or diarrhea   Musculoskeletal:  Numbness, tingling, pain, or loss of motor function only as noted above in history of present illness.  : Denies painful urination or hematuria  Integument:  Denies rash, lesion or ulceration   Neurologic:  Denies headache or focal weakness  Endocrine:  Denies lymphadenopathy  Psych:  Denies confusion or change in mental status   Hem:  Denies active bleeding    OBJECTIVE:  Physical Exam:   Temp 97.3 °F (36.3 °C)   Ht 160 cm (63\")   Wt 47.6 kg (105 lb)   BMI 18.60 kg/m²     General Appearance:    Alert, cooperative, in no acute distress                  Eyes: conjunctiva clear  ENT: external ears and nose atraumatic  CV: no peripheral edema  Resp: normal respiratory effort  Skin: no rashes or wounds; normal turgor  Psych: mood and affect appropriate  Lymph: no nodes appreciated  Neuro: gross sensation intact  Vascular:  Palpable peripheral pulse in noted extremity  Musculoskeletal Extremities: Exam today shows she walks with a limp minimal tenderness over the trochanter she has good motion    Radiology:   AP of the left hip compared to previous x-rays of the hip show well-positioned total hip arthroplasty she has a slightly abducted anteverted cup and there appears to be a little bit of narrowing superiorly appears stable from her previous x-ray but is been in a year.    Assessment:     ICD-10-CM ICD-9-CM   1. Status post total replacement of left hip Z96.642 V43.64        Procedures       Plan: Biomechanics of pertinent body area discussed.  Risks, benefits, alternatives, comparisons, " and complications of accepted medicines, injections, recommendations, surgical procedures, and therapies explained and education provided in laymen's terms. Natural history and expected course of this patient's diagnosis discussed along with evaluation of therapies. Questions answered. When appropriate I also discussed proper use of cane, walker, trekking poles. She is doing well clinically with a suggest to see her in 6 months for an AP of the hips lateral left hip just to see if there is any progression.  Sooner if she has problems.      5/6/2019    Much of this encounter note is an electronic transcription/translation of spoken language to printed text. The electronic translation of spoken language may permit erroneous, or at times, nonsensical words or phrases to be inadvertently transcribed; Although I have reviewed the note for such errors, some may still exist

## 2019-05-29 LAB
BUN SERPL-MCNC: 25 MG/DL (ref 8–23)
CHLORIDE SERPL-SCNC: 105 MMOL/L (ref 98–107)
CO2 SERPL-SCNC: 26.8 MMOL/L (ref 22–29)
CREAT SERPL-MCNC: 1 MG/DL (ref 0.57–1)
POTASSIUM SERPL-SCNC: 4.4 MMOL/L (ref 3.5–5.2)
SODIUM SERPL-SCNC: 143 MMOL/L (ref 136–145)
T4 FREE SERPL-MCNC: 1.19 NG/DL (ref 0.93–1.7)
TSH SERPL DL<=0.005 MIU/L-ACNC: 16.2 MIU/ML (ref 0.27–4.2)

## 2019-06-05 ENCOUNTER — OFFICE VISIT (OUTPATIENT)
Dept: FAMILY MEDICINE CLINIC | Facility: CLINIC | Age: 79
End: 2019-06-05

## 2019-06-05 VITALS
SYSTOLIC BLOOD PRESSURE: 131 MMHG | RESPIRATION RATE: 16 BRPM | DIASTOLIC BLOOD PRESSURE: 71 MMHG | HEIGHT: 63 IN | BODY MASS INDEX: 19.14 KG/M2 | HEART RATE: 61 BPM | TEMPERATURE: 97.6 F | WEIGHT: 108 LBS | OXYGEN SATURATION: 98 %

## 2019-06-05 DIAGNOSIS — I10 ESSENTIAL HYPERTENSION: ICD-10-CM

## 2019-06-05 DIAGNOSIS — G89.29 CHRONIC MIDLINE LOW BACK PAIN WITHOUT SCIATICA: ICD-10-CM

## 2019-06-05 DIAGNOSIS — E03.9 HYPOTHYROIDISM, ACQUIRED: ICD-10-CM

## 2019-06-05 DIAGNOSIS — M54.50 CHRONIC MIDLINE LOW BACK PAIN WITHOUT SCIATICA: ICD-10-CM

## 2019-06-05 PROCEDURE — 99214 OFFICE O/P EST MOD 30 MIN: CPT | Performed by: FAMILY MEDICINE

## 2019-06-05 RX ORDER — TRAMADOL HYDROCHLORIDE 50 MG/1
50 TABLET ORAL EVERY 8 HOURS PRN
Qty: 90 TABLET | Refills: 2 | Status: SHIPPED | OUTPATIENT
Start: 2019-06-05 | End: 2019-09-05 | Stop reason: SDUPTHER

## 2019-06-05 RX ORDER — LEVOTHYROXINE SODIUM 100 MCG
100 TABLET ORAL DAILY
Qty: 90 TABLET | Refills: 0 | Status: SHIPPED | OUTPATIENT
Start: 2019-06-05 | End: 2019-09-05 | Stop reason: SDUPTHER

## 2019-06-05 RX ORDER — METOPROLOL SUCCINATE 25 MG/1
12.5 TABLET, EXTENDED RELEASE ORAL DAILY
Qty: 45 TABLET | Refills: 0 | Status: SHIPPED | OUTPATIENT
Start: 2019-06-05 | End: 2019-09-05 | Stop reason: SDUPTHER

## 2019-06-05 RX ORDER — CALCIUM POLYCARBOPHIL 625 MG 625 MG/1
625 TABLET ORAL DAILY
COMMUNITY

## 2019-06-05 NOTE — PROGRESS NOTES
"Chief Complaint   Patient presents with   • Chronic Kidney disease stage III     3 month follow up        Subjective     Bailee Garza presents to the office today to refill her medications and review recent labs. No medication side effects are reported.  BP is elevated. Problems of fatigue, swelling, forgetfulness. TSH is elevated. Back pain is stable.     I have reviewed and updated her medications, medical history and problem list during today's office visit.  This patient presents to the office to refill her tramadol. Pain is controlled and is rated  5 out of 10. . The medicine is effective and no side effects are reported.  Shaji report has been reviewed. The latest prescription bottle (dated April 29, 2019) has 12 pills/capsules remaining.            Patient Care Team:  Eddie Araya MD as PCP - General  Eddie Araya MD as PCP - Family Medicine  Wilfred Saavedra MD as Consulting Physician (Cardiology)    Social History     Tobacco Use   • Smoking status: Never Smoker   • Smokeless tobacco: Never Used   Substance Use Topics   • Alcohol use: No       Review of Systems   Constitutional: Negative for fatigue.   Cardiovascular: Negative for chest pain.       Objective     /71   Pulse 61   Temp 97.6 °F (36.4 °C) (Oral)   Resp 16   Ht 160 cm (63\")   Wt 49 kg (108 lb)   SpO2 98%   BMI 19.13 kg/m²     Body mass index is 19.13 kg/m².    Physical Exam   Constitutional: She is oriented to person, place, and time. She appears well-developed. No distress.   Eyes: Conjunctivae and lids are normal.   Neck: Trachea normal and phonation normal. No tracheal tenderness present. Carotid bruit is not present. No thyroid mass and no thyromegaly present.   Cardiovascular: Normal rate, regular rhythm and normal heart sounds.   Pulmonary/Chest: Effort normal and breath sounds normal.   Musculoskeletal: She exhibits no edema.   Neurological: She is alert and oriented to person, place, and time.   Skin: Skin is " warm and dry.   Psychiatric: She has a normal mood and affect. Her behavior is normal.   Vitals reviewed.      Data Reviewed:             Lab Results   Component Value Date    BUN 25 (H) 05/28/2019    CREATININE 1.00 05/28/2019    EGFRIFNONA 54 (L) 05/28/2019    EGFRIFAFRI 65 05/28/2019     05/28/2019    K 4.4 05/28/2019     05/28/2019    TSH 16.200 (H) 05/28/2019    FREET4 1.19 05/28/2019        Assessment/Plan     Diagnoses and all orders for this visit:    1. Hypothyroidism, acquired  Assessment & Plan:  TSH elevated. Some edema, will increase dose.     Orders:  -     SYNTHROID 100 MCG tablet; Take 1 tablet by mouth Daily for 90 days.  Dispense: 90 tablet; Refill: 0  -     TSH; Future    2. Chronic midline low back pain without sciatica  Assessment & Plan:  The current medical regimen is effective;  continue present plan and medications.      Orders:  -     traMADol (ULTRAM) 50 MG tablet; Take 1 tablet by mouth Every 8 (Eight) Hours As Needed for Moderate Pain  for up to 30 days.  Dispense: 90 tablet; Refill: 2    3. Essential hypertension  Assessment & Plan:  Hypertension is improving with treatment.  Continue current treatment regimen.  Blood pressure will be reassessed in 3 months.    Orders:  -     metoprolol succinate XL (TOPROL-XL) 25 MG 24 hr tablet; Take 0.5 tablets by mouth Daily for 90 days.  Dispense: 45 tablet; Refill: 0  -     Comprehensive Metabolic Panel; Future  -     CBC & Differential; Future      Return in about 3 months (around 9/5/2019) for Controlled Substance Visit, Recheck.

## 2019-07-31 ENCOUNTER — OFFICE VISIT (OUTPATIENT)
Dept: ORTHOPEDIC SURGERY | Facility: CLINIC | Age: 79
End: 2019-07-31

## 2019-07-31 VITALS — WEIGHT: 108.2 LBS | TEMPERATURE: 98.2 F | BODY MASS INDEX: 19.17 KG/M2 | HEIGHT: 63 IN

## 2019-07-31 DIAGNOSIS — M47.818 ARTHROPATHY OF LEFT SACROILIAC JOINT: ICD-10-CM

## 2019-07-31 DIAGNOSIS — G89.29 CHRONIC MIDLINE LOW BACK PAIN WITHOUT SCIATICA: ICD-10-CM

## 2019-07-31 DIAGNOSIS — M70.62 TROCHANTERIC BURSITIS, LEFT HIP: ICD-10-CM

## 2019-07-31 DIAGNOSIS — M54.50 CHRONIC MIDLINE LOW BACK PAIN WITHOUT SCIATICA: ICD-10-CM

## 2019-07-31 DIAGNOSIS — Z96.642 HISTORY OF HIP REPLACEMENT, TOTAL, LEFT: Primary | ICD-10-CM

## 2019-07-31 PROCEDURE — 99213 OFFICE O/P EST LOW 20 MIN: CPT | Performed by: ORTHOPAEDIC SURGERY

## 2019-07-31 PROCEDURE — 73502 X-RAY EXAM HIP UNI 2-3 VIEWS: CPT | Performed by: ORTHOPAEDIC SURGERY

## 2019-07-31 NOTE — PROGRESS NOTES
Patient Name: Bailee Garza   YOB: 1940  Referring Primary Care Physician: Eddie Araya MD  BMI: Body mass index is 19.17 kg/m².    Chief Complaint:    Chief Complaint   Patient presents with   • Left Hip - Follow-up, Pain        HPI:     Bailee aGrza is a 79 y.o. female who presents today for evaluation of   Chief Complaint   Patient presents with   • Left Hip - Follow-up, Pain   .  Patient is status post left total hip replacement 724 of 18.  It is been doing well until she climbed in her son's jeep which was a big step over the weekend.  She developed pain thereafter and it hurts to lie down on it.  She denies any falls subluxations etc.  Has a history of chronic back    This problem is new to this examiner.     Subjective   Medications:   Home Medications:  Current Outpatient Medications on File Prior to Visit   Medication Sig   • acetaminophen (TYLENOL) 500 MG tablet Take 500 mg by mouth Every 4 (Four) Hours As Needed for Mild Pain .   • calcium polycarbophil (FIBERCON) 625 MG tablet Take 625 mg by mouth Daily.   • metoprolol succinate XL (TOPROL-XL) 25 MG 24 hr tablet Take 0.5 tablets by mouth Daily for 90 days.   • Multiple Vitamins-Minerals (PRESERVISION AREDS PO) Take  by mouth.   • Omega-3 Fatty Acids (OMEGA 3 PO) Take  by mouth.   • SYNTHROID 100 MCG tablet Take 1 tablet by mouth Daily for 90 days.   • traMADol (ULTRAM) 50 MG tablet Take 1 tablet by mouth Every 8 (Eight) Hours As Needed for Moderate Pain  for up to 30 days.     No current facility-administered medications on file prior to visit.      Current Medications:  Scheduled Meds:  Continuous Infusions:  No current facility-administered medications for this visit.   PRN Meds:.    I have reviewed the patient's medical history in detail and updated the computerized patient record.  Review and summarization of old records includes:    Past Medical History:   Diagnosis Date   • Allergic    • Arthritis    • Arthropathy of pelvic  region and thigh 2012    unspecified   • Back pain 2007   • Chronic back pain 2009   • Constipation 2015    unspecified   • Diverticulosis    • Dyspepsia 2008   • Essential hypertension 2007   • 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), 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   • Hearing loss 2008   • Hip pain, left    • History of bone density study 2015   • History of pneumonia     2017   • History of skin cancer    • Hypothyroidism, acquired 2007   • Insomnia 2015    unspecified   • Intervertebral disc disorder with myelopathy, cervical region 2010   • Rhinitis, allergic 2007   • Scoliosis    • Screening breast examination 2007   • Stress 2015    other acute reactions to stress   • Stress incontinence    • Urticaria 2015        Past Surgical History:   Procedure Laterality Date   • BREAST EXCISIONAL BIOPSY Right     50+ years ago   • BRONCHOSCOPY N/A 2017    Procedure: BRONCHOSCOPY;  Surgeon: Mario Alanis MD;  Location: [facility] ENDOSCOPY;  Service:    • CATARACT EXTRACTION, BILATERAL     • COLONOSCOPY     • HYSTERECTOMY     • TOTAL HIP ARTHROPLASTY Left 2018    Procedure: LEFT TOTAL HIP ARTHROPLASTY;  Surgeon: Justen Mann MD;  Location: [facility] OR;  Service: Orthopedics        Social History     Occupational History   • Not on file   Tobacco Use   • Smoking status: Never Smoker   • Smokeless tobacco: Never Used   Substance and Sexual Activity   • Alcohol use: No   • Drug use: No   • Sexual activity: Defer      Social History     Social History Narrative   • Not on file        Family History   Problem Relation Age of Onset   • Heart disease Mother    • Aneurysm Mother    • Cancer Father    • Asthma Paternal Grandfather    • Aneurysm Brother    • Breast cancer Paternal Grandmother    • Malig Hyperthermia Neg Hx        ROS: 14 point review of systems was performed and all other  "systems were reviewed and are negative except for documented findings in HPI and today's encounter.     Allergies:   Allergies   Allergen Reactions   • Hydrocodone Hallucinations   • Ketek [Telithromycin] Hives   • Nsaids Hives   • Sulfa Antibiotics Hives     Constitutional:  Denies fever, shaking or chills   Eyes:  Denies change in visual acuity   HENT:  Denies nasal congestion or sore throat   Respiratory:  Denies cough or shortness of breath   Cardiovascular:  Denies chest pain or severe LE edema   GI:  Denies abdominal pain, nausea, vomiting, bloody stools or diarrhea   Musculoskeletal:  Numbness, tingling, pain, or loss of motor function only as noted above in history of present illness.  : Denies painful urination or hematuria  Integument:  Denies rash, lesion or ulceration   Neurologic:  Denies headache or focal weakness  Endocrine:  Denies lymphadenopathy  Psych:  Denies confusion or change in mental status   Hem:  Denies active bleeding    OBJECTIVE:  Physical Exam:   Temp 98.2 °F (36.8 °C) (Temporal)   Ht 160 cm (63\")   Wt 49.1 kg (108 lb 3.2 oz)   BMI 19.17 kg/m²     General Appearance:    Alert, cooperative, in no acute distress                  Eyes: conjunctiva clear  ENT: external ears and nose atraumatic  CV: no peripheral edema  Resp: normal respiratory effort  Skin: no rashes or wounds; normal turgor  Psych: mood and affect appropriate  Lymph: no nodes appreciated  Neuro: gross sensation intact  Vascular:  Palpable peripheral pulse in noted extremity  Musculoskeletal Extremities: Exam today shows she is moderately tender left greater trochanter she has good range of motion the hip without any axial loading tenderness or thigh pain she is also tender over left SI joint she been taking tramadol and Tylenol Tylenol because she cannot take NSAIDs because she gets hives    Radiology:   AP of the hips lateral left hip shows a left total hip arthroplasty in place also shows degenerative changes with " "evidence of scoliosis and SI joint degenerative change compared to old x-rays    Assessment:     ICD-10-CM ICD-9-CM   1. History of hip replacement, total, left Z96.642 V43.64   2. Trochanteric bursitis, left hip M70.62 726.5   3. Chronic midline low back pain without sciatica M54.5 724.2    G89.29 338.29   4. Arthropathy of left sacroiliac joint M47.818 721.3        Procedures       Plan:  16 min spent face to face with patient 12 min spent counseling about: Total hip or back pain in the different treatments.  Discussed the possibility of injection neither in the trochanter or the SI joint both of which she declined.  She is she was relieved that \"nothing was broken\" with see us back with x-rays as needed.      7/31/2019    Much of this encounter note is an electronic transcription/translation of spoken language to printed text. The electronic translation of spoken language may permit erroneous, or at times, nonsensical words or phrases to be inadvertently transcribed; Although I have reviewed the note for such errors, some may still exist    "

## 2019-08-04 ENCOUNTER — RESULTS ENCOUNTER (OUTPATIENT)
Dept: FAMILY MEDICINE CLINIC | Facility: CLINIC | Age: 79
End: 2019-08-04

## 2019-08-04 DIAGNOSIS — E03.9 HYPOTHYROIDISM, ACQUIRED: ICD-10-CM

## 2019-08-04 DIAGNOSIS — I10 ESSENTIAL HYPERTENSION: ICD-10-CM

## 2019-08-21 LAB
ALBUMIN SERPL-MCNC: 4.8 G/DL (ref 3.5–5.2)
ALBUMIN/GLOB SERPL: 2.2 G/DL
ALP SERPL-CCNC: 95 U/L (ref 39–117)
ALT SERPL-CCNC: 23 U/L (ref 1–33)
AST SERPL-CCNC: 25 U/L (ref 1–32)
BASOPHILS # BLD AUTO: 0.05 10*3/MM3 (ref 0–0.2)
BASOPHILS NFR BLD AUTO: 0.8 % (ref 0–1.5)
BILIRUB SERPL-MCNC: 0.3 MG/DL (ref 0.2–1.2)
BUN SERPL-MCNC: 25 MG/DL (ref 8–23)
BUN/CREAT SERPL: 27.8 (ref 7–25)
CALCIUM SERPL-MCNC: 9.6 MG/DL (ref 8.6–10.5)
CHLORIDE SERPL-SCNC: 103 MMOL/L (ref 98–107)
CO2 SERPL-SCNC: 27.1 MMOL/L (ref 22–29)
CREAT SERPL-MCNC: 0.9 MG/DL (ref 0.57–1)
EOSINOPHIL # BLD AUTO: 0.01 10*3/MM3 (ref 0–0.4)
EOSINOPHIL NFR BLD AUTO: 0.2 % (ref 0.3–6.2)
ERYTHROCYTE [DISTWIDTH] IN BLOOD BY AUTOMATED COUNT: 12 % (ref 12.3–15.4)
GLOBULIN SER CALC-MCNC: 2.2 GM/DL
GLUCOSE SERPL-MCNC: 91 MG/DL (ref 65–99)
HCT VFR BLD AUTO: 44.2 % (ref 34–46.6)
HGB BLD-MCNC: 13.7 G/DL (ref 12–15.9)
IMM GRANULOCYTES # BLD AUTO: 0.02 10*3/MM3 (ref 0–0.05)
IMM GRANULOCYTES NFR BLD AUTO: 0.3 % (ref 0–0.5)
LYMPHOCYTES # BLD AUTO: 1.07 10*3/MM3 (ref 0.7–3.1)
LYMPHOCYTES NFR BLD AUTO: 17.7 % (ref 19.6–45.3)
MCH RBC QN AUTO: 31.6 PG (ref 26.6–33)
MCHC RBC AUTO-ENTMCNC: 31 G/DL (ref 31.5–35.7)
MCV RBC AUTO: 101.8 FL (ref 79–97)
MONOCYTES # BLD AUTO: 0.52 10*3/MM3 (ref 0.1–0.9)
MONOCYTES NFR BLD AUTO: 8.6 % (ref 5–12)
NEUTROPHILS # BLD AUTO: 4.38 10*3/MM3 (ref 1.7–7)
NEUTROPHILS NFR BLD AUTO: 72.4 % (ref 42.7–76)
NRBC BLD AUTO-RTO: 0.2 /100 WBC (ref 0–0.2)
PLATELET # BLD AUTO: 234 10*3/MM3 (ref 140–450)
POTASSIUM SERPL-SCNC: 4.6 MMOL/L (ref 3.5–5.2)
PROT SERPL-MCNC: 7 G/DL (ref 6–8.5)
RBC # BLD AUTO: 4.34 10*6/MM3 (ref 3.77–5.28)
SODIUM SERPL-SCNC: 143 MMOL/L (ref 136–145)
TSH SERPL DL<=0.005 MIU/L-ACNC: 0.62 MIU/ML (ref 0.27–4.2)
WBC # BLD AUTO: 6.05 10*3/MM3 (ref 3.4–10.8)

## 2019-09-05 ENCOUNTER — OFFICE VISIT (OUTPATIENT)
Dept: FAMILY MEDICINE CLINIC | Facility: CLINIC | Age: 79
End: 2019-09-05

## 2019-09-05 VITALS
WEIGHT: 105 LBS | RESPIRATION RATE: 16 BRPM | HEART RATE: 54 BPM | SYSTOLIC BLOOD PRESSURE: 126 MMHG | BODY MASS INDEX: 18.61 KG/M2 | OXYGEN SATURATION: 98 % | DIASTOLIC BLOOD PRESSURE: 70 MMHG | HEIGHT: 63 IN

## 2019-09-05 DIAGNOSIS — G89.29 CHRONIC MIDLINE LOW BACK PAIN WITHOUT SCIATICA: ICD-10-CM

## 2019-09-05 DIAGNOSIS — M54.50 CHRONIC MIDLINE LOW BACK PAIN WITHOUT SCIATICA: ICD-10-CM

## 2019-09-05 DIAGNOSIS — I10 ESSENTIAL HYPERTENSION: ICD-10-CM

## 2019-09-05 DIAGNOSIS — E03.9 HYPOTHYROIDISM, ACQUIRED: ICD-10-CM

## 2019-09-05 PROCEDURE — 99214 OFFICE O/P EST MOD 30 MIN: CPT | Performed by: FAMILY MEDICINE

## 2019-09-05 RX ORDER — LEVOTHYROXINE SODIUM 100 MCG
100 TABLET ORAL DAILY
Qty: 90 TABLET | Refills: 0 | Status: SHIPPED | OUTPATIENT
Start: 2019-09-05 | End: 2019-10-09 | Stop reason: SDUPTHER

## 2019-09-05 RX ORDER — TRAMADOL HYDROCHLORIDE 50 MG/1
50 TABLET ORAL EVERY 8 HOURS PRN
Qty: 90 TABLET | Refills: 2 | Status: SHIPPED | OUTPATIENT
Start: 2019-09-05 | End: 2019-10-09 | Stop reason: SDUPTHER

## 2019-09-05 RX ORDER — METOPROLOL SUCCINATE 25 MG/1
12.5 TABLET, EXTENDED RELEASE ORAL DAILY
Qty: 45 TABLET | Refills: 0 | Status: SHIPPED | OUTPATIENT
Start: 2019-09-05 | End: 2019-10-09 | Stop reason: SDUPTHER

## 2019-09-05 RX ORDER — DULOXETIN HYDROCHLORIDE 30 MG/1
30 CAPSULE, DELAYED RELEASE ORAL DAILY
Qty: 30 CAPSULE | Refills: 2 | Status: SHIPPED | OUTPATIENT
Start: 2019-09-05 | End: 2019-10-09 | Stop reason: SDUPTHER

## 2019-09-05 NOTE — PATIENT INSTRUCTIONS
Initially we will start with duloxetine 30 mg daily.  Begin the weaning process on week 2 by decreasing tramadol to twice a day and then week 3 down to once a day and then discontinuing on week 4.  Follow-up in the office to recheck with me in 1 month.

## 2019-09-05 NOTE — ASSESSMENT & PLAN NOTE
Pain is currently controlled.  Due to memory concerns, will try weaning off tramadol and replacing with duloxetine.  Initially we will start with duloxetine 30 mg daily.  She will begin the weaning process on week 2 by decreasing tramadol to twice a day and then week 3 down to once a day and then discontinuing on week 4.  She will follow-up in the office to recheck with me in 1 month.

## 2019-10-09 ENCOUNTER — OFFICE VISIT (OUTPATIENT)
Dept: FAMILY MEDICINE CLINIC | Facility: CLINIC | Age: 79
End: 2019-10-09

## 2019-10-09 VITALS
BODY MASS INDEX: 19.14 KG/M2 | DIASTOLIC BLOOD PRESSURE: 73 MMHG | HEART RATE: 66 BPM | RESPIRATION RATE: 16 BRPM | WEIGHT: 108 LBS | OXYGEN SATURATION: 97 % | SYSTOLIC BLOOD PRESSURE: 124 MMHG | HEIGHT: 63 IN

## 2019-10-09 DIAGNOSIS — E03.9 HYPOTHYROIDISM, ACQUIRED: ICD-10-CM

## 2019-10-09 DIAGNOSIS — G89.29 CHRONIC MIDLINE LOW BACK PAIN WITHOUT SCIATICA: ICD-10-CM

## 2019-10-09 DIAGNOSIS — I10 ESSENTIAL HYPERTENSION: Primary | ICD-10-CM

## 2019-10-09 DIAGNOSIS — Z13.6 SCREENING FOR ISCHEMIC HEART DISEASE: ICD-10-CM

## 2019-10-09 DIAGNOSIS — M54.50 CHRONIC MIDLINE LOW BACK PAIN WITHOUT SCIATICA: ICD-10-CM

## 2019-10-09 DIAGNOSIS — N18.30 CHRONIC KIDNEY DISEASE, STAGE III (MODERATE) (HCC): ICD-10-CM

## 2019-10-09 PROBLEM — M25.552 HIP PAIN, LEFT: Status: RESOLVED | Noted: 2019-02-04 | Resolved: 2019-10-09

## 2019-10-09 PROBLEM — R93.89 ABNORMAL CXR: Status: RESOLVED | Noted: 2017-12-18 | Resolved: 2019-10-09

## 2019-10-09 PROBLEM — M16.9 OA (OSTEOARTHRITIS) OF HIP: Status: RESOLVED | Noted: 2018-07-24 | Resolved: 2019-10-09
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
## 2019-10-09 PROBLEM — R06.02 EXERTIONAL SHORTNESS OF BREATH: Status: RESOLVED | Noted: 2017-12-18 | Resolved: 2019-10-09

## 2019-10-09 PROBLEM — S92.245A CLOSED NONDISPLACED FRACTURE OF MEDIAL CUNEIFORM OF LEFT FOOT: Status: RESOLVED | Noted: 2018-02-19 | Resolved: 2019-10-09

## 2019-10-09 PROBLEM — R05.3 CHRONIC COUGH: Status: RESOLVED | Noted: 2017-12-18 | Resolved: 2019-10-09

## 2019-10-09 PROBLEM — M70.62 TROCHANTERIC BURSITIS, LEFT HIP: Status: RESOLVED | Noted: 2019-02-04 | Resolved: 2019-10-09

## 2019-10-09 PROCEDURE — 99214 OFFICE O/P EST MOD 30 MIN: CPT | Performed by: FAMILY MEDICINE

## 2019-10-09 RX ORDER — LEVOTHYROXINE SODIUM 100 MCG
100 TABLET ORAL DAILY
Qty: 90 TABLET | Refills: 0 | Status: SHIPPED | OUTPATIENT
Start: 2019-10-09 | End: 2019-12-10 | Stop reason: SDUPTHER

## 2019-10-09 RX ORDER — TRAMADOL HYDROCHLORIDE 50 MG/1
50 TABLET ORAL 2 TIMES DAILY PRN
Qty: 60 TABLET | Refills: 2 | Status: SHIPPED | OUTPATIENT
Start: 2019-10-09 | End: 2019-12-10 | Stop reason: SDUPTHER

## 2019-10-09 RX ORDER — DULOXETIN HYDROCHLORIDE 30 MG/1
30 CAPSULE, DELAYED RELEASE ORAL DAILY
Qty: 30 CAPSULE | Refills: 2 | Status: SHIPPED | OUTPATIENT
Start: 2019-10-09 | End: 2019-12-10

## 2019-10-09 RX ORDER — METOPROLOL SUCCINATE 25 MG/1
12.5 TABLET, EXTENDED RELEASE ORAL DAILY
Qty: 45 TABLET | Refills: 0 | Status: SHIPPED | OUTPATIENT
Start: 2019-10-09 | End: 2019-12-10 | Stop reason: ALTCHOICE

## 2019-10-09 NOTE — PROGRESS NOTES
"Chief Complaint:   Subjective    Rooming Tab(CC,VS,Pt Hx,Fall Screen)   Chief Complaint   Patient presents with   • Hypertension   • Hypothyroidism   • Edema     fingers    • Med Management         History of Present Illness   Bailee Garza is a 79 y.o. female who presents to the office today to refill medicines.  Blood pressure control.  Thyroid control.  Her low back pain is actually shown some improvement.  She is taking only 2 tramadol a day along with her duloxetine and it is working satisfactorily.Shaji report reviewed.  Labs from August reviewed.  Only problem today is puffiness in her hands which keep her from being able to wear rings.  Her creatinine since December 2017 is been within normal limits.  We will resolve the chronic kidney disease diagnosis to her past medical history    I have reviewed and updated her medications, medical history and problem list during today's office visit.     Problem List Tab  Medications Tab  Synopsis Tab  Chart Review Tab  Care Everywhere Tab  Immunizations Tab  Patient History Tab  Social History     Tobacco Use   • Smoking status: Never Smoker   • Smokeless tobacco: Never Used   Substance Use Topics   • Alcohol use: No       Review of Systems   Constitutional: Negative for fatigue.   Respiratory: Negative for shortness of breath.    Cardiovascular: Negative for chest pain.   Musculoskeletal: Positive for back pain.   Skin:        See HPI         Physical Examination:   Objective   Rooming Tab(CC,VS,Pt Hx,Fall Screen)  /73   Pulse 66   Resp 16   Ht 160 cm (62.99\")   Wt 49 kg (108 lb)   SpO2 97%   BMI 19.14 kg/m²     Body mass index is 19.14 kg/m².    Physical Exam   Constitutional: She is oriented to person, place, and time. She appears well-developed. No distress.   Eyes: Conjunctivae and lids are normal.   Neck: Carotid bruit is not present.   Cardiovascular: Normal rate, regular rhythm and normal heart sounds.   Pulmonary/Chest: Effort normal and breath " sounds normal.   Neurological: She is alert and oriented to person, place, and time.   Skin: Skin is warm and dry.   Psychiatric: She has a normal mood and affect. Her behavior is normal.   Vitals reviewed.       Data Reviewed:              Lab Results   Component Value Date    GLU 91 08/20/2019    BUN 25 (H) 08/20/2019    CREATININE 0.90 08/20/2019    EGFRIFNONA 61 08/20/2019    EGFRIFAFRI 73 08/20/2019     08/20/2019    K 4.6 08/20/2019     08/20/2019    CALCIUM 9.6 08/20/2019    ALBUMIN 4.80 08/20/2019    BILITOT 0.3 08/20/2019    ALKPHOS 95 08/20/2019    AST 25 08/20/2019    ALT 23 08/20/2019    WBC 6.05 08/20/2019    RBC 4.34 08/20/2019    HCT 44.2 08/20/2019    .8 (H) 08/20/2019    MCH 31.6 08/20/2019    TSH 0.625 08/20/2019          Assessment/Plan:   Assessment/Plan   Order Review Tab  Health Maintenance Tab  Patient Plan/Order Tab  Diagnoses and all orders for this visit:    1. Essential hypertension (Primary)  Assessment & Plan:  Hypertension is unchanged.  Continue current treatment regimen.  Blood pressure will be reassessed in 3 months.    Orders:  -     metoprolol succinate XL (TOPROL-XL) 25 MG 24 hr tablet; Take 0.5 tablets by mouth Daily for 90 days.  Dispense: 45 tablet; Refill: 0  -     Comprehensive Metabolic Panel; Future  -     CBC & Differential; Future    2. Chronic midline low back pain without sciatica  Assessment & Plan:  Improving. Patient taking tramadol bid along with duloxetine    Orders:  -     traMADol (ULTRAM) 50 MG tablet; Take 1 tablet by mouth 2 (Two) Times a Day As Needed for Moderate Pain  for up to 30 days.  Dispense: 60 tablet; Refill: 2  -     DULoxetine (CYMBALTA) 30 MG capsule; Take 1 capsule by mouth Daily for 90 days.  Dispense: 30 capsule; Refill: 2    3. Hypothyroidism, acquired  Assessment & Plan:  The current medical regimen is effective;  continue present plan and medications.      Orders:  -     SYNTHROID 100 MCG tablet; Take 1 tablet by mouth  Daily for 90 days.  Dispense: 90 tablet; Refill: 0  -     TSH; Future    4. Chronic kidney disease, stage III (moderate) (CMS/HCC)  Comments:  condition resolved since 12/31/2017    5. Screening for ischemic heart disease  -     Lipid Panel With / Chol / HDL Ratio; Future     Follow up:   Wrapup Tab  Return in about 3 months (around 1/9/2020) for Recheck, Adult Wellness Visit, 30 minute visit, Controlled Substance Visit.

## 2019-11-04 ENCOUNTER — OFFICE VISIT (OUTPATIENT)
Dept: ORTHOPEDIC SURGERY | Facility: CLINIC | Age: 79
End: 2019-11-04

## 2019-11-04 VITALS — TEMPERATURE: 98.4 F | WEIGHT: 105 LBS | BODY MASS INDEX: 17.93 KG/M2 | HEIGHT: 64 IN

## 2019-11-04 DIAGNOSIS — Z96.642 HISTORY OF HIP REPLACEMENT, TOTAL, LEFT: Primary | ICD-10-CM

## 2019-11-04 DIAGNOSIS — Z96.642 STATUS POST TOTAL REPLACEMENT OF LEFT HIP: ICD-10-CM

## 2019-11-04 PROCEDURE — 99213 OFFICE O/P EST LOW 20 MIN: CPT | Performed by: ORTHOPAEDIC SURGERY

## 2019-11-04 PROCEDURE — 73502 X-RAY EXAM HIP UNI 2-3 VIEWS: CPT | Performed by: ORTHOPAEDIC SURGERY

## 2019-11-04 NOTE — PROGRESS NOTES
Patient Name: Bailee Garza   YOB: 1940  Referring Primary Care Physician: Eddie Araya MD  BMI: Body mass index is 18.02 kg/m².    Chief Complaint:    Chief Complaint   Patient presents with   • Left Hip - Follow-up, Pain        HPI:     Bailee Garza is a 79 y.o. female who presents today for evaluation of   Chief Complaint   Patient presents with   • Left Hip - Follow-up, Pain   .  The patient follows up today in her left hip she had a total hip arthroplasty by Dr. Mann about a year ago.  Noticed in a follow-up x-ray some eccentric polyethylene want to follow-up to make sure it was not progressing.  She has absolutely no complaints or tenderness and very happy with her outcome to date    This problem is not new to this examiner.     Subjective   Medications:   Home Medications:  Current Outpatient Medications on File Prior to Visit   Medication Sig   • acetaminophen (TYLENOL) 500 MG tablet Take 500 mg by mouth Every 4 (Four) Hours As Needed for Mild Pain .   • calcium polycarbophil (FIBERCON) 625 MG tablet Take 625 mg by mouth Daily.   • DULoxetine (CYMBALTA) 30 MG capsule Take 1 capsule by mouth Daily for 90 days.   • metoprolol succinate XL (TOPROL-XL) 25 MG 24 hr tablet Take 0.5 tablets by mouth Daily for 90 days.   • Multiple Vitamins-Minerals (PRESERVISION AREDS PO) Take  by mouth.   • Omega-3 Fatty Acids (OMEGA 3 PO) Take  by mouth.   • SYNTHROID 100 MCG tablet Take 1 tablet by mouth Daily for 90 days.   • traMADol (ULTRAM) 50 MG tablet Take 1 tablet by mouth 2 (Two) Times a Day As Needed for Moderate Pain  for up to 30 days.     No current facility-administered medications on file prior to visit.      Current Medications:  Scheduled Meds:  Continuous Infusions:  No current facility-administered medications for this visit.   PRN Meds:.    I have reviewed the patient's medical history in detail and updated the computerized patient record.  Review and summarization of old records  includes:    Past Medical History:   Diagnosis Date   • Abnormal CXR 2017   • Allergic    • Arthritis    • Arthropathy of pelvic region and thigh 2012    unspecified   • Back pain 2007   • Chronic back pain 2009   • Chronic cough 2017   • Closed nondisplaced fracture of medial cuneiform of left foot 2018   • Constipation 2015    unspecified   • Diverticulosis    • Dyspepsia 2008   • Essential hypertension 2007   • Exertional shortness of breath 2017   • Fall 2018   • 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), 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   • Hearing loss 2008   • Hip pain, left    • History of bone density study 2015   • History of pneumonia     2017   • History of skin cancer    • Hypothyroidism, acquired 2007   • Insomnia 2015    unspecified   • Intervertebral disc disorder with myelopathy, cervical region 2010   • OA (osteoarthritis) of hip 2018   • Rhinitis, allergic 2007   • Scoliosis    • Screening breast examination 2007   • Stress 2015    other acute reactions to stress   • Stress incontinence    • Trochanteric bursitis, left hip 2019   • Urticaria 2015        Past Surgical History:   Procedure Laterality Date   • BREAST EXCISIONAL BIOPSY Right     50+ years ago   • BRONCHOSCOPY N/A 2017    Procedure: BRONCHOSCOPY;  Surgeon: Mario Alanis MD;  Location: [facility] ENDOSCOPY;  Service:    • CATARACT EXTRACTION, BILATERAL     • COLONOSCOPY     • HYSTERECTOMY     • TOTAL HIP ARTHROPLASTY Left 2018    Procedure: LEFT TOTAL HIP ARTHROPLASTY;  Surgeon: Justen Mann MD;  Location: [facility] OR;  Service: Orthopedics        Social History     Occupational History   • Not on file   Tobacco Use   • Smoking status: Never Smoker   • Smokeless tobacco: Never Used   Substance and Sexual Activity   • Alcohol use: No   • Drug use: No   • Sexual  "activity: Defer      Social History     Social History Narrative   • Not on file        Family History   Problem Relation Age of Onset   • Heart disease Mother    • Aneurysm Mother    • Cancer Father    • Asthma Paternal Grandfather    • Aneurysm Brother    • Breast cancer Paternal Grandmother    • Malig Hyperthermia Neg Hx        ROS: 14 point review of systems was performed and all other systems were reviewed and are negative except for documented findings in HPI and today's encounter.     Allergies:   Allergies   Allergen Reactions   • Hydrocodone Hallucinations   • Ketek [Telithromycin] Hives   • Nsaids Hives   • Sulfa Antibiotics Hives     Constitutional:  Denies fever, shaking or chills   Eyes:  Denies change in visual acuity   HENT:  Denies nasal congestion or sore throat   Respiratory:  Denies cough or shortness of breath   Cardiovascular:  Denies chest pain or severe LE edema   GI:  Denies abdominal pain, nausea, vomiting, bloody stools or diarrhea   Musculoskeletal:  Numbness, tingling, pain, or loss of motor function only as noted above in history of present illness.  : Denies painful urination or hematuria  Integument:  Denies rash, lesion or ulceration   Neurologic:  Denies headache or focal weakness  Endocrine:  Denies lymphadenopathy  Psych:  Denies confusion or change in mental status   Hem:  Denies active bleeding    OBJECTIVE:  Physical Exam: 79 y.o. female  Wt Readings from Last 3 Encounters:   11/04/19 47.6 kg (105 lb)   10/09/19 49 kg (108 lb)   09/05/19 47.6 kg (105 lb)     Ht Readings from Last 1 Encounters:   11/04/19 162.6 cm (64\")     Body mass index is 18.02 kg/m².  Vitals:    11/04/19 0807   Temp: 98.4 °F (36.9 °C)     Vital signs reviewed.     General Appearance:    Alert, cooperative, in no acute distress                  Eyes: conjunctiva clear  ENT: external ears and nose atraumatic  CV: no peripheral edema  Resp: normal respiratory effort  Skin: no rashes or wounds; normal " turgor  Psych: mood and affect appropriate  Lymph: no nodes appreciated  Neuro: gross sensation intact  Vascular:  Palpable peripheral pulse in noted extremity  Musculoskeletal Extremities: She walks without a limp.  He has no pain to palpation over trochanters and she has clinically good leg lengths.    Radiology:   AP of the hips lateral left hip taken the office today and compared to serial x-rays in the past show no appreciable change.  She does have degenerative changes in the opposite hip and lower back as well    Assessment:     ICD-10-CM ICD-9-CM   1. History of hip replacement, total, left Z96.642 V43.64   2. Status post total replacement of left hip Z96.642 V43.64        Procedures       Plan: Postop total hip recommendations given.  Like to follow-up yearly with an x-ray or sooner if she has any kind of symptoms.  Advice given and questions answered      11/4/2019    Much of this encounter note is an electronic transcription/translation of spoken language to printed text. The electronic translation of spoken language may permit erroneous, or at times, nonsensical words or phrases to be inadvertently transcribed; Although I have reviewed the note for such errors, some may still exist

## 2019-12-10 ENCOUNTER — OFFICE VISIT (OUTPATIENT)
Dept: FAMILY MEDICINE CLINIC | Facility: CLINIC | Age: 79
End: 2019-12-10

## 2019-12-10 VITALS
HEIGHT: 64 IN | DIASTOLIC BLOOD PRESSURE: 76 MMHG | HEART RATE: 62 BPM | SYSTOLIC BLOOD PRESSURE: 133 MMHG | RESPIRATION RATE: 16 BRPM | BODY MASS INDEX: 18.44 KG/M2 | WEIGHT: 108 LBS | OXYGEN SATURATION: 98 %

## 2019-12-10 DIAGNOSIS — E03.9 HYPOTHYROIDISM, ACQUIRED: ICD-10-CM

## 2019-12-10 DIAGNOSIS — E78.49 OTHER HYPERLIPIDEMIA: ICD-10-CM

## 2019-12-10 DIAGNOSIS — I10 ESSENTIAL HYPERTENSION: Primary | ICD-10-CM

## 2019-12-10 DIAGNOSIS — Z13.6 SCREENING FOR ISCHEMIC HEART DISEASE: ICD-10-CM

## 2019-12-10 DIAGNOSIS — M54.50 CHRONIC MIDLINE LOW BACK PAIN WITHOUT SCIATICA: ICD-10-CM

## 2019-12-10 DIAGNOSIS — G89.29 CHRONIC MIDLINE LOW BACK PAIN WITHOUT SCIATICA: ICD-10-CM

## 2019-12-10 PROCEDURE — 99214 OFFICE O/P EST MOD 30 MIN: CPT | Performed by: FAMILY MEDICINE

## 2019-12-10 RX ORDER — TRAMADOL HYDROCHLORIDE 50 MG/1
50 TABLET ORAL 2 TIMES DAILY PRN
Qty: 60 TABLET | Refills: 2 | Status: SHIPPED | OUTPATIENT
Start: 2019-12-10 | End: 2020-01-08 | Stop reason: SDUPTHER

## 2019-12-10 RX ORDER — TRIAMTERENE AND HYDROCHLOROTHIAZIDE 37.5; 25 MG/1; MG/1
1 TABLET ORAL DAILY
Qty: 90 TABLET | Refills: 0 | Status: SHIPPED | OUTPATIENT
Start: 2019-12-10 | End: 2020-03-09 | Stop reason: SDUPTHER

## 2019-12-10 RX ORDER — LEVOTHYROXINE SODIUM 100 MCG
100 TABLET ORAL DAILY
Qty: 90 TABLET | Refills: 0 | Status: SHIPPED | OUTPATIENT
Start: 2019-12-10 | End: 2019-12-30 | Stop reason: DRUGHIGH

## 2019-12-10 NOTE — ASSESSMENT & PLAN NOTE
Hypertension is unchanged.  Medication changes per orders.  Discontinue metoprolol due to edema.  Re-institute triamterene for blood pressure control.  Blood pressure will be reassessed in 3 months.

## 2019-12-10 NOTE — PATIENT INSTRUCTIONS
Sit-to-Stand Exercise    The sit-to-stand exercise (also known as the chair stand or chair rise exercise) strengthens your lower body and helps you maintain or improve your mobility and independence. The goal is to do the sit-to-stand exercise without using your hands. This will be easier as you become stronger. You should always talk with your health care provider before starting any exercise program, especially if you have had recent surgery.  Do the exercise exactly as told by your health care provider and adjust it as directed. It is normal to feel mild stretching, pulling, tightness, or discomfort as you do this exercise, but you should stop right away if you feel sudden pain or your pain gets worse. Do not begin doing this exercise until told by your health care provider.  What the sit-to-stand exercise does  The sit-to-stand exercise helps to strengthen the muscles in your thighs and the muscles in the center of your body that give you stability (core muscles). This exercise is especially helpful if:  · You have had knee or hip surgery.  · You have trouble getting up from a chair, out of a car, or off the toilet.  How to do the sit-to-stand exercise  1. Sit toward the front edge of a sturdy chair without armrests. Your knees should be bent and your feet should be flat on the floor and shoulder-width apart.  2. Place your hands lightly on each side of the seat. Keep your back and neck as straight as possible, with your chest slightly forward.  3. Breathe in slowly. Lean forward and slightly shift your weight to the front of your feet.  4. Breathe out as you slowly stand up. Use your hands as little as possible.  5. Stand and pause for a full breath in and out.  6. Breathe in as you sit down slowly. Tighten your core and abdominal muscles to control your lowering as much as possible.  7. Breathe out slowly.  8. Do this exercise 10-15 times. If needed, do it fewer times until you build up strength.  9. Rest for  1 minute, then do another set of 10-15 repetitions.  To change the difficulty of the sit-to-stand exercise  · If the exercise is too difficult, use a chair with sturdy armrests, and push off the armrests to help you come to the standing position. You can also use the armrests to help slowly lower yourself back to sitting. As this gets easier, try to use your arms less. You can also place a firm cushion or pillow on the chair to make the surface higher.  · If this exercise is too easy, do not use your arms to help raise or lower yourself. You can also wear a weighted vest, use hand weights, increase your repetitions, or try a lower chair.  General tips  · You may feel tired when starting an exercise routine. This is normal.  · You may have muscle soreness that lasts a few days. This is normal. As you get stronger, you may not feel muscle soreness.  · Use smooth, steady movements.  · Do not  hold your breath during strength exercises. This can cause unsafe changes in your blood pressure.  · Breathe in slowly through your nose, and breathe out slowly through your mouth.  Summary  · Strengthening your lower body is an important step to help you move safely and independently.  · The sit-to-stand exercise helps strengthen the muscles in your thighs and core.  · You should always talk with your health care provider before starting any exercise program, especially if you have had recent surgery.  This information is not intended to replace advice given to you by your health care provider. Make sure you discuss any questions you have with your health care provider.  Document Released: 02/08/2018 Document Revised: 02/08/2018 Document Reviewed: 02/08/2018  Elsevier Interactive Patient Education © 2019 [employer]

## 2019-12-10 NOTE — PROGRESS NOTES
"   Subjective       Chief Complaint   Patient presents with   • Hypertension     wants advise with meds    • Med Management   • Hand Pain     right hand swelling          History of Present Illness   Bailee Garza is a 79 y.o. female who presents to the office today to discuss medications.  She has been using a pharmacy that has cost her more money this year than last and would like to switch to [facility] pharmacy.  We will update her active pain medicine agreement to reflect local [facility] here in [facility].  Blood pressure controlled but she has side effects of some edema of her fingers and ankles.  We will switch back to triamterene which she tolerated well in the past.  Her thyroid is stable pending lab results.  We will continue same dose.  Pain medicine is effective with as needed usage.  She did bring in her pill bottle and appropriate amount of the medication remains.  We will update the prescription to reflect twice daily usage as needed.  We will reset our follow-up appointment for 3 months.    I have reviewed and updated her medications, medical history and problem list during today's office visit.     Social History     Tobacco Use   • Smoking status: Never Smoker   • Smokeless tobacco: Never Used   Substance Use Topics   • Alcohol use: No       Review of Systems   Constitutional: Negative for fatigue.   Musculoskeletal:        See history of present illness.         Physical Examination:   Objective   /76   Pulse 62   Resp 16   Ht 162.6 cm (64\")   Wt 49 kg (108 lb)   SpO2 98%   BMI 18.54 kg/m²     Body mass index is 18.54 kg/m².    Physical Exam   Constitutional: She is oriented to person, place, and time. She appears well-developed. No distress.   Eyes: Conjunctivae and lids are normal.   Neck: Carotid bruit is not present.   Cardiovascular: Normal rate, regular rhythm and normal heart sounds.   Pulmonary/Chest: Effort normal and breath sounds normal.   Neurological: She is alert and oriented " to person, place, and time.   Skin: Skin is warm and dry.   Psychiatric: She has a normal mood and affect. Her behavior is normal.   Vitals reviewed.       Data Reviewed:                      Assessment/Plan:   Assessment/Plan   Diagnoses and all orders for this visit:    1. Essential hypertension (Primary)  Assessment & Plan:  Hypertension is unchanged.  Medication changes per orders.  Discontinue metoprolol due to edema.  Re-institute triamterene for blood pressure control.  Blood pressure will be reassessed in 3 months.    Orders:  -     CBC & Differential  -     Comprehensive Metabolic Panel  -     triamterene-hydrochlorothiazide (MAXZIDE-25) 37.5-25 MG per tablet; Take 1 tablet by mouth Daily for 90 days.  Dispense: 90 tablet; Refill: 0    2. Hypothyroidism, acquired  Assessment & Plan:  The current medical regimen is effective;  continue present plan and medications.      Orders:  -     TSH  -     SYNTHROID 100 MCG tablet; Take 1 tablet by mouth Daily for 90 days.  Dispense: 90 tablet; Refill: 0    3. Screening for ischemic heart disease  -     Lipid Panel With / Chol / HDL Ratio    4. Chronic midline low back pain without sciatica  Assessment & Plan:  The current medical regimen is effective;  continue present plan and medications.      Orders:  -     traMADol (ULTRAM) 50 MG tablet; Take 1 tablet by mouth 2 (Two) Times a Day As Needed for Moderate Pain  for up to 30 days.  Dispense: 60 tablet; Refill: 2    Patient Instructions         Follow up:   Return in about 3 months (around 3/10/2020) for Recheck, Controlled Substance Visit.

## 2019-12-27 LAB
ALBUMIN SERPL-MCNC: 4.7 G/DL (ref 3.5–5.2)
ALBUMIN/GLOB SERPL: 1.9 G/DL
ALP SERPL-CCNC: 104 U/L (ref 39–117)
ALT SERPL-CCNC: 26 U/L (ref 1–33)
AST SERPL-CCNC: 26 U/L (ref 1–32)
BASOPHILS # BLD AUTO: 0.05 10*3/MM3 (ref 0–0.2)
BASOPHILS NFR BLD AUTO: 0.8 % (ref 0–1.5)
BILIRUB SERPL-MCNC: 0.4 MG/DL (ref 0.2–1.2)
BUN SERPL-MCNC: 30 MG/DL (ref 8–23)
BUN/CREAT SERPL: 28 (ref 7–25)
CALCIUM SERPL-MCNC: 9.8 MG/DL (ref 8.6–10.5)
CHLORIDE SERPL-SCNC: 100 MMOL/L (ref 98–107)
CHOLEST SERPL-MCNC: 208 MG/DL (ref 0–200)
CHOLEST/HDLC SERPL: 2.7 {RATIO}
CO2 SERPL-SCNC: 25.5 MMOL/L (ref 22–29)
CREAT SERPL-MCNC: 1.07 MG/DL (ref 0.57–1)
EOSINOPHIL # BLD AUTO: 0 10*3/MM3 (ref 0–0.4)
EOSINOPHIL NFR BLD AUTO: 0 % (ref 0.3–6.2)
ERYTHROCYTE [DISTWIDTH] IN BLOOD BY AUTOMATED COUNT: 12 % (ref 12.3–15.4)
GLOBULIN SER CALC-MCNC: 2.5 GM/DL
GLUCOSE SERPL-MCNC: 81 MG/DL (ref 65–99)
HCT VFR BLD AUTO: 44.1 % (ref 34–46.6)
HDLC SERPL-MCNC: 77 MG/DL (ref 40–60)
HGB BLD-MCNC: 14.7 G/DL (ref 12–15.9)
IMM GRANULOCYTES # BLD AUTO: 0.01 10*3/MM3 (ref 0–0.05)
IMM GRANULOCYTES NFR BLD AUTO: 0.2 % (ref 0–0.5)
LDLC SERPL CALC-MCNC: 110 MG/DL (ref 0–100)
LYMPHOCYTES # BLD AUTO: 1.26 10*3/MM3 (ref 0.7–3.1)
LYMPHOCYTES NFR BLD AUTO: 20.2 % (ref 19.6–45.3)
MCH RBC QN AUTO: 31.7 PG (ref 26.6–33)
MCHC RBC AUTO-ENTMCNC: 33.3 G/DL (ref 31.5–35.7)
MCV RBC AUTO: 95.2 FL (ref 79–97)
MONOCYTES # BLD AUTO: 0.57 10*3/MM3 (ref 0.1–0.9)
MONOCYTES NFR BLD AUTO: 9.1 % (ref 5–12)
NEUTROPHILS # BLD AUTO: 4.36 10*3/MM3 (ref 1.7–7)
NEUTROPHILS NFR BLD AUTO: 69.7 % (ref 42.7–76)
NRBC BLD AUTO-RTO: 0 /100 WBC (ref 0–0.2)
PLATELET # BLD AUTO: 244 10*3/MM3 (ref 140–450)
POTASSIUM SERPL-SCNC: 4.5 MMOL/L (ref 3.5–5.2)
PROT SERPL-MCNC: 7.2 G/DL (ref 6–8.5)
RBC # BLD AUTO: 4.63 10*6/MM3 (ref 3.77–5.28)
SODIUM SERPL-SCNC: 140 MMOL/L (ref 136–145)
TRIGL SERPL-MCNC: 104 MG/DL (ref 0–150)
TSH SERPL DL<=0.005 MIU/L-ACNC: 0.2 UIU/ML (ref 0.27–4.2)
VLDLC SERPL CALC-MCNC: 20.8 MG/DL
WBC # BLD AUTO: 6.25 10*3/MM3 (ref 3.4–10.8)

## 2019-12-30 RX ORDER — LEVOTHYROXINE SODIUM 88 MCG
88 TABLET ORAL DAILY
Qty: 30 TABLET | Refills: 2 | Status: SHIPPED | OUTPATIENT
Start: 2019-12-30 | End: 2020-01-07

## 2020-01-07 DIAGNOSIS — E03.9 HYPOTHYROIDISM, ACQUIRED: Primary | ICD-10-CM

## 2020-01-07 RX ORDER — LEVOTHYROXINE SODIUM 88 UG/1
88 TABLET ORAL DAILY
Qty: 90 TABLET | Refills: 0 | Status: SHIPPED | OUTPATIENT
Start: 2020-01-07 | End: 2020-01-08 | Stop reason: ALTCHOICE

## 2020-01-08 ENCOUNTER — OFFICE VISIT (OUTPATIENT)
Dept: FAMILY MEDICINE CLINIC | Facility: CLINIC | Age: 80
End: 2020-01-08

## 2020-01-08 VITALS
OXYGEN SATURATION: 98 % | DIASTOLIC BLOOD PRESSURE: 69 MMHG | HEIGHT: 64 IN | TEMPERATURE: 97.7 F | HEART RATE: 61 BPM | BODY MASS INDEX: 18.1 KG/M2 | WEIGHT: 106 LBS | SYSTOLIC BLOOD PRESSURE: 129 MMHG | RESPIRATION RATE: 16 BRPM

## 2020-01-08 DIAGNOSIS — E03.9 HYPOTHYROIDISM, ACQUIRED: Primary | ICD-10-CM

## 2020-01-08 DIAGNOSIS — M54.50 CHRONIC MIDLINE LOW BACK PAIN WITHOUT SCIATICA: ICD-10-CM

## 2020-01-08 DIAGNOSIS — G89.29 CHRONIC MIDLINE LOW BACK PAIN WITHOUT SCIATICA: ICD-10-CM

## 2020-01-08 PROCEDURE — 99213 OFFICE O/P EST LOW 20 MIN: CPT | Performed by: FAMILY MEDICINE

## 2020-01-08 RX ORDER — LEVOTHYROXINE SODIUM 75 MCG
75 TABLET ORAL DAILY
Qty: 30 TABLET | Refills: 2 | Status: SHIPPED | OUTPATIENT
Start: 2020-01-08 | End: 2020-01-29

## 2020-01-08 RX ORDER — TRAMADOL HYDROCHLORIDE 50 MG/1
50 TABLET ORAL 2 TIMES DAILY PRN
Qty: 60 TABLET | Refills: 1 | Status: SHIPPED | OUTPATIENT
Start: 2020-01-08 | End: 2020-02-05 | Stop reason: SDUPTHER

## 2020-01-08 NOTE — PROGRESS NOTES
"   Subjective       Chief Complaint   Patient presents with   • Labs Only     lab review          History of Present Illness   Bailee Garza is a 79 y.o. female who presents to the office today to review labs.  Recent labs have shown some minor abnormalities.  We discussed them in detail during today's visit.  Change in thyroid medicine needed.  She has requested to switch to [facility] pharmacy and tramadol prescription has been sent for that visit.  Repeat lab testing will be set up prior to March visit.  15 minute face to face spent during this discussion. I have reviewed and updated her medications, medical history and problem list during today's office visit.     Social History     Tobacco Use   • Smoking status: Never Smoker   • Smokeless tobacco: Never Used   Substance Use Topics   • Alcohol use: No       Review of Systems   Constitutional: Negative for fatigue.         Physical Examination:   Objective   /69   Pulse 61   Temp 97.7 °F (36.5 °C) (Oral)   Resp 16   Ht 162.6 cm (64\")   Wt 48.1 kg (106 lb)   SpO2 98%   BMI 18.19 kg/m²     Body mass index is 18.19 kg/m².    Physical Exam   Constitutional: She appears well-developed.   Psychiatric: Her mood appears anxious.        Data Reviewed:              Lab Results   Component Value Date    GLU 81 12/27/2019    BUN 30 (H) 12/27/2019    CREATININE 1.07 (H) 12/27/2019    EGFRIFNONA 50 (L) 12/27/2019    EGFRIFAFRI 60 (L) 12/27/2019     12/27/2019    K 4.5 12/27/2019     12/27/2019    CALCIUM 9.8 12/27/2019    ALBUMIN 4.70 12/27/2019    BILITOT 0.4 12/27/2019    ALKPHOS 104 12/27/2019    AST 26 12/27/2019    ALT 26 12/27/2019    CHLPL 208 (H) 12/27/2019    TRIG 104 12/27/2019    HDL 77 (H) 12/27/2019    VLDL 20.8 12/27/2019     (H) 12/27/2019    WBC 6.25 12/27/2019    RBC 4.63 12/27/2019    HCT 44.1 12/27/2019    MCV 95.2 12/27/2019    MCH 31.7 12/27/2019    TSH 0.205 (L) 12/27/2019          Assessment/Plan:   Assessment/Plan "   Diagnoses and all orders for this visit:    1. Hypothyroidism, acquired (Primary)  Assessment & Plan:  TSH lowered.  Will decrease Synthroid dose with short-term follow-up.    Orders:  -     SYNTHROID 75 MCG tablet; Take 1 tablet by mouth Daily for 90 days.  Dispense: 30 tablet; Refill: 2    2. Chronic midline low back pain without sciatica  Assessment & Plan:  Condition stable.  New prescription sent to new pharmacy.  Keep same follow-up.    Orders:  -     traMADol (ULTRAM) 50 MG tablet; Take 1 tablet by mouth 2 (Two) Times a Day As Needed for Moderate Pain  for up to 60 days.  Dispense: 60 tablet; Refill: 1    Patient Instructions         Follow up:   Return in 2 months (on 3/9/2020) for Next scheduled follow up.

## 2020-01-12 ENCOUNTER — HOSPITAL ENCOUNTER (EMERGENCY)
Facility: HOSPITAL | Age: 80
Discharge: HOME | End: 2020-01-12
Attending: EMERGENCY MEDICINE | Admitting: EMERGENCY MEDICINE

## 2020-01-12 ENCOUNTER — APPOINTMENT (OUTPATIENT)
Dept: GENERAL RADIOLOGY | Facility: HOSPITAL | Age: 80
End: 2020-01-12

## 2020-01-12 VITALS
OXYGEN SATURATION: 98 % | SYSTOLIC BLOOD PRESSURE: 118 MMHG | HEART RATE: 110 BPM | RESPIRATION RATE: 18 BRPM | DIASTOLIC BLOOD PRESSURE: 59 MMHG | TEMPERATURE: 98.2 F | BODY MASS INDEX: 18.1 KG/M2 | WEIGHT: 106 LBS | HEIGHT: 64 IN

## 2020-01-12 DIAGNOSIS — S82.892A CLOSED FRACTURE OF LEFT ANKLE, INITIAL ENCOUNTER: Primary | ICD-10-CM

## 2020-01-12 PROCEDURE — 99283 EMERGENCY DEPT VISIT LOW MDM: CPT

## 2020-01-12 PROCEDURE — 73610 X-RAY EXAM OF ANKLE: CPT

## 2020-01-12 NOTE — DISCHARGE INSTRUCTIONS
Weightbearing only with walking boot.  Please contact orthopedics for follow-up sometime this week.

## 2020-01-12 NOTE — ED PROVIDER NOTES
EMERGENCY DEPARTMENT ENCOUNTER    Room Number:  04/04  Date of encounter:  1/12/2020  PCP: Eddie Araya MD  Historian: Patient       HPI:  Chief Complaint: Right ankle injury  A complete HPI/ROS/PMH/PSH/SH/FH are unobtainable due to: None    Context: Bailee Garza is a 79 y.o. female who presents to the ED c/o injury to right ankle which occurred yesterday.  Patient got up from sitting and rolled her right ankle.  Pain is moderate and worsened with movement.  Patient denies other injury.  Patient denies history of prior ankle problems      PAST MEDICAL HISTORY  Active Ambulatory Problems     Diagnosis Date Noted   • Chronic midline low back pain without sciatica 07/13/2009   • Essential hypertension 11/20/2007   • Hearing loss 03/18/2008   • Hypothyroidism, acquired 11/20/2007   • IBS (irritable bowel syndrome) 04/25/2013   • Chronic nonseasonal allergic rhinitis due to pollen 11/20/2007   • Arthralgia of right knee 10/11/2017   • DDD (degenerative disc disease), lumbosacral 02/07/2018   • Scoliosis due to degenerative disease of spine in adult patient 05/30/2018   • Status post total replacement of left hip 08/07/2018   • Status post total hip replacement, left 02/04/2019   • Family history of abdominal aortic aneurysm (AAA) 03/06/2019     Resolved Ambulatory Problems     Diagnosis Date Noted   • 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   • Back pain 11/20/2007   • Constipation 03/24/2015   • Depression 01/28/2015   • Dyspepsia 03/18/2008   • Grief reaction 07/05/2011   • Insomnia 01/28/2015   • Intervertebral disc disorder with myelopathy, cervical region 07/22/2010   • Stress 04/27/2015   • Screening breast examination 11/20/2007   • Urticaria 06/29/2015   • Chronic kidney disease, stage III (moderate) (CMS/HCC) 12/19/2016   • Chronic cough 12/18/2017   • Abnormal weight loss 12/18/2017   • Abnormal CXR 12/18/2017   • Exertional shortness of breath 12/18/2017   • Pneumonia of both lower lobes due  to infectious organism (CMS/[facility]) 12/19/2017   • HCAP (healthcare-associated pneumonia) 12/29/2017   • Pain of left midfoot 01/06/2018   • Swelling of foot joint, left 01/06/2018   • Foot injury, left, initial encounter 01/06/2018   • Arthritis of hip, left 02/05/2018   • Primary osteoarthritis of left hip 02/19/2018   • Closed nondisplaced fracture of medial cuneiform of left foot 02/19/2018   • Left lumbar radiculopathy 05/30/2018   • Numbness of left anterior thigh 05/30/2018   • Degenerative disc disease, lumbar 05/30/2018   • OA (osteoarthritis) of hip 07/24/2018   • Fall 09/17/2018   • Trochanteric bursitis, left hip 02/04/2019   • Hip pain, left 02/04/2019     Past Medical History:   Diagnosis Date   • Allergic    • Arthritis    • Chronic back pain 07/13/2009   • Diverticulosis    • History of bone density study 09/16/2015   • History of pneumonia    • History of skin cancer    • Rhinitis, allergic 11/20/2007   • Scoliosis    • Stress incontinence          PAST SURGICAL HISTORY  Past Surgical History:   Procedure Laterality Date   • BREAST EXCISIONAL BIOPSY Right     50+ years ago   • BRONCHOSCOPY N/A 12/21/2017    Procedure: BRONCHOSCOPY;  Surgeon: Mario Alanis MD;  Location: [facility] ENDOSCOPY;  Service:    • CATARACT EXTRACTION, BILATERAL     • COLONOSCOPY     • HYSTERECTOMY     • TOTAL HIP ARTHROPLASTY Left 7/24/2018    Procedure: LEFT TOTAL HIP ARTHROPLASTY;  Surgeon: Justen Mann MD;  Location: [facility] OR;  Service: Orthopedics         FAMILY HISTORY  Family History   Problem Relation Age of Onset   • Heart disease Mother    • Aneurysm Mother    • Cancer Father    • Asthma Paternal Grandfather    • Aneurysm Brother    • Breast cancer Paternal Grandmother    • Malig Hyperthermia Neg Hx          SOCIAL HISTORY  Social History     Socioeconomic History   • Marital status:      Spouse name: Not on file   • Number of children: Not on file   • Years of education: Not on file   • Highest  education level: Not on file   Tobacco Use   • Smoking status: Never Smoker   • Smokeless tobacco: Never Used   Substance and Sexual Activity   • Alcohol use: No   • Drug use: No   • Sexual activity: Defer         ALLERGIES  Hydrocodone; Ketek [telithromycin]; Nsaids; and Sulfa antibiotics       REVIEW OF SYSTEMS  Review of Systems   Constitutional: Negative for fever.   Respiratory: Negative for shortness of breath.    Cardiovascular: Negative for chest pain.   Musculoskeletal:        Right ankle pain as per HPI           PHYSICAL EXAM    I have reviewed the triage vital signs and nursing notes.    ED Triage Vitals   Temp Heart Rate Resp BP SpO2   01/12/20 0800 01/12/20 0800 01/12/20 0800 01/12/20 0807 01/12/20 0800   98.2 °F (36.8 °C) 110 16 139/80 98 %      Temp src Heart Rate Source Patient Position BP Location FiO2 (%)   01/12/20 0800 -- -- -- --   Tympanic           Physical Exam  GENERAL: not distressed  HENT: nares patent  EYES: no scleral icterus  CV: regular rhythm, regular rate  RESPIRATORY: normal effort  ABDOMEN: soft  MUSCULOSKELETAL: Examination of the right ankle reveals moderate lateral swelling.  There is tenderness to palpation over the lateral malleolus.  There is no tenderness palpation over the proximal fibula nor over the lateral foot.  Distal strength sensation and pulses are grossly intact.  NEURO: Strength, sensation, and coordination are grossly intact.  Speech and mentation are unremarkable  SKIN: warm, dry      LAB RESULTS  No results found for this or any previous visit (from the past 24 hour(s)).    Ordered the above labs and independently reviewed the results.      RADIOLOGY  Xr Ankle 3+ View Right    Result Date: 1/12/2020  THREE-VIEW RIGHT ANKLE  HISTORY: Fell. Pain.  There is a nondisplaced fracture at the tip of the lateral malleolus. There is a small associated joint effusion.        I ordered the above noted radiological studies. Reviewed by me and discussed with radiologist.   See dictation for official radiology interpretation.      PROCEDURES  Procedures      MEDICATIONS GIVEN IN ER    Medications - No data to display      PROGRESS, DATA ANALYSIS, CONSULTS, AND MEDICAL DECISION MAKING    All labs have been independently reviewed by me.  All radiology studies have been reviewed by me and discussed with radiologist dictating the report.   EKG's independently viewed and interpreted by me.  Discussion below represents my analysis of pertinent findings related to patient's condition, differential diagnosis, treatment plan and final disposition.      ED Course as of Jan 12 0951   Sun Jan 12, 2020   0945 I reviewed x-ray of the right ankle which shows a small nondisplaced fracture of the lateral malleolus.  Will place patient in a walking boot and have her follow-up with orthopedics on call.  Discussed results of the x-ray with patient family at the bedside.     [DB]      ED Course User Index  [DB] Justen Aguayo MD       AS OF 9:51 AM VITALS:    BP - 139/80  HR - 110  TEMP - 98.2 °F (36.8 °C) (Tympanic)  O2 SATS - 97%      DIAGNOSIS  Final diagnoses:   Closed fracture of left ankle, initial encounter         DISPOSITION  DISCHARGE    Patient discharged in stable condition.    Reviewed implications of results, diagnosis, meds, responsibility to follow up, warning signs and symptoms of possible worsening, potential complications and reasons to return to ER, including creased pain or as needed.    Patient/Family voiced understanding of above instructions.    Discussed plan for discharge, as there is no emergent indication for admission. Patient referred to primary care provider for BP management due to today's BP. Pt/family is agreeable and understands need for follow up and repeat testing.  Pt is aware that discharge does not mean that nothing is wrong but it indicates no emergency is present that requires admission and they must continue care with follow-up as given below or physician of  their choice.     FOLLOW-UP  Valarie Salcido MD  [address]  [place] [id number] [phone]    In 2 days  Call for Appointment         Medication List      No changes were made to your prescriptions during this visit.                Justen Aguayo MD  01/12/20 0952

## 2020-01-13 ENCOUNTER — OFFICE VISIT (OUTPATIENT)
Dept: ORTHOPEDIC SURGERY | Facility: CLINIC | Age: 80
End: 2020-01-13

## 2020-01-13 ENCOUNTER — TELEPHONE (OUTPATIENT)
Dept: ORTHOPEDIC SURGERY | Facility: CLINIC | Age: 80
End: 2020-01-13

## 2020-01-13 VITALS — WEIGHT: 106 LBS | TEMPERATURE: 98.5 F | HEIGHT: 64 IN | BODY MASS INDEX: 18.1 KG/M2

## 2020-01-13 DIAGNOSIS — M76.71 PERONEAL TENDONITIS, RIGHT: ICD-10-CM

## 2020-01-13 DIAGNOSIS — S82.64XA CLOSED NONDISPLACED FRACTURE OF LATERAL MALLEOLUS OF RIGHT FIBULA, INITIAL ENCOUNTER: Primary | ICD-10-CM

## 2020-01-13 PROCEDURE — 99214 OFFICE O/P EST MOD 30 MIN: CPT | Performed by: ORTHOPAEDIC SURGERY

## 2020-01-13 PROCEDURE — 27786 TREATMENT OF ANKLE FRACTURE: CPT | Performed by: ORTHOPAEDIC SURGERY

## 2020-01-13 RX ORDER — LEVOTHYROXINE SODIUM 88 MCG
88 TABLET ORAL DAILY
COMMUNITY
Start: 2020-01-08 | End: 2020-03-09 | Stop reason: SDUPTHER

## 2020-01-13 NOTE — PROGRESS NOTES
New Patient Complaint      Patient: Bailee Garza  YOB: 1940 79 y.o. female  Medical Record Number: [id number]    Chief Complaints: I hurt my ankle    History of Present Illness: Patient injured right ankle on 1/11/2020 when she fell getting up from a chair.  She was seen in the emergency room with a right distal fibula fracture was placed into a boot.  She reports mild constant pain with bruising improved with ice.    She had no prior history of injury to the ankle.    I have taken care of her daughter Phyllis Diaz in the past  for ankle fracture in 2007        HPI    Allergies:   Allergies   Allergen Reactions   • Hydrocodone Hallucinations   • Ketek [Telithromycin] Hives   • Nsaids Hives   • Sulfa Antibiotics Hives       Medications:   Current Outpatient Medications on File Prior to Visit   Medication Sig   • acetaminophen (TYLENOL) 500 MG tablet Take 500 mg by mouth Every 4 (Four) Hours As Needed for Mild Pain .   • calcium polycarbophil (FIBERCON) 625 MG tablet Take 625 mg by mouth Daily.   • Multiple Vitamins-Minerals (PRESERVISION AREDS PO) Take  by mouth.   • Omega-3 Fatty Acids (OMEGA 3 PO) Take  by mouth.   • SYNTHROID 88 MCG tablet Take 88 mcg by mouth Daily.   • traMADol (ULTRAM) 50 MG tablet Take 1 tablet by mouth 2 (Two) Times a Day As Needed for Moderate Pain  for up to 60 days.   • triamterene-hydrochlorothiazide (MAXZIDE-25) 37.5-25 MG per tablet Take 1 tablet by mouth Daily for 90 days.   • SYNTHROID 75 MCG tablet Take 1 tablet by mouth Daily for 90 days.     No current facility-administered medications on file prior to visit.        Past Medical History:   Diagnosis Date   • Abnormal CXR 12/18/2017   • Allergic    • Arthritis    • 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    unspecified   • Back pain 11/20/2007   • Chronic back pain 07/13/2009   • Chronic cough 12/18/2017   • Closed nondisplaced fracture of medial cuneiform of left foot 2/19/2018   • Constipation  2015    unspecified   • Diverticulosis    • Dyspepsia 2008   • Essential hypertension 2007   • Exertional shortness of breath 2017   • Fall 2018   • 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), 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   • Hearing loss 2008   • Hip pain, left    • History of bone density study 2015   • History of pneumonia     2017   • History of skin cancer    • Hypothyroidism, acquired 2007   • Insomnia 2015    unspecified   • Intervertebral disc disorder with myelopathy, cervical region 2010   • OA (osteoarthritis) of hip 2018   • Rhinitis, allergic 2007   • Scoliosis    • Screening breast examination 2007   • Stress 2015    other acute reactions to stress   • Stress incontinence    • Trochanteric bursitis, left hip 2019   • Urticaria 2015     Past Surgical History:   Procedure Laterality Date   • BREAST EXCISIONAL BIOPSY Right     50+ years ago   • BRONCHOSCOPY N/A 2017    Procedure: BRONCHOSCOPY;  Surgeon: Mario Alanis MD;  Location: [facility] ENDOSCOPY;  Service:    • CATARACT EXTRACTION, BILATERAL     • COLONOSCOPY     • HYSTERECTOMY     • TOTAL HIP ARTHROPLASTY Left 2018    Procedure: LEFT TOTAL HIP ARTHROPLASTY;  Surgeon: Justen Mann MD;  Location: [facility] MAIN OR;  Service: Orthopedics     Social History     Occupational History   • Not on file   Tobacco Use   • Smoking status: Never Smoker   • Smokeless tobacco: Never Used   Substance and Sexual Activity   • Alcohol use: No   • Drug use: No   • Sexual activity: Defer      Social History     Social History Narrative   • Not on file     Family History   Problem Relation Age of Onset   • Heart disease Mother    • Aneurysm Mother    • Cancer Father    • Asthma Paternal Grandfather    • Aneurysm Brother    • Breast cancer Paternal Grandmother    • Malig Hyperthermia Neg Hx        Review of Systems: 14 point review  "of systems performed, positive pertinent findings identified in HPI. All remaining systems negative except for hearing loss and ringing in the ears    Review of Systems      Physical Exam:   Vitals:    01/13/20 1550   Temp: 98.5 °F (36.9 °C)   TempSrc: Temporal   Weight: 48.1 kg (106 lb)   Height: 162.6 cm (64\")   PainSc:   3   PainLoc: Ankle     Physical Exam   Constitutional: pleasant, well developed   Eyes: sclera non icteric  Hearing : adequate for exam  Cardiovascular: palpable pulses in right foot, right calf/ thigh NT without sign of DVT  Respiratoy: breathing unlabored   Neurological: grossly sensate to LT throughout right LE  Psychiatric: oriented with normal mood and affect.   Lymphatic: No palpable popliteal lymphadenopathy right LE  Skin: intact throughout right leg/foot  Musculoskeletal: Right ankle shows moderate discomfort to palpation of the distal aspect of the fibula more so than anterolateral ligamentous structures.  Mild discomfort and slight swelling along the peroneal tendons no focal discomfort along the medial ankle and no subluxation of peroneal tendons with resisted eversion.  Nontender over the dorsum of the midfoot or forefoot.  Physical Exam  Ortho Exam    Radiology: 3 views of the right ankle reviewed on the [employer] system from 1/12/2020 which show an essentially nondisplaced fracture of the distal aspect of the fibula below the level of the joint.  Talus remains well-seated within the mortise without widening of the syndesmosis    Assessment/Plan: 1.  Right distal fibula fracture nondisplaced  2.  Right peroneal tendinitis    We did her there could be some occult tear of the peroneal tendons but will hold off on further imaging as it would not change our treatment protocol at this time.  Reviewed with her I would not recommend any surgical treatment for her fibula fracture at this point.    She will use a tall boot and begin using a cane which she already has to offload this with the " contralateral hand.    I have her sleep and Air-Stirrup brace and if anything worsens let me know otherwise I will see her back in 3 weeks x-rays of her right ankle.  If she has persistent pain on the peroneal tendons may need to get an MRI.

## 2020-01-15 ENCOUNTER — TELEPHONE (OUTPATIENT)
Dept: ORTHOPEDIC SURGERY | Facility: CLINIC | Age: 80
End: 2020-01-15

## 2020-01-15 NOTE — TELEPHONE ENCOUNTER
Patient says that she is having a difficult time with her left ankle and she wants to know what if she should pursue short term disability.

## 2020-01-15 NOTE — TELEPHONE ENCOUNTER
I returned patient's call.  She said when she had initially because she was considering trying to get some type of disability or work excuse note because she felt she was traveling people having to bring her back and forth to work however she is considering this now and she does not want to do it she feels he can go to work safely with someone driving her and does seated work and can keep her leg elevated.  She understands to use her boot and a cane or crutches and a sleep and Air-Stirrup brace.    She appreciated the call and I will see her back as scheduled   97.8

## 2020-01-16 ENCOUNTER — TELEPHONE (OUTPATIENT)
Dept: ORTHOPEDIC SURGERY | Facility: CLINIC | Age: 80
End: 2020-01-16

## 2020-01-23 ENCOUNTER — TELEPHONE (OUTPATIENT)
Dept: FAMILY MEDICINE CLINIC | Facility: CLINIC | Age: 80
End: 2020-01-23

## 2020-01-23 ENCOUNTER — TELEPHONE (OUTPATIENT)
Dept: ORTHOPEDIC SURGERY | Facility: CLINIC | Age: 80
End: 2020-01-23

## 2020-01-23 NOTE — TELEPHONE ENCOUNTER
"I spoke at length with patient and she said she has been trying to be very active on this and do quite a bit on it with her boot and still using a walker but thinks she really overdid it her ankle had persistent pain and swelling but not as much swelling as it had initially.    She says she feels like her heel is somewhat loose in the boot and is going to try some wedges to make it fit more comfortably which I was in agreement with.    Recommend that she take it very easy with this.  She said it was somewhat painful to try to wear the air stirrup last night so she took it off which I said should be fine but she does limit her activities use her boot and ice and as this settles back down get back to using her air stirrup brace.    Explained again to her that this could take at least 8 to 10 weeks or longer to heal and she says she \"thought it would heal faster\" which I reviewed with her it would not and at this point I would not recommend any surgical treatment nor does she desire it.    She appreciated the call and I will see her back as scheduled with x-rays of her ankle.  "

## 2020-01-23 NOTE — TELEPHONE ENCOUNTER
"Patient stated she still has swelling & pain from Right distal fibula fracture. Patient asking if she can put Right heel insert into brace / boot to raise Right foot? Patient stated she slept without Air-Stirrup brace last night and patient reports Right foot / ankle \"about the same, still swollen, but not as swollen as when at previous appt 01/13/2020\". Patient \"thought it would heal sooner\", asking what she can do?     Patient was transferred to [facility] to discuss [employer] insurance needing information. Thanks / srh   "

## 2020-01-23 NOTE — TELEPHONE ENCOUNTER
Mrs Garza wants to know if it is ok that she takes tramadol 3 x a day she has a fx rt leg and they did not give her any other pain meds because she is on the tramadol from you.

## 2020-01-24 ENCOUNTER — TELEPHONE (OUTPATIENT)
Dept: FAMILY MEDICINE CLINIC | Facility: CLINIC | Age: 80
End: 2020-01-24

## 2020-01-24 NOTE — TELEPHONE ENCOUNTER
Pt called stating she broke her tibia and is in pain and wants to know if she could increase her tramadol to 3x a day instead of 2x

## 2020-01-27 ENCOUNTER — TELEPHONE (OUTPATIENT)
Dept: ORTHOPEDIC SURGERY | Facility: CLINIC | Age: 80
End: 2020-01-27

## 2020-01-27 NOTE — TELEPHONE ENCOUNTER
I returned patient's call she was concerned that she still having appreciable pain in the right ankle.  She admits she is not been taking it very easy as she has a lot to do living by herself.    She will continue with her boot and limit weightbearing on this with at least a cane and check her later this week as she was concerned that something may have moved.  We will check x-rays and if need be we can get her onto a scooter.  She appreciated the call

## 2020-01-29 ENCOUNTER — OFFICE VISIT (OUTPATIENT)
Dept: ORTHOPEDIC SURGERY | Facility: CLINIC | Age: 80
End: 2020-01-29

## 2020-01-29 VITALS — HEIGHT: 64 IN | WEIGHT: 106 LBS | BODY MASS INDEX: 18.1 KG/M2 | TEMPERATURE: 97.6 F

## 2020-01-29 DIAGNOSIS — M76.71 PERONEAL TENDONITIS, RIGHT: Primary | ICD-10-CM

## 2020-01-29 DIAGNOSIS — S82.64XA CLOSED NONDISPLACED FRACTURE OF LATERAL MALLEOLUS OF RIGHT FIBULA, INITIAL ENCOUNTER: ICD-10-CM

## 2020-01-29 DIAGNOSIS — S82.64XD CLOSED NONDISPLACED FRACTURE OF LATERAL MALLEOLUS OF RIGHT FIBULA WITH ROUTINE HEALING, SUBSEQUENT ENCOUNTER: ICD-10-CM

## 2020-01-29 PROCEDURE — 73610 X-RAY EXAM OF ANKLE: CPT | Performed by: ORTHOPAEDIC SURGERY

## 2020-01-29 PROCEDURE — 99024 POSTOP FOLLOW-UP VISIT: CPT | Performed by: ORTHOPAEDIC SURGERY

## 2020-01-29 NOTE — PROGRESS NOTES
"Ankle Follow Up      Patient: Bailee Garza    YOB: 1940 79 y.o. female    Chief Complaints: Ankle pain    History of Present Illness: Patient was seen initially on 2020 after injuring her right ankle on 2020 when she fell getting up from a chair.  She was found to have a right distal fibula fracture and peroneal tendinitis.    She was instructed on use of a boot and limiting activities and at least using a cane.  She is called multiple times and we have spoken she is having persistent somewhat worsening pain and swelling in the inferolateral aspect of the right hindfoot the swelling does improve some with elevation overnight.  She is trying to limit her activity but is \"very busy\".  HPI    ROS: ankle pain no fevers chills chest pain or shortness of breath  Past Medical History:   Diagnosis Date   • Abnormal CXR 2017   • Allergic    • Arthritis    • Arthropathy of pelvic region and thigh 2012    unspecified   • Back pain 2007   • Chronic back pain 2009   • Chronic cough 2017   • Closed nondisplaced fracture of medial cuneiform of left foot 2018   • Constipation 2015    unspecified   • Diverticulosis    • Dyspepsia 2008   • Essential hypertension 2007   • Exertional shortness of breath 2017   • Fall 2018   • 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), 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   • Hearing loss 2008   • Hip pain, left    • History of bone density study 2015   • History of pneumonia     2017   • History of skin cancer    • Hypothyroidism, acquired 2007   • Insomnia 2015    unspecified   • Intervertebral disc disorder with myelopathy, cervical region 2010   • OA (osteoarthritis) of hip 2018   • Rhinitis, allergic 2007   • Scoliosis    • Screening breast examination 2007   • Stress 2015    other acute reactions to stress   • Stress " "incontinence    • Trochanteric bursitis, left hip 2/4/2019   • Urticaria 06/29/2015       Physical Exam:   Vitals:    01/29/20 1013   Temp: 97.6 °F (36.4 °C)   Weight: 48.1 kg (106 lb)   Height: 162.6 cm (64\")   PainSc:   5     Well developed with normal mood.  Right ankle shows mild swelling with tenderness over the distal fibula and along the peroneal tendons and along the peroneal musculature in the lateral ankle.  No tenderness over the medial ankle.  Right calf was nontender without sign DVT      Radiology: 3 views of the right ankle ordered evaluate pain and alignment reviewed and compared with previous x-rays on the [employer] system from 1/12/2020.  There remains fracture over the distal fibula that I do not see any displacement.  There is some chronic appearing irregularity along the syndesmosis but no widening.  Talus remains well-seated within the mortise.      Assessment/Plan:  1.  Right distal fibula fracture nondisplaced  2.  Right peroneal tendinitis    Reviewed with her I did not see any change in alignment of the distal fibula but certainly concerned about her persistent pain and especially along the peroneal tendons.    We will have her come back when [facility] is available to be fitted with compression hose and with a better fitting boot.  When she said now feels like it tends to slip when she lifts her foot up and has been using a heel wedge in it.    We need to get an MRI of her right ankle evaluate for peroneal tendon pathology that may change our treatment algorithm as far as necessity for surgery.    I will see her back in 2 weeks x-rays of her right ankle  "

## 2020-02-01 ENCOUNTER — HOSPITAL ENCOUNTER (OUTPATIENT)
Dept: MRI IMAGING | Facility: HOSPITAL | Age: 80
Discharge: HOME | End: 2020-02-01
Admitting: ORTHOPAEDIC SURGERY

## 2020-02-01 DIAGNOSIS — M76.71 PERONEAL TENDONITIS, RIGHT: ICD-10-CM

## 2020-02-01 DIAGNOSIS — S82.64XD CLOSED NONDISPLACED FRACTURE OF LATERAL MALLEOLUS OF RIGHT FIBULA WITH ROUTINE HEALING, SUBSEQUENT ENCOUNTER: ICD-10-CM

## 2020-02-01 PROCEDURE — 73721 MRI JNT OF LWR EXTRE W/O DYE: CPT

## 2020-02-05 ENCOUNTER — OFFICE VISIT (OUTPATIENT)
Dept: FAMILY MEDICINE CLINIC | Facility: CLINIC | Age: 80
End: 2020-02-05

## 2020-02-05 VITALS
DIASTOLIC BLOOD PRESSURE: 74 MMHG | HEIGHT: 64 IN | RESPIRATION RATE: 16 BRPM | OXYGEN SATURATION: 97 % | HEART RATE: 74 BPM | BODY MASS INDEX: 18.19 KG/M2 | SYSTOLIC BLOOD PRESSURE: 111 MMHG

## 2020-02-05 DIAGNOSIS — M54.50 CHRONIC MIDLINE LOW BACK PAIN WITHOUT SCIATICA: ICD-10-CM

## 2020-02-05 DIAGNOSIS — G89.29 CHRONIC MIDLINE LOW BACK PAIN WITHOUT SCIATICA: ICD-10-CM

## 2020-02-05 PROCEDURE — 99213 OFFICE O/P EST LOW 20 MIN: CPT | Performed by: FAMILY MEDICINE

## 2020-02-05 RX ORDER — ACETAMINOPHEN 500 MG
1000 TABLET ORAL 3 TIMES DAILY PRN
Qty: 180 TABLET | Refills: 11
Start: 2020-02-05 | End: 2021-02-04

## 2020-02-05 RX ORDER — TRAMADOL HYDROCHLORIDE 50 MG/1
50 TABLET ORAL EVERY 8 HOURS PRN
Qty: 90 TABLET | Refills: 0 | Status: SHIPPED | OUTPATIENT
Start: 2020-02-05 | End: 2020-03-09 | Stop reason: SDUPTHER

## 2020-02-05 NOTE — PROGRESS NOTES
"   Subjective       Chief Complaint   Patient presents with   • Med Management         HPI:       Bailee Garza is a 79 y.o. female who presents to the office today to discuss medications.  She has had increased pain since fracture of right fibula.  She would like the permission to take an extra tramadol daily.  We also talked about supplementation of acetaminophen along with her tramadol.  Back pain is the same.  No side effects from tramadol reported.  Currently she is still wearing a boot on the right lower extremity.  She has follow-up on February 12.    I have reviewed and updated her medications, medical history and problem list during today's office visit.     Social History     Tobacco Use   • Smoking status: Never Smoker   • Smokeless tobacco: Never Used   Substance Use Topics   • Alcohol use: No       Review of Systems   Musculoskeletal: Positive for back pain.        Leg pain from fracture         PE:   Objective   /74   Pulse 74   Resp 16   Ht 162.6 cm (64\")   SpO2 97%   BMI 18.19 kg/m²     Body mass index is 18.19 kg/m².    Physical Exam   Constitutional: She appears well-developed. No distress.   Musculoskeletal:   Orthopedic walking boot on RLE   Psychiatric: Her speech is normal and behavior is normal. Judgment normal. Her mood appears anxious. She is attentive.        Data Reviewed:   Xr Ankle 3+ View Right    Result Date: 1/29/2020  Impression: Ordering physician's impression is located in the Encounter Note dated 01/29/20. X-ray performed in the DR room.     Mri Ankle Right Without Contrast    Result Date: 2/3/2020  Impression: 1. Nondisplaced transverse fracture tip of the lateral malleolus. ATFL and CFL originate on the fracture fragment and there is a tear of the ATFL. 2. Curvilinear low signal intra-articular body anterior to the tibiotalar joint. This suggests a displaced capsular or ligamentous fragment. No cartilaginous donor site to suggest that this is a chondral loose body. " Tibiotalar and subtalar joint effusions. Edema within the lateral ankle subcutaneous fat. 3. 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  This report was finalized on 2/3/2020 1:29 PM by Dr. Ajay Garcia M.D.                     A/P:     Assessment/Plan   Diagnoses and all orders for this visit:    1. Chronic midline low back pain without sciatica  Assessment & Plan:  Continue tramadol.  Because of the fracture in the right lower extremity, temporarily we will allow her to take 3 tramadol daily and supplement with acetaminophen up to 3000 mg daily.  Keep appointment as scheduled March 9.  Shaji report reviewed today.    Orders:  -     traMADol (ULTRAM) 50 MG tablet; Take 1 tablet by mouth Every 8 (Eight) Hours As Needed for Moderate Pain  for up to 30 days.  Dispense: 90 tablet; Refill: 0  -     acetaminophen (TYLENOL) 500 MG tablet; Take 2 tablets by mouth 3 (Three) Times a Day As Needed for Mild Pain  or Moderate Pain . Do not exceed 3000 mg daily  Dispense: 180 tablet; Refill: 11        Follow up:    Return in 5 weeks (on 3/9/2020) for Next scheduled follow up.

## 2020-02-05 NOTE — ASSESSMENT & PLAN NOTE
Continue tramadol.  Because of the fracture in the right lower extremity, temporarily we will allow her to take 3 tramadol daily and supplement with acetaminophen up to 3000 mg daily.  Keep appointment as scheduled March 9.  Shaji report reviewed today.

## 2020-02-08 ENCOUNTER — APPOINTMENT (OUTPATIENT)
Dept: MRI IMAGING | Facility: HOSPITAL | Age: 80
End: 2020-02-08

## 2020-02-12 ENCOUNTER — OFFICE VISIT (OUTPATIENT)
Dept: ORTHOPEDIC SURGERY | Facility: CLINIC | Age: 80
End: 2020-02-12

## 2020-02-12 VITALS — WEIGHT: 105 LBS | TEMPERATURE: 98.1 F | BODY MASS INDEX: 17.93 KG/M2 | HEIGHT: 64 IN

## 2020-02-12 DIAGNOSIS — M65.871 OTHER SYNOVITIS AND TENOSYNOVITIS, RIGHT ANKLE AND FOOT: ICD-10-CM

## 2020-02-12 DIAGNOSIS — M19.079 ARTHRITIS OF FOOT: ICD-10-CM

## 2020-02-12 DIAGNOSIS — S82.64XD CLOSED NONDISPLACED FRACTURE OF LATERAL MALLEOLUS OF RIGHT FIBULA WITH ROUTINE HEALING, SUBSEQUENT ENCOUNTER: Primary | ICD-10-CM

## 2020-02-12 PROCEDURE — 73610 X-RAY EXAM OF ANKLE: CPT | Performed by: ORTHOPAEDIC SURGERY

## 2020-02-12 PROCEDURE — 99024 POSTOP FOLLOW-UP VISIT: CPT | Performed by: ORTHOPAEDIC SURGERY

## 2020-02-12 NOTE — PROGRESS NOTES
"Ankle Follow Up      Patient: Bailee Garza    YOB: 1940 79 y.o. female    Chief Complaints: Ankle feels some better    History of Present Illness:Patient was seen initially on 2020 after injuring her right ankle on 2020 when she fell getting up from a chair.  She was found to have a right distal fibula fracture and peroneal tendinitis    She was last seen on 2020 with persistent worsening pain in the inferolateral aspect of the right hindfoot.  She is remained \"very busy\" and only uses her cane occasionally but does use her boot but is very active in her boot    She was sent for MRI and is seen back today with some improvement stating that it does feel better still gets some achiness with increased activity and has not been using her cane intermittently.  She gets some mild discomfort over the inferolateral aspect of the right hindfoot and some of the anterior ankle.  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   • Hearing loss 2008   • Hip pain, left    • History of bone density study 2015   • History of pneumonia     2017   • History of skin cancer    • Hypothyroidism, acquired 2007   • Insomnia 2015    unspecified   • Intervertebral disc disorder with myelopathy, cervical region 2010   • OA (osteoarthritis) of hip 2018   • Rhinitis, allergic " "11/20/2007   • Scoliosis    • Screening breast examination 11/20/2007   • Stress 04/27/2015    other acute reactions to stress   • Stress incontinence    • Trochanteric bursitis, left hip 2/4/2019   • Urticaria 06/29/2015       Physical Exam:   Vitals:    02/12/20 0942   Temp: 98.1 °F (36.7 °C)   Weight: 47.6 kg (105 lb)   Height: 162.6 cm (64\")   PainSc:   5     Well developed with normal mood.  Exam she has mild discomfort over the anterior ankle with slight swelling.  There is mild discomfort over the distal tip of the fibula and minimal along the peroneal tendons.  There is no focal discomfort of the dorsum of the midfoot    Radiology: 3 views the right ankle ordered evaluate pain reviewed and compared with previous x-rays.  There is been no displacement appreciable to the distal fibula fracture is remains well-seated within the mortise I do not see any clear healing yet of the distal fibula.    MRI films and report of the right hindfoot dated 2/1/2020 show a nondisplaced fracture of the tip of the lateral malleolus with 1 mm of separation.  ATFL is torn and originates at this bony fragment in the CFL originates on this fragment as well and appears intact.    There is mild fluid within the peroneal tendon sheath but they do appear intact.    There is tibiotalar and subtalar joint effusion and anterior to the tibiotalar joint there is a curvilinear 0.9 x 1.8 x 0.3 cm area of low signal felt to be a displaced capsular ligamentous structure but no clear donor site to suggest that it is cartilaginous.    There is also arthritis of the second TMT joint.      Assessment/Plan: 1.  Right nondisplaced transverse fracture at the tip lateral malleolus at the origin of the CFL and ATFL with tearing of the ATFL  2.  Signal in the anterior aspect of the tibiotalar joint suggestive of displaced capsular ligamentous fragment but no cartilaginous donor site with tibiotalar and subtalar joint effusions  3.  Right peroneal " "tenosynovitis  4.  Right chronic second TMT arthritis    We reviewed treatment options and she does not want to do anything from a surgical standpoint at this time which would likely include arthroscopy with debridement as well as either fixation or excision of the distal fibular fragment.    She also declined injection in the anterior ankle joint to help calm down any synovitis.    We will continue with her boot for now and limit activities with a cane.    .  I will check her back in 3 weeks with x-rays of the right ankle.    She told me I was \"1 of the best\" and gave me a large bag of candy and nuts today.  "

## 2020-02-13 ENCOUNTER — OFFICE VISIT (OUTPATIENT)
Dept: ORTHOPEDIC SURGERY | Facility: CLINIC | Age: 80
End: 2020-02-13

## 2020-02-13 VITALS — WEIGHT: 105 LBS | HEIGHT: 64 IN | TEMPERATURE: 97.9 F | BODY MASS INDEX: 17.93 KG/M2

## 2020-02-13 DIAGNOSIS — S82.64XD CLOSED NONDISPLACED FRACTURE OF LATERAL MALLEOLUS OF RIGHT FIBULA WITH ROUTINE HEALING, SUBSEQUENT ENCOUNTER: ICD-10-CM

## 2020-02-13 DIAGNOSIS — M65.871 OTHER SYNOVITIS AND TENOSYNOVITIS, RIGHT ANKLE AND FOOT: Primary | ICD-10-CM

## 2020-02-13 DIAGNOSIS — M76.71 PERONEAL TENDONITIS, RIGHT: ICD-10-CM

## 2020-02-13 DIAGNOSIS — T14.8XXA JOINT CAPSULE TEAR: ICD-10-CM

## 2020-02-13 PROCEDURE — 99024 POSTOP FOLLOW-UP VISIT: CPT | Performed by: ORTHOPAEDIC SURGERY

## 2020-02-13 RX ORDER — METHYLPREDNISOLONE ACETATE 80 MG/ML
80 INJECTION, SUSPENSION INTRA-ARTICULAR; INTRALESIONAL; INTRAMUSCULAR; SOFT TISSUE
Status: COMPLETED | OUTPATIENT
Start: 2020-02-13 | End: 2020-02-13

## 2020-02-13 RX ADMIN — METHYLPREDNISOLONE ACETATE 80 MG: 80 INJECTION, SUSPENSION INTRA-ARTICULAR; INTRALESIONAL; INTRAMUSCULAR; SOFT TISSUE at 10:50

## 2020-02-14 NOTE — PROGRESS NOTES
Ankle Follow Up      Patient: Bailee Garza    YOB: 1940 79 y.o. female    Chief Complaints: Ankle pain    History of Present Illness: Please see note from 2020 for details.    Patient was seen yesterday and we discussed treatment options and nothing I would recommend from a surgical standpoint nor did she desire it.  We discussed possible injection in the anteromedial aspect of her ankle to help with some of the anterior ankle pain and possible capsule ligamentous swelling and pain to that area.    She did report to me today that she had not reported previously that prior to her MRI she had been trying to put on the tennis shoe and it felt something pull in the anterior aspect of her ankle but this happened before the MRI so we have adequate imaging of it and do not need anything further.    Yesterday we had discussed injection which she did not want to do and decided now that she does want to do it and is here today for that.  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   • Hearing loss 2008   • Hip pain, left    • History of bone density study 2015   • History of pneumonia     2017   • History of skin cancer    • Hypothyroidism, acquired 2007   • Insomnia 2015    unspecified   • Intervertebral disc disorder with myelopathy, cervical region 2010   • OA  "(osteoarthritis) of hip 7/24/2018   • Rhinitis, allergic 11/20/2007   • Scoliosis    • Screening breast examination 11/20/2007   • Stress 04/27/2015    other acute reactions to stress   • Stress incontinence    • Trochanteric bursitis, left hip 2/4/2019   • Urticaria 06/29/2015       Physical Exam:   Vitals:    02/13/20 1036   Temp: 97.9 °F (36.6 °C)   Weight: 47.6 kg (105 lb)   Height: 162.6 cm (64\")     Well developed with normal mood.  Right ankle shows mild swelling over the anterior aspect without change compared to yesterday there is mild discomfort over the tip of the fibula and minimal discomfort on the peroneal tendons and no focal discomfort of the dorsum of the midfoot      Radiology: None performed      Assessment/Plan:  1.  Right nondisplaced transverse fracture at the tip lateral malleolus at the origin of the CFL and ATFL with tearing of the ATFL  2.  Signal in the anterior aspect of the tibiotalar joint suggestive of displaced capsular ligamentous fragment but no cartilaginous donor site with tibiotalar and subtalar joint effusions  3.  Right peroneal tenosynovitis  4.  Right chronic second TMT arthritis    We discussed treatment options and after verbal consent and sterile preparation with discussion of risks which can include infection the right ankle was injected anteromedially with steroid lidocaine mixture.    We again reviewed treatment options and she does not want to do anything from a surgical standpoint which would include arthroscopy and debridement and either fixation or excision of the distal fibular fragment.    She will continue with her boot and at least a cane and limit activities.    I will see her back as scheduled.  She will be much she had appreciated my care.    Medium Joint Arthrocentesis: R ankle  Date/Time: 2/13/2020 10:50 AM  Consent given by: patient  Site marked: site marked  Supporting Documentation  Indications: pain   Procedure Details  Location: ankle - R ankle  Needle " size: 22 G  Approach: anteromedial  Medications administered: 80 mg methylPREDNISolone acetate 80 MG/ML; 3 mL lidocaine (cardiac)  Patient tolerance: patient tolerated the procedure well with no immediate complications

## 2020-02-28 ENCOUNTER — RESULTS ENCOUNTER (OUTPATIENT)
Dept: FAMILY MEDICINE CLINIC | Facility: CLINIC | Age: 80
End: 2020-02-28

## 2020-02-28 DIAGNOSIS — I10 ESSENTIAL HYPERTENSION: ICD-10-CM

## 2020-02-28 DIAGNOSIS — E03.9 HYPOTHYROIDISM, ACQUIRED: ICD-10-CM

## 2020-02-28 DIAGNOSIS — E78.49 OTHER HYPERLIPIDEMIA: ICD-10-CM

## 2020-03-04 ENCOUNTER — OFFICE VISIT (OUTPATIENT)
Dept: ORTHOPEDIC SURGERY | Facility: CLINIC | Age: 80
End: 2020-03-04

## 2020-03-04 ENCOUNTER — TELEPHONE (OUTPATIENT)
Dept: ORTHOPEDIC SURGERY | Facility: CLINIC | Age: 80
End: 2020-03-04

## 2020-03-04 ENCOUNTER — LAB (OUTPATIENT)
Dept: LAB | Facility: HOSPITAL | Age: 80
End: 2020-03-04

## 2020-03-04 VITALS — BODY MASS INDEX: 18.64 KG/M2 | HEIGHT: 64 IN | TEMPERATURE: 98.2 F | WEIGHT: 109.2 LBS

## 2020-03-04 DIAGNOSIS — M65.871 OTHER SYNOVITIS AND TENOSYNOVITIS, RIGHT ANKLE AND FOOT: ICD-10-CM

## 2020-03-04 DIAGNOSIS — S82.64XG CLOSED NONDISPLACED FRACTURE OF LATERAL MALLEOLUS OF RIGHT FIBULA WITH DELAYED HEALING, SUBSEQUENT ENCOUNTER: ICD-10-CM

## 2020-03-04 DIAGNOSIS — M76.71 PERONEAL TENDONITIS, RIGHT: ICD-10-CM

## 2020-03-04 DIAGNOSIS — E55.9 VITAMIN D DEFICIENCY: ICD-10-CM

## 2020-03-04 DIAGNOSIS — M25.571 RIGHT ANKLE PAIN, UNSPECIFIED CHRONICITY: ICD-10-CM

## 2020-03-04 DIAGNOSIS — E55.9 VITAMIN D DEFICIENCY: Primary | ICD-10-CM

## 2020-03-04 LAB — 25(OH)D3 SERPL-MCNC: 15.7 NG/ML (ref 30–100)

## 2020-03-04 PROCEDURE — 73610 X-RAY EXAM OF ANKLE: CPT | Performed by: ORTHOPAEDIC SURGERY

## 2020-03-04 PROCEDURE — 99213 OFFICE O/P EST LOW 20 MIN: CPT | Performed by: ORTHOPAEDIC SURGERY

## 2020-03-04 PROCEDURE — 36415 COLL VENOUS BLD VENIPUNCTURE: CPT

## 2020-03-04 PROCEDURE — 82306 VITAMIN D 25 HYDROXY: CPT

## 2020-03-04 RX ORDER — CHOLECALCIFEROL (VITAMIN D3) 1250 MCG
50000 CAPSULE ORAL 2 TIMES WEEKLY
Qty: 8 CAPSULE | Refills: 0 | Status: SHIPPED | OUTPATIENT
Start: 2020-03-05 | End: 2020-03-31

## 2020-03-04 NOTE — TELEPHONE ENCOUNTER
Serum vitamin D level was 15.7.  Have sent in a new prescription to [facility] pharmacy at [phone] for vitamin D3 50,000 units, #8, directions are to take 1 capsule twice a week.  Patient also has been instructed that she will need to have a repeat serum vitamin D level in 4 weeks per Dr. So

## 2020-03-04 NOTE — PROGRESS NOTES
"Ankle Follow Up      Patient: Bailee Garza    YOB: 1940 79 y.o. female    Chief Complaints: Ankle getting better    History of Present Illness: Please see note from 2020 for details.    Patient follows up injury to her right ankle that occurred on 2020 with initial evaluation on 2020 when she fell getting up from a chair.  She has been treating for right distal fibular fracture and peroneal tendinitis with MRI of the head shown some signal in the anterior aspect of the tibiotalar joint suggestive of capsular ligamentous fragment but no cartilaginous donor was noted.    She was last seen on 2020 with injection to her ankle and she said that helped tremendously with the pain in the anterior aspect of her ankle where she has only a slight occasional ache now.    She was also instructed at that time on continued use of her boot and at least a cane and limiting activities.    She states that the pain over her distal fibula has improved quite a bit with only little discomfort \"nothing I cannot live with\".  She said she tried to walk some without her boot but \"felt like it needed more support\".  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   • Hearing loss 2008   • Hip pain, left    • History of bone density study 2015   • History of pneumonia     2017   • History of skin " "cancer    • Hypothyroidism, acquired 11/20/2007   • Insomnia 01/28/2015    unspecified   • Intervertebral disc disorder with myelopathy, cervical region 07/22/2010   • OA (osteoarthritis) of hip 7/24/2018   • Rhinitis, allergic 11/20/2007   • Scoliosis    • Screening breast examination 11/20/2007   • Stress 04/27/2015    other acute reactions to stress   • Stress incontinence    • Trochanteric bursitis, left hip 2/4/2019   • Urticaria 06/29/2015       Physical Exam:   Vitals:    03/04/20 0913   Temp: 98.2 °F (36.8 °C)   Weight: 49.5 kg (109 lb 3.2 oz)   Height: 162.6 cm (64\")   PainSc:   2     Well developed with normal mood.  On exam she has very limited discomfort over the anterior aspect of the ankle and minimal discomfort to palpation of the tip of the fibula and along the peroneal tendons.  Minimal discomfort over the dorsum of the midfoot.      Radiology: 3 views of the right ankle ordered evaluate distal fibula fracture reviewed and compared with previous x-rays.  Fragment appears somewhat sclerotic but without change in alignment.  This may be somewhat projectional there is been no displacement.      Assessment/Plan:  1.  Right nondisplaced transverse fracture at the tip lateral malleolus at the origin of the CFL and ATFL with tearing of the ATFL  2.  Signal in the anterior aspect of the tibiotalar joint suggestive of displaced capsular ligamentous fragment but no cartilaginous donor site with tibiotalar and subtalar joint effusions  3.  Right peroneal tenosynovitis  4.  Right chronic second TMT arthritis    I reviewed her x-ray findings and clinical details with her and she would like to avoid surgical treatment if at all possible.  I do not see anything surgically to do for the peroneal tendons at this time and as her anterior ankle pain has improved I would not recommend arthroscopy currently.    Reviewed with her that as the fracture has not displaced and her pain is improving that although we still see " it on the x-rays I would not recommend surgical treatment at this time nor does she desire it.    Patient the injection helped with her ankle pain and she currently rates her pain about the ankle now and only 2 out of 10.    We will have her use a boot out of the house and ASO in the house both with her cane for the next 10 days and if pain continues to improve she may then transition to just an ASO brace in and out of the house but continue use of her cane.    Her when I have her get a vitamin D level as I suspect this is low and augmenting this may help heal this fracture without surgical treatment.    I will see her back in 3 weeks x-rays of her right ankle

## 2020-03-04 NOTE — TELEPHONE ENCOUNTER
----- Message from Álvaro So MD sent at 3/4/2020  2:56 PM EST -----  Please put her on vitamin D3 50,000 units twice weekly and recheck in 4 weeks thank you

## 2020-03-06 LAB
ALBUMIN SERPL-MCNC: 4.5 G/DL (ref 3.5–5.2)
ALBUMIN/GLOB SERPL: 1.9 G/DL
ALP SERPL-CCNC: 102 U/L (ref 39–117)
ALT SERPL-CCNC: 17 U/L (ref 1–33)
AST SERPL-CCNC: 17 U/L (ref 1–32)
BASOPHILS # BLD AUTO: 0.04 10*3/MM3 (ref 0–0.2)
BASOPHILS NFR BLD AUTO: 0.8 % (ref 0–1.5)
BILIRUB SERPL-MCNC: 0.4 MG/DL (ref 0.2–1.2)
BUN SERPL-MCNC: 35 MG/DL (ref 8–23)
BUN/CREAT SERPL: 36.1 (ref 7–25)
CALCIUM SERPL-MCNC: 9.5 MG/DL (ref 8.6–10.5)
CHLORIDE SERPL-SCNC: 102 MMOL/L (ref 98–107)
CHOLEST SERPL-MCNC: 214 MG/DL (ref 0–200)
CHOLEST/HDLC SERPL: 2.61 {RATIO}
CO2 SERPL-SCNC: 27 MMOL/L (ref 22–29)
CREAT SERPL-MCNC: 0.97 MG/DL (ref 0.57–1)
EOSINOPHIL # BLD AUTO: 0.01 10*3/MM3 (ref 0–0.4)
EOSINOPHIL NFR BLD AUTO: 0.2 % (ref 0.3–6.2)
ERYTHROCYTE [DISTWIDTH] IN BLOOD BY AUTOMATED COUNT: 12.4 % (ref 12.3–15.4)
GLOBULIN SER CALC-MCNC: 2.4 GM/DL
GLUCOSE SERPL-MCNC: 86 MG/DL (ref 65–99)
HCT VFR BLD AUTO: 40.2 % (ref 34–46.6)
HDLC SERPL-MCNC: 82 MG/DL (ref 40–60)
HGB BLD-MCNC: 13.6 G/DL (ref 12–15.9)
IMM GRANULOCYTES # BLD AUTO: 0.03 10*3/MM3 (ref 0–0.05)
IMM GRANULOCYTES NFR BLD AUTO: 0.6 % (ref 0–0.5)
LDLC SERPL CALC-MCNC: 116 MG/DL (ref 0–100)
LYMPHOCYTES # BLD AUTO: 0.88 10*3/MM3 (ref 0.7–3.1)
LYMPHOCYTES NFR BLD AUTO: 17.4 % (ref 19.6–45.3)
MCH RBC QN AUTO: 32 PG (ref 26.6–33)
MCHC RBC AUTO-ENTMCNC: 33.8 G/DL (ref 31.5–35.7)
MCV RBC AUTO: 94.6 FL (ref 79–97)
MONOCYTES # BLD AUTO: 0.38 10*3/MM3 (ref 0.1–0.9)
MONOCYTES NFR BLD AUTO: 7.5 % (ref 5–12)
NEUTROPHILS # BLD AUTO: 3.71 10*3/MM3 (ref 1.7–7)
NEUTROPHILS NFR BLD AUTO: 73.5 % (ref 42.7–76)
NRBC BLD AUTO-RTO: 0 /100 WBC (ref 0–0.2)
PLATELET # BLD AUTO: 234 10*3/MM3 (ref 140–450)
POTASSIUM SERPL-SCNC: 4 MMOL/L (ref 3.5–5.2)
PROT SERPL-MCNC: 6.9 G/DL (ref 6–8.5)
RBC # BLD AUTO: 4.25 10*6/MM3 (ref 3.77–5.28)
SODIUM SERPL-SCNC: 141 MMOL/L (ref 136–145)
T4 FREE SERPL-MCNC: 1.78 NG/DL (ref 0.93–1.7)
TRIGL SERPL-MCNC: 78 MG/DL (ref 0–150)
TSH SERPL DL<=0.005 MIU/L-ACNC: 0.36 UIU/ML (ref 0.27–4.2)
VLDLC SERPL CALC-MCNC: 15.6 MG/DL
WBC # BLD AUTO: 5.05 10*3/MM3 (ref 3.4–10.8)

## 2020-03-09 ENCOUNTER — OFFICE VISIT (OUTPATIENT)
Dept: FAMILY MEDICINE CLINIC | Facility: CLINIC | Age: 80
End: 2020-03-09

## 2020-03-09 VITALS
HEART RATE: 82 BPM | DIASTOLIC BLOOD PRESSURE: 70 MMHG | OXYGEN SATURATION: 98 % | RESPIRATION RATE: 16 BRPM | SYSTOLIC BLOOD PRESSURE: 124 MMHG | HEIGHT: 64 IN | BODY MASS INDEX: 18.74 KG/M2 | TEMPERATURE: 97.5 F

## 2020-03-09 DIAGNOSIS — I10 ESSENTIAL HYPERTENSION: ICD-10-CM

## 2020-03-09 DIAGNOSIS — M54.50 CHRONIC MIDLINE LOW BACK PAIN WITHOUT SCIATICA: ICD-10-CM

## 2020-03-09 DIAGNOSIS — R60.9 EDEMA, UNSPECIFIED TYPE: ICD-10-CM

## 2020-03-09 DIAGNOSIS — Z00.00 ENCOUNTER FOR WELLNESS EXAMINATION IN ADULT: Primary | ICD-10-CM

## 2020-03-09 DIAGNOSIS — E03.9 HYPOTHYROIDISM, ACQUIRED: ICD-10-CM

## 2020-03-09 DIAGNOSIS — R79.9 ELEVATED BUN: ICD-10-CM

## 2020-03-09 DIAGNOSIS — G89.29 CHRONIC MIDLINE LOW BACK PAIN WITHOUT SCIATICA: ICD-10-CM

## 2020-03-09 PROCEDURE — 99214 OFFICE O/P EST MOD 30 MIN: CPT | Performed by: FAMILY MEDICINE

## 2020-03-09 PROCEDURE — 99397 PER PM REEVAL EST PAT 65+ YR: CPT | Performed by: FAMILY MEDICINE

## 2020-03-09 RX ORDER — TRIAMTERENE AND HYDROCHLOROTHIAZIDE 37.5; 25 MG/1; MG/1
1 TABLET ORAL DAILY
Qty: 90 TABLET | Refills: 0 | Status: SHIPPED | OUTPATIENT
Start: 2020-03-09 | End: 2020-04-23 | Stop reason: SDUPTHER

## 2020-03-09 RX ORDER — LEVOTHYROXINE SODIUM 75 MCG
75 TABLET ORAL EVERY OTHER DAY
Qty: 45 TABLET | Refills: 0 | Status: SHIPPED | OUTPATIENT
Start: 2020-03-09 | End: 2020-04-23 | Stop reason: SDUPTHER

## 2020-03-09 RX ORDER — TRAMADOL HYDROCHLORIDE 50 MG/1
50 TABLET ORAL EVERY 8 HOURS PRN
Qty: 90 TABLET | Refills: 0 | Status: SHIPPED | OUTPATIENT
Start: 2020-03-09 | End: 2020-04-23 | Stop reason: SDUPTHER

## 2020-03-09 RX ORDER — LEVOTHYROXINE SODIUM 88 MCG
88 TABLET ORAL EVERY OTHER DAY
Qty: 45 TABLET | Refills: 0 | Status: SHIPPED | OUTPATIENT
Start: 2020-03-09 | End: 2020-04-23 | Stop reason: SDUPTHER

## 2020-03-09 NOTE — ASSESSMENT & PLAN NOTE
Overcorrection of thyroid.  We will now alternate doses to get TSH level in correct treatment zone.

## 2020-03-09 NOTE — PROGRESS NOTES
"   Subjective       Chief Complaint   Patient presents with   • Medicare Wellness-subsequent     med refill          HPI:       Bailee Garza is a 80 y.o. female who presents to the office today for annual wellness visit and also medication refills.  Labs have been reviewed.  Previously elevated creatinine is now normal.  BUN has shown interval worsening.  She has been treated for right tibia fibula fracture.  That is improving.  Her mobility is decreased and she has had some edema and weight gain.  No shortness of breath or chest pain.  BUN as shown interval worsening.  She has also been taking stay hist for allergies and will discontinue that with short-term follow-up.  Thyroid is overcorrected.  We will adjust dose.  Remainder of her labs are satisfactory except for ongoing elevation of pure cholesterol.  HDL cholesterol is elevated which is a good thing.  Overall she feels well.  Blood pressure is controlled.  I have reviewed and updated her medications, medical history and problem list during today's office visit.  She has recently started vitamin D therapy for her vitamin D deficiency.  This was started by her orthopedist.    Social History     Tobacco Use   • Smoking status: Never Smoker   • Smokeless tobacco: Never Used   Substance Use Topics   • Alcohol use: No       Review of Systems   Constitutional: Negative for fatigue.   Respiratory: Negative for shortness of breath.    Cardiovascular: Negative for chest pain and leg swelling.   All other systems reviewed and are negative.        PE:   Objective   /70   Pulse 82   Temp 97.5 °F (36.4 °C) (Oral)   Resp 16   Ht 162.6 cm (64\")   SpO2 98%   BMI 18.74 kg/m²     Body mass index is 18.74 kg/m².    Physical Exam   Constitutional: She is oriented to person, place, and time. Vital signs are normal. She appears well-developed. No distress.   HENT:   Head: Normocephalic and atraumatic.   Right Ear: Hearing and tympanic membrane normal.   Left Ear: " Hearing and tympanic membrane normal.   Nose: Nose normal.   Mouth/Throat: Uvula is midline, oropharynx is clear and moist and mucous membranes are normal.   Eyes: Pupils are equal, round, and reactive to light. Conjunctivae, EOM and lids are normal.   Neck: Trachea normal and phonation normal. No JVD present. Carotid bruit is not present. No thyroid mass and no thyromegaly present.   Cardiovascular: Normal rate, regular rhythm and normal heart sounds.   Pulmonary/Chest: Effort normal and breath sounds normal.   Abdominal: Soft. Normal appearance and bowel sounds are normal. There is no hepatosplenomegaly. There is no tenderness.   Musculoskeletal: Normal range of motion.        Lumbar back: She exhibits no deformity.   Walking boot RLE   Lymphadenopathy:     She has no cervical adenopathy.        Right: No supraclavicular adenopathy present.        Left: No supraclavicular adenopathy present.   Neurological: She is alert and oriented to person, place, and time. She has normal strength. No cranial nerve deficit.   Reflex Scores:       Patellar reflexes are 2+ on the right side and 2+ on the left side.  Skin: Skin is warm and dry. No rash noted.   Psychiatric: She has a normal mood and affect. Her speech is normal and behavior is normal. Judgment and thought content normal. Cognition and memory are normal. She is attentive.        Data Reviewed:             Lab Results   Component Value Date    GLU 86 03/06/2020    BUN 35 (H) 03/06/2020    CREATININE 0.97 03/06/2020    EGFRIFNONA 55 (L) 03/06/2020    EGFRIFAFRI 67 03/06/2020     03/06/2020    K 4.0 03/06/2020     03/06/2020    CALCIUM 9.5 03/06/2020    ALBUMIN 4.50 03/06/2020    BILITOT 0.4 03/06/2020    ALKPHOS 102 03/06/2020    AST 17 03/06/2020    ALT 17 03/06/2020    CHLPL 214 (H) 03/06/2020    TRIG 78 03/06/2020    HDL 82 (H) 03/06/2020    VLDL 15.6 03/06/2020     (H) 03/06/2020    WBC 5.05 03/06/2020    RBC 4.25 03/06/2020    HCT 40.2  03/06/2020    MCV 94.6 03/06/2020    MCH 32.0 03/06/2020    TSH 0.359 03/06/2020    FREET4 1.78 (H) 03/06/2020    LLQM43DZ 15.7 (L) 03/04/2020          A/P:     Assessment/Plan   Diagnoses and all orders for this visit:    1. Encounter for wellness examination in adult (Primary)  Comments:  Discussed immunizations.  She will check on those.  Overall doing well preventative medicine wise.    2. Chronic midline low back pain without sciatica  Assessment & Plan:  The current medical regimen is effective;  continue present plan and medications.      Orders:  -     traMADol (ULTRAM) 50 MG tablet; Take 1 tablet by mouth Every 8 (Eight) Hours As Needed for Moderate Pain  for up to 30 days.  Dispense: 90 tablet; Refill: 0    3. Essential hypertension  Assessment & Plan:  Hypertension is unchanged.  Continue current treatment regimen.  Blood pressure will be reassessed at the next regular appointment.    Orders:  -     triamterene-hydrochlorothiazide (MAXZIDE-25) 37.5-25 MG per tablet; Take 1 tablet by mouth Daily for 90 days.  Dispense: 90 tablet; Refill: 0  -     BUN; Future  -     Creatinine, Serum; Future  -     Electrolyte Panel; Future    4. Hypothyroidism, acquired  Assessment & Plan:  Overcorrection of thyroid.  We will now alternate doses to get TSH level in correct treatment zone.    Orders:  -     SYNTHROID 88 MCG tablet; Take 1 tablet by mouth Every Other Day for 90 days.  Dispense: 45 tablet; Refill: 0  -     SYNTHROID 75 MCG tablet; Take 1 tablet by mouth Every Other Day for 90 days.  Dispense: 45 tablet; Refill: 0  -     TSH+Free T4; Future    5. Elevated BUN  -     BUN; Future  -     Creatinine, Serum; Future  -     Electrolyte Panel; Future    6. Edema, unspecified type        Follow up:    Return in about 6 weeks (around 4/20/2020) for Recheck/Medication  Refill.

## 2020-03-23 ENCOUNTER — RESULTS ENCOUNTER (OUTPATIENT)
Dept: FAMILY MEDICINE CLINIC | Facility: CLINIC | Age: 80
End: 2020-03-23

## 2020-03-23 DIAGNOSIS — E03.9 HYPOTHYROIDISM, ACQUIRED: ICD-10-CM

## 2020-03-23 DIAGNOSIS — I10 ESSENTIAL HYPERTENSION: ICD-10-CM

## 2020-03-23 DIAGNOSIS — R79.9 ELEVATED BUN: ICD-10-CM

## 2020-03-25 ENCOUNTER — OFFICE VISIT (OUTPATIENT)
Dept: ORTHOPEDIC SURGERY | Facility: CLINIC | Age: 80
End: 2020-03-25

## 2020-03-25 VITALS — WEIGHT: 107 LBS | HEIGHT: 64 IN | TEMPERATURE: 98 F | BODY MASS INDEX: 18.27 KG/M2

## 2020-03-25 DIAGNOSIS — E55.9 VITAMIN D DEFICIENCY: ICD-10-CM

## 2020-03-25 DIAGNOSIS — M76.71 PERONEAL TENDONITIS, RIGHT: ICD-10-CM

## 2020-03-25 DIAGNOSIS — T14.8XXA JOINT CAPSULE TEAR: ICD-10-CM

## 2020-03-25 DIAGNOSIS — M25.571 RIGHT ANKLE PAIN, UNSPECIFIED CHRONICITY: Primary | ICD-10-CM

## 2020-03-25 DIAGNOSIS — M65.871 OTHER SYNOVITIS AND TENOSYNOVITIS, RIGHT ANKLE AND FOOT: ICD-10-CM

## 2020-03-25 DIAGNOSIS — M25.561 RIGHT KNEE PAIN, UNSPECIFIED CHRONICITY: ICD-10-CM

## 2020-03-25 DIAGNOSIS — S82.64XG CLOSED NONDISPLACED FRACTURE OF LATERAL MALLEOLUS OF RIGHT FIBULA WITH DELAYED HEALING, SUBSEQUENT ENCOUNTER: ICD-10-CM

## 2020-03-25 PROCEDURE — 99213 OFFICE O/P EST LOW 20 MIN: CPT | Performed by: ORTHOPAEDIC SURGERY

## 2020-03-25 PROCEDURE — 73610 X-RAY EXAM OF ANKLE: CPT | Performed by: ORTHOPAEDIC SURGERY

## 2020-03-25 PROCEDURE — 73562 X-RAY EXAM OF KNEE 3: CPT | Performed by: ORTHOPAEDIC SURGERY

## 2020-03-25 NOTE — PROGRESS NOTES
"Ankle Follow Up      Patient: Bailee Garza    YOB: 1940 80 y.o. female    Chief Complaints: Ankle still hurts    History of Present Illness:Patient follows up injury to her right ankle that occurred on 1/11/2020 with initial evaluation on 1/13/2020 when she fell getting up from a chair.  She has been treating for right distal fibular fracture and peroneal tendinitis with MRI of the head shown some signal in the anterior aspect of the tibiotalar joint suggestive of capsular ligamentous fragment but no cartilaginous donor was noted.     She was seen on 2/13/2020 with injection to her ankle and she said that helped tremendously with the pain in the anterior aspect of her ankle where she has only a slight occasional ache.      Seen on 3/4/2020 at which time that the pain over the distal fibula had improved quite a bit with only little bit of discomfort and \"nothing she could live with\".  She tried going some without her boot but felt like it needed more support.    She was instructed at that time to try weaning out of the boot to an ASO brace and sent for vitamin D level.    She said that she does not feel that the brace gives her much support as the boot has continued with it but has not been using her cane although she recalls instructions to do as such.  She states that her ankle pain really has not improved since her last visit but has not worsened either    She also reports now she had an episode about 2 weeks ago after sitting where she had significant pain with some feelings of swelling over the anteromedial aspect of the right knee that have now improved but wanted to have it checked today.      HPI    ROS: ankle pain, knee pain, no numbness or tingling  Past Medical History:   Diagnosis Date   • Abnormal CXR 12/18/2017   • Allergic    • Arthritis    • 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    unspecified   • Back pain 11/20/2007   • Chronic back pain 07/13/2009   • Chronic cough " "2017   • Closed nondisplaced fracture of medial cuneiform of left foot 2018   • Constipation 2015    unspecified   • Diverticulosis    • Dyspepsia 2008   • Essential hypertension 2007   • Exertional shortness of breath 2017   • Fall 2018   • 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), 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   • Hearing loss 2008   • Hip pain, left    • History of bone density study 2015   • History of pneumonia     2017   • History of skin cancer    • Hypothyroidism, acquired 2007   • Insomnia 2015    unspecified   • Intervertebral disc disorder with myelopathy, cervical region 2010   • OA (osteoarthritis) of hip 2018   • Rhinitis, allergic 2007   • Scoliosis    • Screening breast examination 2007   • Stress 2015    other acute reactions to stress   • Stress incontinence    • Trochanteric bursitis, left hip 2019   • Urticaria 2015       Physical Exam:   Vitals:    20 0924   Temp: 98 °F (36.7 °C)   Weight: 48.5 kg (107 lb)   Height: 162.6 cm (64\")   PainSc:   2     Well developed with normal mood.      Radiology: 3 views of the right ankle ordered evaluate fracture reviewed and compared with previous x-rays.  The distal fibular ossific fragment remains apparent with some sclerosis and without obvious healing compared with previous x-rays.  Talus remains well-seated within the mortise no widening of the syndesmosis or medial joint line.    3 views of the right knee ordered evaluate pain reviewed and no prior x-rays available for comparison.  There is arthritic change of the medial aspect more so than laterally of the knee with some medial joint space narrowing but no obvious fracture.  There is patellofemoral arthritis as well.    Vitamin D 3/4/2020 15.7      Assessment/Plan:  1.  Right nondisplaced transverse fracture at the tip lateral malleolus at the origin of the " CFL and ATFL with tearing of the ATFL  2.  Signal in the anterior aspect of the tibiotalar joint suggestive of displaced capsular ligamentous fragment but no cartilaginous donor site with tibiotalar and subtalar joint effusions  3.  Right peroneal tenosynovitis  4.  Right chronic second TMT arthritis  5.  Exacerbation of chronic right knee arthritis  6.  Vitamin D deficiency    Reviewed with her I do not see any clinical sign of meniscal tear or stress fracture to the right knee she will limit activity on this and use ice as needed.    Regarding her right ankle she is doing persistent complaints of pain that is been unimproved since her last visit along the inferolateral aspect of the right hindfoot in the area of fracture.    She feels more comfortable in her boot than she does in her ASO brace so she will continue with that and recommended use of a cane which she did not have with her today and demonstrated for use in the contralateral hand..  We will get a get a CT scan of her right ankle to evaluate for any healing of the distal fragment and for potential preoperative planning as this may potentially require surgical treatment.    She understands to have her vitamin D level rechecked next week on Tuesday and instructed her that although she has been  taking it twice a week not to take it on consecutive days as she has been doing.  She will take it today (Wednesday) and again on Saturday..  I will see her back in 2-3 weeks with x-rays of her right ankle.    She will remain off work until I see her back in 3 weeks

## 2020-04-13 ENCOUNTER — TELEPHONE (OUTPATIENT)
Dept: ORTHOPEDIC SURGERY | Facility: CLINIC | Age: 80
End: 2020-04-13

## 2020-04-13 LAB
BUN SERPL-MCNC: 27 MG/DL (ref 8–23)
CHLORIDE SERPL-SCNC: 98 MMOL/L (ref 98–107)
CO2 SERPL-SCNC: 26.7 MMOL/L (ref 22–29)
CREAT SERPL-MCNC: 1.04 MG/DL (ref 0.57–1)
POTASSIUM SERPL-SCNC: 4.3 MMOL/L (ref 3.5–5.2)
SODIUM SERPL-SCNC: 139 MMOL/L (ref 136–145)
T4 FREE SERPL-MCNC: 1.42 NG/DL (ref 0.93–1.7)
TSH SERPL DL<=0.005 MIU/L-ACNC: 1.49 UIU/ML (ref 0.27–4.2)

## 2020-04-17 ENCOUNTER — OFFICE VISIT (OUTPATIENT)
Dept: ORTHOPEDIC SURGERY | Facility: CLINIC | Age: 80
End: 2020-04-17

## 2020-04-17 VITALS — BODY MASS INDEX: 17.75 KG/M2 | TEMPERATURE: 97.7 F | HEIGHT: 64 IN | WEIGHT: 104 LBS

## 2020-04-17 DIAGNOSIS — S82.64XG CLOSED NONDISPLACED FRACTURE OF LATERAL MALLEOLUS OF RIGHT FIBULA WITH DELAYED HEALING, SUBSEQUENT ENCOUNTER: Primary | ICD-10-CM

## 2020-04-17 DIAGNOSIS — M17.11 PRIMARY LOCALIZED OSTEOARTHROSIS OF RIGHT LOWER LEG: ICD-10-CM

## 2020-04-17 DIAGNOSIS — M19.079 ARTHRITIS OF FOOT: ICD-10-CM

## 2020-04-17 DIAGNOSIS — T14.8XXA JOINT CAPSULE TEAR: ICD-10-CM

## 2020-04-17 DIAGNOSIS — M65.871 OTHER SYNOVITIS AND TENOSYNOVITIS, RIGHT ANKLE AND FOOT: ICD-10-CM

## 2020-04-17 DIAGNOSIS — M25.571 RIGHT ANKLE PAIN, UNSPECIFIED CHRONICITY: ICD-10-CM

## 2020-04-17 DIAGNOSIS — E55.9 VITAMIN D DEFICIENCY: ICD-10-CM

## 2020-04-17 PROCEDURE — 99213 OFFICE O/P EST LOW 20 MIN: CPT | Performed by: ORTHOPAEDIC SURGERY

## 2020-04-17 PROCEDURE — 73610 X-RAY EXAM OF ANKLE: CPT | Performed by: ORTHOPAEDIC SURGERY

## 2020-04-17 NOTE — PROGRESS NOTES
"Ankle Follow Up      Patient: Bailee Garza    YOB: 1940 80 y.o. female    Chief Complaints: Ankle feels a little better    History of Present Illness:Patient follows up injury to her right ankle that occurred on 1/11/2020 with initial evaluation on 1/13/2020 when she fell getting up from a chair.  She has been treating for right distal fibular fracture and peroneal tendinitis with MRI of the head shown some signal in the anterior aspect of the tibiotalar joint suggestive of capsular ligamentous fragment but no cartilaginous donor was noted.     She was seen on 2/13/2020 with injection to her ankle and she said that helped tremendously with the pain in the anterior aspect of her ankle where she has only a slight occasional ache.        Seen on 3/4/2020 at which time that the pain over the distal fibula had improved quite a bit with only little bit of discomfort and \"nothing she could live with\".  She tried going some without her boot but felt like it needed more support.     She was instructed at that time to try weaning out of the boot to an ASO brace and sent for vitamin D level.     She was last seen on 3/25/2020 and at that time reported that she did not feel that the brace gives her much support as the boot had continued with it but had not been using her cane although she recalled instructions to do as such.  She stated that her ankle pain really had not improved since her previous visit but had not worsened either     She also reported now she had an episode about 2 weeks prior to that visit after sitting where she had significant pain with some feelings of swelling over the anteromedial aspect of the right knee that had improved but wanted to have it checked at that visit..    Since that visit she has been using her boot and her cane and states that her ankle is feeling somewhat better but still gets an occasional ache and a feeling of something \"sticking\" in the lateral aspect of her ankle. " " We also discussed getting a CT scan which apparently I forgot to order and she said she understood.  She did have her vitamin D level checked earlier this week at [facility]  but we do not have those results yet.     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   • Hearing loss 2008   • Hip pain, left    • History of bone density study 2015   • History of pneumonia     2017   • History of skin cancer    • Hypothyroidism, acquired 2007   • Insomnia 2015    unspecified   • Intervertebral disc disorder with myelopathy, cervical region 2010   • OA (osteoarthritis) of hip 2018   • Rhinitis, allergic 2007   • Scoliosis    • Screening breast examination 2007   • Stress 2015    other acute reactions to stress   • Stress incontinence    • Trochanteric bursitis, left hip 2019   • Urticaria 2015       Physical Exam:   Vitals:    20 1005   Temp: 97.7 °F (36.5 °C)   TempSrc: Temporal   Weight: 47.2 kg (104 lb)   Height: 162.6 cm (64\")   PainSc: 1  Comment: ache     Well developed with normal mood.  She had slight tenderness palpation of the right distal fibula but no pain over the anterior ankle or with range of motion or medial ankle.  Did have some mild discomfort along the peroneal tendons but no subluxation with resisted eversion      Radiology: 3 views of " the right ankle ordered evaluate fracture alignment reviewed and compared with previous x-rays.  There is been no change in alignment to the distal fibula fracture which appears to be somewhat sclerotic over the distal portion of the fibula though without change in alignment or apparent healing.      Assessment/Plan:    1.  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  Signal in the anterior aspect of the tibiotalar joint suggestive of displaced capsular ligamentous fragment but no cartilaginous donor site with tibiotalar and subtalar joint effusions  3.  Right peroneal tenosynovitis  4.  Right chronic second TMT arthritis  5.  Exacerbation of chronic right knee arthritis  6.  Vitamin D deficiency      We discussed treatment options and we will go ahead and get a CT scan evaluate any extent of healing to this fracture though it appears that it is going on to a nonunion.  Additional nonoperative modalities will include continuation of vitamin D supplementation.  We will try to get her recent vitamin D to see what level augmentation that she needs and will let her know.    Reviewed with her that other nonoperative modalities could include bone stimulator.    Offered to start having her, the boot into the brace but she said the boot feels much better and she will continue with that and her cane.    Reviewed with her that treatment options may include continued nonoperative measures with bone stimulator and vitamin D and possible operative measures which could include potential ORIF although given her thin skin this could be increased with complications or may require excision of the bony fragment with secondary ligamentous reconstruction.    I will see her back in 3 weeks x-rays of her right ankle.

## 2020-04-20 ENCOUNTER — TELEPHONE (OUTPATIENT)
Dept: ORTHOPEDIC SURGERY | Facility: CLINIC | Age: 80
End: 2020-04-20

## 2020-04-20 DIAGNOSIS — E55.9 VITAMIN D DEFICIENCY: Primary | ICD-10-CM

## 2020-04-20 NOTE — TELEPHONE ENCOUNTER
Serum vitamin D level was 71.4.  Have instructed the patient to discontinue her vitamin D supplements for now.  She will need to have a repeat serum vitamin D level drawn in 2 weeks.  Request that the order be faxed to [facility].  I will also mail the patient a copy. Dr. So and also requested moving her appointment to May 4 however May 5 is the day that he is scheduled in the office.  Will go ahead and leave her on May 5 per Dr. So

## 2020-04-20 NOTE — TELEPHONE ENCOUNTER
Looks like her new level is 71.4 done on 4/13/2020.  Previously her level was around 15 on 3/4/2020 and she had been instructed on taking 50,000 units twice a week.  Please have her discontinue her vitamin D for now and have it rechecked in 2 weeks.  I am scheduled to see her back in the office on 5/5/2020 but please change that to 5/4/2020 as 5/5/2020 is a Tuesday which could be an OR day if necessary.

## 2020-04-22 ENCOUNTER — HOSPITAL ENCOUNTER (OUTPATIENT)
Dept: CT IMAGING | Facility: HOSPITAL | Age: 80
Discharge: HOME | End: 2020-04-22
Admitting: ORTHOPAEDIC SURGERY

## 2020-04-22 DIAGNOSIS — S82.64XG CLOSED NONDISPLACED FRACTURE OF LATERAL MALLEOLUS OF RIGHT FIBULA WITH DELAYED HEALING, SUBSEQUENT ENCOUNTER: ICD-10-CM

## 2020-04-22 PROCEDURE — 73700 CT LOWER EXTREMITY W/O DYE: CPT

## 2020-04-22 PROCEDURE — 76377 3D RENDER W/INTRP POSTPROCES: CPT

## 2020-04-23 ENCOUNTER — OFFICE VISIT (OUTPATIENT)
Dept: FAMILY MEDICINE CLINIC | Facility: CLINIC | Age: 80
End: 2020-04-23

## 2020-04-23 DIAGNOSIS — M54.50 CHRONIC MIDLINE LOW BACK PAIN WITHOUT SCIATICA: ICD-10-CM

## 2020-04-23 DIAGNOSIS — G89.29 CHRONIC MIDLINE LOW BACK PAIN WITHOUT SCIATICA: ICD-10-CM

## 2020-04-23 DIAGNOSIS — E03.9 HYPOTHYROIDISM, ACQUIRED: ICD-10-CM

## 2020-04-23 DIAGNOSIS — I10 ESSENTIAL HYPERTENSION: ICD-10-CM

## 2020-04-23 PROCEDURE — 99213 OFFICE O/P EST LOW 20 MIN: CPT | Performed by: FAMILY MEDICINE

## 2020-04-23 RX ORDER — TRAMADOL HYDROCHLORIDE 50 MG/1
50 TABLET ORAL EVERY 8 HOURS PRN
Qty: 90 TABLET | Refills: 0 | Status: SHIPPED | OUTPATIENT
Start: 2020-04-23 | End: 2020-05-27 | Stop reason: SDUPTHER

## 2020-04-23 RX ORDER — TRIAMTERENE AND HYDROCHLOROTHIAZIDE 37.5; 25 MG/1; MG/1
1 TABLET ORAL DAILY
Qty: 90 TABLET | Refills: 0 | Status: SHIPPED | OUTPATIENT
Start: 2020-04-23 | End: 2020-07-01 | Stop reason: SDUPTHER

## 2020-04-23 RX ORDER — LEVOTHYROXINE SODIUM 75 MCG
75 TABLET ORAL EVERY OTHER DAY
Qty: 45 TABLET | Refills: 0 | Status: SHIPPED | OUTPATIENT
Start: 2020-04-23 | End: 2020-07-01 | Stop reason: SDUPTHER

## 2020-04-23 RX ORDER — LEVOTHYROXINE SODIUM 88 MCG
88 TABLET ORAL EVERY OTHER DAY
Qty: 45 TABLET | Refills: 0 | Status: SHIPPED | OUTPATIENT
Start: 2020-04-23 | End: 2020-07-01 | Stop reason: SDUPTHER

## 2020-04-23 NOTE — ASSESSMENT & PLAN NOTE
The current medical regimen is effective;  continue present plan and medications.  TSH within normal limits with adjustment on Synthroid.  Continue every other day dosing as ordered.

## 2020-04-23 NOTE — PROGRESS NOTES
Mode of Visit: Telephone  You have chosen to receive care through a telephone visit today. Do you consent to use a telephone visit for your medical care today? Yes   Bailee Garza confirmed that she was in [place] at the time of this telephonic visit.   The visit included telephone interaction. No technical issues occurred during this visit.   more than 10 minutes up to 20 minutes.   Subjective       Chief Complaint   Patient presents with   • Back Pain     Wants a refill on ultram   • Results         HPI:       Bailee Garza is a 80 y.o. female who presents to  [address] [facility] today     I have reviewed and updated her medications, medical history and problem list during today's office visit.     Social History     Tobacco Use   • Smoking status: Never Smoker   • Smokeless tobacco: Never Used   Substance Use Topics   • Alcohol use: No       Review of Systems      PE:   Objective   There were no vitals taken for this visit.    There is no height or weight on file to calculate BMI.    Physical Exam     Data Reviewed:             Lab Results   Component Value Date    GLU 86 03/06/2020    BUN 27 (H) 04/13/2020    CREATININE 1.04 (H) 04/13/2020    EGFRIFNONA 51 (L) 04/13/2020    EGFRIFAFRI 62 04/13/2020     04/13/2020    K 4.3 04/13/2020    CL 98 04/13/2020    CALCIUM 9.5 03/06/2020    ALBUMIN 4.50 03/06/2020    BILITOT 0.4 03/06/2020    ALKPHOS 102 03/06/2020    AST 17 03/06/2020    ALT 17 03/06/2020    CHLPL 214 (H) 03/06/2020    TRIG 78 03/06/2020    HDL 82 (H) 03/06/2020    VLDL 15.6 03/06/2020     (H) 03/06/2020    WBC 5.05 03/06/2020    RBC 4.25 03/06/2020    HCT 40.2 03/06/2020    MCV 94.6 03/06/2020    MCH 32.0 03/06/2020    TSH 1.490 04/13/2020    FREET4 1.42 04/13/2020    VKQT11NL 15.7 (L) 03/04/2020          A/P:     Assessment/Plan   Diagnoses and all orders for this visit:    1. Chronic midline low back pain without  sciatica  Assessment & Plan:  The current medical regimen is effective;  continue present plan and medications.      Orders:  -     traMADol (ULTRAM) 50 MG tablet; Take 1 tablet by mouth Every 8 (Eight) Hours As Needed for Moderate Pain  for up to 30 days.  Dispense: 90 tablet; Refill: 0    2. Hypothyroidism, acquired  Assessment & Plan:  The current medical regimen is effective;  continue present plan and medications.  TSH within normal limits with adjustment on Synthroid.  Continue every other day dosing as ordered.    Orders:  -     SYNTHROID 88 MCG tablet; Take 1 tablet by mouth Every Other Day for 90 days.  Dispense: 45 tablet; Refill: 0  -     SYNTHROID 75 MCG tablet; Take 1 tablet by mouth Every Other Day for 90 days.  Dispense: 45 tablet; Refill: 0    3. Essential hypertension  Assessment & Plan:  Hypertension is unchanged.  Continue current treatment regimen.  Blood pressure will be reassessed in 3 months.    Orders:  -     triamterene-hydrochlorothiazide (MAXZIDE-25) 37.5-25 MG per tablet; Take 1 tablet by mouth Daily for 90 days.  Dispense: 90 tablet; Refill: 0        Follow up:    Return in about 3 months (around 7/23/2020) for Recheck/Medication  Refill, Controlled Substance Visit.

## 2020-05-05 NOTE — PROGRESS NOTES
"Ankle Follow Up      Patient: Bailee Garza    YOB: 1940 80 y.o. female    Chief Complaints: Ankle feeling better    History of Present Illness:Patient follows up injury to her right ankle that occurred on 1/11/2020 with initial evaluation on 1/13/2020 when she fell getting up from a chair.  She has been treating for right distal fibular fracture and peroneal tendinitis with MRI of the head shown some signal in the anterior aspect of the tibiotalar joint suggestive of capsular ligamentous fragment but no cartilaginous donor was noted.     She was seen on 2/13/2020 with injection to her ankle and she said that helped tremendously with the pain in the anterior aspect of her ankle where she has only a slight occasional ache.        Seen on 3/4/2020 at which time that the pain over the distal fibula had improved quite a bit with only little bit of discomfort and \"nothing she could live with\".  She tried going some without her boot but felt like it needed more support.     She was instructed at that time to try weaning out of the boot to an ASO brace and sent for vitamin D level.     She was seen on 3/25/2020 and at that time reported that she did not feel that the brace gave her as much support as the boot had and  continued with it but had not been using her cane although she recalled instructions to do as such.  She stated that her ankle pain really had not improved since her previous visit but had not worsened either     She also reported now she had an episode about 2 weeks prior to that visit after sitting where she had significant pain with some feelings of swelling over the anteromedial aspect of the right knee that had improved but wanted to have it checked at that visit..     On 4/17/20  she reported that she had been using her boot and her cane and stated that her ankle was feeling somewhat better but still had an occasional ache and a feeling of something \"sticking\" in the lateral aspect of " "her ankle.  We had also discussed getting a CT scan at her 3/25 visit which apparently I forgot to order and she said she understood.  She did [facility]  but we did not have those results yet.    At that time she requested to stay in her boot as it felt better than her brace and she was sent for CT scan.  We also subsequently received her vitamin D level and this will be addressed below in the plan.    She quit using her boot about 2 weeks ago because it started bothering her \"hip.  She describes moderate aching pain in the posterior lateral aspect of her right buttock and lateral aspect of the right hip but no groin pain and no radicular symptoms.  She said her ankle pain has improved quite a bit with minimal if any occasional achiness over the inferolateral aspect of the right hindfoot.  She has no anterior ankle pain or mechanical symptoms.    She does still get some mild intermittent aching pain about the right knee but no mechanical symptoms.      HPI    ROS: ankle pain,, \"hip pain\" no numbness or tingling  Past Medical History:   Diagnosis Date   • Abnormal CXR 2017   • Allergic    • Arthritis    • Arthropathy of pelvic region and thigh 2012    unspecified   • Back pain 2007   • Chronic back pain 2009   • Chronic cough 2017   • Closed nondisplaced fracture of medial cuneiform of left foot 2018   • Constipation 2015    unspecified   • Diverticulosis    • Dyspepsia 2008   • Essential hypertension 2007   • Exertional shortness of breath 2017   • Fall 2018   • 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), 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   • Hearing loss 2008   • Hip pain, left    • History of bone density study 2015   • History of pneumonia     2017   • History of skin cancer    • Hypothyroidism, acquired 2007   • Insomnia 2015    unspecified " "  • Intervertebral disc disorder with myelopathy, cervical region 07/22/2010   • OA (osteoarthritis) of hip 7/24/2018   • Rhinitis, allergic 11/20/2007   • Scoliosis    • Screening breast examination 11/20/2007   • Stress 04/27/2015    other acute reactions to stress   • Stress incontinence    • Trochanteric bursitis, left hip 2/4/2019   • Urticaria 06/29/2015       Physical Exam:   Vitals:    05/06/20 0806   Temp: 98.4 °F (36.9 °C)   Weight: 48.5 kg (107 lb)   Height: 162.6 cm (64\")   PainSc: 0-No pain     Well developed with normal mood.  On exam she has mild discomfort of the posterior lateral buttock and lateral aspect of the hip but without focal pain in the groin with range of motion and without focal pain along the sacrum today.    Right knee shows no warmth erythema or effusion there is mild discomfort of the medial joint line.  Right ankle shows no focal swelling there was minimal discomfort on the posterior lateral aspect of the ankle but none directly over the distal fibula or anterolateral ankle.  No pain of the anterior ankle to palpation or with range of motion.      Radiology: CT scan films and report of the right ankle dated 4/22/2020 reviewed which shows chronic ununited fracture fragment at the distal aspect of the lateral malleolus measuring 17 mm x 5 mm x 10 mm with 1 to 2 mm of separation but no healing.  There is also chronic ossification and spurring at the Achilles insertion and degenerative changes at the dorsal aspect of the first TMT joint with subchondral cyst formation as well as chronic remodeling of the head of the second metatarsal and spurring along the dorsal neck of the second metatarsal.    3 views right ankle ordered evaluate fracture reviewed and compared with previous x-rays.  The fracture over the distal fibula is without change in alignment and without any apparent healing and is consistent with a nonunion talus remains well-seated within the mortise.    Vitamin D 4/13/2020 " 71.4      Assessment/Plan:  1.  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  Signal in the anterior aspect of the tibiotalar joint suggestive of displaced capsular ligamentous fragment but no cartilaginous donor site with tibiotalar and subtalar joint effusions  3.  Right peroneal tenosynovitis  4.  Right chronic second MTP arthritis as well as arthritis at the first TMT joint and Achilles insertional ossification  5.  Exacerbation of chronic right knee arthritis  6.  Vitamin D elevation      Regarding her vitamin D level, although it is in the normal range it is somewhat elevated for what we see in the majority of patients and on 4/20/2020 she was instructed to hold any vitamin D supplementation and instructed to have her level checked 2 weeks thereafter.  Instructed her that she needs to get this rechecked and she said she is going to go to [facility] to have that done.    Regarding her right hip this is most likely been exacerbated by use of her boot for prolonged period of time.  She has been back into her brace now for about 2 weeks and is tolerating that well but still with some hip discomfort.  We will have her get started in some physical therapy for her hip as well as for her knee and ankle.    Regarding her ankle she is not really having pain when she walks to the bathroom at night without her brace so I think we can have her start weaning out of her brace around the house but use when she is out of the house for any prolonged walking or standing activities but does not yet feel comfortable returning to work so we will hold her off for another 4 weeks until I see her back.    Regarding her ankle we discussed operative and nonoperative treatment options and as her pain has markedly improved she would like to hold off on any surgical treatment which I do not think is  unreasonable at this point.  She is can get her vitamin D level rechecked and we will order a bone stimulator for her ankle.    I will see her back in 4 weeks x-rays of her right ankle.

## 2020-05-06 ENCOUNTER — OFFICE VISIT (OUTPATIENT)
Dept: ORTHOPEDIC SURGERY | Facility: CLINIC | Age: 80
End: 2020-05-06

## 2020-05-06 VITALS — TEMPERATURE: 98.4 F | WEIGHT: 107 LBS | HEIGHT: 64 IN | BODY MASS INDEX: 18.27 KG/M2

## 2020-05-06 DIAGNOSIS — S82.64XK CLOSED NONDISPLACED FRACTURE OF LATERAL MALLEOLUS OF RIGHT FIBULA WITH NONUNION, SUBSEQUENT ENCOUNTER: Primary | ICD-10-CM

## 2020-05-06 DIAGNOSIS — S82.64XG CLOSED NONDISPLACED FRACTURE OF LATERAL MALLEOLUS OF RIGHT FIBULA WITH DELAYED HEALING, SUBSEQUENT ENCOUNTER: ICD-10-CM

## 2020-05-06 DIAGNOSIS — S76.011A HIP STRAIN, RIGHT, INITIAL ENCOUNTER: ICD-10-CM

## 2020-05-06 DIAGNOSIS — M17.11 PRIMARY LOCALIZED OSTEOARTHROSIS OF RIGHT LOWER LEG: ICD-10-CM

## 2020-05-06 DIAGNOSIS — M65.871 OTHER SYNOVITIS AND TENOSYNOVITIS, RIGHT ANKLE AND FOOT: ICD-10-CM

## 2020-05-06 DIAGNOSIS — T14.8XXA JOINT CAPSULE TEAR: ICD-10-CM

## 2020-05-06 DIAGNOSIS — M76.71 PERONEAL TENDONITIS, RIGHT: ICD-10-CM

## 2020-05-06 DIAGNOSIS — M19.079 ARTHRITIS OF FOOT: ICD-10-CM

## 2020-05-06 PROCEDURE — 73610 X-RAY EXAM OF ANKLE: CPT | Performed by: ORTHOPAEDIC SURGERY

## 2020-05-06 PROCEDURE — 99214 OFFICE O/P EST MOD 30 MIN: CPT | Performed by: ORTHOPAEDIC SURGERY

## 2020-05-11 ENCOUNTER — TELEPHONE (OUTPATIENT)
Dept: ORTHOPEDIC SURGERY | Facility: CLINIC | Age: 80
End: 2020-05-11

## 2020-05-11 NOTE — TELEPHONE ENCOUNTER
I have spoken with the patient.  The order was faxed to [facility] on April 20 and I also discussed with the patient that when I talked to her on 20 April I was also going to mail her a copy of the lab for her to take with her in case [employer] did tell her they never received the fax.  Patient does not remember ever getting the order in the mail.  Therefore I will refax the same order again to [phone]

## 2020-05-19 ENCOUNTER — TREATMENT (OUTPATIENT)
Dept: PHYSICAL THERAPY | Facility: CLINIC | Age: 80
End: 2020-05-19

## 2020-05-19 DIAGNOSIS — S82.64XG CLOSED NONDISPLACED FRACTURE OF LATERAL MALLEOLUS OF RIGHT FIBULA WITH DELAYED HEALING, SUBSEQUENT ENCOUNTER: Primary | ICD-10-CM

## 2020-05-19 PROCEDURE — 97110 THERAPEUTIC EXERCISES: CPT | Performed by: PHYSICAL THERAPIST

## 2020-05-19 PROCEDURE — 97162 PT EVAL MOD COMPLEX 30 MIN: CPT | Performed by: PHYSICAL THERAPIST

## 2020-05-19 PROCEDURE — 97530 THERAPEUTIC ACTIVITIES: CPT | Performed by: PHYSICAL THERAPIST

## 2020-05-19 PROCEDURE — 97140 MANUAL THERAPY 1/> REGIONS: CPT | Performed by: PHYSICAL THERAPIST

## 2020-05-19 NOTE — PROGRESS NOTES
Orthopedic / Sports / Industrial Physical Therapy  Physical Therapy Initial Evaluation and Plan of Care    Patient Name: Bailee Garza          :  1940  Referring Physician: Álvaro So MD  Diagnosis: Closed nondisplaced fracture of lateral malleolus of right fibula with delayed healing, subsequent encounter [S82.64XG]    Date of Evaluation: 2020  ______________________________________________________________________    Subjective Evaluation    History of Present Illness  Onset date: 2020.  Mechanism of injury: Pt was sleeping in a chair with (R) leg/ under the chair - Pt attempted to get up and fell fracturing her distal fibula -   Difficulty healing and using a bone stimulator -   Wore BOOT up to 3 weeks ago when LBP started (R) - Given an ankle brace that is very uncomfortable and did not bring it with her today -     Subjective comment: H/O CHAS (L); Mild Scoliosis; Depressed  Patient Occupation: [employer] - Very active - [employer] acres -  Pain  Current pain rating: 3  At worst pain ratin  Location: (R) Ankle: Lateral ankle / distal fib;   LB:  (R) LB into buttocks (R)  Quality: Ache / sharp   Alleviating factors: Rest / NWB.  Exacerbated by: ANKLE: Walking on uneven ground; Stairs;  LB: Flexion; Rising from  chair; HE.    Social Support  Patient lives at: Home w/ stairs - .    Diagnostic Tests  X-ray: abnormal (non-union Disal fib fx (R))    Treatments  Previous treatment: immobilization and medication  Current treatment: medication  Patient Goals  Patient/family treatment goals: Pan alleviaion; nomal moblity, strength to perform ADLs and normal job duties.         ___________________________________________________    Observation: (R) [place]lium / ASIS/ PSIS highe vs (L) -   Mild swelling lateral (R) ankle - Antalgic gait RLE w/ reduced WBing RLE -     Objective          Palpation     Additional Palpation Details  Tender (R) SI Jt, (R) Disal Fibula and ATFL, peroneal tendons distal  1/2 lateral lower leg; Tender at proximal fib / Proximal tib-ifb joint (R) -     Active Range of Motion     Additional Active Range of Motion Details  (R) Ankle: Good ROM w/ minimal pain even into inversion  Lumbar spine: Lumbar Flexion 2/3 normal with pain (R) LB/SI region;                                         Extension: 2/3 normal limited R>L ;  SB WFL (B)    Strength/Myotome Testing     Additional Strength Details  (R) ANKLE: 4-4+/5 strength w/ manual resistance w/o pain     Tests     Additional Tests Details  (-) Inversion, Talar TIlt, Ant Drawer Tests (R) ankle  (-) SLR;  (+) SI Jt Dysfunction / Upshear (R);         See Treatment Flow sheet for Exercises, Manual therapy, and modalities.   FUNCTIONAL ACTIVITIES: X 12 min  · TAPING / BRACING: NA  · Discussed bracing / taping options to allow improved function and reduce stress to fx site -   · Jt protection, ADL modification; Posture and     ___________________________________________________  Assessment & Plan     Assessment  Assessment details: 81 yo female who suffered a distal fibular fx on 01/11/2020 with limited healing / non-union w/ onset of (R)-sided LBP due to prolonged use of Immobilizer Boot RLE -     PROBLEMS: Pain; Abnormal gait; LE weakness;  Limited tolerance to ADL's, Hobby activities, and normal job requiring use of LE's -   PROGNOSIS: Good    GOALS:   SHORT TERM GOALS: 2 weeks:  1) HEP Initiated; 2) Pain decreased 50%:   3) AROM Lumbar spine increased to 3/4 flexion / extension:  4) Improved gait with increased WB-ing RLE w/wo taping/bracing; 5) Improved strength (R) ankle to 4+/5; 6) Pt noting Improved functional ability LE's w/ bracing / taping -     LONG TERM GOALS: 4 weeks (or at time of DISCHARGE): 1) (I) HEP; 2) AROM WFL and pain free; 3) Strength / proprioception to be able to perform all ADL's, hobby and job-related activities w/o restrictions; 4) Normal gait -       Plan  Planned therapy interventions: abdominal trunk  stabilization, balance/weight-bearing training, body mechanics training, flexibility, home exercise program, joint mobilization, manual therapy, neuromuscular re-education, postural training, soft tissue mobilization, strengthening, stretching and therapeutic activities (Modalities prn; Taping / bracing prn)  Frequency: 2-3x week.  Duration in weeks: 4  Treatment plan discussed with: patient      ___________________________________________________  Manual Therapy:    10     mins  61662;   Therapeutic Exercise:    08     mins  96870;     Neuromuscular Sofya:    03    mins  16882;   Therapeutic Activity:     12     mins  31113;     Ultrasound:     06     mins  65992;    Electrical Stimulation:        mins  12687 ( );  Dry Needling          mins self-pay   Gait Training:          mins  65855;  EVAL TIME:   20    Timed Treatment:   39   mins                Total Treatment:     65   mins    PT SIGNATURE:   Kirit Damon, SARAH  DATE TREATMENT INITIATED: 5/19/2020  ___________________________________________________  Initial Certification  Certification Period: 8/17/2020  I certify that the therapy services are furnished while this patient is under my care.  The services outlined above are required by this patient, and will be reviewed every 90 days.     PHYSICIAN: ________________________________  DATE: ______  Álvaro So MD        Please sign and return via fax to [phone].. Thank you, [facility].  ______________________________________________________________________  [address] [address] [address]  Phone: [phone]      Fax: [phone]

## 2020-05-21 ENCOUNTER — TREATMENT (OUTPATIENT)
Dept: PHYSICAL THERAPY | Facility: CLINIC | Age: 80
End: 2020-05-21

## 2020-05-21 DIAGNOSIS — M25.552 LEFT HIP PAIN: ICD-10-CM

## 2020-05-21 DIAGNOSIS — S82.64XG CLOSED NONDISPLACED FRACTURE OF LATERAL MALLEOLUS OF RIGHT FIBULA WITH DELAYED HEALING, SUBSEQUENT ENCOUNTER: ICD-10-CM

## 2020-05-21 DIAGNOSIS — Z96.642 HISTORY OF TOTAL LEFT HIP REPLACEMENT: Primary | ICD-10-CM

## 2020-05-21 DIAGNOSIS — R26.9 GAIT DIFFICULTY: ICD-10-CM

## 2020-05-21 PROCEDURE — 97110 THERAPEUTIC EXERCISES: CPT | Performed by: PHYSICAL THERAPIST

## 2020-05-21 NOTE — PROGRESS NOTES
"Physical Therapy Daily Progress Note  Visit # 2      Subjective   Bailee Garza reports:   Had some increased ankle soreness from testing and treatment at [facility].  States \"I just usually deal with\" the pain, it is normal for her.  Today lateral (R) ankle soreness persists, (R) hip/LB symptoms much improved from eval status.  Pt repeatedly states \"I just feel weak all over.\"              Objective   See Exercise, Manual, and Modality Logs for complete treatment.   KT knee taping performed by Kirit Damon PT after discussion with pt, pt c/o inc PF pain during NuStep.  Progressed HEP with t-band INV/EV, written instructions and red band issued.      Held MT, US application today with focus on therex and progression of HEP.     Assessment & Plan     Assessment  Assessment details: Pt reports mm fatigue/burning throughout (R) LE during exercise, subsided each time with short rest.  Low endurance, requires frequent breaks during session.            Progress per Plan of Care and Progress strengthening /stabilization /functional activity           Timed:         Manual Therapy:         mins  54128     Therapeutic Exercise:     45    mins  33908     Neuromuscular Sofya:        mins  77551    Therapeutic Activity:          mins  17508     Gait Training:           mins  31619     Ultrasound:          mins  75033    Ionto                                   mins  95861  Self Care                            mins  10524    Un-Timed:  Electrical Stimulation:         mins 41354 ( )  Traction          mins 65626    Timed Treatment:   45   mins   Total Treatment:     55   mins    RADHA Martinez License #[id number]  Physical Therapist Assistant  "

## 2020-05-26 ENCOUNTER — TREATMENT (OUTPATIENT)
Dept: PHYSICAL THERAPY | Facility: CLINIC | Age: 80
End: 2020-05-26

## 2020-05-26 DIAGNOSIS — M25.552 LEFT HIP PAIN: ICD-10-CM

## 2020-05-26 DIAGNOSIS — Z96.642 HISTORY OF TOTAL LEFT HIP REPLACEMENT: Primary | ICD-10-CM

## 2020-05-26 DIAGNOSIS — S82.64XG CLOSED NONDISPLACED FRACTURE OF LATERAL MALLEOLUS OF RIGHT FIBULA WITH DELAYED HEALING, SUBSEQUENT ENCOUNTER: ICD-10-CM

## 2020-05-26 DIAGNOSIS — R26.9 GAIT DIFFICULTY: ICD-10-CM

## 2020-05-26 PROCEDURE — 97110 THERAPEUTIC EXERCISES: CPT | Performed by: PHYSICAL THERAPIST

## 2020-05-26 PROCEDURE — 97140 MANUAL THERAPY 1/> REGIONS: CPT | Performed by: PHYSICAL THERAPIST

## 2020-05-26 PROCEDURE — 97112 NEUROMUSCULAR REEDUCATION: CPT | Performed by: PHYSICAL THERAPIST

## 2020-05-26 NOTE — PROGRESS NOTES
Physical Therapy Daily Progress Note  Visit: 3    Bailee Taylorcharly reports: My back is still hurting. It is hurting more than the ankle, but the MD said the ankle bone is not all the way healed yet.    Subjective     Objective   See Exercise, Manual, and Modality Logs for complete treatment.       Assessment & Plan     Assessment  Assessment details: Pt tolerated treatment well. Had pain with bridge exercise, therefore deferred today and will try again next week    Plan  Plan details: Progress per POC        Manual Therapy:    8     mins  77154;  Therapeutic Exercise:    28     mins  66847;     Neuromuscular Sofya:    10    mins  65410;    Therapeutic Activity:     -     mins  14953;     Gait Training:      -     mins  12459;     Ultrasound:     -     mins  24993;    Electrical Stimulation:    -     mins  20104 ( );  Dry Needling     -     mins self-pay    Timed Treatment:   46   mins   Total Treatment:     48   mins    Marta Reis PT  [place] License #: [id number]    Physical Therapist

## 2020-05-27 DIAGNOSIS — M54.50 CHRONIC MIDLINE LOW BACK PAIN WITHOUT SCIATICA: ICD-10-CM

## 2020-05-27 DIAGNOSIS — G89.29 CHRONIC MIDLINE LOW BACK PAIN WITHOUT SCIATICA: ICD-10-CM

## 2020-05-27 RX ORDER — TRAMADOL HYDROCHLORIDE 50 MG/1
50 TABLET ORAL EVERY 8 HOURS PRN
Qty: 90 TABLET | Refills: 0 | Status: SHIPPED | OUTPATIENT
Start: 2020-05-27 | End: 2020-07-01 | Stop reason: SDUPTHER

## 2020-05-28 ENCOUNTER — TREATMENT (OUTPATIENT)
Dept: PHYSICAL THERAPY | Facility: CLINIC | Age: 80
End: 2020-05-28

## 2020-05-28 DIAGNOSIS — R26.9 GAIT DIFFICULTY: ICD-10-CM

## 2020-05-28 DIAGNOSIS — S82.64XG CLOSED NONDISPLACED FRACTURE OF LATERAL MALLEOLUS OF RIGHT FIBULA WITH DELAYED HEALING, SUBSEQUENT ENCOUNTER: ICD-10-CM

## 2020-05-28 DIAGNOSIS — Z96.642 HISTORY OF TOTAL LEFT HIP REPLACEMENT: Primary | ICD-10-CM

## 2020-05-28 DIAGNOSIS — M25.552 LEFT HIP PAIN: ICD-10-CM

## 2020-05-28 PROCEDURE — 97112 NEUROMUSCULAR REEDUCATION: CPT | Performed by: PHYSICAL THERAPIST

## 2020-05-28 PROCEDURE — 97110 THERAPEUTIC EXERCISES: CPT | Performed by: PHYSICAL THERAPIST

## 2020-05-28 NOTE — PROGRESS NOTES
Physical Therapy Daily Progress Note  Visit: 4    Bailee Garza reports: I am feeling good. The tape really helped the knee.    Subjective     Objective   See Exercise, Manual, and Modality Logs for complete treatment.       Assessment & Plan     Assessment  Assessment details: Pt doing well. Improved steadiness and balance noted today.     Plan  Plan details: Progress as able        Manual Therapy:    3     mins  00886;  Therapeutic Exercise:    30     mins  52875;     Neuromuscular Sofya:    10    mins  32916;    Therapeutic Activity:     -     mins  93806;     Gait Training:      -     mins  70997;     Ultrasound:     -     mins  18627;    Electrical Stimulation:    -     mins  91703 ( );  Dry Needling     -     mins self-pay    Timed Treatment:   43   mins   Total Treatment:     45   mins    Marta Reis, PT  [place] License #: [id number]    Physical Therapist

## 2020-06-01 ENCOUNTER — TREATMENT (OUTPATIENT)
Dept: PHYSICAL THERAPY | Facility: CLINIC | Age: 80
End: 2020-06-01

## 2020-06-01 DIAGNOSIS — Z96.642 HISTORY OF TOTAL LEFT HIP REPLACEMENT: Primary | ICD-10-CM

## 2020-06-01 DIAGNOSIS — R26.9 GAIT DIFFICULTY: ICD-10-CM

## 2020-06-01 DIAGNOSIS — M25.552 LEFT HIP PAIN: ICD-10-CM

## 2020-06-01 PROCEDURE — 97110 THERAPEUTIC EXERCISES: CPT | Performed by: PHYSICAL THERAPIST

## 2020-06-01 PROCEDURE — 97112 NEUROMUSCULAR REEDUCATION: CPT | Performed by: PHYSICAL THERAPIST

## 2020-06-01 PROCEDURE — 97140 MANUAL THERAPY 1/> REGIONS: CPT | Performed by: PHYSICAL THERAPIST

## 2020-06-01 NOTE — PROGRESS NOTES
Physical Therapy Daily Progress Note  Visit: 5    Bailee Garza reports: My right hip is still bothering me. The exercises make it feel good, but when I go to bed    Subjective     Objective   See Exercise, Manual, and Modality Logs for complete treatment.       Assessment & Plan     Assessment  Assessment details: Balance is significantly improved. Able to progress with standing exercises without discomfort. Educated on KT taping technique for home use.     Plan  Plan details: Progress per POC        Manual Therapy:    8     mins  44236;  Therapeutic Exercise:    36     mins  86438;     Neuromuscular Sofya:    10    mins  01635;    Therapeutic Activity:     -     mins  24490;     Gait Training:      -     mins  16111;     Ultrasound:     -     mins  41000;    Electrical Stimulation:    -     mins  82590 ( );  Dry Needling     -     mins self-pay    Timed Treatment:   54   mins   Total Treatment:     55   mins    Marta Reis PT  [place] License #: [id number]    Physical Therapist

## 2020-06-03 ENCOUNTER — OFFICE VISIT (OUTPATIENT)
Dept: ORTHOPEDIC SURGERY | Facility: CLINIC | Age: 80
End: 2020-06-03

## 2020-06-03 ENCOUNTER — TELEPHONE (OUTPATIENT)
Dept: ORTHOPEDIC SURGERY | Facility: CLINIC | Age: 80
End: 2020-06-03

## 2020-06-03 ENCOUNTER — LAB (OUTPATIENT)
Dept: LAB | Facility: HOSPITAL | Age: 80
End: 2020-06-03

## 2020-06-03 VITALS — TEMPERATURE: 98.7 F | WEIGHT: 105.8 LBS | HEIGHT: 64 IN | BODY MASS INDEX: 18.06 KG/M2

## 2020-06-03 DIAGNOSIS — M17.11 PRIMARY LOCALIZED OSTEOARTHROSIS OF RIGHT LOWER LEG: ICD-10-CM

## 2020-06-03 DIAGNOSIS — E55.9 VITAMIN D DEFICIENCY: ICD-10-CM

## 2020-06-03 DIAGNOSIS — T14.8XXA JOINT CAPSULE TEAR: ICD-10-CM

## 2020-06-03 DIAGNOSIS — S76.011D HIP STRAIN, RIGHT, SUBSEQUENT ENCOUNTER: ICD-10-CM

## 2020-06-03 DIAGNOSIS — S82.64XG CLOSED NONDISPLACED FRACTURE OF LATERAL MALLEOLUS OF RIGHT FIBULA WITH DELAYED HEALING, SUBSEQUENT ENCOUNTER: Primary | ICD-10-CM

## 2020-06-03 DIAGNOSIS — M76.71 PERONEAL TENDONITIS, RIGHT: ICD-10-CM

## 2020-06-03 LAB — 25(OH)D3 SERPL-MCNC: 57.4 NG/ML (ref 30–100)

## 2020-06-03 PROCEDURE — 99213 OFFICE O/P EST LOW 20 MIN: CPT | Performed by: ORTHOPAEDIC SURGERY

## 2020-06-03 PROCEDURE — 73610 X-RAY EXAM OF ANKLE: CPT | Performed by: ORTHOPAEDIC SURGERY

## 2020-06-03 PROCEDURE — 82306 VITAMIN D 25 HYDROXY: CPT

## 2020-06-03 NOTE — TELEPHONE ENCOUNTER
Patient needs a letter for employer stating that patient will be able to return to work on Monday June 8. Patient would like letter mailed to her.

## 2020-06-03 NOTE — PROGRESS NOTES
"Ankle Follow Up      Patient: Bailee Garza    YOB: 1940 80 y.o. female    Chief Complaints: Ankle feels pretty good    History of Present Illness: Patient follows up injured her right ankle that occurred on 2020 with initial dilation on 2020 when she fell getting up from a chair is been treated for right distal fibular fracture and peroneal tendinitis.    Please see note from 2024 details.  During her period of treatment for this she has had complaints of knee pain consistent with arthritis as well as some posterior lateral \"hip\" buttock strain after wearing her boot.  She is been doing therapy for her ankle and her buttock and seems to be helping some and is been using K tape for her knee with only intermittent aching pain.    She was most recently seen on 2020 for review of CT scan which had not shown any appreciable healing of the distal fibula fragment and she has since been using a bone stimulator.  She has nightly been using a brace and has been doing physical therapy    She states that her ankle aches some but does not \"hurt\" the symptoms do not limit her activity and she is very careful with it.  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   • Hearing loss 2008   • Hip pain, left    • History of bone density study 2015   • History of pneumonia     2017   • " "History of skin cancer    • Hypothyroidism, acquired 11/20/2007   • Insomnia 01/28/2015    unspecified   • Intervertebral disc disorder with myelopathy, cervical region 07/22/2010   • OA (osteoarthritis) of hip 7/24/2018   • Rhinitis, allergic 11/20/2007   • Scoliosis    • Screening breast examination 11/20/2007   • Stress 04/27/2015    other acute reactions to stress   • Stress incontinence    • Trochanteric bursitis, left hip 2/4/2019   • Urticaria 06/29/2015       Physical Exam:   Vitals:    06/03/20 0934   Temp: 98.7 °F (37.1 °C)   Weight: 48 kg (105 lb 12.8 oz)   Height: 162.6 cm (64\")   PainSc:   1     Well developed with normal mood.  On exam she has mild discomfort of the posterior lateral buttock but no focal pain over the sacrum or greater trochanteric bursal area.  There is mild discomfort of the medial aspect of the knee but good range of motion and no focal discomfort over the medial femoral condyles or tibial plateau.  Right ankle shows very slight discomfort over the distal fibula more so than on the peroneal tendons with grossly stable anterior drawer but with some guarding.  She is nontender over the medial hindfoot.    Radiology: 3 views the right ankle ordered evaluate fracture reviewed and compared to previous x-rays.  There is been no change in alignment to the fragment over the distal fibula which appears sclerotic but without change in alignment compared with previous x-rays.    Vitamin D 4/13/2020 71.4      Assessment/Plan:  1.  Right nondisplaced transverse fracture at the tip lateral malleolus at the origin of the CFL and ATFL with tearing of the ATFL and with apparent nonunion  at the fracture site  2.  Signal in the anterior aspect of the tibiotalar joint suggestive of displaced capsular ligamentous fragment but no cartilaginous donor site with tibiotalar and subtalar joint effusions  3.  Right peroneal tenosynovitis  4.  Right chronic second MTP arthritis as well as arthritis at the " first TMT joint and Achilles insertional ossification  5.  Exacerbation of chronic right knee arthritis  6.  Vitamin D elevation    Regarding her vitamin D she was instructed on holding it for a week and have her level checked 2 weeks thereafter which she says she did but it was never sent to us and she is going to get a new level drawn today.    Regarding her symptoms as above she does not desire anything done from a surgical standpoint nor do I necessarily recommended as she is only having some mild soreness over the lateral ankle that does not limit her activities but if symptoms persist or worsen this still could require surgical treatment.    She has been out of her brace now as it was bothering her hip and her hip does seem to be improving to some degree as does her knee and will continue with therapy for those.    She can get a vitamin D level rechecked and continue with physical therapy she is not currently taking any vitamin D and I will see her back in about 6 weeks with x-rays of her right ankle.

## 2020-06-04 ENCOUNTER — TREATMENT (OUTPATIENT)
Dept: PHYSICAL THERAPY | Facility: CLINIC | Age: 80
End: 2020-06-04

## 2020-06-04 DIAGNOSIS — Z96.642 HISTORY OF TOTAL LEFT HIP REPLACEMENT: Primary | ICD-10-CM

## 2020-06-04 DIAGNOSIS — M25.552 LEFT HIP PAIN: ICD-10-CM

## 2020-06-04 DIAGNOSIS — R26.9 GAIT DIFFICULTY: ICD-10-CM

## 2020-06-04 PROCEDURE — 97530 THERAPEUTIC ACTIVITIES: CPT | Performed by: PHYSICAL THERAPIST

## 2020-06-04 PROCEDURE — 97110 THERAPEUTIC EXERCISES: CPT | Performed by: PHYSICAL THERAPIST

## 2020-06-04 PROCEDURE — 97140 MANUAL THERAPY 1/> REGIONS: CPT | Performed by: PHYSICAL THERAPIST

## 2020-06-05 ENCOUNTER — TELEPHONE (OUTPATIENT)
Dept: ORTHOPEDIC SURGERY | Facility: CLINIC | Age: 80
End: 2020-06-05

## 2020-06-05 DIAGNOSIS — E55.9 VITAMIN D DEFICIENCY: Primary | ICD-10-CM

## 2020-06-05 NOTE — TELEPHONE ENCOUNTER
----- Message from Álvaro So MD sent at 6/3/2020  2:55 PM EDT -----  Please have her resume use of about 3000 units daily of vitamin D and recheck in about 4 weeks at the hospital not at [facility].

## 2020-06-05 NOTE — TELEPHONE ENCOUNTER
Serum vitamin D level is 57.4.  Have instructed the patient to Dr. So is recommending that she take vitamin D3 3000 units daily and will recheck her serum vitamin D level in 4 weeks per Dr. So

## 2020-06-05 NOTE — TELEPHONE ENCOUNTER
Patient called to report Vitamin D is sold as 1,000 MG or 2,000 MG - patient asking & was informed that per MWM she should take 3,000 MG once a day.

## 2020-06-08 ENCOUNTER — TREATMENT (OUTPATIENT)
Dept: PHYSICAL THERAPY | Facility: CLINIC | Age: 80
End: 2020-06-08

## 2020-06-08 DIAGNOSIS — R26.9 GAIT DIFFICULTY: ICD-10-CM

## 2020-06-08 DIAGNOSIS — M25.552 LEFT HIP PAIN: ICD-10-CM

## 2020-06-08 DIAGNOSIS — Z96.642 HISTORY OF TOTAL LEFT HIP REPLACEMENT: Primary | ICD-10-CM

## 2020-06-08 PROCEDURE — 97110 THERAPEUTIC EXERCISES: CPT | Performed by: PHYSICAL THERAPIST

## 2020-06-08 PROCEDURE — 97112 NEUROMUSCULAR REEDUCATION: CPT | Performed by: PHYSICAL THERAPIST

## 2020-06-08 NOTE — PROGRESS NOTES
Physical Therapy Daily Progress Note  Visit: 7    Bailee Taylrocharly reports: I think the exercises are helping. I taped my knee by myself and my balance is better. I was able to do my yardwork this weekend. I have to leave at 10:00 today because I am heading back to work this week    Subjective     Objective   See Exercise, Manual, and Modality Logs for complete treatment.       Assessment & Plan     Assessment  Assessment details: Pt doing great. Her strength and balance are significantly better. Anticipate DC next session    Plan  Plan details: Reassess next session. Anticipate DC next session        Manual Therapy:    -     mins  35431;  Therapeutic Exercise:    20     mins  54433;     Neuromuscular Sofya:    10    mins  11668;    Therapeutic Activity:     -     mins  56891;     Gait Training:      -     mins  90325;     Ultrasound:     -     mins  87714;    Electrical Stimulation:    --     mins  01109 ( );  Dry Needling     -     mins self-pay    Timed Treatment:   30   mins   Total Treatment:     31   mins    Marta Reis PT  [place] License #: [id number]    Physical Therapist

## 2020-06-11 ENCOUNTER — TREATMENT (OUTPATIENT)
Dept: PHYSICAL THERAPY | Facility: CLINIC | Age: 80
End: 2020-06-11

## 2020-06-11 DIAGNOSIS — M25.552 LEFT HIP PAIN: ICD-10-CM

## 2020-06-11 DIAGNOSIS — R26.9 GAIT DIFFICULTY: ICD-10-CM

## 2020-06-11 DIAGNOSIS — Z96.642 HISTORY OF TOTAL LEFT HIP REPLACEMENT: Primary | ICD-10-CM

## 2020-06-11 PROCEDURE — 97110 THERAPEUTIC EXERCISES: CPT | Performed by: PHYSICAL THERAPIST

## 2020-06-11 PROCEDURE — 97530 THERAPEUTIC ACTIVITIES: CPT | Performed by: PHYSICAL THERAPIST

## 2020-06-11 NOTE — PROGRESS NOTES
Physical Therapy Daily Progress Note  Visit: 8    Bailee Garza reports: I am feeling really good. I am back to doing everything I was doing before. Pain is about 0.5/10. I am back to work. Can we just retest and be done today because I have a lot of errands to run today    Subjective     Objective          Strength/Myotome Testing     Left Hip   Planes of Motion   Flexion: 4+  Abduction: 4-  External rotation: 4    Right Hip   Planes of Motion   Flexion: 4+  Abduction: 4+  External rotation: 4+    Ambulation     Comments   Good gait mechanics      See Exercise, Manual, and Modality Logs for complete treatment.       Assessment & Plan     Assessment  Assessment details: Pt made excellent progress in therapy. Her strength, mobility, and balance are significantly better. She is back to work and all house and yardwork with minimal reports of discomfort. Educated to continue exercises at home.    Plan  Plan details: DC at this time        Manual Therapy:    -     mins  77878;  Therapeutic Exercise:    10     mins  67168;     Neuromuscular Sofya:    5    mins  72683;    Therapeutic Activity:     13     mins  75295;     Gait Training:      -     mins  96644;     Ultrasound:     -     mins  89020;    Electrical Stimulation:    -     mins  42413 ( );  Dry Needling     -     mins self-pay    Timed Treatment:   28   mins   Total Treatment:     30   mins    Marta Reis PT  [place] License #: [id number]    Physical Therapist

## 2020-06-30 DIAGNOSIS — M54.50 CHRONIC MIDLINE LOW BACK PAIN WITHOUT SCIATICA: ICD-10-CM

## 2020-06-30 DIAGNOSIS — G89.29 CHRONIC MIDLINE LOW BACK PAIN WITHOUT SCIATICA: ICD-10-CM

## 2020-07-01 ENCOUNTER — OFFICE VISIT (OUTPATIENT)
Dept: FAMILY MEDICINE CLINIC | Facility: CLINIC | Age: 80
End: 2020-07-01

## 2020-07-01 DIAGNOSIS — I10 ESSENTIAL HYPERTENSION: ICD-10-CM

## 2020-07-01 DIAGNOSIS — E03.9 HYPOTHYROIDISM, ACQUIRED: ICD-10-CM

## 2020-07-01 DIAGNOSIS — M54.50 CHRONIC MIDLINE LOW BACK PAIN WITHOUT SCIATICA: ICD-10-CM

## 2020-07-01 DIAGNOSIS — E78.49 OTHER HYPERLIPIDEMIA: Primary | ICD-10-CM

## 2020-07-01 DIAGNOSIS — Z11.59 NEED FOR HEPATITIS C SCREENING TEST: ICD-10-CM

## 2020-07-01 DIAGNOSIS — G89.29 CHRONIC MIDLINE LOW BACK PAIN WITHOUT SCIATICA: ICD-10-CM

## 2020-07-01 PROCEDURE — 99442 PR PHYS/QHP TELEPHONE EVALUATION 11-20 MIN: CPT | Performed by: FAMILY MEDICINE

## 2020-07-01 RX ORDER — LEVOTHYROXINE SODIUM 88 MCG
88 TABLET ORAL EVERY OTHER DAY
Qty: 45 TABLET | Refills: 0 | Status: SHIPPED | OUTPATIENT
Start: 2020-07-01 | End: 2020-07-30 | Stop reason: SDUPTHER

## 2020-07-01 RX ORDER — TRAMADOL HYDROCHLORIDE 50 MG/1
TABLET ORAL
Qty: 90 TABLET | Refills: 0 | OUTPATIENT
Start: 2020-07-01

## 2020-07-01 RX ORDER — LEVOTHYROXINE SODIUM 75 MCG
75 TABLET ORAL EVERY OTHER DAY
Qty: 45 TABLET | Refills: 0 | Status: SHIPPED | OUTPATIENT
Start: 2020-07-01 | End: 2020-07-30 | Stop reason: SDUPTHER

## 2020-07-01 RX ORDER — TRAMADOL HYDROCHLORIDE 50 MG/1
50 TABLET ORAL EVERY 8 HOURS PRN
Qty: 90 TABLET | Refills: 0 | Status: SHIPPED | OUTPATIENT
Start: 2020-07-01 | End: 2020-07-30 | Stop reason: SDUPTHER

## 2020-07-01 RX ORDER — TRIAMTERENE AND HYDROCHLOROTHIAZIDE 37.5; 25 MG/1; MG/1
1 TABLET ORAL DAILY
Qty: 90 TABLET | Refills: 0 | Status: SHIPPED | OUTPATIENT
Start: 2020-07-01 | End: 2020-07-30 | Stop reason: SDUPTHER

## 2020-07-01 NOTE — ASSESSMENT & PLAN NOTE
Only pain is in low back and right hip. The current medical regimen is effective;  continue present plan and medications.

## 2020-07-01 NOTE — PROGRESS NOTES
Mode of Visit: Telephone  You have chosen to receive care through a telephone visit today. Do you consent to use a telephone visit for your medical care today? Yes   Bailee Garza confirmed that she was in [place] at the time of this telephonic visit.   The visit included telephone interaction. No technical issues occurred during this visit.   more than 10 minutes up to 20 minutes.   Subjective       Chief Complaint   Patient presents with   • Sciatica     med refill          HPI:      Bailee Garza is a 80 y.o. female who presents to  [address] [facility] today to refill medicines.  Shaji report is been reviewed.  Tramadol is effective for low back and right hip pain.  No side effects reported.  Thyroid is stable.  Blood pressure controlled on current therapy.  Overall she feels well.    I have reviewed and updated her medications, medical history and problem list during today's office visit.     Social History     Tobacco Use   • Smoking status: Never Smoker   • Smokeless tobacco: Never Used   Substance Use Topics   • Alcohol use: No       Review of Systems   Musculoskeletal:        See history of present illness         PE:   Objective   There were no vitals taken for this visit.    There is no height or weight on file to calculate BMI.    Physical Exam     Data Reviewed:                    A/P:     Assessment/Plan   Diagnoses and all orders for this visit:    1. Other hyperlipidemia (Primary)  -     Lipid Panel With / Chol / HDL Ratio; Future  -     CK; Future    2. Essential hypertension  Assessment & Plan:  Hypertension is unchanged.  Continue current treatment regimen.  Blood pressure will be reassessed in 3 months.    Orders:  -     triamterene-hydrochlorothiazide (MAXZIDE-25) 37.5-25 MG per tablet; Take 1 tablet by mouth Daily for 90 days.  Dispense: 90 tablet; Refill: 0  -     Comprehensive Metabolic Panel; Future  -     CBC & Differential;  Future    3. Chronic midline low back pain without sciatica  Assessment & Plan:  Only pain is in low back and right hip. The current medical regimen is effective;  continue present plan and medications.      Orders:  -     traMADol (ULTRAM) 50 MG tablet; Take 1 tablet by mouth Every 8 (Eight) Hours As Needed for Moderate Pain  for up to 30 days.  Dispense: 90 tablet; Refill: 0    4. Hypothyroidism, acquired  Assessment & Plan:  The current medical regimen is effective;  continue present plan and medications.      Orders:  -     SYNTHROID 88 MCG tablet; Take 1 tablet by mouth Every Other Day for 90 days.  Dispense: 45 tablet; Refill: 0  -     SYNTHROID 75 MCG tablet; Take 1 tablet by mouth Every Other Day for 90 days.  Dispense: 45 tablet; Refill: 0  -     TSH; Future    5. Need for hepatitis C screening test  -     Hepatitis C Antibody; Future        Follow up:    Return in about 3 months (around 10/1/2020) for Controlled Substance Visit, Recheck/Medication  Refill.

## 2020-07-06 ENCOUNTER — TELEPHONE (OUTPATIENT)
Dept: ORTHOPEDIC SURGERY | Facility: CLINIC | Age: 80
End: 2020-07-06

## 2020-07-06 ENCOUNTER — LAB (OUTPATIENT)
Dept: LAB | Facility: HOSPITAL | Age: 80
End: 2020-07-06

## 2020-07-06 DIAGNOSIS — E55.9 VITAMIN D DEFICIENCY: ICD-10-CM

## 2020-07-06 DIAGNOSIS — E55.9 VITAMIN D DEFICIENCY: Primary | ICD-10-CM

## 2020-07-06 LAB — 25(OH)D3 SERPL-MCNC: 64.7 NG/ML (ref 30–100)

## 2020-07-06 PROCEDURE — 82306 VITAMIN D 25 HYDROXY: CPT

## 2020-07-06 PROCEDURE — 36415 COLL VENOUS BLD VENIPUNCTURE: CPT

## 2020-07-06 NOTE — TELEPHONE ENCOUNTER
Serum vitamin D level is 64.7.  Have advised her to continue with her vitamin D at 3000 units daily and will recheck her serum vitamin D level in 4 weeks.  Also made her an appointment to see Dr. So next week.

## 2020-07-06 NOTE — TELEPHONE ENCOUNTER
----- Message from Álvaro So MD sent at 7/6/2020  4:19 PM EDT -----  Please have her continue 3000 units vitamin D3 daily and recheck in 4 weeks.  Please also make sure she has a follow-up appointment scheduled with me as I do not see one scheduled and she was supposed to have followed up with me 6 weeks after her last visit.

## 2020-07-16 ENCOUNTER — OFFICE VISIT (OUTPATIENT)
Dept: ORTHOPEDIC SURGERY | Facility: CLINIC | Age: 80
End: 2020-07-16

## 2020-07-16 VITALS — HEIGHT: 64 IN | WEIGHT: 106 LBS | TEMPERATURE: 98.2 F | BODY MASS INDEX: 18.1 KG/M2

## 2020-07-16 DIAGNOSIS — M25.571 RIGHT ANKLE PAIN, UNSPECIFIED CHRONICITY: Primary | ICD-10-CM

## 2020-07-16 DIAGNOSIS — M76.71 PERONEAL TENDONITIS, RIGHT: ICD-10-CM

## 2020-07-16 DIAGNOSIS — S82.64XG CLOSED NONDISPLACED FRACTURE OF LATERAL MALLEOLUS OF RIGHT FIBULA WITH DELAYED HEALING, SUBSEQUENT ENCOUNTER: ICD-10-CM

## 2020-07-16 DIAGNOSIS — T14.8XXA JOINT CAPSULE TEAR: ICD-10-CM

## 2020-07-16 DIAGNOSIS — E55.9 VITAMIN D DEFICIENCY: ICD-10-CM

## 2020-07-16 PROBLEM — S82.66XG: Status: ACTIVE | Noted: 2020-01-13

## 2020-07-16 PROCEDURE — 73610 X-RAY EXAM OF ANKLE: CPT | Performed by: ORTHOPAEDIC SURGERY

## 2020-07-16 PROCEDURE — 99213 OFFICE O/P EST LOW 20 MIN: CPT | Performed by: ORTHOPAEDIC SURGERY

## 2020-07-16 RX ORDER — ERGOCALCIFEROL 1.25 MG/1
50000 CAPSULE ORAL WEEKLY
COMMUNITY
End: 2020-07-30

## 2020-07-16 NOTE — PROGRESS NOTES
"Ankle Follow Up      Patient: Bailee Garza    YOB: 1940 80 y.o. female    Chief Complaints: Ankle feels about the same    History of Present Illness: Patient follows up injured her right ankle that occurred on 2020 with initial dilation on 2020 when she fell getting up from a chair is been treated for right distal fibular fracture and peroneal tendinitis.    During her period of treatment she had some complaints of posterior lateral buttock/hip pain and foot after wearing her boot.    She was seen on 2020 for review of CT scan which did not show appreciable healing of the distal fibula fracture.  She is mostly recently seen on 6/3/2020 and did not desire anything done from a surgical standpoint.  She had been out of her brace and hip pain was improving.    She is continue doing physical therapy and her hip pain has improved.  She has not been using a brace and has not had any feelings of instability to the ankle she gets an occasional \"stick\" feel over the anterolateral inferior aspect of the ankle and some slight discomfort posterior laterally and anteriorly but nothing that limits her activity.  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   • Hearing loss 2008   • Hip pain, left    • History of bone density study 2015   • History of pneumonia     2017   • History of " "skin cancer    • Hypothyroidism, acquired 11/20/2007   • Insomnia 01/28/2015    unspecified   • Intervertebral disc disorder with myelopathy, cervical region 07/22/2010   • OA (osteoarthritis) of hip 7/24/2018   • Rhinitis, allergic 11/20/2007   • Scoliosis    • Screening breast examination 11/20/2007   • Stress 04/27/2015    other acute reactions to stress   • Stress incontinence    • Trochanteric bursitis, left hip 2/4/2019   • Urticaria 06/29/2015       Physical Exam:   Vitals:    07/16/20 0923   Temp: 98.2 °F (36.8 °C)   TempSrc: Temporal   Weight: 48.1 kg (106 lb)   Height: 162.6 cm (64\")   PainSc:   1     Well developed with normal mood.  She has a nonantalgic gait without brace or assistive device.  There was minimal discomfort over the distal aspect of the fibula and along the peroneal tendons with slight discomfort of the anterior ankle but no pain with range of motion.      Radiology: 3 views of the right ankle ordered evaluate fracture alignment reviewed and compared with previous x-rays.  The distal fibular ossific fragment is still present but appears to be somewhat consolidated compared with previous x-rays no appreciable displacement compared to previous x-rays and talus remains well-seated within the mortise.  Nontender over the dorsum of the midfoot      Vitamin D 6/7/2020 64.7 down from 71.4 on 4/13/2020      Assessment/Plan:  1.  Right nondisplaced transverse fracture at the tip lateral malleolus at the origin of the CFL and ATFL with tearing of the ATFL and with apparent nonunion  at the fracture site  2.  Signal in the anterior aspect of the tibiotalar joint suggestive of displaced capsular ligamentous fragment but no cartilaginous donor site with tibiotalar and subtalar joint effusions  3.  Right peroneal tenosynovitis  4.  Right chronic second MTP arthritis as well as arthritis at the first TMT joint and Achilles insertional ossification  5.  Exacerbation of chronic right knee arthritis  6. "  Vitamin D elevation after initial deficiency.    Regarding her vitamin D she was instructed on 7/6/2022 resume 3000 units daily and have her level checked in 4 weeks which will be around 8/3/2020.  She understands going forward after this next check she will need to follow-up with her primary care provider regarding dosage and monitoring.    We discussed treatment options for her ankle and overall she seems to doing relatively well with only very mild intermittent symptoms and does not desire anything done from a surgical standpoint and given her relative lack of pain and lack of instability I would not recommend any surgical treatment at this time nor does she desire it.    She will advance activity slowly as tolerated and will continue working as she has been doing without restrictions and tolerating this well.  She has any worsening of symptoms she will let me know otherwise I will see her back as needed she to me how much she had appreciated my care

## 2020-07-29 ENCOUNTER — LAB (OUTPATIENT)
Dept: LAB | Facility: HOSPITAL | Age: 80
End: 2020-07-29

## 2020-07-29 ENCOUNTER — TELEPHONE (OUTPATIENT)
Dept: ORTHOPEDIC SURGERY | Facility: CLINIC | Age: 80
End: 2020-07-29

## 2020-07-29 DIAGNOSIS — E78.49 OTHER HYPERLIPIDEMIA: ICD-10-CM

## 2020-07-29 DIAGNOSIS — E03.9 HYPOTHYROIDISM, ACQUIRED: ICD-10-CM

## 2020-07-29 DIAGNOSIS — I10 ESSENTIAL HYPERTENSION: ICD-10-CM

## 2020-07-29 DIAGNOSIS — Z11.59 NEED FOR HEPATITIS C SCREENING TEST: ICD-10-CM

## 2020-07-29 DIAGNOSIS — E55.9 VITAMIN D DEFICIENCY: ICD-10-CM

## 2020-07-29 LAB
25(OH)D3 SERPL-MCNC: 59.6 NG/ML (ref 30–100)
ALBUMIN SERPL-MCNC: 4.4 G/DL (ref 3.5–5.2)
ALBUMIN/GLOB SERPL: 1.6 G/DL
ALP SERPL-CCNC: 83 U/L (ref 39–117)
ALT SERPL W P-5'-P-CCNC: 23 U/L (ref 1–33)
ANION GAP SERPL CALCULATED.3IONS-SCNC: 8.2 MMOL/L (ref 5–15)
AST SERPL-CCNC: 23 U/L (ref 1–32)
BASOPHILS # BLD AUTO: 0.06 10*3/MM3 (ref 0–0.2)
BASOPHILS NFR BLD AUTO: 1.2 % (ref 0–1.5)
BILIRUB SERPL-MCNC: 0.4 MG/DL (ref 0–1.2)
BUN SERPL-MCNC: 26 MG/DL (ref 8–23)
BUN/CREAT SERPL: 25.5 (ref 7–25)
CALCIUM SPEC-SCNC: 10.1 MG/DL (ref 8.6–10.5)
CHLORIDE SERPL-SCNC: 103 MMOL/L (ref 98–107)
CHOLEST SERPL-MCNC: 189 MG/DL (ref 0–200)
CK SERPL-CCNC: 78 U/L (ref 20–180)
CO2 SERPL-SCNC: 26.8 MMOL/L (ref 22–29)
CREAT SERPL-MCNC: 1.02 MG/DL (ref 0.57–1)
DEPRECATED RDW RBC AUTO: 44.1 FL (ref 37–54)
EOSINOPHIL # BLD AUTO: 0 10*3/MM3 (ref 0–0.4)
EOSINOPHIL NFR BLD AUTO: 0 % (ref 0.3–6.2)
ERYTHROCYTE [DISTWIDTH] IN BLOOD BY AUTOMATED COUNT: 12 % (ref 12.3–15.4)
GFR SERPL CREATININE-BSD FRML MDRD: 52 ML/MIN/1.73
GLOBULIN UR ELPH-MCNC: 2.7 GM/DL
GLUCOSE SERPL-MCNC: 88 MG/DL (ref 65–99)
HCT VFR BLD AUTO: 41.6 % (ref 34–46.6)
HCV AB SER DONR QL: NORMAL
HDLC SERPL QL: 2.78
HDLC SERPL-MCNC: 68 MG/DL (ref 40–60)
HGB BLD-MCNC: 13.6 G/DL (ref 12–15.9)
IMM GRANULOCYTES # BLD AUTO: 0.01 10*3/MM3 (ref 0–0.05)
IMM GRANULOCYTES NFR BLD AUTO: 0.2 % (ref 0–0.5)
LDLC SERPL CALC-MCNC: 98 MG/DL (ref 0–100)
LYMPHOCYTES # BLD AUTO: 1.26 10*3/MM3 (ref 0.7–3.1)
LYMPHOCYTES NFR BLD AUTO: 24.2 % (ref 19.6–45.3)
MCH RBC QN AUTO: 32.2 PG (ref 26.6–33)
MCHC RBC AUTO-ENTMCNC: 32.7 G/DL (ref 31.5–35.7)
MCV RBC AUTO: 98.3 FL (ref 79–97)
MONOCYTES # BLD AUTO: 0.48 10*3/MM3 (ref 0.1–0.9)
MONOCYTES NFR BLD AUTO: 9.2 % (ref 5–12)
NEUTROPHILS NFR BLD AUTO: 3.4 10*3/MM3 (ref 1.7–7)
NEUTROPHILS NFR BLD AUTO: 65.2 % (ref 42.7–76)
NRBC BLD AUTO-RTO: 0 /100 WBC (ref 0–0.2)
PLATELET # BLD AUTO: 221 10*3/MM3 (ref 140–450)
PMV BLD AUTO: 10.7 FL (ref 6–12)
POTASSIUM SERPL-SCNC: 4.1 MMOL/L (ref 3.5–5.2)
PROT SERPL-MCNC: 7.1 G/DL (ref 6–8.5)
RBC # BLD AUTO: 4.23 10*6/MM3 (ref 3.77–5.28)
SODIUM SERPL-SCNC: 138 MMOL/L (ref 136–145)
TRIGL SERPL-MCNC: 114 MG/DL (ref 0–150)
TSH SERPL DL<=0.05 MIU/L-ACNC: 2.4 UIU/ML (ref 0.27–4.2)
VLDLC SERPL-MCNC: 22.8 MG/DL (ref 5–40)
WBC # BLD AUTO: 5.21 10*3/MM3 (ref 3.4–10.8)

## 2020-07-29 PROCEDURE — 85025 COMPLETE CBC W/AUTO DIFF WBC: CPT

## 2020-07-29 PROCEDURE — 82550 ASSAY OF CK (CPK): CPT

## 2020-07-29 PROCEDURE — 80061 LIPID PANEL: CPT

## 2020-07-29 PROCEDURE — 36415 COLL VENOUS BLD VENIPUNCTURE: CPT

## 2020-07-29 PROCEDURE — 80053 COMPREHEN METABOLIC PANEL: CPT

## 2020-07-29 PROCEDURE — 86803 HEPATITIS C AB TEST: CPT

## 2020-07-29 PROCEDURE — 82306 VITAMIN D 25 HYDROXY: CPT

## 2020-07-29 PROCEDURE — 84443 ASSAY THYROID STIM HORMONE: CPT

## 2020-07-29 NOTE — TELEPHONE ENCOUNTER
Serum vitamin D level is 59.6.  Have instructed her that she needs to continue her vitamin D at 3000 units daily.  Dr. So is recommending that she contact her primary care physician for further monitoring and dosing.  Patient states that she is scheduled to see her PCP tomorrow and will discuss it with him at that time

## 2020-07-29 NOTE — TELEPHONE ENCOUNTER
----- Message from Álvaro So MD sent at 7/29/2020  2:02 PM EDT -----  Please have her continue with D3 3000 units daily and please make sure she understands she is to follow-up with her primary care provider for further monitoring and dosing as I am no longer seeing her.

## 2020-07-30 ENCOUNTER — OFFICE VISIT (OUTPATIENT)
Dept: FAMILY MEDICINE CLINIC | Facility: CLINIC | Age: 80
End: 2020-07-30

## 2020-07-30 VITALS
RESPIRATION RATE: 16 BRPM | BODY MASS INDEX: 18.27 KG/M2 | OXYGEN SATURATION: 97 % | HEART RATE: 75 BPM | DIASTOLIC BLOOD PRESSURE: 70 MMHG | TEMPERATURE: 97.7 F | HEIGHT: 64 IN | SYSTOLIC BLOOD PRESSURE: 122 MMHG | WEIGHT: 107 LBS

## 2020-07-30 DIAGNOSIS — E03.9 HYPOTHYROIDISM, ACQUIRED: ICD-10-CM

## 2020-07-30 DIAGNOSIS — I10 ESSENTIAL HYPERTENSION: Primary | ICD-10-CM

## 2020-07-30 DIAGNOSIS — G89.29 CHRONIC MIDLINE LOW BACK PAIN WITHOUT SCIATICA: ICD-10-CM

## 2020-07-30 DIAGNOSIS — N18.30 STAGE 3 CHRONIC KIDNEY DISEASE (HCC): ICD-10-CM

## 2020-07-30 DIAGNOSIS — Z23 IMMUNIZATION DUE: ICD-10-CM

## 2020-07-30 DIAGNOSIS — M54.50 CHRONIC MIDLINE LOW BACK PAIN WITHOUT SCIATICA: ICD-10-CM

## 2020-07-30 PROCEDURE — 90715 TDAP VACCINE 7 YRS/> IM: CPT | Performed by: FAMILY MEDICINE

## 2020-07-30 PROCEDURE — 99214 OFFICE O/P EST MOD 30 MIN: CPT | Performed by: FAMILY MEDICINE

## 2020-07-30 PROCEDURE — 90471 IMMUNIZATION ADMIN: CPT | Performed by: FAMILY MEDICINE

## 2020-07-30 RX ORDER — LEVOTHYROXINE SODIUM 75 MCG
75 TABLET ORAL EVERY OTHER DAY
Qty: 45 TABLET | Refills: 0 | Status: SHIPPED | OUTPATIENT
Start: 2020-07-30 | End: 2020-10-29 | Stop reason: SDUPTHER

## 2020-07-30 RX ORDER — MELATONIN
1000 DAILY
COMMUNITY
End: 2022-06-23

## 2020-07-30 RX ORDER — LEVOTHYROXINE SODIUM 88 MCG
88 TABLET ORAL EVERY OTHER DAY
Qty: 45 TABLET | Refills: 0 | Status: SHIPPED | OUTPATIENT
Start: 2020-07-30 | End: 2020-10-29 | Stop reason: SDUPTHER

## 2020-07-30 RX ORDER — TRIAMTERENE AND HYDROCHLOROTHIAZIDE 37.5; 25 MG/1; MG/1
1 TABLET ORAL DAILY
Qty: 90 TABLET | Refills: 0 | Status: SHIPPED | OUTPATIENT
Start: 2020-07-30 | End: 2020-10-29 | Stop reason: SDUPTHER

## 2020-07-30 RX ORDER — CHOLECALCIFEROL (VITAMIN D3) 125 MCG
50 CAPSULE ORAL DAILY
COMMUNITY
End: 2021-07-22 | Stop reason: SDUPTHER

## 2020-07-30 RX ORDER — TRAMADOL HYDROCHLORIDE 50 MG/1
50 TABLET ORAL EVERY 6 HOURS PRN
Qty: 120 TABLET | Refills: 2 | Status: SHIPPED | OUTPATIENT
Start: 2020-07-30 | End: 2020-10-29 | Stop reason: SDUPTHER

## 2020-07-30 NOTE — PROGRESS NOTES
"   Subjective       Chief Complaint   Patient presents with   • Hypertension   • Back Pain     med refill          HPI:       HPI   Patient presents the office to refill medicines.  Medication list has been updated.  Blood pressure control.  Thyroid stable.  Chronic back pain is controlled with tramadol and Tylenol 4 times daily.  Shaji report is been reviewed.  Pill count is adequate.  Last prescription bottle brought in from 2020.  She states she has been taking medication 4 times daily with good effectiveness.    Review of Systems   Constitutional: Negative for fatigue.   Musculoskeletal: Positive for back pain.        I have reviewed and confirmed the accuracy of the ROS as documented by myself or MA/LPN/RN Eddie Araya MD   The patient's ACP documentation has  and needs to be reviewed again.      PE:   Objective   /70   Pulse 75   Temp 97.7 °F (36.5 °C) (Tympanic)   Resp 16   Ht 162.6 cm (64\")   Wt 48.5 kg (107 lb)   SpO2 97%   BMI 18.37 kg/m²     Body mass index is 18.37 kg/m².    Physical Exam   Constitutional: She is oriented to person, place, and time. She appears well-developed. No distress.   Eyes: Conjunctivae and lids are normal.   Neck: Carotid bruit is not present.   Cardiovascular: Normal rate, regular rhythm and normal heart sounds.   Pulmonary/Chest: Effort normal and breath sounds normal.   Neurological: She is alert and oriented to person, place, and time.   Skin: Skin is warm and dry.   Psychiatric: She has a normal mood and affect. Her behavior is normal.   Vitals reviewed.         Data Reviewed:       GENERAL LABS:  Lab Results   Component Value Date    BUN 26 (H) 2020    CREATININE 1.02 (H) 2020    EGFRIFNONA 52 (L) 2020     2020    K 4.1 2020     2020    CALCIUM 10.1 2020    ALBUMIN 4.40 2020    BILITOT 0.4 2020    ALKPHOS 83 2020    AST 23 2020    ALT 23 2020    TRIG 114 " 07/29/2020    HDL 68 (H) 07/29/2020    VLDL 22.8 07/29/2020    LDL 98 07/29/2020    CKTOTAL 78 07/29/2020    WBC 5.21 07/29/2020    RBC 4.23 07/29/2020    HCT 41.6 07/29/2020    MCV 98.3 (H) 07/29/2020    MCH 32.2 07/29/2020    TSH 2.400 07/29/2020    LZML91KD 59.6 07/29/2020             A/P:     Assessment/Plan   Diagnoses and all orders for this visit:    1. Essential hypertension (Primary)  Assessment & Plan:  Hypertension is unchanged.  Continue current treatment regimen.  Blood pressure will be reassessed in 3 months.    Orders:  -     triamterene-hydrochlorothiazide (MAXZIDE-25) 37.5-25 MG per tablet; Take 1 tablet by mouth Daily for 90 days.  Dispense: 90 tablet; Refill: 0    2. Hypothyroidism, acquired  Assessment & Plan:  The current medical regimen is effective;  continue present plan and medications.      Orders:  -     SYNTHROID 75 MCG tablet; Take 1 tablet by mouth Every Other Day for 90 days.  Dispense: 45 tablet; Refill: 0  -     SYNTHROID 88 MCG tablet; Take 1 tablet by mouth Every Other Day for 90 days.  Dispense: 45 tablet; Refill: 0    3. Chronic midline low back pain without sciatica  Assessment & Plan:  The current medical regimen is effective;  continue present plan and medications.      Orders:  -     traMADol (ULTRAM) 50 MG tablet; Take 1 tablet by mouth Every 6 (Six) Hours As Needed for Moderate Pain  for up to 90 days.  Dispense: 120 tablet; Refill: 2    4. Immunization due  -     Tdap Vaccine Greater Than or Equal To 6yo IM    5. Stage 3 chronic kidney disease (CMS/HCC)  Assessment & Plan:  Renal condition is improving with lifestyle modifications.  Continue current treatment regimen.  Renal condition will be reassessed in 3 months.          Follow up:    Return in about 3 months (around 10/30/2020).

## 2020-07-30 NOTE — ASSESSMENT & PLAN NOTE
Renal condition is improving with lifestyle modifications.  Continue current treatment regimen.  Renal condition will be reassessed in 3 months.

## 2020-10-23 ENCOUNTER — TELEPHONE (OUTPATIENT)
Dept: FAMILY MEDICINE CLINIC | Facility: CLINIC | Age: 80
End: 2020-10-23

## 2020-10-29 ENCOUNTER — OFFICE VISIT (OUTPATIENT)
Dept: FAMILY MEDICINE CLINIC | Facility: CLINIC | Age: 80
End: 2020-10-29

## 2020-10-29 VITALS
SYSTOLIC BLOOD PRESSURE: 135 MMHG | BODY MASS INDEX: 18.27 KG/M2 | HEART RATE: 74 BPM | HEIGHT: 64 IN | WEIGHT: 107 LBS | RESPIRATION RATE: 16 BRPM | OXYGEN SATURATION: 98 % | DIASTOLIC BLOOD PRESSURE: 72 MMHG | TEMPERATURE: 97.1 F

## 2020-10-29 DIAGNOSIS — I10 ESSENTIAL HYPERTENSION: ICD-10-CM

## 2020-10-29 DIAGNOSIS — E03.9 HYPOTHYROIDISM, ACQUIRED: ICD-10-CM

## 2020-10-29 DIAGNOSIS — M54.50 CHRONIC MIDLINE LOW BACK PAIN WITHOUT SCIATICA: ICD-10-CM

## 2020-10-29 DIAGNOSIS — G89.29 CHRONIC MIDLINE LOW BACK PAIN WITHOUT SCIATICA: ICD-10-CM

## 2020-10-29 PROCEDURE — 99214 OFFICE O/P EST MOD 30 MIN: CPT | Performed by: FAMILY MEDICINE

## 2020-10-29 RX ORDER — LEVOTHYROXINE SODIUM 88 MCG
88 TABLET ORAL EVERY OTHER DAY
Qty: 45 TABLET | Refills: 0 | Status: SHIPPED | OUTPATIENT
Start: 2020-10-29 | End: 2021-01-28 | Stop reason: SDUPTHER

## 2020-10-29 RX ORDER — TRAMADOL HYDROCHLORIDE 50 MG/1
50 TABLET ORAL EVERY 6 HOURS PRN
Qty: 120 TABLET | Refills: 2 | Status: SHIPPED | OUTPATIENT
Start: 2020-10-29 | End: 2021-01-28 | Stop reason: SDUPTHER

## 2020-10-29 RX ORDER — TRIAMTERENE AND HYDROCHLOROTHIAZIDE 37.5; 25 MG/1; MG/1
1 TABLET ORAL DAILY
Qty: 90 TABLET | Refills: 0 | Status: SHIPPED | OUTPATIENT
Start: 2020-10-29 | End: 2021-01-28 | Stop reason: SDUPTHER

## 2020-10-29 RX ORDER — LEVOTHYROXINE SODIUM 75 MCG
75 TABLET ORAL EVERY OTHER DAY
Qty: 45 TABLET | Refills: 0 | Status: SHIPPED | OUTPATIENT
Start: 2020-10-29 | End: 2021-01-28 | Stop reason: SDUPTHER

## 2020-10-29 NOTE — PROGRESS NOTES
"   Subjective       Chief Complaint   Patient presents with   • Hypertension     3 mo   • Hyperlipidemia         SUBJECTIVE:       This patient presents for to refill medicines.  Blood pressure controlled.  Thyroid controlled.  Her back pain is stable with current therapy.  Shaji report is been reviewed.  She did bring in her prescription bottles and has appropriate amount remaining from most recent prescription.  History of Present Illness       Review of Systems   Constitutional: Negative for fatigue.   Cardiovascular: Negative for chest pain.   Musculoskeletal: Positive for back pain (with rle sciatica).      I have reviewed the ROS as documented above. Eddie Araya MD     The patient's ACP documentation has  and needs to be reviewed again.        OBJECTIVE   Objective   /72   Pulse 74   Temp 97.1 °F (36.2 °C) (Tympanic)   Resp 16   Ht 162.6 cm (64\")   Wt 48.5 kg (107 lb)   SpO2 98%   BMI 18.37 kg/m²  Body mass index is 18.37 kg/m².    Physical Exam  Vitals signs reviewed.   Constitutional:       General: She is not in acute distress.  Eyes:      General: Lids are normal.      Conjunctiva/sclera: Conjunctivae normal.   Neck:      Vascular: No carotid bruit.      Trachea: No tracheal deviation.   Cardiovascular:      Rate and Rhythm: Normal rate and regular rhythm.      Heart sounds: Normal heart sounds. No murmur.   Pulmonary:      Effort: Pulmonary effort is normal.      Breath sounds: Normal breath sounds.   Skin:     General: Skin is warm and dry.   Neurological:      Mental Status: She is alert. She is not disoriented.   Psychiatric:         Speech: Speech normal.         Behavior: Behavior normal. Behavior is cooperative.                         Procedures           ASSESSMENT/PLAN:     Assessment/Plan   Diagnoses and all orders for this visit:    1. Hypothyroidism, acquired  Assessment & Plan:  The current medical regimen is effective;  continue present plan and " medications.    Orders:  -     Synthroid 75 MCG tablet; Take 1 tablet by mouth Every Other Day for 90 days.  Dispense: 45 tablet; Refill: 0  -     Synthroid 88 MCG tablet; Take 1 tablet by mouth Every Other Day for 90 days.  Dispense: 45 tablet; Refill: 0    2. Chronic midline low back pain without sciatica  Assessment & Plan:  The current medical regimen is effective;  continue present plan and medications.      Orders:  -     traMADol (ULTRAM) 50 MG tablet; Take 1 tablet by mouth Every 6 (Six) Hours As Needed for Moderate Pain  for up to 90 days.  Dispense: 120 tablet; Refill: 2    3. Essential hypertension  Assessment & Plan:  Hypertension is unchanged.  Continue current treatment regimen.  Blood pressure will be reassessed in 3 months.    Orders:  -     triamterene-hydrochlorothiazide (MAXZIDE-25) 37.5-25 MG per tablet; Take 1 tablet by mouth Daily for 90 days.  Dispense: 90 tablet; Refill: 0        FOLLOW UP:    Return in about 3 months (around 1/29/2021) for Recheck/Medication, Controlled Substance Visit.

## 2020-11-05 ENCOUNTER — OFFICE VISIT (OUTPATIENT)
Dept: ORTHOPEDIC SURGERY | Facility: CLINIC | Age: 80
End: 2020-11-05

## 2020-11-05 VITALS — BODY MASS INDEX: 18.27 KG/M2 | WEIGHT: 107 LBS | TEMPERATURE: 96.2 F | HEIGHT: 64 IN

## 2020-11-05 DIAGNOSIS — Z96.642 STATUS POST TOTAL REPLACEMENT OF LEFT HIP: ICD-10-CM

## 2020-11-05 DIAGNOSIS — M25.552 LEFT HIP PAIN: Primary | ICD-10-CM

## 2020-11-05 DIAGNOSIS — M16.11 ARTHRITIS OF RIGHT HIP: ICD-10-CM

## 2020-11-05 PROCEDURE — 73501 X-RAY EXAM HIP UNI 1 VIEW: CPT | Performed by: ORTHOPAEDIC SURGERY

## 2020-11-05 PROCEDURE — 99213 OFFICE O/P EST LOW 20 MIN: CPT | Performed by: ORTHOPAEDIC SURGERY

## 2020-11-05 NOTE — PROGRESS NOTES
"Patient Name: Bailee Garza   YOB: 1940  Referring Primary Care Physician: Eddie Araya MD  BMI: Body mass index is 18.37 kg/m².    Chief Complaint:    Chief Complaint   Patient presents with   • Left Hip - Follow-up        HPI:     Bailee Garza is a 80 y.o. female who presents today for evaluation of   Chief Complaint   Patient presents with   • Left Hip - Follow-up   .  Patient is seen today for follow-up on her left total hip.  She was \"Dr. Mann's last patient\".  She says that her knee is doing \"fantastic\" and she gets around quite well.  She demonstrates sitting in a \"W\" position but encouraged her not to do that.  She was noted to have some eccentric polywear when I saw her last year and encouraged that she follow-up.  She did have a mild to moderate symptoms on the right she does get groin pain and have some pain there but not enough to do anything about      Subjective   Medications:   Home Medications:  Current Outpatient Medications on File Prior to Visit   Medication Sig   • acetaminophen (TYLENOL) 500 MG tablet Take 2 tablets by mouth 3 (Three) Times a Day As Needed for Mild Pain  or Moderate Pain . Do not exceed 3000 mg daily   • calcium polycarbophil (FIBERCON) 625 MG tablet Take 625 mg by mouth Daily.   • cholecalciferol (VITAMIN D3) 25 MCG (1000 UT) tablet Take 1,000 Units by mouth Daily.   • Cholecalciferol (VITAMIN D3) 50 MCG (2000 UT) tablet Take 50 mcg by mouth Daily.   • Synthroid 75 MCG tablet Take 1 tablet by mouth Every Other Day for 90 days.   • Synthroid 88 MCG tablet Take 1 tablet by mouth Every Other Day for 90 days.   • traMADol (ULTRAM) 50 MG tablet Take 1 tablet by mouth Every 6 (Six) Hours As Needed for Moderate Pain  for up to 90 days.   • triamterene-hydrochlorothiazide (MAXZIDE-25) 37.5-25 MG per tablet Take 1 tablet by mouth Daily for 90 days.     No current facility-administered medications on file prior to visit.      Current Medications:  Scheduled " Meds:  Continuous Infusions:No current facility-administered medications for this visit.     PRN Meds:.    I have reviewed the patient's medical history in detail and updated the computerized patient record.  Review and summarization of old records includes:    Past Medical History:   Diagnosis Date   • Abnormal CXR 2017   • Allergic    • Arthritis    • Arthropathy of pelvic region and thigh 2012    unspecified   • Back pain 2007   • Chronic back pain 2009   • Chronic cough 2017   • Closed nondisplaced fracture of medial cuneiform of left foot 2018   • Constipation 2015    unspecified   • Diverticulosis    • Dyspepsia 2008   • Essential hypertension 2007   • Exertional shortness of breath 2017   • Fall 2018   • 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), 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   • Hearing loss 2008   • Hip pain, left    • History of bone density study 2015   • History of pneumonia     2017   • History of skin cancer    • Hypothyroidism, acquired 2007   • Insomnia 2015    unspecified   • Intervertebral disc disorder with myelopathy, cervical region 2010   • OA (osteoarthritis) of hip 2018   • Rhinitis, allergic 2007   • Scoliosis    • Screening breast examination 2007   • Stress 2015    other acute reactions to stress   • Stress incontinence    • Trochanteric bursitis, left hip 2019   • Urticaria 2015        Past Surgical History:   Procedure Laterality Date   • BREAST EXCISIONAL BIOPSY Right     50+ years ago   • BRONCHOSCOPY N/A 2017    Procedure: BRONCHOSCOPY;  Surgeon: Mario Alanis MD;  Location: [facility] ENDOSCOPY;  Service:    • CATARACT EXTRACTION, BILATERAL     • COLONOSCOPY     • HYSTERECTOMY     • TOTAL HIP ARTHROPLASTY Left 2018    Procedure: LEFT TOTAL HIP ARTHROPLASTY;  Surgeon: Justen Mann MD;  Location: [facility] MAIN OR;  " Service: Orthopedics        Social History     Occupational History   • Not on file   Tobacco Use   • Smoking status: Never Smoker   • Smokeless tobacco: Never Used   Substance and Sexual Activity   • Alcohol use: No   • Drug use: Never   • Sexual activity: Defer      Social History     Social History Narrative   • Not on file        Family History   Problem Relation Age of Onset   • Heart disease Mother    • Aneurysm Mother    • Cancer Father    • Asthma Paternal Grandfather    • Aneurysm Brother    • Breast cancer Paternal Grandmother    • Malig Hyperthermia Neg Hx        ROS: 14 point review of systems was performed and all other systems were reviewed and are negative except for documented findings in HPI and today's encounter.     Allergies:   Allergies   Allergen Reactions   • Hydrocodone Hallucinations   • Ketek [Telithromycin] Hives   • Nsaids Hives   • Sulfa Antibiotics Hives     Constitutional:  Denies fever, shaking or chills   Eyes:  Denies change in visual acuity   HENT:  Denies nasal congestion or sore throat   Respiratory:  Denies cough or shortness of breath   Cardiovascular:  Denies chest pain or severe LE edema   GI:  Denies abdominal pain, nausea, vomiting, bloody stools or diarrhea   Musculoskeletal:  Numbness, tingling, pain, or loss of motor function only as noted above in history of present illness.  : Denies painful urination or hematuria  Integument:  Denies rash, lesion or ulceration   Neurologic:  Denies headache or focal weakness  Endocrine:  Denies lymphadenopathy  Psych:  Denies confusion or change in mental status   Hem:  Denies active bleeding    OBJECTIVE:  Physical Exam: 80 y.o. female  Wt Readings from Last 3 Encounters:   11/05/20 48.5 kg (107 lb)   10/29/20 48.5 kg (107 lb)   07/30/20 48.5 kg (107 lb)     Ht Readings from Last 1 Encounters:   11/05/20 162.6 cm (64\")     Body mass index is 18.37 kg/m².  Vitals:    11/05/20 0903   Temp: 96.2 °F (35.7 °C)     Vital signs reviewed. "     General Appearance:    Alert, cooperative, in no acute distress                  Eyes: conjunctiva clear  ENT: external ears and nose atraumatic  CV: no peripheral edema  Resp: normal respiratory effort  Skin: no rashes or wounds; normal turgor  Psych: mood and affect appropriate  Lymph: no nodes appreciated  Neuro: gross sensation intact  Vascular:  Palpable peripheral pulse in noted extremity  Musculoskeletal Extremities: Exam today shows right knee has decreased internal rotation with mild to moderate pain she walks without a limp left leg is nontender over the bursa or SI joint    Radiology:   AP of the hips lateral left hip taken the office today for total joint follow-up with comparison view serially over the last 2 years do show slightly vertical cup with increased anteversion and some eccentric polywear.  The eccentric polywear has not changed significantly from last year.  He also has arthritis in her right hip which really is not bothering her enough to do anything about according to the patient at this time    Assessment:     ICD-10-CM ICD-9-CM   1. Left hip pain  M25.552 719.45   2. Status post total replacement of left hip  Z96.642 V43.64   3. Arthritis of right hip  M16.11 716.95        Procedures       Plan: The diagnosis(es), natural history, pathophysiology and treatment for diagnosis(es) were discussed. Opportunity given and questions answered.  Biomechanics of pertinent body areas discussed.  When appropriate, the use of ambulatory aids discussed.  Recommend that she follow-up at least yearly or sooner if she is having problems.      11/5/2020    Much of this encounter note is an electronic transcription/translation of spoken language to printed text. The electronic translation of spoken language may permit erroneous, or at times, nonsensical words or phrases to be inadvertently transcribed; Although I have reviewed the note for such errors, some may still exist

## 2021-01-28 ENCOUNTER — OFFICE VISIT (OUTPATIENT)
Dept: FAMILY MEDICINE CLINIC | Facility: CLINIC | Age: 81
End: 2021-01-28

## 2021-01-28 VITALS
WEIGHT: 108 LBS | HEIGHT: 64 IN | RESPIRATION RATE: 16 BRPM | OXYGEN SATURATION: 98 % | TEMPERATURE: 97.1 F | SYSTOLIC BLOOD PRESSURE: 120 MMHG | DIASTOLIC BLOOD PRESSURE: 67 MMHG | HEART RATE: 67 BPM | BODY MASS INDEX: 18.44 KG/M2

## 2021-01-28 DIAGNOSIS — E55.9 VITAMIN D DEFICIENCY: ICD-10-CM

## 2021-01-28 DIAGNOSIS — M54.50 CHRONIC MIDLINE LOW BACK PAIN WITHOUT SCIATICA: ICD-10-CM

## 2021-01-28 DIAGNOSIS — N18.30 STAGE 3 CHRONIC KIDNEY DISEASE, UNSPECIFIED WHETHER STAGE 3A OR 3B CKD (HCC): ICD-10-CM

## 2021-01-28 DIAGNOSIS — G89.29 CHRONIC MIDLINE LOW BACK PAIN WITHOUT SCIATICA: ICD-10-CM

## 2021-01-28 DIAGNOSIS — I10 ESSENTIAL HYPERTENSION: Primary | ICD-10-CM

## 2021-01-28 DIAGNOSIS — E03.9 HYPOTHYROIDISM, ACQUIRED: ICD-10-CM

## 2021-01-28 DIAGNOSIS — Z13.6 SCREENING FOR ISCHEMIC HEART DISEASE: ICD-10-CM

## 2021-01-28 PROBLEM — S82.66XG: Status: RESOLVED | Noted: 2020-01-13 | Resolved: 2021-01-28

## 2021-01-28 PROBLEM — M65.871 OTHER SYNOVITIS AND TENOSYNOVITIS, RIGHT ANKLE AND FOOT: Status: RESOLVED | Noted: 2020-02-12 | Resolved: 2021-01-28

## 2021-01-28 PROBLEM — S76.011A HIP STRAIN, RIGHT, INITIAL ENCOUNTER: Status: RESOLVED | Noted: 2020-05-06 | Resolved: 2021-01-28

## 2021-01-28 PROBLEM — M76.71 PERONEAL TENDONITIS, RIGHT: Status: RESOLVED | Noted: 2020-01-13 | Resolved: 2021-01-28

## 2021-01-28 PROBLEM — M17.11 PRIMARY LOCALIZED OSTEOARTHROSIS OF RIGHT LOWER LEG: Status: RESOLVED | Noted: 2020-04-17 | Resolved: 2021-01-28

## 2021-01-28 PROBLEM — M19.079 ARTHRITIS OF FOOT: Status: RESOLVED | Noted: 2020-04-17 | Resolved: 2021-01-28

## 2021-01-28 PROBLEM — M25.571 RIGHT ANKLE PAIN: Status: RESOLVED | Noted: 2020-03-04 | Resolved: 2021-01-28

## 2021-01-28 PROCEDURE — 99214 OFFICE O/P EST MOD 30 MIN: CPT | Performed by: FAMILY MEDICINE

## 2021-01-28 RX ORDER — TRIAMTERENE AND HYDROCHLOROTHIAZIDE 37.5; 25 MG/1; MG/1
1 TABLET ORAL DAILY
Qty: 90 TABLET | Refills: 0 | Status: SHIPPED | OUTPATIENT
Start: 2021-01-28 | End: 2021-04-29 | Stop reason: SDUPTHER

## 2021-01-28 RX ORDER — LEVOTHYROXINE SODIUM 88 MCG
88 TABLET ORAL EVERY OTHER DAY
Qty: 45 TABLET | Refills: 0 | Status: SHIPPED | OUTPATIENT
Start: 2021-01-28 | End: 2021-04-29 | Stop reason: SDUPTHER

## 2021-01-28 RX ORDER — TRAMADOL HYDROCHLORIDE 50 MG/1
50 TABLET ORAL EVERY 6 HOURS PRN
Qty: 120 TABLET | Refills: 2 | Status: SHIPPED | OUTPATIENT
Start: 2021-01-28 | End: 2021-04-29 | Stop reason: SDUPTHER

## 2021-01-28 RX ORDER — LEVOTHYROXINE SODIUM 75 MCG
75 TABLET ORAL EVERY OTHER DAY
Qty: 45 TABLET | Refills: 0 | Status: SHIPPED | OUTPATIENT
Start: 2021-01-28 | End: 2021-04-29 | Stop reason: SDUPTHER

## 2021-01-28 NOTE — PROGRESS NOTES
"Chief Complaint  Hypothyroidism (3 mo ) and Hypertension    Subjective      History of Present Illness      Bailee Garza presents to [facility] to refill medicines.  Blood pressure controlled.  Thyroid stable.  Stage III chronic kidney disease stable.  She continues to take tramadol for her chronic low back pain.  That condition is stable.  She has about 22 pills remaining in current prescription bottle.  Shaji report has been reviewed.  No medicine side effects are reported.    Review of Systems          Objective     Vital Signs:   /67   Pulse 67   Temp 97.1 °F (36.2 °C) (Tympanic)   Resp 16   Ht 162.6 cm (64\")   Wt 49 kg (108 lb)   SpO2 98%   BMI 18.54 kg/m²       Physical Exam  Vitals signs reviewed.   Constitutional:       General: She is not in acute distress.  Eyes:      General: Lids are normal.      Conjunctiva/sclera: Conjunctivae normal.   Neck:      Vascular: No carotid bruit.      Trachea: No tracheal deviation.   Cardiovascular:      Rate and Rhythm: Normal rate and regular rhythm.      Heart sounds: Normal heart sounds. No murmur.   Pulmonary:      Effort: Pulmonary effort is normal.      Breath sounds: Normal breath sounds.   Skin:     General: Skin is warm and dry.   Neurological:      Mental Status: She is alert. She is not disoriented.   Psychiatric:         Attention and Perception: Attention normal.         Mood and Affect: Mood and affect normal.         Speech: Speech normal.         Behavior: Behavior normal. Behavior is cooperative.              Result Review :                        Assessment/Plan     Assessment and Plan      Diagnoses and all orders for this visit:    1. Essential hypertension (Primary)  Assessment & Plan:  Hypertension is unchanged.  Continue current treatment regimen.  Blood pressure will be reassessed in 3 months.    Orders:  -     triamterene-hydrochlorothiazide (MAXZIDE-25) 37.5-25 MG per tablet; Take 1 tablet by " mouth Daily for 90 days.  Dispense: 90 tablet; Refill: 0  -     Comprehensive Metabolic Panel; Future  -     CBC & Differential; Future    2. Hypothyroidism, acquired  Assessment & Plan:  The current medical regimen is effective;  continue present plan and medications.      Orders:  -     Synthroid 75 MCG tablet; Take 1 tablet by mouth Every Other Day for 90 days.  Dispense: 45 tablet; Refill: 0  -     Synthroid 88 MCG tablet; Take 1 tablet by mouth Every Other Day for 90 days.  Dispense: 45 tablet; Refill: 0  -     TSH; Future    3. Chronic midline low back pain without sciatica  Assessment & Plan:  The current medical regimen is effective;  continue present plan and medications.      Orders:  -     traMADol (ULTRAM) 50 MG tablet; Take 1 tablet by mouth Every 6 (Six) Hours As Needed for Moderate Pain  for up to 90 days.  Dispense: 120 tablet; Refill: 2    4. Stage 3 chronic kidney disease, unspecified whether stage 3a or 3b CKD (CMS/[facility])  Assessment & Plan:  Renal condition is unchanged.  Continue current treatment regimen.  Renal condition will be reassessed in 3 months.    Orders:  -     Comprehensive Metabolic Panel; Future    5. Vitamin D deficiency  Assessment & Plan:  Check labs 3 months.    Orders:  -     Vitamin D 25 Hydroxy; Future    6. Screening for ischemic heart disease  -     Lipid Panel With / Chol / HDL Ratio; Future          Follow Up   Return in about 3 months (around 4/28/2021) for Medicare Wellness and regular visit, 30 minutes, Controlled Substance Visit.    Patient was given instructions and counseling regarding her condition or for health maintenance advice. Please see specific information pulled into the AVS if appropriate.

## 2021-04-29 ENCOUNTER — OFFICE VISIT (OUTPATIENT)
Dept: FAMILY MEDICINE CLINIC | Facility: CLINIC | Age: 81
End: 2021-04-29

## 2021-04-29 VITALS
HEIGHT: 64 IN | OXYGEN SATURATION: 95 % | WEIGHT: 102 LBS | SYSTOLIC BLOOD PRESSURE: 102 MMHG | TEMPERATURE: 95 F | RESPIRATION RATE: 16 BRPM | DIASTOLIC BLOOD PRESSURE: 56 MMHG | BODY MASS INDEX: 17.42 KG/M2 | HEART RATE: 117 BPM

## 2021-04-29 DIAGNOSIS — M54.50 CHRONIC MIDLINE LOW BACK PAIN WITHOUT SCIATICA: ICD-10-CM

## 2021-04-29 DIAGNOSIS — Z00.00 MEDICARE ANNUAL WELLNESS VISIT, SUBSEQUENT: Primary | ICD-10-CM

## 2021-04-29 DIAGNOSIS — Z78.0 MENOPAUSE: ICD-10-CM

## 2021-04-29 DIAGNOSIS — E03.9 HYPOTHYROIDISM, ACQUIRED: ICD-10-CM

## 2021-04-29 DIAGNOSIS — N18.30 STAGE 3 CHRONIC KIDNEY DISEASE, UNSPECIFIED WHETHER STAGE 3A OR 3B CKD (HCC): ICD-10-CM

## 2021-04-29 DIAGNOSIS — G89.29 CHRONIC MIDLINE LOW BACK PAIN WITHOUT SCIATICA: ICD-10-CM

## 2021-04-29 DIAGNOSIS — I10 ESSENTIAL HYPERTENSION: ICD-10-CM

## 2021-04-29 DIAGNOSIS — Z12.31 ENCOUNTER FOR SCREENING MAMMOGRAM FOR BREAST CANCER: ICD-10-CM

## 2021-04-29 DIAGNOSIS — Z91.81 AT MODERATE RISK FOR FALL: ICD-10-CM

## 2021-04-29 PROBLEM — Z82.49 FAMILY HISTORY OF ABDOMINAL AORTIC ANEURYSM (AAA): Status: RESOLVED | Noted: 2019-03-06 | Resolved: 2021-04-29

## 2021-04-29 PROCEDURE — 99214 OFFICE O/P EST MOD 30 MIN: CPT | Performed by: FAMILY MEDICINE

## 2021-04-29 RX ORDER — TRIAMTERENE AND HYDROCHLOROTHIAZIDE 37.5; 25 MG/1; MG/1
1 TABLET ORAL DAILY
Qty: 90 TABLET | Refills: 0 | Status: SHIPPED | OUTPATIENT
Start: 2021-04-29 | End: 2021-07-22 | Stop reason: SDUPTHER

## 2021-04-29 RX ORDER — TRAMADOL HYDROCHLORIDE 50 MG/1
50 TABLET ORAL EVERY 6 HOURS PRN
Qty: 120 TABLET | Refills: 2 | Status: SHIPPED | OUTPATIENT
Start: 2021-04-29 | End: 2021-07-22 | Stop reason: SDUPTHER

## 2021-04-29 RX ORDER — LEVOTHYROXINE SODIUM 75 MCG
75 TABLET ORAL EVERY OTHER DAY
Qty: 45 TABLET | Refills: 0 | Status: SHIPPED | OUTPATIENT
Start: 2021-04-29 | End: 2021-10-11

## 2021-04-29 RX ORDER — LEVOTHYROXINE SODIUM 88 MCG
88 TABLET ORAL EVERY OTHER DAY
Qty: 45 TABLET | Refills: 0 | Status: SHIPPED | OUTPATIENT
Start: 2021-04-29 | End: 2021-07-22 | Stop reason: SDUPTHER

## 2021-04-29 NOTE — PATIENT INSTRUCTIONS
Check on insurance coverage and cost for adacel Tdap(tetanus plus whooping cough vaccine) and get the immunization at your local pharmacy. (Once every 10 Years)  Check on insurance coverage and cost for Shingrix (newest shingles vaccine) and get the immunization at your local pharmacy. It is more effective than the old Zostavax vaccine and is recommended even if you have had the Zostavax vaccine in the past. For more information, please look at the website below:  https://www.cdc.gov/vaccines/vpd/shingles/public/shingrix/index.html    Annual flu shot has been recommended to patient. Optimal timing of this vaccination is in mid October of each year.     Please remember to send in copy of your living will for scanning into the chart.      Advance Directive    Advance directives are legal documents that let you make choices ahead of time about your health care and medical treatment in case you become unable to communicate for yourself. Advance directives are a way for you to make known your wishes to family, friends, and health care providers. This can let others know about your end-of-life care if you become unable to communicate.  Discussing and writing advance directives should happen over time rather than all at once. Advance directives can be changed depending on your situation and what you want, even after you have signed the advance directives.  There are different types of advance directives, such as:  · Medical power of .  · Living will.  · Do not resuscitate (DNR) or do not attempt resuscitation (DNAR) order.  Health care proxy and medical power of   A health care proxy is also called a health care agent. This is a person who is appointed to make medical decisions for you in cases where you are unable to make the decisions yourself. Generally, people choose someone they know well and trust to represent their preferences. Make sure to ask this person for an agreement to act as your proxy. A  proxy may have to exercise judgment in the event of a medical decision for which your wishes are not known.  A medical power of  is a legal document that names your health care proxy. Depending on the laws in your state, after the document is written, it may also need to be:  · Signed.  · Notarized.  · Dated.  · Copied.  · Witnessed.  · Incorporated into your medical record.  You may also want to appoint someone to manage your money in a situation in which you are unable to do so. This is called a durable power of  for finances. It is a separate legal document from the durable power of  for health care. You may choose the same person or someone different from your health care proxy to act as your agent in money matters.  If you do not appoint a proxy, or if there is a concern that the proxy is not acting in your best interests, a court may appoint a guardian to act on your behalf.  Living will  A living will is a set of instructions that state your wishes about medical care when you cannot express them yourself. Health care providers should keep a copy of your living will in your medical record. You may want to give a copy to family members or friends. To alert caregivers in case of an emergency, you can place a card in your wallet to let them know that you have a living will and where they can find it. A living will is used if you become:  · Terminally ill.  · Disabled.  · Unable to communicate or make decisions.  Items to consider in your living will include:  · To use or not to use life-support equipment, such as dialysis machines and breathing machines (ventilators).  · A DNR or DNAR order. This tells health care providers not to use cardiopulmonary resuscitation (CPR) if breathing or heartbeat stops.  · To use or not to use tube feeding.  · To be given or not to be given food and fluids.  · Comfort (palliative) care when the goal becomes comfort rather than a cure.  · Donation of organs  and tissues.  A living will does not give instructions for distributing your money and property if you should pass away.  DNR or DNAR  A DNR or DNAR order is a request not to have CPR in the event that your heart stops beating or you stop breathing. If a DNR or DNAR order has not been made and shared, a health care provider will try to help any patient whose heart has stopped or who has stopped breathing. If you plan to have surgery, talk with your health care provider about how your DNR or DNAR order will be followed if problems occur.  What if I do not have an advance directive?  If you do not have an advance directive, some states assign family decision makers to act on your behalf based on how closely you are related to them. Each state has its own laws about advance directives. You may want to check with your health care provider, , or state representative about the laws in your state.  Summary  · Advance directives are the legal documents that allow you to make choices ahead of time about your health care and medical treatment in case you become unable to tell others about your care.  · The process of discussing and writing advance directives should happen over time. You can change the advance directives, even after you have signed them.  · Advance directives include DNR or DNAR orders, living holland, and designating an agent as your medical power of .  This information is not intended to replace advice given to you by your health care provider. Make sure you discuss any questions you have with your health care provider.  Document Revised: 01/28/2021 Document Reviewed: 07/16/2020  Elsevier Patient Education © 2021 [employer]        Fall Prevention in the Home, Adult  Falls can cause injuries and can affect people from all age groups. There are many simple things that you can do to make your home safe and to help prevent falls. Ask for help when making these changes, if needed.  What actions  can I take to prevent falls?  General instructions  · Use good lighting in all rooms. Replace any light bulbs that burn out.  · Turn on lights if it is dark. Use night-lights.  · Place frequently used items in easy-to-reach places. Lower the shelves around your home if necessary.  · Set up furniture so that there are clear paths around it. Avoid moving your furniture around.  · Remove throw rugs and other tripping hazards from the floor.  · Avoid walking on wet floors.  · Fix any uneven floor surfaces.  · Add color or contrast paint or tape to grab bars and handrails in your home. Place contrasting color strips on the first and last steps of stairways.  · When you use a stepladder, make sure that it is completely opened and that the sides are firmly locked. Have someone hold the ladder while you are using it. Do not climb a closed stepladder.  · Be aware of any and all pets.  What can I do in the bathroom?         · Keep the floor dry. Immediately clean up any water that spills onto the floor.  · Remove soap buildup in the tub or shower on a regular basis.  · Use non-skid mats or decals on the floor of the tub or shower.  · Attach bath mats securely with double-sided, non-slip rug tape.  · If you need to sit down while you are in the shower, use a plastic, non-slip stool.  · Install grab bars by the toilet and in the tub and shower. Do not use towel bars as grab bars.  What can I do in the bedroom?  · Make sure that a bedside light is easy to reach.  · Do not use oversized bedding that drapes onto the floor.  · Have a firm chair that has side arms to use for getting dressed.  What can I do in the kitchen?  · Clean up any spills right away.  · If you need to reach for something above you, use a sturdy step stool that has a grab bar.  · Keep electrical cables out of the way.  · Do not use floor polish or wax that makes floors slippery. If you must use wax, make sure that it is non-skid floor wax.  What can I do in the  stairways?  · Do not leave any items on the stairs.  · Make sure that you have a light switch at the top of the stairs and the bottom of the stairs. Have them installed if you do not have them.  · Make sure that there are handrails on both sides of the stairs. Fix handrails that are broken or loose. Make sure that handrails are as long as the stairways.  · Install non-slip stair treads on all stairs in your home.  · Avoid having throw rugs at the top or bottom of stairways, or secure the rugs with carpet tape to prevent them from moving.  · Choose a carpet design that does not hide the edge of steps on the stairway.  · Check any carpeting to make sure that it is firmly attached to the stairs. Fix any carpet that is loose or worn.  What can I do on the outside of my home?  · Use bright outdoor lighting.  · Regularly repair the edges of walkways and driveways and fix any cracks.  · Remove high doorway thresholds.  · Trim any shrubbery on the main path into your home.  · Regularly check that handrails are securely fastened and in good repair. Both sides of any steps should have handrails.  · Install guardrails along the edges of any raised decks or porches.  · Clear walkways of debris and clutter, including tools and rocks.  · Have leaves, snow, and ice cleared regularly.  · Use sand or salt on walkways during winter months.  · In the garage, clean up any spills right away, including grease or oil spills.  What other actions can I take?  · Wear closed-toe shoes that fit well and support your feet. Wear shoes that have rubber soles or low heels.  · Use mobility aids as needed, such as canes, walkers, scooters, and crutches.  · Review your medicines with your health care provider. Some medicines can cause dizziness or changes in blood pressure, which increase your risk of falling.  Talk with your health care provider about other ways that you can decrease your risk of falls. This may include working with a physical  therapist or  to improve your strength, balance, and endurance.  Where to find more information  · Centers for Disease Control and Prevention, [place]: https://www.cdc.gov  · National [place] on Aging: https://nk1ntpk.jackson.nih.gov  Contact a health care provider if:  · You are afraid of falling at home.  · You feel weak, drowsy, or dizzy at home.  · You fall at home.  Summary  · There are many simple things that you can do to make your home safe and to help prevent falls.  · Ways to make your home safe include removing tripping hazards and installing grab bars in the bathroom.  · Ask for help when making these changes in your home.  This information is not intended to replace advice given to you by your health care provider. Make sure you discuss any questions you have with your health care provider.  Document Revised: 11/30/2018 Document Reviewed: 08/02/2018  [employer] Patient Education © 2021 [employer]      Sit-to-Stand Exercise    The sit-to-stand exercise (also known as the chair stand or chair rise exercise) strengthens your lower body and helps you maintain or improve your mobility and independence. The goal is to do the sit-to-stand exercise without using your hands. This will be easier as you become stronger. You should always talk with your health care provider before starting any exercise program, especially if you have had recent surgery.  Do the exercise exactly as told by your health care provider and adjust it as directed. It is normal to feel mild stretching, pulling, tightness, or discomfort as you do this exercise, but you should stop right away if you feel sudden pain or your pain gets worse. Do not begin doing this exercise until told by your health care provider.  What the sit-to-stand exercise does  The sit-to-stand exercise helps to strengthen the muscles in your thighs and the muscles in the center of your body that give you stability (core muscles). This exercise is especially  helpful if:  · You have had knee or hip surgery.  · You have trouble getting up from a chair, out of a car, or off the toilet.  How to do the sit-to-stand exercise  1. Sit toward the front edge of a sturdy chair without armrests. Your knees should be bent and your feet should be flat on the floor and shoulder-width apart.  2. Place your hands lightly on each side of the seat. Keep your back and neck as straight as possible, with your chest slightly forward.  3. Breathe in slowly. Lean forward and slightly shift your weight to the front of your feet.  4. Breathe out as you slowly stand up. Use your hands as little as possible.  5. Stand and pause for a full breath in and out.  6. Breathe in as you sit down slowly. Tighten your core and abdominal muscles to control your lowering as much as possible.  7. Breathe out slowly.  8. Do this exercise 10-15 times. If needed, do it fewer times until you build up strength.  9. Rest for 1 minute, then do another set of 10-15 repetitions.  To change the difficulty of the sit-to-stand exercise  · If the exercise is too difficult, use a chair with sturdy armrests, and push off the armrests to help you come to the standing position. You can also use the armrests to help slowly lower yourself back to sitting. As this gets easier, try to use your arms less. You can also place a firm cushion or pillow on the chair to make the surface higher.  · If this exercise is too easy, do not use your arms to help raise or lower yourself. You can also wear a weighted vest, use hand weights, increase your repetitions, or try a lower chair.  General tips  · You may feel tired when starting an exercise routine. This is normal.  · You may have muscle soreness that lasts a few days. This is normal. As you get stronger, you may not feel muscle soreness.  · Use smooth, steady movements.  · Do not  hold your breath during strength exercises. This can cause unsafe changes in your blood pressure.  · Breathe  in slowly through your nose, and breathe out slowly through your mouth.  Summary  · Strengthening your lower body is an important step to help you move safely and independently.  · The sit-to-stand exercise helps strengthen the muscles in your thighs and core.  · You should always talk with your health care provider before starting any exercise program, especially if you have had recent surgery.  This information is not intended to replace advice given to you by your health care provider. Make sure you discuss any questions you have with your health care provider.  Document Revised: 10/16/2019 Document Reviewed: 02/08/2018  Elsevier Patient Education © 2021 Elsevier Inc.

## 2021-04-29 NOTE — ASSESSMENT & PLAN NOTE
Renal condition is unchanged.  labs due to evaluate this condition.   Renal condition will be reassessed in 3 months.

## 2021-05-03 ENCOUNTER — TELEPHONE (OUTPATIENT)
Dept: FAMILY MEDICINE CLINIC | Facility: CLINIC | Age: 81
End: 2021-05-03

## 2021-05-03 NOTE — TELEPHONE ENCOUNTER
----- Message from Eddie Araya MD sent at 5/3/2021  9:15 AM EDT -----  Several lab abnormalities. Schedule telephone visit to discuss these lab results. Thanks, Dr. Araya

## 2021-05-06 ENCOUNTER — OFFICE VISIT (OUTPATIENT)
Dept: FAMILY MEDICINE CLINIC | Facility: CLINIC | Age: 81
End: 2021-05-06

## 2021-05-06 DIAGNOSIS — R79.9 ELEVATED BUN: ICD-10-CM

## 2021-05-06 DIAGNOSIS — R79.89 ELEVATED SERUM CREATININE: ICD-10-CM

## 2021-05-06 DIAGNOSIS — E03.9 HYPOTHYROIDISM, ACQUIRED: Primary | ICD-10-CM

## 2021-05-06 DIAGNOSIS — D58.2 ELEVATED HEMOGLOBIN (HCC): ICD-10-CM

## 2021-05-06 PROCEDURE — 99442 PR PHYS/QHP TELEPHONE EVALUATION 11-20 MIN: CPT | Performed by: FAMILY MEDICINE

## 2021-05-06 NOTE — ASSESSMENT & PLAN NOTE
Gradual worsening of TSH noted along with some symptoms of dry skin.  She will rehydrate and we will recheck this before adjusting medications.  Plan recheck of this condition in July.

## 2021-05-06 NOTE — PROGRESS NOTES
You have chosen to receive care through a telephone visit. Do you consent to use a telephone visit for your medical care today? Yes   Mode of Visit: Telephone  The visit included telephone interaction. No technical issues occurred during this visit.   more than 10 minutes up to 20 minutes.     Subjective       Chief Complaint   Patient presents with   • discuss labs         History of Present Illness       Bailee Garza is a 81 y.o. female who presents to  [address] today to discuss lab results. Labs show elevated HB,HCT, elevated BUN,Creatinine and elevated TSH. Pt admits she has been drinking less water than usual. She also admits to dry skin.                Objective                         Assessment/Plan     Assessment and Plan      Diagnoses and all orders for this visit:    1. Hypothyroidism, acquired (Primary)  Assessment & Plan:  Gradual worsening of TSH noted along with some symptoms of dry skin.  She will rehydrate and we will recheck this before adjusting medications.  Plan recheck of this condition in July.    Orders:  -     TSH+Free T4; Future    2. Elevated BUN  Assessment & Plan:  New problem.  Suggested the patient increase water to 6 to 8 glasses daily.    Orders:  -     BUN; Future    3. Elevated serum creatinine  Assessment & Plan:  Increase fluid intake.  Recheck in July    Orders:  -     Creatinine, Serum; Future    4. Elevated hemoglobin (CMS/HCC)  Assessment & Plan:  Increase fluid intake, recheck in July    Orders:  -     CBC & Differential; Future        Follow Up     Return in 12 weeks (on 7/29/2021) for next scheduled follow up.

## 2021-07-01 ENCOUNTER — TELEPHONE (OUTPATIENT)
Dept: FAMILY MEDICINE CLINIC | Facility: CLINIC | Age: 81
End: 2021-07-01

## 2021-07-01 NOTE — TELEPHONE ENCOUNTER
Caller: Bailee Garza    Relationship: Self    Best call back number: [phone]    What is the best time to reach you: ANYTIME    Who are you requesting to speak with (clinical staff, provider,  specific staff member): DR BIGGS'S MA    What was the call regarding: REQUESTING DIRECTIONS FOR BLOOD TEST    Do you require a callback: YES

## 2021-07-09 ENCOUNTER — HOSPITAL ENCOUNTER (OUTPATIENT)
Dept: BONE DENSITY | Facility: HOSPITAL | Age: 81
Discharge: HOME | End: 2021-07-09

## 2021-07-09 ENCOUNTER — HOSPITAL ENCOUNTER (OUTPATIENT)
Dept: MAMMOGRAPHY | Facility: HOSPITAL | Age: 81
Discharge: HOME | End: 2021-07-09

## 2021-07-09 DIAGNOSIS — Z78.0 MENOPAUSE: ICD-10-CM

## 2021-07-09 DIAGNOSIS — Z12.31 ENCOUNTER FOR SCREENING MAMMOGRAM FOR BREAST CANCER: ICD-10-CM

## 2021-07-09 PROCEDURE — 77067 SCR MAMMO BI INCL CAD: CPT

## 2021-07-09 PROCEDURE — 77080 DXA BONE DENSITY AXIAL: CPT

## 2021-07-09 PROCEDURE — 77063 BREAST TOMOSYNTHESIS BI: CPT

## 2021-07-22 ENCOUNTER — OFFICE VISIT (OUTPATIENT)
Dept: FAMILY MEDICINE CLINIC | Facility: CLINIC | Age: 81
End: 2021-07-22

## 2021-07-22 VITALS
WEIGHT: 101 LBS | DIASTOLIC BLOOD PRESSURE: 59 MMHG | BODY MASS INDEX: 17.24 KG/M2 | HEIGHT: 64 IN | HEART RATE: 67 BPM | OXYGEN SATURATION: 98 % | TEMPERATURE: 97.4 F | RESPIRATION RATE: 16 BRPM | SYSTOLIC BLOOD PRESSURE: 115 MMHG

## 2021-07-22 DIAGNOSIS — M54.50 CHRONIC MIDLINE LOW BACK PAIN WITHOUT SCIATICA: ICD-10-CM

## 2021-07-22 DIAGNOSIS — I10 ESSENTIAL HYPERTENSION: Primary | ICD-10-CM

## 2021-07-22 DIAGNOSIS — N18.30 STAGE 3 CHRONIC KIDNEY DISEASE, UNSPECIFIED WHETHER STAGE 3A OR 3B CKD (HCC): ICD-10-CM

## 2021-07-22 DIAGNOSIS — G89.29 CHRONIC MIDLINE LOW BACK PAIN WITHOUT SCIATICA: ICD-10-CM

## 2021-07-22 DIAGNOSIS — E03.9 HYPOTHYROIDISM, ACQUIRED: ICD-10-CM

## 2021-07-22 PROCEDURE — 99214 OFFICE O/P EST MOD 30 MIN: CPT | Performed by: FAMILY MEDICINE

## 2021-07-22 RX ORDER — LEVOTHYROXINE SODIUM 88 MCG
88 TABLET ORAL
Qty: 90 TABLET | Refills: 0 | Status: SHIPPED | OUTPATIENT
Start: 2021-07-22 | End: 2021-10-11 | Stop reason: SDUPTHER

## 2021-07-22 RX ORDER — ACETAMINOPHEN 500 MG
1000 TABLET ORAL 3 TIMES DAILY PRN
Qty: 180 TABLET | Refills: 11
Start: 2021-07-22 | End: 2021-10-11 | Stop reason: SDUPTHER

## 2021-07-22 RX ORDER — TRAMADOL HYDROCHLORIDE 50 MG/1
50 TABLET ORAL EVERY 6 HOURS PRN
Qty: 120 TABLET | Refills: 2 | Status: SHIPPED | OUTPATIENT
Start: 2021-07-22 | End: 2021-08-15

## 2021-07-22 RX ORDER — TRIAMTERENE AND HYDROCHLOROTHIAZIDE 37.5; 25 MG/1; MG/1
1 TABLET ORAL DAILY
Qty: 90 TABLET | Refills: 0 | Status: SHIPPED | OUTPATIENT
Start: 2021-07-22 | End: 2021-10-04 | Stop reason: SDUPTHER

## 2021-07-22 RX ORDER — LEVOTHYROXINE SODIUM 75 MCG
75 TABLET ORAL EVERY OTHER DAY
Qty: 45 TABLET | Refills: 0 | Status: CANCELLED | OUTPATIENT
Start: 2021-07-22 | End: 2021-10-20

## 2021-07-22 NOTE — PROGRESS NOTES
"Chief Complaint  Hypothyroidism (1 month follow up )    David Morocho presents to [facility] to refill medications and review recent lab results. No medication side effects are reported. Overall the patient is feeling well.  Labs reviewed and are seen below.  Continued chronic kidney disease stage III.  Overall improvement of hemoglobin and hematocrit.  Mild progression of TSH noted.  Medications reconciled during today's visit.  Advised patient to take vitamin D 1000 daily.  Shaji report reviewed.  Chronic back pain is unchanged and stable on current therapy.          Objective   Vital Signs:   Vitals:    07/22/21 0826   BP: 115/59   Pulse: 67   Resp: 16   Temp: 97.4 °F (36.3 °C)   TempSrc: Skin   SpO2: 98%   Weight: 45.8 kg (101 lb)   Height: 162.6 cm (64\")        Physical Exam  Vitals reviewed.   Constitutional:       General: She is not in acute distress.  Eyes:      General: Lids are normal.      Conjunctiva/sclera: Conjunctivae normal.   Neck:      Vascular: No carotid bruit.      Trachea: No tracheal deviation.   Cardiovascular:      Rate and Rhythm: Normal rate and regular rhythm.      Heart sounds: Normal heart sounds. No murmur heard.     Pulmonary:      Effort: Pulmonary effort is normal.      Breath sounds: Normal breath sounds.   Musculoskeletal:      Lumbar back: No spasms or tenderness. Normal range of motion. No scoliosis.        Back:    Skin:     General: Skin is warm and dry.   Neurological:      Mental Status: She is alert. She is not disoriented.   Psychiatric:         Speech: Speech normal.         Behavior: Behavior normal. Behavior is cooperative.          Result Review :     The following data was reviewed by: Eddie Araya MD on 07/22/2021:  DEXA Bone Density Axial (07/09/2021 10:12)  Mammo Screening Digital Tomosynthesis Bilateral With CAD (07/09/2021 10:45)  Creatinine, Serum (07/15/2021 10:07)  BUN (07/15/2021 10:07)  CBC & Differential " (07/15/2021 10:07)  TSH+Free T4 (07/15/2021 10:07)           Assessment and Plan    Diagnoses and all orders for this visit:    1. Essential hypertension (Primary)  Assessment & Plan:  Condition is stable. The current medical regimen is effective;  continue present plan and medications.    Orders:  -     triamterene-hydrochlorothiazide (MAXZIDE-25) 37.5-25 MG per tablet; Take 1 tablet by mouth Daily for 90 days.  Dispense: 90 tablet; Refill: 0    2. Hypothyroidism, acquired  Assessment & Plan:  Progression of TSH noted.  Increase Synthroid to 88 mcg daily.  Recheck labs 3 months.    Orders:  -     Synthroid 88 MCG tablet; Take 1 tablet by mouth Every Morning Before Breakfast for 90 days.  Dispense: 90 tablet; Refill: 0  -     TSH+Free T4; Future    3. Chronic midline low back pain without sciatica  Assessment & Plan:  Continue same therapy for low back pain.  Advised to not go above 3000 mg of acetaminophen daily.    Orders:  -     traMADol (ULTRAM) 50 MG tablet; Take 1 tablet by mouth Every 6 (Six) Hours As Needed for Moderate Pain  for up to 90 days.  Dispense: 120 tablet; Refill: 2  -     acetaminophen (TYLENOL) 500 MG tablet; Take 2 tablets by mouth 3 (Three) Times a Day As Needed for Mild Pain  or Moderate Pain . Do not exceed 3000 mg daily  Dispense: 180 tablet; Refill: 11    4. Stage 3 chronic kidney disease, unspecified whether stage 3a or 3b CKD (CMS/[facility])  Assessment & Plan:  Renal condition is unchanged.  Continue current treatment regimen.  Renal condition will be reassessed in 6 months.      Other orders  -     Cancel: Synthroid 75 MCG tablet; Take 1 tablet by mouth Every Other Day for 90 days.  Dispense: 45 tablet; Refill: 0      Follow Up   Return in about 3 months (around 10/22/2021) for Adult wellness & medication appt, schedule 30 min.  Patient was given instructions and counseling regarding her condition or for health maintenance advice. Please see specific information pulled into the AVS if  appropriate.

## 2021-07-23 ENCOUNTER — TELEPHONE (OUTPATIENT)
Dept: FAMILY MEDICINE CLINIC | Facility: CLINIC | Age: 81
End: 2021-07-23

## 2021-07-23 NOTE — TELEPHONE ENCOUNTER
Caller: Bailee Garza    Relationship: Self    Best call back number: [phone]    What is the best time to reach you: ANY     Who are you requesting to speak with (clinical staff, provider,  specific staff member): MD GERALD CHAVEZ       What was the call regarding: VITAMIN D MG. SHE WOULD LIKE TO KNOW HOW MANY MG SHE SHOULD BE TAKING. SHE STATED THAT MD BIGGS HAD DECREASED THE DOSAGE BUT FORGOT THE AMOUNT SHE WAS SUPPOSED TO TAKE NOW     Do you require a callback: YES

## 2021-08-02 ENCOUNTER — TELEPHONE (OUTPATIENT)
Dept: FAMILY MEDICINE CLINIC | Facility: CLINIC | Age: 81
End: 2021-08-02

## 2021-08-02 NOTE — TELEPHONE ENCOUNTER
Caller: Bailee Garza    Relationship: Self    Best call back number: [phone] (H)    What is the best time to reach you: ANYTIME    Who are you requesting to speak with (clinical staff, provider,  specific staff member): CLINICAL STAFF    Do you know the name of the person who called: [facility]    What was the call regarding: VITAMIN D DOSAGE     Do you require a callback: YES

## 2021-08-27 ENCOUNTER — TELEPHONE (OUTPATIENT)
Dept: FAMILY MEDICINE CLINIC | Facility: CLINIC | Age: 81
End: 2021-08-27

## 2021-08-27 NOTE — TELEPHONE ENCOUNTER
PATIENT CALLED IN REGARDS TO THE MEDICATION  traMADol (ULTRAM) 50 MG tablet.     SHE STATES THE MEDICATION CAME FROM [facility] AND THEY HAVE CHANGED MANUFACTURERS AND NOW THIS MEDICATION IS DIFFERENT AND ITS NOT WORKING. THIS WAS FILLED ON 8/15/21    THE  THAT SHE WANTS TO HAVE THE MEDICATION FROM IS [facility]. [facility] HAS THIS MEDICATION BUT WOULD NEED A NEW PRESCRIPTION FOR [facility].    SHE STATES SHE CAN HARDLY GET OUT OF BED IN THE MORNING, MAKES HER DIZZY AND DROWSY.    [id number] - [facility] [address] - [phone]  - [phone]   [phone]    CALL BACK NUMBER [phone]    SHE WANTED TO SEE DR. BIGGS BUT NOT APPOINTMENT UNTIL 9/13/21

## 2021-08-27 NOTE — TELEPHONE ENCOUNTER
I called the patient to let her know that Dr rAaya is out of the office today and would be back Monday.     Rody

## 2021-10-04 ENCOUNTER — TELEPHONE (OUTPATIENT)
Dept: FAMILY MEDICINE CLINIC | Facility: CLINIC | Age: 81
End: 2021-10-04

## 2021-10-04 DIAGNOSIS — I10 ESSENTIAL HYPERTENSION: ICD-10-CM

## 2021-10-04 RX ORDER — TRIAMTERENE AND HYDROCHLOROTHIAZIDE 37.5; 25 MG/1; MG/1
1 TABLET ORAL DAILY
Qty: 90 TABLET | Refills: 0 | Status: SHIPPED | OUTPATIENT
Start: 2021-10-04 | End: 2021-10-11

## 2021-10-04 NOTE — TELEPHONE ENCOUNTER
Caller: Bailee Garza    Relationship: Self    Best call back number: [phone]    Which medication are you concerned about:   MAXIZIDE 37.5-25 MG     PATIENT STATES THAT HER PHARMACY HAS CHANGED MANUFACTURERS AND HAVE GIVEN HER GENERIC. PATIENT STATES THAT THE GENERIC MEDICATION IS NOT WORKING FOR HER AND IS REQUESTING TO HAVE NAME BRAND CALLED IN ASAP

## 2021-10-05 ENCOUNTER — TELEPHONE (OUTPATIENT)
Dept: FAMILY MEDICINE CLINIC | Facility: CLINIC | Age: 81
End: 2021-10-05

## 2021-10-05 NOTE — TELEPHONE ENCOUNTER
I talked with pt. She states that when she started the medication she was getting it from one manufacture and now she is getting it from different one. She states that now her hands are swelling and it is hard to grab things. She believes it is from the medication. I have scheduled her an appointment on 10/11/21 to talk about. She is wanting to know if this should be changed before seeing you.

## 2021-10-05 NOTE — TELEPHONE ENCOUNTER
Caller: Bailee Garza ELIZABETH    Relationship: Self    Best call back number: [phone]    What medications are you currently taking:   Current Outpatient Medications on File Prior to Visit   Medication Sig Dispense Refill   • acetaminophen (TYLENOL) 500 MG tablet Take 2 tablets by mouth 3 (Three) Times a Day As Needed for Mild Pain  or Moderate Pain . Do not exceed 3000 mg daily 180 tablet 11   • calcium polycarbophil (FIBERCON) 625 MG tablet Take 625 mg by mouth Daily.     • cholecalciferol (VITAMIN D3) 25 MCG (1000 UT) tablet Take 1,000 Units by mouth Daily.     • Synthroid 75 MCG tablet Take 1 tablet by mouth Every Other Day for 90 days. 45 tablet 0   • Synthroid 88 MCG tablet Take 1 tablet by mouth Every Morning Before Breakfast for 90 days. 90 tablet 0   • traMADol (ULTRAM) 50 MG tablet Take 1 tablet by mouth Every 6 (Six) Hours As Needed for Moderate Pain  for up to 90 days. 120 tablet 2   • triamterene-hydrochlorothiazide (MAXZIDE-25) 37.5-25 MG per tablet Take 1 tablet by mouth Daily for 90 days. 90 tablet 0     No current facility-administered medications on file prior to visit.        Which medication are you concerned about: triamterene-hydrochlorothiazide (MAXZIDE-25) 37.5-25 MG per tablet    Who prescribed you this medication: DR BIGGS    What are your concerns: PATIENT CAN NOT FIND A PHARMACY THAT WILL FILL THIS. SHE ALSO STATES THAT SHE FEELS LIKE IT IS NOT WORKING. PATIENT WOULD LIKE TO DISCUSS CHANGING MEDICATIONS.

## 2021-10-11 ENCOUNTER — OFFICE VISIT (OUTPATIENT)
Dept: FAMILY MEDICINE CLINIC | Facility: CLINIC | Age: 81
End: 2021-10-11

## 2021-10-11 VITALS
SYSTOLIC BLOOD PRESSURE: 132 MMHG | TEMPERATURE: 97 F | DIASTOLIC BLOOD PRESSURE: 71 MMHG | OXYGEN SATURATION: 98 % | BODY MASS INDEX: 18.1 KG/M2 | HEIGHT: 64 IN | WEIGHT: 106 LBS | HEART RATE: 80 BPM | RESPIRATION RATE: 16 BRPM

## 2021-10-11 DIAGNOSIS — I10 ESSENTIAL HYPERTENSION: Primary | ICD-10-CM

## 2021-10-11 DIAGNOSIS — G89.29 CHRONIC MIDLINE LOW BACK PAIN WITHOUT SCIATICA: ICD-10-CM

## 2021-10-11 DIAGNOSIS — M54.50 CHRONIC MIDLINE LOW BACK PAIN WITHOUT SCIATICA: ICD-10-CM

## 2021-10-11 DIAGNOSIS — E03.9 HYPOTHYROIDISM, ACQUIRED: ICD-10-CM

## 2021-10-11 PROCEDURE — 99214 OFFICE O/P EST MOD 30 MIN: CPT | Performed by: FAMILY MEDICINE

## 2021-10-11 RX ORDER — ACETAMINOPHEN 500 MG
1000 TABLET ORAL 3 TIMES DAILY PRN
Qty: 180 TABLET | Refills: 11
Start: 2021-10-11 | End: 2022-10-11

## 2021-10-11 RX ORDER — LEVOTHYROXINE SODIUM 88 MCG
88 TABLET ORAL
Qty: 90 TABLET | Refills: 1 | Status: SHIPPED | OUTPATIENT
Start: 2021-10-11 | End: 2021-12-15 | Stop reason: DRUGHIGH

## 2021-10-11 RX ORDER — VALSARTAN 80 MG/1
80 TABLET ORAL DAILY
Qty: 90 TABLET | Refills: 1 | Status: SHIPPED | OUTPATIENT
Start: 2021-10-11 | End: 2021-12-15 | Stop reason: SDUPTHER

## 2021-10-11 RX ORDER — HYDROCHLOROTHIAZIDE 25 MG/1
25 TABLET ORAL DAILY
Qty: 90 TABLET | Refills: 1 | Status: SHIPPED | OUTPATIENT
Start: 2021-10-11 | End: 2021-12-15 | Stop reason: SDUPTHER

## 2021-10-11 NOTE — ASSESSMENT & PLAN NOTE
Blood pressure controlled.  However we will switch to valsartan 80 and hydrochlorothiazide 25 mg due to decreased efficacy of current triamterene hydrochlorothiazide product.

## 2021-10-11 NOTE — PROGRESS NOTES
"Chief Complaint  Hypertension    David Morocho presents to [facility] to discuss recent issues with medications.  She has noticed that since a generic change was made at her pharmacy, the triamterene is no longer working to help with the puffiness.  Her blood pressure continues to be controlled.    She has also not taken tramadol since about mid August due to lack of efficacy.  She is currently taking Tylenol for her pain condition and it is satisfactorily controlled with some breakthrough.  She noted the problem with tramadol also when the generic company was changed by her pharmacy.          Objective   Vital Signs:   Vitals:    10/11/21 1515   BP: 132/71   Pulse: 80   Resp: 16   Temp: 97 °F (36.1 °C)   TempSrc: Skin   SpO2: 98%   Weight: 48.1 kg (106 lb)   Height: 162.6 cm (64\")        Physical Exam  Vitals reviewed.   Constitutional:       General: She is not in acute distress.  Eyes:      General: Lids are normal.      Conjunctiva/sclera: Conjunctivae normal.   Neck:      Vascular: No carotid bruit.      Trachea: No tracheal deviation.   Cardiovascular:      Rate and Rhythm: Normal rate and regular rhythm.      Heart sounds: Normal heart sounds. No murmur heard.      Pulmonary:      Effort: Pulmonary effort is normal.      Breath sounds: Normal breath sounds.   Skin:     General: Skin is warm and dry.   Neurological:      Mental Status: She is alert. She is not disoriented.   Psychiatric:         Speech: Speech normal.         Behavior: Behavior normal. Behavior is cooperative.          Result Review :                 Assessment and Plan    Diagnoses and all orders for this visit:    1. Essential hypertension (Primary)  Assessment & Plan:  Blood pressure controlled.  However we will switch to valsartan 80 and hydrochlorothiazide 25 mg due to decreased efficacy of current triamterene hydrochlorothiazide product.    Orders:  -     valsartan (DIOVAN) 80 MG tablet; Take 1 " tablet by mouth Daily for 180 days.  Dispense: 90 tablet; Refill: 1  -     hydroCHLOROthiazide (HYDRODIURIL) 25 MG tablet; Take 1 tablet by mouth Daily for 180 days.  Dispense: 90 tablet; Refill: 1    2. Chronic midline low back pain without sciatica  Assessment & Plan:  Condition is stable. The current medical regimen is effective;  continue present plan and medications.    Orders:  -     acetaminophen (TYLENOL) 500 MG tablet; Take 2 tablets by mouth 3 (Three) Times a Day As Needed for Mild Pain  or Moderate Pain . Do not exceed 3000 mg daily  Dispense: 180 tablet; Refill: 11    3. Hypothyroidism, acquired  Assessment & Plan:  Condition is stable. The current medical regimen is effective;  continue present plan and medications.    Orders:  -     Synthroid 88 MCG tablet; Take 1 tablet by mouth Every Morning Before Breakfast for 180 days.  Dispense: 90 tablet; Refill: 1      Follow Up   Return in about 2 months (around 12/11/2021) for recheck/refill medication.  Patient was given instructions and counseling regarding her condition or for health maintenance advice. Please see specific information pulled into the AVS if appropriate.

## 2021-12-15 ENCOUNTER — OFFICE VISIT (OUTPATIENT)
Dept: FAMILY MEDICINE CLINIC | Facility: CLINIC | Age: 81
End: 2021-12-15

## 2021-12-15 VITALS
BODY MASS INDEX: 17.24 KG/M2 | OXYGEN SATURATION: 99 % | HEART RATE: 77 BPM | TEMPERATURE: 97.2 F | HEIGHT: 64 IN | WEIGHT: 101 LBS | SYSTOLIC BLOOD PRESSURE: 94 MMHG | RESPIRATION RATE: 16 BRPM | DIASTOLIC BLOOD PRESSURE: 55 MMHG

## 2021-12-15 DIAGNOSIS — I10 ESSENTIAL HYPERTENSION: Primary | ICD-10-CM

## 2021-12-15 DIAGNOSIS — E03.9 HYPOTHYROIDISM, ACQUIRED: ICD-10-CM

## 2021-12-15 DIAGNOSIS — E55.9 VITAMIN D DEFICIENCY: ICD-10-CM

## 2021-12-15 DIAGNOSIS — Z13.6 SCREENING FOR ISCHEMIC HEART DISEASE: ICD-10-CM

## 2021-12-15 DIAGNOSIS — N18.30 STAGE 3 CHRONIC KIDNEY DISEASE, UNSPECIFIED WHETHER STAGE 3A OR 3B CKD (HCC): ICD-10-CM

## 2021-12-15 PROCEDURE — 99214 OFFICE O/P EST MOD 30 MIN: CPT | Performed by: FAMILY MEDICINE

## 2021-12-15 RX ORDER — VALSARTAN 80 MG/1
80 TABLET ORAL DAILY
Qty: 90 TABLET | Refills: 1 | Status: SHIPPED | OUTPATIENT
Start: 2021-12-15 | End: 2022-10-06 | Stop reason: SDUPTHER

## 2021-12-15 RX ORDER — LEVOTHYROXINE SODIUM 75 MCG
75 TABLET ORAL DAILY
Qty: 90 TABLET | Refills: 1 | Status: SHIPPED | OUTPATIENT
Start: 2021-12-15 | End: 2022-06-13 | Stop reason: SDUPTHER

## 2021-12-15 RX ORDER — HYDROCHLOROTHIAZIDE 12.5 MG/1
12.5 TABLET ORAL DAILY
Qty: 90 TABLET | Refills: 1 | Status: SHIPPED | OUTPATIENT
Start: 2021-12-15 | End: 2022-06-13 | Stop reason: SDUPTHER

## 2021-12-15 NOTE — ASSESSMENT & PLAN NOTE
Blood pressure is overtreated.  We will decrease hydrochlorothiazide to 12.5 mg daily.  Continue with valsartan.  Same follow-up in 6 months.

## 2021-12-15 NOTE — PROGRESS NOTES
"Chief Complaint  Hypertension    David Morocho presents to [facility] to refill medications and review recent lab results. No medication side effects are reported. Overall the patient is feeling well.  Thyroid replacement is supratherapeutic.    Patient is considering nursing home.          Objective   Vital Signs:   Vitals:    12/15/21 0809   BP: 94/55   Pulse: 77   Resp: 16   Temp: 97.2 °F (36.2 °C)   TempSrc: Skin   SpO2: 99%   Weight: 45.8 kg (101 lb)   Height: 162.6 cm (64\")        Physical Exam  Vitals reviewed.   Constitutional:       General: She is not in acute distress.  Eyes:      General: Lids are normal.      Conjunctiva/sclera: Conjunctivae normal.   Neck:      Thyroid: No thyroid mass or thyromegaly.      Vascular: No carotid bruit.      Trachea: Trachea and phonation normal. No tracheal deviation.   Cardiovascular:      Rate and Rhythm: Normal rate and regular rhythm.      Heart sounds: Normal heart sounds. No murmur heard.      Pulmonary:      Effort: Pulmonary effort is normal.      Breath sounds: Normal breath sounds.   Skin:     General: Skin is warm and dry.   Neurological:      Mental Status: She is alert. She is not disoriented.   Psychiatric:         Speech: Speech normal.         Behavior: Behavior normal. Behavior is cooperative.          Result Review :     The following data was reviewed by: Eddie Araya MD on 12/15/2021:  TSH+Free T4 (11/29/2021 08:34)           Assessment and Plan    Diagnoses and all orders for this visit:    1. Essential hypertension (Primary)  Assessment & Plan:  Blood pressure is overtreated.  We will decrease hydrochlorothiazide to 12.5 mg daily.  Continue with valsartan.  Same follow-up in 6 months.    Orders:  -     hydroCHLOROthiazide (HYDRODIURIL) 12.5 MG tablet; Take 1 tablet by mouth Daily for 180 days.  Dispense: 90 tablet; Refill: 1  -     valsartan (DIOVAN) 80 MG tablet; Take 1 tablet by mouth Daily for 180 days.  " Dispense: 90 tablet; Refill: 1  -     Comprehensive Metabolic Panel; Future  -     CBC & Differential; Future    2. Hypothyroidism, acquired  Assessment & Plan:  Supratherapeutic. Decrease synthroid to 75 mcg daily.     Orders:  -     Synthroid 75 MCG tablet; Take 1 tablet by mouth Daily for 180 days.  Dispense: 90 tablet; Refill: 1  -     TSH+Free T4; Future    3. Stage 3 chronic kidney disease, unspecified whether stage 3a or 3b CKD (HCC)  -     Comprehensive Metabolic Panel; Future    4. Vitamin D deficiency  -     Vitamin D 25 Hydroxy; Future    5. Screening for ischemic heart disease  -     Lipid Panel With / Chol / HDL Ratio; Future      Follow Up   Return in about 6 months (around 6/15/2022) for recheck/refill medication.  Patient was given instructions and counseling regarding her condition or for health maintenance advice. Please see specific information pulled into the AVS if appropriate.

## 2022-01-14 NOTE — ASSESSMENT & PLAN NOTE
Done in error   Hypertension is unchanged.  Continue current treatment regimen.  Blood pressure will be reassessed in 3 months.

## 2022-03-13 ENCOUNTER — HOSPITAL ENCOUNTER (EMERGENCY)
Facility: HOSPITAL | Age: 82
Discharge: HOME | End: 2022-03-13
Attending: EMERGENCY MEDICINE | Admitting: EMERGENCY MEDICINE

## 2022-03-13 ENCOUNTER — APPOINTMENT (OUTPATIENT)
Dept: GENERAL RADIOLOGY | Facility: HOSPITAL | Age: 82
End: 2022-03-13

## 2022-03-13 VITALS
SYSTOLIC BLOOD PRESSURE: 131 MMHG | TEMPERATURE: 98 F | BODY MASS INDEX: 17.24 KG/M2 | HEIGHT: 64 IN | RESPIRATION RATE: 17 BRPM | HEART RATE: 84 BPM | DIASTOLIC BLOOD PRESSURE: 68 MMHG | OXYGEN SATURATION: 99 % | WEIGHT: 101 LBS

## 2022-03-13 DIAGNOSIS — S81.012A KNEE LACERATION, LEFT, INITIAL ENCOUNTER: Primary | ICD-10-CM

## 2022-03-13 PROCEDURE — 99282 EMERGENCY DEPT VISIT SF MDM: CPT

## 2022-03-13 PROCEDURE — 73560 X-RAY EXAM OF KNEE 1 OR 2: CPT

## 2022-03-13 RX ORDER — CEPHALEXIN 500 MG/1
500 CAPSULE ORAL 3 TIMES DAILY
Qty: 15 CAPSULE | Refills: 0 | Status: SHIPPED | OUTPATIENT
Start: 2022-03-13 | End: 2022-03-18

## 2022-03-13 RX ORDER — LIDOCAINE HYDROCHLORIDE AND EPINEPHRINE 10; 10 MG/ML; UG/ML
10 INJECTION, SOLUTION INFILTRATION; PERINEURAL ONCE
Status: COMPLETED | OUTPATIENT
Start: 2022-03-13 | End: 2022-03-13

## 2022-03-13 RX ADMIN — LIDOCAINE HYDROCHLORIDE,EPINEPHRINE BITARTRATE 10 ML: 10; .01 INJECTION, SOLUTION INFILTRATION; PERINEURAL at 15:46

## 2022-03-13 NOTE — DISCHARGE INSTRUCTIONS
Wound care:    1.  Keep initial dressing in place for 24 hours if able.  (if blood seeps through, then remove this dressing, wash gently with antibacterial soap and pat dry)    2.  After initial 24 hours wash the wound twice a day with antibacterial soap and apply thin film of over the counter triple antibiotic ointment (bacitracin or neosporin ointment)    3.  Cover with bandaid while at work or home    4.  Sutures out in 8-10 days.

## 2022-03-13 NOTE — ED PROVIDER NOTES
MD ATTESTATION NOTE    The ESTEFANI and I have discussed this patient's history, physical exam, and treatment plan.  I have reviewed the documentation and personally had a face to face interaction with the patient. I affirm the documentation and agree with the treatment and plan.  The attached note describes my personal findings.      I provided a substantive portion of the care of the patient.  I personally performed the physical exam in its entirety, and below are my findings.  For this patient encounter, the patient wore surgical mask, I wore full protective PPE including N95 and eye protection.      Brief HPI: Patient presents for evaluation after fall.  Patient states she tripped on the sidewalk and lacerated her left leg.  Patient has no head injury no neck pain.  Patient tetanus is up-to-date.    PHYSICAL EXAM  ED Triage Vitals   Temp Heart Rate Resp BP SpO2   03/13/22 1439 03/13/22 1438 03/13/22 1438 03/13/22 1532 03/13/22 1438   98 °F (36.7 °C) 84 17 131/68 99 %      Temp src Heart Rate Source Patient Position BP Location FiO2 (%)   -- -- 03/13/22 1532 03/13/22 1532 --     Lying Right arm          GENERAL: no acute distress  HENT: nares patent  EYES: no scleral icterus  CV: regular rhythm, normal rate  RESPIRATORY: normal effort  ABDOMEN: soft  MUSCULOSKELETAL: no deformity.  Some left knee tenderness  NEURO: alert, moves all extremities, follows commands  PSYCH:  calm, cooperative  SKIN: warm, dry.  Multiple lacerations to left knee    Vital signs and nursing notes reviewed.        Plan: Laceration repair       Molina Rawls MD  03/13/22 1640

## 2022-03-13 NOTE — ED PROVIDER NOTES
EMERGENCY DEPARTMENT ENCOUNTER    Room Number:  02/02  Date seen:  3/13/2022  Time seen: 15:53 EDT  PCP: Eddie Araya MD  Historian: patient    HPI:  Chief complaint:left knee laceration  A complete HPI/ROS/PMH/PSH/SH/FH are unobtainable due to: n/a  Context:Bailee Garza is a 82 y.o. female who presents to the ED with c/o 2 lacerations to left knee that occurred PTA due to a trip and fall while on sidewalk.  She denies other injuries and did not hit her head.  Pain is  Mild and she is more anxious about the possibility of needing sutures than pain.  Tdap is UTD per medical records.       Patient was placed in face mask in first look. Patient was wearing facemask when I entered the room and throughout our encounter. I wore full protective equipment throughout this patient encounter including a N95 face mask, eye shield and gloves. Hand hygiene/washing of hands was performed before donning protective equipment and after removal when leaving the room.    MEDICAL RECORD REVIEW    ALLERGIES  Hydrocodone, Ketek [telithromycin], Nsaids, and Sulfa antibiotics    PAST MEDICAL HISTORY  Active Ambulatory Problems     Diagnosis Date Noted   • Chronic midline low back pain without sciatica 07/13/2009   • Essential hypertension 11/20/2007   • Hearing loss 03/18/2008   • Hypothyroidism, acquired 11/20/2007   • IBS (irritable bowel syndrome) 04/25/2013   • Chronic nonseasonal allergic rhinitis due to pollen 11/20/2007   • Stage 3 chronic kidney disease (HCC) 12/19/2016   • Arthralgia of right knee 10/11/2017   • DDD (degenerative disc disease), lumbosacral 02/07/2018   • Scoliosis due to degenerative disease of spine in adult patient 05/30/2018   • Status post total replacement of left hip 08/07/2018   • Status post total hip replacement, left 02/04/2019   • Vitamin D deficiency 03/04/2020   • Elevated BUN 05/06/2021   • Elevated serum creatinine 05/06/2021   • Elevated hemoglobin (HCC) 05/06/2021     Resolved Ambulatory  Problems     Diagnosis Date Noted   • Joint capsule tear 12/13/2012   • Back pain 11/20/2007   • Constipation 03/24/2015   • Depression 01/28/2015   • Dyspepsia 03/18/2008   • Grief reaction 07/05/2011   • Insomnia 01/28/2015   • Intervertebral disc disorder with myelopathy, cervical region 07/22/2010   • Stress 04/27/2015   • Screening breast examination 11/20/2007   • Urticaria 06/29/2015   • Chronic cough 12/18/2017   • Abnormal weight loss 12/18/2017   • Abnormal CXR 12/18/2017   • Exertional shortness of breath 12/18/2017   • Pneumonia of both lower lobes due to infectious organism 12/19/2017   • HCAP (healthcare-associated pneumonia) 12/29/2017   • Pain of left midfoot 01/06/2018   • Swelling of foot joint, left 01/06/2018   • Foot injury, left, initial encounter 01/06/2018   • Arthritis of hip, left 02/05/2018   • Primary osteoarthritis of left hip 02/19/2018   • Closed nondisplaced fracture of medial cuneiform of left foot 02/19/2018   • Left lumbar radiculopathy 05/30/2018   • Numbness of left anterior thigh 05/30/2018   • Degenerative disc disease, lumbar 05/30/2018   • OA (osteoarthritis) of hip 07/24/2018   • Fall 09/17/2018   • Trochanteric bursitis, left hip 02/04/2019   • Hip pain, left 02/04/2019   • Family history of abdominal aortic aneurysm (AAA) 03/06/2019   • Peroneal tendonitis, right 01/13/2020   • Closed nondisplaced fracture of lateral malleolus of fibula with delayed healing 01/13/2020   • Other synovitis and tenosynovitis, right ankle and foot 02/12/2020   • Right ankle pain 03/04/2020   • Primary localized osteoarthrosis of right lower leg 04/17/2020   • Arthritis of foot 04/17/2020   • Hip strain, right, initial encounter 05/06/2020     Past Medical History:   Diagnosis Date   • Allergic    • Arthritis    • 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   • Chronic back pain 07/13/2009   • Diverticulosis    • History of bone density study 09/16/2015   • History of pneumonia    •  History of skin cancer    • Rhinitis, allergic 11/20/2007   • Scoliosis    • Stress incontinence        PAST SURGICAL HISTORY  Past Surgical History:   Procedure Laterality Date   • BREAST EXCISIONAL BIOPSY Right     50+ years ago   • BRONCHOSCOPY N/A 12/21/2017    Procedure: BRONCHOSCOPY;  Surgeon: Mario Alanis MD;  Location: [facility] ENDOSCOPY;  Service:    • CATARACT EXTRACTION, BILATERAL     • COLONOSCOPY     • HYSTERECTOMY     • TOTAL HIP ARTHROPLASTY Left 7/24/2018    Procedure: LEFT TOTAL HIP ARTHROPLASTY;  Surgeon: Justen Mann MD;  Location: [facility] MAIN OR;  Service: Orthopedics       FAMILY HISTORY  Family History   Problem Relation Age of Onset   • Heart disease Mother    • Aneurysm Mother    • Cancer Father    • Asthma Paternal Grandfather    • Aneurysm Brother    • Breast cancer Paternal Grandmother    • Malig Hyperthermia Neg Hx    • Ovarian cancer Neg Hx        SOCIAL HISTORY  Social History     Socioeconomic History   • Marital status:    Tobacco Use   • Smoking status: Never Smoker   • Smokeless tobacco: Never Used   Substance and Sexual Activity   • Alcohol use: No   • Drug use: Never   • Sexual activity: Defer       REVIEW OF SYSTEMS  Review of Systems    All systems reviewed and negative except for those discussed in HPI.     PHYSICAL EXAM    ED Triage Vitals   Temp Heart Rate Resp BP SpO2   03/13/22 1439 03/13/22 1438 03/13/22 1438 03/13/22 1532 03/13/22 1438   98 °F (36.7 °C) 84 17 131/68 99 %      Temp src Heart Rate Source Patient Position BP Location FiO2 (%)   -- -- 03/13/22 1532 03/13/22 1532 --     Lying Right arm      Physical Exam  Musculoskeletal:        Legs:       Comments: Lacerations to left knee as documented.            I have reviewed the triage vital signs and nursing notes.      GENERAL: not distressed  HENT: nares patent  EYES: no scleral icterus  NECK: no ROM limitations  CV: regular rhythm, regular rate, no murmur, no rubs, no gallups  RESPIRATORY: normal effort,  CTAB  ABDOMEN: soft  : deferred  MUSCULOSKELETAL: see diagram  NEURO: alert, moves all extremities, follows commands  SKIN: warm, dry    Laceration Repair    Date/Time: 3/13/2022 3:59 PM  Performed by: Maria Elena Roca APRN  Authorized by: Molina Rawls MD     Consent:     Consent obtained:  Verbal    Consent given by:  Patient    Risks, benefits, and alternatives were discussed: yes      Risks discussed:  Infection, pain, poor cosmetic result and poor wound healing    Alternatives discussed:  No treatment  Universal protocol:     Patient identity confirmed:  Verbally with patient and arm band  Anesthesia:     Anesthesia method:  Local infiltration    Local anesthetic:  Lidocaine 1% WITH epi  Laceration details:     Location:  Leg    Leg location:  L knee    Length (cm):  2  Pre-procedure details:     Preparation:  Patient was prepped and draped in usual sterile fashion and imaging obtained to evaluate for foreign bodies  Exploration:     Limited defect created (wound extended): no      Hemostasis achieved with:  Direct pressure    Imaging obtained: x-ray      Imaging outcome: foreign body not noted      Wound extent: no muscle damage noted, no underlying fracture noted and no vascular damage noted      Contaminated: no    Treatment:     Area cleansed with:  Ivanna    Amount of cleaning:  Standard    Irrigation solution:  Sterile saline    Irrigation volume:  10    Irrigation method:  Pressure wash    Visualized foreign bodies/material removed: no      Debridement:  None    Undermining:  None    Scar revision: no    Skin repair:     Repair method:  Sutures    Suture size:  4-0    Suture material:  Nylon    Suture technique:  Horizontal mattress    Number of sutures:  2  Approximation:     Approximation:  Close  Repair type:     Repair type:  Simple  Post-procedure details:     Dressing:  Antibiotic ointment and bulky dressing    Procedure completion:  Tolerated    Laceration Repair    Date/Time:  3/13/2022 4:03 PM  Performed by: Maria Elena Roca APRN  Authorized by: Molina Rawls MD     Consent:     Consent obtained:  Verbal    Consent given by:  Patient    Risks, benefits, and alternatives were discussed: yes      Risks discussed:  Infection, pain, poor cosmetic result and poor wound healing    Alternatives discussed:  No treatment  Universal protocol:     Patient identity confirmed:  Verbally with patient and arm band  Anesthesia:     Anesthesia method:  Local infiltration    Local anesthetic:  Lidocaine 1% WITH epi  Laceration details:     Location:  Leg    Length (cm):  2  Pre-procedure details:     Preparation:  Patient was prepped and draped in usual sterile fashion and imaging obtained to evaluate for foreign bodies  Exploration:     Limited defect created (wound extended): no      Imaging obtained: x-ray      Imaging outcome: foreign body not noted      Wound exploration: wound explored through full range of motion      Contaminated: no    Treatment:     Area cleansed with:  Joaquina-Jose Juan    Amount of cleaning:  Standard    Irrigation solution:  Sterile saline    Irrigation volume:  10    Irrigation method:  Pressure wash    Visualized foreign bodies/material removed: no      Debridement:  None    Undermining:  None    Scar revision: no    Skin repair:     Repair method:  Sutures    Suture size:  4-0    Suture material:  Nylon    Suture technique:  Simple interrupted    Number of sutures:  4  Approximation:     Approximation:  Close  Repair type:     Repair type:  Simple  Post-procedure details:     Dressing:  Antibiotic ointment and bulky dressing    Procedure completion:  Tolerated        LAB RESULTS  No results found for this or any previous visit (from the past 24 hour(s)).      RADIOLOGY RESULTS  XR Knee 1 or 2 View Left    Result Date: 3/13/2022  XR KNEE 1 OR 2 VW LEFT-  INDICATIONS: Trauma  TECHNIQUE: 2 views of the left knee  COMPARISON: None available  FINDINGS:  Mild medial tibiofemoral  joint space narrowing. No acute fracture, erosion, or dislocation is identified. Mild prepatellar soft tissue swelling is apparent. Trace knee effusion. Follow-up/further evaluation can be obtained as indications persist.       As described.    This report was finalized on 3/13/2022 4:23 PM by Dr. Omari Monson M.D.           PROGRESS, DATA ANALYSIS, CONSULTS AND MEDICAL DECISION MAKING  All labs have been independently reviewed by me.  All radiology studies have been reviewed by me and discussed with radiologist dictating the report.  EKG's independently viewed and interpreted by me unless stated otherwise. Discussion below represents my analysis of pertinent findings related to patient's condition, differential diagnosis, treatment plan and final disposition.     ED Course as of 03/13/22 1632   Sun Mar 13, 2022   1619 I viewed left knee images in PACS.  My interpretation is no acute fracture.  [EW]   1629 MDM/dispo:  left knee images WNL.  Pt will be provided wound care and she will follow up with her PCP for suture removal.  [EW]      ED Course User Index  [EW] Maria Elena Roca, TERESA     DDX: left knee laceration, left distal femur fracture, contaminated wound     Reviewed pt's history and workup with Dr. Rawls.  After a bedside evaluation, Dr. Rawls agrees with the plan of care.    The patient's history, physical exam, and lab findings were discussed with the physician, who also performed a face to face history and physical exam.  I discussed all results and noted any abnormalities with patient.  Discussed absoute need to recheck abnormalities with their family physician.  I answered any of the patient's questions.  Discussed plan for discharge, as there is no emergent indication for admission.  Pt is agreeable and understands need for follow up and repeat testing.  Pt is aware that discharge does not mean that nothing is wrong but it indicates no emergency is present and they must continue care  "with their family physician.  Pt is discharged with instructions to follow up with primary care doctor to have their blood pressure rechecked.           Disposition vitals:  /68 (BP Location: Right arm, Patient Position: Lying)   Pulse 84   Temp 98 °F (36.7 °C)   Resp 17   Ht 162.6 cm (64\")   Wt 45.8 kg (101 lb)   SpO2 99%   BMI 17.34 kg/m²       DIAGNOSIS  Final diagnoses:   Knee laceration, left, initial encounter       FOLLOW UP   Eddie Araya MD  [id number] [address]  [phone]    Schedule an appointment as soon as possible for a visit   in 8-10 days for suture removal         Maria Elena Roca, APRN  03/13/22 1632    "

## 2022-03-13 NOTE — ED TRIAGE NOTES
"States trip and fall. States has 2 \"cuts\" to LT knee that may need sutured. Denies other injuries. Knee bandaged PTA  "

## 2022-03-13 NOTE — ED NOTES
Patient was placed in face mask during triage process. Patient was wearing facemask when I entered the room and throughout our encounter. I wore full protective equipment throughout this patient encounter including a face mask, eye protection, and gloves. Hand hygiene was performed before donning protective equipment and again following doffing of PPE after leaving the room.

## 2022-03-23 NOTE — PROGRESS NOTES
"Subjective   Bailee Garza is a 82 y.o. female.     CC: ED F/U for Laceration/Suture Removal    History of Present Illness     Pt comes in today after trip to the [facility] ED on 3/13/22. That visit was as follows:    HPI:  Chief complaint:left knee laceration  A complete HPI/ROS/PMH/PSH/SH/FH are unobtainable due to: n/a  Context:Bailee Garza is a 82 y.o. female who presents to the ED with c/o 2 lacerations to left knee that occurred PTA due to a trip and fall while on sidewalk.  She denies other injuries and did not hit her head.  Pain is  Mild and she is more anxious about the possibility of needing sutures than pain.  Tdap is UTD per medical records.     Pt had two lacerations repaired, one with 2 sutures, the other with 4.    DIAGNOSIS   Final diagnoses:   Knee laceration, left, initial encounter       Current outpatient and discharge medications have been reconciled for the patient.  Reviewed by: Eddie Shi MD        The following portions of the patient's history were reviewed and updated as appropriate: allergies, current medications, past family history, past medical history, past social history, past surgical history and problem list.    Review of Systems   Constitutional: Negative for activity change, chills and fever.   Respiratory: Negative for cough.    Cardiovascular: Negative for chest pain.   Psychiatric/Behavioral: Negative for dysphoric mood.       /66   Pulse 74   Temp 97.2 °F (36.2 °C) (Oral)   Resp 16   Ht 162.6 cm (64\")   Wt 45.4 kg (100 lb)   BMI 17.16 kg/m²     Objective   Physical Exam  Constitutional:       General: She is not in acute distress.     Appearance: She is well-developed.   Pulmonary:      Effort: Pulmonary effort is normal.   Neurological:      Mental Status: She is alert and oriented to person, place, and time.   Psychiatric:         Behavior: Behavior normal.         Thought Content: Thought content normal.     Hospital records reviewed with pt confirming " HPI.      Assessment/Plan   Diagnoses and all orders for this visit:    1. Laceration of left knee, subsequent encounter (Primary)    2. Visit for suture removal    3. Hospital discharge follow-up    Sutures removed with good result. Wound care discussed with pt. Steri-strips placed.

## 2022-03-24 ENCOUNTER — OFFICE VISIT (OUTPATIENT)
Dept: FAMILY MEDICINE CLINIC | Facility: CLINIC | Age: 82
End: 2022-03-24

## 2022-03-24 VITALS
TEMPERATURE: 97.2 F | SYSTOLIC BLOOD PRESSURE: 106 MMHG | DIASTOLIC BLOOD PRESSURE: 66 MMHG | BODY MASS INDEX: 17.07 KG/M2 | HEIGHT: 64 IN | RESPIRATION RATE: 16 BRPM | WEIGHT: 100 LBS | HEART RATE: 74 BPM

## 2022-03-24 DIAGNOSIS — Z09 HOSPITAL DISCHARGE FOLLOW-UP: ICD-10-CM

## 2022-03-24 DIAGNOSIS — S81.012D LACERATION OF LEFT KNEE, SUBSEQUENT ENCOUNTER: Primary | ICD-10-CM

## 2022-03-24 DIAGNOSIS — Z48.02 VISIT FOR SUTURE REMOVAL: ICD-10-CM

## 2022-03-24 PROCEDURE — 99212 OFFICE O/P EST SF 10 MIN: CPT | Performed by: FAMILY MEDICINE

## 2022-04-14 ENCOUNTER — TELEPHONE (OUTPATIENT)
Dept: ORTHOPEDIC SURGERY | Facility: CLINIC | Age: 82
End: 2022-04-14

## 2022-04-14 NOTE — TELEPHONE ENCOUNTER
"Caller: Bailee Garza    Relationship to patient: Self    Best call back number: [phone]    Patient is needing:CALLBACK  PATIENT FELL OUTSIDE HER FRONT DOOR 3.13.22 AND SUFFERED INJURY TO LT KNEE WHICH REQUIRED STITCHES  PATIENT REPORTS THAT SINCE THE FALL, SHE HAS A \"HARD PLACE\" AT THE CREASE OF HER UPPER RT LEG WHERE THE LEG MEETS THE TORSO.  PATIENT STATES THIS HARD PLACE IS CAUSING EXCRUCIATING PAIN THAT SHOOTS DOWN HER LEG.  PATIENT WOULD LIKE TO SEE DR HOLCOMB FOR THIS (INJURY)  PLEASE ADVISE    UNABLE TO WARM TRANSFER          "

## 2022-04-20 ENCOUNTER — OFFICE VISIT (OUTPATIENT)
Dept: ORTHOPEDIC SURGERY | Facility: CLINIC | Age: 82
End: 2022-04-20

## 2022-04-20 VITALS — BODY MASS INDEX: 18.88 KG/M2 | TEMPERATURE: 97.7 F | HEIGHT: 61 IN | WEIGHT: 100 LBS

## 2022-04-20 DIAGNOSIS — M16.11 ARTHRITIS OF RIGHT HIP: ICD-10-CM

## 2022-04-20 DIAGNOSIS — M25.551 RIGHT HIP PAIN: Primary | ICD-10-CM

## 2022-04-20 PROCEDURE — 99213 OFFICE O/P EST LOW 20 MIN: CPT | Performed by: ORTHOPAEDIC SURGERY

## 2022-04-20 PROCEDURE — 73502 X-RAY EXAM HIP UNI 2-3 VIEWS: CPT | Performed by: ORTHOPAEDIC SURGERY

## 2022-04-20 NOTE — PROGRESS NOTES
"Patient Name: Bailee Garza   YOB: 1940  Referring Primary Care Physician: Eddie Araya MD  BMI: Body mass index is 18.89 kg/m².    Chief Complaint:    Chief Complaint   Patient presents with   • Right Hip - Pain        HPI:     Bailee Garza is a 82 y.o. female who presents today for evaluation of   Chief Complaint   Patient presents with   • Right Hip - Pain   .  Patient is seen today complaint of right hip pain that is getting increasingly worse.  She says she fell on March 13 cut her knee and had some lacerations that had to be repaired as made her right hip hurt more she had a left total hip done in 2018 July by Dr. Mann and says she was \"his last patient\" I saw her last couple years and she did have some asymmetric polywear but is really not had symptoms on that side.  She said she twisted in bed the other day and felt a strange sensation in her left hip and I cautioned her about hip precautions he does take some Tylenol she cannot take anti-inflammatories because reviewing her chart she has had some kidney problems in the past.      Subjective   Medications:   Home Medications:  Current Outpatient Medications on File Prior to Visit   Medication Sig   • acetaminophen (TYLENOL) 500 MG tablet Take 2 tablets by mouth 3 (Three) Times a Day As Needed for Mild Pain  or Moderate Pain . Do not exceed 3000 mg daily   • calcium polycarbophil (FIBERCON) 625 MG tablet Take 625 mg by mouth Daily.   • cholecalciferol (VITAMIN D3) 25 MCG (1000 UT) tablet Take 1,000 Units by mouth Daily.   • hydroCHLOROthiazide (HYDRODIURIL) 12.5 MG tablet Take 1 tablet by mouth Daily for 180 days.   • Synthroid 75 MCG tablet Take 1 tablet by mouth Daily for 180 days.   • valsartan (DIOVAN) 80 MG tablet Take 1 tablet by mouth Daily for 180 days.     No current facility-administered medications on file prior to visit.     Current Medications:  Scheduled Meds:  Continuous Infusions:No current facility-administered " medications for this visit.    PRN Meds:.    I have reviewed the patient's medical history in detail and updated the computerized patient record.  Review and summarization of old records includes:    Past Medical History:   Diagnosis Date   • Abnormal CXR 2017   • Allergic    • Arthritis    • Arthritis of foot 2020   • Arthropathy of pelvic region and thigh 2012    unspecified   • Back pain 2007   • Chronic back pain 2009   • Chronic cough 2017   • Closed nondisplaced fracture of lateral malleolus of fibula with delayed healing 2020   • Closed nondisplaced fracture of medial cuneiform of left foot 2018   • Constipation 2015    unspecified   • Diverticulosis    • Dyspepsia 2008   • Essential hypertension 2007   • Exertional shortness of breath 2017   • Fall 2018   • Family history of abdominal aortic aneurysm (AAA) 3/6/2019     aaa screen limited (04/10/2019 10:32)    • 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), 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   • Hearing loss 2008   • Hip pain, left    • History of bone density study 2015   • History of pneumonia     2017   • History of skin cancer    • Hypothyroidism, acquired 2007   • Insomnia 2015    unspecified   • Intervertebral disc disorder with myelopathy, cervical region 2010   • Joint capsule tear 2012    unspecified   • OA (osteoarthritis) of hip 2018   • Other synovitis and tenosynovitis, right ankle and foot 2020   • Peroneal tendonitis, right 2020   • Rhinitis, allergic 2007   • Scoliosis    • Screening breast examination 2007   • Stress 2015    other acute reactions to stress   • Stress incontinence    • Trochanteric bursitis, left hip 2019   • Urticaria 2015        Past Surgical History:   Procedure Laterality Date   • BREAST EXCISIONAL BIOPSY Right     50+ years ago   •  BRONCHOSCOPY N/A 12/21/2017    Procedure: BRONCHOSCOPY;  Surgeon: Mario Alanis MD;  Location: [facility] ENDOSCOPY;  Service:    • CATARACT EXTRACTION, BILATERAL     • COLONOSCOPY     • HYSTERECTOMY     • TOTAL HIP ARTHROPLASTY Left 7/24/2018    Procedure: LEFT TOTAL HIP ARTHROPLASTY;  Surgeon: Justen Mann MD;  Location: [facility] MAIN OR;  Service: Orthopedics        Social History     Occupational History   • Not on file   Tobacco Use   • Smoking status: Never Smoker   • Smokeless tobacco: Never Used   Substance and Sexual Activity   • Alcohol use: No   • Drug use: Never   • Sexual activity: Defer      Social History     Social History Narrative   • Not on file        Family History   Problem Relation Age of Onset   • Heart disease Mother    • Aneurysm Mother    • Cancer Father    • Asthma Paternal Grandfather    • Aneurysm Brother    • Breast cancer Paternal Grandmother    • Malig Hyperthermia Neg Hx    • Ovarian cancer Neg Hx        ROS: 14 point review of systems was performed and all other systems were reviewed and are negative except for documented findings in HPI and today's encounter.     Allergies:   Allergies   Allergen Reactions   • Hydrocodone Hallucinations   • Ketek [Telithromycin] Hives   • Nsaids Hives   • Sulfa Antibiotics Hives     Constitutional:  Denies fever, shaking or chills   Eyes:  Denies change in visual acuity   HENT:  Denies nasal congestion or sore throat   Respiratory:  Denies cough or shortness of breath   Cardiovascular:  Denies chest pain or severe LE edema   GI:  Denies abdominal pain, nausea, vomiting, bloody stools or diarrhea   Musculoskeletal:  Numbness, tingling, pain, or loss of motor function only as noted above in history of present illness.  : Denies painful urination or hematuria  Integument:  Denies rash, lesion or ulceration   Neurologic:  Denies headache or focal weakness  Endocrine:  Denies lymphadenopathy  Psych:  Denies confusion or change in mental status   Hem:   "Denies active bleeding    OBJECTIVE:  Physical Exam: 82 y.o. female  Wt Readings from Last 3 Encounters:   04/20/22 45.4 kg (100 lb)   03/22/22 45.4 kg (100 lb)   03/21/22 45.4 kg (100 lb)     Ht Readings from Last 1 Encounters:   04/20/22 154.9 cm (61\")     Body mass index is 18.89 kg/m².  Vitals:    04/20/22 0810   Temp: 97.7 °F (36.5 °C)     Vital signs reviewed.     General Appearance:    Alert, cooperative, in no acute distress                  Eyes: conjunctiva clear  ENT: external ears and nose atraumatic  CV: no peripheral edema  Resp: normal respiratory effort  Skin: no rashes or wounds; normal turgor  Psych: mood and affect appropriate  Lymph: no nodes appreciated  Neuro: gross sensation intact  Vascular:  Palpable peripheral pulse in noted extremity  Musculoskeletal Extremities: Stinchfield's positive on the right she has no internal rotation she has tenderness in the hip.  Left hip not sore to palpation I did not range it to range of motion because of her symptoms the other day.  She is able to walk    Radiology:   AP of the hips lateral right hip taken in the office today for complaints of pain with comparison view shows advanced arthritic change in the right hip with a left total hip arthroplasty the cup appears to be a little bit vertical a little bit anteverted but has been stable.  Question subluxation episodes as of late.  Polyethylene wear appears to be symmetric with a small head over the last couple years        Assessment:     ICD-10-CM ICD-9-CM   1. Right hip pain  M25.551 719.45   2. Arthritis of right hip  M16.11 716.95        MDM/Plan:   The diagnosis(es), natural history, pathophysiology and treatment for diagnosis(es) were discussed. Opportunity given and questions answered.  Biomechanics of pertinent body areas discussed.  When appropriate, the use of ambulatory aids discussed.    EXERCISES:  Advice on benefits of, and types of regular/moderate exercise pertaining to orthopedic " diagnosis(es).  Inflammation/pain control; with cold, heat, elevation and/or liniments discussed as appropriate  PT referral.  MEDICAL RECORDS reviewed from other provider(s) for past and current medical history pertinent to this complaint.  Have her try a little physical therapy to see if she can work it out I think she is aggravated the right hip working on the knee.  If it bothers her enough we can get her injected and/or talked about doing total hip replacement.    4/20/2022    Dictated utilizing Dragon dictation

## 2022-05-04 ENCOUNTER — OFFICE VISIT (OUTPATIENT)
Dept: ORTHOPEDIC SURGERY | Facility: CLINIC | Age: 82
End: 2022-05-04

## 2022-05-04 ENCOUNTER — PREP FOR SURGERY (OUTPATIENT)
Dept: OTHER | Facility: HOSPITAL | Age: 82
End: 2022-05-04

## 2022-05-04 VITALS — WEIGHT: 100 LBS | HEIGHT: 61 IN | TEMPERATURE: 95.7 F | BODY MASS INDEX: 18.88 KG/M2

## 2022-05-04 DIAGNOSIS — M16.11 ARTHRITIS OF RIGHT HIP: Primary | ICD-10-CM

## 2022-05-04 DIAGNOSIS — M51.36 DDD (DEGENERATIVE DISC DISEASE), LUMBAR: ICD-10-CM

## 2022-05-04 DIAGNOSIS — Z96.642 STATUS POST TOTAL REPLACEMENT OF LEFT HIP: ICD-10-CM

## 2022-05-04 DIAGNOSIS — M16.11 ARTHRITIS OF RIGHT HIP: ICD-10-CM

## 2022-05-04 DIAGNOSIS — M54.50 LUMBAR SPINE PAIN: Primary | ICD-10-CM

## 2022-05-04 PROCEDURE — 99214 OFFICE O/P EST MOD 30 MIN: CPT | Performed by: ORTHOPAEDIC SURGERY

## 2022-05-04 PROCEDURE — 72100 X-RAY EXAM L-S SPINE 2/3 VWS: CPT | Performed by: ORTHOPAEDIC SURGERY

## 2022-05-04 RX ORDER — CEFAZOLIN SODIUM 2 G/100ML
2 INJECTION, SOLUTION INTRAVENOUS ONCE
Status: CANCELLED | OUTPATIENT
Start: 2022-07-05 | End: 2022-05-04

## 2022-05-04 RX ORDER — ACETAMINOPHEN 500 MG
1000 TABLET ORAL ONCE
Status: CANCELLED | OUTPATIENT
Start: 2022-07-05 | End: 2022-05-04

## 2022-05-04 RX ORDER — CHLORHEXIDINE GLUCONATE 500 MG/1
1 CLOTH TOPICAL TAKE AS DIRECTED
Status: CANCELLED | OUTPATIENT
Start: 2022-05-04

## 2022-05-04 RX ORDER — PREGABALIN 75 MG/1
150 CAPSULE ORAL ONCE
Status: CANCELLED | OUTPATIENT
Start: 2022-07-05 | End: 2022-05-04

## 2022-05-04 RX ORDER — POVIDONE-IODINE 10 MG/ML
SOLUTION TOPICAL ONCE
Status: CANCELLED | OUTPATIENT
Start: 2022-07-05 | End: 2022-05-04

## 2022-05-04 NOTE — PROGRESS NOTES
"Patient Name: Bailee Garza   YOB: 1940  Referring Primary Care Physician: Eddie Araya MD  BMI: Body mass index is 18.89 kg/m².    Chief Complaint:    Chief Complaint   Patient presents with   • Lumbar Spine - Establish Care, Pain   Right hip and groin    HPI:     Bailee Garza is a 82 y.o. female who presents today for evaluation of   Chief Complaint   Patient presents with   • Lumbar Spine - Establish Care, Pain   .  Patient is seen today complaining of some lower back pain with Kurtzer in the left lower back some in the right back and buttock as well.  She had had left total hip replacement which was \"Dr. Mann's last case\".  Says that her right hip and groin are bothering her radiating down to her leg denies any trauma.  Recommended that she get some physical therapy which she tried to do however said that they mixed up her insurance and that should be starting it soon.  It was bothering her more in her lower back and now is bothering her hip and groin      Subjective   Medications:   Home Medications:  Current Outpatient Medications on File Prior to Visit   Medication Sig   • acetaminophen (TYLENOL) 500 MG tablet Take 2 tablets by mouth 3 (Three) Times a Day As Needed for Mild Pain  or Moderate Pain . Do not exceed 3000 mg daily   • calcium polycarbophil (FIBERCON) 625 MG tablet Take 625 mg by mouth Daily.   • Chlorcyclizine-Pseudoephed (STAHIST AD PO) Take  by mouth.   • cholecalciferol (VITAMIN D3) 25 MCG (1000 UT) tablet Take 1,000 Units by mouth Daily.   • hydroCHLOROthiazide (HYDRODIURIL) 12.5 MG tablet Take 1 tablet by mouth Daily for 180 days.   • Synthroid 75 MCG tablet Take 1 tablet by mouth Daily for 180 days.   • valsartan (DIOVAN) 80 MG tablet Take 1 tablet by mouth Daily for 180 days.     No current facility-administered medications on file prior to visit.     Current Medications:  Scheduled Meds:  Continuous Infusions:No current facility-administered medications for this " visit.    PRN Meds:.    I have reviewed the patient's medical history in detail and updated the computerized patient record.  Review and summarization of old records includes:    Past Medical History:   Diagnosis Date   • Abnormal CXR 2017   • Allergic    • Arthritis    • Arthritis of foot 2020   • Arthropathy of pelvic region and thigh 2012    unspecified   • Back pain 2007   • Chronic back pain 2009   • Chronic cough 2017   • Closed nondisplaced fracture of lateral malleolus of fibula with delayed healing 2020   • Closed nondisplaced fracture of medial cuneiform of left foot 2018   • Constipation 2015    unspecified   • Diverticulosis    • Dyspepsia 2008   • Essential hypertension 2007   • Exertional shortness of breath 2017   • Fall 2018   • Family history of abdominal aortic aneurysm (AAA) 3/6/2019     aaa screen limited (04/10/2019 10:32)    • 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), 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   • Hearing loss 2008   • Hip pain, left    • History of bone density study 2015   • History of pneumonia     2017   • History of skin cancer    • Hypothyroidism, acquired 2007   • Insomnia 2015    unspecified   • Intervertebral disc disorder with myelopathy, cervical region 2010   • Joint capsule tear 2012    unspecified   • OA (osteoarthritis) of hip 2018   • Other synovitis and tenosynovitis, right ankle and foot 2020   • Peroneal tendonitis, right 2020   • Rhinitis, allergic 2007   • Scoliosis    • Screening breast examination 2007   • Stress 2015    other acute reactions to stress   • Stress incontinence    • Trochanteric bursitis, left hip 2019   • Urticaria 2015        Past Surgical History:   Procedure Laterality Date   • BREAST EXCISIONAL BIOPSY Right     50+ years ago   • BRONCHOSCOPY N/A 2017     Procedure: BRONCHOSCOPY;  Surgeon: Mario Alanis MD;  Location: [facility] ENDOSCOPY;  Service:    • CATARACT EXTRACTION, BILATERAL     • COLONOSCOPY     • HYSTERECTOMY     • TOTAL HIP ARTHROPLASTY Left 7/24/2018    Procedure: LEFT TOTAL HIP ARTHROPLASTY;  Surgeon: Justen Mann MD;  Location: [facility] MAIN OR;  Service: Orthopedics        Social History     Occupational History   • Not on file   Tobacco Use   • Smoking status: Never Smoker   • Smokeless tobacco: Never Used   Vaping Use   • Vaping Use: Never used   Substance and Sexual Activity   • Alcohol use: No   • Drug use: Never   • Sexual activity: Defer      Social History     Social History Narrative   • Not on file        Family History   Problem Relation Age of Onset   • Heart disease Mother    • Aneurysm Mother    • Cancer Father    • Asthma Paternal Grandfather    • Aneurysm Brother    • Breast cancer Paternal Grandmother    • Malig Hyperthermia Neg Hx    • Ovarian cancer Neg Hx        ROS: 14 point review of systems was performed and all other systems were reviewed and are negative except for documented findings in HPI and today's encounter.     Allergies:   Allergies   Allergen Reactions   • Hydrocodone Hallucinations   • Ketek [Telithromycin] Hives   • Nsaids Hives   • Sulfa Antibiotics Hives     Constitutional:  Denies fever, shaking or chills   Eyes:  Denies change in visual acuity   HENT:  Denies nasal congestion or sore throat   Respiratory:  Denies cough or shortness of breath   Cardiovascular:  Denies chest pain or severe LE edema   GI:  Denies abdominal pain, nausea, vomiting, bloody stools or diarrhea   Musculoskeletal:  Numbness, tingling, pain, or loss of motor function only as noted above in history of present illness.  : Denies painful urination or hematuria  Integument:  Denies rash, lesion or ulceration   Neurologic:  Denies headache or focal weakness  Endocrine:  Denies lymphadenopathy  Psych:  Denies confusion or change in mental  "status   Hem:  Denies active bleeding    OBJECTIVE:  Physical Exam: 82 y.o. female  Wt Readings from Last 3 Encounters:   05/04/22 45.4 kg (100 lb)   04/20/22 45.4 kg (100 lb)   03/22/22 45.4 kg (100 lb)     Ht Readings from Last 1 Encounters:   05/04/22 154.9 cm (61\")     Body mass index is 18.89 kg/m².  Vitals:    05/04/22 0853   Temp: 95.7 °F (35.4 °C)     Vital signs reviewed.     General Appearance:    Alert, cooperative, in no acute distress                  Eyes: conjunctiva clear  ENT: external ears and nose atraumatic  CV: no peripheral edema  Resp: normal respiratory effort  Skin: no rashes or wounds; normal turgor  Psych: mood and affect appropriate  Lymph: no nodes appreciated  Neuro: gross sensation intact  Vascular:  Palpable peripheral pulse in noted extremity  Musculoskeletal Extremities: Exam today shows Stinchfield to be positive she has pain with internal and external rotation of the hip no pain with axial loading is tender into her buttocks and she has diffuse lumbar pain.    Radiology:   AP of the hips lateral right hip taken the office previously with AP lateral lumbosacral spine today which were all taken for pain without comparison views readily available shows degenerative change lumbosacral spine with scoliosis and degenerative disc disease and end-stage arthritis in her right hip left total hip arthroplasty        Assessment:     ICD-10-CM ICD-9-CM   1. Lumbar spine pain  M54.50 724.2   2. DDD (degenerative disc disease), lumbar  M51.36 722.52   3. Arthritis of right hip  M16.11 716.95   4. Status post total replacement of left hip  Z96.642 V43.64        MDM/Plan:   The diagnosis(es), natural history, pathophysiology and treatment for diagnosis(es) were discussed. Opportunity given and questions answered.  Biomechanics of pertinent body areas discussed.  When appropriate, the use of ambulatory aids discussed.    Inflammation/pain control; with cold, heat, elevation and/or liniments " discussed as appropriate  HOME EXERCISE/PT program encouraged  MEDICAL RECORDS reviewed from other provider(s) for past and current medical history pertinent to this complaint.  Total Hip Replacement: Continuation of conservative management vs. CHAS discussed.  I reviewed anatomy of a total hip arthroplasty in laymen's terms, as well as typical postoperative recovery and possibly 6-12 months for maximal recovery, and possible need for rehabilitation stay after hospitalization. We also discussed risks, benefits, alternatives, and limitations of procedure with risks including but not limited to neurovascular damage, bleeding, infection, malalignment, chronic pain, failure of implants, periprosthetic fracture, osteolysis, loosening of implants, loss of motion, weakness, stiffness, instability, DVT, pulmonary embolus, death, stroke, complex regional pain syndrome, myocardial infarction, and need for additional procedures. Concept of substitution vs. replacement discussed.  No guarantees were given regarding results of surgery.  Patient verbalized understanding, and was given the opportunity to ask and have all questions answered today.  Went over different options for the hip which seems to be bothering more than anything today these included injections or surgery.  Discussing with her son she wants to go had right total hip arthroplasty as injection on the other hip did not really help.  I would recommend she get into therapy and try to do it although it hurt her hip but may help her back    5/4/2022    Dictated utilizing Dragon dictation

## 2022-05-12 ENCOUNTER — TREATMENT (OUTPATIENT)
Dept: PHYSICAL THERAPY | Facility: CLINIC | Age: 82
End: 2022-05-12

## 2022-05-12 DIAGNOSIS — M16.11 PRIMARY OSTEOARTHRITIS OF RIGHT HIP: ICD-10-CM

## 2022-05-12 DIAGNOSIS — M25.551 RIGHT HIP PAIN: Primary | ICD-10-CM

## 2022-05-12 DIAGNOSIS — R26.9 GAIT DIFFICULTY: ICD-10-CM

## 2022-05-12 PROCEDURE — 97162 PT EVAL MOD COMPLEX 30 MIN: CPT | Performed by: PHYSICAL THERAPIST

## 2022-05-12 PROCEDURE — 97530 THERAPEUTIC ACTIVITIES: CPT | Performed by: PHYSICAL THERAPIST

## 2022-05-12 PROCEDURE — 97110 THERAPEUTIC EXERCISES: CPT | Performed by: PHYSICAL THERAPIST

## 2022-05-12 NOTE — PROGRESS NOTES
Physical Therapy Initial Evaluation and Plan of Care    Patient: Bailee Garza   : 1940  Diagnosis/ICD-10 Code:  Right hip pain [M25.551]  Referring practitioner: Anupam Ruiz MD  Past Medical History Reviewed: 2022    PLOF: Independent and lives with son    Subjective Evaluation    History of Present Illness  Date of onset: 3/13/2022  Mechanism of injury: I fell on  and hurt my left knee so I was putting a lot of weight on the right leg. My hip flexor is hurting so bad. 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 I am getting it replaced on .   I moved into my sons house and it has 13 steps, but I live alone. I have a hard time with that. I use ice and sometimes heat. My son said use ice. I normally do not fall.  No numbness or tingling in my legs, but they do feel weak.   I broke my right fibula in , but it is fine now.        Patient Occupation: retired and lives with son. LIkes to be active Pain  Current pain rating: 3  At worst pain ratin  Location: (R) anterior hip  Relieving factors: ice  Aggravating factors: stairs, standing, ambulation and sleeping  Progression: worsening    Social Support  Lives with: alone    Diagnostic Tests  X-ray: abnormal (hip OA)             Objective          Palpation     Additional Palpation Details  Global tenderness of (R) hip    Neurological Testing     Sensation     Hip   Left Hip   Intact: light touch    Right Hip   Intact: light touch    Active Range of Motion   Left Hip   Normal active range of motion    Right Hip   Flexion: 90 degrees with pain  External rotation (90/90): 40 degrees with pain  Internal rotation (90/90): 10 degrees with pain    Strength/Myotome Testing     Left Hip   Planes of Motion   Flexion: 4-  Abduction: 4-  External rotation: 4-  Internal rotation: 4    Right Hip   Planes of Motion   Flexion: 4-  Abduction: 3+  External rotation: 3+  Internal rotation: 4    Ambulation   Weight-Bearing Status    Assistive device used: single point cane    Ambulation: Stairs   Pattern: reciprocal  Pattern: reciprocal    Observational Gait   Gait: antalgic   Decreased walking speed.           Assessment & Plan     Assessment  Impairments: abnormal gait, abnormal or restricted ROM, activity intolerance, impaired balance, impaired physical strength, lacks appropriate home exercise program and pain with function  Functional Limitations: sleeping, walking, uncomfortable because of pain and standing  Assessment details: Pt presents to PT with symptoms consistent with right hip pain, (B)LE weakness and gait difficulties secondary to hip OA and deconditioning.  Pt would benefit from skilled PT intervention to address the deficits noted.   Pt getting hip replaced in July  Prognosis: good    Goals  Plan Goals:  SHORT TERM GOALS: 4-5 visits  1.  Patient to be compliant with HEP and demo good efficiency with TE  2.  Report < 2 sleep disturbances 2° hip pain.     3.  Increased Lumbar and SIJ mobility to allow for improved pelvic alignment and gait mechanics (equal step length and level pelvis throughout gait).    4. Pt will report minimal tenderness to palpation with firm pressure.   5. Pt. Able to ambulate up to 10 min with pain < 3/10 with acceptable pattern.      LONG TERM GOALS: 8-10 visits  1.  Pt. to score > 50/80 perceived ability on LEFS  2.  Pain level < 2/10 in hips with all activities including sitting > 1 hr continuously.   3. Hip strength to 4/5  to allow for pushing, pulling and more strenuous activities to occur without pain.            Plan  Therapy options: will be seen for skilled therapy services  Planned modality interventions: cryotherapy, electrical stimulation/Russian stimulation, iontophoresis, TENS, thermotherapy (hydrocollator packs), traction and ultrasound  Other planned modality interventions: Dry Needling  Planned therapy interventions: abdominal trunk stabilization, ADL retraining, body mechanics  training, balance/weight-bearing training, flexibility, functional ROM exercises, gait training, home exercise program, joint mobilization, manual therapy, neuromuscular re-education, postural training, soft tissue mobilization, spinal/joint mobilization, strengthening, stretching and therapeutic activities  Frequency: 2x week  Duration in visits: 16  Treatment plan discussed with: patient        Manual Therapy:    -     mins  86136;  Therapeutic Exercise:    10     mins  37820;     Neuromuscular Sofya:    -    mins  74457;    Therapeutic Activity:     14     mins  51545;     Gait Training:      -     mins  34160;     Ultrasound:     -     mins  20862;    Electrical Stimulation:    -     mins  47355 ( );  Dry Needling     -     mins self-pay    Timed Treatment:   24   mins   Total Treatment:     51   mins      PT SIGNATURE: SARAH Cabral license #: [id number]  DATE TREATMENT INITIATED: 5/12/2022    Medicare Initial Certification  Certification Period: 8/10/2022  I certify that the therapy services are furnished while this patient is under my care.  The services outlined above are required by this patient, and will be reviewed every 90 days.     PHYSICIAN: Anupam Ruiz MD      DATE:     Please sign and return via fax to [phone].. Thank you, [facility].

## 2022-05-18 ENCOUNTER — TREATMENT (OUTPATIENT)
Dept: PHYSICAL THERAPY | Facility: CLINIC | Age: 82
End: 2022-05-18

## 2022-05-18 PROCEDURE — 97110 THERAPEUTIC EXERCISES: CPT | Performed by: PHYSICAL THERAPIST

## 2022-05-18 NOTE — PROGRESS NOTES
Physical Therapy Daily Progress Note  Visit # 2           Patient: Bailee Garza   : 1940  Diagnosis/ICD-10 Code:  No primary diagnosis found.  Referring practitioner: Anupam Ruiz MD  Date of Initial Evaluation:  Type: THERAPY  Noted: 2022      Subjective  Bailee Garza reports:   Rates pain 5/10 in (R) hip today.  Declines attempting NuStep, feels she would not be able to tolerate the hip flexion.    Objective   See Exercise, Manual, and Modality Logs for complete treatment.   Reviewed current HEP, progressed therex program with exercises as noted.      Assessment/Plan  Tolerated continued progression of therapeutic exercise/therapeutic activity well today, no increased pain reported at conclusion of session.  Requires some mild encouragement to perform exercises, will stop with pain at first rep, but was able to progress reps with decreasing pain as reps increased, with several exercises.  Would continue to benefit from skilled PT progressing with functional WB and strength of (B) LEs.        Progress per Plan of Care and Progress strengthening /stabilization /functional activity, trial NuStep in limited ROM at next visit.           Timed:         Manual Therapy:         mins  19715     Therapeutic Exercise:     35    mins  90685     Neuromuscular Sofya:        mins  03997    Therapeutic Activity:          mins  85464     Gait Training:           mins  56457     Ultrasound:          mins  52002    Ionto                                   mins  44077  Self Care                            mins  94707    Un-Timed:  Electrical Stimulation:         mins 47504 ( )  Traction          mins 90189    Timed Treatment:   35   mins   Total Treatment:     40   mins    RADHA Martinez License #[id number]  Physical Therapist Assistant

## 2022-05-25 ENCOUNTER — TREATMENT (OUTPATIENT)
Dept: PHYSICAL THERAPY | Facility: CLINIC | Age: 82
End: 2022-05-25

## 2022-05-25 DIAGNOSIS — S82.64XG CLOSED NONDISPLACED FRACTURE OF LATERAL MALLEOLUS OF RIGHT FIBULA WITH DELAYED HEALING, SUBSEQUENT ENCOUNTER: ICD-10-CM

## 2022-05-25 DIAGNOSIS — M16.11 PRIMARY OSTEOARTHRITIS OF RIGHT HIP: ICD-10-CM

## 2022-05-25 DIAGNOSIS — M25.552 LEFT HIP PAIN: ICD-10-CM

## 2022-05-25 DIAGNOSIS — Z96.642 HISTORY OF TOTAL LEFT HIP REPLACEMENT: ICD-10-CM

## 2022-05-25 DIAGNOSIS — M25.551 RIGHT HIP PAIN: Primary | ICD-10-CM

## 2022-05-25 DIAGNOSIS — R26.9 GAIT DIFFICULTY: ICD-10-CM

## 2022-05-25 PROCEDURE — 97140 MANUAL THERAPY 1/> REGIONS: CPT | Performed by: PHYSICAL THERAPIST

## 2022-05-25 PROCEDURE — 97110 THERAPEUTIC EXERCISES: CPT | Performed by: PHYSICAL THERAPIST

## 2022-05-25 NOTE — PROGRESS NOTES
Physical Therapy Daily Progress Note  Visit: 3    Bailee Greg reports: I have been sore. My hip is always stiff and sore    Subjective     Objective   See Exercise, Manual, and Modality Logs for complete treatment.       Assessment & Plan     Assessment    Assessment details: Tolerated treatment well. Added bridge and seated hamstring stretch to HEP    Plan  Plan details: Progress per POC        Manual Therapy:    5     mins  32451;  Therapeutic Exercise:    35     mins  48662;     Neuromuscular Sofya:    -    mins  71896;    Therapeutic Activity:     -     mins  78869;     Gait Training:      -     mins  09572;     Ultrasound:     -     mins  10640;    Electrical Stimulation:    -     mins  39328 ( );  Dry Needling     -     mins self-pay    Timed Treatment:   40   mins   Total Treatment:     45   mins    Marta Reis PT  [place] License #: [id number]    Physical Therapist

## 2022-05-27 ENCOUNTER — TREATMENT (OUTPATIENT)
Dept: PHYSICAL THERAPY | Facility: CLINIC | Age: 82
End: 2022-05-27

## 2022-05-27 DIAGNOSIS — M16.11 PRIMARY OSTEOARTHRITIS OF RIGHT HIP: ICD-10-CM

## 2022-05-27 DIAGNOSIS — M25.551 RIGHT HIP PAIN: Primary | ICD-10-CM

## 2022-05-27 DIAGNOSIS — R26.9 GAIT DIFFICULTY: ICD-10-CM

## 2022-05-27 PROCEDURE — 97140 MANUAL THERAPY 1/> REGIONS: CPT | Performed by: PHYSICAL THERAPIST

## 2022-05-27 PROCEDURE — 97110 THERAPEUTIC EXERCISES: CPT | Performed by: PHYSICAL THERAPIST

## 2022-05-27 NOTE — PROGRESS NOTES
Physical Therapy Daily Progress Note  Visit: 4    Bailee Taylorcharly reports: My hip is off and on sore all the time    Subjective     Objective   See Exercise, Manual, and Modality Logs for complete treatment.       Assessment & Plan     Assessment    Assessment details: Continues to have hip impingement with flexion and weight bearing. Discussed use of cane for sx management, but stated she was not interested due to it getting in the way    Plan  Plan details: Progress per POC        Manual Therapy:    9     mins  04491;  Therapeutic Exercise:    32     mins  52626;     Neuromuscular Sofya:    -    mins  69104;    Therapeutic Activity:     -     mins  01058;     Gait Training:      -     mins  48834;     Ultrasound:     -     mins  75906;    Electrical Stimulation:    -     mins  26332 ( );  Dry Needling     -     mins self-pay    Timed Treatment:   41   mins   Total Treatment:     45   mins    Marta Reis PT  [place] License #: [id number]    Physical Therapist

## 2022-06-01 ENCOUNTER — TREATMENT (OUTPATIENT)
Dept: PHYSICAL THERAPY | Facility: CLINIC | Age: 82
End: 2022-06-01

## 2022-06-01 DIAGNOSIS — M16.11 PRIMARY OSTEOARTHRITIS OF RIGHT HIP: ICD-10-CM

## 2022-06-01 DIAGNOSIS — M25.551 RIGHT HIP PAIN: Primary | ICD-10-CM

## 2022-06-01 DIAGNOSIS — R26.9 GAIT DIFFICULTY: ICD-10-CM

## 2022-06-01 PROCEDURE — 97112 NEUROMUSCULAR REEDUCATION: CPT | Performed by: PHYSICAL THERAPIST

## 2022-06-01 PROCEDURE — 97110 THERAPEUTIC EXERCISES: CPT | Performed by: PHYSICAL THERAPIST

## 2022-06-01 NOTE — PROGRESS NOTES
Physical Therapy Daily Progress Note  Visit: 5    Bailee Taylorcharly reports: I have been icing and I have been sore. My right hip is still really sore and I have trouble bringing it forward    Subjective     Objective   See Exercise, Manual, and Modality Logs for complete treatment.       Assessment & Plan     Assessment    Assessment details: Pt continues to have severe impingement and pain in her right hip. Continue to strengthening in non-painful positions    Plan  Plan details: Progress per POC        Manual Therapy:    5     mins  35497;  Therapeutic Exercise:    25     mins  92339;     Neuromuscular Sofya:    10    mins  87224;    Therapeutic Activity:     -     mins  63477;     Gait Training:      -     mins  76234;     Ultrasound:     -     mins  09642;    Electrical Stimulation:    -     mins  30860 ( );  Dry Needling     -     mins self-pay    Timed Treatment:   40   mins   Total Treatment:     42   mins    SARAH Cabral License #: [id number]    Physical Therapist

## 2022-06-03 ENCOUNTER — TELEPHONE (OUTPATIENT)
Dept: FAMILY MEDICINE CLINIC | Facility: CLINIC | Age: 82
End: 2022-06-03

## 2022-06-03 ENCOUNTER — TREATMENT (OUTPATIENT)
Dept: PHYSICAL THERAPY | Facility: CLINIC | Age: 82
End: 2022-06-03

## 2022-06-03 DIAGNOSIS — M25.551 RIGHT HIP PAIN: Primary | ICD-10-CM

## 2022-06-03 DIAGNOSIS — E03.9 HYPOTHYROIDISM, ACQUIRED: ICD-10-CM

## 2022-06-03 DIAGNOSIS — R79.89 ELEVATED SERUM CREATININE: ICD-10-CM

## 2022-06-03 DIAGNOSIS — M16.11 PRIMARY OSTEOARTHRITIS OF RIGHT HIP: ICD-10-CM

## 2022-06-03 DIAGNOSIS — I10 ESSENTIAL HYPERTENSION: Primary | ICD-10-CM

## 2022-06-03 DIAGNOSIS — R26.9 GAIT DIFFICULTY: ICD-10-CM

## 2022-06-03 DIAGNOSIS — E55.9 VITAMIN D DEFICIENCY: ICD-10-CM

## 2022-06-03 DIAGNOSIS — N18.30 STAGE 3 CHRONIC KIDNEY DISEASE, UNSPECIFIED WHETHER STAGE 3A OR 3B CKD: ICD-10-CM

## 2022-06-03 DIAGNOSIS — D58.2 ELEVATED HEMOGLOBIN: ICD-10-CM

## 2022-06-03 PROCEDURE — 97140 MANUAL THERAPY 1/> REGIONS: CPT | Performed by: PHYSICAL THERAPIST

## 2022-06-03 PROCEDURE — 97110 THERAPEUTIC EXERCISES: CPT | Performed by: PHYSICAL THERAPIST

## 2022-06-03 PROCEDURE — 97112 NEUROMUSCULAR REEDUCATION: CPT | Performed by: PHYSICAL THERAPIST

## 2022-06-03 NOTE — PROGRESS NOTES
Physical Therapy Daily Progress Note  Visit: 6    Bailee Taylorcharly reports: The hip is still sore    Subjective     Objective   See Exercise, Manual, and Modality Logs for complete treatment.       Assessment & Plan     Assessment    Assessment details: Pt reported relief after distraction of (R) hip. Educated to continue stretching and using tiger tail at home    Plan  Plan details: Trial of gentle belt mobs for distraction        Manual Therapy:    6     mins  49274;  Therapeutic Exercise:    25     mins  47115;     Neuromuscular Sofya:    10    mins  89666;    Therapeutic Activity:     -     mins  03634;     Gait Training:      -     mins  16495;     Ultrasound:     -     mins  49333;    Electrical Stimulation:    -     mins  76769 ( );  Dry Needling     -     mins self-pay    Timed Treatment:   41   mins   Total Treatment:     45   mins    Marta Ries, PT  [place] License #: [id number]    Physical Therapist

## 2022-06-06 ENCOUNTER — LAB (OUTPATIENT)
Dept: LAB | Facility: HOSPITAL | Age: 82
End: 2022-06-06

## 2022-06-06 PROCEDURE — 85025 COMPLETE CBC W/AUTO DIFF WBC: CPT | Performed by: FAMILY MEDICINE

## 2022-06-06 PROCEDURE — 80053 COMPREHEN METABOLIC PANEL: CPT | Performed by: FAMILY MEDICINE

## 2022-06-06 PROCEDURE — 80061 LIPID PANEL: CPT | Performed by: FAMILY MEDICINE

## 2022-06-06 PROCEDURE — 84439 ASSAY OF FREE THYROXINE: CPT | Performed by: FAMILY MEDICINE

## 2022-06-06 PROCEDURE — 82306 VITAMIN D 25 HYDROXY: CPT | Performed by: FAMILY MEDICINE

## 2022-06-06 PROCEDURE — 84443 ASSAY THYROID STIM HORMONE: CPT | Performed by: FAMILY MEDICINE

## 2022-06-07 ENCOUNTER — TREATMENT (OUTPATIENT)
Dept: PHYSICAL THERAPY | Facility: CLINIC | Age: 82
End: 2022-06-07

## 2022-06-07 DIAGNOSIS — M16.11 PRIMARY OSTEOARTHRITIS OF RIGHT HIP: ICD-10-CM

## 2022-06-07 DIAGNOSIS — M25.551 RIGHT HIP PAIN: Primary | ICD-10-CM

## 2022-06-07 DIAGNOSIS — R26.9 GAIT DIFFICULTY: ICD-10-CM

## 2022-06-07 PROCEDURE — 97110 THERAPEUTIC EXERCISES: CPT | Performed by: PHYSICAL THERAPIST

## 2022-06-07 PROCEDURE — 97140 MANUAL THERAPY 1/> REGIONS: CPT | Performed by: PHYSICAL THERAPIST

## 2022-06-07 PROCEDURE — 97112 NEUROMUSCULAR REEDUCATION: CPT | Performed by: PHYSICAL THERAPIST

## 2022-06-07 NOTE — PROGRESS NOTES
Physical Therapy Daily Progress Note  Visit: 7    Bailee Garza reports: My hip still hurts with most movements    Subjective     Objective   See Exercise, Manual, and Modality Logs for complete treatment.       Assessment & Plan     Assessment    Assessment details: Pt tolerated treatment well. Reported relief after belt mobs and distraction efforts in supine    Plan  Plan details: Progress per pOC        Manual Therapy:    10     mins  00762;  Therapeutic Exercise:    20     mins  61684;     Neuromuscular Sofya:    11    mins  00789;    Therapeutic Activity:     -     mins  60304;     Gait Training:      -     mins  37868;     Ultrasound:     -     mins  30479;    Electrical Stimulation:    -     mins  87398 ( );  Dry Needling     -     mins self-pay    Timed Treatment:   41   mins   Total Treatment:     50   mins    Marta Reis, PT  [place] License #: [id number]    Physical Therapist

## 2022-06-08 ENCOUNTER — OFFICE VISIT (OUTPATIENT)
Dept: FAMILY MEDICINE CLINIC | Facility: CLINIC | Age: 82
End: 2022-06-08

## 2022-06-08 VITALS
OXYGEN SATURATION: 98 % | WEIGHT: 103 LBS | DIASTOLIC BLOOD PRESSURE: 54 MMHG | RESPIRATION RATE: 18 BRPM | TEMPERATURE: 97 F | HEIGHT: 61 IN | BODY MASS INDEX: 19.45 KG/M2 | HEART RATE: 88 BPM | SYSTOLIC BLOOD PRESSURE: 104 MMHG

## 2022-06-08 DIAGNOSIS — D64.9 LOW HEMOGLOBIN: ICD-10-CM

## 2022-06-08 DIAGNOSIS — Z01.818 PREOP EXAMINATION: Primary | ICD-10-CM

## 2022-06-08 DIAGNOSIS — M16.11 ARTHRITIS OF RIGHT HIP: ICD-10-CM

## 2022-06-08 PROCEDURE — 99214 OFFICE O/P EST MOD 30 MIN: CPT | Performed by: FAMILY MEDICINE

## 2022-06-08 NOTE — PROGRESS NOTES
"Chief Complaint  Hypertension (6 month f/u/Needs surgery clearance for July 5)    David Morocho presents to [facility]  For preoperative clearance.  She has right hip surgical replacement scheduled for July 5.  Her blood pressure is controlled.  There has been interval improvement in chronic kidney disease stage III.  Her thyroid shows good control.  Most recent labs did show mild decrease hemoglobin and we will work this up prior to surgery.  Hopefully it is nothing more than a spurious lab reading.  Patient does not have any complaints of gastritis or blood in the bowel movement or change in bowel movement colors.  No unexplained weight loss.  She did well with her previous hip surgery.  No reports of any problems with anesthesia in the past.        Objective   Vital Signs:   Vitals:    06/08/22 1122   BP: 104/54   Pulse: 88   Resp: 18   Temp: 97 °F (36.1 °C)   SpO2: 98%   Weight: 46.7 kg (103 lb)   Height: 154.9 cm (61\")        BMI is within normal parameters. No other follow-up for BMI required.       Physical Exam  Vitals reviewed.   Constitutional:       General: She is not in acute distress.  Eyes:      General: Lids are normal.      Conjunctiva/sclera: Conjunctivae normal.   Neck:      Vascular: No carotid bruit.      Trachea: No tracheal deviation.   Cardiovascular:      Rate and Rhythm: Normal rate and regular rhythm.      Heart sounds: Normal heart sounds. No murmur heard.  Pulmonary:      Effort: Pulmonary effort is normal.      Breath sounds: Normal breath sounds.   Skin:     General: Skin is warm and dry.   Neurological:      Mental Status: She is alert. She is not disoriented.   Psychiatric:         Speech: Speech normal.         Behavior: Behavior normal. Behavior is cooperative.          Result Review :     The following data was reviewed by: Eddie Araya MD on 06/08/2022:  Vitamin D 25 Hydroxy (06/06/2022 07:32)-49  TSH+Free T4 (06/06/2022 " 07:32)-nl  Lipid Panel With / Chol / HDL Ratio (06/06/2022 07:32)-nl  CBC & Differential (06/06/2022 07:32)-HB 11.6  Comprehensive Metabolic Panel (06/06/2022 07:32)- creatinine 1.10           Assessment and Plan    Diagnoses and all orders for this visit:    1. Preop examination (Primary)  Comments:  cleared for surgery pending lab followup for low hemoglobin.     2. Low hemoglobin  Comments:  check labs for followup  Orders:  -     Iron Profile  -     CBC & Differential  -     Vitamin B12 & Folate    3. Arthritis of right hip  Assessment & Plan:  Planned surgical replacement of left hip.         Follow Up   Return if symptoms worsen or fail to improve.  Patient was given instructions and counseling regarding her condition or for health maintenance advice. Please see specific information pulled into the AVS if appropriate.

## 2022-06-09 ENCOUNTER — TREATMENT (OUTPATIENT)
Dept: PHYSICAL THERAPY | Facility: CLINIC | Age: 82
End: 2022-06-09

## 2022-06-09 DIAGNOSIS — M16.11 PRIMARY OSTEOARTHRITIS OF RIGHT HIP: ICD-10-CM

## 2022-06-09 DIAGNOSIS — M25.551 RIGHT HIP PAIN: Primary | ICD-10-CM

## 2022-06-09 DIAGNOSIS — R26.9 GAIT DIFFICULTY: ICD-10-CM

## 2022-06-09 LAB
BASOPHILS # BLD AUTO: 0.1 X10E3/UL (ref 0–0.2)
BASOPHILS NFR BLD AUTO: 1 %
EOSINOPHIL # BLD AUTO: 0 X10E3/UL (ref 0–0.4)
EOSINOPHIL NFR BLD AUTO: 0 %
ERYTHROCYTE [DISTWIDTH] IN BLOOD BY AUTOMATED COUNT: 13 % (ref 11.7–15.4)
FOLATE SERPL-MCNC: 12.1 NG/ML
HCT VFR BLD AUTO: 34.3 % (ref 34–46.6)
HGB BLD-MCNC: 11.8 G/DL (ref 11.1–15.9)
IMM GRANULOCYTES # BLD AUTO: 0 X10E3/UL (ref 0–0.1)
IMM GRANULOCYTES NFR BLD AUTO: 0 %
IRON SATN MFR SERPL: 27 % (ref 15–55)
IRON SERPL-MCNC: 94 UG/DL (ref 27–139)
LYMPHOCYTES # BLD AUTO: 1 X10E3/UL (ref 0.7–3.1)
LYMPHOCYTES NFR BLD AUTO: 16 %
MCH RBC QN AUTO: 33.1 PG (ref 26.6–33)
MCHC RBC AUTO-ENTMCNC: 34.4 G/DL (ref 31.5–35.7)
MCV RBC AUTO: 96 FL (ref 79–97)
MONOCYTES # BLD AUTO: 0.4 X10E3/UL (ref 0.1–0.9)
MONOCYTES NFR BLD AUTO: 6 %
NEUTROPHILS # BLD AUTO: 4.9 X10E3/UL (ref 1.4–7)
NEUTROPHILS NFR BLD AUTO: 77 %
PLATELET # BLD AUTO: 278 X10E3/UL (ref 150–450)
RBC # BLD AUTO: 3.56 X10E6/UL (ref 3.77–5.28)
TIBC SERPL-MCNC: 344 UG/DL (ref 250–450)
UIBC SERPL-MCNC: 250 UG/DL (ref 118–369)
VIT B12 SERPL-MCNC: 719 PG/ML (ref 232–1245)
WBC # BLD AUTO: 6.3 X10E3/UL (ref 3.4–10.8)

## 2022-06-09 PROCEDURE — 97112 NEUROMUSCULAR REEDUCATION: CPT | Performed by: PHYSICAL THERAPIST

## 2022-06-09 PROCEDURE — 97110 THERAPEUTIC EXERCISES: CPT | Performed by: PHYSICAL THERAPIST

## 2022-06-09 NOTE — PROGRESS NOTES
Physical Therapy Daily Progress Note  Visit: 8    Bailee Taylorcharly reports: My hip still hurts. I am a little concerned about my lab results    Subjective     Objective   See Exercise, Manual, and Modality Logs for complete treatment.       Assessment & Plan     Assessment    Assessment details: Pt continues to have right hip impingement and pain due to arthritis. Continued efforts to build strength and stabilization in right hip without increasing impingement pain.     Plan  Plan details: Reassess next week        Manual Therapy:    -     mins  60669;  Therapeutic Exercise:    31     mins  12623;     Neuromuscular Sofya:    10    mins  28013;    Therapeutic Activity:     -     mins  22902;     Gait Training:      -     mins  19253;     Ultrasound:     -     mins  44998;    Electrical Stimulation:    -     mins  04930 ( );  Dry Needling     -     mins self-pay    Timed Treatment:   41   mins   Total Treatment:     45   mins    SARAH Cabral License #: [id number]    Physical Therapist

## 2022-06-13 ENCOUNTER — TELEPHONE (OUTPATIENT)
Dept: FAMILY MEDICINE CLINIC | Facility: CLINIC | Age: 82
End: 2022-06-13

## 2022-06-13 DIAGNOSIS — E03.9 HYPOTHYROIDISM, ACQUIRED: ICD-10-CM

## 2022-06-13 DIAGNOSIS — I10 ESSENTIAL HYPERTENSION: ICD-10-CM

## 2022-06-13 RX ORDER — LEVOTHYROXINE SODIUM 75 MCG
75 TABLET ORAL DAILY
Qty: 90 TABLET | Refills: 0 | Status: SHIPPED | OUTPATIENT
Start: 2022-06-13 | End: 2022-09-19 | Stop reason: SDUPTHER

## 2022-06-13 RX ORDER — HYDROCHLOROTHIAZIDE 12.5 MG/1
12.5 TABLET ORAL DAILY
Qty: 90 TABLET | Refills: 0 | Status: SHIPPED | OUTPATIENT
Start: 2022-06-13 | End: 2022-09-19 | Stop reason: SDUPTHER

## 2022-06-13 NOTE — TELEPHONE ENCOUNTER
Caller: BrandoncharlyBailee ELIZABETH    Relationship: Self    Best call back number: [phone]    Requested Prescriptions:   Requested Prescriptions     Pending Prescriptions Disp Refills   • hydroCHLOROthiazide (HYDRODIURIL) 12.5 MG tablet 90 tablet 1     Sig: Take 1 tablet by mouth Daily for 180 days.   • Synthroid 75 MCG tablet 90 tablet 1     Sig: Take 1 tablet by mouth Daily for 180 days.        Pharmacy where request should be sent: [facility] [address] AT [facility] - [phone] [facility] [phone] FX     Additional details provided by patient: PATIENT IS NEEDING A REFILL ASAP.    PLEASE CONTACT PATIENT REGARDING LAB RESULTS.    Does the patient have less than a 3 day supply:  [x] Yes  [] No    Vivien Thomas Rep   06/13/22 11:18 EDT

## 2022-06-14 ENCOUNTER — TREATMENT (OUTPATIENT)
Dept: PHYSICAL THERAPY | Facility: CLINIC | Age: 82
End: 2022-06-14

## 2022-06-14 DIAGNOSIS — M25.551 RIGHT HIP PAIN: Primary | ICD-10-CM

## 2022-06-14 DIAGNOSIS — M16.11 PRIMARY OSTEOARTHRITIS OF RIGHT HIP: ICD-10-CM

## 2022-06-14 DIAGNOSIS — R26.9 GAIT DIFFICULTY: ICD-10-CM

## 2022-06-14 PROCEDURE — 97140 MANUAL THERAPY 1/> REGIONS: CPT | Performed by: PHYSICAL THERAPIST

## 2022-06-14 PROCEDURE — 97112 NEUROMUSCULAR REEDUCATION: CPT | Performed by: PHYSICAL THERAPIST

## 2022-06-14 PROCEDURE — 97110 THERAPEUTIC EXERCISES: CPT | Performed by: PHYSICAL THERAPIST

## 2022-06-14 NOTE — PROGRESS NOTES
Physical Therapy Daily Progress Note  Visit: 9    Bailee Garza reports: I am feeling fine today.    Subjective     Objective   See Exercise, Manual, and Modality Logs for complete treatment.       Assessment & Plan     Assessment    Assessment details: Pt tolerated session well today w/ no reports of inc'd pain during exercises. Pt reported dec'd pain w/ hip mobilizations performed this visit. Pt educated that next visit will be her last visit before hip replacement surgery.    Plan  Plan details: Anticipate Dc next visit        Manual Therapy:    12     mins  52496;  Therapeutic Exercise:    26     mins  29893;     Neuromuscular Sofya:    17    mins  10417;    Therapeutic Activity:     -     mins  40809;     Gait Training:      -     mins  95948;     Ultrasound:     -     mins  13342;    Electrical Stimulation:    -     mins  37626 ( );  Dry Needling     -     mins self-pay    Timed Treatment:   55   mins   Total Treatment:     58   mins    Hailey Wachter  Student Physical Therapist     I was present in the PT department guiding the student by approving, concurring and confirming the skilled judgement for all services rendered    Marta Reis PT  [place] License #: [id number]    Physical Therapist

## 2022-06-16 ENCOUNTER — TREATMENT (OUTPATIENT)
Dept: PHYSICAL THERAPY | Facility: CLINIC | Age: 82
End: 2022-06-16

## 2022-06-16 DIAGNOSIS — M16.11 PRIMARY OSTEOARTHRITIS OF RIGHT HIP: ICD-10-CM

## 2022-06-16 DIAGNOSIS — R26.9 GAIT DIFFICULTY: ICD-10-CM

## 2022-06-16 DIAGNOSIS — M25.551 RIGHT HIP PAIN: Primary | ICD-10-CM

## 2022-06-16 PROCEDURE — 97530 THERAPEUTIC ACTIVITIES: CPT | Performed by: PHYSICAL THERAPIST

## 2022-06-16 PROCEDURE — 97110 THERAPEUTIC EXERCISES: CPT | Performed by: PHYSICAL THERAPIST

## 2022-06-16 PROCEDURE — 97140 MANUAL THERAPY 1/> REGIONS: CPT | Performed by: PHYSICAL THERAPIST

## 2022-06-16 NOTE — PROGRESS NOTES
Physical Therapy Daily Progress Note  Visit: 10    Bailee Garza reports: I am feeling the same as usual today. I worked a lot yesterday in the yard but iced after and this morning.    Subjective     Objective   See Exercise, Manual, and Modality Logs for complete treatment.       Assessment & Plan     Assessment    Assessment details: Pt presents for discharge this visit with plans to return to therapy following hip replacement surgery next month. Pt demonstrates a good compliance to HEP and understands the importance of doing exercises at home in preparation for surgery. Pt has made strength improvements and demonstrates understanding of pain management techniques.    Plan  Plan details: DC at this time        Manual Therapy:    8     mins  87820;  Therapeutic Exercise:    19     mins  60896;     Neuromuscular Sofya:    -    mins  04260;    Therapeutic Activity:     11     mins  55852;     Gait Training:      -     mins  32016;     Ultrasound:     -     mins  53489;    Electrical Stimulation:    -     mins  82679 ( );  Dry Needling     -     mins self-pay    Timed Treatment:  38    mins   Total Treatment:     42   mins    Hailey Wachter  Student Physical Therapist     I was present in the PT department guiding the student by approving, concurring and confirming the skilled judgement for all services rendered    Marta Reis PT  [place] License #: [id number]    Physical Therapist

## 2022-06-20 ENCOUNTER — TELEPHONE (OUTPATIENT)
Dept: ORTHOPEDIC SURGERY | Facility: CLINIC | Age: 82
End: 2022-06-20

## 2022-06-20 NOTE — PROGRESS NOTES
6/20/2022  Vitamin B12 & Folate (06/08/2022 11:57)  Iron Profile (06/08/2022 11:57)  CBC & Differential (06/08/2022 11:57)    Above labs have been reviewed and are all within normal limits.  Note has been sent to Dr. Vega for clearance for upcoming surgery.

## 2022-06-20 NOTE — TELEPHONE ENCOUNTER
"Spoke with patient she wanted to make sure her appointments for pre admission testing and her post op with Oli VAUGHN were both still on the schedule, had questions regarding what will happen at each appointment, walked patient through pre op appointment and told her pre admission testing could be labs and EKG. Patient voiced understanding and confirmed she would be at both pre operative appointments, also will bring her daughter, said she is having some \"memory problems\" and she wants to make sure she has everything in order.   "

## 2022-06-23 ENCOUNTER — PRE-ADMISSION TESTING (OUTPATIENT)
Dept: PREADMISSION TESTING | Facility: HOSPITAL | Age: 82
End: 2022-06-23

## 2022-06-23 VITALS
RESPIRATION RATE: 18 BRPM | BODY MASS INDEX: 18.43 KG/M2 | WEIGHT: 104 LBS | HEIGHT: 63 IN | TEMPERATURE: 98.2 F | SYSTOLIC BLOOD PRESSURE: 122 MMHG | OXYGEN SATURATION: 98 % | DIASTOLIC BLOOD PRESSURE: 80 MMHG | HEART RATE: 75 BPM

## 2022-06-23 DIAGNOSIS — M16.11 ARTHRITIS OF RIGHT HIP: ICD-10-CM

## 2022-06-23 LAB
ANION GAP SERPL CALCULATED.3IONS-SCNC: 10 MMOL/L (ref 5–15)
BUN SERPL-MCNC: 32 MG/DL (ref 8–23)
BUN/CREAT SERPL: 29.1 (ref 7–25)
CALCIUM SPEC-SCNC: 9.4 MG/DL (ref 8.6–10.5)
CHLORIDE SERPL-SCNC: 106 MMOL/L (ref 98–107)
CO2 SERPL-SCNC: 23 MMOL/L (ref 22–29)
CREAT SERPL-MCNC: 1.1 MG/DL (ref 0.57–1)
DEPRECATED RDW RBC AUTO: 47.6 FL (ref 37–54)
EGFRCR SERPLBLD CKD-EPI 2021: 50.6 ML/MIN/1.73
ERYTHROCYTE [DISTWIDTH] IN BLOOD BY AUTOMATED COUNT: 13.8 % (ref 12.3–15.4)
GLUCOSE SERPL-MCNC: 98 MG/DL (ref 65–99)
HCT VFR BLD AUTO: 31.6 % (ref 34–46.6)
HGB BLD-MCNC: 11 G/DL (ref 12–15.9)
MCH RBC QN AUTO: 33.5 PG (ref 26.6–33)
MCHC RBC AUTO-ENTMCNC: 34.8 G/DL (ref 31.5–35.7)
MCV RBC AUTO: 96.3 FL (ref 79–97)
PLATELET # BLD AUTO: 225 10*3/MM3 (ref 140–450)
PMV BLD AUTO: 10.4 FL (ref 6–12)
POTASSIUM SERPL-SCNC: 4.9 MMOL/L (ref 3.5–5.2)
RBC # BLD AUTO: 3.28 10*6/MM3 (ref 3.77–5.28)
SODIUM SERPL-SCNC: 139 MMOL/L (ref 136–145)
WBC NRBC COR # BLD: 5.14 10*3/MM3 (ref 3.4–10.8)

## 2022-06-23 PROCEDURE — 93005 ELECTROCARDIOGRAM TRACING: CPT

## 2022-06-23 PROCEDURE — 93010 ELECTROCARDIOGRAM REPORT: CPT | Performed by: INTERNAL MEDICINE

## 2022-06-23 PROCEDURE — 80048 BASIC METABOLIC PNL TOTAL CA: CPT

## 2022-06-23 PROCEDURE — 85027 COMPLETE CBC AUTOMATED: CPT

## 2022-06-23 PROCEDURE — 36415 COLL VENOUS BLD VENIPUNCTURE: CPT

## 2022-06-23 RX ORDER — CHLORHEXIDINE GLUCONATE 500 MG/1
1 CLOTH TOPICAL TAKE AS DIRECTED
Status: ACTIVE | OUTPATIENT
Start: 2022-06-23

## 2022-06-23 RX ORDER — CHLORHEXIDINE GLUCONATE 500 MG/1
CLOTH TOPICAL
COMMUNITY
End: 2022-10-10

## 2022-06-23 ASSESSMENT — HOOS JR
HOOS JR SCORE: 58.93
HOOS JR SCORE: 10

## 2022-06-23 NOTE — PROGRESS NOTES
Case Management Discharge Note    Final Note: DC home via private auto with [facility] to follow. Lara Matthews RN    Destination     No service has been selected for the patient.      Durable Medical Equipment     No service has been selected for the patient.      Dialysis/Infusion     No service has been selected for the patient.      Home Medical Care - Selection Complete     Service Request Status Selected Specialties Address Phone Number Fax Number    [facility] Home Health Services [address] [address] [phone]      Social Care     No service has been selected for the patient.             Final Discharge Disposition Code: 06 - home with home health care   Please put her on the cancellation list for me. If possible she should come in as telephone is not as effective.

## 2022-06-23 NOTE — DISCHARGE INSTRUCTIONS
ARRIVE DAY OF SURGERY AT  5:30 AM          Take the following medications the morning of surgery:  SYNTHROID      If you are on prescription narcotic pain medication to control your pain you may also take that medication the morning of surgery.    General Instructions:  Do not eat solid food after midnight the night before surgery.  You may drink clear liquids day of surgery but must stop at least one hour before your hospital arrival time.  It is beneficial for you to have a clear drink that contains carbohydrates the day of surgery.  We suggest a 12 to 20 ounce bottle of Gatorade or Powerade for non-diabetic patients or a 12 to 20 ounce bottle of G2 or Powerade Zero for diabetic patients. (Pediatric patients, are not advised to drink a 12 to 20 ounce carbohydrate drink)    Clear liquids are liquids you can see through.  Nothing red in color.     Plain water                               Sports drinks  Sodas                                   Gelatin (Jell-O)  Fruit juices without pulp such as white grape juice and apple juice  Popsicles that contain no fruit or yogurt  Tea or coffee (no cream or milk added)  Gatorade / Powerade  G2 / Powerade Zero    Infants may have breast milk up to four hours before surgery.  Infants drinking formula may drink formula up to six hours before surgery.   Patients who avoid smoking, chewing tobacco and alcohol for 4 weeks prior to surgery have a reduced risk of post-operative complications.  Quit smoking as many days before surgery as you can.  Do not smoke, use chewing tobacco or drink alcohol the day of surgery.   If applicable bring your C-PAP/ BI-PAP machine.  Bring any papers given to you in the doctor’s office.  Wear clean comfortable clothes.  Do not wear contact lenses, false eyelashes or make-up.  Bring a case for your glasses.   Bring crutches or walker if applicable.  Remove all piercings.  Leave jewelry and any other valuables at home.  Hair extensions with metal clips  must be removed prior to surgery.  The Pre-Admission Testing nurse will instruct you to bring medications if unable to obtain an accurate list in Pre-Admission Testing.          Preventing a Surgical Site Infection:  For 2 to 3 days before surgery, avoid shaving with a razor because the razor can irritate skin and make it easier to develop an infection.    Any areas of open skin can increase the risk of a post-operative wound infection by allowing bacteria to enter and travel throughout the body.  Notify your surgeon if you have any skin wounds / rashes even if it is not near the expected surgical site.  The area will need assessed to determine if surgery should be delayed until it is healed.  The night prior to surgery shower using a fresh bar of anti-bacterial soap (such as Dial) and clean washcloth.  Sleep in a clean bed with clean clothing.  Do not allow pets to sleep with you.  Shower on the morning of surgery using a fresh bar of anti-bacterial soap (such as Dial) and clean washcloth.  Dry with a clean towel and dress in clean clothing.  Ask your surgeon if you will be receiving antibiotics prior to surgery.  Make sure you, your family, and all healthcare providers clean their hands with soap and water or an alcohol based hand  before caring for you or your wound.    Day of surgery:  Your arrival time is approximately two hours before your scheduled surgery time.  Upon arrival, a Pre-op nurse and Anesthesiologist will review your health history, obtain vital signs, and answer questions you may have.  The only belongings needed at this time will be a list of your home medications and if applicable your C-PAP/BI-PAP machine.  A Pre-op nurse will start an IV and you may receive medication in preparation for surgery, including something to help you relax.     Please be aware that surgery does come with discomfort.  We want to make every effort to control your discomfort so please discuss any uncontrolled  symptoms with your nurse.   Your doctor will most likely have prescribed pain medications.      If you are going home after surgery you will receive individualized written care instructions before being discharged.  A responsible adult must drive you to and from the hospital on the day of your surgery and stay with you for 24 hours.  Discharge prescriptions can be filled by the hospital pharmacy during regular pharmacy hours.  If you are having surgery late in the day/evening your prescription may be e-prescribed to your pharmacy.  Please verify your pharmacy hours or chose a 24 hour pharmacy to avoid not having access to your prescription because your pharmacy has closed for the day.    If you are staying overnight following surgery, you will be transported to your hospital room following the recovery period.  [facility] has all private rooms.    If you have any questions please call Pre-Admission Testing at [phone].  Deductibles and co-payments are collected on the day of service. Please be prepared to pay the required co-pay, deductible or deposit on the day of service as defined by your plan.    Patient Education for Self-Quarantine Process    Following your COVID testing, we strongly recommend that you wear a mask when you are with other people and practice social distancing.   Limit your activities to only required outings.  Wash your hands with soap and water frequently for at least 20 seconds.   Avoid touching your eyes, nose and mouth with unwashed hands.  Do not share anything - utensils, drinking glasses, food from the same bowl.   Sanitize household surfaces daily. Include all high touch areas (door handles, light switches, phones, countertops, etc.)    Call your surgeon immediately if you experience any of the following symptoms:  Sore Throat  Shortness of Breath or difficulty breathing  Cough  Chills  Body soreness or muscle pain  Headache  Fever  New loss of taste or smell  Do not  arrive for your surgery ill.  Your procedure will need to be rescheduled to another time.  You will need to call your physician before the day of surgery to avoid any unnecessary exposure to hospital staff as well as other patients.

## 2022-06-24 LAB — QT INTERVAL: 381 MS

## 2022-06-28 ENCOUNTER — OFFICE VISIT (OUTPATIENT)
Dept: ORTHOPEDIC SURGERY | Facility: CLINIC | Age: 82
End: 2022-06-28

## 2022-06-28 VITALS — WEIGHT: 104 LBS | HEIGHT: 62 IN | BODY MASS INDEX: 19.14 KG/M2 | RESPIRATION RATE: 18 BRPM | TEMPERATURE: 96.8 F

## 2022-06-28 DIAGNOSIS — M16.11 ARTHRITIS OF RIGHT HIP: Primary | ICD-10-CM

## 2022-06-28 DIAGNOSIS — M25.551 PAIN IN RIGHT HIP: ICD-10-CM

## 2022-06-28 PROCEDURE — S0260 H&P FOR SURGERY: HCPCS | Performed by: NURSE PRACTITIONER

## 2022-06-28 NOTE — PROGRESS NOTES
"   History & Physical       Patient: Bailee Garza  YOB: 1940  Medical Record Number: [id number]  Wt Readings from Last 3 Encounters:   06/28/22 47.2 kg (104 lb)   06/23/22 47.2 kg (104 lb)   06/08/22 46.7 kg (103 lb)     Ht Readings from Last 3 Encounters:   06/28/22 157.5 cm (62\")   06/23/22 158.8 cm (62.5\")   06/08/22 154.9 cm (61\")     Body mass index is 19.02 kg/m².  Facility age limit for growth percentiles is 20 years.    Surgeon:  Dr. Anupam Ruiz    Chief Complaints:   Chief Complaint   Patient presents with   • Right Hip - Follow-up     Surgery:  Right Total Hip Arhtroplasty     Subjective:    History of Present Illness: 82 y.o. female presents with   Chief Complaint   Patient presents with   • Right Hip - Follow-up   . Chronic symptoms have been progressively worsening despite more conservative treatment measures including medications OTC and prescription, cortisone injections and physical therapy.  Symptoms are associated with ability to move, exercise, and perform activities of daily living.  Symptoms are aggravated by weight bearing and ROM necessary for activities of daily living.   Symptoms improve with rest, ice and elevation only minimally.      Allergies:   Allergies   Allergen Reactions   • Hydrocodone Hallucinations   • Ketek [Telithromycin] Hives   • Nsaids Hives   • Sulfa Antibiotics Hives       Medications:   Home Medications:  Current Outpatient Medications on File Prior to Visit   Medication Sig   • acetaminophen (TYLENOL) 500 MG tablet Take 2 tablets by mouth 3 (Three) Times a Day As Needed for Mild Pain  or Moderate Pain . Do not exceed 3000 mg daily   • calcium polycarbophil (FIBERCON) 625 MG tablet Take 625 mg by mouth Daily.   • Chlorcyclizine-Pseudoephed (STAHIST AD PO) Take  by mouth As Needed.   • Chlorhexidine Gluconate Cloth 2 % pads Apply  topically. AS DIRECTED PREOP   • hydroCHLOROthiazide (HYDRODIURIL) 12.5 MG tablet Take 1 tablet by mouth Daily for 180 " days.   • Synthroid 75 MCG tablet Take 1 tablet by mouth Daily for 180 days.   • valsartan (DIOVAN) 80 MG tablet Take 1 tablet by mouth Daily for 180 days.     Current Facility-Administered Medications on File Prior to Visit   Medication   • Chlorhexidine Gluconate Cloth 2 % pads 1 each     Current Medications:  Scheduled Meds:  Continuous Infusions:No current facility-administered medications for this visit.    PRN Meds:.    I have reviewed the patient's medical history in detail and updated the computerized patient record.  Review and summarization of old records include:    Past Medical History:   Diagnosis Date   • Abnormal CXR 2017   • Adverse reaction to narcotic drug     SEVERE HALLUCINATIONS POST OP LEFT HIP PLACEMENT   • Allergies    • Arthritis    • Arthritis of foot 2020   • Arthropathy of pelvic region and thigh 2012    unspecified   • Back pain 2007   • Chronic back pain 2009   • Chronic cough 2017   • Closed nondisplaced fracture of lateral malleolus of fibula with delayed healing 2020   • Closed nondisplaced fracture of medial cuneiform of left foot 2018   • Constipation 2015    unspecified   • COVID-19 vaccination refused    • Diverticulosis    • Dyspepsia 2008   • Essential hypertension 2007   • Exertional shortness of breath 2017   • Fall 2018   • Family history of abdominal aortic aneurysm (AAA) 2019     aaa screen limited (04/10/2019 10:32)    • 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), 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   • Hearing loss 2008   • Hip pain, left    • History of bone density study 2015   • History of pneumonia     2017   • History of skin cancer    • Hypothyroidism, acquired 2007   • Insomnia 2015    unspecified   • Intervertebral disc disorder with myelopathy, cervical region 2010   • Joint capsule tear 2012    unspecified   •  OA (osteoarthritis) of hip 07/24/2018   • Other synovitis and tenosynovitis, right ankle and foot 02/12/2020   • Paranoia (HCC)     PT IS VERY CONCERNED & FEARFUL THAT THE HOSPITAL STAFF WILL GIVE HER THE COVID VACCINE WHILE HERE IN THE HOSPITAL & BELIEVES THAT THE COVID SWAB TEST HAS COVID ON THE SWAB & THAT SHE WILL GET COVID BEING TESTED. ATTEMPTED TO REASSURE PT   • Peroneal tendonitis, right 01/13/2020   • Rhinitis, allergic 11/20/2007   • Scoliosis    • Screening breast examination 11/20/2007   • Stress 04/27/2015    other acute reactions to stress   • Stress incontinence    • Trochanteric bursitis, left hip 02/04/2019   • Urticaria 06/29/2015        Past Surgical History:   Procedure Laterality Date   • BREAST EXCISIONAL BIOPSY Right     50+ years ago   • BRONCHOSCOPY N/A 12/21/2017    Procedure: BRONCHOSCOPY;  Surgeon: Mario Alanis MD;  Location: [facility] ENDOSCOPY;  Service:    • CATARACT EXTRACTION, BILATERAL     • COLONOSCOPY     • HYSTERECTOMY     • TOTAL HIP ARTHROPLASTY Left 07/24/2018    Procedure: LEFT TOTAL HIP ARTHROPLASTY;  Surgeon: Justen Mann MD;  Location: [facility] OR;  Service: Orthopedics        Social History     Occupational History   • Not on file   Tobacco Use   • Smoking status: Never Smoker   • Smokeless tobacco: Never Used   Vaping Use   • Vaping Use: Never used   Substance and Sexual Activity   • Alcohol use: No   • Drug use: Never   • Sexual activity: Defer      Social History     Social History Narrative   • Not on file        Family History   Problem Relation Age of Onset   • Heart disease Mother    • Aneurysm Mother    • Cancer Father    • Asthma Paternal Grandfather    • Aneurysm Brother    • Breast cancer Paternal Grandmother    • Malig Hyperthermia Neg Hx    • Ovarian cancer Neg Hx        ROS: 14 point review of systems was performed and was negative except for documented findings in HPI and today's encounter.       Constitutional:  Denies fever, shaking or chills  "  Eyes:  Denies change in visual acuity   HENT:  Denies nasal congestion or sore throat   Respiratory:  Denies cough or shortness of breath   Cardiovascular:  Denies chest pain or severe LE edema   GI:  Denies abdominal pain, nausea, vomiting, bloody stools or diarrhea   Musculoskeletal:  Denies numbness tingling or loss of motor function except as outlined above in history of present illness.  : Denies painful urination or hematuria  Integument:  Denies rash, lesion or ulceration   Neurologic:  Denies headache or focal weakness  Endocrine:  Denies lymphadenopathy  Psych:  Denies confusion or change in mental status   Hem:  Denies active bleeding    Physical Exam: 82 y.o. female  Wt Readings from Last 3 Encounters:   06/28/22 47.2 kg (104 lb)   06/23/22 47.2 kg (104 lb)   06/08/22 46.7 kg (103 lb)     Ht Readings from Last 3 Encounters:   06/28/22 157.5 cm (62\")   06/23/22 158.8 cm (62.5\")   06/08/22 154.9 cm (61\")     Body mass index is 19.02 kg/m².  Facility age limit for growth percentiles is 20 years.  Vitals:    06/28/22 0914   Resp: 18   Temp: 96.8 °F (36 °C)       Vital signs reviewed.   General Appearance:    Alert, cooperative, in no acute distress                  Eyes: conjunctiva clear  ENT: external ears and nose atraumatic  CV: no peripheral edema  Resp: normal respiratory effort  Skin: no rashes or wounds; normal turgor  Psych: mood and affect appropriate  Lymph: no nodes appreciated  Neuro: gross sensation intact  Vascular:  Palpable peripheral pulse in noted extremity  Musculoskeletal Extremities: HIP Exam: normal gait with assistive device right hip 2+ pedal pulses and brisk capillary refill Pedal edema none        Diagnostic Tests:  Results for orders placed or performed in visit on 06/08/22   CBC & Differential   Result Value Ref Range    WBC 6.3 3.4 - 10.8 x10E3/uL    RBC 3.56 (L) 3.77 - 5.28 x10E6/uL    Hemoglobin 11.8 11.1 - 15.9 g/dL    Hematocrit 34.3 34.0 - 46.6 %    MCV 96 79 - 97 fL    " MCH 33.1 (H) 26.6 - 33.0 pg    MCHC 34.4 31.5 - 35.7 g/dL    RDW 13.0 11.7 - 15.4 %    Platelets 278 150 - 450 x10E3/uL    Neutrophil Rel % 77 Not Estab. %    Lymphocyte Rel % 16 Not Estab. %    Monocyte Rel % 6 Not Estab. %    Eosinophil Rel % 0 Not Estab. %    Basophil Rel % 1 Not Estab. %    Neutrophils Absolute 4.9 1.4 - 7.0 x10E3/uL    Lymphocytes Absolute 1.0 0.7 - 3.1 x10E3/uL    Monocytes Absolute 0.4 0.1 - 0.9 x10E3/uL    Eosinophils Absolute 0.0 0.0 - 0.4 x10E3/uL    Basophils Absolute 0.1 0.0 - 0.2 x10E3/uL    Immature Granulocyte Rel % 0 Not Estab. %    Immature Grans Absolute 0.0 0.0 - 0.1 x10E3/uL     Lab Results   Component Value Date    GLUCOSE 98 06/23/2022    CALCIUM 9.4 06/23/2022     06/23/2022    K 4.9 06/23/2022    CO2 23.0 06/23/2022     06/23/2022    BUN 32 (H) 06/23/2022    CREATININE 1.10 (H) 06/23/2022    EGFRIFAFRI 54 (L) 07/15/2021    EGFRIFNONA 45 (L) 07/15/2021    BCR 29.1 (H) 06/23/2022    ANIONGAP 10.0 06/23/2022       EKG:  Progress Note      HEART RATE= 74  bpm  RR Interval= 808  ms  VT Interval= 142  ms  P Horizontal Axis= 14  deg  P Front Axis= 75  deg  QRSD Interval= 98  ms  QT Interval= 381  ms  QRS Axis= 59  deg  T Wave Axis= 52  deg  - ABNORMAL ECG -  Sinus rhythm  Right atrial enlargement  No change from prior tracing  Electronically Signed By: Suhail MooreKRYSTAL) ([facility]) 24-Jun-2022 05:56:41  Date and Time of Study: 2022-06-23 10:48:50       I have reviewed all the lab & EKG results.     Imaging was done previously in the office, viewed images and discussed with the patient:    Indication: pain related symptoms,  Assessment:  Patient Active Problem List   Diagnosis   • Chronic midline low back pain without sciatica   • Essential hypertension   • Hearing loss   • Hypothyroidism, acquired   • IBS (irritable bowel syndrome)   • Chronic nonseasonal allergic rhinitis due to pollen   • Stage 3 chronic kidney disease (HCC)   • Arthralgia of right knee   • DDD  (degenerative disc disease), lumbosacral   • Scoliosis due to degenerative disease of spine in adult patient   • Status post total replacement of left hip   • Status post total hip replacement, left   • Vitamin D deficiency   • Elevated BUN   • Elevated serum creatinine   • Elevated hemoglobin (HCC)   • Arthritis of right hip       Plan:  Reviewed anatomy of a total joint arthroplasty in laymen's terms, as well as typical postoperative recovery and possibly 6-12 months for maximal recovery, and possible need for rehabilitation stay after hospitalization. We also discussed risks, benefits, alternatives, and limitations of procedure with risks including but not limited to neurovascular damage, bleeding, infection, malalignment, chronic pian, failure of implants, osteolysis, loosening of implants, loss of motion, weakness, stiffness, instability, DVT, pulmonary embolus, death, stroke, complex regional pain syndrome, myocardial infarction, and need for additional procedures. Concept of substitution vs. replacement discussed.  No guarantees were given regarding results of surgery.      Bailee JOHNSON Greg was given the opportunity to ask and have all questions answered today.  The patient voiced understanding of the risks, benefits, and alternative forms of treatment that were discussed and the patient consents to proceed with surgery.       Cleared by PCP on Dr Araya 6/8/2022 - message sent regarding drop in hemoglobin, will check back for response.     Skin breakdown? WNL  Metal allergy? No  DVT Risk Factors:No significant increased risk factor    DVT Prophylaxis:  Aspirin 81mg BID x4 wks  COVID Status:Not Vaccinated    Discharge Plan: POD 1 to home, home health and when cleared by physical therapy as safe for discharge has walker at home    To be updated    Date: 6/28/2022  TERESA Esparza        Much of this encounter note is an electronic transcription/translation of spoken language to printed text. The  electronic translation of spoken language may permit erroneous, or at times, nonsensical words or phrases to be inadvertently transcribed; Although I have reviewed the note for such errors, some may still exist

## 2022-06-29 ENCOUNTER — TELEPHONE (OUTPATIENT)
Dept: FAMILY MEDICINE CLINIC | Facility: CLINIC | Age: 82
End: 2022-06-29

## 2022-06-29 ENCOUNTER — TELEPHONE (OUTPATIENT)
Dept: ORTHOPEDIC SURGERY | Facility: CLINIC | Age: 82
End: 2022-06-29

## 2022-06-29 DIAGNOSIS — N18.30 STAGE 3 CHRONIC KIDNEY DISEASE, UNSPECIFIED WHETHER STAGE 3A OR 3B CKD: ICD-10-CM

## 2022-06-29 DIAGNOSIS — D64.9 ANEMIA OF UNKNOWN ETIOLOGY: Primary | ICD-10-CM

## 2022-06-29 NOTE — TELEPHONE ENCOUNTER
Caller: Bailee Garza    Relationship: Self    Best call back number: [phone] (H)    What test was performed: BLOOD WORK     When was the test performed: 06/23/22    Where was the test performed: IN OFFICE    Additional notes:       PLEASE GIVE PATIENT A CALLBACK REGARDING THESE RESULTS

## 2022-06-30 ENCOUNTER — TELEPHONE (OUTPATIENT)
Dept: FAMILY MEDICINE CLINIC | Facility: CLINIC | Age: 82
End: 2022-06-30

## 2022-06-30 NOTE — TELEPHONE ENCOUNTER
PATIENT CALLED AND SURGERY PLACED IN THE DEPOT. SHE WILL CALL ME WHEN CLEARED TO BE SCHEDULED AGAIN.

## 2022-06-30 NOTE — TELEPHONE ENCOUNTER
Called and spoke with patient to inform.  She will wait and call us back, if she does not get a call to set up an appt

## 2022-06-30 NOTE — TELEPHONE ENCOUNTER
Called and spoke with patient discussed that we cannot proceed with her surgery on 7/5/22. Explained her primary care is not going to give clearance for her surgery until further work up on her blood work. He will be handling the referral and when we get clearance from him we can get her surgery rescheduled. She verbalized understanding.

## 2022-06-30 NOTE — TELEPHONE ENCOUNTER
Caller: Bailee Garza     Relationship: [unfilled]     Best call back number: [phone]    What is your medical concern? PATIENT STATES HER HEMOGLOBIN LEVELS WERE OFF. IS UNSURE WHAT DR BIGGS WOULD LIKE HER TO BE DOING NOW    How long has this issue been going on? UNSURE    Is your provider already aware of this issue? YES    Have you been treated for this issue?NO

## 2022-06-30 NOTE — TELEPHONE ENCOUNTER
We have set up an appointment to see the hematologist for this condition.  She does not need to do anything different other than wait for this appointment.  They had to postpone her upcoming procedure until this gets worked out.  Have her call us if she does not get this appointment call within the next week or so.  Call her with this information.  Thanks, Dr. Araya

## 2022-07-20 DIAGNOSIS — D64.9 ANEMIA, UNSPECIFIED TYPE: Primary | ICD-10-CM

## 2022-07-28 ENCOUNTER — APPOINTMENT (OUTPATIENT)
Dept: OTHER | Facility: HOSPITAL | Age: 82
End: 2022-07-28

## 2022-07-29 ENCOUNTER — CONSULT (OUTPATIENT)
Dept: ONCOLOGY | Facility: CLINIC | Age: 82
End: 2022-07-29

## 2022-07-29 ENCOUNTER — LAB (OUTPATIENT)
Dept: OTHER | Facility: HOSPITAL | Age: 82
End: 2022-07-29

## 2022-07-29 VITALS
HEIGHT: 62 IN | DIASTOLIC BLOOD PRESSURE: 68 MMHG | HEART RATE: 81 BPM | BODY MASS INDEX: 19.17 KG/M2 | SYSTOLIC BLOOD PRESSURE: 110 MMHG | TEMPERATURE: 97.1 F | WEIGHT: 104.2 LBS | RESPIRATION RATE: 16 BRPM | OXYGEN SATURATION: 96 %

## 2022-07-29 DIAGNOSIS — D53.9 ANEMIA, MACROCYTIC: Primary | ICD-10-CM

## 2022-07-29 DIAGNOSIS — D64.9 ANEMIA, UNSPECIFIED TYPE: ICD-10-CM

## 2022-07-29 DIAGNOSIS — N18.31 STAGE 3A CHRONIC KIDNEY DISEASE: ICD-10-CM

## 2022-07-29 LAB
BASOPHILS # BLD AUTO: 0.03 10*3/MM3 (ref 0–0.2)
BASOPHILS NFR BLD AUTO: 0.5 % (ref 0–1.5)
DEPRECATED RDW RBC AUTO: 47.6 FL (ref 37–54)
EOSINOPHIL # BLD AUTO: 0.02 10*3/MM3 (ref 0–0.4)
EOSINOPHIL NFR BLD AUTO: 0.3 % (ref 0.3–6.2)
ERYTHROCYTE [DISTWIDTH] IN BLOOD BY AUTOMATED COUNT: 12.5 % (ref 12.3–15.4)
HCT VFR BLD AUTO: 34.9 % (ref 34–46.6)
HGB BLD-MCNC: 11.1 G/DL (ref 12–15.9)
IMM GRANULOCYTES # BLD AUTO: 0.07 10*3/MM3 (ref 0–0.05)
IMM GRANULOCYTES NFR BLD AUTO: 1.2 % (ref 0–0.5)
LYMPHOCYTES # BLD AUTO: 1.03 10*3/MM3 (ref 0.7–3.1)
LYMPHOCYTES NFR BLD AUTO: 17.9 % (ref 19.6–45.3)
MCH RBC QN AUTO: 33 PG (ref 26.6–33)
MCHC RBC AUTO-ENTMCNC: 31.8 G/DL (ref 31.5–35.7)
MCV RBC AUTO: 103.9 FL (ref 79–97)
MONOCYTES # BLD AUTO: 0.44 10*3/MM3 (ref 0.1–0.9)
MONOCYTES NFR BLD AUTO: 7.6 % (ref 5–12)
NEUTROPHILS NFR BLD AUTO: 4.18 10*3/MM3 (ref 1.7–7)
NEUTROPHILS NFR BLD AUTO: 72.5 % (ref 42.7–76)
NRBC BLD AUTO-RTO: 0 /100 WBC (ref 0–0.2)
PLATELET # BLD AUTO: 249 10*3/MM3 (ref 140–450)
PMV BLD AUTO: 10.8 FL (ref 6–12)
RBC # BLD AUTO: 3.36 10*6/MM3 (ref 3.77–5.28)
WBC NRBC COR # BLD: 5.77 10*3/MM3 (ref 3.4–10.8)

## 2022-07-29 PROCEDURE — 84165 PROTEIN E-PHORESIS SERUM: CPT | Performed by: INTERNAL MEDICINE

## 2022-07-29 PROCEDURE — 83521 IG LIGHT CHAINS FREE EACH: CPT | Performed by: INTERNAL MEDICINE

## 2022-07-29 PROCEDURE — 99204 OFFICE O/P NEW MOD 45 MIN: CPT | Performed by: INTERNAL MEDICINE

## 2022-07-29 PROCEDURE — 84630 ASSAY OF ZINC: CPT | Performed by: INTERNAL MEDICINE

## 2022-07-29 PROCEDURE — 82525 ASSAY OF COPPER: CPT | Performed by: INTERNAL MEDICINE

## 2022-07-29 PROCEDURE — 86334 IMMUNOFIX E-PHORESIS SERUM: CPT | Performed by: INTERNAL MEDICINE

## 2022-07-29 PROCEDURE — 85025 COMPLETE CBC W/AUTO DIFF WBC: CPT | Performed by: INTERNAL MEDICINE

## 2022-07-29 PROCEDURE — 82668 ASSAY OF ERYTHROPOIETIN: CPT | Performed by: INTERNAL MEDICINE

## 2022-07-29 PROCEDURE — 36415 COLL VENOUS BLD VENIPUNCTURE: CPT

## 2022-07-29 PROCEDURE — 82784 ASSAY IGA/IGD/IGG/IGM EACH: CPT | Performed by: INTERNAL MEDICINE

## 2022-07-29 PROCEDURE — 82728 ASSAY OF FERRITIN: CPT | Performed by: INTERNAL MEDICINE

## 2022-07-29 PROCEDURE — 84155 ASSAY OF PROTEIN SERUM: CPT | Performed by: INTERNAL MEDICINE

## 2022-07-29 NOTE — PROGRESS NOTES
.     REASON FOR CONSULTATION:     Provide an opinion on any further workup or treatment of anemia.                              REQUESTING PHYSICIAN: Eddie Araya MD    RECORDS OBTAINED:  Records of the patient's history including those obtained from the referring provider were reviewed and summarized in detail.    HISTORY OF PRESENT ILLNESS:  The patient is a 82 y.o. year old female with hypertension, hypothyroidism, and end-stage osteoarthritis, who presented today for initial evaluation because of anemia.     Patient reports significant fatigue.  She is worn out quickly and she feels fatigued even in the morning usually.  She denies significant weight changes. Patient reports she has no melena or hematochezia.  She has no fever, sweating or chills.     I received her outside laboratory studies.  On 06/06/2022 she had hemoglobin of 11.6, MCV 95.6, MCHC 33.3.  She had normal platelets of 247,000 and WBC 5400.  Her chemistry labs reported creatinine of 1.10 with EGFR of 50.6 mL/min/1.73 m2.  She had normal electrolytes, normal glucose, normal liver function panel including total protein 7.2, albumin 4.9, and globulin 2.3.  She had normal TSH and free T4.     Laboratory studies on 06/08/2022 reported normal iron saturation of 27% with free iron 94, TIBC 344 however without a ferritin level.  Normal folate of 12.1 ng/mL and vitamin B12 level at 719 pg/mL.  Hemoglobin was 11.8, MCV 96, with normal platelets of 278,000 and WBC 6300.  Then, on 06/23/2022 she had hemoglobin of 11.0, MCV 96.3, platelets 225,000 and WBC 5140, no differentiation of WBC.  Chemistry labs reported creatinine of 1.10 and normal electrolytes. No liver function panel.      This patient had CBC results 07/15/2021 which she had a completely normal hemoglobin of 14.0, MCV 98.8, and platelets 254,000, WBC 4370 including neutrophils 2920, lymphocytes 830.  No labs study from then to 6/8/2022.    Surprisingly on 04/29/2021 she actually had 1  episode of elevated hemoglobin at 17.4, normal WBC of 5300 and platelets 141,000. Her chemistry labs at that time reported a creatinine of 1.11 and elevated TSH of 4.32.     In  and  this patient had completely normal hemoglobin between 13.6 and 14.7.  She previously had anemia back in 2017 and 2018 relevant to her hospitalization. It looks like the patient had a left knee replacement at that time.      Lab studies today in our clinic on 2022 reported macrocytic anemia with hemoglobin 11.1, .9, normal MCHC of 31.8, normal platelets at 249,000 and WBC 5770 including neutrophils 4180, lymphocytes 1030, monocytes 440.     I reviewed her peripheral blood smear today on 2022 which showed no evidence for hematologic malignancy.  There are marginal macrocytosis, no targeted cells, no schistocytes.  Morphology of WBC and platelets is normal.  No clustering of platelets.  No super segmentation of ANC.        Past Medical History:   Diagnosis Date    Abnormal CXR 2017    Adverse reaction to narcotic drug     SEVERE HALLUCINATIONS POST OP LEFT HIP PLACEMENT    Allergies     Arthritis     Arthritis of foot 2020    Arthropathy of pelvic region and thigh 2012    unspecified    Back pain 2007    Chronic back pain 2009    Chronic cough 2017    Closed nondisplaced fracture of lateral malleolus of fibula with delayed healing 2020    Closed nondisplaced fracture of medial cuneiform of left foot 2018    Constipation 2015    unspecified    COVID-19 vaccination refused     Diverticulosis     Dyspepsia 2008    Essential hypertension 2007    Exertional shortness of breath 2017    Fall 2018    Family history of abdominal aortic aneurysm (AAA) 2019    [place] aaa screen limited (04/10/2019 10:32)     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), 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    Hearing loss 2008     Hip pain, left     History of bone density study 09/16/2015    History of pneumonia     12/2017    History of skin cancer     Hypothyroidism, acquired 11/20/2007    Insomnia 01/28/2015    unspecified    Intervertebral disc disorder with myelopathy, cervical region 07/22/2010    Joint capsule tear 12/13/2012    unspecified    OA (osteoarthritis) of hip 07/24/2018    Other synovitis and tenosynovitis, right ankle and foot 02/12/2020    Paranoia (HCC)     PT IS VERY CONCERNED & FEARFUL THAT THE HOSPITAL STAFF WILL GIVE HER THE COVID VACCINE WHILE HERE IN THE HOSPITAL & BELIEVES THAT THE COVID SWAB TEST HAS COVID ON THE SWAB & THAT SHE WILL GET COVID BEING TESTED. ATTEMPTED TO REASSURE PT    Peroneal tendonitis, right 01/13/2020    Rhinitis, allergic 11/20/2007    Scoliosis     Screening breast examination 11/20/2007    Stress 04/27/2015    other acute reactions to stress    Stress incontinence     Trochanteric bursitis, left hip 02/04/2019    Urticaria 06/29/2015     Past Surgical History:   Procedure Laterality Date    BREAST EXCISIONAL BIOPSY Right     50+ years ago    BRONCHOSCOPY N/A 12/21/2017    Procedure: BRONCHOSCOPY;  Surgeon: Mario Alanis MD;  Location: [facility] ENDOSCOPY;  Service:     CATARACT EXTRACTION, BILATERAL      COLONOSCOPY      HYSTERECTOMY      TOTAL HIP ARTHROPLASTY Left 07/24/2018    Procedure: LEFT TOTAL HIP ARTHROPLASTY;  Surgeon: Jutsen Mann MD;  Location: [facility] MAIN OR;  Service: Orthopedics       MEDICATIONS    Current Outpatient Medications:     acetaminophen (TYLENOL) 500 MG tablet, Take 2 tablets by mouth 3 (Three) Times a Day As Needed for Mild Pain  or Moderate Pain . Do not exceed 3000 mg daily, Disp: 180 tablet, Rfl: 11    calcium polycarbophil (FIBERCON) 625 MG tablet, Take 625 mg by mouth Daily., Disp: , Rfl:     Chlorcyclizine-Pseudoephed (STAHIST AD PO), Take  by mouth As Needed., Disp: , Rfl:     Chlorhexidine Gluconate Cloth 2 % pads, Apply  topically. AS DIRECTED PREOP,  "Disp: , Rfl:     hydroCHLOROthiazide (HYDRODIURIL) 12.5 MG tablet, Take 1 tablet by mouth Daily for 180 days., Disp: 90 tablet, Rfl: 0    Synthroid 75 MCG tablet, Take 1 tablet by mouth Daily for 180 days., Disp: 90 tablet, Rfl: 0    valsartan (DIOVAN) 80 MG tablet, Take 1 tablet by mouth Daily for 180 days., Disp: 90 tablet, Rfl: 1  No current facility-administered medications for this visit.    Facility-Administered Medications Ordered in Other Visits:     Chlorhexidine Gluconate Cloth 2 % pads 1 each, 1 each, Apply externally, Take As Directed, Anupam Ruiz MD    ALLERGIES:     Allergies   Allergen Reactions    Hydrocodone Hallucinations    Ketek [Telithromycin] Hives    Nsaids Hives    Sulfa Antibiotics Hives       SOCIAL HISTORY:       Social History     Socioeconomic History    Marital status:    Tobacco Use    Smoking status: Never Smoker    Smokeless tobacco: Never Used   Vaping Use    Vaping Use: Never used   Substance and Sexual Activity    Alcohol use: No    Drug use: Never    Sexual activity: Defer         FAMILY HISTORY:  Family History   Problem Relation Age of Onset    Heart disease Mother     Aneurysm Mother     Cancer Father     Asthma Paternal Grandfather     Aneurysm Brother     Breast cancer Paternal Grandmother     Malig Hyperthermia Neg Hx     Ovarian cancer Neg Hx    Father  of colon cancer at age 79-80.    Mother  of heart disease at age 81-82.    REVIEW OF SYSTEMS:  Review of Systems           Vitals:    22 1325   BP: 110/68   Pulse: 81   Resp: 16   Temp: 97.1 °F (36.2 °C)   TempSrc: Temporal   SpO2: 96%   Weight: 47.3 kg (104 lb 3.2 oz)   Height: 157 cm (61.81\")   PainSc: 0-No pain     Current Status 2022   ECOG score 0      PHYSICAL EXAM:      CONSTITUTIONAL:  Vital signs reviewed.  No distress, looks comfortable.  EYES:  Conjunctivae and lids unremarkable.  PERRLA  EARS,NOSE,MOUTH,THROAT:  Ears and nose appear unremarkable.  Lips, teeth, gums appear " unremarkable.  RESPIRATORY:  Normal respiratory effort.  Lungs clear to auscultation bilaterally.  CARDIOVASCULAR:  Normal S1, S2.  No murmurs rubs or gallops.  No significant lower extremity edema.  GASTROINTESTINAL: Abdomen appears unremarkable.  Nontender.  No hepatomegaly.  No splenomegaly.  LYMPHATIC:  No cervical, supraclavicular, axillary lymphadenopathy.  MUSCULOSKELETAL:  Unremarkable gait and station.  Unremarkable digits/nails.  No cyanosis or clubbing.  SKIN:  Warm.  No rashes.  PSYCHIATRIC:  Normal judgment and insight.  Normal mood and affect.      RECENT LABS:        WBC   Date Value Ref Range Status   07/29/2022 5.77 3.40 - 10.80 10*3/mm3 Final   06/23/2022 5.14 3.40 - 10.80 10*3/mm3 Final   06/08/2022 6.3 3.4 - 10.8 x10E3/uL Final   06/06/2022 5.40 3.40 - 10.80 10*3/mm3 Final   07/15/2021 4.37 3.40 - 10.80 10*3/mm3 Final   04/29/2021 5.30 3.40 - 10.80 10*3/mm3 Final   07/29/2020 5.21 3.40 - 10.80 10*3/mm3 Final   03/06/2020 5.05 3.40 - 10.80 10*3/mm3 Final   12/27/2019 6.25 3.40 - 10.80 10*3/mm3 Final   08/20/2019 6.05 3.40 - 10.80 10*3/mm3 Final   12/04/2018 5.31 4.50 - 10.70 10*3/mm3 Final   07/17/2018 4.88 4.50 - 10.70 10*3/mm3 Final   01/26/2018 13.45 (H) 4.50 - 10.70 10*3/mm3 Final   01/08/2018 8.92 4.50 - 10.70 10*3/mm3 Final   12/31/2017 10.95 (H) 4.50 - 10.70 10*3/mm3 Final   12/30/2017 20.84 (H) 4.50 - 10.70 10*3/mm3 Final   12/29/2017 22.60 (H) 4.50 - 10.70 10*3/mm3 Final   12/28/2017 8.91 4.50 - 10.70 10*3/mm3 Final   12/21/2017 8.65 4.50 - 10.70 10*3/mm3 Final   12/20/2017 9.06 4.50 - 10.70 10*3/mm3 Final   12/19/2017 7.50 4.50 - 10.70 10*3/mm3 Final   11/24/2017 5.8 3.4 - 10.8 x10E3/uL Final   06/16/2017 4.93 4.50 - 10.70 10*3/mm3 Final   11/18/2016 4.62 4.50 - 10.70 10*3/mm3 Final   02/18/2016 4.65 4.50 - 10.70 K/Cumm Final   12/04/2015 5.3 3.4 - 10.8 x10E3/uL Final     Hemoglobin   Date Value Ref Range Status   07/29/2022 11.1 (L) 12.0 - 15.9 g/dL Final   06/23/2022 11.0 (L) 12.0 -  15.9 g/dL Final   06/08/2022 11.8 11.1 - 15.9 g/dL Final   06/06/2022 11.6 (L) 12.0 - 15.9 g/dL Final   07/15/2021 14.0 12.0 - 15.9 g/dL Final   04/29/2021 17.4 (H) 12.0 - 15.9 g/dL Final   07/29/2020 13.6 12.0 - 15.9 g/dL Final   03/06/2020 13.6 12.0 - 15.9 g/dL Final   12/27/2019 14.7 12.0 - 15.9 g/dL Final   08/20/2019 13.7 12.0 - 15.9 g/dL Final   12/04/2018 12.5 11.9 - 15.5 g/dL Final   07/25/2018 10.4 (L) 11.9 - 15.5 g/dL Final   07/17/2018 13.7 11.9 - 15.5 g/dL Final   01/26/2018 10.5 (L) 11.9 - 15.5 g/dL Final   01/08/2018 11.1 (L) 11.9 - 15.5 g/dL Final   12/31/2017 9.8 (L) 11.9 - 15.5 g/dL Final   12/30/2017 10.7 (L) 11.9 - 15.5 g/dL Final   12/29/2017 10.9 (L) 11.9 - 15.5 g/dL Final   12/28/2017 11.9 11.9 - 15.5 g/dL Final   12/21/2017 10.1 (L) 11.9 - 15.5 g/dL Final   12/20/2017 10.9 (L) 11.9 - 15.5 g/dL Final   12/19/2017 13.7 11.9 - 15.5 g/dL Final   11/24/2017 13.5 11.1 - 15.9 g/dL Final     Comment:     **Effective December 4, 2017 the reference interval**    for Hemoglobin MALES only will be changing to:                          Males 13-15 years: 12.6 - 17.7                          Males   >15 years: 13.0 - 17.7     06/16/2017 12.8 11.9 - 15.5 g/dL Final   11/18/2016 12.8 11.9 - 15.5 g/dL Final   02/18/2016 12.3 11.9 - 15.5 g/dL Final   12/04/2015 12.9 11.1 - 15.9 g/dL Final     Platelets   Date Value Ref Range Status   07/29/2022 249 140 - 450 10*3/mm3 Final   06/23/2022 225 140 - 450 10*3/mm3 Final   06/08/2022 278 150 - 450 x10E3/uL Final   06/06/2022 247 140 - 450 10*3/mm3 Final   07/15/2021 254 140 - 450 10*3/mm3 Final   04/29/2021 141 140 - 450 10*3/mm3 Final   07/29/2020 221 140 - 450 10*3/mm3 Final   03/06/2020 234 140 - 450 10*3/mm3 Final   12/27/2019 244 140 - 450 10*3/mm3 Final   08/20/2019 234 140 - 450 10*3/mm3 Final   12/04/2018 247 140 - 500 10*3/mm3 Final   07/17/2018 224 140 - 500 10*3/mm3 Final   01/26/2018 327 140 - 500 10*3/mm3 Final   01/08/2018 417 140 - 500 10*3/mm3 Final    12/31/2017 293 140 - 500 10*3/mm3 Final   12/30/2017 316 140 - 500 10*3/mm3 Final   12/29/2017 314 140 - 500 10*3/mm3 Final   12/28/2017 361 140 - 500 10*3/mm3 Final   12/21/2017 233 140 - 500 10*3/mm3 Final   12/20/2017 235 140 - 500 10*3/mm3 Final   12/19/2017 257 140 - 500 10*3/mm3 Final   11/24/2017 229 150 - 379 x10E3/uL Final   06/16/2017 224 140 - 500 10*3/mm3 Final   11/18/2016 215 140 - 500 10*3/mm3 Final   02/18/2016 207 140 - 500 K/Cumm Final   12/04/2015 245 150 - 379 x10E3/uL Final       Assessment & Plan       ASSESSMENT:    This patient has mild anemia since June this year.  Previously, lab was from 07/15/2021 and reported normal hemoglobin of 14.0, and also normal hemoglobin in 2019 and 2020.  Today, laboratory studies also showed mild anemia with Hb 11.1 and macrocytosis with .9.      This patient has chronic renal insufficiency stage 3 with creatinine clearance below 60 mL/min.      I discussed with the patient for etiology of anemia, I recommended she obtain serum copper and zinc level, erythropoietin level and also serum protein electrophoresis immunoglobulin quantification, immunofixation and free light chain for evaluation.  This patient had normal iron saturation of 27% on 06/08/2022.  We will try to also obtain ferritin level to complete iron studies.        PLAN:  Laboratory studies today.   - Erythropoietin  - NANETTE,PE and FLC, Serum  - Copper, Serum  - Zinc    I will bring the patient back for reevaluation in 3 weeks to discuss results and further management plan.  She voiced an understanding and was agreeable.      RACHEL CORNELL M.D., Ph.D.     7/29/2022        CC:  Eddie Araya MD

## 2022-07-31 LAB — COPPER SERPL-MCNC: 94 UG/DL (ref 80–158)

## 2022-08-01 LAB
ALBUMIN SERPL ELPH-MCNC: 4 G/DL (ref 2.9–4.4)
ALBUMIN/GLOB SERPL: 1.4 {RATIO} (ref 0.7–1.7)
ALPHA1 GLOB SERPL ELPH-MCNC: 0.3 G/DL (ref 0–0.4)
ALPHA2 GLOB SERPL ELPH-MCNC: 0.7 G/DL (ref 0.4–1)
B-GLOBULIN SERPL ELPH-MCNC: 1 G/DL (ref 0.7–1.3)
EPO SERPL-ACNC: 13.3 MIU/ML (ref 2.6–18.5)
FERRITIN SERPL-MCNC: 181.3 NG/ML (ref 13–150)
GAMMA GLOB SERPL ELPH-MCNC: 1 G/DL (ref 0.4–1.8)
GLOBULIN SER-MCNC: 3 G/DL (ref 2.2–3.9)
IGA SERPL-MCNC: 136 MG/DL (ref 64–422)
IGG SERPL-MCNC: 906 MG/DL (ref 586–1602)
IGM SERPL-MCNC: 182 MG/DL (ref 26–217)
INTERPRETATION SERPL IEP-IMP: ABNORMAL
KAPPA LC FREE SER-MCNC: 32.8 MG/L (ref 3.3–19.4)
KAPPA LC FREE/LAMBDA FREE SER: 1.3 {RATIO} (ref 0.26–1.65)
LABORATORY COMMENT REPORT: ABNORMAL
LAMBDA LC FREE SERPL-MCNC: 25.2 MG/L (ref 5.7–26.3)
M PROTEIN SERPL ELPH-MCNC: ABNORMAL G/DL
PROT SERPL-MCNC: 7 G/DL (ref 6–8.5)

## 2022-08-02 LAB — ZINC SERPL-MCNC: 84 UG/DL (ref 44–115)

## 2022-08-18 ENCOUNTER — OFFICE VISIT (OUTPATIENT)
Dept: ONCOLOGY | Facility: CLINIC | Age: 82
End: 2022-08-18

## 2022-08-18 ENCOUNTER — APPOINTMENT (OUTPATIENT)
Dept: OTHER | Facility: HOSPITAL | Age: 82
End: 2022-08-18

## 2022-08-18 VITALS
WEIGHT: 104 LBS | SYSTOLIC BLOOD PRESSURE: 111 MMHG | HEIGHT: 62 IN | HEART RATE: 86 BPM | RESPIRATION RATE: 16 BRPM | BODY MASS INDEX: 19.14 KG/M2 | DIASTOLIC BLOOD PRESSURE: 54 MMHG | TEMPERATURE: 97.1 F | OXYGEN SATURATION: 98 %

## 2022-08-18 DIAGNOSIS — D64.9 ANEMIA, UNSPECIFIED TYPE: Primary | ICD-10-CM

## 2022-08-18 DIAGNOSIS — D53.9 ANEMIA, MACROCYTIC: ICD-10-CM

## 2022-08-18 DIAGNOSIS — N18.31 STAGE 3A CHRONIC KIDNEY DISEASE: ICD-10-CM

## 2022-08-18 PROCEDURE — 99214 OFFICE O/P EST MOD 30 MIN: CPT | Performed by: INTERNAL MEDICINE

## 2022-08-23 ENCOUNTER — TELEPHONE (OUTPATIENT)
Dept: ORTHOPEDIC SURGERY | Facility: CLINIC | Age: 82
End: 2022-08-23

## 2022-08-23 NOTE — TELEPHONE ENCOUNTER
She needs to discuss with her Primary care physician. They are the ones who advised against the surgery until she met with Hematologist. Once she gets clearance from primary care we can get her rescheduled but that has to come from Primary care. Thank you!

## 2022-08-24 ENCOUNTER — OFFICE VISIT (OUTPATIENT)
Dept: ORTHOPEDIC SURGERY | Facility: CLINIC | Age: 82
End: 2022-08-24

## 2022-08-24 VITALS — HEIGHT: 62 IN | TEMPERATURE: 97.8 F | WEIGHT: 105.4 LBS | BODY MASS INDEX: 19.4 KG/M2

## 2022-08-24 DIAGNOSIS — M16.11 ARTHRITIS OF RIGHT HIP: Primary | ICD-10-CM

## 2022-08-24 PROCEDURE — 99214 OFFICE O/P EST MOD 30 MIN: CPT | Performed by: ORTHOPAEDIC SURGERY

## 2022-08-24 NOTE — PROGRESS NOTES
"Patient Name: Bailee Garza   YOB: 1940  Referring Primary Care Physician: Eddie Araya MD  BMI: Body mass index is 19.4 kg/m².    Chief Complaint:    Chief Complaint   Patient presents with   • Right Hip - Follow-up        HPI:     Bailee Garza is a 82 y.o. female who presents today for evaluation of   Chief Complaint   Patient presents with   • Right Hip - Follow-up   .  Patient follows up today on her right hip.  Its killing her.  We had her scheduled for surgery but in preoperative evaluation she was found to have a anemia that basically hemoglobin went from 17 2 years ago to 13 a year ago and was now 11.  She been worked up extensively by hematology and they have told her that it is combination of her age and her light weight.  Her BMI is currently 19.40.  Despite efforts to increase it she has not been able to.  They worked up for anemia her hip is ruining her quality of life and she wishes to talk about surgery she had a left total hip several years ago and was \"Dr. Mann's last patient\" and she wants to go ahead with this.  She is tried physical therapy that just made it hurt is using ambulatory aids and it is affecting her sleep and is ß her activities of daily living      Subjective   Medications:   Home Medications:  Current Outpatient Medications on File Prior to Visit   Medication Sig   • acetaminophen (TYLENOL) 500 MG tablet Take 2 tablets by mouth 3 (Three) Times a Day As Needed for Mild Pain  or Moderate Pain . Do not exceed 3000 mg daily   • calcium polycarbophil (FIBERCON) 625 MG tablet Take 625 mg by mouth Daily.   • Chlorcyclizine-Pseudoephed (STAHIST AD PO) Take  by mouth As Needed.   • Chlorhexidine Gluconate Cloth 2 % pads Apply  topically. AS DIRECTED PREOP   • hydroCHLOROthiazide (HYDRODIURIL) 12.5 MG tablet Take 1 tablet by mouth Daily for 180 days.   • Synthroid 75 MCG tablet Take 1 tablet by mouth Daily for 180 days.   • valsartan (DIOVAN) 80 MG tablet Take 1 " tablet by mouth Daily for 180 days.     Current Facility-Administered Medications on File Prior to Visit   Medication   • Chlorhexidine Gluconate Cloth 2 % pads 1 each     Current Medications:  Scheduled Meds:  Continuous Infusions:No current facility-administered medications for this visit.    PRN Meds:.    I have reviewed the patient's medical history in detail and updated the computerized patient record.  Review and summarization of old records includes:    Past Medical History:   Diagnosis Date   • Abnormal CXR 2017   • Adverse reaction to narcotic drug     SEVERE HALLUCINATIONS POST OP LEFT HIP PLACEMENT   • Allergies    • Arthritis    • Arthritis of foot 2020   • Arthropathy of pelvic region and thigh 2012    unspecified   • Back pain 2007   • Chronic back pain 2009   • Chronic cough 2017   • Closed nondisplaced fracture of lateral malleolus of fibula with delayed healing 2020   • Closed nondisplaced fracture of medial cuneiform of left foot 2018   • Constipation 2015    unspecified   • COVID-19 vaccination refused    • Diverticulosis    • Dyspepsia 2008   • Essential hypertension 2007   • Exertional shortness of breath 2017   • Fall 2018   • Family history of abdominal aortic aneurysm (AAA) 2019    [place]aa screen limited (04/10/2019 10:32)    • 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), 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   • Hearing loss 2008   • Hip pain, left    • History of bone density study 2015   • History of pneumonia     2017   • History of skin cancer    • Hypothyroidism, acquired 2007   • Insomnia 2015    unspecified   • Intervertebral disc disorder with myelopathy, cervical region 2010   • Joint capsule tear 2012    unspecified   • OA (osteoarthritis) of hip 2018   • Other synovitis and tenosynovitis, right ankle and foot 2020   •  Marcos (ELEAZAR)     PT IS VERY CONCERNED & FEARFUL THAT THE HOSPITAL STAFF WILL GIVE HER THE COVID VACCINE WHILE HERE IN THE HOSPITAL & BELIEVES THAT THE COVID SWAB TEST HAS COVID ON THE SWAB & THAT SHE WILL GET COVID BEING TESTED. ATTEMPTED TO REASSURE PT   • Peroneal tendonitis, right 01/13/2020   • Rhinitis, allergic 11/20/2007   • Scoliosis    • Screening breast examination 11/20/2007   • Stress 04/27/2015    other acute reactions to stress   • Stress incontinence    • Trochanteric bursitis, left hip 02/04/2019   • Urticaria 06/29/2015        Past Surgical History:   Procedure Laterality Date   • BREAST EXCISIONAL BIOPSY Right     50+ years ago   • BRONCHOSCOPY N/A 12/21/2017    Procedure: BRONCHOSCOPY;  Surgeon: Mario Alanis MD;  Location: [facility] ENDOSCOPY;  Service:    • CATARACT EXTRACTION, BILATERAL     • COLONOSCOPY     • HYSTERECTOMY     • TOTAL HIP ARTHROPLASTY Left 07/24/2018    Procedure: LEFT TOTAL HIP ARTHROPLASTY;  Surgeon: Justen Mann MD;  Location: [facility] MAIN OR;  Service: Orthopedics        Social History     Occupational History     Employer: [employer]   Tobacco Use   • Smoking status: Never Smoker   • Smokeless tobacco: Never Used   Vaping Use   • Vaping Use: Never used   Substance and Sexual Activity   • Alcohol use: No   • Drug use: Never   • Sexual activity: Defer      Social History     Social History Narrative   • Not on file        Family History   Problem Relation Age of Onset   • Heart disease Mother    • Aneurysm Mother    • Colon cancer Father    • Aneurysm Brother    • Breast cancer Paternal Grandmother    • Asthma Paternal Grandfather    • Malig Hyperthermia Neg Hx    • Ovarian cancer Neg Hx        ROS: 14 point review of systems was performed and all other systems were reviewed and are negative except for documented findings in HPI and today's encounter.     Allergies:   Allergies   Allergen Reactions   • Hydrocodone Hallucinations   • Ketek  "[Telithromycin] Hives   • Nsaids Hives   • Sulfa Antibiotics Hives     Constitutional:  Denies fever, shaking or chills   Eyes:  Denies change in visual acuity   HENT:  Denies nasal congestion or sore throat   Respiratory:  Denies cough or shortness of breath   Cardiovascular:  Denies chest pain or severe LE edema   GI:  Denies abdominal pain, nausea, vomiting, bloody stools or diarrhea   Musculoskeletal:  Numbness, tingling, pain, or loss of motor function only as noted above in history of present illness.  : Denies painful urination or hematuria  Integument:  Denies rash, lesion or ulceration   Neurologic:  Denies headache or focal weakness  Endocrine:  Denies lymphadenopathy  Psych:  Denies confusion or change in mental status   Hem:  Denies active bleeding    OBJECTIVE:  Physical Exam: 82 y.o. female  Wt Readings from Last 3 Encounters:   08/24/22 47.8 kg (105 lb 6.4 oz)   08/18/22 47.2 kg (104 lb)   07/29/22 47.3 kg (104 lb 3.2 oz)     Ht Readings from Last 1 Encounters:   08/24/22 157 cm (61.8\")     Body mass index is 19.4 kg/m².  Vitals:    08/24/22 1609   Temp: 97.8 °F (36.6 °C)     Vital signs reviewed.     General Appearance:    Alert, cooperative, in no acute distress                  Eyes: conjunctiva clear  ENT: external ears and nose atraumatic  CV: no peripheral edema  Resp: normal respiratory effort  Skin: no rashes or wounds; normal turgor  Psych: mood and affect appropriate  Lymph: no nodes appreciated  Neuro: gross sensation intact  Vascular:  Palpable peripheral pulse in noted extremity  Musculoskeletal Extremities: Exam today shows functional flexion extension in her right hip she has no internal rotation it causes severe pain when you do it Stinchfield's positive she has about 40 degrees of external rotation when she gets up and adjust her self she can walk but is difficult    Radiology:   AP of the hips lateral right hip taken deviously in the office with comparison view shows a left total " hip arthroplasty and severe end-stage arthritis of her right hip she also has lumbar degenerative disease and scoliosis is evident.        Assessment:     ICD-10-CM ICD-9-CM   1. Arthritis of right hip  M16.11 716.95        MDM/Plan:   The diagnosis(es), natural history, pathophysiology and treatment for diagnosis(es) were discussed. Opportunity given and questions answered.  Biomechanics of pertinent body areas discussed.  When appropriate, the use of ambulatory aids discussed.    HOME EXERCISE/PT program encouraged  Total Hip Replacement: Continuation of conservative management vs. CHAS discussed.  I reviewed anatomy of a total hip arthroplasty in laymen's terms, as well as typical postoperative recovery and possibly 6-12 months for maximal recovery, and possible need for rehabilitation stay after hospitalization. We also discussed risks, benefits, alternatives, and limitations of procedure with risks including but not limited to neurovascular damage, bleeding, infection, malalignment, chronic pain, failure of implants, periprosthetic fracture, osteolysis, loosening of implants, loss of motion, weakness, stiffness, instability, DVT, pulmonary embolus, death, stroke, complex regional pain syndrome, myocardial infarction, and need for additional procedures. Concept of substitution vs. replacement discussed.  No guarantees were given regarding results of surgery.  Patient verbalized understanding, and was given the opportunity to ask and have all questions answered today.      8/24/2022    Dictated utilizing Dragon dictation

## 2022-09-10 NOTE — PLAN OF CARE
Discharged by provider Problem: Patient Care Overview (Adult)  Goal: Plan of Care Review  Outcome: Ongoing (interventions implemented as appropriate)   01/01/18 0548   Coping/Psychosocial Response Interventions   Plan Of Care Reviewed With patient   Patient Care Overview   Progress improving   Outcome Evaluation   Outcome Summary/Follow up Plan VSS, am labs, no complaints, IV antibx, IS use, TCDB, encourage activity       Problem: Pneumonia (Adult)  Goal: Signs and Symptoms of Listed Potential Problems Will be Absent or Manageable (Pneumonia)  Outcome: Ongoing (interventions implemented as appropriate)      Problem: Fall Risk (Adult)  Goal: Identify Related Risk Factors and Signs and Symptoms  Outcome: Ongoing (interventions implemented as appropriate)

## 2022-09-12 ENCOUNTER — TELEPHONE (OUTPATIENT)
Dept: ORTHOPEDIC SURGERY | Facility: CLINIC | Age: 82
End: 2022-09-12

## 2022-09-12 NOTE — TELEPHONE ENCOUNTER
Caller: ALCIDES CORTEZ    Relationship to patient: SELF    Best call back number: [phone]    Patient is needing: REQ TO SPEAK WITH SURGERY SCHEDULER- STATES IT HAS BEEN OVER 2 WEEKS. PLEASE CONTACT PATIENT TO DISCUSS SURGERY STATUS

## 2022-09-19 DIAGNOSIS — I10 ESSENTIAL HYPERTENSION: ICD-10-CM

## 2022-09-19 DIAGNOSIS — E03.9 HYPOTHYROIDISM, ACQUIRED: ICD-10-CM

## 2022-09-19 RX ORDER — HYDROCHLOROTHIAZIDE 12.5 MG/1
12.5 TABLET ORAL DAILY
Qty: 30 TABLET | Refills: 0 | Status: SHIPPED | OUTPATIENT
Start: 2022-09-19 | End: 2022-10-10 | Stop reason: SDUPTHER

## 2022-09-19 RX ORDER — LEVOTHYROXINE SODIUM 75 MCG
75 TABLET ORAL DAILY
Qty: 30 TABLET | Refills: 0 | Status: SHIPPED | OUTPATIENT
Start: 2022-09-19 | End: 2022-10-10 | Stop reason: SDUPTHER

## 2022-09-19 NOTE — TELEPHONE ENCOUNTER
Caller: Bailee Garza ELIZABETH    Relationship: Self    Best call back number: [phone]    Requested Prescriptions:   Requested Prescriptions     Pending Prescriptions Disp Refills   • hydroCHLOROthiazide (HYDRODIURIL) 12.5 MG tablet 90 tablet 0     Sig: Take 1 tablet by mouth Daily for 180 days.   • Synthroid 75 MCG tablet 90 tablet 0     Sig: Take 1 tablet by mouth Daily for 180 days.        Pharmacy where request should be sent: [facility] [address] [place] [address] RD AT [facility] [phone] [facility] [phone] FX     Additional details provided by patient: PATIENT NEEDS THESE FILLED ASAP.   Does the patient have less than a 3 day supply:  [x] Yes  [] No    Vivien ALMENDAREZ Rep   09/19/22 09:53 EDT

## 2022-10-03 DIAGNOSIS — I10 ESSENTIAL HYPERTENSION: ICD-10-CM

## 2022-10-03 RX ORDER — VALSARTAN 80 MG/1
80 TABLET ORAL DAILY
Qty: 90 TABLET | Refills: 1 | OUTPATIENT
Start: 2022-10-03 | End: 2023-04-01

## 2022-10-03 NOTE — TELEPHONE ENCOUNTER
Caller: Bailee Garza    Relationship: Self    Best call back number: [phone]     Requested Prescriptions:   Requested Prescriptions     Pending Prescriptions Disp Refills   • valsartan (DIOVAN) 80 MG tablet 90 tablet 1     Sig: Take 1 tablet by mouth Daily for 180 days.        Pharmacy where request should be sent: [facility] #[id number] [address]  AT [facility] [phone] [facility] [phone] FX     Additional details provided by patient: PATIENT HAS 3 DAYS LEFT     Does the patient have less than a 3 day supply:  [] Yes  [x] No    Vivien Walsh Rep   10/03/22 08:27 EDT

## 2022-10-06 DIAGNOSIS — I10 ESSENTIAL HYPERTENSION: ICD-10-CM

## 2022-10-06 RX ORDER — VALSARTAN 80 MG/1
80 TABLET ORAL DAILY
Qty: 30 TABLET | Refills: 0 | Status: SHIPPED | OUTPATIENT
Start: 2022-10-06 | End: 2022-10-10 | Stop reason: SDUPTHER

## 2022-10-06 NOTE — TELEPHONE ENCOUNTER
Caller: Bailee Garza    Relationship: Self    Best call back number: [phone]    Requested Prescriptions:   Requested Prescriptions      No prescriptions requested or ordered in this encounter        Pharmacy where request should be sent: [facility] [id number] - [facility] [address] AT [facility] - [phone]  - [phone]      Additional details provided by patient: PATIENT IS COMPLETELY OUT  Does the patient have less than a 3 day supply:  [x] Yes  [] No    Vivien Mccoy Rep   10/06/22 11:23 EDT

## 2022-10-10 ENCOUNTER — OFFICE VISIT (OUTPATIENT)
Dept: FAMILY MEDICINE CLINIC | Facility: CLINIC | Age: 82
End: 2022-10-10

## 2022-10-10 VITALS
WEIGHT: 103 LBS | TEMPERATURE: 97.5 F | HEIGHT: 62 IN | BODY MASS INDEX: 18.95 KG/M2 | DIASTOLIC BLOOD PRESSURE: 62 MMHG | HEART RATE: 69 BPM | SYSTOLIC BLOOD PRESSURE: 92 MMHG

## 2022-10-10 DIAGNOSIS — E03.9 HYPOTHYROIDISM, ACQUIRED: ICD-10-CM

## 2022-10-10 DIAGNOSIS — I10 ESSENTIAL HYPERTENSION: ICD-10-CM

## 2022-10-10 PROCEDURE — 99214 OFFICE O/P EST MOD 30 MIN: CPT | Performed by: FAMILY MEDICINE

## 2022-10-10 RX ORDER — HYDROCHLOROTHIAZIDE 12.5 MG/1
12.5 TABLET ORAL DAILY
Qty: 90 TABLET | Refills: 2 | Status: SHIPPED | OUTPATIENT
Start: 2022-10-10 | End: 2023-07-07

## 2022-10-10 RX ORDER — LEVOTHYROXINE SODIUM 75 MCG
75 TABLET ORAL DAILY
Qty: 90 TABLET | Refills: 2 | Status: SHIPPED | OUTPATIENT
Start: 2022-10-10 | End: 2023-07-07

## 2022-10-10 RX ORDER — VALSARTAN 80 MG/1
80 TABLET ORAL DAILY
Qty: 90 TABLET | Refills: 2 | Status: SHIPPED | OUTPATIENT
Start: 2022-10-10 | End: 2023-07-07

## 2022-10-10 NOTE — PROGRESS NOTES
"Chief Complaint  Hypertension    Subjective          Bailee presents to [facility]  To refill medications. She has some hip problems and is going to get a surgery to correct.(December 13) Otherwise she feels fine.         Objective   Vital Signs:   Vitals:    10/10/22 1458   BP: 92/62   Pulse: 69   Temp: 97.5 °F (36.4 °C)   Weight: 46.7 kg (103 lb)   Height: 157 cm (61.81\")        BMI is within normal parameters. No other follow-up for BMI required.        Physical Exam  Vitals reviewed.   Constitutional:       General: She is not in acute distress.  Eyes:      General: Lids are normal.      Conjunctiva/sclera: Conjunctivae normal.   Neck:      Vascular: No carotid bruit.      Trachea: No tracheal deviation.   Cardiovascular:      Rate and Rhythm: Normal rate and regular rhythm.      Heart sounds: Normal heart sounds. No murmur heard.  Pulmonary:      Effort: Pulmonary effort is normal.      Breath sounds: Normal breath sounds.   Skin:     General: Skin is warm and dry.   Neurological:      Mental Status: She is alert. She is not disoriented.   Psychiatric:         Speech: Speech normal.         Behavior: Behavior normal. Behavior is cooperative.          Result Review :                Assessment and Plan    Diagnoses and all orders for this visit:    1. Essential hypertension  Assessment & Plan:  Hypertension is unchanged.  Continue current treatment regimen.  Blood pressure will be reassessed at the next regular appointment.    Orders:  -     hydroCHLOROthiazide (HYDRODIURIL) 12.5 MG tablet; Take 1 tablet by mouth Daily for 270 days.  Dispense: 90 tablet; Refill: 2  -     valsartan (DIOVAN) 80 MG tablet; Take 1 tablet by mouth Daily for 270 days.  Dispense: 90 tablet; Refill: 2    2. Hypothyroidism, acquired  Assessment & Plan:  The current medical regimen is effective;  continue present plan and medications.      Orders:  -     Synthroid 75 MCG tablet; Take 1 tablet by mouth Daily " for 270 days.  Dispense: 90 tablet; Refill: 2        Follow Up   Return in about 6 months (around 4/10/2023) for Medicare Wellness & regular visit, [id number] min.  Patient was given instructions and counseling regarding her condition or for health maintenance advice. Please see specific information pulled into the AVS if appropriate.

## 2022-10-17 ENCOUNTER — OFFICE VISIT (OUTPATIENT)
Dept: FAMILY MEDICINE CLINIC | Facility: CLINIC | Age: 82
End: 2022-10-17

## 2022-10-17 VITALS
RESPIRATION RATE: 16 BRPM | WEIGHT: 102 LBS | SYSTOLIC BLOOD PRESSURE: 126 MMHG | DIASTOLIC BLOOD PRESSURE: 88 MMHG | HEIGHT: 62 IN | BODY MASS INDEX: 18.77 KG/M2 | OXYGEN SATURATION: 93 % | HEART RATE: 93 BPM | TEMPERATURE: 98.3 F

## 2022-10-17 DIAGNOSIS — U07.1 COVID-19 VIRUS DETECTED: Primary | ICD-10-CM

## 2022-10-17 DIAGNOSIS — R05.9 COUGH, UNSPECIFIED TYPE: ICD-10-CM

## 2022-10-17 LAB
EXPIRATION DATE: ABNORMAL
FLUAV AG UPPER RESP QL IA.RAPID: NOT DETECTED
FLUBV AG UPPER RESP QL IA.RAPID: NOT DETECTED
INTERNAL CONTROL: ABNORMAL
Lab: ABNORMAL
SARS-COV-2 AG UPPER RESP QL IA.RAPID: DETECTED

## 2022-10-17 PROCEDURE — 99213 OFFICE O/P EST LOW 20 MIN: CPT | Performed by: FAMILY MEDICINE

## 2022-10-17 PROCEDURE — 87428 SARSCOV & INF VIR A&B AG IA: CPT | Performed by: FAMILY MEDICINE

## 2022-10-17 NOTE — PROGRESS NOTES
"Chief Complaint  Cough (Scratchy throat, dry cough x 5 days )    David Morocho presents to [facility]  With cough/cold symptoms.         Objective   Vital Signs:   Vitals:    10/17/22 1058   BP: 126/88   Pulse: 93   Resp: 16   Temp: 98.3 °F (36.8 °C)   SpO2: 93%   Weight: 46.3 kg (102 lb)   Height: 157 cm (61.81\")        BMI is within normal parameters. No other follow-up for BMI required.        Physical Exam  Vitals reviewed.   Constitutional:       General: She is not in acute distress.     Appearance: She is not toxic-appearing.   Cardiovascular:      Rate and Rhythm: Normal rate and regular rhythm.      Heart sounds: Normal heart sounds.   Pulmonary:      Effort: Pulmonary effort is normal.      Breath sounds: Normal breath sounds.   Neurological:      General: No focal deficit present.      Mental Status: She is alert.   Psychiatric:         Attention and Perception: Attention normal.         Mood and Affect: Mood normal.         Behavior: Behavior normal.          Result Review :     The following data was reviewed by: Eddie Araya MD on 10/17/2022:  POCT SARS-CoV-2 Antigen LASHA + Flu (10/17/2022 11:12)-positive           Assessment and Plan    Diagnoses and all orders for this visit:    1. COVID-19 virus detected (Primary)  Assessment & Plan:  Newly diagnosed. Will add aspirin to current regimen. Will add centrum to current regimen.       2. Cough, unspecified type  -     POCT SARS-CoV-2 Antigen LASHA + Flu      Follow Up   Return if symptoms worsen or fail to improve.  Patient was given instructions and counseling regarding her condition or for health maintenance advice. Please see specific information pulled into the AVS if appropriate.   "

## 2022-10-26 ENCOUNTER — OFFICE VISIT (OUTPATIENT)
Dept: FAMILY MEDICINE CLINIC | Facility: CLINIC | Age: 82
End: 2022-10-26

## 2022-10-26 VITALS
WEIGHT: 102 LBS | DIASTOLIC BLOOD PRESSURE: 61 MMHG | SYSTOLIC BLOOD PRESSURE: 104 MMHG | RESPIRATION RATE: 16 BRPM | BODY MASS INDEX: 18.77 KG/M2 | OXYGEN SATURATION: 94 % | TEMPERATURE: 98 F | HEIGHT: 62 IN | HEART RATE: 82 BPM

## 2022-10-26 DIAGNOSIS — R05.8 COUGH PRODUCTIVE OF YELLOW SPUTUM: ICD-10-CM

## 2022-10-26 DIAGNOSIS — U07.1 COVID-19 VIRUS DETECTED: Primary | ICD-10-CM

## 2022-10-26 PROCEDURE — 99213 OFFICE O/P EST LOW 20 MIN: CPT

## 2022-10-26 RX ORDER — DOXYCYCLINE HYCLATE 100 MG/1
100 CAPSULE ORAL 2 TIMES DAILY
Qty: 20 CAPSULE | Refills: 0 | Status: SHIPPED | OUTPATIENT
Start: 2022-10-26 | End: 2022-11-05

## 2022-10-26 RX ORDER — BENZONATATE 100 MG/1
100 CAPSULE ORAL 3 TIMES DAILY PRN
Qty: 30 CAPSULE | Refills: 1 | Status: SHIPPED | OUTPATIENT
Start: 2022-10-26 | End: 2022-11-28

## 2022-10-26 NOTE — PROGRESS NOTES
"Chief Complaint  Cough (/) and Fatigue    Subjective         History of Present Illness    Bailee Garza 82 y.o. female who presents today for a follow-up on COVID-19 virus and cough.  The patient was seen by her PCP on 10/17/2022.  The patient tested positive for COVID-19 at that appointment.  The patient's PCP added aspirin and Centrum to the patient's current regimen.    Today, the patient reports ongoing productive cough with yellow sputum and fatigue.  She denies fever, chest pain, shortness of breath, wheezing, and palpitations.     She  denies medication side effects.    Review of Systems   Constitutional: Positive for fatigue. Negative for chills and fever.   HENT: Negative for congestion, ear pain, sinus pressure, sore throat and trouble swallowing.    Eyes: Negative for visual disturbance.   Respiratory: Positive for cough (productive). Negative for chest tightness, shortness of breath and wheezing.    Cardiovascular: Negative for chest pain and palpitations.   Gastrointestinal: Negative for abdominal pain, constipation, diarrhea, nausea and vomiting.   Genitourinary: Negative for dysuria, frequency and urgency.   Musculoskeletal: Negative for back pain.   Skin: Negative for rash.   Neurological: Negative for dizziness, syncope and light-headedness.   Psychiatric/Behavioral: Negative for behavioral problems and sleep disturbance.        Objective   Vital Signs:   /61   Pulse 82   Temp 98 °F (36.7 °C) (Oral)   Resp 16   Ht 157 cm (61.81\")   Wt 46.3 kg (102 lb)   SpO2 94%   BMI 18.77 kg/m²      BMI is within normal parameters. No other follow-up for BMI required.        Physical Exam  Vitals and nursing note reviewed.   Constitutional:       General: She is not in acute distress.     Appearance: Normal appearance. She is well-developed. She is not toxic-appearing or diaphoretic.   HENT:      Head: Normocephalic and atraumatic. Hair is normal.      Right Ear: External ear normal. No drainage, " swelling or tenderness. Tympanic membrane is not retracted.      Left Ear: External ear normal. No drainage, swelling or tenderness. Tympanic membrane is not retracted.      Nose: No mucosal edema or congestion.      Mouth/Throat:      Mouth: Mucous membranes are moist. No oral lesions.      Pharynx: Uvula midline. Posterior oropharyngeal erythema present. No pharyngeal swelling, oropharyngeal exudate or uvula swelling.      Tonsils: No tonsillar exudate or tonsillar abscesses.   Eyes:      General: No scleral icterus.        Right eye: No discharge.         Left eye: No discharge.      Conjunctiva/sclera: Conjunctivae normal.      Pupils: Pupils are equal, round, and reactive to light.   Cardiovascular:      Rate and Rhythm: Normal rate and regular rhythm.      Pulses: Normal pulses.      Heart sounds: Normal heart sounds. No murmur heard.    No gallop.   Pulmonary:      Effort: No respiratory distress.      Breath sounds: Normal breath sounds. No stridor or decreased air movement. No decreased breath sounds, wheezing, rhonchi or rales.   Chest:      Chest wall: No tenderness.   Abdominal:      Palpations: Abdomen is soft.      Tenderness: There is no abdominal tenderness.   Musculoskeletal:      Cervical back: Normal range of motion and neck supple.   Lymphadenopathy:      Cervical: No cervical adenopathy.   Skin:     General: Skin is warm and dry.      Findings: No rash.   Neurological:      Mental Status: She is alert and oriented to person, place, and time.      Motor: No abnormal muscle tone.   Psychiatric:         Behavior: Behavior normal.         Thought Content: Thought content normal.         Judgment: Judgment normal.          The following data was reviewed by: TERESA Agarwal on 10/26/2022:  Office Visit with Eddie Araya MD (10/17/2022)  POCT SARS-CoV-2 Antigen LASHA + Flu (10/17/2022 11:12)               Assessment and Plan      Diagnoses and all orders for this visit:    1. COVID-19 virus  detected (Primary)  -     doxycycline (VIBRAMYCIN) 100 MG capsule; Take 1 capsule by mouth 2 (Two) Times a Day for 10 days.  Dispense: 20 capsule; Refill: 0  -     benzonatate (Tessalon Perles) 100 MG capsule; Take 1 capsule by mouth 3 (Three) Times a Day As Needed for Cough.  Dispense: 30 capsule; Refill: 1    2. Cough productive of yellow sputum  -     doxycycline (VIBRAMYCIN) 100 MG capsule; Take 1 capsule by mouth 2 (Two) Times a Day for 10 days.  Dispense: 20 capsule; Refill: 0  -     benzonatate (Tessalon Perles) 100 MG capsule; Take 1 capsule by mouth 3 (Three) Times a Day As Needed for Cough.  Dispense: 30 capsule; Refill: 1            Follow Up     Return if symptoms worsen or fail to improve.    Patient was given instructions and counseling regarding her condition or for health maintenance advice. Please see specific information pulled into the AVS if appropriate.     -Take medication as prescribed.  -Covid was positive on 10/17/2022.  -Monitor for fever and take Tylenol as needed.  Drink plenty of fluids and get plenty of rest.  -Use cool-mist humidifier as needed.  -Seek immediate medical attention for fever unrelieved by Tylenol, chest pain, shortness of breath, decreased oxygen saturations, sharp pain with breathing, or any other worsening signs or symptoms.  -Remain in quarantine for 10 days from symptom onset.  -The patient verbalized understanding of all instructions given today.

## 2022-11-14 ENCOUNTER — TELEPHONE (OUTPATIENT)
Dept: ORTHOPEDIC SURGERY | Facility: CLINIC | Age: 82
End: 2022-11-14

## 2022-11-14 NOTE — TELEPHONE ENCOUNTER
Patient called to let us know that she tested positive for Covid 10/11/22. Patient wants to know will this hinder her from surgery for knee in Dec. Please review and advise

## 2022-11-15 ENCOUNTER — TELEPHONE (OUTPATIENT)
Dept: ORTHOPEDIC SURGERY | Facility: CLINIC | Age: 82
End: 2022-11-15

## 2022-11-15 NOTE — TELEPHONE ENCOUNTER
EDITH GLOVER RETURNED CALL , UNABLE TO WT. REQUESTING A CALL BACK. PLEASE ADVISE.    CALL BACK #: [phone]

## 2022-11-15 NOTE — TELEPHONE ENCOUNTER
As long as she is asymptomatic prior to her pre-admission testing she will be far enough out from her initial positive covid test to continue with surgery. If she has symptoms she will need to retest. If she is positive she will be pushed back, this is hospital protocol. It is meant to keep everyone including her safe. Thank you!

## 2022-11-15 NOTE — TELEPHONE ENCOUNTER
Provider: AICHA  Caller: EDITH  Relationship to Patient: DAUGHTER    Phone Number: [phone]  Reason for Call: DAUGHTER IS CALLING FOR A CALL BACK ABOUT MOM SX FOR HER HIP REPLACEMENT.  NOW SHE'S IN SO MUCH PAIN AND WANTS TO MAKE SURE SHE CAN HAVE THAT SX DONE AS SOON AS POSSIBLE.    DAUGHTER STATES SHE IS NOT SICK AND DOSE NOT WANT A COVID TEST TO STOP MOM SX.

## 2022-11-28 ENCOUNTER — PRE-ADMISSION TESTING (OUTPATIENT)
Dept: PREADMISSION TESTING | Facility: HOSPITAL | Age: 82
End: 2022-11-28

## 2022-11-28 VITALS
DIASTOLIC BLOOD PRESSURE: 75 MMHG | RESPIRATION RATE: 18 BRPM | TEMPERATURE: 97.6 F | HEIGHT: 64 IN | BODY MASS INDEX: 17.75 KG/M2 | SYSTOLIC BLOOD PRESSURE: 131 MMHG | HEART RATE: 74 BPM | WEIGHT: 104 LBS | OXYGEN SATURATION: 99 %

## 2022-11-28 LAB
ANION GAP SERPL CALCULATED.3IONS-SCNC: 10.7 MMOL/L (ref 5–15)
BUN SERPL-MCNC: 27 MG/DL (ref 8–23)
BUN/CREAT SERPL: 27.3 (ref 7–25)
CALCIUM SPEC-SCNC: 10.2 MG/DL (ref 8.6–10.5)
CHLORIDE SERPL-SCNC: 104 MMOL/L (ref 98–107)
CO2 SERPL-SCNC: 23.3 MMOL/L (ref 22–29)
CREAT SERPL-MCNC: 0.99 MG/DL (ref 0.57–1)
DEPRECATED RDW RBC AUTO: 49.4 FL (ref 37–54)
EGFRCR SERPLBLD CKD-EPI 2021: 57.4 ML/MIN/1.73
ERYTHROCYTE [DISTWIDTH] IN BLOOD BY AUTOMATED COUNT: 13.1 % (ref 12.3–15.4)
GLUCOSE SERPL-MCNC: 93 MG/DL (ref 65–99)
HCT VFR BLD AUTO: 35.2 % (ref 34–46.6)
HGB BLD-MCNC: 11.5 G/DL (ref 12–15.9)
MCH RBC QN AUTO: 33.1 PG (ref 26.6–33)
MCHC RBC AUTO-ENTMCNC: 32.7 G/DL (ref 31.5–35.7)
MCV RBC AUTO: 101.4 FL (ref 79–97)
PLATELET # BLD AUTO: 282 10*3/MM3 (ref 140–450)
PMV BLD AUTO: 10.5 FL (ref 6–12)
POTASSIUM SERPL-SCNC: 5.2 MMOL/L (ref 3.5–5.2)
QT INTERVAL: 411 MS
RBC # BLD AUTO: 3.47 10*6/MM3 (ref 3.77–5.28)
SODIUM SERPL-SCNC: 138 MMOL/L (ref 136–145)
WBC NRBC COR # BLD: 5.86 10*3/MM3 (ref 3.4–10.8)

## 2022-11-28 PROCEDURE — 85027 COMPLETE CBC AUTOMATED: CPT

## 2022-11-28 PROCEDURE — 36415 COLL VENOUS BLD VENIPUNCTURE: CPT

## 2022-11-28 PROCEDURE — 80048 BASIC METABOLIC PNL TOTAL CA: CPT

## 2022-11-28 PROCEDURE — 93010 ELECTROCARDIOGRAM REPORT: CPT | Performed by: INTERNAL MEDICINE

## 2022-11-28 PROCEDURE — 93005 ELECTROCARDIOGRAM TRACING: CPT

## 2022-11-28 RX ORDER — CHLORHEXIDINE GLUCONATE 500 MG/1
CLOTH TOPICAL TAKE AS DIRECTED
Status: ON HOLD | COMMUNITY
End: 2022-12-13

## 2022-11-28 ASSESSMENT — HOOS JR
HOOS JR SCORE: 11
HOOS JR SCORE: 59.381

## 2022-11-28 NOTE — DISCHARGE INSTRUCTIONS
Take the following medications the morning of surgery: SYNTHROID    ARRIVE AT 7 AM ON 12/13/22    If you are on prescription narcotic pain medication to control your pain you may also take that medication the morning of surgery.    General Instructions:  Do not eat solid food after midnight the night before surgery.  You may drink clear liquids day of surgery but must stop at least one hour before your hospital arrival time.  It is beneficial for you to have a clear drink that contains carbohydrates the day of surgery.  We suggest a 12 to 20 ounce bottle of Gatorade or Powerade for non-diabetic patients or a 12 to 20 ounce bottle of G2 or Powerade Zero for diabetic patients. (Pediatric patients, are not advised to drink a 12 to 20 ounce carbohydrate drink)    Clear liquids are liquids you can see through.  Nothing red in color.     Plain water                               Sports drinks  Sodas                                   Gelatin (Jell-O)  Fruit juices without pulp such as white grape juice and apple juice  Popsicles that contain no fruit or yogurt  Tea or coffee (no cream or milk added)  Gatorade / Powerade  G2 / Powerade Zero    Infants may have breast milk up to four hours before surgery.  Infants drinking formula may drink formula up to six hours before surgery.   Patients who avoid smoking, chewing tobacco and alcohol for 4 weeks prior to surgery have a reduced risk of post-operative complications.  Quit smoking as many days before surgery as you can.  Do not smoke, use chewing tobacco or drink alcohol the day of surgery.   If applicable bring your C-PAP/ BI-PAP machine.  Bring any papers given to you in the doctor’s office.  Wear clean comfortable clothes.  Do not wear contact lenses, false eyelashes or make-up.  Bring a case for your glasses.   Bring crutches or walker if applicable.  Remove all piercings.  Leave jewelry and any other valuables at home.  Hair extensions with metal clips must be removed  prior to surgery.  The Pre-Admission Testing nurse will instruct you to bring medications if unable to obtain an accurate list in Pre-Admission Testing.        If you were given a blood bank ID arm band remember to bring it with you the day of surgery.    Preventing a Surgical Site Infection:  For 2 to 3 days before surgery, avoid shaving with a razor because the razor can irritate skin and make it easier to develop an infection.    Any areas of open skin can increase the risk of a post-operative wound infection by allowing bacteria to enter and travel throughout the body.  Notify your surgeon if you have any skin wounds / rashes even if it is not near the expected surgical site.  The area will need assessed to determine if surgery should be delayed until it is healed.  The night prior to surgery shower using a fresh bar of anti-bacterial soap (such as Dial) and clean washcloth.  Sleep in a clean bed with clean clothing.  Do not allow pets to sleep with you.  Shower on the morning of surgery using a fresh bar of anti-bacterial soap (such as Dial) and clean washcloth.  Dry with a clean towel and dress in clean clothing.  Ask your surgeon if you will be receiving antibiotics prior to surgery.  Make sure you, your family, and all healthcare providers clean their hands with soap and water or an alcohol based hand  before caring for you or your wound.    Day of surgery:  Your arrival time is approximately two hours before your scheduled surgery time.  Upon arrival, a Pre-op nurse and Anesthesiologist will review your health history, obtain vital signs, and answer questions you may have.  The only belongings needed at this time will be a list of your home medications and if applicable your C-PAP/BI-PAP machine.  A Pre-op nurse will start an IV and you may receive medication in preparation for surgery, including something to help you relax.     Please be aware that surgery does come with discomfort.  We want to  make every effort to control your discomfort so please discuss any uncontrolled symptoms with your nurse.   Your doctor will most likely have prescribed pain medications.      If you are going home after surgery you will receive individualized written care instructions before being discharged.  A responsible adult must drive you to and from the hospital on the day of your surgery and stay with you for 24 hours.  Discharge prescriptions can be filled by the hospital pharmacy during regular pharmacy hours.  If you are having surgery late in the day/evening your prescription may be e-prescribed to your pharmacy.  Please verify your pharmacy hours or chose a 24 hour pharmacy to avoid not having access to your prescription because your pharmacy has closed for the day.    If you are staying overnight following surgery, you will be transported to your hospital room following the recovery period.  [facility] has all private rooms.    If you have any questions please call Pre-Admission Testing at [phone].  Deductibles and co-payments are collected on the day of service. Please be prepared to pay the required co-pay, deductible or deposit on the day of service as defined by your plan.    Call your surgeon immediately if you experience any of the following symptoms:  Sore Throat  Shortness of Breath or difficulty breathing  Cough  Chills  Body soreness or muscle pain  Headache  Fever  New loss of taste or smell  Do not arrive for your surgery ill.  Your procedure will need to be rescheduled to another time.  You will need to call your physician before the day of surgery to avoid any unnecessary exposure to hospital staff as well as other patients.   CHLORHEXIDINE CLOTH INSTRUCTIONS  The morning of surgery follow these instructions using the Chlorhexidine cloths you've been given.  These steps reduce bacteria on the body.  Do not use the cloths near your eyes, ears mouth, genitalia or on open wounds.  Throw  the cloths away after use but do not try to flush them down a toilet.      Open and remove one cloth at a time from the package.    Leave the cloth unfolded and begin the bathing.  Massage the skin with the cloths using gentle pressure to remove bacteria.  Do not scrub harshly.   Follow the steps below with one 2% CHG cloth per area (6 total cloths).  One cloth for neck, shoulders and chest.  One cloth for both arms, hands, fingers and underarms (do underarms last).  One cloth for the abdomen followed by groin.  One cloth for right leg and foot including between the toes.  One cloth for left leg and foot including between the toes.  The last cloth is to be used for the back of the neck, back and buttocks.    Allow the CHG to air dry 3 minutes on the skin which will give it time to work and decrease the chance of irritation.  The skin may feel sticky until it is dry.  Do not rinse with water or any other liquid or you will lose the beneficial effects of the CHG.  If mild skin irritation occurs, do rinse the skin to remove the CHG.  Report this to the nurse at time of admission.  Do not apply lotions, creams, ointments, deodorants or perfumes after using the clothes. Dress in clean clothes before coming to the hospital.

## 2022-12-06 ENCOUNTER — OFFICE VISIT (OUTPATIENT)
Dept: ORTHOPEDIC SURGERY | Facility: CLINIC | Age: 82
End: 2022-12-06

## 2022-12-06 VITALS — TEMPERATURE: 96.2 F | HEIGHT: 64 IN | BODY MASS INDEX: 18.22 KG/M2 | WEIGHT: 106.7 LBS

## 2022-12-06 DIAGNOSIS — R52 PAIN: Primary | ICD-10-CM

## 2022-12-06 PROCEDURE — S0260 H&P FOR SURGERY: HCPCS | Performed by: NURSE PRACTITIONER

## 2022-12-06 PROCEDURE — 73502 X-RAY EXAM HIP UNI 2-3 VIEWS: CPT | Performed by: NURSE PRACTITIONER

## 2022-12-06 NOTE — H&P (VIEW-ONLY)
"   History & Physical       Patient: Bailee Garza  YOB: 1940  Medical Record Number: [id number]  Wt Readings from Last 3 Encounters:   12/06/22 48.4 kg (106 lb 11.2 oz)   11/28/22 47.2 kg (104 lb)   10/26/22 46.3 kg (102 lb)     Ht Readings from Last 3 Encounters:   12/06/22 162.6 cm (64\")   11/28/22 162.6 cm (64\")   10/26/22 157 cm (61.81\")     Body mass index is 18.32 kg/m².  Facility age limit for growth percentiles is 20 years.    Surgeon:  Dr. Anupam Ruiz    Chief Complaints:   Chief Complaint   Patient presents with   • Right Hip - Follow-up     Surgery:  Posterior Right Total Hip Arthroplasty with Phyllis Navigation    Subjective:    History of Present Illness: 82 y.o. female presents with   Chief Complaint   Patient presents with   • Right Hip - Follow-up   . Chronic symptoms have been progressively worsening despite more conservative treatment measures including medications OTC and prescription, cortisone injections and physical therapy.  Symptoms are associated with ability to move, exercise, and perform activities of daily living.  Symptoms are aggravated by weight bearing and ROM necessary for activities of daily living.   Symptoms improve with rest, ice and elevation only minimally.      Allergies:   Allergies   Allergen Reactions   • Hydrocodone Hallucinations   • Ketek [Telithromycin] Hives   • Nsaids Hives   • Sulfa Antibiotics Hives       Medications:   Home Medications:  Current Outpatient Medications on File Prior to Visit   Medication Sig   • calcium polycarbophil (FIBERCON) 625 MG tablet Take 625 mg by mouth Daily.   • Chlorcyclizine-Pseudoephed (STAHIST AD PO) Take  by mouth As Needed.   • Chlorhexidine Gluconate Cloth 2 % pads Apply  topically Take As Directed.   • hydroCHLOROthiazide (HYDRODIURIL) 12.5 MG tablet Take 1 tablet by mouth Daily for 270 days.   • Synthroid 75 MCG tablet Take 1 tablet by mouth Daily for 270 days.   • valsartan (DIOVAN) 80 MG tablet Take 1 " tablet by mouth Daily for 270 days.     Current Facility-Administered Medications on File Prior to Visit   Medication   • Chlorhexidine Gluconate Cloth 2 % pads 1 each     Current Medications:  Scheduled Meds:  Continuous Infusions:No current facility-administered medications for this visit.    PRN Meds:.    I have reviewed the patient's medical history in detail and updated the computerized patient record.  Review and summarization of old records include:    Past Medical History:   Diagnosis Date   • Abnormal CXR 2017   • Adverse reaction to narcotic drug     SEVERE HALLUCINATIONS POST OP LEFT HIP PLACEMENT   • Allergies    • Arthritis    • Arthritis of foot 2020   • Arthropathy of pelvic region and thigh 2012    unspecified   • Back pain 2007   • Chronic back pain 2009   • Chronic cough 2017   • Closed nondisplaced fracture of lateral malleolus of fibula with delayed healing 2020   • Closed nondisplaced fracture of medial cuneiform of left foot 2018   • Constipation 2015    unspecified   • COVID-19 vaccination refused    • Diverticulosis    • Dyspepsia 2008   • Essential hypertension 2007   • Exertional shortness of breath 2017   • Fall 2018   • Family history of abdominal aortic aneurysm (AAA) 2019    [place] aaa screen limited (04/10/2019 10:32)    • 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), 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   • Hearing loss 2008   • Hip pain, left    • History of bone density study 2015   • History of pneumonia     2017   • History of skin cancer    • Hypothyroidism, acquired 2007   • Insomnia 2015    unspecified   • Intervertebral disc disorder with myelopathy, cervical region 2010   • Joint capsule tear 2012    unspecified   • OA (osteoarthritis) of hip 2018   • Other synovitis and tenosynovitis, right ankle and foot 2020   •  Marcos (ELEAZAR)     PT IS VERY CONCERNED & FEARFUL THAT THE HOSPITAL STAFF WILL GIVE HER THE COVID VACCINE WHILE HERE IN THE HOSPITAL & BELIEVES THAT THE COVID SWAB TEST HAS COVID ON THE SWAB & THAT SHE WILL GET COVID BEING TESTED. ATTEMPTED TO REASSURE PT   • Peroneal tendonitis, right 01/13/2020   • Rhinitis, allergic 11/20/2007   • Scoliosis    • Screening breast examination 11/20/2007   • Stress 04/27/2015    other acute reactions to stress   • Stress incontinence    • Trochanteric bursitis, left hip 02/04/2019   • Urticaria 06/29/2015        Past Surgical History:   Procedure Laterality Date   • BREAST EXCISIONAL BIOPSY Right     50+ years ago   • BRONCHOSCOPY N/A 12/21/2017    Procedure: BRONCHOSCOPY;  Surgeon: Mario Alanis MD;  Location: [facility] ENDOSCOPY;  Service:    • CATARACT EXTRACTION, BILATERAL     • COLONOSCOPY     • HYSTERECTOMY     • TOTAL HIP ARTHROPLASTY Left 07/24/2018    Procedure: LEFT TOTAL HIP ARTHROPLASTY;  Surgeon: Justen Mann MD;  Location: [facility] MAIN OR;  Service: Orthopedics        Social History     Occupational History     Employer: [employer]   Tobacco Use   • Smoking status: Never   • Smokeless tobacco: Never   Vaping Use   • Vaping Use: Never used   Substance and Sexual Activity   • Alcohol use: No   • Drug use: Never   • Sexual activity: Defer      Social History     Social History Narrative   • Not on file        Family History   Problem Relation Age of Onset   • Heart disease Mother    • Aneurysm Mother    • Colon cancer Father    • Aneurysm Brother    • Breast cancer Paternal Grandmother    • Asthma Paternal Grandfather    • Malig Hyperthermia Neg Hx    • Ovarian cancer Neg Hx        ROS: 14 point review of systems was performed and was negative except for documented findings in HPI and today's encounter.       Constitutional:  Denies fever, shaking or chills   Eyes:  Denies change in visual acuity   HENT:  Denies nasal congestion or sore throat  "  Respiratory:  Denies cough or shortness of breath   Cardiovascular:  Denies chest pain or severe LE edema   GI:  Denies abdominal pain, nausea, vomiting, bloody stools or diarrhea   Musculoskeletal:  Denies numbness tingling or loss of motor function except as outlined above in history of present illness.  : Denies painful urination or hematuria  Integument:  Denies rash, lesion or ulceration   Neurologic:  Denies headache or focal weakness  Endocrine:  Denies lymphadenopathy  Psych:  Denies confusion or change in mental status   Hem:  Denies active bleeding    Physical Exam: 82 y.o. female  Wt Readings from Last 3 Encounters:   12/06/22 48.4 kg (106 lb 11.2 oz)   11/28/22 47.2 kg (104 lb)   10/26/22 46.3 kg (102 lb)     Ht Readings from Last 3 Encounters:   12/06/22 162.6 cm (64\")   11/28/22 162.6 cm (64\")   10/26/22 157 cm (61.81\")     Body mass index is 18.32 kg/m².  Facility age limit for growth percentiles is 20 years.  Vitals:    12/06/22 1254   Temp: 96.2 °F (35.7 °C)       Vital signs reviewed.   General Appearance:    Alert, cooperative, in no acute distress                  Eyes: conjunctiva clear  ENT: external ears and nose atraumatic  CV: no peripheral edema  Resp: normal respiratory effort  Skin: no rashes or wounds; normal turgor  Psych: mood and affect appropriate  Lymph: no nodes appreciated  Neuro: gross sensation intact  Vascular:  Palpable peripheral pulse in noted extremity  Musculoskeletal Extremities: HIP Exam: antalgic gait without assistive device right hip 2+ pedal pulses and brisk capillary refill Pedal edema none        Diagnostic Tests:  WBC   Date Value Ref Range Status   11/28/2022 5.86 3.40 - 10.80 10*3/mm3 Final   06/08/2022 6.3 3.4 - 10.8 x10E3/uL Final     RBC   Date Value Ref Range Status   11/28/2022 3.47 (L) 3.77 - 5.28 10*6/mm3 Final   06/08/2022 3.56 (L) 3.77 - 5.28 x10E6/uL Final     Hemoglobin   Date Value Ref Range Status   11/28/2022 11.5 (L) 12.0 - 15.9 g/dL Final "     Hematocrit   Date Value Ref Range Status   11/28/2022 35.2 34.0 - 46.6 % Final     MCV   Date Value Ref Range Status   11/28/2022 101.4 (H) 79.0 - 97.0 fL Final     MCH   Date Value Ref Range Status   11/28/2022 33.1 (H) 26.6 - 33.0 pg Final     MCHC   Date Value Ref Range Status   11/28/2022 32.7 31.5 - 35.7 g/dL Final     RDW   Date Value Ref Range Status   11/28/2022 13.1 12.3 - 15.4 % Final     RDW-SD   Date Value Ref Range Status   11/28/2022 49.4 37.0 - 54.0 fl Final     MPV   Date Value Ref Range Status   11/28/2022 10.5 6.0 - 12.0 fL Final     Platelets   Date Value Ref Range Status   11/28/2022 282 140 - 450 10*3/mm3 Final     Neutrophil %   Date Value Ref Range Status   07/29/2022 72.5 42.7 - 76.0 % Final     Lymphocyte %   Date Value Ref Range Status   07/29/2022 17.9 (L) 19.6 - 45.3 % Final     Monocyte %   Date Value Ref Range Status   07/29/2022 7.6 5.0 - 12.0 % Final     Eosinophil %   Date Value Ref Range Status   07/29/2022 0.3 0.3 - 6.2 % Final     Basophil %   Date Value Ref Range Status   07/29/2022 0.5 0.0 - 1.5 % Final     Immature Grans %   Date Value Ref Range Status   07/29/2022 1.2 (H) 0.0 - 0.5 % Final     Neutrophils, Absolute   Date Value Ref Range Status   07/29/2022 4.18 1.70 - 7.00 10*3/mm3 Final     Lymphocytes, Absolute   Date Value Ref Range Status   07/29/2022 1.03 0.70 - 3.10 10*3/mm3 Final     Monocytes, Absolute   Date Value Ref Range Status   07/29/2022 0.44 0.10 - 0.90 10*3/mm3 Final     Eosinophils, Absolute   Date Value Ref Range Status   07/29/2022 0.02 0.00 - 0.40 10*3/mm3 Final     Basophils, Absolute   Date Value Ref Range Status   07/29/2022 0.03 0.00 - 0.20 10*3/mm3 Final     Immature Grans, Absolute   Date Value Ref Range Status   07/29/2022 0.07 (H) 0.00 - 0.05 10*3/mm3 Final     nRBC   Date Value Ref Range Status   07/29/2022 0.0 0.0 - 0.2 /100 WBC Final       Lab Results   Component Value Date    GLUCOSE 93 11/28/2022    CALCIUM 10.2 11/28/2022      11/28/2022    K 5.2 11/28/2022    CO2 23.3 11/28/2022     11/28/2022    BUN 27 (H) 11/28/2022    CREATININE 0.99 11/28/2022    EGFRIFAFRI 54 (L) 07/15/2021    EGFRIFNONA 45 (L) 07/15/2021    BCR 27.3 (H) 11/28/2022    ANIONGAP 10.7 11/28/2022       EKG:    Progress Note    HEART RATE= 64  bpm  RR Interval= 938  ms  SC Interval= 158  ms  P Horizontal Axis= 45  deg  P Front Axis= 89  deg  QRSD Interval= 75  ms  QT Interval= 411  ms  QRS Axis= 89  deg  T Wave Axis= 83  deg  - BORDERLINE ECG -  Sinus rhythm  Incomplete right bundle branch block  ROBER, consider biatrial enlargement  No significant change from prior ekg  Electronically Signed By: Lawrence Caraballo ([facility]) 28-Nov-2022 14:08:40  Date and Time of Study: 2022-11-28 11:23:58       I have reviewed all the lab & EKG results.     Radiology:  AP and Lateral views of right hip taken in office today for pain, with comparison views show advanced degenerative changes of the right hip, bone on bone contact and osteophyte formation.      Assessment:  Patient Active Problem List   Diagnosis   • Chronic midline low back pain without sciatica   • Essential hypertension   • Hearing loss   • Hypothyroidism, acquired   • IBS (irritable bowel syndrome)   • Chronic nonseasonal allergic rhinitis due to pollen   • Stage 3 chronic kidney disease (HCC)   • Arthralgia of right knee   • DDD (degenerative disc disease), lumbosacral   • Scoliosis due to degenerative disease of spine in adult patient   • Status post total replacement of left hip   • Status post total hip replacement, left   • Vitamin D deficiency   • Elevated BUN   • Elevated serum creatinine   • Elevated hemoglobin (HCC)   • Arthritis of right hip   • Anemia, macrocytic   • COVID-19 virus detected       Plan:  Reviewed anatomy of a total joint arthroplasty in laymen's terms, as well as typical postoperative recovery and possibly 6-12 months for maximal recovery, and possible need for rehabilitation stay after  hospitalization. We also discussed risks, benefits, alternatives, and limitations of procedure with risks including but not limited to neurovascular damage, bleeding, infection, malalignment, chronic pian, failure of implants, osteolysis, loosening of implants, loss of motion, weakness, stiffness, instability, DVT, pulmonary embolus, death, stroke, complex regional pain syndrome, myocardial infarction, and need for additional procedures. Concept of substitution vs. replacement discussed.  No guarantees were given regarding results of surgery.      Bailee ELIZABETH Garza was given the opportunity to ask and have all questions answered today.  The patient voiced understanding of the risks, benefits, and alternative forms of treatment that were discussed and the patient consents to proceed with surgery.         Skin breakdown? WNL  Metal allergy? No  COVID Status:Not Vaccinated and History of Positive COVID test - symptomatic  DVT Risk Factors: no significant increased risk factors    DVT Prophylaxis:  Aspirin 81mg BID x 2 weeks, then daily  Walker: has one at home  Consults: none  Additional orders: toilet seat, Tramadol and Tylenol for pain medicaiton  Pharmacy: meds to bed    Discharge Plan: POD 1 to home, home health and when cleared by physical therapy as safe for discharge    To be updated    Date: 12/6/2022  TERESA Esparza    Dictated Utilizing Dragon Dictation

## 2022-12-06 NOTE — PROGRESS NOTES
"   History & Physical       Patient: Bailee Garza  YOB: 1940  Medical Record Number: [id number]  Wt Readings from Last 3 Encounters:   12/06/22 48.4 kg (106 lb 11.2 oz)   11/28/22 47.2 kg (104 lb)   10/26/22 46.3 kg (102 lb)     Ht Readings from Last 3 Encounters:   12/06/22 162.6 cm (64\")   11/28/22 162.6 cm (64\")   10/26/22 157 cm (61.81\")     Body mass index is 18.32 kg/m².  Facility age limit for growth percentiles is 20 years.    Surgeon:  Dr. Anupam Ruiz    Chief Complaints:   Chief Complaint   Patient presents with   • Right Hip - Follow-up     Surgery:  Posterior Right Total Hip Arthroplasty with Phyllis Navigation    Subjective:    History of Present Illness: 82 y.o. female presents with   Chief Complaint   Patient presents with   • Right Hip - Follow-up   . Chronic symptoms have been progressively worsening despite more conservative treatment measures including medications OTC and prescription, cortisone injections and physical therapy.  Symptoms are associated with ability to move, exercise, and perform activities of daily living.  Symptoms are aggravated by weight bearing and ROM necessary for activities of daily living.   Symptoms improve with rest, ice and elevation only minimally.      Allergies:   Allergies   Allergen Reactions   • Hydrocodone Hallucinations   • Ketek [Telithromycin] Hives   • Nsaids Hives   • Sulfa Antibiotics Hives       Medications:   Home Medications:  Current Outpatient Medications on File Prior to Visit   Medication Sig   • calcium polycarbophil (FIBERCON) 625 MG tablet Take 625 mg by mouth Daily.   • Chlorcyclizine-Pseudoephed (STAHIST AD PO) Take  by mouth As Needed.   • Chlorhexidine Gluconate Cloth 2 % pads Apply  topically Take As Directed.   • hydroCHLOROthiazide (HYDRODIURIL) 12.5 MG tablet Take 1 tablet by mouth Daily for 270 days.   • Synthroid 75 MCG tablet Take 1 tablet by mouth Daily for 270 days.   • valsartan (DIOVAN) 80 MG tablet Take 1 " tablet by mouth Daily for 270 days.     Current Facility-Administered Medications on File Prior to Visit   Medication   • Chlorhexidine Gluconate Cloth 2 % pads 1 each     Current Medications:  Scheduled Meds:  Continuous Infusions:No current facility-administered medications for this visit.    PRN Meds:.    I have reviewed the patient's medical history in detail and updated the computerized patient record.  Review and summarization of old records include:    Past Medical History:   Diagnosis Date   • Abnormal CXR 2017   • Adverse reaction to narcotic drug     SEVERE HALLUCINATIONS POST OP LEFT HIP PLACEMENT   • Allergies    • Arthritis    • Arthritis of foot 2020   • Arthropathy of pelvic region and thigh 2012    unspecified   • Back pain 2007   • Chronic back pain 2009   • Chronic cough 2017   • Closed nondisplaced fracture of lateral malleolus of fibula with delayed healing 2020   • Closed nondisplaced fracture of medial cuneiform of left foot 2018   • Constipation 2015    unspecified   • COVID-19 vaccination refused    • Diverticulosis    • Dyspepsia 2008   • Essential hypertension 2007   • Exertional shortness of breath 2017   • Fall 2018   • Family history of abdominal aortic aneurysm (AAA) 2019    [place] aaa screen limited (04/10/2019 10:32)    • 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), 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   • Hearing loss 2008   • Hip pain, left    • History of bone density study 2015   • History of pneumonia     2017   • History of skin cancer    • Hypothyroidism, acquired 2007   • Insomnia 2015    unspecified   • Intervertebral disc disorder with myelopathy, cervical region 2010   • Joint capsule tear 2012    unspecified   • OA (osteoarthritis) of hip 2018   • Other synovitis and tenosynovitis, right ankle and foot 2020   •  Marcos (ELEAZAR)     PT IS VERY CONCERNED & FEARFUL THAT THE HOSPITAL STAFF WILL GIVE HER THE COVID VACCINE WHILE HERE IN THE HOSPITAL & BELIEVES THAT THE COVID SWAB TEST HAS COVID ON THE SWAB & THAT SHE WILL GET COVID BEING TESTED. ATTEMPTED TO REASSURE PT   • Peroneal tendonitis, right 01/13/2020   • Rhinitis, allergic 11/20/2007   • Scoliosis    • Screening breast examination 11/20/2007   • Stress 04/27/2015    other acute reactions to stress   • Stress incontinence    • Trochanteric bursitis, left hip 02/04/2019   • Urticaria 06/29/2015        Past Surgical History:   Procedure Laterality Date   • BREAST EXCISIONAL BIOPSY Right     50+ years ago   • BRONCHOSCOPY N/A 12/21/2017    Procedure: BRONCHOSCOPY;  Surgeon: Mario Alanis MD;  Location: [facility] ENDOSCOPY;  Service:    • CATARACT EXTRACTION, BILATERAL     • COLONOSCOPY     • HYSTERECTOMY     • TOTAL HIP ARTHROPLASTY Left 07/24/2018    Procedure: LEFT TOTAL HIP ARTHROPLASTY;  Surgeon: Justen Mann MD;  Location: [facility] MAIN OR;  Service: Orthopedics        Social History     Occupational History     Employer: [employer]   Tobacco Use   • Smoking status: Never   • Smokeless tobacco: Never   Vaping Use   • Vaping Use: Never used   Substance and Sexual Activity   • Alcohol use: No   • Drug use: Never   • Sexual activity: Defer      Social History     Social History Narrative   • Not on file        Family History   Problem Relation Age of Onset   • Heart disease Mother    • Aneurysm Mother    • Colon cancer Father    • Aneurysm Brother    • Breast cancer Paternal Grandmother    • Asthma Paternal Grandfather    • Malig Hyperthermia Neg Hx    • Ovarian cancer Neg Hx        ROS: 14 point review of systems was performed and was negative except for documented findings in HPI and today's encounter.       Constitutional:  Denies fever, shaking or chills   Eyes:  Denies change in visual acuity   HENT:  Denies nasal congestion or sore throat  "  Respiratory:  Denies cough or shortness of breath   Cardiovascular:  Denies chest pain or severe LE edema   GI:  Denies abdominal pain, nausea, vomiting, bloody stools or diarrhea   Musculoskeletal:  Denies numbness tingling or loss of motor function except as outlined above in history of present illness.  : Denies painful urination or hematuria  Integument:  Denies rash, lesion or ulceration   Neurologic:  Denies headache or focal weakness  Endocrine:  Denies lymphadenopathy  Psych:  Denies confusion or change in mental status   Hem:  Denies active bleeding    Physical Exam: 82 y.o. female  Wt Readings from Last 3 Encounters:   12/06/22 48.4 kg (106 lb 11.2 oz)   11/28/22 47.2 kg (104 lb)   10/26/22 46.3 kg (102 lb)     Ht Readings from Last 3 Encounters:   12/06/22 162.6 cm (64\")   11/28/22 162.6 cm (64\")   10/26/22 157 cm (61.81\")     Body mass index is 18.32 kg/m².  Facility age limit for growth percentiles is 20 years.  Vitals:    12/06/22 1254   Temp: 96.2 °F (35.7 °C)       Vital signs reviewed.   General Appearance:    Alert, cooperative, in no acute distress                  Eyes: conjunctiva clear  ENT: external ears and nose atraumatic  CV: no peripheral edema  Resp: normal respiratory effort  Skin: no rashes or wounds; normal turgor  Psych: mood and affect appropriate  Lymph: no nodes appreciated  Neuro: gross sensation intact  Vascular:  Palpable peripheral pulse in noted extremity  Musculoskeletal Extremities: HIP Exam: antalgic gait without assistive device right hip 2+ pedal pulses and brisk capillary refill Pedal edema none        Diagnostic Tests:  WBC   Date Value Ref Range Status   11/28/2022 5.86 3.40 - 10.80 10*3/mm3 Final   06/08/2022 6.3 3.4 - 10.8 x10E3/uL Final     RBC   Date Value Ref Range Status   11/28/2022 3.47 (L) 3.77 - 5.28 10*6/mm3 Final   06/08/2022 3.56 (L) 3.77 - 5.28 x10E6/uL Final     Hemoglobin   Date Value Ref Range Status   11/28/2022 11.5 (L) 12.0 - 15.9 g/dL Final "     Hematocrit   Date Value Ref Range Status   11/28/2022 35.2 34.0 - 46.6 % Final     MCV   Date Value Ref Range Status   11/28/2022 101.4 (H) 79.0 - 97.0 fL Final     MCH   Date Value Ref Range Status   11/28/2022 33.1 (H) 26.6 - 33.0 pg Final     MCHC   Date Value Ref Range Status   11/28/2022 32.7 31.5 - 35.7 g/dL Final     RDW   Date Value Ref Range Status   11/28/2022 13.1 12.3 - 15.4 % Final     RDW-SD   Date Value Ref Range Status   11/28/2022 49.4 37.0 - 54.0 fl Final     MPV   Date Value Ref Range Status   11/28/2022 10.5 6.0 - 12.0 fL Final     Platelets   Date Value Ref Range Status   11/28/2022 282 140 - 450 10*3/mm3 Final     Neutrophil %   Date Value Ref Range Status   07/29/2022 72.5 42.7 - 76.0 % Final     Lymphocyte %   Date Value Ref Range Status   07/29/2022 17.9 (L) 19.6 - 45.3 % Final     Monocyte %   Date Value Ref Range Status   07/29/2022 7.6 5.0 - 12.0 % Final     Eosinophil %   Date Value Ref Range Status   07/29/2022 0.3 0.3 - 6.2 % Final     Basophil %   Date Value Ref Range Status   07/29/2022 0.5 0.0 - 1.5 % Final     Immature Grans %   Date Value Ref Range Status   07/29/2022 1.2 (H) 0.0 - 0.5 % Final     Neutrophils, Absolute   Date Value Ref Range Status   07/29/2022 4.18 1.70 - 7.00 10*3/mm3 Final     Lymphocytes, Absolute   Date Value Ref Range Status   07/29/2022 1.03 0.70 - 3.10 10*3/mm3 Final     Monocytes, Absolute   Date Value Ref Range Status   07/29/2022 0.44 0.10 - 0.90 10*3/mm3 Final     Eosinophils, Absolute   Date Value Ref Range Status   07/29/2022 0.02 0.00 - 0.40 10*3/mm3 Final     Basophils, Absolute   Date Value Ref Range Status   07/29/2022 0.03 0.00 - 0.20 10*3/mm3 Final     Immature Grans, Absolute   Date Value Ref Range Status   07/29/2022 0.07 (H) 0.00 - 0.05 10*3/mm3 Final     nRBC   Date Value Ref Range Status   07/29/2022 0.0 0.0 - 0.2 /100 WBC Final       Lab Results   Component Value Date    GLUCOSE 93 11/28/2022    CALCIUM 10.2 11/28/2022      11/28/2022    K 5.2 11/28/2022    CO2 23.3 11/28/2022     11/28/2022    BUN 27 (H) 11/28/2022    CREATININE 0.99 11/28/2022    EGFRIFAFRI 54 (L) 07/15/2021    EGFRIFNONA 45 (L) 07/15/2021    BCR 27.3 (H) 11/28/2022    ANIONGAP 10.7 11/28/2022       EKG:    Progress Note    HEART RATE= 64  bpm  RR Interval= 938  ms  PA Interval= 158  ms  P Horizontal Axis= 45  deg  P Front Axis= 89  deg  QRSD Interval= 75  ms  QT Interval= 411  ms  QRS Axis= 89  deg  T Wave Axis= 83  deg  - BORDERLINE ECG -  Sinus rhythm  Incomplete right bundle branch block  ROBER, consider biatrial enlargement  No significant change from prior ekg  Electronically Signed By: Lawrence Caraballo ([facility]) 28-Nov-2022 14:08:40  Date and Time of Study: 2022-11-28 11:23:58       I have reviewed all the lab & EKG results.     Radiology:  AP and Lateral views of right hip taken in office today for pain, with comparison views show advanced degenerative changes of the right hip, bone on bone contact and osteophyte formation.      Assessment:  Patient Active Problem List   Diagnosis   • Chronic midline low back pain without sciatica   • Essential hypertension   • Hearing loss   • Hypothyroidism, acquired   • IBS (irritable bowel syndrome)   • Chronic nonseasonal allergic rhinitis due to pollen   • Stage 3 chronic kidney disease (HCC)   • Arthralgia of right knee   • DDD (degenerative disc disease), lumbosacral   • Scoliosis due to degenerative disease of spine in adult patient   • Status post total replacement of left hip   • Status post total hip replacement, left   • Vitamin D deficiency   • Elevated BUN   • Elevated serum creatinine   • Elevated hemoglobin (HCC)   • Arthritis of right hip   • Anemia, macrocytic   • COVID-19 virus detected       Plan:  Reviewed anatomy of a total joint arthroplasty in laymen's terms, as well as typical postoperative recovery and possibly 6-12 months for maximal recovery, and possible need for rehabilitation stay after  hospitalization. We also discussed risks, benefits, alternatives, and limitations of procedure with risks including but not limited to neurovascular damage, bleeding, infection, malalignment, chronic pian, failure of implants, osteolysis, loosening of implants, loss of motion, weakness, stiffness, instability, DVT, pulmonary embolus, death, stroke, complex regional pain syndrome, myocardial infarction, and need for additional procedures. Concept of substitution vs. replacement discussed.  No guarantees were given regarding results of surgery.      Bailee ELIZABETH Garza was given the opportunity to ask and have all questions answered today.  The patient voiced understanding of the risks, benefits, and alternative forms of treatment that were discussed and the patient consents to proceed with surgery.         Skin breakdown? WNL  Metal allergy? No  COVID Status:Not Vaccinated and History of Positive COVID test - symptomatic  DVT Risk Factors: no significant increased risk factors    DVT Prophylaxis:  Aspirin 81mg BID x 2 weeks, then daily  Walker: has one at home  Consults: none  Additional orders: toilet seat, Tramadol and Tylenol for pain medicaiton  Pharmacy: meds to bed    Discharge Plan: POD 1 to home, home health and when cleared by physical therapy as safe for discharge    To be updated    Date: 12/6/2022  TERESA Esparza    Dictated Utilizing Dragon Dictation

## 2022-12-13 ENCOUNTER — ANESTHESIA (OUTPATIENT)
Dept: PERIOP | Facility: HOSPITAL | Age: 82
End: 2022-12-13

## 2022-12-13 ENCOUNTER — ANESTHESIA EVENT (OUTPATIENT)
Dept: PERIOP | Facility: HOSPITAL | Age: 82
End: 2022-12-13

## 2022-12-13 ENCOUNTER — APPOINTMENT (OUTPATIENT)
Dept: GENERAL RADIOLOGY | Facility: HOSPITAL | Age: 82
End: 2022-12-13

## 2022-12-13 ENCOUNTER — HOSPITAL ENCOUNTER (OUTPATIENT)
Facility: HOSPITAL | Age: 82
Discharge: NURSING, REHAB OR LONG-TERM CARE | End: 2022-12-15
Attending: ORTHOPAEDIC SURGERY | Admitting: ORTHOPAEDIC SURGERY

## 2022-12-13 DIAGNOSIS — Z96.641 S/P TOTAL RIGHT HIP ARTHROPLASTY: Primary | ICD-10-CM

## 2022-12-13 DIAGNOSIS — M16.11 ARTHRITIS OF RIGHT HIP: ICD-10-CM

## 2022-12-13 PROCEDURE — 27130 TOTAL HIP ARTHROPLASTY: CPT | Performed by: ORTHOPAEDIC SURGERY

## 2022-12-13 PROCEDURE — 25010000002 PROPOFOL 10 MG/ML EMULSION

## 2022-12-13 PROCEDURE — 25010000002 CEFAZOLIN IN DEXTROSE 2-4 GM/100ML-% SOLUTION: Performed by: ORTHOPAEDIC SURGERY

## 2022-12-13 PROCEDURE — 73501 X-RAY EXAM HIP UNI 1 VIEW: CPT

## 2022-12-13 PROCEDURE — 97110 THERAPEUTIC EXERCISES: CPT

## 2022-12-13 PROCEDURE — 25010000002 ROPIVACAINE PER 1 MG: Performed by: ORTHOPAEDIC SURGERY

## 2022-12-13 PROCEDURE — 25010000002 NEOSTIGMINE 5 MG/10ML SOLUTION

## 2022-12-13 PROCEDURE — G0378 HOSPITAL OBSERVATION PER HR: HCPCS

## 2022-12-13 PROCEDURE — 25010000002 EPINEPHRINE 1 MG/ML SOLUTION 30 ML VIAL: Performed by: ORTHOPAEDIC SURGERY

## 2022-12-13 PROCEDURE — 97535 SELF CARE MNGMENT TRAINING: CPT

## 2022-12-13 PROCEDURE — 20985 CPTR-ASST DIR MS PX: CPT | Performed by: ORTHOPAEDIC SURGERY

## 2022-12-13 PROCEDURE — 25010000002 MIDAZOLAM PER 1 MG: Performed by: ANESTHESIOLOGY

## 2022-12-13 PROCEDURE — 97161 PT EVAL LOW COMPLEX 20 MIN: CPT

## 2022-12-13 PROCEDURE — 97166 OT EVAL MOD COMPLEX 45 MIN: CPT

## 2022-12-13 PROCEDURE — 25010000002 CLONIDINE PER 1 MG: Performed by: ORTHOPAEDIC SURGERY

## 2022-12-13 PROCEDURE — 25010000002 ONDANSETRON PER 1 MG

## 2022-12-13 PROCEDURE — C1776 JOINT DEVICE (IMPLANTABLE): HCPCS | Performed by: ORTHOPAEDIC SURGERY

## 2022-12-13 PROCEDURE — 25010000002 FENTANYL CITRATE (PF) 100 MCG/2ML SOLUTION

## 2022-12-13 PROCEDURE — 25010000002 CEFAZOLIN IN DEXTROSE 2-4 GM/100ML-% SOLUTION: Performed by: NURSE PRACTITIONER

## 2022-12-13 DEVICE — DEV CONTRL TISS STRATAFIXSPIRALMNCRYL PLSPS2 REV3/0 45CM: Type: IMPLANTABLE DEVICE | Site: HIP | Status: FUNCTIONAL

## 2022-12-13 DEVICE — SUMMIT FEMORAL STEM 12/14 TAPER TAPER ED W/POROCOAT SIZE 3 HI 135MM
Type: IMPLANTABLE DEVICE | Site: HIP | Status: FUNCTIONAL
Brand: SUMMIT POROCOAT

## 2022-12-13 DEVICE — PINNACLE HIP SOLUTIONS ALTRX POLYETHYLENE ACETABULAR LINER +4 10 DEGREE 28MM ID 44MM OD
Type: IMPLANTABLE DEVICE | Site: HIP | Status: FUNCTIONAL
Brand: PINNACLE ALTRX

## 2022-12-13 DEVICE — TOTL HIP COP DEPUY 9641334: Type: IMPLANTABLE DEVICE | Site: HIP | Status: FUNCTIONAL

## 2022-12-13 DEVICE — BIOLOX DELTA CERAMIC FEMORAL HEAD 28MM DIA +1.5 12/14 TAPER
Type: IMPLANTABLE DEVICE | Site: HIP | Status: FUNCTIONAL
Brand: BIOLOX DELTA

## 2022-12-13 DEVICE — PINNACLE GRIPTION ACETABULAR SHELL BANTAM 44MM OD
Type: IMPLANTABLE DEVICE | Site: HIP | Status: FUNCTIONAL
Brand: PINNACLE GRIPTION

## 2022-12-13 DEVICE — TOTL HIP M/H GRIPTION CUP DEPUY UPCHRG: Type: IMPLANTABLE DEVICE | Site: HIP | Status: FUNCTIONAL

## 2022-12-13 RX ORDER — BISACODYL 5 MG/1
10 TABLET, DELAYED RELEASE ORAL DAILY PRN
Status: DISCONTINUED | OUTPATIENT
Start: 2022-12-13 | End: 2022-12-15 | Stop reason: HOSPADM

## 2022-12-13 RX ORDER — LIDOCAINE HYDROCHLORIDE 10 MG/ML
0.5 INJECTION, SOLUTION EPIDURAL; INFILTRATION; INTRACAUDAL; PERINEURAL ONCE AS NEEDED
Status: DISCONTINUED | OUTPATIENT
Start: 2022-12-13 | End: 2022-12-13 | Stop reason: HOSPADM

## 2022-12-13 RX ORDER — FENTANYL CITRATE 50 UG/ML
50 INJECTION, SOLUTION INTRAMUSCULAR; INTRAVENOUS
Status: DISCONTINUED | OUTPATIENT
Start: 2022-12-13 | End: 2022-12-13 | Stop reason: HOSPADM

## 2022-12-13 RX ORDER — PROMETHAZINE HYDROCHLORIDE 25 MG/1
25 SUPPOSITORY RECTAL ONCE AS NEEDED
Status: DISCONTINUED | OUTPATIENT
Start: 2022-12-13 | End: 2022-12-13 | Stop reason: HOSPADM

## 2022-12-13 RX ORDER — FAMOTIDINE 10 MG/ML
20 INJECTION, SOLUTION INTRAVENOUS ONCE
Status: COMPLETED | OUTPATIENT
Start: 2022-12-13 | End: 2022-12-13

## 2022-12-13 RX ORDER — POVIDONE-IODINE 10 MG/ML
SOLUTION TOPICAL ONCE
Status: COMPLETED | OUTPATIENT
Start: 2022-12-13 | End: 2022-12-13

## 2022-12-13 RX ORDER — FAMOTIDINE 20 MG/1
40 TABLET, FILM COATED ORAL DAILY
Status: DISCONTINUED | OUTPATIENT
Start: 2022-12-13 | End: 2022-12-15 | Stop reason: HOSPADM

## 2022-12-13 RX ORDER — ROCURONIUM BROMIDE 10 MG/ML
INJECTION, SOLUTION INTRAVENOUS AS NEEDED
Status: DISCONTINUED | OUTPATIENT
Start: 2022-12-13 | End: 2022-12-13 | Stop reason: SURG

## 2022-12-13 RX ORDER — SODIUM CHLORIDE 0.9 % (FLUSH) 0.9 %
3-10 SYRINGE (ML) INJECTION AS NEEDED
Status: DISCONTINUED | OUTPATIENT
Start: 2022-12-13 | End: 2022-12-13 | Stop reason: HOSPADM

## 2022-12-13 RX ORDER — BISACODYL 10 MG
10 SUPPOSITORY, RECTAL RECTAL DAILY PRN
Status: DISCONTINUED | OUTPATIENT
Start: 2022-12-13 | End: 2022-12-15 | Stop reason: HOSPADM

## 2022-12-13 RX ORDER — DIPHENHYDRAMINE HYDROCHLORIDE 50 MG/ML
12.5 INJECTION INTRAMUSCULAR; INTRAVENOUS
Status: DISCONTINUED | OUTPATIENT
Start: 2022-12-13 | End: 2022-12-13 | Stop reason: HOSPADM

## 2022-12-13 RX ORDER — LIDOCAINE HYDROCHLORIDE 20 MG/ML
INJECTION, SOLUTION EPIDURAL; INFILTRATION; INTRACAUDAL; PERINEURAL AS NEEDED
Status: DISCONTINUED | OUTPATIENT
Start: 2022-12-13 | End: 2022-12-13 | Stop reason: SURG

## 2022-12-13 RX ORDER — ACETAMINOPHEN 500 MG
1000 TABLET ORAL ONCE
Status: COMPLETED | OUTPATIENT
Start: 2022-12-13 | End: 2022-12-13

## 2022-12-13 RX ORDER — DOCUSATE SODIUM 100 MG/1
100 CAPSULE, LIQUID FILLED ORAL 2 TIMES DAILY
Status: DISCONTINUED | OUTPATIENT
Start: 2022-12-13 | End: 2022-12-15 | Stop reason: HOSPADM

## 2022-12-13 RX ORDER — HYDROMORPHONE HYDROCHLORIDE 1 MG/ML
0.5 INJECTION, SOLUTION INTRAMUSCULAR; INTRAVENOUS; SUBCUTANEOUS
Status: DISCONTINUED | OUTPATIENT
Start: 2022-12-13 | End: 2022-12-15 | Stop reason: HOSPADM

## 2022-12-13 RX ORDER — ASPIRIN 81 MG/1
81 TABLET ORAL EVERY 12 HOURS SCHEDULED
Status: DISCONTINUED | OUTPATIENT
Start: 2022-12-14 | End: 2022-12-15 | Stop reason: HOSPADM

## 2022-12-13 RX ORDER — VALSARTAN 80 MG/1
80 TABLET ORAL DAILY
Status: DISCONTINUED | OUTPATIENT
Start: 2022-12-13 | End: 2022-12-15 | Stop reason: HOSPADM

## 2022-12-13 RX ORDER — TRAMADOL HYDROCHLORIDE 50 MG/1
50 TABLET ORAL EVERY 4 HOURS PRN
Status: DISCONTINUED | OUTPATIENT
Start: 2022-12-13 | End: 2022-12-15 | Stop reason: HOSPADM

## 2022-12-13 RX ORDER — CEFAZOLIN SODIUM 2 G/100ML
2 INJECTION, SOLUTION INTRAVENOUS EVERY 8 HOURS
Status: COMPLETED | OUTPATIENT
Start: 2022-12-13 | End: 2022-12-13

## 2022-12-13 RX ORDER — SODIUM CHLORIDE, SODIUM LACTATE, POTASSIUM CHLORIDE, CALCIUM CHLORIDE 600; 310; 30; 20 MG/100ML; MG/100ML; MG/100ML; MG/100ML
100 INJECTION, SOLUTION INTRAVENOUS CONTINUOUS
Status: ACTIVE | OUTPATIENT
Start: 2022-12-13 | End: 2022-12-14

## 2022-12-13 RX ORDER — ONDANSETRON 4 MG/1
4 TABLET, FILM COATED ORAL EVERY 6 HOURS PRN
Status: DISCONTINUED | OUTPATIENT
Start: 2022-12-13 | End: 2022-12-15 | Stop reason: HOSPADM

## 2022-12-13 RX ORDER — ONDANSETRON 2 MG/ML
INJECTION INTRAMUSCULAR; INTRAVENOUS AS NEEDED
Status: DISCONTINUED | OUTPATIENT
Start: 2022-12-13 | End: 2022-12-13 | Stop reason: SURG

## 2022-12-13 RX ORDER — LABETALOL HYDROCHLORIDE 5 MG/ML
5 INJECTION, SOLUTION INTRAVENOUS
Status: DISCONTINUED | OUTPATIENT
Start: 2022-12-13 | End: 2022-12-13 | Stop reason: HOSPADM

## 2022-12-13 RX ORDER — TRANEXAMIC ACID 100 MG/ML
INJECTION, SOLUTION INTRAVENOUS AS NEEDED
Status: DISCONTINUED | OUTPATIENT
Start: 2022-12-13 | End: 2022-12-13 | Stop reason: SURG

## 2022-12-13 RX ORDER — NEOSTIGMINE METHYLSULFATE 0.5 MG/ML
INJECTION, SOLUTION INTRAVENOUS AS NEEDED
Status: DISCONTINUED | OUTPATIENT
Start: 2022-12-13 | End: 2022-12-13 | Stop reason: SURG

## 2022-12-13 RX ORDER — ENALAPRILAT 2.5 MG/2ML
1.25 INJECTION INTRAVENOUS ONCE AS NEEDED
Status: DISCONTINUED | OUTPATIENT
Start: 2022-12-13 | End: 2022-12-13 | Stop reason: HOSPADM

## 2022-12-13 RX ORDER — ACETAMINOPHEN 500 MG
1000 TABLET ORAL EVERY 8 HOURS SCHEDULED
Status: DISCONTINUED | OUTPATIENT
Start: 2022-12-13 | End: 2022-12-15 | Stop reason: HOSPADM

## 2022-12-13 RX ORDER — SODIUM CHLORIDE, SODIUM LACTATE, POTASSIUM CHLORIDE, CALCIUM CHLORIDE 600; 310; 30; 20 MG/100ML; MG/100ML; MG/100ML; MG/100ML
9 INJECTION, SOLUTION INTRAVENOUS CONTINUOUS
Status: DISCONTINUED | OUTPATIENT
Start: 2022-12-13 | End: 2022-12-13

## 2022-12-13 RX ORDER — ONDANSETRON 2 MG/ML
4 INJECTION INTRAMUSCULAR; INTRAVENOUS EVERY 6 HOURS PRN
Status: DISCONTINUED | OUTPATIENT
Start: 2022-12-13 | End: 2022-12-15 | Stop reason: HOSPADM

## 2022-12-13 RX ORDER — LEVOTHYROXINE SODIUM 0.07 MG/1
75 TABLET ORAL DAILY
Status: DISCONTINUED | OUTPATIENT
Start: 2022-12-13 | End: 2022-12-15 | Stop reason: HOSPADM

## 2022-12-13 RX ORDER — SODIUM CHLORIDE 0.9 % (FLUSH) 0.9 %
3 SYRINGE (ML) INJECTION EVERY 12 HOURS SCHEDULED
Status: DISCONTINUED | OUTPATIENT
Start: 2022-12-13 | End: 2022-12-13 | Stop reason: HOSPADM

## 2022-12-13 RX ORDER — PREGABALIN 75 MG/1
150 CAPSULE ORAL ONCE
Status: COMPLETED | OUTPATIENT
Start: 2022-12-13 | End: 2022-12-13

## 2022-12-13 RX ORDER — FENTANYL CITRATE 50 UG/ML
INJECTION, SOLUTION INTRAMUSCULAR; INTRAVENOUS AS NEEDED
Status: DISCONTINUED | OUTPATIENT
Start: 2022-12-13 | End: 2022-12-13 | Stop reason: SURG

## 2022-12-13 RX ORDER — OXYCODONE AND ACETAMINOPHEN 7.5; 325 MG/1; MG/1
1 TABLET ORAL ONCE AS NEEDED
Status: DISCONTINUED | OUTPATIENT
Start: 2022-12-13 | End: 2022-12-13 | Stop reason: HOSPADM

## 2022-12-13 RX ORDER — CEFAZOLIN SODIUM 2 G/100ML
2 INJECTION, SOLUTION INTRAVENOUS ONCE
Status: COMPLETED | OUTPATIENT
Start: 2022-12-13 | End: 2022-12-13

## 2022-12-13 RX ORDER — NALOXONE HCL 0.4 MG/ML
0.1 VIAL (ML) INJECTION
Status: DISCONTINUED | OUTPATIENT
Start: 2022-12-13 | End: 2022-12-15 | Stop reason: HOSPADM

## 2022-12-13 RX ORDER — PROMETHAZINE HYDROCHLORIDE 25 MG/1
25 TABLET ORAL ONCE AS NEEDED
Status: DISCONTINUED | OUTPATIENT
Start: 2022-12-13 | End: 2022-12-13 | Stop reason: HOSPADM

## 2022-12-13 RX ORDER — ONDANSETRON 2 MG/ML
4 INJECTION INTRAMUSCULAR; INTRAVENOUS ONCE AS NEEDED
Status: DISCONTINUED | OUTPATIENT
Start: 2022-12-13 | End: 2022-12-13 | Stop reason: HOSPADM

## 2022-12-13 RX ORDER — BUPIVACAINE HCL/0.9 % NACL/PF 0.125 %
PLASTIC BAG, INJECTION (ML) EPIDURAL AS NEEDED
Status: DISCONTINUED | OUTPATIENT
Start: 2022-12-13 | End: 2022-12-13 | Stop reason: SURG

## 2022-12-13 RX ORDER — MAGNESIUM HYDROXIDE 1200 MG/15ML
LIQUID ORAL AS NEEDED
Status: DISCONTINUED | OUTPATIENT
Start: 2022-12-13 | End: 2022-12-13 | Stop reason: HOSPADM

## 2022-12-13 RX ORDER — NALOXONE HCL 0.4 MG/ML
0.4 VIAL (ML) INJECTION AS NEEDED
Status: DISCONTINUED | OUTPATIENT
Start: 2022-12-13 | End: 2022-12-13 | Stop reason: HOSPADM

## 2022-12-13 RX ORDER — GLYCOPYRROLATE 0.2 MG/ML
INJECTION INTRAMUSCULAR; INTRAVENOUS AS NEEDED
Status: DISCONTINUED | OUTPATIENT
Start: 2022-12-13 | End: 2022-12-13 | Stop reason: SURG

## 2022-12-13 RX ORDER — HYDROCHLOROTHIAZIDE 12.5 MG/1
12.5 TABLET ORAL DAILY
Status: DISCONTINUED | OUTPATIENT
Start: 2022-12-13 | End: 2022-12-15 | Stop reason: HOSPADM

## 2022-12-13 RX ORDER — TRAMADOL HYDROCHLORIDE 50 MG/1
100 TABLET ORAL EVERY 4 HOURS PRN
Status: DISCONTINUED | OUTPATIENT
Start: 2022-12-13 | End: 2022-12-15 | Stop reason: HOSPADM

## 2022-12-13 RX ORDER — POLYETHYLENE GLYCOL 3350 17 G/17G
17 POWDER, FOR SOLUTION ORAL DAILY
Status: DISCONTINUED | OUTPATIENT
Start: 2022-12-13 | End: 2022-12-15 | Stop reason: HOSPADM

## 2022-12-13 RX ORDER — HYDROMORPHONE HYDROCHLORIDE 1 MG/ML
0.25 INJECTION, SOLUTION INTRAMUSCULAR; INTRAVENOUS; SUBCUTANEOUS
Status: DISCONTINUED | OUTPATIENT
Start: 2022-12-13 | End: 2022-12-13 | Stop reason: HOSPADM

## 2022-12-13 RX ORDER — MIDAZOLAM HYDROCHLORIDE 1 MG/ML
0.5 INJECTION INTRAMUSCULAR; INTRAVENOUS
Status: COMPLETED | OUTPATIENT
Start: 2022-12-13 | End: 2022-12-13

## 2022-12-13 RX ORDER — PROPOFOL 10 MG/ML
VIAL (ML) INTRAVENOUS AS NEEDED
Status: DISCONTINUED | OUTPATIENT
Start: 2022-12-13 | End: 2022-12-13 | Stop reason: SURG

## 2022-12-13 RX ADMIN — LIDOCAINE HYDROCHLORIDE 50 MG: 20 INJECTION, SOLUTION EPIDURAL; INFILTRATION; INTRACAUDAL; PERINEURAL at 07:40

## 2022-12-13 RX ADMIN — FENTANYL CITRATE 25 MCG: 50 INJECTION INTRAMUSCULAR; INTRAVENOUS at 08:51

## 2022-12-13 RX ADMIN — Medication 200 MCG: at 08:41

## 2022-12-13 RX ADMIN — TRANEXAMIC ACID 1000 MG: 1 INJECTION, SOLUTION INTRAVENOUS at 08:05

## 2022-12-13 RX ADMIN — FENTANYL CITRATE 50 MCG: 50 INJECTION INTRAMUSCULAR; INTRAVENOUS at 07:40

## 2022-12-13 RX ADMIN — MIDAZOLAM 0.5 MG: 1 INJECTION INTRAMUSCULAR; INTRAVENOUS at 07:19

## 2022-12-13 RX ADMIN — Medication 200 MCG: at 09:06

## 2022-12-13 RX ADMIN — CEFAZOLIN SODIUM 2 G: 2 INJECTION, SOLUTION INTRAVENOUS at 07:24

## 2022-12-13 RX ADMIN — ACETAMINOPHEN 1000 MG: 500 TABLET, FILM COATED ORAL at 22:07

## 2022-12-13 RX ADMIN — Medication 100 MCG: at 09:20

## 2022-12-13 RX ADMIN — ROCURONIUM BROMIDE 50 MG: 10 INJECTION, SOLUTION INTRAVENOUS at 07:40

## 2022-12-13 RX ADMIN — PREGABALIN 150 MG: 75 CAPSULE ORAL at 06:17

## 2022-12-13 RX ADMIN — PROPOFOL 120 MG: 10 INJECTION, EMULSION INTRAVENOUS at 07:40

## 2022-12-13 RX ADMIN — CEFAZOLIN SODIUM 2 G: 2 INJECTION, SOLUTION INTRAVENOUS at 18:08

## 2022-12-13 RX ADMIN — NEOSTIGMINE METHYLSULFATE 3 MG: 0.5 INJECTION INTRAVENOUS at 09:26

## 2022-12-13 RX ADMIN — GLYCOPYRROLATE 0.2 MCG: 1 INJECTION INTRAMUSCULAR; INTRAVENOUS at 09:26

## 2022-12-13 RX ADMIN — ACETAMINOPHEN 1000 MG: 500 TABLET, FILM COATED ORAL at 15:33

## 2022-12-13 RX ADMIN — GLYCOPYRROLATE 0.3 MCG: 1 INJECTION INTRAMUSCULAR; INTRAVENOUS at 08:45

## 2022-12-13 RX ADMIN — CEFAZOLIN SODIUM 2 G: 2 INJECTION, SOLUTION INTRAVENOUS at 11:30

## 2022-12-13 RX ADMIN — Medication 100 MCG: at 09:36

## 2022-12-13 RX ADMIN — ONDANSETRON 4 MG: 2 INJECTION INTRAMUSCULAR; INTRAVENOUS at 09:31

## 2022-12-13 RX ADMIN — SODIUM CHLORIDE, POTASSIUM CHLORIDE, SODIUM LACTATE AND CALCIUM CHLORIDE 100 ML/HR: 600; 310; 30; 20 INJECTION, SOLUTION INTRAVENOUS at 15:31

## 2022-12-13 RX ADMIN — Medication 200 MCG: at 08:30

## 2022-12-13 RX ADMIN — Medication 200 MCG: at 07:48

## 2022-12-13 RX ADMIN — Medication 100 MCG: at 07:58

## 2022-12-13 RX ADMIN — Medication 200 MCG: at 09:15

## 2022-12-13 RX ADMIN — POVIDONE-IODINE 1 EACH: 10 SOLUTION TOPICAL at 06:17

## 2022-12-13 RX ADMIN — SODIUM CHLORIDE, POTASSIUM CHLORIDE, SODIUM LACTATE AND CALCIUM CHLORIDE 9 ML/HR: 600; 310; 30; 20 INJECTION, SOLUTION INTRAVENOUS at 11:09

## 2022-12-13 RX ADMIN — ACETAMINOPHEN 1000 MG: 500 TABLET ORAL at 06:17

## 2022-12-13 RX ADMIN — FENTANYL CITRATE 25 MCG: 50 INJECTION INTRAMUSCULAR; INTRAVENOUS at 09:39

## 2022-12-13 RX ADMIN — MIDAZOLAM 0.5 MG: 1 INJECTION INTRAMUSCULAR; INTRAVENOUS at 06:51

## 2022-12-13 RX ADMIN — Medication 200 MCG: at 07:45

## 2022-12-13 RX ADMIN — Medication 100 MCG: at 08:16

## 2022-12-13 RX ADMIN — SODIUM CHLORIDE, POTASSIUM CHLORIDE, SODIUM LACTATE AND CALCIUM CHLORIDE 9 ML/HR: 600; 310; 30; 20 INJECTION, SOLUTION INTRAVENOUS at 06:54

## 2022-12-13 RX ADMIN — FAMOTIDINE 20 MG: 10 INJECTION INTRAVENOUS at 06:51

## 2022-12-13 NOTE — PLAN OF CARE
Goal Outcome Evaluation:  Plan of Care Reviewed With: patient, son, daughter        Progress: no change  Outcome Evaluation: Pt is an 83 yo female seen POD0 for R CHAS secondary to end-stage osteoarthritis of the hip. Pt seen this date for OT USHA cameron&Pop, reports (I) at baseline, no AD used, lives alone, 3 [address] with railing, bedroom is upstairs a full flight, cares for her cat/dog. Pt's daughter present and reports she is able to check in throughout the week. Today, pt presents greatly below ADL baseline with impaired act tolerance, generalized post op weakness, impaired functional transfers, and limited this date due to nausea and dizziness present. Upon arrival, pt requesting use of bed pan, min A for multiple rolling trials completed this date, able to maintain side lying with CGA throughout, toileting/bed change/LBD with max A and assist from nurse aide. Pt unable to tolerate further activity at EOB due to fatigue and nausea. Pt educated on precautions this date, able to verbalize them back to therapist. Pt will continue to benefit from skilled OT to address noted functional deficits and progress toward prior level of function. Recommending SNF at d/c for continued rehab due to pt living alone and greatly below baseline.

## 2022-12-13 NOTE — OP NOTE
Operative Note  Name: Bailee Garza  YOB: 1940  MRN: [id number]  BMI: Body mass index is 18.32 kg/m².    DATE OF SURGERY: 12/13/2022    PREOPERATIVE DIAGNOSIS:  right hip end-stage osteoarthritis    POSTOPERATIVE DIAGNOSIS: Same.    PROCEDURE PERFORMED:  right Total hip replacement with [place] navigation    SURGEON:  Anupam Ruiz M.D.    ASSISTANT:  ZAKI Wills    IMPLANTS:  Depuy [place] Pressfit    Anesthesia: Gen. and local  IV fluid: 1500 crystalloid  Blood loss: 400 mL  Specimen: None  Drain: None  Complications: None   22 Modifier:  none    Description:    Bailee Garza is a 82 y.o.-year-old female with end-stage osteoarthritis of the hip, who presents today for surgery having failed nonoperative therapy.  This patient was brought into the operating room and placed in the supine position on the operative table. Surgical time out was performed.  Gen. anesthetic was induced, the patient was given IV antibiotics, and was placed in a lateral decubitus position with padding and an axillary roll. The operative lower extremity was prepped with Betadine scrub and paint, and draped with sterile drapes including an Ioban. 2 stab wounds were made above the iliac crest anterior superior iliac spine. Two 5 mm half pins were placed in the iliac crest and navigation protocol was undertaken.    A 3-4 inch incision was based in the skin posteriorly. Dissection was carried down to the fascia which was opened. Retractors were placed. The sciatic nerve was identified and avoided. The short external rotators were identified and the capsulotomy was performed. The hip was dislocated and femoral neck cut was made. It was made in line with the template of just less than a fingerbreadth. Acetabular soft tissues were removed and retractors were placed. Acetabular navigation was achieved. Was then reamed sequentially up to fitted size. A trial was placed. It was reamed in approximately 40° of abduction  and 20° of anteversion. Bone grafts were applied and appropriate size cup was selected and impacted to affix the cup. The final position was 42° of abduction and 22° of anteversion.    Femoral canal was opened with a . The femur was sequentially broached up to appropriate size using the [employer] [place] Pressfit system and good fit and fill.  Offset was selected using the preoperative template for starting point and tried to correct fit. Leg length was restored or slightly lengthened according to the navigation which was in line with the preoperative template. Trial showed equivalent leg lengths, no undue tightness, no undue shuck, and good approximation of soft tissues. The hip was stable anteriorly and 90° of flexion you could internally rotated to 80-85° before it started to come out.    Bony surfaces were irrigated and the Depuy [place] Pressfit was impacted with gentle blows of the mallet. Once again trialed and found to do best with the chosen liner. It reproduced stability and leg length and offset of the trials. The ceramic femoral head was then applied and taken through final range of motion checks all of which were excellent. The wound was dry at the time of surgical conclusion. Tranexamic acid was given and orthopedic mix were infiltrated. The final irrigation was achieved with all 3 L of irrigation fluid. The short external rotators were repaired in an interrupted fashion with a good closure of the capsule and short external rotators. The fascia was closed in an interrupted fashion in a watertight manner. Multilayered closure was placed in the subcutaneous fat which measured 3-4 inches. This required additional time and skill the skin was closed with a V-lock suture. Surgical glue was applied and sterile dressings were applied as well. Findings needle and instrument counts reported to be correct. No complications noted but x-rays ordered for the recovery area.     Surgical assistant performed  retraction, suction, hemostasis, and specific limb positioning and manipulation required for accuracy of joint placement, and assistance in wound closure and dressing application.     Anupam Ruiz MD  12/13/2022

## 2022-12-13 NOTE — ANESTHESIA POSTPROCEDURE EVALUATION
"Patient: Bailee Garza    Procedure Summary     Date: 12/13/22 Room / Location:  [facility] [address] [place] [address] [facility]    Anesthesia Start: 0728 Anesthesia Stop: 0950    Procedure: Posterior RIGHT TOTAL HIP ARTHROPLASTY MARCELINO NAVIGATION (Right: Hip) Diagnosis:       Arthritis of right hip      (Arthritis of right hip [M16.11])    Surgeons: Anupam Ruiz MD Provider: Sadie, Damien Gusman MD    Anesthesia Type: general ASA Status: 3          Anesthesia Type: general    Vitals  Vitals Value Taken Time   /46 12/13/22 1000   Temp 36 °C (96.8 °F) 12/13/22 0950   Pulse 45 12/13/22 1015   Resp 14 12/13/22 1000   SpO2 100 % 12/13/22 1015   Vitals shown include unvalidated device data.        Post Anesthesia Care and Evaluation    Patient location during evaluation: bedside  Patient participation: complete - patient participated  Level of consciousness: awake  Pain management: adequate    Airway patency: patent  Anesthetic complications: No anesthetic complications    Cardiovascular status: acceptable  Respiratory status: acceptable  Hydration status: acceptable    Comments: */46   Pulse 58   Temp 36 °C (96.8 °F) (Temporal)   Resp 14   Ht 162.6 cm (64\")   Wt 48.4 kg (106 lb 11.2 oz)   SpO2 100%   BMI 18.32 kg/m²         "

## 2022-12-13 NOTE — THERAPY EVALUATION
Patient Name: Bailee Garza  : 1940    MRN: [id number]                              Today's Date: 2022       Admit Date: 2022    Visit Dx:     ICD-10-CM ICD-9-CM   1. Arthritis of right hip  M16.11 716.95     Patient Active Problem List   Diagnosis   • Chronic midline low back pain without sciatica   • Essential hypertension   • Hearing loss   • Hypothyroidism, acquired   • IBS (irritable bowel syndrome)   • Chronic nonseasonal allergic rhinitis due to pollen   • Stage 3 chronic kidney disease (HCC)   • Arthralgia of right knee   • DDD (degenerative disc disease), lumbosacral   • Scoliosis due to degenerative disease of spine in adult patient   • Status post total replacement of left hip   • Status post total hip replacement, left   • Vitamin D deficiency   • Elevated BUN   • Elevated serum creatinine   • Elevated hemoglobin (HCC)   • Arthritis of right hip   • Anemia, macrocytic   • COVID-19 virus detected     Past Medical History:   Diagnosis Date   • Abnormal CXR 2017   • Adverse reaction to narcotic drug     SEVERE HALLUCINATIONS POST OP LEFT HIP PLACEMENT   • Allergies    • Arthritis    • Arthritis of foot 2020   • Arthropathy of pelvic region and thigh 2012    unspecified   • Back pain 2007   • Chronic back pain 2009   • Chronic cough 2017   • Closed nondisplaced fracture of lateral malleolus of fibula with delayed healing 2020   • Closed nondisplaced fracture of medial cuneiform of left foot 2018   • Constipation 2015    unspecified   • COVID-19 vaccination refused    • Diverticulosis    • Dyspepsia 2008   • Essential hypertension 2007   • Exertional shortness of breath 2017   • Fall 2018   • Family history of abdominal aortic aneurysm (AAA) 2019    [place] aaa screen limited (04/10/2019 10:32)    • 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), 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   •  Hearing loss 03/18/2008   • Hip pain, left    • History of bone density study 09/16/2015   • History of pneumonia     12/2017   • History of skin cancer    • Hypothyroidism, acquired 11/20/2007   • Insomnia 01/28/2015    unspecified   • Intervertebral disc disorder with myelopathy, cervical region 07/22/2010   • Joint capsule tear 12/13/2012    unspecified   • OA (osteoarthritis) of hip 07/24/2018   • Other synovitis and tenosynovitis, right ankle and foot 02/12/2020   • Paranoia (HCC)     PT IS VERY CONCERNED & FEARFUL THAT THE HOSPITAL STAFF WILL GIVE HER THE COVID VACCINE WHILE HERE IN THE HOSPITAL & BELIEVES THAT THE COVID SWAB TEST HAS COVID ON THE SWAB & THAT SHE WILL GET COVID BEING TESTED. ATTEMPTED TO REASSURE PT   • Peroneal tendonitis, right 01/13/2020   • Rhinitis, allergic 11/20/2007   • Scoliosis    • Screening breast examination 11/20/2007   • Stress 04/27/2015    other acute reactions to stress   • Stress incontinence    • Trochanteric bursitis, left hip 02/04/2019   • Urticaria 06/29/2015     Past Surgical History:   Procedure Laterality Date   • BREAST EXCISIONAL BIOPSY Right     50+ years ago   • BRONCHOSCOPY N/A 12/21/2017    Procedure: BRONCHOSCOPY;  Surgeon: Mario Alanis MD;  Location: [facility];  Service:    • CATARACT EXTRACTION, BILATERAL     • COLONOSCOPY     • HYSTERECTOMY     • TOTAL HIP ARTHROPLASTY Left 07/24/2018    Procedure: LEFT TOTAL HIP ARTHROPLASTY;  Surgeon: Justen Mann MD;  Location: [facility] OR;  Service: Orthopedics      General Information     Row Name 12/13/22 1515          Physical Therapy Time and Intention    Document Type evaluation  Pt. is s/p Right THR (Posterior)  -MS     Mode of Treatment physical therapy;individual therapy  -MS     Row Name 12/13/22 1514          General Information    Patient Profile Reviewed yes  -MS     Prior Level of Function independent:  -MS     Existing Precautions/Restrictions hip, posterior  -MS     Barriers to Rehab none  "identified  -MS     Row Name 12/13/22 1515          Cognition    Orientation Status (Cognition) oriented to;person;place  -MS           User Key  (r) = Recorded By, (t) = Taken By, (c) = Cosigned By    Initials Name Provider Type    Shaquille Maynard ELIZABETH, PT Physical Therapist               Mobility     Row Name 12/13/22 1516          Bed Mobility    Bed Mobility supine-sit;sit-supine  -MS     Supine-Sit [place] (Bed Mobility) moderate assist (50% patient effort)  -MS     Sit-Supine [place] (Bed Mobility) moderate assist (50% patient effort)  -MS     Assistive Device (Bed Mobility) draw sheet  -MS     Comment, (Bed Mobility) Once sitting EOB, pt. c/o feeling \"light headed\" and nauseated that became worse as she sat EOB. Pt. requires Min-Mod. assist x 1 for static sitting balance.  -MS     Row Name 12/13/22 1516          Transfers    Comment, (Transfers) Unable to attempt sit <-> stand transfers as pt. is unable to maintain a safe sitting position EOB. Pt. also c/o feeling light headed that became worse the longer she sat on EOB.  Nursing notified.  -MS     Row Name 12/13/22 1516          Mobility    Extremity Weight-bearing Status right lower extremity  -MS     Right Lower Extremity (Weight-bearing Status) weight-bearing as tolerated (WBAT)  -MS           User Key  (r) = Recorded By, (t) = Taken By, (c) = Cosigned By    Initials Name Provider Type    Shaquille Maynard ELIZABETH, PT Physical Therapist               Obj/Interventions     Row Name 12/13/22 1517          Range of Motion Comprehensive    Comment, General Range of Motion BUE/LLE (WFL's)  -MS     Row Name 12/13/22 1517          Strength Comprehensive (MMT)    Comment, General Manual Muscle Testing (MMT) Assessment BUE/LLE (>/= 3/5)  -MS     Row Name 12/13/22 1517          Motor Skills    Therapeutic Exercise --  Right THR ther. ex. program x 10 reps completed with assist.  -MS           User Key  (r) = Recorded By, (t) = Taken By, (c) = Cosigned By    " Initials Name Provider Type    Shaquille Maynard L, PT Physical Therapist               Goals/Plan     Row Name 12/13/22 1519          Bed Mobility Goal 1 (PT)    Activity/Assistive Device (Bed Mobility Goal 1, PT) bed mobility activities, all  -MS     [place] Level/Cues Needed (Bed Mobility Goal 1, PT) contact guard required  -MS     Time Frame (Bed Mobility Goal 1, PT) long term goal (LTG);3 days  -MS     Row Name 12/13/22 1519          Transfer Goal 1 (PT)    Activity/Assistive Device (Transfer Goal 1, PT) transfers, all;walker, rolling  -MS     [place] Level/Cues Needed (Transfer Goal 1, PT) contact guard required  -MS     Time Frame (Transfer Goal 1, PT) long term goal (LTG);3 days  -MS     Row Name 12/13/22 1519          Gait Training Goal 1 (PT)    Activity/Assistive Device (Gait Training Goal 1, PT) gait (walking locomotion);walker, rolling  -MS     [place] Level (Gait Training Goal 1, PT) standby assist  -MS     Distance (Gait Training Goal 1, PT) 80 feet  -MS     Time Frame (Gait Training Goal 1, PT) long term goal (LTG);3 days  -MS     Row Name 12/13/22 1519          Stairs Goal 1 (PT)    Activity/Assistive Device (Stairs Goal 1, PT) stairs, all skills  -MS     [place] Level/Cues Needed (Stairs Goal 1, PT) contact guard required  -MS     Number of Stairs (Stairs Goal 1, PT) 4  -MS     Time Frame (Stairs Goal 1, PT) long term goal (LTG);3 days  -MS     Row Name 12/13/22 1519          Therapy Assessment/Plan (PT)    Planned Therapy Interventions (PT) balance training;bed mobility training;gait training;home exercise program;patient/family education;postural re-education;transfer training;strengthening;stair training  -MS           User Key  (r) = Recorded By, (t) = Taken By, (c) = Cosigned By    Initials Name Provider Type    Shaquille Maynard L, PT Physical Therapist               Clinical Impression     Row Name 12/13/22 1518          Pain    Pretreatment Pain Rating 5/10  -MS      Posttreatment Pain Rating 5/10  -MS     Pain Location - Side/Orientation Right  -MS     Pain Location - hip  -MS     Pain Intervention(s) Medication (See MAR);Elevated;Nursing Notified;Cold applied;Repositioned  -MS     Row Name 12/13/22 1518          Plan of Care Review    Plan of Care Reviewed With patient  -MS     Row Name 12/13/22 1518          Therapy Assessment/Plan (PT)    Rehab Potential (PT) good, to achieve stated therapy goals  -MS     Criteria for Skilled Interventions Met (PT) skilled treatment is necessary  -MS     Therapy Frequency (PT) daily  -MS     Row Name 12/13/22 1518          Positioning and Restraints    Pre-Treatment Position in bed  -MS     Post Treatment Position bed  -MS     In Bed notified nsg;supine;call light within reach;encouraged to call for assist;exit alarm on;ABD pillow  All lines intact. Ice pack to Right hip.  -MS           User Key  (r) = Recorded By, (t) = Taken By, (c) = Cosigned By    Initials Name Provider Type    MS Shaquille Donnelly, PT Physical Therapist               Outcome Measures     Row Name 12/13/22 1519 12/13/22 1244       How much help from another person do you currently need...    Turning from your back to your side while in flat bed without using bedrails? 2  -MS 3  -SUZANNE    Moving from lying on back to sitting on the side of a flat bed without bedrails? 2  -MS 3  -SUZANNE    Moving to and from a bed to a chair (including a wheelchair)? 2  -MS 3  -SUZANNE    Standing up from a chair using your arms (e.g., wheelchair, bedside chair)? 2  -MS 3  -SUZANNE    Climbing 3-5 steps with a railing? 2  -MS 2  -SUZANNE    To walk in hospital room? 2  -MS 2  -SUZANNE    AM-PAC 6 Clicks Score (PT) 12  -MS 16  -SUZANNE    Highest level of mobility 4 --> Transferred to chair/commode  -MS 5 --> Static standing  -SUZANNE    Row Name 12/13/22 1521          Modified [place] Scale    Modified [place] Scale 4 - Moderately severe disability.  Unable to walk without assistance, and unable to attend to own bodily needs  without assistance.  -     Row Name 12/13/22 1521 12/13/22 1519       Functional Assessment    Outcome Measure Options AM-PAC 6 Clicks Daily Activity (OT);Modified [place]  -MW AM-PAC 6 Clicks Basic Mobility (PT)  -MS          User Key  (r) = Recorded By, (t) = Taken By, (c) = Cosigned By    Initials Name Provider Type    MS Shaquille Donnelly, PT Physical Therapist    Donna Bustillo OT Occupational Therapist    Zhane Macdonald RN Registered Nurse                             Physical Therapy Education     Title: PT OT SLP Therapies (Done)     Topic: Physical Therapy (Done)     Point: Mobility training (Done)     Learning Progress Summary           Patient Acceptance, E,D, VU,NR by MS at 12/13/2022 1520                   Point: Home exercise program (Done)     Learning Progress Summary           Patient Acceptance, E,D, VU,NR by MS at 12/13/2022 1520                   Point: Body mechanics (Done)     Learning Progress Summary           Patient Acceptance, E,D, VU,NR by MS at 12/13/2022 1520                   Point: Precautions (Done)     Learning Progress Summary           Patient Acceptance, E,D, VU,NR by MS at 12/13/2022 1520                               User Key     Initials Effective Dates Name Provider Type Discipline    MS 06/16/21 -  Shaquille Donnelly, PT Physical Therapist PT              PT Recommendation and Plan  Planned Therapy Interventions (PT): balance training, bed mobility training, gait training, home exercise program, patient/family education, postural re-education, transfer training, strengthening, stair training  Plan of Care Reviewed With: patient  Outcome Evaluation: Pt. presents with typical post op impairments related to THR surgery that include decreased ROM, decreased strength, and decreased balance.  Pt. will benefit from skilled inpt. P.T. to address her functional deficits and to assist pt. in regaining her maximum level of independence with functional mobility.     Time  Calculation:    PT Charges     Row Name 12/13/22 1520             Time Calculation    Start Time 1345  -MS      Stop Time 1400  -MS      Time Calculation (min) 15 min  -MS      PT Received On 12/13/22  -MS      PT - Next Appointment 12/14/22  -MS      PT Goal Re-Cert Due Date 12/16/22  -MS         Time Calculation- PT    Total Timed Code Minutes- PT 14 minute(s)  -MS            User Key  (r) = Recorded By, (t) = Taken By, (c) = Cosigned By    Initials Name Provider Type    Shaquille Maynard, PT Physical Therapist              Therapy Charges for Today     Code Description Service Date Service Provider Modifiers Qty    42914900981 HC PT EVAL LOW COMPLEXITY 2 12/13/2022 Shaquille Donnelly, PT GP 1    75334119488 HC PT THER PROC EA 15 MIN 12/13/2022 Shaquille Donnelly, PT GP 1          PT G-Codes  Outcome Measure Options: AM-PAC 6 Clicks Daily Activity (OT), Modified [place]  AM-PAC 6 Clicks Score (PT): 12  AM-PAC 6 Clicks Score (OT): 10  Modified [place] Scale: 4 - Moderately severe disability.  Unable to walk without assistance, and unable to attend to own bodily needs without assistance.  PT Discharge Summary  Anticipated Discharge Disposition (PT): home with assist, home with home health    Shaquille Donnelly, PT  12/13/2022

## 2022-12-13 NOTE — PLAN OF CARE
Goal Outcome Evaluation:  Plan of Care Reviewed With: patient           Outcome Evaluation: Pt. presents with typical post op impairments related to THR surgery that include decreased ROM, decreased strength, and decreased balance.  Pt. will benefit from skilled inpt. P.T. to address her functional deficits and to assist pt. in regaining her maximum level of independence with functional mobility.    Patient was wearing a face mask during this therapy encounter. Therapist used appropriate personal protective equipment including eye protection, mask, and gloves.  Mask used was standard procedure mask. Appropriate PPE was worn during the entire therapy session. Hand hygiene was completed before and after therapy session. Patient is not in enhanced droplet precautions.

## 2022-12-13 NOTE — THERAPY EVALUATION
Patient Name: Bailee Garza  : 1940    MRN: [id number]                              Today's Date: 2022       Admit Date: 2022    Visit Dx:     ICD-10-CM ICD-9-CM   1. Arthritis of right hip  M16.11 716.95     Patient Active Problem List   Diagnosis   • Chronic midline low back pain without sciatica   • Essential hypertension   • Hearing loss   • Hypothyroidism, acquired   • IBS (irritable bowel syndrome)   • Chronic nonseasonal allergic rhinitis due to pollen   • Stage 3 chronic kidney disease (HCC)   • Arthralgia of right knee   • DDD (degenerative disc disease), lumbosacral   • Scoliosis due to degenerative disease of spine in adult patient   • Status post total replacement of left hip   • Status post total hip replacement, left   • Vitamin D deficiency   • Elevated BUN   • Elevated serum creatinine   • Elevated hemoglobin (HCC)   • Arthritis of right hip   • Anemia, macrocytic   • COVID-19 virus detected     Past Medical History:   Diagnosis Date   • Abnormal CXR 2017   • Adverse reaction to narcotic drug     SEVERE HALLUCINATIONS POST OP LEFT HIP PLACEMENT   • Allergies    • Arthritis    • Arthritis of foot 2020   • Arthropathy of pelvic region and thigh 2012    unspecified   • Back pain 2007   • Chronic back pain 2009   • Chronic cough 2017   • Closed nondisplaced fracture of lateral malleolus of fibula with delayed healing 2020   • Closed nondisplaced fracture of medial cuneiform of left foot 2018   • Constipation 2015    unspecified   • COVID-19 vaccination refused    • Diverticulosis    • Dyspepsia 2008   • Essential hypertension 2007   • Exertional shortness of breath 2017   • Fall 2018   • Family history of abdominal aortic aneurysm (AAA) 2019    [place] aaa screen limited (04/10/2019 10:32)    • 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), 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   •  Hearing loss 03/18/2008   • Hip pain, left    • History of bone density study 09/16/2015   • History of pneumonia     12/2017   • History of skin cancer    • Hypothyroidism, acquired 11/20/2007   • Insomnia 01/28/2015    unspecified   • Intervertebral disc disorder with myelopathy, cervical region 07/22/2010   • Joint capsule tear 12/13/2012    unspecified   • OA (osteoarthritis) of hip 07/24/2018   • Other synovitis and tenosynovitis, right ankle and foot 02/12/2020   • Paranoia (HCC)     PT IS VERY CONCERNED & FEARFUL THAT THE HOSPITAL STAFF WILL GIVE HER THE COVID VACCINE WHILE HERE IN THE HOSPITAL & BELIEVES THAT THE COVID SWAB TEST HAS COVID ON THE SWAB & THAT SHE WILL GET COVID BEING TESTED. ATTEMPTED TO REASSURE PT   • Peroneal tendonitis, right 01/13/2020   • Rhinitis, allergic 11/20/2007   • Scoliosis    • Screening breast examination 11/20/2007   • Stress 04/27/2015    other acute reactions to stress   • Stress incontinence    • Trochanteric bursitis, left hip 02/04/2019   • Urticaria 06/29/2015     Past Surgical History:   Procedure Laterality Date   • BREAST EXCISIONAL BIOPSY Right     50+ years ago   • BRONCHOSCOPY N/A 12/21/2017    Procedure: BRONCHOSCOPY;  Surgeon: Mario Alanis MD;  Location: [facility] ENDOSCOPY;  Service:    • CATARACT EXTRACTION, BILATERAL     • COLONOSCOPY     • HYSTERECTOMY     • TOTAL HIP ARTHROPLASTY Left 07/24/2018    Procedure: LEFT TOTAL HIP ARTHROPLASTY;  Surgeon: Justen Mann MD;  Location: [facility] MAIN OR;  Service: Orthopedics      General Information     Row Name 12/13/22 1509          OT Time and Intention    Document Type evaluation  -MW     Mode of Treatment individual therapy;occupational therapy  -     Row Name 12/13/22 1505          General Information    Patient Profile Reviewed yes  -MW     Prior Level of Function independent:;ADL's;home management;all household mobility;community mobility;transfer  cares for her dog/cat, no AD at baseline  -MW     Existing  Precautions/Restrictions fall  RLE WBAT  -     Barriers to Rehab none identified  -     Row Name 12/13/22 1509          Living Environment    People in Home alone  -     Row Name 12/13/22 1509          Home Main Entrance    Number of Stairs, Main Entrance three  -MW     Stair Railings, Main Entrance railings safe and in good condition  -     Row Name 12/13/22 1509          Stairs Within Home, Primary    Stairs, Within Home, Primary bedroom is upstairs (full flight), bathroom on main level and upstairs  -     Row Name 12/13/22 1509          Cognition    Orientation Status (Cognition) oriented x 3  -MW     Row Name 12/13/22 1509          Safety Issues, Functional Mobility    Safety Issues Affecting Function (Mobility) safety precaution awareness;safety precautions follow-through/compliance  -     Impairments Affecting Function (Mobility) endurance/activity tolerance;range of motion (ROM);pain;strength  -           User Key  (r) = Recorded By, (t) = Taken By, (c) = Cosigned By    Initials Name Provider Type    MW Donna Rain OT Occupational Therapist                 Mobility/ADL's     Row Name 12/13/22 1510          Bed Mobility    Bed Mobility rolling left;rolling right  -     Rolling Left [place] (Bed Mobility) minimum assist (75% patient effort)  -     Rolling Right [place] (Bed Mobility) minimum assist (75% patient effort)  -     Assistive Device (Bed Mobility) bed rails  -     Comment, (Bed Mobility) cues for hand placement; multiple rolling bouts for toileting/bed pan use/ positioning of sheets with assist from nurse aide  -     Row Name 12/13/22 1510          Transfers    Comment, (Transfers) did not assess this date due to pt's nausea, dry heaving and dizziness; limiting OOB activity following multiple rolling bouts for toileting  -     Row Name 12/13/22 1510          Activities of Daily Living    BADL Assessment/Intervention lower body dressing;toileting;feeding  -      [facility] Name 12/13/22 1510          Mobility    Extremity Weight-bearing Status right lower extremity  -MW     Right Lower Extremity (Weight-bearing Status) weight-bearing as tolerated (WBAT)  -[facility] Name 12/13/22 1510          Lower Body Dressing Assessment/Training    [place]ora Level (Lower Body Dressing) don;pants/bottoms;maximum assist (25% patient effort)  -MW     Position (Lower Body Dressing) supine  -MW     Row Name 12/13/22 1510          Toileting Assessment/Training    [place] Level (Toileting) toileting skills;adjust/manage clothing;change pad/brief;perform perineal hygiene;maximum assist (25% patient effort)  -     Position (Toileting) supine  -[facility] Name 12/13/22 1510          Self-Feeding Assessment/Training    Nora Level (Feeding) liquids to mouth;set up  -     Position (Self-Feeding) sitting up in bed  -MW     Comment, (Feeding) water  -           User Key  (r) = Recorded By, (t) = Taken By, (c) = Cosigned By    Initials Name Provider Type    MW Donna Rain OT Occupational Therapist               Obj/Interventions     [facility] Name 12/13/22 1512          Sensory Assessment (Somatosensory)    Sensory Assessment (Somatosensory) UE sensation intact  -MW     Row Name 12/13/22 1512          Vision Assessment/Intervention    Visual Impairment/Limitations WFL  -MW     Row Name 12/13/22 1512          Range of Motion Comprehensive    General Range of Motion bilateral upper extremity ROM WNL  -MW     Row Name 12/13/22 1512          Strength Comprehensive (MMT)    General Manual Muscle Testing (MMT) Assessment upper extremity strength deficits identified  -     Comment, General Manual Muscle Testing (MMT) Assessment generalized post op weakness, BUE grossly 3+/5  -MW     [facility] Name 12/13/22 1512          Motor Skills    Motor Skills functional endurance  -MW     Functional Endurance limited by nausea this date  -           User Key  (r) = Recorded By, (t) = Taken By, (c) =  Cosigned By    Initials Name Provider Type    MW Donna Rain, OT Occupational Therapist               Goals/Plan     Row Name 12/13/22 1520          Transfer Goal 1 (OT)    Activity/Assistive Device (Transfer Goal 1, OT) sit-to-stand/stand-to-sit;bed-to-chair/chair-to-bed;toilet  -MW     Accident Level/Cues Needed (Transfer Goal 1, OT) minimum assist (75% or more patient effort)  -MW     Time Frame (Transfer Goal 1, OT) short term goal (STG);2 weeks  -MW     Progress/Outcome (Transfer Goal 1, OT) goal ongoing  -     Row Name 12/13/22 1520          Bathing Goal 1 (OT)    Activity/Device (Bathing Goal 1, OT) upper body bathing;shower chair  -MW     Accident Level/Cues Needed (Bathing Goal 1, OT) minimum assist (75% or more patient effort)  -MW     Time Frame (Bathing Goal 1, OT) short term goal (STG);2 weeks  -MW     Progress/Outcomes (Bathing Goal 1, OT) goal ongoing  -     Row Name 12/13/22 1520          Dressing Goal 1 (OT)    Activity/Device (Dressing Goal 1, OT) lower body dressing  -MW     Accident/Cues Needed (Dressing Goal 1, OT) minimum assist (75% or more patient effort)  -MW     Time Frame (Dressing Goal 1, OT) short term goal (STG);2 weeks  -MW     Progress/Outcome (Dressing Goal 1, OT) goal ongoing  -     Row Name 12/13/22 1520          Toileting Goal 1 (OT)    Activity/Device (Toileting Goal 1, OT) toileting skills, all  -MW     Accident Level/Cues Needed (Toileting Goal 1, OT) minimum assist (75% or more patient effort)  -MW     Time Frame (Toileting Goal 1, OT) short term goal (STG);2 weeks  -MW     Progress/Outcome (Toileting Goal 1, OT) goal ongoing  -     Row Name 12/13/22 1520          Grooming Goal 1 (OT)    Activity/Device (Grooming Goal 1, OT) grooming skills, all  -MW     Accident (Grooming Goal 1, OT) set-up required;contact guard required  -MW     Time Frame (Grooming Goal 1, OT) short term goal (STG);2 weeks  -MW     Progress/Outcome (Grooming Goal 1, OT)  goal ongoing  -MW     Row Name 12/13/22 8220          Therapy Assessment/Plan (OT)    Planned Therapy Interventions (OT) activity tolerance training;functional balance retraining;BADL retraining;neuromuscular control/coordination retraining;occupation/activity based interventions;ROM/therapeutic exercise;strengthening exercise;transfer/mobility retraining;patient/caregiver education/training  -MW           User Key  (r) = Recorded By, (t) = Taken By, (c) = Cosigned By    Initials Name Provider Type    Donna Bustillo, KAVITHA Occupational Therapist               Clinical Impression     Row Name 12/13/22 1512          Pain Assessment    Pre/Posttreatment Pain Comment did not give pain numeric value however minimal facial grimacing with rolling  -MW     Pain Intervention(s) Rest;Repositioned;Elevated  -MW     Row Name 12/13/22 7172          Plan of Care Review    Plan of Care Reviewed With patient;son;daughter  -MW     Progress no change  -MW     Outcome Evaluation Pt is an 81 yo female seen POD0 for R HCAS secondary to end-stage osteoarthritis of the hip. Pt seen this date for OT USHA cameron&Ox3, reports (I) at baseline, no AD used, lives alone, 3 EDUARD[address] with railing, bedroom is upstairs a full flight, cares for her cat/dog. Pt's daughter present and reports she is able to check in throughout the week. Today, pt presents greatly below ADL baseline with impaired act tolerance, generalized post op weakness, impaired functional transfers, and limited this date due to nausea and dizziness present. Upon arrival, pt requesting use of bed pan, min A for multiple rolling trials completed this date, able to maintain side lying with CGA throughout, toileting/bed change/LBD with max A and assist from nurse aide. Pt unable to tolerate further activity at EOB due to fatigue and nausea. Pt educated on precautions this date, able to verbalize them back to therapist. Pt will continue to benefit from skilled OT to address noted functional  deficits and progress toward prior level of function. Recommending SNF at d/c for continued rehab due to pt living alone and greatly below baseline.  -     Row Name 12/13/22 1512          Therapy Assessment/Plan (OT)    Rehab Potential (OT) good, to achieve stated therapy goals  -     Criteria for Skilled Therapeutic Interventions Met (OT) yes;meets criteria;skilled treatment is necessary  -MW     Therapy Frequency (OT) 5 times/wk  -MW     Row Name 12/13/22 1512          Therapy Plan Review/Discharge Plan (OT)    Anticipated Discharge Disposition (OT) skilled nursing facility  -     Row Name 12/13/22 1512          Vital Signs    O2 Delivery Pre Treatment room air  -MW     Pre Patient Position Supine  -MW     Post Patient Position Supine  -MW     Row Name 12/13/22 1512          Positioning and Restraints    Pre-Treatment Position in bed  -MW     Post Treatment Position bed  -MW     In Bed notified nsg;fowlers;call light within reach;exit alarm on;encouraged to call for assist;side rails up x3;with family/caregiver;legs elevated;ABD pillow;SCD pump applied  -MW           User Key  (r) = Recorded By, (t) = Taken By, (c) = Cosigned By    Initials Name Provider Type    MW Donna Rain, OT Occupational Therapist               Outcome Measures     Row Name 12/13/22 1521          How much help from another is currently needed...    Putting on and taking off regular lower body clothing? 1  -MW     Bathing (including washing, rinsing, and drying) 1  -MW     Toileting (which includes using toilet bed pan or urinal) 1  -MW     Putting on and taking off regular upper body clothing 2  -MW     Taking care of personal grooming (such as brushing teeth) 2  -MW     Eating meals 3  -MW     AM-PAC 6 Clicks Score (OT) 10  -MW     Row Name 12/13/22 1519 12/13/22 1244       How much help from another person do you currently need...    Turning from your back to your side while in flat bed without using bedrails? 2  -MS 3  -SUZANNE     Moving from lying on back to sitting on the side of a flat bed without bedrails? 2  -MS 3  -SUZANNE    Moving to and from a bed to a chair (including a wheelchair)? 2  -MS 3  -SUZANNE    Standing up from a chair using your arms (e.g., wheelchair, bedside chair)? 2  -MS 3  -SUZANNE    Climbing 3-5 steps with a railing? 2  -MS 2  -SUZANNE    To walk in hospital room? 2  -MS 2  -SUZANNE    AM-PAC 6 Clicks Score (PT) 12  -MS 16  -SUZANNE    Highest level of mobility 4 --> Transferred to chair/commode  -MS 5 --> Static standing  -SUZANNE    Row Name 12/13/22 1521          Modified [place] Scale    Modified Michelle Scale 4 - Moderately severe disability.  Unable to walk without assistance, and unable to attend to own bodily needs without assistance.  -     Row Name 12/13/22 1521 12/13/22 1519       Functional Assessment    Outcome Measure Options AM-PAC 6 Clicks Daily Activity (OT);Modified [place]ney  -MW AM-PAC 6 Clicks Basic Mobility (PT)  -MS          User Key  (r) = Recorded By, (t) = Taken By, (c) = Cosigned By    Initials Name Provider Type    MS Cony Shaquille L, PT Physical Therapist    Donna Bustillo, OT Occupational Therapist    Zhane Macdonald, RN Registered Nurse                Occupational Therapy Education     Title: PT OT SLP Therapies (Done)     Topic: Occupational Therapy (Done)     Point: ADL training (Done)     Description:   Instruct learner(s) on proper safety adaptation and remediation techniques during self care or transfers.   Instruct in proper use of assistive devices.              Learning Progress Summary           Patient Acceptance, E, VU,NR by  at 12/13/2022 1522    Comment: role of OT, d/c rec, plan of care, hip precautions   Family Acceptance, E, VU,NR by  at 12/13/2022 1522    Comment: role of OT, d/c rec, plan of care, hip precautions                   Point: Precautions (Done)     Description:   Instruct learner(s) on prescribed precautions during self-care and functional transfers.              Learning  Progress Summary           Patient Acceptance, E, VU,NR by  at 12/13/2022 1522    Comment: role of OT, d/c rec, plan of care, hip precautions   Family Acceptance, E, VU,NR by  at 12/13/2022 1522    Comment: role of OT, d/c rec, plan of care, hip precautions                   Point: Body mechanics (Done)     Description:   Instruct learner(s) on proper positioning and spine alignment during self-care, functional mobility activities and/or exercises.              Learning Progress Summary           Patient Acceptance, E, VU,NR by  at 12/13/2022 1522    Comment: role of OT, d/c rec, plan of care, hip precautions   Family Acceptance, E, VU,NR by  at 12/13/2022 1522    Comment: role of OT, d/c rec, plan of care, hip precautions                               User Key     Initials Effective Dates Name Provider Type Discipline     08/20/21 -  Donna Rain OT Occupational Therapist OT              OT Recommendation and Plan  Planned Therapy Interventions (OT): activity tolerance training, functional balance retraining, BADL retraining, neuromuscular control/coordination retraining, occupation/activity based interventions, ROM/therapeutic exercise, strengthening exercise, transfer/mobility retraining, patient/caregiver education/training  Therapy Frequency (OT): 5 times/wk  Plan of Care Review  Plan of Care Reviewed With: patient, son, daughter  Progress: no change  Outcome Evaluation: Pt is an 81 yo female seen POD0 for R CHAS secondary to end-stage osteoarthritis of the hip. Pt seen this date for OT USHA cameron&Ox3, reports (I) at baseline, no AD used, lives alone, 3 [address] with railing, bedroom is upstairs a full flight, cares for her cat/dog. Pt's daughter present and reports she is able to check in throughout the week. Today, pt presents greatly below ADL baseline with impaired act tolerance, generalized post op weakness, impaired functional transfers, and limited this date due to nausea and dizziness present.  Upon arrival, pt requesting use of bed pan, min A for multiple rolling trials completed this date, able to maintain side lying with CGA throughout, toileting/bed change/LBD with max A and assist from nurse aide. Pt unable to tolerate further activity at EOB due to fatigue and nausea. Pt educated on precautions this date, able to verbalize them back to therapist. Pt will continue to benefit from skilled OT to address noted functional deficits and progress toward prior level of function. Recommending SNF at d/c for continued rehab due to pt living alone and greatly below baseline.     Time Calculation:    Time Calculation- OT     Row Name 12/13/22 1522             Time Calculation- OT    OT Start Time 1419  -MW      OT Stop Time 1442  -MW      OT Time Calculation (min) 23 min  -MW      Total Timed Code Minutes- OT 16 minute(s)  -MW      OT Received On 12/13/22  -MW      OT - Next Appointment 12/14/22  -MW      OT Goal Re-Cert Due Date 12/27/22  -MW         Timed Charges    30350 - OT Self Care/Mgmt Minutes 16  -MW         Untimed Charges    OT Eval/Re-eval Minutes 7  -MW         Total Minutes    Timed Charges Total Minutes 16  -MW      Untimed Charges Total Minutes 7  -MW       Total Minutes 23  -MW            User Key  (r) = Recorded By, (t) = Taken By, (c) = Cosigned By    Initials Name Provider Type     Donna Rain OT Occupational Therapist              Therapy Charges for Today     Code Description Service Date Service Provider Modifiers Qty    [id number]  OT SELF CARE/MGMT/TRAIN EA 15 MIN 12/13/2022 Donna Rain OT GO 1    [id number] HC OT EVAL MOD COMPLEXITY 2 12/13/2022 Donna Rain OT GO 1               Donna Rain OT  12/13/2022

## 2022-12-13 NOTE — ANESTHESIA PREPROCEDURE EVALUATION
Anesthesia Evaluation     Patient summary reviewed and Nursing notes reviewed   NPO Solid Status: > 8 hours  NPO Liquid Status: > 2 hours           Airway   Mallampati: II  TM distance: >3 FB  Neck ROM: full  Comment: MAC 3. Cormack-Lehane Classification: grade I - full view of glottis  Dental - normal exam   (+) upper dentures    Pulmonary - negative pulmonary ROS and normal exam    breath sounds clear to auscultation  Cardiovascular - normal exam    ECG reviewed  Patient on routine beta blocker  Rhythm: regular  Rate: normal    (+) hypertension, JARA,   (-) angina, orthopnea, PND    ROS comment: Sinus rhythm  Incomplete right bundle branch block  ROBER, consider biatrial enlargement  No significant change from prior ekg    Neuro/Psych  (+) numbness (Left lumbar radiculopathy),    GI/Hepatic/Renal/Endo    (+)   renal disease (Chronic kidney disease, stage III (moderate)) CRI, thyroid problem hypothyroidism    Musculoskeletal     (+) back pain, chronic pain,   Abdominal    Substance History - negative use     OB/GYN negative ob/gyn ROS         Other   arthritis,                        Anesthesia Plan    ASA 3     general     intravenous induction     Anesthetic plan, risks, benefits, and alternatives have been provided, discussed and informed consent has been obtained with: patient.

## 2022-12-14 LAB
HCT VFR BLD AUTO: 26.8 % (ref 34–46.6)
HGB BLD-MCNC: 8.9 G/DL (ref 12–15.9)

## 2022-12-14 PROCEDURE — 99024 POSTOP FOLLOW-UP VISIT: CPT | Performed by: NURSE PRACTITIONER

## 2022-12-14 PROCEDURE — G0378 HOSPITAL OBSERVATION PER HR: HCPCS

## 2022-12-14 PROCEDURE — 97530 THERAPEUTIC ACTIVITIES: CPT

## 2022-12-14 PROCEDURE — 85018 HEMOGLOBIN: CPT | Performed by: NURSE PRACTITIONER

## 2022-12-14 PROCEDURE — 85014 HEMATOCRIT: CPT | Performed by: NURSE PRACTITIONER

## 2022-12-14 RX ADMIN — LEVOTHYROXINE SODIUM 75 MCG: 0.07 TABLET ORAL at 05:12

## 2022-12-14 RX ADMIN — DOCUSATE SODIUM 100 MG: 100 CAPSULE, LIQUID FILLED ORAL at 22:12

## 2022-12-14 RX ADMIN — FAMOTIDINE 40 MG: 20 TABLET, FILM COATED ORAL at 08:44

## 2022-12-14 RX ADMIN — POLYETHYLENE GLYCOL 3350 17 G: 17 POWDER, FOR SOLUTION ORAL at 08:44

## 2022-12-14 RX ADMIN — ACETAMINOPHEN 1000 MG: 500 TABLET, FILM COATED ORAL at 22:12

## 2022-12-14 RX ADMIN — ASPIRIN 81 MG: 81 TABLET, COATED ORAL at 08:44

## 2022-12-14 RX ADMIN — TRAMADOL HYDROCHLORIDE 100 MG: 50 TABLET, COATED ORAL at 15:00

## 2022-12-14 RX ADMIN — TRAMADOL HYDROCHLORIDE 100 MG: 50 TABLET, COATED ORAL at 10:23

## 2022-12-14 RX ADMIN — ACETAMINOPHEN 1000 MG: 500 TABLET, FILM COATED ORAL at 05:12

## 2022-12-14 RX ADMIN — SODIUM CHLORIDE, POTASSIUM CHLORIDE, SODIUM LACTATE AND CALCIUM CHLORIDE 100 ML/HR: 600; 310; 30; 20 INJECTION, SOLUTION INTRAVENOUS at 00:40

## 2022-12-14 RX ADMIN — DOCUSATE SODIUM 100 MG: 100 CAPSULE, LIQUID FILLED ORAL at 08:44

## 2022-12-14 RX ADMIN — ASPIRIN 81 MG: 81 TABLET, COATED ORAL at 22:12

## 2022-12-14 RX ADMIN — TRAMADOL HYDROCHLORIDE 50 MG: 50 TABLET, COATED ORAL at 05:12

## 2022-12-14 NOTE — PLAN OF CARE
Goal Outcome Evaluation:  Plan of Care Reviewed With: patient           Outcome Evaluation: Upon entering room, pt. sitting up in chair, awake/alert, and agreeable to work with P.T. despite c/o Right hip pain.  Pt. able to ambulate 12 feet, CGA x 1, with use of Rwx this AM. Pt. requires CGA x 1 for sit <-> stand transfers.  Right THR ther. ex. program x 10 reps completed with assist for general strengthening. Verbal/tactile cues given during ambulation for proper gait sequencing with use of the Rwx.  Followed pt. with chair for safety due to increased pain and weakness.  Will continue to progress functional mobility as tolerated.    Patient was not wearing a face mask during this therapy encounter. Therapist used appropriate personal protective equipment including eye protection, mask, and gloves.  Mask used was standard procedure mask. Appropriate PPE was worn during the entire therapy session. Hand hygiene was completed before and after therapy session. Patient is not in enhanced droplet precautions.

## 2022-12-14 NOTE — PLAN OF CARE
Goal Outcome Evaluation:  Plan of Care Reviewed With: patient, daughter        Progress: improving  Outcome Evaluation: Pt seen this date for OT, agreeable and noted improvements made this date with ADLs/functional mobility. Pt with improved upright mobility, no c/o of nausea, STS with CGA and mobility CGA with Rwx with max cues for sequencing. Pt demo good carryover. Pt will continue to benefit from skilled OT to address noted functional deficits and progress toward goals, plans to go to SNF at d/c. Pt agreeable.

## 2022-12-14 NOTE — PLAN OF CARE
Goal Outcome Evaluation:  Plan of Care Reviewed With: patient   Vss, nvi, dressing c/d/I, voiding function intact, unable to ambulate from stretcher to bed or work with PT and OT d/t nausea and dry heaving, zofran given x1 this shift, plans to d/c home tomorrow, educated on bp meds and monitoring.      Progress: improving

## 2022-12-14 NOTE — THERAPY TREATMENT NOTE
Patient Name: Bailee Garza  : 1940    MRN: [id number]                              Today's Date: 2022       Admit Date: 2022    Visit Dx:     ICD-10-CM ICD-9-CM   1. Arthritis of right hip  M16.11 716.95     Patient Active Problem List   Diagnosis   • Chronic midline low back pain without sciatica   • Essential hypertension   • Hearing loss   • Hypothyroidism, acquired   • IBS (irritable bowel syndrome)   • Chronic nonseasonal allergic rhinitis due to pollen   • Stage 3 chronic kidney disease (HCC)   • Arthralgia of right knee   • DDD (degenerative disc disease), lumbosacral   • Scoliosis due to degenerative disease of spine in adult patient   • Status post total replacement of left hip   • Status post total hip replacement, left   • Vitamin D deficiency   • Elevated BUN   • Elevated serum creatinine   • Elevated hemoglobin (HCC)   • Arthritis of right hip   • Anemia, macrocytic   • COVID-19 virus detected     Past Medical History:   Diagnosis Date   • Abnormal CXR 2017   • Adverse reaction to narcotic drug     SEVERE HALLUCINATIONS POST OP LEFT HIP PLACEMENT   • Allergies    • Arthritis    • Arthritis of foot 2020   • Arthropathy of pelvic region and thigh 2012    unspecified   • Back pain 2007   • Chronic back pain 2009   • Chronic cough 2017   • Closed nondisplaced fracture of lateral malleolus of fibula with delayed healing 2020   • Closed nondisplaced fracture of medial cuneiform of left foot 2018   • Constipation 2015    unspecified   • COVID-19 vaccination refused    • Diverticulosis    • Dyspepsia 2008   • Essential hypertension 2007   • Exertional shortness of breath 2017   • Fall 2018   • Family history of abdominal aortic aneurysm (AAA) 2019    [place] aaa screen limited (04/10/2019 10:32)    • 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), 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   •  Hearing loss 03/18/2008   • Hip pain, left    • History of bone density study 09/16/2015   • History of pneumonia     12/2017   • History of skin cancer    • Hypothyroidism, acquired 11/20/2007   • Insomnia 01/28/2015    unspecified   • Intervertebral disc disorder with myelopathy, cervical region 07/22/2010   • Joint capsule tear 12/13/2012    unspecified   • OA (osteoarthritis) of hip 07/24/2018   • Other synovitis and tenosynovitis, right ankle and foot 02/12/2020   • Paranoia (HCC)     PT IS VERY CONCERNED & FEARFUL THAT THE HOSPITAL STAFF WILL GIVE HER THE COVID VACCINE WHILE HERE IN THE HOSPITAL & BELIEVES THAT THE COVID SWAB TEST HAS COVID ON THE SWAB & THAT SHE WILL GET COVID BEING TESTED. ATTEMPTED TO REASSURE PT   • Peroneal tendonitis, right 01/13/2020   • Rhinitis, allergic 11/20/2007   • Scoliosis    • Screening breast examination 11/20/2007   • Stress 04/27/2015    other acute reactions to stress   • Stress incontinence    • Trochanteric bursitis, left hip 02/04/2019   • Urticaria 06/29/2015     Past Surgical History:   Procedure Laterality Date   • BREAST EXCISIONAL BIOPSY Right     50+ years ago   • BRONCHOSCOPY N/A 12/21/2017    Procedure: BRONCHOSCOPY;  Surgeon: Mario Alanis MD;  Location: [facility] ENDOSCOPY;  Service:    • CATARACT EXTRACTION, BILATERAL     • COLONOSCOPY     • HYSTERECTOMY     • TOTAL HIP ARTHROPLASTY Left 07/24/2018    Procedure: LEFT TOTAL HIP ARTHROPLASTY;  Surgeon: Justen Mann MD;  Location: [facility] MAIN OR;  Service: Orthopedics   • TOTAL HIP ARTHROPLASTY Right 12/13/2022    Procedure: Posterior RIGHT TOTAL HIP ARTHROPLASTY MARCELINO NAVIGATION;  Surgeon: Anupam Ruiz MD;  Location: [facility] OR [facility];  Service: Orthopedics;  Laterality: Right;      General Information     Row Name 12/14/22 1041          Physical Therapy Time and Intention    Document Type therapy note (daily note)  -MS     Mode of Treatment physical therapy;occupational therapy;co-treatment  -MS     Row  Name 12/14/22 1041          General Information    Patient Profile Reviewed yes  -MS     Existing Precautions/Restrictions hip, posterior  Exit alarm  -MS     Row Name 12/14/22 1041          Cognition    Orientation Status (Cognition) oriented to;person  -MS     Row Name 12/14/22 1041          Safety Issues, Functional Mobility    Comment, Safety Issues/Impairments (Mobility) Gait belt used for safety.  -MS           User Key  (r) = Recorded By, (t) = Taken By, (c) = Cosigned By    Initials Name Provider Type    Shaquille Maynard, PT Physical Therapist               Mobility     Row Name 12/14/22 1042          Bed Mobility    Comment, (Bed Mobility) Up in chair this AM.  -MS     Row Name 12/14/22 1042          Sit-Stand Transfer    Sit-Stand [place] (Transfers) contact guard  -MS     Assistive Device (Sit-Stand Transfers) walker, front-wheeled  -MS     Row Name 12/14/22 1042          Gait/Stairs (Locomotion)    [place] Level (Gait) contact guard  -MS     Assistive Device (Gait) walker, front-wheeled  -MS     Distance in Feet (Gait) 12 feet  -MS     Deviations/Abnormal Patterns (Gait) jah decreased;antalgic  -MS     Bilateral Gait Deviations forward flexed posture  -MS     Right Sided Gait Deviations heel strike decreased  -MS     Comment, (Gait/Stairs) Limited in gait distance due to pain/weakness.  Verbal/tactile cues given for proper gait sequencing with use of Rwx.  -MS     Row Name 12/14/22 1042          Mobility    Right Lower Extremity (Weight-bearing Status) weight-bearing as tolerated (WBAT)  -MS           User Key  (r) = Recorded By, (t) = Taken By, (c) = Cosigned By    Initials Name Provider Type    Shaquille Maynard, PT Physical Therapist               Obj/Interventions     Row Name 12/14/22 1043          Motor Skills    Therapeutic Exercise --  Right THR ther. ex. program x 10 reps completed with assist.  -MS           User Key  (r) = Recorded By, (t) = Taken By, (c) = Cosigned By     Initials Name Provider Type    Shaquille Maynard PT Physical Therapist               Goals/Plan    No documentation.                Clinical Impression     Row Name 12/14/22 1043          Pain    Pretreatment Pain Rating 10/10  During ambulation  -MS     Posttreatment Pain Rating 5/10  At rest  -MS     Pain Location - Side/Orientation Right  -MS     Pain Location - hip  -MS     Pain Intervention(s) Medication (See MAR);Elevated;Nursing Notified;Repositioned  -MS     Row Name 12/14/22 1043          Positioning and Restraints    Pre-Treatment Position sitting in chair/recliner  -MS     Post Treatment Position chair  -MS     In Chair notified nsg;reclined;sitting;call light within reach;encouraged to call for assist;exit alarm on;with family/caregiver  -MS           User Key  (r) = Recorded By, (t) = Taken By, (c) = Cosigned By    Initials Name Provider Type    Shaquille Maynard PT Physical Therapist               Outcome Measures     Row Name 12/14/22 1044          How much help from another person do you currently need...    Turning from your back to your side while in flat bed without using bedrails? 3  -MS     Moving from lying on back to sitting on the side of a flat bed without bedrails? 3  -MS     Moving to and from a bed to a chair (including a wheelchair)? 3  -MS     Standing up from a chair using your arms (e.g., wheelchair, bedside chair)? 3  -MS     Climbing 3-5 steps with a railing? 2  -MS     To walk in hospital room? 3  -MS     AM-PAC 6 Clicks Score (PT) 17  -MS     Highest level of mobility 5 --> Static standing  -MS     Row Name 12/14/22 1044          Functional Assessment    Outcome Measure Options AM-PAC 6 Clicks Basic Mobility (PT)  -MS           User Key  (r) = Recorded By, (t) = Taken By, (c) = Cosigned By    Initials Name Provider Type    Shaquille Maynard PT Physical Therapist                             Physical Therapy Education     Title: PT OT SLP Therapies (In Progress)      Topic: Physical Therapy (In Progress)     Point: Mobility training (In Progress)     Learning Progress Summary           Patient Acceptance, E,D, NR by MS at 12/14/2022 1044    Acceptance, E,D, VU,NR by MS at 12/13/2022 1520                   Point: Home exercise program (In Progress)     Learning Progress Summary           Patient Acceptance, E,D, NR by MS at 12/14/2022 1044    Acceptance, E,D, VU,NR by MS at 12/13/2022 1520                   Point: Body mechanics (In Progress)     Learning Progress Summary           Patient Acceptance, E,D, NR by MS at 12/14/2022 1044    Acceptance, E,D, VU,NR by MS at 12/13/2022 1520                   Point: Precautions (In Progress)     Learning Progress Summary           Patient Acceptance, E,D, NR by MS at 12/14/2022 1044    Acceptance, E,D, VU,NR by MS at 12/13/2022 1520                               User Key     Initials Effective Dates Name Provider Type Discipline    MS 06/16/21 -  Shaquille Donnelly, PT Physical Therapist PT              PT Recommendation and Plan  Planned Therapy Interventions (PT): balance training, bed mobility training, gait training, home exercise program, patient/family education, postural re-education, transfer training, strengthening, stair training  Plan of Care Reviewed With: patient  Outcome Evaluation: Upon entering room, pt. sitting up in chair, awake/alert, and agreeable to work with P.T. despite c/o Right hip pain.  Pt. able to ambulate 12 feet, CGA x 1, with use of Rwx this AM. Pt. requires CGA x 1 for sit <-> stand transfers.  Right THR ther. ex. program x 10 reps completed with assist for general strengthening. Verbal/tactile cues given during ambulation for proper gait sequencing with use of the Rwx.  Followed pt. with chair for safety due to increased pain and weakness.  Will continue to progress functional mobility as tolerated.     Time Calculation:    PT Charges     Row Name 12/14/22 4743             Time Calculation    Start Time  0953  -MS      Stop Time 1010  -MS      Time Calculation (min) 17 min  -MS      PT Received On 12/14/22  -MS      PT - Next Appointment 12/15/22  -MS         Time Calculation- PT    Total Timed Code Minutes- PT 16 minute(s)  -MS            User Key  (r) = Recorded By, (t) = Taken By, (c) = Cosigned By    Initials Name Provider Type    Shaquille Maynard, PT Physical Therapist              Therapy Charges for Today     Code Description Service Date Service Provider Modifiers Qty    24312795633 HC PT EVAL LOW COMPLEXITY 2 12/13/2022 Shaquille Donnelly, PT GP 1    32012908541 HC PT THER PROC EA 15 MIN 12/13/2022 Shaquille Donnelly, PT GP 1    06828990128 HC PT THERAPEUTIC ACT EA 15 MIN 12/14/2022 Shaquille Donnelly, PT GP 1          PT G-Codes  Outcome Measure Options: AM-PAC 6 Clicks Basic Mobility (PT)  AM-PAC 6 Clicks Score (PT): 17  AM-PAC 6 Clicks Score (OT): 10  Modified [place] Scale: 4 - Moderately severe disability.  Unable to walk without assistance, and unable to attend to own bodily needs without assistance.  PT Discharge Summary  Anticipated Discharge Disposition (PT): skilled nursing facility    Shaquille Donnelly, PT  12/14/2022

## 2022-12-14 NOTE — PROGRESS NOTES
Orthopedic Total Joint Progress Note        Patient: Bailee Garza    Date of Admission: 12/13/2022  5:20 AM    YOB: 1940    Medical Record Number: [id number]    Attending Physician: Anupam Ruiz MD      POD # 1 Day Post-Op Procedure(s) (LRB):  Posterior RIGHT TOTAL HIP ARTHROPLASTY MARCELINO NAVIGATION (Right)       Systemic or Specific Complaints: The patient has had a relatively normal postoperative course.  The patient has had improving normal postoperative pain.  The patient has had no issues with the wound.  Patient up in chair upon arrival, daughter in the room.  Patient is surprised how tired and weak she is after this hip.  She has previously had her left hip that approximately 5 years ago.  She is having weakness and a hard time ambulating due to pain and swelling.  She denies any dizziness, she did have nausea and weakness yesterday upon standing which has improved today.  She states she just feels weak.  I discussed with her that she was slightly anemic going into the surgery which we are aware of and her hemoglobin dropped which can increase her weakness.  Also she is 5 years older than when she had her other hip done.  We discussed her weakness and living alone with 1 flight of stairs to to navigate.  Both her daughter and her feel that she is not safe to go home today.  I agreed with them as well PT and OT also recommended she go to a SNF rehab in order to get back to functional baseline.  They both verbalized understanding and are in agreement with this plan.    Allergies:   Allergies   Allergen Reactions   • Hydrocodone Hallucinations   • Ketek [Telithromycin] Hives   • Nsaids Hives   • Sulfa Antibiotics Hives       Medications:   Current Medications:  Scheduled Meds:acetaminophen, 1,000 mg, Oral, Q8H  aspirin, 81 mg, Oral, Q12H  docusate sodium, 100 mg, Oral, BID  famotidine, 40 mg, Oral, Daily  hydroCHLOROthiazide, 12.5 mg, Oral, Daily  levothyroxine, 75 mcg, Oral,  "Daily  polyethylene glycol, 17 g, Oral, Daily  valsartan, 80 mg, Oral, Daily      Continuous Infusions:lactated ringers, 100 mL/hr, Last Rate: 100 mL/hr (12/14/22 0040)      PRN Meds:.•  bisacodyl  •  bisacodyl  •  HYDROmorphone **AND** naloxone  •  magnesium hydroxide  •  ondansetron **OR** ondansetron  •  traMADol  •  traMADol      Physical Exam: 82 y.o. female   Wt Readings from Last 3 Encounters:   12/13/22 48.4 kg (106 lb 11.2 oz)   12/06/22 48.4 kg (106 lb 11.2 oz)   11/28/22 47.2 kg (104 lb)     Ht Readings from Last 3 Encounters:   12/13/22 162.6 cm (64\")   12/06/22 162.6 cm (64\")   11/28/22 162.6 cm (64\")     Body mass index is 18.32 kg/m².    Vitals:    12/13/22 1807 12/13/22 2200 12/14/22 0234 12/14/22 0522   BP: 94/40 105/52 91/51 102/59   BP Location: Left arm Right arm Left arm Left arm   Patient Position: Lying Lying Lying Lying   Pulse:  81 92 83   Resp:  16 16 16   Temp:  98.4 °F (36.9 °C) 98.4 °F (36.9 °C) 98.3 °F (36.8 °C)   TempSrc:  Oral Oral Oral   SpO2:  97% 97% 95%   Weight:       Height:            General Appearance:    General: alert and oriented         Abdomen/:     soft non-tender, non-distended, voiding without difficulty and Purewick catheter in place       Extremities:   Operative extremity neurovascular status intact. ROM appropriate.  Incision intact w/out signs or symptoms of infection.  No cyanosis, calf is soft and nontender.  Optifoam dressing clean dry and intact no signs of drainage or active bleeding.  Hip soft, no palpable hematomas.     Activity: Mobilizing Per P.T.   Weight Bearing: As Tolerated    Diagnostic Tests:   Admission on 12/13/2022   Component Date Value Ref Range Status   • Hemoglobin 12/14/2022 8.9 (L)  12.0 - 15.9 g/dL Final   • Hematocrit 12/14/2022 26.8 (L)  34.0 - 46.6 % Final       Imaging Results (Last 72 Hours)     Procedure Component Value Units Date/Time    XR Hip 1 View Without Pelvis Right (Surgery Only) [[id number]] Collected: 12/13/22 1037     " Updated: 12/13/22 1041    Narrative:      XR HIP 1 VIEW WO PELVIS RIGHT-  12/13/2022     HISTORY: Status post right hip arthroplasty.     The acetabular and proximal femoral components of the right hip  prosthesis are well-seated with no abnormal surrounding bony lucencies.  Soft tissue air is seen. No unexpected findings are noted.       Impression:      1. Satisfactory postoperative appearance of the right hip.     This report was finalized on 12/13/2022 10:38 AM by Dr. Arun Romero M.D.             Personally viewed ortho images and report     Assessment:  - Doing well 1 Day Post-Op following total joint replacement  - Acute Blood Loss Anemia, postoperative hemoglobin 8.9, preoperative hemoglobin 11.5 - stable  - Post-operative Pain  - Limited mobility, requires use of walker and assistance when OOB.    Patient Active Problem List   Diagnosis   • Chronic midline low back pain without sciatica   • Essential hypertension   • Hearing loss   • Hypothyroidism, acquired   • IBS (irritable bowel syndrome)   • Chronic nonseasonal allergic rhinitis due to pollen   • Stage 3 chronic kidney disease (HCC)   • Arthralgia of right knee   • DDD (degenerative disc disease), lumbosacral   • Scoliosis due to degenerative disease of spine in adult patient   • Status post total replacement of left hip   • Status post total hip replacement, left   • Vitamin D deficiency   • Elevated BUN   • Elevated serum creatinine   • Elevated hemoglobin (HCC)   • Arthritis of right hip   • Anemia, macrocytic   • COVID-19 virus detected        Plan:   -Recommend SNF/rehab for placement - discharge planners working on placement  - Consults: none  - Continue to monitor labs and/or v/s, for tolerance to post op blood loss.  - Continue efforts to increase mobilization.  - Continue Pain Control Measures.  - Continue incisional Care.  - DVT prophylaxis - Aspirin  - Follow up in office with Anupam Ruiz M.D. In 2 weeks.    Discharge Plan:today  or tomorrow to SNF/rehab when accepted with available bed and  when cleared by physical therapy as safe for discharge    Date: 12/14/2022  TERESA Epsarza

## 2022-12-14 NOTE — THERAPY TREATMENT NOTE
Patient Name: Bailee Garza  : 1940    MRN: [id number]                              Today's Date: 2022       Admit Date: 2022    Visit Dx:     ICD-10-CM ICD-9-CM   1. Arthritis of right hip  M16.11 716.95     Patient Active Problem List   Diagnosis   • Chronic midline low back pain without sciatica   • Essential hypertension   • Hearing loss   • Hypothyroidism, acquired   • IBS (irritable bowel syndrome)   • Chronic nonseasonal allergic rhinitis due to pollen   • Stage 3 chronic kidney disease (HCC)   • Arthralgia of right knee   • DDD (degenerative disc disease), lumbosacral   • Scoliosis due to degenerative disease of spine in adult patient   • Status post total replacement of left hip   • Status post total hip replacement, left   • Vitamin D deficiency   • Elevated BUN   • Elevated serum creatinine   • Elevated hemoglobin (HCC)   • Arthritis of right hip   • Anemia, macrocytic   • COVID-19 virus detected     Past Medical History:   Diagnosis Date   • Abnormal CXR 2017   • Adverse reaction to narcotic drug     SEVERE HALLUCINATIONS POST OP LEFT HIP PLACEMENT   • Allergies    • Arthritis    • Arthritis of foot 2020   • Arthropathy of pelvic region and thigh 2012    unspecified   • Back pain 2007   • Chronic back pain 2009   • Chronic cough 2017   • Closed nondisplaced fracture of lateral malleolus of fibula with delayed healing 2020   • Closed nondisplaced fracture of medial cuneiform of left foot 2018   • Constipation 2015    unspecified   • COVID-19 vaccination refused    • Diverticulosis    • Dyspepsia 2008   • Essential hypertension 2007   • Exertional shortness of breath 2017   • Fall 2018   • Family history of abdominal aortic aneurysm (AAA) 2019    [place] aaa screen limited (04/10/2019 10:32)    • 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), 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   •  Hearing loss 03/18/2008   • Hip pain, left    • History of bone density study 09/16/2015   • History of pneumonia     12/2017   • History of skin cancer    • Hypothyroidism, acquired 11/20/2007   • Insomnia 01/28/2015    unspecified   • Intervertebral disc disorder with myelopathy, cervical region 07/22/2010   • Joint capsule tear 12/13/2012    unspecified   • OA (osteoarthritis) of hip 07/24/2018   • Other synovitis and tenosynovitis, right ankle and foot 02/12/2020   • Paranoia (HCC)     PT IS VERY CONCERNED & FEARFUL THAT THE HOSPITAL STAFF WILL GIVE HER THE COVID VACCINE WHILE HERE IN THE HOSPITAL & BELIEVES THAT THE COVID SWAB TEST HAS COVID ON THE SWAB & THAT SHE WILL GET COVID BEING TESTED. ATTEMPTED TO REASSURE PT   • Peroneal tendonitis, right 01/13/2020   • Rhinitis, allergic 11/20/2007   • Scoliosis    • Screening breast examination 11/20/2007   • Stress 04/27/2015    other acute reactions to stress   • Stress incontinence    • Trochanteric bursitis, left hip 02/04/2019   • Urticaria 06/29/2015     Past Surgical History:   Procedure Laterality Date   • BREAST EXCISIONAL BIOPSY Right     50+ years ago   • BRONCHOSCOPY N/A 12/21/2017    Procedure: BRONCHOSCOPY;  Surgeon: Mario Alanis MD;  Location: [facility] ENDOSCOPY;  Service:    • CATARACT EXTRACTION, BILATERAL     • COLONOSCOPY     • HYSTERECTOMY     • TOTAL HIP ARTHROPLASTY Left 07/24/2018    Procedure: LEFT TOTAL HIP ARTHROPLASTY;  Surgeon: Justen Mann MD;  Location: [facility] MAIN OR;  Service: Orthopedics   • TOTAL HIP ARTHROPLASTY Right 12/13/2022    Procedure: Posterior RIGHT TOTAL HIP ARTHROPLASTY MARCELINO NAVIGATION;  Surgeon: Anupam Ruiz MD;  Location: [facility] OR OSC;  Service: Orthopedics;  Laterality: Right;      General Information     Row Name 12/14/22 9344          OT Time and Intention    Document Type therapy note (daily note)  -MW     Mode of Treatment co-treatment;occupational therapy  -MW     Row Name 12/14/22 9540           General Information    Patient Profile Reviewed yes  -     Existing Precautions/Restrictions hip, posterior  -     Row Name 12/14/22 1251          Cognition    Orientation Status (Cognition) oriented to;person  -     Row Name 12/14/22 1251          Safety Issues, Functional Mobility    Impairments Affecting Function (Mobility) endurance/activity tolerance;range of motion (ROM);pain;strength  -           User Key  (r) = Recorded By, (t) = Taken By, (c) = Cosigned By    Initials Name Provider Type     Donna Rain OT Occupational Therapist                 Mobility/ADL's     Row Name 12/14/22 1251          Bed Mobility    Comment, (Bed Mobility) NT UIC  -     Row Name 12/14/22 1251          Transfers    Transfers sit-stand transfer;stand-sit transfer  -[facility] Name 12/14/22 1251          Sit-Stand Transfer    Sit-Stand [place] (Transfers) contact guard  -     Assistive Device (Sit-Stand Transfers) walker, front-wheeled  -     Row Name 12/14/22 1251          Stand-Sit Transfer    Stand-Sit [place] (Transfers) contact guard  -     Assistive Device (Stand-Sit Transfers) walker, front-wheeled  -[facility] Name 12/14/22 1251          Functional Mobility    Functional Mobility- Ind. Level contact guard assist  -     Functional Mobility- Device walker, front-wheeled  -     Functional Mobility- Comment pt demo improvements with upright mobility, slow pace and max vc's with Rwx and sequencing, mostly CGA throughout  -     Row Name 12/14/22 1251          Activities of Daily Living    BADL Assessment/Intervention lower body dressing;toileting  -[facility] Name 12/14/22 1251          Mobility    Extremity Weight-bearing Status right lower extremity  -     Right Lower Extremity (Weight-bearing Status) weight-bearing as tolerated (WBAT)  -     Row Name 12/14/22 1251          Lower Body Dressing Assessment/Training    [place] Level (Lower Body Dressing) don;maximum assist (25%  patient effort);socks  -MW     Position (Lower Body Dressing) supported sitting  -MW     Row Name 12/14/22 1251          Toileting Assessment/Training    Comment, (Toileting) pt encouraged to get up to BSC/BR as pt demo'd household distances this date, alerted RN as well  -MW           User Key  (r) = Recorded By, (t) = Taken By, (c) = Cosigned By    Initials Name Provider Type    Donna Bustillo OT Occupational Therapist               Obj/Interventions     Row Name 12/14/22 1254          Balance    Balance Assessment sitting static balance;sitting dynamic balance;sit to stand dynamic balance;standing static balance;standing dynamic balance  -MW     Static Sitting Balance supervision  -MW     Dynamic Sitting Balance supervision  -MW     Position, Sitting Balance sitting in chair  -MW     Sit to Stand Dynamic Balance contact guard  -MW     Static Standing Balance contact guard  -MW     Dynamic Standing Balance contact guard  -MW     Position/Device Used, Standing Balance supported;walker, front-wheeled  -MW     Balance Interventions sitting;standing;sit to stand;supported;dynamic;static;minimal challenge  -MW     Comment, Balance CGA, no overt LOBs  -MW           User Key  (r) = Recorded By, (t) = Taken By, (c) = Cosigned By    Initials Name Provider Type    Donna Bustillo OT Occupational Therapist               Goals/Plan    No documentation.                Clinical Impression     Row Name 12/14/22 1255          Pain Assessment    Pretreatment Pain Rating 10/10  with walking  -MW     Posttreatment Pain Rating 5/10  chair  -MW     Row Name 12/14/22 1255          Plan of Care Review    Plan of Care Reviewed With patient;daughter  -MW     Progress improving  -MW     Outcome Evaluation Pt seen this date for OT, agreeable and noted improvements made this date with ADLs/functional mobility. Pt with improved upright mobility, no c/o of nausea, STS with CGA and mobility CGA with Rwx with max cues for  sequencing. Pt demo good carryover. Pt will continue to benefit from skilled OT to address noted functional deficits and progress toward goals, plans to go to SNF at d/c. Pt agreeable.  -     Row Name 12/14/22 1255          Therapy Plan Review/Discharge Plan (OT)    Anticipated Discharge Disposition (OT) skilled nursing facility  -     Row Name 12/14/22 1255          Vital Signs    O2 Delivery Pre Treatment room air  -MW     Pre Patient Position Sitting  -MW     Intra Patient Position Standing  -MW     Post Patient Position Sitting  -     Row Name 12/14/22 1255          Positioning and Restraints    Pre-Treatment Position sitting in chair/recliner  -MW     Post Treatment Position chair  -MW     In Chair notified nsg;reclined;call light within reach;encouraged to call for assist;exit alarm on;with family/caregiver  -           User Key  (r) = Recorded By, (t) = Taken By, (c) = Cosigned By    Initials Name Provider Type    Donna Bustillo, OT Occupational Therapist               Outcome Measures     Row Name 12/14/22 1259          How much help from another is currently needed...    Putting on and taking off regular lower body clothing? 2  -MW     Bathing (including washing, rinsing, and drying) 2  -MW     Toileting (which includes using toilet bed pan or urinal) 2  -MW     Putting on and taking off regular upper body clothing 3  -MW     Taking care of personal grooming (such as brushing teeth) 3  -MW     Eating meals 4  -MW     AM-PAC 6 Clicks Score (OT) 16  -MW     Row Name 12/14/22 1044          How much help from another person do you currently need...    Turning from your back to your side while in flat bed without using bedrails? 3  -MS     Moving from lying on back to sitting on the side of a flat bed without bedrails? 3  -MS     Moving to and from a bed to a chair (including a wheelchair)? 3  -MS     Standing up from a chair using your arms (e.g., wheelchair, bedside chair)? 3  -MS     Climbing  3-5 steps with a railing? 2  -MS     To walk in hospital room? 3  -MS     AM-PAC 6 Clicks Score (PT) 17  -MS     Highest level of mobility 5 --> Static standing  -MS     Row Name 12/14/22 1259 12/14/22 1044       Functional Assessment    Outcome Measure Options AM-PAC 6 Clicks Daily Activity (OT)  - AM-PAC 6 Clicks Basic Mobility (PT)  -MS          User Key  (r) = Recorded By, (t) = Taken By, (c) = Cosigned By    Initials Name Provider Type    MS Cony Shaquille ELIZABETH, PT Physical Therapist     Donna Rain, OT Occupational Therapist                Occupational Therapy Education     Title: PT OT SLP Therapies (In Progress)     Topic: Occupational Therapy (Done)     Point: ADL training (Done)     Description:   Instruct learner(s) on proper safety adaptation and remediation techniques during self care or transfers.   Instruct in proper use of assistive devices.              Learning Progress Summary           Patient Acceptance, E, VU,NR by  at 12/13/2022 1522    Comment: role of OT, d/c rec, plan of care, hip precautions   Family Acceptance, E, VU,NR by  at 12/13/2022 1522    Comment: role of OT, d/c rec, plan of care, hip precautions                   Point: Precautions (Done)     Description:   Instruct learner(s) on prescribed precautions during self-care and functional transfers.              Learning Progress Summary           Patient Acceptance, E, VU,NR by  at 12/13/2022 1522    Comment: role of OT, d/c rec, plan of care, hip precautions   Family Acceptance, E, VU,NR by  at 12/13/2022 1522    Comment: role of OT, d/c rec, plan of care, hip precautions                   Point: Body mechanics (Done)     Description:   Instruct learner(s) on proper positioning and spine alignment during self-care, functional mobility activities and/or exercises.              Learning Progress Summary           Patient Acceptance, E, VU,NR by  at 12/13/2022 1522    Comment: role of OT, d/c rec, plan of care, hip  precautions   Family Acceptance, E, VU,NR by  at 12/13/2022 1522    Comment: role of OT, d/c rec, plan of care, hip precautions                               User Key     Initials Effective Dates Name Provider Type Discipline     08/20/21 -  Donna Rain OT Occupational Therapist OT              OT Recommendation and Plan  Planned Therapy Interventions (OT): activity tolerance training, functional balance retraining, BADL retraining, neuromuscular control/coordination retraining, occupation/activity based interventions, ROM/therapeutic exercise, strengthening exercise, transfer/mobility retraining, patient/caregiver education/training  Therapy Frequency (OT): 5 times/wk  Plan of Care Review  Plan of Care Reviewed With: patient, daughter  Progress: improving  Outcome Evaluation: Pt seen this date for OT, agreeable and noted improvements made this date with ADLs/functional mobility. Pt with improved upright mobility, no c/o of nausea, STS with CGA and mobility CGA with Rwx with max cues for sequencing. Pt demo good carryover. Pt will continue to benefit from skilled OT to address noted functional deficits and progress toward goals, plans to go to SNF at d/c. Pt agreeable.     Time Calculation:    Time Calculation- OT     Row Name 12/14/22 1259             Time Calculation- OT    OT Start Time 0953  -MW      OT Stop Time 1009  -MW      OT Time Calculation (min) 16 min  -MW      Total Timed Code Minutes- OT 16 minute(s)  -MW      OT Received On 12/14/22  -MW      OT - Next Appointment 12/15/22  -MW         Timed Charges    72888 - OT Therapeutic Activity Minutes 16  -MW         Total Minutes    Timed Charges Total Minutes 16  -MW       Total Minutes 16  -MW            User Key  (r) = Recorded By, (t) = Taken By, (c) = Cosigned By    Initials Name Provider Type     Donna Rain OT Occupational Therapist              Therapy Charges for Today     Code Description Service Date Service Provider Modifiers  Qty    93552984862 HC OT SELF CARE/MGMT/TRAIN EA 15 MIN 12/13/2022 Donna Rain OT GO 1    88590634269 HC OT EVAL MOD COMPLEXITY 2 12/13/2022 Donna Rain OT GO 1    05036135738 HC OT THERAPEUTIC ACT EA 15 MIN 12/14/2022 Donna Rain OT GO 1               Donna Rain OT  12/14/2022

## 2022-12-14 NOTE — CASE MANAGEMENT/SOCIAL WORK
Continued Stay Note  [facility]     Patient Name: Bailee Garza  MRN: [id number]  Today's Date: 12/14/2022    Admit Date: 12/13/2022    Plan: DC to accepting SNF   Discharge Plan     Row Name 12/14/22 1510       Plan    Plan DC to accepting SNF    Plan Comments CCP met with pt introduced self, role and reviewed face sheet.  Pt lives alone in a one story home.  She has 13 steps to go up and down the steps to basement.  Prior to her surgery she was independent with ADL's and drove to and from her appointments.  She has a walker and shower chair but has not been using them.  [facility].  Pts plan is to go to rehab for therapy.  CCP will continue to follow. Thanks, Arabella SHAFFER               Discharge Codes    No documentation.               Expected Discharge Date and Time     Expected Discharge Date Expected Discharge Time    Dec 14, 2022             Arabella Hagan

## 2022-12-14 NOTE — DISCHARGE PLACEMENT REQUEST
"Bailee Cortez (82 y.o. Female)     Date of Birth   1940    Social Security Number       Address   [address] [place] [address]    Home Phone   [phone]    MRN   [id number]       [employer]   Jew    Marital Status                               Admission Date   12/13/22    Admission Type   Elective    Admitting Provider   Anupam Ruiz MD    Attending Provider   Anupam Riuz MD    Department, Room/Bed   [address], P889/1       Discharge Date       Discharge Disposition       Discharge Destination                               Attending Provider: Anupam Ruiz MD    Allergies: Hydrocodone, Ketek [Telithromycin], Nsaids, Sulfa Antibiotics    Isolation: None   Infection: COVID (History) (12/13/22)   Code Status: CPR    Ht: 162.6 cm (64\")   Wt: 48.4 kg (106 lb 11.2 oz)    Admission Cmt: None   Principal Problem: Arthritis of right hip [M16.11]                 Active Insurance as of 12/13/2022     Primary Coverage     Payor Plan Insurance Group Employer/Plan Group    MEDICARE MEDICARE A & B      Payor Plan Address Payor Plan Phone Number Payor Plan Fax Number Effective Dates    [address] [phone]  3/1/2006 - None Entered    [facility] [address]       Subscriber Name Subscriber Birth Date Member ID       BAILEE CORTEZ 1940 [id number]           Secondary Coverage     Payor Plan Insurance Group Employer/Plan Group    [facility] SUPP [id number]     Payor Plan Address Payor Plan Phone Number Payor Plan Fax Number Effective Dates    [address]   5/1/2022 - None Entered    [facility] [address]       Subscriber Name Subscriber Birth Date Member ID       BAILEE CORTEZ 1940 [id number]                 Emergency Contacts      (Rel.) Home Phone Work Phone Mobile Phone    Phyllis Diaz (Daughter) -- -- [phone]    Christopher Diaz (Son) [phone] -- --              "

## 2022-12-14 NOTE — CASE MANAGEMENT/SOCIAL WORK
Continued Stay Note  [facility]     Patient Name: Bailee Garza  MRN: [id number]  Today's Date: 12/14/2022    Admit Date: 12/13/2022    Plan: DC to either [facility] or [facility] tomorrow 12/15   Discharge Plan     Row Name 12/14/22 1612       Plan    Plan DC to either [facility] or [facility] tomorrow 12/15    Plan Comments CCP spoke to pt regarding SNF, she did not know who to pick.  CCP printed out facilities in her zip code.  She chose [facility], [facility].  CCP called casper with [facility] and [facility].  She will have a bed at both places tomorrow.  CCP called Samantha with [facility] and left  with referral.  CCP let pt know of available beds of at least 2 facilities tomorrow.  And she will be dc'd tomorrow as well.  She wanted me to call her daughter and update her.  CCP called Phyllis and caryn  asking for return call.  CCP will continue to follow. Arabella Reece    Row Name 12/14/22 1510       Plan    Plan DC to accepting SNF    Plan Comments CCP met with pt introduced self, role and reviewed face sheet.  Pt lives alone in a one story home.  She has 13 steps to go up and down the steps to basement.  Prior to her surgery she was independent with ADL's and drove to and from her appointments.  She has a walker and shower chair but has not been using them.  [facility] [place].  Pts plan is to go to rehab for therapy.  CCP will continue to follow. Arabella Reece               Discharge Codes    No documentation.               Expected Discharge Date and Time     Expected Discharge Date Expected Discharge Time    Dec 14, 2022             Arabella Hagan

## 2022-12-15 VITALS
SYSTOLIC BLOOD PRESSURE: 92 MMHG | BODY MASS INDEX: 18.22 KG/M2 | WEIGHT: 106.7 LBS | TEMPERATURE: 97.1 F | HEART RATE: 87 BPM | HEIGHT: 64 IN | OXYGEN SATURATION: 92 % | DIASTOLIC BLOOD PRESSURE: 47 MMHG | RESPIRATION RATE: 16 BRPM

## 2022-12-15 LAB
HCT VFR BLD AUTO: 27.2 % (ref 34–46.6)
HGB BLD-MCNC: 8.9 G/DL (ref 12–15.9)

## 2022-12-15 PROCEDURE — 99024 POSTOP FOLLOW-UP VISIT: CPT | Performed by: NURSE PRACTITIONER

## 2022-12-15 PROCEDURE — G0378 HOSPITAL OBSERVATION PER HR: HCPCS

## 2022-12-15 PROCEDURE — 85018 HEMOGLOBIN: CPT | Performed by: NURSE PRACTITIONER

## 2022-12-15 PROCEDURE — 85014 HEMATOCRIT: CPT | Performed by: NURSE PRACTITIONER

## 2022-12-15 RX ORDER — TRAMADOL HYDROCHLORIDE 50 MG/1
TABLET ORAL
Qty: 42 TABLET | Refills: 0 | Status: SHIPPED | OUTPATIENT
Start: 2022-12-15 | End: 2023-01-11

## 2022-12-15 RX ORDER — ASPIRIN 81 MG/1
TABLET ORAL
Qty: 60 TABLET | Refills: 0 | Status: SHIPPED | OUTPATIENT
Start: 2022-12-15

## 2022-12-15 RX ORDER — FAMOTIDINE 40 MG/1
40 TABLET, FILM COATED ORAL DAILY
Qty: 30 TABLET | Refills: 0 | Status: SHIPPED | OUTPATIENT
Start: 2022-12-16 | End: 2023-01-11

## 2022-12-15 RX ORDER — PSEUDOEPHEDRINE HCL 30 MG
100 TABLET ORAL 2 TIMES DAILY
Qty: 60 CAPSULE | Refills: 0 | Status: SHIPPED | OUTPATIENT
Start: 2022-12-15 | End: 2023-01-11

## 2022-12-15 RX ORDER — ONDANSETRON 4 MG/1
4 TABLET, FILM COATED ORAL EVERY 6 HOURS PRN
Qty: 10 TABLET | Refills: 0 | Status: SHIPPED | OUTPATIENT
Start: 2022-12-15 | End: 2023-01-11

## 2022-12-15 RX ORDER — POLYETHYLENE GLYCOL 3350 17 G/17G
17 POWDER, FOR SOLUTION ORAL DAILY
Qty: 10 PACKET | Refills: 0 | Status: SHIPPED | OUTPATIENT
Start: 2022-12-16 | End: 2022-12-26

## 2022-12-15 RX ADMIN — ASPIRIN 81 MG: 81 TABLET, COATED ORAL at 08:09

## 2022-12-15 RX ADMIN — TRAMADOL HYDROCHLORIDE 50 MG: 50 TABLET, COATED ORAL at 12:17

## 2022-12-15 RX ADMIN — TRAMADOL HYDROCHLORIDE 50 MG: 50 TABLET, COATED ORAL at 08:13

## 2022-12-15 RX ADMIN — LEVOTHYROXINE SODIUM 75 MCG: 0.07 TABLET ORAL at 08:09

## 2022-12-15 RX ADMIN — ACETAMINOPHEN 1000 MG: 500 TABLET, FILM COATED ORAL at 05:30

## 2022-12-15 RX ADMIN — ACETAMINOPHEN 1000 MG: 500 TABLET, FILM COATED ORAL at 13:19

## 2022-12-15 NOTE — PROGRESS NOTES
Case Management Discharge Note      Final Note: [facility]         Selected Continued Care - Discharged on 12/15/2022 Admission date: 12/13/2022 - Discharge disposition: Skilled Nursing Facility (DC - External)    Destination Coordination complete.    Service Provider Selected Services Address Phone Fax Patient Preferred    [facility] [address] [place] [address] [place], [facility] [address] [phone] --          Durable Medical Equipment    No services have been selected for the patient.              Dialysis/Infusion    No services have been selected for the patient.              Home Medical Care    No services have been selected for the patient.              Therapy    No services have been selected for the patient.              Community Resources    No services have been selected for the patient.              Community & DME    No services have been selected for the patient.                       Final Discharge Disposition Code: 03 - skilled nursing facility (SNF)

## 2022-12-15 NOTE — DISCHARGE INSTRUCTIONS
[facility] Discharge Instructions: Patient is to continue with physical therapy exercises twice daily and continue working with the physical therapist as ordered  and should be up walking every 2 hours during the day in addition to the physical therapy exercises. Patient may weight bear as tolerated unless otherwise specified. Continue to ice regularly. Do frequent ankle pumping exercises while you are sitting with legs elevated.  Patient also instructed on deep breathing, coughing, and using  incentive spirometer during hospitalization and encouraged to continue to use at home regularly.          VI. FOLLOW-UP VISITS:  Follow up in the office with Dr Anupam Ruiz in 2 weeks - If already scheduled (see Future Appointments) for date and time, if not yet scheduled, patient to call the office at [phone] to schedule. Prescriptions were given for pain medication, nausea, constipation, and blood thinner therapy.     If you have any concerns or suspected complications prior to your follow up visit, please call your surgeon's office. Do not wait until your appointment time if you suspect complications. These will need to be addressed in the office promptly.

## 2022-12-15 NOTE — PLAN OF CARE
Goal Outcome Evaluation:              Outcome Evaluation: Patient a/o x4, with some forgetfullness and a moment of confusion when waking up. Pt POD 1 for R posterior hip arthroplasty with marianna navigation. pt on RA, cont b/b, purewick in place. plan is d/c Thursday for rehab. Meds given as ordered. No new issues noted. See v/s and labs.

## 2022-12-15 NOTE — PROGRESS NOTES
Orthopedic Total Joint Progress Note        Patient: Bailee Garza    Date of Admission: 12/13/2022  5:20 AM    YOB: 1940    Medical Record Number: [id number]    Attending Physician: Anupam Ruiz MD      POD # 2 Days Post-Op Procedure(s) (LRB):  Posterior RIGHT TOTAL HIP ARTHROPLASTY MARCELINO NAVIGATION (Right)       Systemic or Specific Complaints: The patient has had a relatively normal postoperative course.  The patient has had no current complaints. The patient has had improving normal postoperative pain.  The patient has had no issues with the wound.  Patient in bed upon arrival daughter in the room.  She states she was in the chair most of the day yesterday and it was too long to sit there.  However she did do a little bit of physical therapy and was able to walk with them.  She is just frustrated with how weak she feels.  I explained to her that she will more than likely be going to the nursing home today.  I think this will be helpful for her as it will get her therapy more often and we can do a little bit by little bit to progress her.  They verbalized understanding.      Allergies:   Allergies   Allergen Reactions   • Hydrocodone Hallucinations   • Ketek [Telithromycin] Hives   • Nsaids Hives   • Sulfa Antibiotics Hives       Medications:   Current Medications:  Scheduled Meds:acetaminophen, 1,000 mg, Oral, Q8H  aspirin, 81 mg, Oral, Q12H  docusate sodium, 100 mg, Oral, BID  famotidine, 40 mg, Oral, Daily  hydroCHLOROthiazide, 12.5 mg, Oral, Daily  levothyroxine, 75 mcg, Oral, Daily  polyethylene glycol, 17 g, Oral, Daily  valsartan, 80 mg, Oral, Daily      Continuous Infusions:   PRN Meds:.•  bisacodyl  •  bisacodyl  •  HYDROmorphone **AND** naloxone  •  magnesium hydroxide  •  ondansetron **OR** ondansetron  •  traMADol  •  traMADol      Physical Exam: 82 y.o. female   Wt Readings from Last 3 Encounters:   12/13/22 48.4 kg (106 lb 11.2 oz)   12/06/22 48.4 kg (106 lb 11.2 oz)  "  11/28/22 47.2 kg (104 lb)     Ht Readings from Last 3 Encounters:   12/13/22 162.6 cm (64\")   12/06/22 162.6 cm (64\")   11/28/22 162.6 cm (64\")     Body mass index is 18.32 kg/m².    Vitals:    12/14/22 1356 12/14/22 1757 12/14/22 2235 12/15/22 0627   BP: 96/58 97/61 90/54 90/48   BP Location: Left arm Left arm Right arm Right arm   Patient Position: Lying Lying Lying Lying   Pulse: 84 104 101 85   Resp: 16 16 16 16   Temp: 98 °F (36.7 °C) 99.8 °F (37.7 °C) 98.8 °F (37.1 °C) 98 °F (36.7 °C)   TempSrc: Oral Oral Oral Oral   SpO2: 94% 91% 95% 95%   Weight:       Height:            General Appearance:    General: alert and oriented         Abdomen/:     soft non-tender, non-distended, voiding without difficulty       Extremities:   Operative extremity neurovascular status intact. ROM appropriate.  Incision intact w/out signs or symptoms of infection.  No cyanosis, calf is soft and nontender.  Optifoam dressing clean dry and intact no signs of drainage or active bleeding.  Hip soft, no palpable hematomas.     Activity: Mobilizing Per P.T.   Weight Bearing: As Tolerated    Diagnostic Tests:   Admission on 12/13/2022   Component Date Value Ref Range Status   • Hemoglobin 12/14/2022 8.9 (L)  12.0 - 15.9 g/dL Final   • Hematocrit 12/14/2022 26.8 (L)  34.0 - 46.6 % Final   • Hemoglobin 12/15/2022 8.9 (L)  12.0 - 15.9 g/dL Final   • Hematocrit 12/15/2022 27.2 (L)  34.0 - 46.6 % Final       Imaging Results (Last 72 Hours)     Procedure Component Value Units Date/Time    XR Hip 1 View Without Pelvis Right (Surgery Only) [[id number]] Collected: 12/13/22 1037     Updated: 12/13/22 1041    Narrative:      XR HIP 1 VIEW WO PELVIS RIGHT-  12/13/2022     HISTORY: Status post right hip arthroplasty.     The acetabular and proximal femoral components of the right hip  prosthesis are well-seated with no abnormal surrounding bony lucencies.  Soft tissue air is seen. No unexpected findings are noted.       Impression:      1. " Satisfactory postoperative appearance of the right hip.     This report was finalized on 12/13/2022 10:38 AM by Dr. Arun Romero M.D.             Personally viewed ortho images and report     Assessment:  - Doing well 2 Days Post-Op following total joint replacement  - Acute Blood Loss Anemia, preoperative hemoglobin 11.5, postop day 2 hemoglobin 8.9 - stable  - Post-operative Pain  - Limited mobility, requires use of walker and assistance when OOB.    Patient Active Problem List   Diagnosis   • Chronic midline low back pain without sciatica   • Essential hypertension   • Hearing loss   • Hypothyroidism, acquired   • IBS (irritable bowel syndrome)   • Chronic nonseasonal allergic rhinitis due to pollen   • Stage 3 chronic kidney disease (HCC)   • Arthralgia of right knee   • DDD (degenerative disc disease), lumbosacral   • Scoliosis due to degenerative disease of spine in adult patient   • Status post total replacement of left hip   • Status post total hip replacement, left   • Vitamin D deficiency   • Elevated BUN   • Elevated serum creatinine   • Elevated hemoglobin (HCC)   • Arthritis of right hip   • Anemia, macrocytic   • COVID-19 virus detected        Plan:    - Consults: none  - Continue to monitor labs and/or v/s, for tolerance to post op blood loss.  - Continue efforts to increase mobilization.  - Continue Pain Control Measures.  - Continue incisional Care.  - DVT prophylaxis - Aspirin  - Follow up in office with Anupam Ruiz M.D. In 2 weeks.    Discharge Plan:today to [facility] when bed ready and when cleared by physical therapy as safe for discharge    Date: 12/15/2022  TERESA Esparza

## 2022-12-15 NOTE — PLAN OF CARE
Goal Outcome Evaluation:POD1 Right posterior total hip, VSS, afebrile, pain controlled with po Ultram. Voiding well, worked with therapy today, sat up in chair, ambulated to BR and back to chair. Dressing c/d/I, Plan to go to SNF tomorrow at [place].

## 2022-12-15 NOTE — DISCHARGE SUMMARY
Orthopedic Discharge Summary      Patient: Bailee Garza  YOB: 1940  Medical Record Number: [id number]    Attending Physician: Anupam Ruiz MD  Consulting Physician(s):   Consults     No orders found from 11/14/2022 to 12/14/2022.          Date of Admission: 12/13/2022  5:20 AM  Date of Discharge: 12/15/2022    Discharge Diagnosis: NC TOTAL HIP ARTHROPLASTY [88637] (Posterior RIGHT TOTAL HIP ARTHROPLASTY MARCELINO NAVIGATION),   Acute Blood Loss Anemia, stable  Post-operative Pain  Limited mobility, requires use of walker and assistance when OOB.    Presenting Problem/History of Present Illness: Arthritis of right hip [M16.11]    Allergies:   Allergies   Allergen Reactions   • Hydrocodone Hallucinations   • Ketek [Telithromycin] Hives   • Nsaids Hives   • Sulfa Antibiotics Hives       Discharge Medications       Discharge Medications      New Medications      Instructions Start Date   aspirin 81 MG EC tablet   Take 1 tablet twice a day for the first 2 weeks, then daily      docusate sodium 100 MG capsule   100 mg, Oral, 2 Times Daily      famotidine 40 MG tablet  Commonly known as: PEPCID   40 mg, Oral, Daily   Start Date: December 16, 2022     ondansetron 4 MG tablet  Commonly known as: ZOFRAN   4 mg, Oral, Every 6 Hours PRN      polyethylene glycol 17 g packet  Commonly known as: MIRALAX   17 g, Oral, Daily   Start Date: December 16, 2022     traMADol 50 MG tablet  Commonly known as: ULTRAM   Take 1-2 tablets PO every 4-6 hours as needed for severe pain         Continue These Medications      Instructions Start Date   hydroCHLOROthiazide 12.5 MG tablet  Commonly known as: HYDRODIURIL   12.5 mg, Oral, Daily      polycarbophil 625 MG tablet tablet   625 mg, Oral, Daily      STAHIST AD PO   Oral, As Needed      Synthroid 75 MCG tablet  Generic drug: levothyroxine   75 mcg, Oral, Daily      valsartan 80 MG tablet  Commonly known as: DIOVAN   80 mg, Oral, Daily               Past Medical History:    Diagnosis Date   • Abnormal CXR 2017   • Adverse reaction to narcotic drug     SEVERE HALLUCINATIONS POST OP LEFT HIP PLACEMENT   • Allergies    • Arthritis    • Arthritis of foot 2020   • Arthropathy of pelvic region and thigh 2012    unspecified   • Back pain 2007   • Chronic back pain 2009   • Chronic cough 2017   • Closed nondisplaced fracture of lateral malleolus of fibula with delayed healing 2020   • Closed nondisplaced fracture of medial cuneiform of left foot 2018   • Constipation 2015    unspecified   • COVID-19 vaccination refused    • Diverticulosis    • Dyspepsia 2008   • Essential hypertension 2007   • Exertional shortness of breath 2017   • Fall 2018   • Family history of abdominal aortic aneurysm (AAA) 2019     aaa screen limited (04/10/2019 10:32)    • 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), 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   • Hearing loss 2008   • Hip pain, left    • History of bone density study 2015   • History of pneumonia     2017   • History of skin cancer    • Hypothyroidism, acquired 2007   • Insomnia 2015    unspecified   • Intervertebral disc disorder with myelopathy, cervical region 2010   • Joint capsule tear 2012    unspecified   • OA (osteoarthritis) of hip 2018   • Other synovitis and tenosynovitis, right ankle and foot 2020   • Paranoia (HCC)     PT IS VERY CONCERNED & FEARFUL THAT THE HOSPITAL STAFF WILL GIVE HER THE COVID VACCINE WHILE HERE IN THE HOSPITAL & BELIEVES THAT THE COVID SWAB TEST HAS COVID ON THE SWAB & THAT SHE WILL GET COVID BEING TESTED. ATTEMPTED TO REASSURE PT   • Peroneal tendonitis, right 2020   • Rhinitis, allergic 2007   • Scoliosis    • Screening breast examination 2007   • Stress 2015    other acute reactions to stress   • Stress incontinence    • Trochanteric  bursitis, left hip 02/04/2019   • Urticaria 06/29/2015        Past Surgical History:   Procedure Laterality Date   • BREAST EXCISIONAL BIOPSY Right     50+ years ago   • BRONCHOSCOPY N/A 12/21/2017    Procedure: BRONCHOSCOPY;  Surgeon: Mario Alanis MD;  Location: [facility] ENDOSCOPY;  Service:    • CATARACT EXTRACTION, BILATERAL     • COLONOSCOPY     • HYSTERECTOMY     • TOTAL HIP ARTHROPLASTY Left 07/24/2018    Procedure: LEFT TOTAL HIP ARTHROPLASTY;  Surgeon: Justen Mann MD;  Location: [facility] MAIN OR;  Service: Orthopedics   • TOTAL HIP ARTHROPLASTY Right 12/13/2022    Procedure: Posterior RIGHT TOTAL HIP ARTHROPLASTY MARCELINO NAVIGATION;  Surgeon: Anupam Ruiz MD;  Location: [facility] OR OSC;  Service: Orthopedics;  Laterality: Right;        Social History     Occupational History     Employer: [employer]   Tobacco Use   • Smoking status: Never   • Smokeless tobacco: Never   Vaping Use   • Vaping Use: Never used   Substance and Sexual Activity   • Alcohol use: No   • Drug use: Never   • Sexual activity: Defer      Social History     Social History Narrative   • Not on file        Family History   Problem Relation Age of Onset   • Heart disease Mother    • Aneurysm Mother    • Colon cancer Father    • Aneurysm Brother    • Breast cancer Paternal Grandmother    • Asthma Paternal Grandfather    • Malig Hyperthermia Neg Hx    • Ovarian cancer Neg Hx          Physical Exam: 82 y.o. female   Body mass index is 18.32 kg/m².  Facility age limit for growth percentiles is 20 years.  Vitals:    12/15/22 0627   BP: 90/48   Pulse: 85   Resp: 16   Temp: 98 °F (36.7 °C)   SpO2: 95%         General Appearance:    Alert, cooperative, in no acute distress                      Vitals:    12/14/22 1356 12/14/22 1757 12/14/22 2235 12/15/22 0627   BP: 96/58 97/61 90/54 90/48   BP Location: Left arm Left arm Right arm Right arm   Patient Position: Lying Lying Lying Lying   Pulse: 84 104 101 85   Resp: 16  16 16 16   Temp: 98 °F (36.7 °C) 99.8 °F (37.7 °C) 98.8 °F (37.1 °C) 98 °F (36.7 °C)   TempSrc: Oral Oral Oral Oral   SpO2: 94% 91% 95% 95%   Weight:       Height:            DIAGNOSTIC TESTS:   Admission on 12/13/2022   Component Date Value Ref Range Status   • Hemoglobin 12/14/2022 8.9 (L)  12.0 - 15.9 g/dL Final   • Hematocrit 12/14/2022 26.8 (L)  34.0 - 46.6 % Final   • Hemoglobin 12/15/2022 8.9 (L)  12.0 - 15.9 g/dL Final   • Hematocrit 12/15/2022 27.2 (L)  34.0 - 46.6 % Final       Imaging Results (Last 72 Hours)     Procedure Component Value Units Date/Time    XR Hip 1 View Without Pelvis Right (Surgery Only) [[id number]] Collected: 12/13/22 1037     Updated: 12/13/22 1041    Narrative:      XR HIP 1 VIEW WO PELVIS RIGHT-  12/13/2022     HISTORY: Status post right hip arthroplasty.     The acetabular and proximal femoral components of the right hip  prosthesis are well-seated with no abnormal surrounding bony lucencies.  Soft tissue air is seen. No unexpected findings are noted.       Impression:      1. Satisfactory postoperative appearance of the right hip.     This report was finalized on 12/13/2022 10:38 AM by Dr. Arun Romero M.D.             Hospital Course:  82 y.o. female admitted to [facility] to services of Anupam Ruiz MD with Arthritis of right hip [M16.11] on 12/13/2022 and underwent ID TOTAL HIP ARTHROPLASTY [[id number]] (Posterior RIGHT TOTAL HIP ARTHROPLASTY MARCELINO NAVIGATION)  Per Anupam Ruiz MD. Antibiotic and VTE prophylaxis were per SCIP protocols. Post-operatively the patient transferred to the post-operative floor where the patient underwent mobilization therapy that included active as well as passive ROM exercises. Opioids were titrated to achieve appropriate pain management to allow for participation in mobilization exercises. Vital signs are now stable. On the day of discharge the wound was clean, dry and intact and calf was soft and nontender and Homans sign was  negative. Operative extremity neurovascular status remains intact.   Appropriate education re: incision care, activity levels, medications, and follow up visits was completed and all questions were answered. The patient is now deemed stable for discharge.    Condition on Discharge:  Stable    Total Joint Replacement Discharge Instructions: Patient is to continue with physical therapy exercises twice daily and continue working with the physical therapist as ordered  and should be up walking every 2 hours during the day in addition to the physical therapy exercises. Patient may weight bear as tolerated unless otherwise specified. Continue to ice regularly. Do frequent ankle pumping exercises while you are sitting with legs elevated.  Patient also instructed on deep breathing, coughing, and using  incentive spirometer during hospitalization and encouraged to continue to use at home regularly.        VI. FOLLOW-UP VISITS:  ? Follow up in the office with Dr Anupam Ruiz in 2 weeks - If already scheduled (see Future Appointments) for date and time, if not yet scheduled, patient to call the office at [phone] to schedule. Prescriptions were given for pain medication, nausea, constipation, and blood thinner therapy.    ? If you have any concerns or suspected complications prior to your follow up visit, please call your surgeon's office. Do not wait until your appointment time if you suspect complications. These will need to be addressed in the office promptly.      Future Appointments   Date Time Provider Department Center   12/29/2022  2:00 PM Suma Seaman APRN [facility]   4/13/2023  9:00 AM Eddie Araya MD [id number]   8/17/2023 12:30 PM LAB CHAIR 1 [facility] LAB [facility]   8/17/2023  1:00 PM Mac Main MD PhD [facility]     Additional Instructions for the Follow-ups that You Need to Schedule     Ambulatory Referral to Home Health   As directed      Face to Face Visit Date:  12/15/2022    Follow-up provider for Plan of Care?: I will be treating the patient on an ongoing basis.  Please send me the Plan of Care for signature.    Follow-up provider: ANUPAM HOLCOMB [[id number]]    Reason/Clinical Findings: post surgical    Describe mobility limitations that make leaving home difficult: Requires the assistance of another to leave home    Nursing/Therapeutic Services Requested: Physical Therapy    PT orders: Total joint pathway    Frequency: 1 Week 1         Ambulatory Referral to Physical Therapy Evaluate and treat, POST OP   As directed      Right CHAS, please schedule for approximately 2 weeks postoperatively    Order Comments: Right CHAS, please schedule for approximately 2 weeks postoperatively     Specialty needed: Evaluate and treat POST OP    Follow-up needed: Yes         Discharge Follow-up with Specialty: Orthopedics; 2 Weeks   As directed      Specialty: Orthopedics    Follow Up: 2 Weeks    Follow Up Details: Return to the office to see Dr. Anupam Holcomb               Discharge Disposition Plan:today to [place] when bed ready and when cleared by physical therapy as safe for discharge    Date: 12/15/2022    TERESA Esparza    Dictated Utilizing Dragon Dictation

## 2022-12-20 ENCOUNTER — TELEPHONE (OUTPATIENT)
Dept: ORTHOPEDIC SURGERY | Facility: HOSPITAL | Age: 82
End: 2022-12-20

## 2022-12-20 NOTE — TELEPHONE ENCOUNTER
Attempted to speak with Ms. Garza to see how she is doing as she is 1 week SP CHAS. Message left at this time.

## 2022-12-27 ENCOUNTER — TELEPHONE (OUTPATIENT)
Dept: FAMILY MEDICINE CLINIC | Facility: CLINIC | Age: 82
End: 2022-12-27

## 2022-12-27 NOTE — TELEPHONE ENCOUNTER
Caller: ERICA GUTIERREZ    Relationship:     Best call back number: [phone]    What orders are you requesting (i.e. lab or imaging): PHYSICAL THERAPY AND OCCUPATIONAL THERAPY EVALUATION     In what timeframe would the patient need to come in: 2 DAYS UPON DISCHARGE     Where will you receive your lab/imaging services: IN PATIENT'S HOME     Additional notes: [facility]

## 2022-12-29 ENCOUNTER — OFFICE VISIT (OUTPATIENT)
Dept: ORTHOPEDIC SURGERY | Facility: CLINIC | Age: 82
End: 2022-12-29

## 2022-12-29 VITALS — TEMPERATURE: 98.6 F | HEIGHT: 64 IN | BODY MASS INDEX: 17.09 KG/M2 | WEIGHT: 100.1 LBS

## 2022-12-29 DIAGNOSIS — R52 PAIN: Primary | ICD-10-CM

## 2022-12-29 DIAGNOSIS — Z96.641 S/P HIP REPLACEMENT, RIGHT: ICD-10-CM

## 2022-12-29 PROCEDURE — 73502 X-RAY EXAM HIP UNI 2-3 VIEWS: CPT | Performed by: NURSE PRACTITIONER

## 2022-12-29 PROCEDURE — 99024 POSTOP FOLLOW-UP VISIT: CPT | Performed by: NURSE PRACTITIONER

## 2022-12-29 NOTE — PROGRESS NOTES
Bailee Garza : 1940 MRN: [id number] DATE: 2022  Body mass index is 17.18 kg/m².  Vitals:    22 1417   Temp: 98.6 °F (37 °C)         DIAGNOSIS: 2 week follow up right total hip     SUBJECTIVE:Patient returns today for 2 week follow up of right total hip replacement.  Patient ambulates to appointment with walker.  Son and grandchild present.  She states she is doing so well.  She is no longer taking pain medication.  She came home from rehab last week.  She is back in her home.  She is still happy with the hip she has no pain like she did before just mild discomfort.  She has no other complaints at this time.    OBJECTIVE:   Exam:. The incision is healing appropriately. No sign of infection. Range of motion is progressing as expected. The calf is soft and nontender with a negative Homans sign.    DIAGNOSTIC STUDIES  2V AP&Lat of the right hip were done in the office today  Indication, findings and comparison: images were reviewed for evaluation of recent hip replacement. They demonstrate a well positioned, well aligned hip replacement without complicating factors noted. In comparison with previous films there has been interval implant placement.    ASSESSMENT: 2 week status post right hip replacement expected healing.    PLAN: 1) Incision open to air, Steri-Strips applied   2) Order given for PT and pain medicine (as needed)   3)Continue ice PRN   4) Start taking aspirin 81 mg daily until 6 weeks out from surgery   5) Follow up in 4 weeks with repeat Xrays of right hip (2 views)   6) Wait for 3 months after surgery for routine teeth cleaning and continue with taking a dose of antibiotics before dental procedures for 2 yrs post total joint replacement.    TERESA Esparza  2022     Dictated Utilizing Dragon Dictation

## 2023-01-03 ENCOUNTER — TELEPHONE (OUTPATIENT)
Dept: FAMILY MEDICINE CLINIC | Facility: CLINIC | Age: 83
End: 2023-01-03

## 2023-01-03 NOTE — TELEPHONE ENCOUNTER
Caller: CECILIO    Relationship: [facility]    Best call back number: [phone]    What orders are you requesting (i.e. lab or imaging): VERBAL ORDERS FOR PHYSICAL THERAPY TWO TIMES A WEEK FOR TWO WEEKS, THEN ONE TIME A WEEK FOR TWO WEEKS     ORDER TO ADD ORTHOPEDIC SURGEON TO PLAN OF CARE, DR. ALEENA HOLCOMB MD    In what timeframe would the patient need to come in: ASAP    Where will you receive your lab/imaging services: [facility]     Additional notes: CECILIO WITH [facility] CALLED AND SAID THAT HE WOULD LIKE TO HAVE THESE VERBAL ORDERS

## 2023-01-11 ENCOUNTER — OFFICE VISIT (OUTPATIENT)
Dept: FAMILY MEDICINE CLINIC | Facility: CLINIC | Age: 83
End: 2023-01-11
Payer: MEDICARE

## 2023-01-11 VITALS
RESPIRATION RATE: 18 BRPM | OXYGEN SATURATION: 98 % | BODY MASS INDEX: 17.93 KG/M2 | DIASTOLIC BLOOD PRESSURE: 58 MMHG | SYSTOLIC BLOOD PRESSURE: 112 MMHG | WEIGHT: 105 LBS | HEIGHT: 64 IN | TEMPERATURE: 98.2 F | HEART RATE: 82 BPM

## 2023-01-11 DIAGNOSIS — Z09 HOSPITAL DISCHARGE FOLLOW-UP: Primary | ICD-10-CM

## 2023-01-11 DIAGNOSIS — N18.31 STAGE 3A CHRONIC KIDNEY DISEASE: ICD-10-CM

## 2023-01-11 DIAGNOSIS — Z96.641 S/P HIP REPLACEMENT, RIGHT: ICD-10-CM

## 2023-01-11 DIAGNOSIS — E03.9 HYPOTHYROIDISM, ACQUIRED: ICD-10-CM

## 2023-01-11 DIAGNOSIS — Z13.6 SCREENING FOR ISCHEMIC HEART DISEASE: ICD-10-CM

## 2023-01-11 DIAGNOSIS — I10 ESSENTIAL HYPERTENSION: ICD-10-CM

## 2023-01-11 DIAGNOSIS — E55.9 VITAMIN D DEFICIENCY: ICD-10-CM

## 2023-01-11 PROCEDURE — 99214 OFFICE O/P EST MOD 30 MIN: CPT | Performed by: FAMILY MEDICINE

## 2023-01-11 NOTE — PROGRESS NOTES
"Chief Complaint  Hospital Follow Up Visit (12/13-Rt hip replacement )    David Morocho presents to [facility]     To follow-up from hospital stay.  She had right hip replacement in mid December.  She is doing quite well.  Medication list has been reconciled during today's visit.  She has an appointment with orthopedist on January 26.        Objective   Vital Signs:   Vitals:    01/11/23 0844   BP: 112/58   Pulse: 82   Resp: 18   Temp: 98.2 °F (36.8 °C)   SpO2: 98%   Weight: 47.6 kg (105 lb)   Height: 162.6 cm (64\")                Physical Exam  Vitals reviewed.   Constitutional:       General: She is not in acute distress.  Cardiovascular:      Rate and Rhythm: Normal rate and regular rhythm.      Heart sounds: Normal heart sounds.   Pulmonary:      Effort: Pulmonary effort is normal.      Breath sounds: Normal breath sounds.   Musculoskeletal:      Right hip: Normal range of motion.   Neurological:      General: No focal deficit present.      Mental Status: She is alert.   Psychiatric:         Attention and Perception: Attention normal.         Mood and Affect: Mood normal.         Behavior: Behavior normal.          Result Review :                Assessment and Plan    Diagnoses and all orders for this visit:    1. Hospital discharge follow-up (Primary)    2. S/P hip replacement, right  Assessment & Plan:  Doing very well. Continue with current postoperative follow up as planned.       3. Essential hypertension  Assessment & Plan:  Condition is stable. The current medical regimen is effective;  continue present plan and medications.    Orders:  -     Comprehensive Metabolic Panel; Future  -     CBC & Differential; Future    4. Hypothyroidism, acquired  -     TSH; Future    5. Stage 3a chronic kidney disease (HCC)  -     Comprehensive Metabolic Panel; Future    6. Vitamin D deficiency  -     Vitamin D,25-Hydroxy; Future    7. Screening for ischemic heart disease  -     " Lipid Panel With / Chol / HDL Ratio; Future  -     CK; Future      Follow Up   Return in about 22 weeks (around 6/14/2023) for Medicare Wellness & regular visit, [id number] min.  Patient was given instructions and counseling regarding her condition or for health maintenance advice. Please see specific information pulled into the AVS if appropriate.

## 2023-01-26 ENCOUNTER — OFFICE VISIT (OUTPATIENT)
Dept: ORTHOPEDIC SURGERY | Facility: CLINIC | Age: 83
End: 2023-01-26
Payer: MEDICARE

## 2023-01-26 VITALS — BODY MASS INDEX: 17.93 KG/M2 | TEMPERATURE: 96.9 F | WEIGHT: 105 LBS | HEIGHT: 64 IN

## 2023-01-26 DIAGNOSIS — Z96.641 STATUS POST TOTAL REPLACEMENT OF RIGHT HIP: Primary | ICD-10-CM

## 2023-01-26 DIAGNOSIS — R26.89 BALANCE PROBLEM: ICD-10-CM

## 2023-01-26 PROCEDURE — 73502 X-RAY EXAM HIP UNI 2-3 VIEWS: CPT | Performed by: ORTHOPAEDIC SURGERY

## 2023-01-26 PROCEDURE — 99024 POSTOP FOLLOW-UP VISIT: CPT | Performed by: ORTHOPAEDIC SURGERY

## 2023-01-26 NOTE — PROGRESS NOTES
Follows up today about 6 weeks or so status post right total hip replacement.  She is doing well with it.  Walking her dog and had some pain the other day but got better.  He complains of some balance issues that may be chronic.  Exam today she is so she is able to transfer and walk without difficulty incisions healing nicely and no significant pain to palpation.  X-rays AP of the hips lateral right hip taken the office today for postop follow-up with comparison view shows well aligned total hip replacements with the symmetric leg lengths.  She does have degenerative changes in her visualized portion of her spine plan went over postop total hip recommendations and advice we will see her back in 2 months with x-rays unless she needs a sooner but encouraged home program.  Also she has had some balance issues so encourage her to use a cane and I will put her in some therapy to once again work on muscular rehab of her total hip but also some balance therapies as well.

## 2023-02-07 ENCOUNTER — TELEPHONE (OUTPATIENT)
Dept: ORTHOPEDIC SURGERY | Facility: CLINIC | Age: 83
End: 2023-02-07

## 2023-02-07 NOTE — TELEPHONE ENCOUNTER
Scheduled patient to see Joseph in the morning patient thinks she can wait the er out but will call our office if she decides to make a er visit

## 2023-02-07 NOTE — TELEPHONE ENCOUNTER
Caller: PATIENT    Relationship to patient: SELF    Best call back number: [phone]    Patient is needing: PATIENT WAS CALLING TO TALK TO THE CLINICAL STAFF ABOUT AN ISSUE WITH HER RIGHT HIP. PATIENT STATED SHE TOOK HER DOG ON A LONGER WALK TODAY AND WAS DOING MULTIPLE THINGS AROUND THE HOUSE BUT NOTICED ONCE SHE TURNED A CERTAIN WAY HER RIGHT HIP STARTED BOTHERING HER. PATIENT IS CONCERNED SHE MIGHT HAVE DONE SOMETHING TO HER RIGHT HIP SINCE SHE JUST HAD SURGERY WITH DR. HOLCOMB ON 12.13.22. I ATTEMPTED TO WARM TRANSFER CALL TO THE OFFICE BUT RECEIVED NO ANSWER. THANK YOU!

## 2023-02-07 NOTE — TELEPHONE ENCOUNTER
If she is having significant pain and cannot walk she would need to go to the ER for evaluation. She may have over done it since she is only about 7 weeks post-op. She needs to take it slow and listen to her body.  If she is able to walk and still having some pain we can get her scheduled with Dr. Ruiz to come in for an xray to check the hip. Thank you!

## 2023-02-08 ENCOUNTER — OFFICE VISIT (OUTPATIENT)
Dept: ORTHOPEDIC SURGERY | Facility: CLINIC | Age: 83
End: 2023-02-08
Payer: MEDICARE

## 2023-02-08 VITALS — WEIGHT: 105 LBS | BODY MASS INDEX: 17.93 KG/M2 | HEIGHT: 64 IN | TEMPERATURE: 98.6 F

## 2023-02-08 DIAGNOSIS — R52 PAIN: Primary | ICD-10-CM

## 2023-02-08 DIAGNOSIS — Z96.643 STATUS POST TOTAL REPLACEMENT OF BOTH HIPS: ICD-10-CM

## 2023-02-08 DIAGNOSIS — M70.61 TROCHANTERIC BURSITIS OF RIGHT HIP: ICD-10-CM

## 2023-02-08 PROCEDURE — 73502 X-RAY EXAM HIP UNI 2-3 VIEWS: CPT | Performed by: ORTHOPAEDIC SURGERY

## 2023-02-08 PROCEDURE — 99024 POSTOP FOLLOW-UP VISIT: CPT | Performed by: ORTHOPAEDIC SURGERY

## 2023-02-08 NOTE — PROGRESS NOTES
"Patient Name: Bailee Garza   YOB: 1940  Referring Primary Care Physician: Eddie Araya MD  BMI: Body mass index is 18.02 kg/m².    Chief Complaint:    Chief Complaint   Patient presents with   • Right Hip - Follow-up        HPI:     Bailee Garza is a 82 y.o. female who presents today for evaluation of   Chief Complaint   Patient presents with   • Right Hip - Follow-up   .  Brigitte is seen today complaint of right hip pain.  She had a recent total hip replacement a few months ago and it sounds like she overdid it lifting and getting around she says \"you know I am busy\" she seen with a attending person today.  She is back on her walker and she was afraid she \"popped it out or cracked the bone\".  It hurt her a lot yesterday and into the evening but it is almost better today.      Subjective   Medications:   Home Medications:  Current Outpatient Medications on File Prior to Visit   Medication Sig   • aspirin 81 MG EC tablet Take 1 tablet twice a day for the first 2 weeks, then daily  Indications: VTE Prophylaxis   • calcium polycarbophil (FIBERCON) 625 MG tablet Take 625 mg by mouth Daily.   • Chlorcyclizine-Pseudoephed (STAHIST AD PO) Take  by mouth As Needed.   • hydroCHLOROthiazide (HYDRODIURIL) 12.5 MG tablet Take 1 tablet by mouth Daily for 270 days.   • Synthroid 75 MCG tablet Take 1 tablet by mouth Daily for 270 days.   • valsartan (DIOVAN) 80 MG tablet Take 1 tablet by mouth Daily for 270 days.     Current Facility-Administered Medications on File Prior to Visit   Medication   • Chlorhexidine Gluconate Cloth 2 % pads 1 each     Current Medications:  Scheduled Meds:  Continuous Infusions:No current facility-administered medications for this visit.    PRN Meds:.    I have reviewed the patient's medical history in detail and updated the computerized patient record.  Review and summarization of old records includes:    Past Medical History:   Diagnosis Date   • Abnormal CXR 12/18/2017   • " Adverse reaction to narcotic drug     SEVERE HALLUCINATIONS POST OP LEFT HIP PLACEMENT   • Allergies    • Arthritis    • Arthritis of foot 2020   • Arthropathy of pelvic region and thigh 2012    unspecified   • Back pain 2007   • Chronic back pain 2009   • Chronic cough 2017   • Closed nondisplaced fracture of lateral malleolus of fibula with delayed healing 2020   • Closed nondisplaced fracture of medial cuneiform of left foot 2018   • Constipation 2015    unspecified   • COVID-19 vaccination refused    • Diverticulosis    • Dyspepsia 2008   • Essential hypertension 2007   • Exertional shortness of breath 2017   • Fall 2018   • Family history of abdominal aortic aneurysm (AAA) 2019     aaa screen limited (04/10/2019 10:32)    • 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), 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   • Hearing loss 2008   • Hip pain, left    • History of bone density study 2015   • History of pneumonia     2017   • History of skin cancer    • Hypothyroidism, acquired 2007   • Insomnia 2015    unspecified   • Intervertebral disc disorder with myelopathy, cervical region 2010   • Joint capsule tear 2012    unspecified   • OA (osteoarthritis) of hip 2018   • Other synovitis and tenosynovitis, right ankle and foot 2020   • Paranoia (HCC)     PT IS VERY CONCERNED & FEARFUL THAT THE HOSPITAL STAFF WILL GIVE HER THE COVID VACCINE WHILE HERE IN THE HOSPITAL & BELIEVES THAT THE COVID SWAB TEST HAS COVID ON THE SWAB & THAT SHE WILL GET COVID BEING TESTED. ATTEMPTED TO REASSURE PT   • Peroneal tendonitis, right 2020   • Rhinitis, allergic 2007   • Scoliosis    • Screening breast examination 2007   • Stress 2015    other acute reactions to stress   • Stress incontinence    • Trochanteric bursitis, left hip 2019   • Urticaria  06/29/2015        Past Surgical History:   Procedure Laterality Date   • BREAST EXCISIONAL BIOPSY Right     50+ years ago   • BRONCHOSCOPY N/A 12/21/2017    Procedure: BRONCHOSCOPY;  Surgeon: Mario Alanis MD;  Location: [facility] ENDOSCOPY;  Service:    • CATARACT EXTRACTION, BILATERAL     • COLONOSCOPY     • HYSTERECTOMY     • TOTAL HIP ARTHROPLASTY Left 07/24/2018    Procedure: LEFT TOTAL HIP ARTHROPLASTY;  Surgeon: Justen Mann MD;  Location: [facility] MAIN OR;  Service: Orthopedics   • TOTAL HIP ARTHROPLASTY Right 12/13/2022    Procedure: Posterior RIGHT TOTAL HIP ARTHROPLASTY MARCELINO NAVIGATION;  Surgeon: Anupam Ruiz MD;  Location:  [facility];  Service: Orthopedics;  Laterality: Right;        Social History     Occupational History     Employer: [employer]   Tobacco Use   • Smoking status: Never   • Smokeless tobacco: Never   Vaping Use   • Vaping Use: Never used   Substance and Sexual Activity   • Alcohol use: No   • Drug use: Never   • Sexual activity: Defer      Social History     Social History Narrative   • Not on file        Family History   Problem Relation Age of Onset   • Heart disease Mother    • Aneurysm Mother    • Colon cancer Father    • Aneurysm Brother    • Breast cancer Paternal Grandmother    • Asthma Paternal Grandfather    • Malig Hyperthermia Neg Hx    • Ovarian cancer Neg Hx        ROS: 14 point review of systems was performed and all other systems were reviewed and are negative except for documented findings in HPI and today's encounter.     Allergies:   Allergies   Allergen Reactions   • Hydrocodone Hallucinations   • Ketek [Telithromycin] Hives   • Nsaids Hives   • Sulfa Antibiotics Hives     Constitutional:  Denies fever, shaking or chills   Eyes:  Denies change in visual acuity   HENT:  Denies nasal congestion or sore throat   Respiratory:  Denies cough or shortness of breath   Cardiovascular:  Denies chest pain or severe LE edema   GI:  Denies  "abdominal pain, nausea, vomiting, bloody stools or diarrhea   Musculoskeletal:  Numbness, tingling, pain, or loss of motor function only as noted above in history of present illness.  : Denies painful urination or hematuria  Integument:  Denies rash, lesion or ulceration   Neurologic:  Denies headache or focal weakness  Endocrine:  Denies lymphadenopathy  Psych:  Denies confusion or change in mental status   Hem:  Denies active bleeding    OBJECTIVE:  Physical Exam: 82 y.o. female  Wt Readings from Last 3 Encounters:   02/08/23 47.6 kg (105 lb)   01/26/23 47.6 kg (105 lb)   01/11/23 47.6 kg (105 lb)     Ht Readings from Last 1 Encounters:   02/08/23 162.6 cm (64\")     Body mass index is 18.02 kg/m².  Vitals:    02/08/23 0820   Temp: 98.6 °F (37 °C)     Vital signs reviewed.     General Appearance:    Alert, cooperative, in no acute distress                  Eyes: conjunctiva clear  ENT: external ears and nose atraumatic  CV: no peripheral edema  Resp: normal respiratory effort  Skin: no rashes or wounds; normal turgor  Psych: mood and affect appropriate  Lymph: no nodes appreciated  Neuro: gross sensation intact  Vascular:  Palpable peripheral pulse in noted extremity  Musculoskeletal Extremities: Exam today she can axial load her leg is nontender she can transfer and ambulate she is mildly tender right greater trochanter which is where her pain was she does not have groin tenderness she also has little bit of sacroiliac tenderness as well    Radiology:   DePuy of the hips lateral right hip taken the office today for complaints of pain with comparison views show good position of her components bilateral hip replacements.        Assessment:     ICD-10-CM ICD-9-CM   1. Pain  R52 780.96   2. Trochanteric bursitis of right hip  M70.61 726.5   3. Status post total replacement of both hips  Z96.643 V43.64        MDM/Plan:   The diagnosis(es), natural history, pathophysiology and treatment for diagnosis(es) were " discussed. Opportunity given and questions answered.  Biomechanics of pertinent body areas discussed.  When appropriate, the use of ambulatory aids discussed.    EXERCISES:  Advice on benefits of, and types of regular/moderate exercise pertaining to orthopedic diagnosis(es).  Inflammation/pain control; with cold, heat, elevation and/or liniments discussed as appropriate  I think she overdid it got some trochanteric bursitis type symptoms that have gotten better went over treatments for inflammation moderating her activities before she gets too involved in the normal healing of the muscles and tissues around the hip.  He was relieved to hear this and since she is doing better with see how it goes and to be happy to see her back she has problems sooner than the 2 months or so I want to see her back for x-ray check on her hips    2/8/2023    Dictated utilizing Dragon dictation

## 2023-02-10 ENCOUNTER — TREATMENT (OUTPATIENT)
Dept: PHYSICAL THERAPY | Facility: CLINIC | Age: 83
End: 2023-02-10
Payer: MEDICARE

## 2023-02-10 DIAGNOSIS — R53.1 STRENGTH LOSS OF: ICD-10-CM

## 2023-02-10 DIAGNOSIS — Z96.641 HISTORY OF TOTAL HIP REPLACEMENT, RIGHT: Primary | ICD-10-CM

## 2023-02-10 DIAGNOSIS — Z78.9 DIFFICULTY NAVIGATING STAIRS: ICD-10-CM

## 2023-02-10 PROCEDURE — 97162 PT EVAL MOD COMPLEX 30 MIN: CPT | Performed by: PHYSICAL THERAPIST

## 2023-02-10 PROCEDURE — 97110 THERAPEUTIC EXERCISES: CPT | Performed by: PHYSICAL THERAPIST

## 2023-02-10 NOTE — PROGRESS NOTES
Physical Therapy Initial Evaluation and Plan of Care      Patient: Bailee Garza   : 1940  Diagnosis/ICD-10 Code:  History of total hip replacement, right [Z96.641]  Referring practitioner: Anupam Ruiz MD  Date of Initial Visit: 2/10/2023  Today's Date: 2/10/2023  Patient seen for 1 sessions           Subjective:  Brigitte reports today as an outpatent with complaints of R posterior hip and gluteal pain.  Peak pain is 7-8/10.  She believes that working with a litter box and vacuuming  at home may have aggravated her R hip.       Objective     Observation: Brigitte arrived in the clinic ambulating independently without an assistive device or brace. Her gait pattern on level surface is normal, she is slightly pigeon toed during the gait cycle.  She ambulated down/up 14 steps, with a step to technique, using the hand rail.  While sitting, I observed Brigitte, in chair with slight hip adduction and IR on the R LE.     MMT: Hip flexion (tested below 90 degrees) L 4+/5, R 4/5, knee extension L 5/5, R 4+/5, ankle dorsiflexion B 5/5, EHL B 4+/5, knee flexion L 4+ to 5/5, R 4 to 4+/5 and hip extension B 4+/5  Sensation:   DTRs: patellar and Alvaro's were B hypotonic and equal, B  Upper motor neuron testing:  Clonus and Babinski were normal  Sensation: L1-S2 dermatomes were intact, B, to light touch perception     Functional Outcome Score: ABC=74% or a 26% deficit    Treatment    Therapeutic exercise  1. Hip clam, red theraband, 10 x 2, hook lying    NMR: verbal cues for exercise technique were issued.      Patient education:  We reviewed proper bed transfer technique and the use of a pillow between her legs during sleep on her L side.  I also reviewed CHAS, posterior technique, to avoid crossing mid line of the body with the R LE, avoiding IR of the R hip and to avoid greater than 90 degrees of hip flexion.     Assessment & Plan     Assessment  Impairments: activity intolerance, impaired physical strength, lacks  "appropriate home exercise program, pain with function and weight-bearing intolerance  Other impairment: difficulty navigating stairs with a normal, step through, pattern.  Functional Limitations: uncomfortable because of pain, standing, stooping and unable to perform repetitive tasks  Assessment details: Bailee Garza is a 82 y.o. year-old female referred to physical therapy for R hip pain after having a posterior CHAS. She presents with an evolving clinical presentation.  She has comorbidities to include acquired weakness of the R LE due to the long period of dysfunction first due to the OA and then post replacement.  She has no known personal factors  that may affect her progress in the plan of care.  Signs and symptoms are consistent with physical therapy diagnosis of orthopedic aftercare for R CHAS, loss of movement/strength, difficulty navigating stairs, R hip pain and she will require education for self care..   Prognosis: good  Prognosis details: Brigitte will need education to develop habits that will protect the R CHAS, posterior technique.     Goals  Plan Goals: STGs to be met in 4 weeks  1. Bailee begins closed chain strengthening exercises on a 4\" step.  2. Gait training on stairs using step through technique begins.  3. Standing balance is tested and appropriate exercises are begun if deficits are present.    LTGs to be met in 12 weeks  1. Brigitte is able to ambulate stairs with a step through technique, using a hand rail. Independently.  2. MMT of the R LE is equal to the L.  3. Brigitte is independent with a HEP and self care education.    Plan  Therapy options: will be seen for skilled therapy services  Planned modality interventions: ultrasound  Planned therapy interventions: neuromuscular re-education, manual therapy, ADL retraining, abdominal trunk stabilization, body mechanics training, flexibility, functional ROM exercises, gait training, home exercise program, therapeutic activities, stretching, " strengthening, soft tissue mobilization, postural training and transfer training  Frequency: 1-2 x per week.  Duration in weeks: 12  Treatment plan discussed with: patient  Plan details: Begin low level closed chain PREs next visit.         Timed:  Manual Therapy:         mins  67256;  Therapeutic Exercise:    8     mins  95296;     Neuromuscular Sofya:    2    mins  28879;    Therapeutic Activity:          mins  64007;     Gait Training:           mins  29091;     Ultrasound:          mins  10482;    Iontophoresis         mins 69839  Dry Needling        mins 34530/ 20561 (Self-pay)      Untimed:  Electrical Stimulation:         mins  29031 ( );  Traction:       mins  86381;   Low Eval          Mins  41383  Mod Eval     35     Mins  98753  High Eval                            Mins  75256    Timed Treatment:   10   mins   Total Treatment:     45   mins    PT SIGNATURE: Deven Nunez PT     License Number: [place] [id number]    Electronically signed by Deven Nuenz PT, 02/10/23, 9:21 AM EST    DATE TREATMENT INITIATED: 2/10/2023    Initial Certification  Certification Period: 5/11/2023  I certify that the therapy services are furnished while this patient is under my care.  The services outlined above are required by this patient, and will be reviewed every 90 days.     PHYSICIAN: Anupam Ruiz MD   NPI: [id number]                                         DATE:     Please sign and return via fax to [phone] Thank you, [facility].

## 2023-02-14 ENCOUNTER — TREATMENT (OUTPATIENT)
Dept: PHYSICAL THERAPY | Facility: CLINIC | Age: 83
End: 2023-02-14
Payer: MEDICARE

## 2023-02-14 DIAGNOSIS — Z96.641 HISTORY OF TOTAL HIP REPLACEMENT, RIGHT: Primary | ICD-10-CM

## 2023-02-14 DIAGNOSIS — R53.1 STRENGTH LOSS OF: ICD-10-CM

## 2023-02-14 DIAGNOSIS — Z78.9 DIFFICULTY NAVIGATING STAIRS: ICD-10-CM

## 2023-02-14 DIAGNOSIS — M25.551 RIGHT HIP PAIN: ICD-10-CM

## 2023-02-14 PROCEDURE — 97112 NEUROMUSCULAR REEDUCATION: CPT | Performed by: PHYSICAL THERAPIST

## 2023-02-14 PROCEDURE — 97110 THERAPEUTIC EXERCISES: CPT | Performed by: PHYSICAL THERAPIST

## 2023-02-14 NOTE — PROGRESS NOTES
"Physical Therapy Daily Treatment Note    [facility]  [address] [place] [address]  [phone] (phone)  [phone] (fax)    Patient: Bailee Garza   : 1940  Diagnosis/ICD-10 Code:  History of total hip replacement, right [Z96.641]  Referring practitioner: Anupam Ruiz MD  Today's Date: 2023  Patient seen for 2 sessions           Subjective: Brigitte stated that she felt today's session was appropriate in intensity and doing the \"right\" things.     Objective     Treatment     Therapeutic exercise  1. NuStep, seat at 7, work load of 6, x 4 minutes  2. Step ups, 4\", x 10, B, SBA-1  3. Lateral step downs, 4\", x 10, B, SBA-1  4. Resisted gait, reverse, 7.5 lbs., x 5, CGA-1  5. [place] hip abduction, 20 lbs., 10 x 2  6. Kalli leg press, seat at 9, 110 lbs., 10 x 2, avoiding full knee extension    NMR: verbal cues were given for exercise technique to include gazing at the ground ahead diagonally, 15 feet, to improve balance, to use eccentric control with the static leg and place the dynamic foot on the ground softly, toe to heel.  With resisted gait cues to use smaller steps, in reverse go toe to heel and forward heel to toe.  With the hip abduction to use eccentric control and on the leg press to avoid full knee extension and to slightly abduct the hips with knee flexion.    Patient education:  We reviewed precautions for posterior CHAS.    Assessment & Plan     Assessment    Assessment details: Brigitte required verbal cues for exercise technique and mild guarding for safety while performing exercises.  She could not recall all of the hip precautions, 90 degrees of hip flexion the forgotten one, and needed further education for that.     Plan  Plan details: Continue per treatment plan.                Timed:    Manual Therapy:         mins  66017;  Therapeutic Exercise:    32     mins  68889;     Neuromuscular Sofya:    8    mins  51262;    Therapeutic Activity: "          mins  26349;     Gait Training:           mins  24783;     Ultrasound:          mins  03641;    Electrical Stimulation:         mins  15960 ( );  Iontophoresis         mins 02031;  Aquatic Therapy         mins 60204;      Untimed:  Electrical Stimulation:         mins  83173 ( );  Traction:         mins  91931;   Dry Needling  (1-2 muscles)                 mins 20560 (Self-pay)  Dry Needling (3-4 muscles)      20561 (Self-pay)  Dry Needling Trial         DRYNDLTRIAL  (No Charge)    Timed Treatment:   40   mins   Total Treatment:     40   mins    Deven Nunez PT  Physical Therapist    [place] License:[id number]

## 2023-02-17 ENCOUNTER — TREATMENT (OUTPATIENT)
Dept: PHYSICAL THERAPY | Facility: CLINIC | Age: 83
End: 2023-02-17
Payer: MEDICARE

## 2023-02-17 DIAGNOSIS — R26.9 GAIT DIFFICULTY: ICD-10-CM

## 2023-02-17 DIAGNOSIS — Z78.9 DIFFICULTY NAVIGATING STAIRS: ICD-10-CM

## 2023-02-17 DIAGNOSIS — M25.551 RIGHT HIP PAIN: ICD-10-CM

## 2023-02-17 DIAGNOSIS — R53.1 STRENGTH LOSS OF: ICD-10-CM

## 2023-02-17 DIAGNOSIS — Z96.641 HISTORY OF TOTAL HIP REPLACEMENT, RIGHT: Primary | ICD-10-CM

## 2023-02-17 PROCEDURE — 97110 THERAPEUTIC EXERCISES: CPT | Performed by: PHYSICAL THERAPIST

## 2023-02-17 PROCEDURE — 97112 NEUROMUSCULAR REEDUCATION: CPT | Performed by: PHYSICAL THERAPIST

## 2023-02-17 NOTE — PROGRESS NOTES
"Physical Therapy Daily Treatment Note    [facility]  [address] [place] [address]  [phone] (phone)  [phone] (fax)    Patient: Bailee Garza   : 1940  Diagnosis/ICD-10 Code:  History of total hip replacement, right [Z96.641]  Referring practitioner: Anupam Ruiz MD  Today's Date: 2023  Patient seen for 3 sessions           Subjective: Brigitte stated that her thighs got real sore after her last session.  Lying down and cold packs helped.     Objective     Therapeutic exercise  1. NuStep, seat at 7, work load of 6, x 4 minutes  2. Step ups, 4\", x 10, B, SBA-1  3. Lateral step downs, 4\", x 10, B, SBA-1  4. Resisted gait, reverse, 7.5 lbs., x 5, CGA-1  5. Kalli hip abduction, 20 lbs., 10 x 2  6. [place] leg press, seat at 9, 110 lbs., 10 x 2, avoiding full knee extension  7. 1/6 of a mile ambulation around the track    NMR: verbal cues were given for exercise technique to include gazing at the ground ahead diagonally, 15 feet, to improve balance, to use eccentric control with the static leg and place the dynamic foot on the ground softly, toe to heel.  With resisted gait cues to use smaller steps, in reverse go toe to heel and forward heel to toe.  With the hip abduction to use eccentric control and on the leg press to avoid full knee extension and to slightly abduct the hips with knee flexion.    Assessment & Plan     Assessment    Assessment details: Brigitte's soreness was due to deconditioning and should improve as she adapts to the exercises.  She required verbal cues for exercise technique.     Plan  Plan details: Continue per treatment plan.            Timed:    Manual Therapy:         mins  01391;  Therapeutic Exercise:    32     mins  52637;     Neuromuscular Sofya:    8    mins  70057;    Therapeutic Activity:          mins  37657;     Gait Training:           mins  86857;     Ultrasound:          mins  32967;    Electrical Stimulation:       "   mins  05202 ( );  Iontophoresis         mins 10271;  Aquatic Therapy         mins 37156;      Untimed:  Electrical Stimulation:         mins  83810 ( );  Traction:         mins  85836;   Dry Needling  (1-2 muscles)                 mins 20560 (Self-pay)  Dry Needling (3-4 muscles)      20561 (Self-pay)  Dry Needling Trial         DRYNDLTRIAL  (No Charge)    Timed Treatment:   40   mins   Total Treatment:     40   mins    Deven Nunez PT  Physical Therapist    [place] License:[id number]

## 2023-02-20 ENCOUNTER — TREATMENT (OUTPATIENT)
Dept: PHYSICAL THERAPY | Facility: CLINIC | Age: 83
End: 2023-02-20
Payer: MEDICARE

## 2023-02-20 DIAGNOSIS — M25.551 RIGHT HIP PAIN: ICD-10-CM

## 2023-02-20 DIAGNOSIS — R53.1 STRENGTH LOSS OF: ICD-10-CM

## 2023-02-20 DIAGNOSIS — Z78.9 DIFFICULTY NAVIGATING STAIRS: ICD-10-CM

## 2023-02-20 DIAGNOSIS — R26.9 GAIT DIFFICULTY: ICD-10-CM

## 2023-02-20 DIAGNOSIS — Z96.641 HISTORY OF TOTAL HIP REPLACEMENT, RIGHT: Primary | ICD-10-CM

## 2023-02-20 PROCEDURE — 97110 THERAPEUTIC EXERCISES: CPT | Performed by: PHYSICAL THERAPIST

## 2023-02-20 PROCEDURE — 97112 NEUROMUSCULAR REEDUCATION: CPT | Performed by: PHYSICAL THERAPIST

## 2023-02-20 NOTE — PROGRESS NOTES
"Physical Therapy Daily Treatment Note    [facility]  [address] [place] [address]  [phone] (phone)  [phone] (fax)    Patient: Bailee Garza   : 1940  Diagnosis/ICD-10 Code:  History of total hip replacement, right [Z96.641]  Referring practitioner: Anupam Ruiz MD  Today's Date: 2023  Patient seen for 4 sessions           Subjective: Brigitte reports noticing a little improvement in her leg. She does continue to complain of being a little off balance.       Objective   .   Therapeutic exercise  1. NuStep, seat at 7, work load of 6, x 4 minutes  2. Step ups, 4\", x 10, B, SBA-1  3. Lateral step downs, 4\", x 10, B, SBA-1  4. Resisted gait, reverse, 7.5 lbs., x 5, CGA-1  5. Kalli hip abduction, 20 lbs., 10 x 2  6. Kalli leg press, seat at 9, 90, 95 and 100 lbs., each x 15, avoiding full knee extension  7. 1/6 of a mile ambulation around the track    NMR: standing balance exercise, SLS, x 3 repetitions till loss of balance and cues for exercise technique with the step exercises to gaze ahead to the ground, 15 feet, for better balance and to use eccentric control with the static leg and hit the ground, toe to heel, softly with the dynamic foot    Patient education:  I added SLS exercise for home.     Assessment & Plan     Assessment    Assessment details: Brigitte required verbal cues for exercise technique and progressed home exercises to improve her leg strength and balance.     Plan  Plan details: Continue per treatment plan.                Timed:    Manual Therapy:         mins  49495;  Therapeutic Exercise:    37     mins  56994;     Neuromuscular Sofya:    8    mins  48034;    Therapeutic Activity:          mins  81063;     Gait Training:           mins  38055;     Ultrasound:          mins  20238;    Electrical Stimulation:         mins  42959 ( );  Iontophoresis         mins 64700;  Aquatic Therapy         mins " 31070;      Untimed:  Electrical Stimulation:         mins  36512 ( );  Traction:         mins  44198;   Dry Needling  (1-2 muscles)                 mins 20560 (Self-pay)  Dry Needling (3-4 muscles)      20561 (Self-pay)  Dry Needling Trial         DRYNDLTRIAL  (No Charge)    Timed Treatment:   45   mins   Total Treatment:     45   mins    Deven Nunez PT  Physical Therapist    [place] License:[id number]

## 2023-02-24 ENCOUNTER — TELEPHONE (OUTPATIENT)
Dept: PHYSICAL THERAPY | Facility: CLINIC | Age: 83
End: 2023-02-24

## 2023-02-24 ENCOUNTER — TREATMENT (OUTPATIENT)
Dept: PHYSICAL THERAPY | Facility: CLINIC | Age: 83
End: 2023-02-24
Payer: MEDICARE

## 2023-02-24 DIAGNOSIS — R53.1 STRENGTH LOSS OF: ICD-10-CM

## 2023-02-24 DIAGNOSIS — Z96.641 HISTORY OF TOTAL HIP REPLACEMENT, RIGHT: Primary | ICD-10-CM

## 2023-02-24 DIAGNOSIS — M25.551 RIGHT HIP PAIN: ICD-10-CM

## 2023-02-24 DIAGNOSIS — Z78.9 DIFFICULTY NAVIGATING STAIRS: ICD-10-CM

## 2023-02-24 DIAGNOSIS — R26.9 GAIT DIFFICULTY: ICD-10-CM

## 2023-02-24 PROCEDURE — 97110 THERAPEUTIC EXERCISES: CPT | Performed by: PHYSICAL THERAPIST

## 2023-02-24 PROCEDURE — 97112 NEUROMUSCULAR REEDUCATION: CPT | Performed by: PHYSICAL THERAPIST

## 2023-02-24 NOTE — PROGRESS NOTES
"Physical Therapy Daily Treatment Note    [facility]  [address] [place] [address]  [phone] (phone)  [phone] (fax)    Patient: Bailee Garza   : 1940  Diagnosis/ICD-10 Code:  History of total hip replacement, right [Z96.641]  Referring practitioner: Anupam Ruiz MD  Today's Date: 2023  Patient seen for 5 sessions           Subjective: Brigitte states that she feels that she is improving, getting stronger, however she feels that she is off balance with her shoes with the metatarsal bar on her shoes.     Objective     Observation:  She has a minimal to moderate pes cavus but with standing, her arches drop down excessively.  I also observed B first MTP bunions, hallux valgus deformities, B, (both were greater R versus L foot)  Forefoot flexibility: 25 degrees B of passive supination    Therapeutic exercise  1. NuStep, seat at 7, work load of 6, x 4 minutes  2. Runner's step, 4\", x 10, B, SBA-1, using column for balance  3. Lateral step downs, 4\", x 10, B, SBA-1  4. Resisted gait, reverse, 7.5 lbs., x 6 CGA-1  5. Kalli leg press, seat at 8 100 lbs., 15 x 2 , avoiding full knee extension  6. [place] hip abduction: 20 lbs., 15 x 2    NMR: standing balance exercise, SLS, x 3 repetitions till loss of balance and cues for exercise technique with the step exercises to gaze ahead to the ground, 15 feet, for better balance and to use eccentric control with the static leg and hit the ground, toe to heel, softly with the dynamic foot    Assessment & Plan     Assessment    Assessment details: Brigitte required verbal cues for exercise technique.  The metatarsal bar on her shoes does not seem functional and necessary.  I do think she needs a good custom made orthotic to help disperse weight thru her forefoot equally and help the entire kinetic chain.     Plan  Plan details: Continue per treatment plan.                Timed:    Manual Therapy:         mins  " 38361;  Therapeutic Exercise:    35     mins  21249;     Neuromuscular Sofya:    8    mins  18735;    Therapeutic Activity:          mins  77368;     Gait Training:           mins  51301;     Ultrasound:          mins  33436;    Electrical Stimulation:         mins  55436 ( );  Iontophoresis         mins 21544;  Aquatic Therapy         mins 05136;      Untimed:  Electrical Stimulation:         mins  91064 ( );  Traction:         mins  95640;   Dry Needling  (1-2 muscles)                 mins 20560 (Self-pay)  Dry Needling (3-4 muscles)      20561 (Self-pay)  Dry Needling Trial         DRYNDLTRIAL  (No Charge)    Timed Treatment:   43   mins   Total Treatment:     43   mins    Deven Nunez PT  Physical Therapist    [place] License:[id number]

## 2023-02-24 NOTE — TELEPHONE ENCOUNTER
Bailee Escobar is having issues with balance due to some old metatarsal pads on her shoes.  I looked at her feet today and feel that she would be better balanced and her entire kinetic chain would benefit from a set of orthotics to help her bear weight more evenly throughout her forefeet.  Would it be okay to take impressions for custom made orthotics?  We provide a good product at a wholesale price that should be beneficial to her.      Sincerely,   Deven Nunez. GIAN.

## 2023-02-27 ENCOUNTER — TREATMENT (OUTPATIENT)
Dept: PHYSICAL THERAPY | Facility: CLINIC | Age: 83
End: 2023-02-27
Payer: MEDICARE

## 2023-02-27 DIAGNOSIS — Z96.641 HISTORY OF TOTAL HIP REPLACEMENT, RIGHT: Primary | ICD-10-CM

## 2023-02-27 DIAGNOSIS — R26.9 GAIT DIFFICULTY: ICD-10-CM

## 2023-02-27 DIAGNOSIS — Z78.9 DIFFICULTY NAVIGATING STAIRS: ICD-10-CM

## 2023-02-27 DIAGNOSIS — M25.551 RIGHT HIP PAIN: ICD-10-CM

## 2023-02-27 DIAGNOSIS — R53.1 STRENGTH LOSS OF: ICD-10-CM

## 2023-02-27 PROCEDURE — 97110 THERAPEUTIC EXERCISES: CPT | Performed by: PHYSICAL THERAPIST

## 2023-02-27 PROCEDURE — 97760 ORTHOTIC MGMT&TRAING 1ST ENC: CPT | Performed by: PHYSICAL THERAPIST

## 2023-02-27 NOTE — PROGRESS NOTES
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     Objective     Forefoot flexibility: B 35 degrees of passive supination    Therapeutic exercise  1. NuStep, seat at 7, work load of 6, x 4 minutes  2. Runner's step, 4\", x 10, B, SBA-1, using column for balance  3. Lateral step downs, 4\", x 10, B, SBA-1  4. Kalli hip abduction, 25 lbs., 15 x 2    NMR: verbal cues for exercise technique.     Orthotic fit/train:  Impressions were taken for custom made orthotics.     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  Due to the amount of time she has walked with the metatarsal bar on each shoe it may take longer than the average person.  She tolerated the exercises well and needed some cues for technique.     Plan  Plan details: Continue per treatment plan.            Timed:    Manual Therapy:         mins  45536;  Therapeutic Exercise:    25     mins  49138;     Neuromuscular Sofya:    5    mins  96689;    Therapeutic Activity:          mins  58753;     Gait Training:           mins  74704;     Ultrasound:          mins  53004;    Electrical Stimulation:         mins  45720 ( );  Iontophoresis         mins 40775;  Aquatic Therapy         mins 48761;  Orthotic fit/train            15 mins 35300    Untimed:  Electrical Stimulation:         mins  39916 ( );  Traction:         mins  11628;   Dry Needling  (1-2 muscles)                 mins 88382 (Self-pay)  Dry Needling (3-4 " muscles)      87638 (Self-pay)  Dry Needling Trial         DRYNDLTRIAL  (No Charge)    Timed Treatment:   45   mins   Total Treatment:     45   mins    Deven Nunez PT  Physical Therapist    [place] License:[id number]

## 2023-03-02 ENCOUNTER — TREATMENT (OUTPATIENT)
Dept: PHYSICAL THERAPY | Facility: CLINIC | Age: 83
End: 2023-03-02
Payer: MEDICARE

## 2023-03-02 DIAGNOSIS — Z96.641 HISTORY OF TOTAL HIP REPLACEMENT, RIGHT: Primary | ICD-10-CM

## 2023-03-02 DIAGNOSIS — R53.1 STRENGTH LOSS OF: ICD-10-CM

## 2023-03-02 DIAGNOSIS — M25.551 RIGHT HIP PAIN: ICD-10-CM

## 2023-03-02 DIAGNOSIS — R26.9 GAIT DIFFICULTY: ICD-10-CM

## 2023-03-02 DIAGNOSIS — Z78.9 DIFFICULTY NAVIGATING STAIRS: ICD-10-CM

## 2023-03-02 PROCEDURE — 97110 THERAPEUTIC EXERCISES: CPT | Performed by: PHYSICAL THERAPIST

## 2023-03-02 PROCEDURE — 97112 NEUROMUSCULAR REEDUCATION: CPT | Performed by: PHYSICAL THERAPIST

## 2023-03-02 NOTE — PROGRESS NOTES
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]  Referring practitioner: Anupam Ruiz MD  Today's Date: 3/3/2023  Patient seen for 7 sessions           Subjective  Still having trouble going uip and down stairs normally       Objective   .Lateral step downs, 4\", x 10, B, SBA-1   Kalli hip abduction, 25 lbs., 10 bilaterally   X 10 single leg     Wobble board work  Static and dynamic work    Med/l lateral    A/P in step stance with RIGHT forward then progress to LEFT   Needs 90% hand touch but improved with time     SINGLE LEG STANCE work at mirror    RIGHT affected by exacerbation of symptom and poor ankle stabilization    Metatarsal toe bar making more difficult as well    patient says she is getting new orthotics       Assessment/Plan  A: progressing well with therex but limited by pain and RIGHT ankle instability   PLAN progress exercise as symptoms allow          Timed:    Manual Therapy:    0     mins  03609;  Therapeutic Exercise:    15     mins  62684;     Neuromuscular Sofya:    23    mins  78679;    Therapeutic Activity:     0     mins  57498;     Gait Trainin     mins  55516;     Ultrasound:     0     mins  81903;    Electrical Stimulation:    0     mins  13721 ( );  Iontophoresis    0     mins 89584;  Aquatic Therapy    0     mins 67195;      Untimed:  Electrical Stimulation:    0     mins  14301 ( );  Traction:    0     mins  29855;   Dry Needling  (1-2 muscles)            0     mins 25943 (Self-pay)  Dry Needling (3-4 muscles) 0      (Self-pay)  Dry Needling Trial    0     DRYNDLTRIAL  (No Charge)    Timed Treatment:   38   mins   Total Treatment:     38   mins    Angie Wright PT  Physical Therapist    [place] License:[id number]  "

## 2023-03-07 ENCOUNTER — TREATMENT (OUTPATIENT)
Dept: PHYSICAL THERAPY | Facility: CLINIC | Age: 83
End: 2023-03-07
Payer: MEDICARE

## 2023-03-07 DIAGNOSIS — M25.551 RIGHT HIP PAIN: ICD-10-CM

## 2023-03-07 DIAGNOSIS — Z78.9 DIFFICULTY NAVIGATING STAIRS: ICD-10-CM

## 2023-03-07 DIAGNOSIS — R26.9 GAIT DIFFICULTY: ICD-10-CM

## 2023-03-07 DIAGNOSIS — R53.1 STRENGTH LOSS OF: ICD-10-CM

## 2023-03-07 DIAGNOSIS — Z96.641 HISTORY OF TOTAL HIP REPLACEMENT, RIGHT: Primary | ICD-10-CM

## 2023-03-07 PROCEDURE — 97112 NEUROMUSCULAR REEDUCATION: CPT | Performed by: PHYSICAL THERAPIST

## 2023-03-07 PROCEDURE — 97110 THERAPEUTIC EXERCISES: CPT | Performed by: PHYSICAL THERAPIST

## 2023-03-07 NOTE — PROGRESS NOTES
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]  Referring practitioner: Anupma Ruiz MD  Today's Date: 3/7/2023  Patient seen for 8 sessions           Subjective Fell on  and stepped off the deck onto the lower deck   More sore in the back than on the RIGHT side   The leg does not feel any worse   Able to do her exercises without increased pain   Had metatarsal bars taken off bottoms of shoes  Bought a step     Objective     Elliptical training    EDUCATION as to proper technique with some weight on heels   Stand up tall    1 min 30 sec with sone symptoms in glut    Verbal cueing goal to feel in front of thigh     [place]    Leg press 100#  X 10 bilaterally    Hip ABD   25# x 10  bilaterally    15# x 10 singles    Side step up x 10     Wobble board    Med/lateral     Static x 30 sec x 5    Dynamics work RIGHT   Center LEFT   Center     Standing in front of mat    dynmaic balance recovers    Manual push off center with good recovery   THERABAND around waist     Prepare to handle resistance     then quick let go to recover     multiple planes   Catch with medicine ball x 10     With playground ball x 10    Assessment/Plan    A: fell again so increased work on balance and balance recovery   PLAN progress weight bearing and mid range control on RIGHT        Timed:    Manual Therapy:    0     mins  76248;  Therapeutic Exercise:    18     mins  96267;     Neuromuscular Sofya:    25    mins  86351;    Therapeutic Activity:     0     mins  56524;     Gait Trainin     mins  88860;     Ultrasound:     0     mins  53093;    Electrical Stimulation:    0     mins  45660 ( );  Iontophoresis    0     mins 16165;  Aquatic Therapy    0     mins  67835;      Untimed:  Electrical Stimulation:    0     mins  04170 ( );  Traction:    0     mins  32481;   Dry Needling  (1-2 muscles)            0     mins 20560 (Self-pay)  Dry Needling (3-4 muscles) 0     20561 (Self-pay)  Dry Needling Trial    0     DRYNDLTRIAL  (No Charge)    Timed Treatment:   43   mins   Total Treatment:     43   mins    Angie Wright PT  Physical Therapist    [place] License:[id number]

## 2023-03-09 ENCOUNTER — TREATMENT (OUTPATIENT)
Dept: PHYSICAL THERAPY | Facility: CLINIC | Age: 83
End: 2023-03-09
Payer: MEDICARE

## 2023-03-09 DIAGNOSIS — Z78.9 DIFFICULTY NAVIGATING STAIRS: ICD-10-CM

## 2023-03-09 DIAGNOSIS — R53.1 STRENGTH LOSS OF: ICD-10-CM

## 2023-03-09 DIAGNOSIS — Z96.641 HISTORY OF TOTAL HIP REPLACEMENT, RIGHT: Primary | ICD-10-CM

## 2023-03-09 DIAGNOSIS — M25.551 RIGHT HIP PAIN: ICD-10-CM

## 2023-03-09 DIAGNOSIS — R26.9 GAIT DIFFICULTY: ICD-10-CM

## 2023-03-09 DIAGNOSIS — M16.11 PRIMARY OSTEOARTHRITIS OF RIGHT HIP: ICD-10-CM

## 2023-03-09 PROCEDURE — 97112 NEUROMUSCULAR REEDUCATION: CPT | Performed by: PHYSICAL THERAPIST

## 2023-03-09 PROCEDURE — 97110 THERAPEUTIC EXERCISES: CPT | Performed by: PHYSICAL THERAPIST

## 2023-03-09 NOTE — PROGRESS NOTES
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]  Referring practitioner: Anupam Ruiz MD  Today's Date: 3/9/2023  Patient seen for 9 sessions           Subjective still downt have enough strength to get our of a chair   Stairs are still too hard and have to pull up with hand     Objective   Elliptical x 2 min with verbal cueing for proper technique and some weight thru heels    [place] leg press  100  Matrix leg press  0#    Lateral step ups    RIGHT 5 x 2   LEFT x 10    Verbal cueing that she should hold on lifhtly for balance but not pull up with hands    RIGHT limited by pain     [place] hip ABD   25# bilaterally x 10     15# singles x 10     30 sec sit to stand = 8    Pretzel stretch   PREP  30 sec x2;   stretch  30 sec x 2     SINGLE LEG STANCE 10 sec    progress to dynamic step balance     MMT  HIP FLEXION  4/5   Quad  4/5   Hamstring  4+/5   Hip ABD  2/5 as not able to lift thru full ROM   LEFT MMT WNL without exercise     RIGHT DANIEL stiff 20% with complaints of stiffness   GAIT with foot/arch compensation and is waiting for her custom orthotics         Assessment/Plan    A:   Patient is progressing nicely with increased vigor of strengthening which is slowly improving lateral steps up from 4 inches but not yet able to navigate full stairs     Goals  Plan Goals: STGs to be met in 4 weeks  1. Bailee begins closed chain strengthening exercises on a 4\" step.    MET   2. Gait training on stairs using step through technique begins.   ONGOING   3. Standing balance is tested and appropriate exercises are begun if deficits are present.   PARTIALLY MET as waiting for custom orthotics but doing well with static single limb stance     LTGs to be met in 12 " weeks  1. Brigitte is able to ambulate stairs with a step through technique, using a hand rail. Independently.  2. MMT of the R LE is equal to the L.  3. Brigitte is independent with a HEP and self care education.       Timed:    Manual Therapy:    0     mins  34547;  Therapeutic Exercise:    25     mins  85805;     Neuromuscular Sofya:    15    mins  06628;    Therapeutic Activity:     0     mins  47420;     Gait Trainin     mins  88650;     Ultrasound:     0     mins  27163;    Electrical Stimulation:    0     mins  77188 ( );  Iontophoresis    0     mins 54626;  Aquatic Therapy    0     mins 07134;      Untimed:  Electrical Stimulation:    0     mins  98573 ( );  Traction:    0     mins  93441;   Dry Needling  (1-2 muscles)            0     mins 63601 (Self-pay)  Dry Needling (3-4 muscles) 0      (Self-pay)  Dry Needling Trial    0     DRYNDLTRIAL  (No Charge)    Timed Treatment:   40   mins   Total Treatment:     40   mins    Angie Wright PT  Physical Therapist    [place] License:[id number]

## 2023-03-15 ENCOUNTER — TREATMENT (OUTPATIENT)
Dept: PHYSICAL THERAPY | Facility: CLINIC | Age: 83
End: 2023-03-15
Payer: MEDICARE

## 2023-03-15 DIAGNOSIS — Z78.9 DIFFICULTY NAVIGATING STAIRS: ICD-10-CM

## 2023-03-15 DIAGNOSIS — R26.9 GAIT DIFFICULTY: ICD-10-CM

## 2023-03-15 DIAGNOSIS — R53.1 STRENGTH LOSS OF: ICD-10-CM

## 2023-03-15 DIAGNOSIS — Z96.641 HISTORY OF TOTAL HIP REPLACEMENT, RIGHT: Primary | ICD-10-CM

## 2023-03-15 DIAGNOSIS — M25.551 RIGHT HIP PAIN: ICD-10-CM

## 2023-03-15 PROCEDURE — 97110 THERAPEUTIC EXERCISES: CPT | Performed by: PHYSICAL THERAPIST

## 2023-03-15 NOTE — PROGRESS NOTES
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]  Referring practitioner: Anupam Ruiz MD  Today's Date: 3/15/2023  Patient seen for 10 sessions           Subjective went up stairs without hands and was OK to do it but still doesn't feel normal yet     Objective   elliptical x 2 min with verbal cueing for proper technique    Practice getting off and on safely (step up when step is in the lowest position) 2 additional times    Matrix leg press  40# x 15     Lateral step ups               RIGHT 5 x 2              LEFT x 10               EDUCATION a to minimize pulling up with hands and focus on pushing thru legs      Kalli hip ABD   25# bilaterally x 10                           15# singles x 10      30 sec sit to stand = 9     Pretzel stretch   PREP  30 sec x2;   stretch  30 sec x 2   Verbal cueing for proper technique     Will await to progress balance until orthotics arrive        Assessment/Plan  A: still needs significant work on mid range control in single limb   PLAN progress strengthening and closed chain activities          Timed:    Manual Therapy:    0     mins  95020;  Therapeutic Exercise:    39     mins  46137;     Neuromuscular Sofya:    0    mins  05776;    Therapeutic Activity:     0     mins  34850;     Gait Trainin     mins  96385;     Ultrasound:     0     mins  03787;    Electrical Stimulation:    0     mins  46411 ( );  Iontophoresis    0     mins 62147;  Aquatic Therapy    0     mins 39357;      Untimed:  Electrical Stimulation:    0     mins  26586 ( );  Traction:    0     mins  47665;   Dry Needling  (1-2 muscles)            0     mins 08657 (Self-pay)  Dry Needling (3-4 muscles) 0      (Self-pay)  Dry Needling Trial    0      DRYNDLTRIAL  (No Charge)    Timed Treatment:   39   mins   Total Treatment:     39   mins    Angie Wright, PT  Physical Therapist    [place] License:[id number]

## 2023-03-20 ENCOUNTER — TREATMENT (OUTPATIENT)
Dept: PHYSICAL THERAPY | Facility: CLINIC | Age: 83
End: 2023-03-20
Payer: MEDICARE

## 2023-03-20 DIAGNOSIS — R26.9 GAIT DIFFICULTY: ICD-10-CM

## 2023-03-20 DIAGNOSIS — Z96.641 HISTORY OF TOTAL HIP REPLACEMENT, RIGHT: Primary | ICD-10-CM

## 2023-03-20 DIAGNOSIS — M25.551 RIGHT HIP PAIN: ICD-10-CM

## 2023-03-20 DIAGNOSIS — R53.1 STRENGTH LOSS OF: ICD-10-CM

## 2023-03-20 DIAGNOSIS — Z78.9 DIFFICULTY NAVIGATING STAIRS: ICD-10-CM

## 2023-03-20 PROCEDURE — 97112 NEUROMUSCULAR REEDUCATION: CPT | Performed by: PHYSICAL THERAPIST

## 2023-03-20 PROCEDURE — 97110 THERAPEUTIC EXERCISES: CPT | Performed by: PHYSICAL THERAPIST

## 2023-03-20 NOTE — PROGRESS NOTES
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  She feels that her balance is still      Objective     Treatment    Therapeutic exercise  1. Matrix leg press, seat at 7, 30 lbs., 10 x 3  2. Matrix hip abduction, 30 lbs., 15 x 2  3. Runner's step, 4\", x 10, B, using column for balance  4. Lateal step down, 4\", x 10, B, using column for balance  5. Resisted gait, reverse, 7.5 lbs., x 10    NMR: verbal cues for technique included using eccentric control with the leg press and hip abduction, with the leg press to abduct the hips with knee flexion.  With the step exercises to gaze to the ground ahead for better balance, and use eccentric control to place the dynamic foot on the ground softly.      Therapeutic activity:  Down/up 14 steps x 2, SBA-1          Assessment & Plan     Assessment    Assessment details: Cues were required for exercise technique and for the stairs.  She tolerated treatment well but the runner's step technique needs more work.     Plan  Plan details: Continue per treatment plan, reassess next visit.                Timed:    Manual Therapy:         mins  61415;  Therapeutic Exercise:    32     mins  28392;     Neuromuscular Sofya:    8    mins  44048;    Therapeutic Activity:     5     mins  87449;     Gait Training:           mins  31819;     Ultrasound:          mins  43238;    Electrical Stimulation:         mins  60322 ( );  Iontophoresis         mins 37202;  Aquatic Therapy         mins 24522;      Untimed:  Electrical Stimulation:         mins  76826 ( " );  Traction:         mins  46456;   Dry Needling  (1-2 muscles)                 mins 20560 (Self-pay)  Dry Needling (3-4 muscles)      20561 (Self-pay)  Dry Needling Trial         DRYNDLTRIAL  (No Charge)    Timed Treatment:   45   mins   Total Treatment:     45   mins    Deven Nunez PT  Physical Therapist    [place] License:[id number]

## 2023-03-23 ENCOUNTER — TREATMENT (OUTPATIENT)
Dept: PHYSICAL THERAPY | Facility: CLINIC | Age: 83
End: 2023-03-23
Payer: MEDICARE

## 2023-03-23 DIAGNOSIS — M25.551 RIGHT HIP PAIN: ICD-10-CM

## 2023-03-23 DIAGNOSIS — R26.9 GAIT DIFFICULTY: ICD-10-CM

## 2023-03-23 DIAGNOSIS — Z96.641 HISTORY OF TOTAL HIP REPLACEMENT, RIGHT: Primary | ICD-10-CM

## 2023-03-23 DIAGNOSIS — Z78.9 DIFFICULTY NAVIGATING STAIRS: ICD-10-CM

## 2023-03-23 DIAGNOSIS — R53.1 STRENGTH LOSS OF: ICD-10-CM

## 2023-03-23 PROCEDURE — 97110 THERAPEUTIC EXERCISES: CPT | Performed by: PHYSICAL THERAPIST

## 2023-03-23 NOTE — PROGRESS NOTES
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   She stated that she immediately felt more balance walking with the orthotics in her shoes.     Objective     Treatment    Therapeutic exercise  1. NuStep, seat at 8, work load of 6, x 4 minutes  2. Matrix leg press, seat at 7, 30 lbs., 10 x 3  3. Matrix hip abduction, 30 lbs., 15 x 2  4. Runner's step, 4\", x 10, B, using column for balance  5. Lateal step down, 4\", x 10, B, using column for balance  After orthotic check out  6. 160' ambulation with the orthotics in her shoes.  7. Runner's stretch, 30s x 2, B, with the orthotics in her shoes    NMR: verbal cues for technique included using eccentric control with the leg press and hip abduction, with the leg press to abduct the hips with knee flexion.  With the step exercises to gaze to the ground ahead for better balance, and use eccentric control to place the dynamic foot on the ground softly.      Orthotic check out: in sitting, Norton's test was performed.  each orthotic was placed under her feet.  She fully plantarflexed each ankle and both of the orthotics maintained full contact with the arch into peak plantarflexion. I then trimmed the orthotics to fit her shoes.    Patient education: I added the runner's stretch to her HEP.  I gave Brigitte instructions for break in with the orthotics, general care of the orthotics and suggested the use of original yoga toes to stretch intrinsic soft tissue of the feet.     Assessment & Plan     Assessment    Assessment " details: I observed a total contact fit with the orthotics today with the Norton's test.  Her initial reaction to the orthotics was good.  It will take time for her kinetic chain to adapt to the orthotics.     Plan  Plan details: Continue per treatment plan.                Timed:    Manual Therapy:         mins  06921;  Therapeutic Exercise:    25     mins  94796;     Neuromuscular Sofya:    5    mins  47871;    Therapeutic Activity:          mins  98131;     Gait Training:           mins  52374;     Ultrasound:          mins  53217;    Electrical Stimulation:         mins  55234 ( );  Iontophoresis         mins 21267;  Aquatic Therapy         mins 42560;  Orthotic check out       15 mins 27131    Untimed:  Electrical Stimulation:         mins  58762 (MC );  Traction:         mins  68688;   Dry Needling  (1-2 muscles)                 mins 20560 (Self-pay)  Dry Needling (3-4 muscles)      20561 (Self-pay)  Dry Needling Trial         DRYNDLTRIAL  (No Charge)    Timed Treatment:   45   mins   Total Treatment:     45   mins    Deven Nunez PT  Physical Therapist    [place] License:[id number]

## 2023-03-28 ENCOUNTER — TREATMENT (OUTPATIENT)
Dept: PHYSICAL THERAPY | Facility: CLINIC | Age: 83
End: 2023-03-28
Payer: MEDICARE

## 2023-03-28 DIAGNOSIS — R53.1 STRENGTH LOSS OF: ICD-10-CM

## 2023-03-28 DIAGNOSIS — Z78.9 DIFFICULTY NAVIGATING STAIRS: ICD-10-CM

## 2023-03-28 DIAGNOSIS — M16.11 PRIMARY OSTEOARTHRITIS OF RIGHT HIP: ICD-10-CM

## 2023-03-28 DIAGNOSIS — M25.551 RIGHT HIP PAIN: ICD-10-CM

## 2023-03-28 DIAGNOSIS — R26.9 GAIT DIFFICULTY: ICD-10-CM

## 2023-03-28 DIAGNOSIS — Z96.641 HISTORY OF TOTAL HIP REPLACEMENT, RIGHT: Primary | ICD-10-CM

## 2023-03-28 PROCEDURE — 97110 THERAPEUTIC EXERCISES: CPT | Performed by: PHYSICAL THERAPIST

## 2023-03-28 PROCEDURE — 97112 NEUROMUSCULAR REEDUCATION: CPT | Performed by: PHYSICAL THERAPIST

## 2023-03-28 NOTE — PROGRESS NOTES
"Physical Therapy Daily Treatment Note    [facility]  [address] [place] [address]  [phone] (phone)  [phone] (fax)    Patient: Bailee Garza   : 1940  Diagnosis/ICD-10 Code:  No primary diagnosis found.  Referring practitioner: Anupam Ruiz MD  Today's Date: 3/28/2023  Patient seen for Visit count could not be calculated. Make sure you are using a visit which is associated with an episode. sessions           Subjective Getting better but still not able to go up steps reciprocally becuae of weakness     Objective   1. Elliptical  1 minutes  2. Matrix leg press, seat at 7, 45 lbs., 10 x 2  3. Matrix hip abduction, 40 lbs., 10 x 2  4. Runner's step, 4\", x 10, B, using column for balance     Up and over   Back and over   5. Lateal step down, 4\", x 10, B, using column for balance  6. squat ;mechanics with stick on back x 5    EDUCATION for [employer]   7. Runner's stretch, 30s x 2, B,   8- dynamic walkng blance    In agarwal hold 3 sec each    Able to perform without difficulty     STAIRS      Up and down with hand held assist independent iwthout noted compensation    Complaints of mild discomfort       Assessment/Plan  A: Doing well with all exercise and closed chain activities including stairs   She has a follow up with ortho MD and is ready for DC   PLAN await consult from ortho MD and prepare for DC          Timed:    Manual Therapy:  0      mins  82326;  Therapeutic Exercise:    25     mins  21675;     Neuromuscular Sofya:    15    mins  05623;    Therapeutic Activity:     0     mins  30292;     Gait Trainin     mins  93938;     Ultrasound:     0     mins  88798;    Electrical Stimulation:    0     mins  96248 ( );  Iontophoresis    0     mins 79924;  Aquatic Therapy    0     mins 97575;      Untimed:  Electrical Stimulation:    0     mins  73076 ( );  Traction:    0     mins  97547;   Dry Needling  (1-2 muscles)            0     " mins 20560 (Self-pay)  Dry Needling (3-4 muscles) 0     20561 (Self-pay)  Dry Needling Trial    0     DRYNDLTRIAL  (No Charge)    Timed Treatment:   40   mins   Total Treatment:     40   mins    Angie Wright PT  Physical Therapist    [place] License:[id number]

## 2023-03-29 ENCOUNTER — OFFICE VISIT (OUTPATIENT)
Dept: ORTHOPEDIC SURGERY | Facility: CLINIC | Age: 83
End: 2023-03-29
Payer: MEDICARE

## 2023-03-29 VITALS — WEIGHT: 105 LBS | HEIGHT: 64 IN | BODY MASS INDEX: 17.93 KG/M2 | TEMPERATURE: 97.8 F
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
## 2023-03-29 DIAGNOSIS — M70.61 TROCHANTERIC BURSITIS OF RIGHT HIP: ICD-10-CM

## 2023-03-29 DIAGNOSIS — R52 PAIN: Primary | ICD-10-CM

## 2023-03-29 DIAGNOSIS — Z96.643 STATUS POST TOTAL REPLACEMENT OF BOTH HIPS: ICD-10-CM

## 2023-03-29 NOTE — PROGRESS NOTES
Patient Name: Bailee Garza   YOB: 1940  Referring Primary Care Physician: Eddie Araya MD  BMI: Body mass index is 18.02 kg/m².    Chief Complaint:    Chief Complaint   Patient presents with   • Right Hip - Follow-up, Pain        HPI:     Bailee Garza is a 83 y.o. female who presents today for evaluation of   Chief Complaint   Patient presents with   • Right Hip - Follow-up, Pain   .  Brigitte had a right total hip replacement about 3 and half months ago has been doing very well.  She had a fall on her right side and has some soreness in her hip she wanted to get it checked.  Says she fell on 3 5 and 23.      Subjective   Medications:   Home Medications:  Current Outpatient Medications on File Prior to Visit   Medication Sig   • aspirin 81 MG EC tablet Take 1 tablet twice a day for the first 2 weeks, then daily  Indications: VTE Prophylaxis   • calcium polycarbophil (FIBERCON) 625 MG tablet Take 1 tablet by mouth Daily.   • Chlorcyclizine-Pseudoephed (STAHIST AD PO) Take  by mouth As Needed.   • hydroCHLOROthiazide (HYDRODIURIL) 12.5 MG tablet Take 1 tablet by mouth Daily for 270 days.   • Synthroid 75 MCG tablet Take 1 tablet by mouth Daily for 270 days.   • valsartan (DIOVAN) 80 MG tablet Take 1 tablet by mouth Daily for 270 days.     Current Facility-Administered Medications on File Prior to Visit   Medication   • Chlorhexidine Gluconate Cloth 2 % pads 1 each     Current Medications:  Scheduled Meds:  Continuous Infusions:No current facility-administered medications for this visit.    PRN Meds:.    I have reviewed the patient's medical history in detail and updated the computerized patient record.  Review and summarization of old records includes:    Past Medical History:   Diagnosis Date   • Abnormal CXR 12/18/2017   • Adverse reaction to narcotic drug     SEVERE HALLUCINATIONS POST OP LEFT HIP PLACEMENT   • Allergies    • Arthritis    • Arthritis of foot 04/17/2020   • Arthropathy of pelvic  region and thigh 2012    unspecified   • Back pain 2007   • Chronic back pain 2009   • Chronic cough 2017   • Closed nondisplaced fracture of lateral malleolus of fibula with delayed healing 2020   • Closed nondisplaced fracture of medial cuneiform of left foot 2018   • Constipation 2015    unspecified   • COVID-19 vaccination refused    • Diverticulosis    • Dyspepsia 2008   • Essential hypertension 2007   • Exertional shortness of breath 2017   • Fall 2018   • Family history of abdominal aortic aneurysm (AAA) 2019    [place] aaa screen limited (04/10/2019 10:32)    • 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), 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   • Hearing loss 2008   • Hip pain, left    • History of bone density study 2015   • History of pneumonia     2017   • History of skin cancer    • Hypothyroidism, acquired 2007   • Insomnia 2015    unspecified   • Intervertebral disc disorder with myelopathy, cervical region 2010   • Joint capsule tear 2012    unspecified   • OA (osteoarthritis) of hip 2018   • Other synovitis and tenosynovitis, right ankle and foot 2020   • Paranoia (HCC)     PT IS VERY CONCERNED & FEARFUL THAT THE HOSPITAL STAFF WILL GIVE HER THE COVID VACCINE WHILE HERE IN THE HOSPITAL & BELIEVES THAT THE COVID SWAB TEST HAS COVID ON THE SWAB & THAT SHE WILL GET COVID BEING TESTED. ATTEMPTED TO REASSURE PT   • Peroneal tendonitis, right 2020   • Rhinitis, allergic 2007   • Scoliosis    • Screening breast examination 2007   • Stress 2015    other acute reactions to stress   • Stress incontinence    • Trochanteric bursitis, left hip 2019   • Urticaria 2015        Past Surgical History:   Procedure Laterality Date   • BREAST EXCISIONAL BIOPSY Right     50+ years ago   • BRONCHOSCOPY N/A 2017    Procedure: BRONCHOSCOPY;   Surgeon: Mario Alanis MD;  Location: [facility] ENDOSCOPY;  Service:    • CATARACT EXTRACTION, BILATERAL     • COLONOSCOPY     • HYSTERECTOMY     • TOTAL HIP ARTHROPLASTY Left 07/24/2018    Procedure: LEFT TOTAL HIP ARTHROPLASTY;  Surgeon: Justen Mann MD;  Location: [facility] MAIN OR;  Service: Orthopedics   • TOTAL HIP ARTHROPLASTY Right 12/13/2022    Procedure: Posterior RIGHT TOTAL HIP ARTHROPLASTY MARCELINO NAVIGATION;  Surgeon: Anupam Ruiz MD;  Location:  [facility];  Service: Orthopedics;  Laterality: Right;        Social History     Occupational History     Employer: [employer]   Tobacco Use   • Smoking status: Never   • Smokeless tobacco: Never   Vaping Use   • Vaping Use: Never used   Substance and Sexual Activity   • Alcohol use: No   • Drug use: Never   • Sexual activity: Defer      Social History     Social History Narrative   • Not on file        Family History   Problem Relation Age of Onset   • Heart disease Mother    • Aneurysm Mother    • Colon cancer Father    • Aneurysm Brother    • Breast cancer Paternal Grandmother    • Asthma Paternal Grandfather    • Malig Hyperthermia Neg Hx    • Ovarian cancer Neg Hx        ROS: 14 point review of systems was performed and all other systems were reviewed and are negative except for documented findings in HPI and today's encounter.     Allergies:   Allergies   Allergen Reactions   • Hydrocodone Hallucinations   • Ketek [Telithromycin] Hives   • Nsaids Hives   • Sulfa Antibiotics Hives     Constitutional:  Denies fever, shaking or chills   Eyes:  Denies change in visual acuity   HENT:  Denies nasal congestion or sore throat   Respiratory:  Denies cough or shortness of breath   Cardiovascular:  Denies chest pain or severe LE edema   GI:  Denies abdominal pain, nausea, vomiting, bloody stools or diarrhea   Musculoskeletal:  Numbness, tingling, pain, or loss of motor function only as noted above in history of present illness.  :  "Denies painful urination or hematuria  Integument:  Denies rash, lesion or ulceration   Neurologic:  Denies headache or focal weakness  Endocrine:  Denies lymphadenopathy  Psych:  Denies confusion or change in mental status   Hem:  Denies active bleeding    OBJECTIVE:  Physical Exam: 83 y.o. female  Wt Readings from Last 3 Encounters:   03/29/23 47.6 kg (105 lb)   02/08/23 47.6 kg (105 lb)   01/26/23 47.6 kg (105 lb)     Ht Readings from Last 1 Encounters:   03/29/23 162.6 cm (64\")     Body mass index is 18.02 kg/m².  Vitals:    03/29/23 0917   Temp: 97.8 °F (36.6 °C)     Vital signs reviewed.     General Appearance:    Alert, cooperative, in no acute distress                  Eyes: conjunctiva clear  ENT: external ears and nose atraumatic  CV: no peripheral edema  Resp: normal respiratory effort  Skin: no rashes or wounds; normal turgor  Psych: mood and affect appropriate  Lymph: no nodes appreciated  Neuro: gross sensation intact  Vascular:  Palpable peripheral pulse in noted extremity  Musculoskeletal Extremities: Exam today shows mild to moderate tenderness over greater trochanter she also has a diffuse tenderness in her lower lumbosacral spine hips nontender to axial loading she can transfer and walk beautifully.  She is little tender over SI joint    Radiology:   AP of the hip lateral right hip taken the office today for complaints of pain with comparison views shows well-seated bilateral total hip replacements without any obvious change from previous x-ray.        Assessment:     ICD-10-CM ICD-9-CM   1. Pain  R52 780.96   2. Fall from slip, trip, or stumble, initial encounter  W01.0XXA E885.9   3. Trochanteric bursitis of right hip  M70.61 726.5   4. Status post total replacement of both hips  Z96.643 V43.64   Offered to inject if is tender enough but it did not bother that much.     MDM/Plan:   The diagnosis(es), natural history, pathophysiology and treatment for diagnosis(es) were discussed. Opportunity " given and questions answered.  Biomechanics of pertinent body areas discussed.  When appropriate, the use of ambulatory aids discussed.    Inflammation/pain control; with cold, heat, elevation and/or liniments discussed as appropriate  MEDICAL RECORDS reviewed from other provider(s) for past and current medical history pertinent to this complaint.      3/29/2023    Dictated utilizing Dragon dictation

## 2023-03-31 ENCOUNTER — TELEPHONE (OUTPATIENT)
Dept: PHYSICAL THERAPY | Facility: CLINIC | Age: 83
End: 2023-03-31

## 2023-04-04 ENCOUNTER — TREATMENT (OUTPATIENT)
Dept: PHYSICAL THERAPY | Facility: CLINIC | Age: 83
End: 2023-04-04
Payer: MEDICARE

## 2023-04-04 DIAGNOSIS — R53.1 STRENGTH LOSS OF: ICD-10-CM

## 2023-04-04 DIAGNOSIS — M25.551 RIGHT HIP PAIN: ICD-10-CM

## 2023-04-04 DIAGNOSIS — R26.9 GAIT DIFFICULTY: ICD-10-CM

## 2023-04-04 DIAGNOSIS — Z96.641 HISTORY OF TOTAL HIP REPLACEMENT, RIGHT: Primary | ICD-10-CM

## 2023-04-04 DIAGNOSIS — Z78.9 DIFFICULTY NAVIGATING STAIRS: ICD-10-CM

## 2023-04-04 PROCEDURE — 97110 THERAPEUTIC EXERCISES: CPT | Performed by: PHYSICAL THERAPIST

## 2023-04-04 PROCEDURE — 97112 NEUROMUSCULAR REEDUCATION: CPT | Performed by: PHYSICAL THERAPIST

## 2023-04-04 NOTE — PROGRESS NOTES
"Physical Therapy Daily Treatment Note    [facility]  [address] [place] [address]  [phone] (phone)  [phone] (fax)    Patient: Bailee Garza   : 1940  Diagnosis/ICD-10 Code:  History of total hip replacement, right [Z96.641]  Referring practitioner: Anupam Ruiz MD  Today's Date: 2023  Patient seen for 14 sessions           Subjective:  Brigitte reports that she missed last visit due to weather issues.     Objective     Therapeutic exercise  1. NuStep, seat at 8, work load of 6, x 4 minutes  2. Matrix leg press, seat at 7, 30 lbs., 10 x 3  3. Matrix hip abduction, 30 lbs., 15 x 2  4. Runner's step, 4\", x 10, B, using column for balance  5. Lateal step down, 4\", x 10, B, using column for balance  6. Resisted gait, reverse, red theraband, x 5  7. Resisted gait, L and R, red theraband, each x 5    NMR: verbal cues for technique included using eccentric control with the leg press and hip abduction, with the leg press to abduct the hips with knee flexion.  With the step exercises to gaze to the ground ahead for better balance, and use eccentric control to place the dynamic foot on the ground softly. Cues for resisted gait were used to go slowly, toe to heel backwards and heel to toe forward.  Going laterally to avoid her feet hitting each other and again to go slowly.      Patient education: I added the resisted gait exercises, issuing red theraband, to her HEP.    Assessment & Plan     Assessment    Assessment details: Brigitte required verbal cues for exercise technique and progressed her HEP.  Those new home exercises should improve leg strength and stability in weight bearing.     Plan  Plan details: Continue per treatment plan.                Timed:    Manual Therapy:         mins  22375;  Therapeutic Exercise:    37     mins  86286;     Neuromuscular Sfoya:    8    mins  59947;    Therapeutic Activity:          mins  36190;     Gait Training:        "    mins  85806;     Ultrasound:          mins  97830;    Electrical Stimulation:         mins  23141 ( );  Iontophoresis         mins 72794;  Aquatic Therapy         mins 98853;      Untimed:  Electrical Stimulation:         mins  32574 ( );  Traction:         mins  56131;   Dry Needling  (1-2 muscles)                 mins 20560 (Self-pay)  Dry Needling (3-4 muscles)      20561 (Self-pay)  Dry Needling Trial         DRYNDLTRIAL  (No Charge)    Timed Treatment:   45   mins   Total Treatment:     45   mins    Deven Nunez PT  Physical Therapist    [place] License:[id number]

## 2023-04-07 ENCOUNTER — TREATMENT (OUTPATIENT)
Dept: PHYSICAL THERAPY | Facility: CLINIC | Age: 83
End: 2023-04-07
Payer: MEDICARE

## 2023-04-07 DIAGNOSIS — R53.1 STRENGTH LOSS OF: ICD-10-CM

## 2023-04-07 DIAGNOSIS — Z96.641 HISTORY OF TOTAL HIP REPLACEMENT, RIGHT: Primary | ICD-10-CM

## 2023-04-07 DIAGNOSIS — R26.9 GAIT DIFFICULTY: ICD-10-CM

## 2023-04-07 DIAGNOSIS — Z78.9 DIFFICULTY NAVIGATING STAIRS: ICD-10-CM

## 2023-04-07 PROCEDURE — 97112 NEUROMUSCULAR REEDUCATION: CPT | Performed by: PHYSICAL THERAPIST

## 2023-04-07 PROCEDURE — 97110 THERAPEUTIC EXERCISES: CPT | Performed by: PHYSICAL THERAPIST

## 2023-04-07 NOTE — PROGRESS NOTES
"Physical Therapy Daily Treatment/Reassessment Note    [facility]  [address] [place] [address]  [phone] (phone)  [phone] (fax)    Patient: Bailee Garza   : 1940  Diagnosis/ICD-10 Code:  History of total hip replacement, right [Z96.641]  Referring practitioner: Anupam Ruiz MD  Today's Date: 2023  Patient seen for 15 sessions           Subjective: Brigitte stated that she is noticing improvement with the R LE but she has difficulty with navigating stairs with a step through technique.      Objective     Therapeutic exercise  1. NuStep, seat at 8, work load of 6, x 4 minutes  2. Runner's step, 4\", x 10, B, using column for balance  3. Lateal step down, 4\", x 10, B, using column for balance  4. Resisted gait, reverse, red theraband, x 5  5. Resisted gait, L and R, red theraband, each x 5    NMR: verbal cues for the step exercises included keeping her torso slightly forward at the top position for balance with the runner's step, using eccentric control with the static leg and hitting the ground toe to heel, softkt, with each of the step exercises. To improve balance with the step exercises and resisted gait to gaze to the ground ahead, diagonally 15 feet.  With the resisted gait to move slowly and use a toe to heel gait in reverse and heel to toe forward.     Patient education:  Instructions for Brigitte to do her mat exercises daily, her LE strengthening 3 x per week, to walk for endurance 3 x per week and to follow her hip precautions.     Reassessment    Motor control:  Hip flexion B 4-/5, knee extension L 4+ to 5/5, R 4+/5, ankle dorsiflexion B 4+/5, EHL B 4+/5, knee flexion B 4+/5 and hip extension B 4+/5    Gait:  On stairs, she was unsteady using a step through technique with use of hand rail.     Assessment & Plan     Assessment  Impairments: impaired physical strength  Other impairment: navigating stairs  Functional Limitations: unable to " "perform repetitive tasks  Assessment details: Bailee Garza has been seen for 15 physical therapy sessions for R posterior CHAS.  Treatment has included therapeutic exercise, manual therapy, neuro-muscular retraining , gait training, patient education with home exercise program  and orthotic fabrication . Progress to physical therapy goals is good.  She will benefit from continued skilled physical therapy to address remaining impairments and functional limitations.     Goals  Plan Goals: STGs to be met in 4 weeks  1. Bailee begins closed chain strengthening exercises on a 4\" step. (met)  2. Gait training on stairs using step through technique begins. (met)  3. Standing balance is tested and appropriate exercises are begun if deficits are present. (met)    LTGs to be met in 12 weeks  1. Brigitte is able to ambulate stairs with a step through technique, using a hand rail. Independently. (ongoing)  2. MMT of the R LE is equal to the L. (ongoing)  3. Brigitte is independent with a HEP and self care education. (ongoing)    Plan  Therapy options: will be seen for skilled therapy services  Planned therapy interventions: strengthening, stretching, postural training, orthotic fitting/training, gait training, functional ROM exercises, flexibility, body mechanics training, balance/weight-bearing training, therapeutic activities, transfer training and home exercise program  Frequency: 1 x in 6-8 weeks.  Duration in weeks: 8  Treatment plan discussed with: patient  Plan details: Brigitte is to do her home program and I will see her in approximately 6 weeks to reassess and probably discharge at that time.                Timed:    Manual Therapy:         mins  77672;  Therapeutic Exercise:    37     mins  12272;     Neuromuscular Sofya:    8    mins  79339;    Therapeutic Activity:          mins  53911;     Gait Training:           mins  80474;     Ultrasound:          mins  20197;    Electrical Stimulation:         mins  54327 (MC " );  Iontophoresis         mins 69890;  Aquatic Therapy         mins 55624;      Untimed:  Electrical Stimulation:         mins  24278 ( );  Traction:         mins  46995;   Dry Needling  (1-2 muscles)                 mins 20560 (Self-pay)  Dry Needling (3-4 muscles)      20561 (Self-pay)  Dry Needling Trial         DRYNDLTRIAL  (No Charge)    Timed Treatment:   45   mins   Total Treatment:     45   mins    Deven Nunez PT  Physical Therapist    [place] License:[id number]

## 2023-06-05 ENCOUNTER — TREATMENT (OUTPATIENT)
Dept: PHYSICAL THERAPY | Facility: CLINIC | Age: 83
End: 2023-06-05
Payer: MEDICARE

## 2023-06-05 DIAGNOSIS — Z78.9 DIFFICULTY NAVIGATING STAIRS: ICD-10-CM

## 2023-06-05 DIAGNOSIS — R26.9 GAIT DIFFICULTY: ICD-10-CM

## 2023-06-05 DIAGNOSIS — Z96.641 HISTORY OF TOTAL HIP REPLACEMENT, RIGHT: Primary | ICD-10-CM

## 2023-06-05 DIAGNOSIS — R53.1 STRENGTH LOSS OF: ICD-10-CM

## 2023-06-05 NOTE — PROGRESS NOTES
"Physical Therapy Daily Treatment/Discharge Note    [facility]  [address] [place] [address]  [phone] (phone)  [phone] (fax)    Patient: Bailee Garza   : 1940  Diagnosis/ICD-10 Code:  History of total hip replacement, right [Z96.641]  Referring practitioner: Anupam Ruiz MD  Today's Date: 2023  Patient seen for 16 sessions           Subjective:  Brigitte reports that she feels her R hip is doing well.  She is able to do her normal ADL routine without problem. Brigitte stated that she has not done her HEP as prescribed.     Objective     Motor control:  hip flexion B 4+/5, knee extension B 4+ to 5/5, ankle dorsiflexion B 5/5, EHL 4+/5, knee flexion B 4 to 4+/5 and hip extension B 4+/5.     Gait: normal on level surface, step thru type gait ascending/descending stairs, both without an assistive device.     Orthotic check out:  each orthotic was placed under her feet, in sitting.  Brigitte fully plantarflexed each ankle and the orthotics maintained full contact with the arch into peak ankle plantarflexion.     Patient education:  I emphasized to Brigitte that doing her HEP will help to maximize function of the R LE as it heals over the full year post surgery.     Assessment & Plan     Assessment    Assessment details: Bailee Garza was seen for 16 physical therapy sessions for R CHAS.  Treatment included therapeutic exercise, therapeutic activity, neuro-muscular retraining , gait training, patient education with home exercise program , and orthotic fabrication . Progress to physical therapy goals was good.  She was discharged to an independent [facility] and provided patient education to self-manage condition.      Goals  Plan Goals: STGs to be met in 4 weeks  1. Bailee begins closed chain strengthening exercises on a 4\" step. (met)  2. Gait training on stairs using step through technique begins. (met)  3. Standing balance is tested and appropriate exercises are " begun if deficits are present. (met)    LTGs to be met in 12 weeks  1. Brigitte is able to ambulate stairs with a step through technique, using a hand rail. Independently. (met)  2. MMT of the R LE is equal to the L. (met)  3. Brigitte is independent with a HEP and self care education. (met)       Plan  Plan details: Discharged to independent self care.             Timed:    Manual Therapy:         mins  35749;  Therapeutic Exercise:    25     mins  57736;     Neuromuscular Sofya:        mins  14749;    Therapeutic Activity:          mins  99454;     Gait Training:           mins  48612;     Ultrasound:          mins  30248;    Electrical Stimulation:         mins  55918 ( );  Iontophoresis         mins 79473;  Aquatic Therapy         mins 70869;  Orthotic check out      15 mins 80503    Untimed:  Electrical Stimulation:         mins  57443 ( );  Traction:         mins  34687;   Dry Needling  (1-2 muscles)                 mins 75082 (Self-pay)  Dry Needling (3-4 muscles)      20561 (Self-pay)  Dry Needling Trial         DRYNDLTRIAL  (No Charge)    Timed Treatment:   40   mins   Total Treatment:     40   mins    Deven Nunez, PT  Physical Therapist    [place] License:[id number]

## 2023-08-10 ENCOUNTER — TELEPHONE (OUTPATIENT)
Dept: ONCOLOGY | Facility: CLINIC | Age: 83
End: 2023-08-10

## 2023-08-10 NOTE — TELEPHONE ENCOUNTER
Caller: Bailee Garza    Relationship: Self    Best call back number: [phone]    What is the best time to reach you: ANYTIME    Who are you requesting to speak with (clinical staff, provider,  specific staff member): SCHEDULING    What was the call regarding: PT CALLING TO CANCEL APPOINTMENTS ON 8-17 LAB AND F/U AND WILL NOT BE RE-SCHEDULING AT THIS TIME     Is it okay if the provider responds through [employer]: N/A

## 2023-08-15 NOTE — PROGRESS NOTES
August 15, 2023     Patient: Gail Pelayo   YOB: 1971   Date of Visit: 8/15/2023       To Whom it May Concern:    Gail Pelayo was seen in my clinic on 8/15/2023 at 2:30 pm.    Please excuse Gail for her absence from work on the date listed above to be able to make her appointment.    Sincerely,         Stone Michele MD    Medical information is confidential and cannot be disclosed without the written consent of the patient or her representative.       Please call patient with abnormal results.  Her thyroid is not within normal limits.  Dose adjustment needed.  I have sent prescription of Synthroid 88 mcg to [facility] pharmacy in [facility].  Tell her to stop Synthroid 100 mcg tablet.  Repeat lab testing in 3 months.  Her total cholesterol is elevated.  Please send her low-cholesterol diet.  Her kidney function has shown mild interval worsening.  Usually this is corrected by drinking 6 to 8 glasses of water daily.  Tell her to avoid nonsteroidal anti-inflammatory drugs like Aleve or ibuprofen.  We will repeat labs prior to our office visit on March 9, 2020.  Thanks, Dr. Araya

## 2023-08-29 ENCOUNTER — TELEPHONE (OUTPATIENT)
Dept: ORTHOPEDIC SURGERY | Facility: CLINIC | Age: 83
End: 2023-08-29

## 2023-08-29 NOTE — TELEPHONE ENCOUNTER
Caller: Bailee Garza    Relationship: Self    Best call back number: [phone], [facility] TO [facility]    PATIENT REC'D A LETTER FROM MEDICARE PART A THAT SHE IS CONFUSED ABOUT. SHE WANTS TO KNOW IF THE OFFICE REC'D THE SAME LETTER 5 PAGES LONG. SHE IS CONCERNED ABOUT HER BILLING NOT BEING COVERED AND STATES IF SHE HAS A BILL, SHE WANTS TO PAY. IT STATES IT IS AN APPEAL AND IS UNFAVORABLE. PLEASE CALL TO  DISCUSS.

## 2023-09-01 ENCOUNTER — TELEPHONE (OUTPATIENT)
Dept: ORTHOPEDIC SURGERY | Facility: CLINIC | Age: 83
End: 2023-09-01

## 2023-09-01 NOTE — TELEPHONE ENCOUNTER
Caller: NICK CORTEZ    Relationship: SELF    Best call back number:     Who are you requesting to speak with (clinical staff, provider,  specific staff member): MARIA    What was the call regarding: THE PATIENT HAD Posterior RIGHT TOTAL HIP ARTHROPLASTY MARCELINO NAVIGATION ON 12/13/22. SHE WOULD LIKE A CALL BACK FROM MARIA REGARDING THIS SURGERY. THE PATIENT DID NOT EXPLAIN FURTHER WHAT THE CALL WAS REGARDING.    Is it okay if the provider responds through MyChart: CALL

## 2023-09-05 NOTE — TELEPHONE ENCOUNTER
Can you try and call Brigitte and find out what her question is, I am running behind today and I do not want her to feel like I am ignoring her since it has been awhile since she called. Please and Thank you!

## 2023-09-05 NOTE — TELEPHONE ENCOUNTER
Hub staff attempted to follow warm transfer process and was unsuccessful     Caller: EDITH GLOVER     Relationship to patient: DAUGHTER     Best call back number: [phone]    Patient is needing: HAD SOME QUESTIONS REGARDING INSURANCE- WAS CALLED EARLIER

## 2023-10-14 NOTE — TELEPHONE ENCOUNTER
Letter faxed to [facility] and fax receipt is in .   Pain in bilateral knee sustained from fall  Scheduled Tylenol  Lidoderm  As needed oxycodone  Bowel regimen while on narcotics

## 2023-12-01 ENCOUNTER — OFFICE VISIT (OUTPATIENT)
Dept: ORTHOPEDIC SURGERY | Facility: CLINIC | Age: 83
End: 2023-12-01
Payer: MEDICARE

## 2023-12-01 VITALS — TEMPERATURE: 98 F | HEIGHT: 64 IN | BODY MASS INDEX: 17.24 KG/M2 | WEIGHT: 101 LBS

## 2023-12-01 DIAGNOSIS — R52 PAIN: Primary | ICD-10-CM

## 2023-12-01 PROCEDURE — 99213 OFFICE O/P EST LOW 20 MIN: CPT | Performed by: ORTHOPAEDIC SURGERY

## 2023-12-01 NOTE — PROGRESS NOTES
Patient Name: Bailee Garza   YOB: 1940  Referring Primary Care Physician: Eddie Araya MD  BMI: Body mass index is 17.34 kg/m².    Chief Complaint:    Chief Complaint   Patient presents with    Right Knee - Initial Evaluation        HPI:     Bailee Garza is a 83 y.o. female who presents today for evaluation of   Chief Complaint   Patient presents with    Right Knee - Initial Evaluation   .  Brigitte is seen today complaining of some right knee pain.  She had bilateral total hips done in the past.  She keeps very very active at 83.  Says that her right bothers her more in the left little harder after she does yard work show ice that she will put liniments on it does bother her on steps      Subjective   Medications:   Home Medications:  Current Outpatient Medications on File Prior to Visit   Medication Sig    calcium polycarbophil (FIBERCON) 625 MG tablet Take 1 tablet by mouth Daily.    Chlorcyclizine-Pseudoephed (STAHIST AD PO) Take  by mouth As Needed.    hydroCHLOROthiazide (HYDRODIURIL) 12.5 MG tablet Take 1 tablet by mouth Daily.    Synthroid 75 MCG tablet Take 1 tablet by mouth Daily.    valsartan (DIOVAN) 80 MG tablet Take 1 tablet by mouth Daily.     Current Facility-Administered Medications on File Prior to Visit   Medication    Chlorhexidine Gluconate Cloth 2 % pads 1 each     Current Medications:  Scheduled Meds:  Continuous Infusions:No current facility-administered medications for this visit.    PRN Meds:.    I have reviewed the patient's medical history in detail and updated the computerized patient record.  Review and summarization of old records includes:    Past Medical History:   Diagnosis Date    Abnormal CXR 12/18/2017    Adverse reaction to narcotic drug     SEVERE HALLUCINATIONS POST OP LEFT HIP PLACEMENT    Allergies     Arthritis     Arthritis of foot 04/17/2020    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    unspecified    Back pain 11/20/2007    Chronic back pain  2009    Chronic cough 2017    Closed nondisplaced fracture of lateral malleolus of fibula with delayed healing 2020    Closed nondisplaced fracture of medial cuneiform of left foot 2018    Constipation 2015    unspecified    COVID-19 vaccination refused     Diverticulosis     Dyspepsia 2008    Essential hypertension 2007    Exertional shortness of breath 2017    Fall 2018    Family history of abdominal aortic aneurysm (AAA) 2019    [place]aa [place] limited (04/10/2019 10:32)     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), 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    Hearing loss 2008    Hip pain, left     History of bone density study 2015    History of pneumonia     2017    History of skin cancer     Hypothyroidism, acquired 2007    Insomnia 2015    unspecified    Intervertebral disc disorder with myelopathy, cervical region 2010    Joint capsule tear 2012    unspecified    OA (osteoarthritis) of hip 2018    Other synovitis and tenosynovitis, right ankle and foot 2020    Paranoia     PT IS VERY CONCERNED & FEARFUL THAT THE HOSPITAL STAFF WILL GIVE HER THE COVID VACCINE WHILE HERE IN THE HOSPITAL & BELIEVES THAT THE COVID SWAB TEST HAS COVID ON THE SWAB & THAT SHE WILL GET COVID BEING TESTED. ATTEMPTED TO REASSURE PT    Peroneal tendonitis, right 2020    Rhinitis, allergic 2007    Scoliosis     Screening breast examination 2007    Stress 2015    other acute reactions to stress    Stress incontinence     Trochanteric bursitis, left hip 2019    Urticaria 2015        Past Surgical History:   Procedure Laterality Date    BREAST EXCISIONAL BIOPSY Right     50+ years ago    BRONCHOSCOPY N/A 2017    Procedure: BRONCHOSCOPY;  Surgeon: Mario Alanis MD;  Location: [facility] END[facility]COPY;  Service:     CATARACT EXTRACTION, BILATERAL      COLONOSCOPY       HYSTERECTOMY      TOTAL HIP ARTHROPLASTY Left 07/24/2018    Procedure: LEFT TOTAL HIP ARTHROPLASTY;  Surgeon: Justen Mann MD;  Location:  [facility];  Service: Orthopedics    TOTAL HIP ARTHROPLASTY Right 12/13/2022    Procedure: Posterior RIGHT TOTAL HIP ARTHROPLASTY MARCELINO NAVIGATION;  Surgeon: Anupam Ruiz MD;  Location:  [facility];  Service: Orthopedics;  Laterality: Right;        Social History     Occupational History     Employer: [employer]   Tobacco Use    Smoking status: Never     Passive exposure: Never    Smokeless tobacco: Never   Vaping Use    Vaping Use: Never used   Substance and Sexual Activity    Alcohol use: No    Drug use: Never    Sexual activity: Defer      Social History     Social History Narrative    Not on file        Family History   Problem Relation Age of Onset    Heart disease Mother     Aneurysm Mother     Colon cancer Father     Aneurysm Brother     Breast cancer Paternal Grandmother     Asthma Paternal Grandfather     Malig Hyperthermia Neg Hx     Ovarian cancer Neg Hx        ROS: 14 point review of systems was performed and all other systems were reviewed and are negative except for documented findings in HPI and today's encounter.     Allergies:   Allergies   Allergen Reactions    Hydrocodone Hallucinations    Ketek [Telithromycin] Hives    Nsaids Hives    Sulfa Antibiotics Hives     Constitutional:  Denies fever, shaking or chills   Eyes:  Denies change in visual acuity   HENT:  Denies nasal congestion or sore throat   Respiratory:  Denies cough or shortness of breath   Cardiovascular:  Denies chest pain or severe LE edema   GI:  Denies abdominal pain, nausea, vomiting, bloody stools or diarrhea   Musculoskeletal:  Numbness, tingling, pain, or loss of motor function only as noted above in history of present illness.  : Denies painful urination or hematuria  Integument:  Denies rash, lesion or ulceration   Neurologic:  Denies headache or  "focal weakness  Endocrine:  Denies lymphadenopathy  Psych:  Denies confusion or change in mental status   Hem:  Denies active bleeding    OBJECTIVE:  Physical Exam: 83 y.o. female  Wt Readings from Last 3 Encounters:   12/01/23 45.8 kg (101 lb)   07/12/23 45.2 kg (99 lb 9.6 oz)   03/29/23 47.6 kg (105 lb)     Ht Readings from Last 1 Encounters:   12/01/23 162.6 cm (64\")     Body mass index is 17.34 kg/m².  Vitals:    12/01/23 0943   Temp: 98 °F (36.7 °C)     Vital signs reviewed.     General Appearance:    Alert, cooperative, in no acute distress                  Eyes: conjunctiva clear  ENT: external ears and nose atraumatic  CV: no peripheral edema  Resp: normal respiratory effort  Skin: no rashes or wounds; normal turgor  Psych: mood and affect appropriate  Lymph: no nodes appreciated  Neuro: gross sensation intact  Vascular:  Palpable peripheral pulse in noted extremity  Musculoskeletal Extremities: Exam today pleasant thin lady she does have swelling crepitation synovitis joint line tenderness along the right knee with little bit of stiffness but overall she has good transfers and gait and has a reasonable gait    Radiology:   The lateral 40 degree PA x-ray right knee taken the office today for complaints of pain without comparison views available shows arthritis        Assessment:     ICD-10-CM ICD-9-CM   1. Pain  R52 780.96        MDM/Plan:   The diagnosis(es), natural history, pathophysiology and treatment for diagnosis(es) were discussed. Opportunity given and questions answered.  Biomechanics of pertinent body areas discussed.  When appropriate, the use of ambulatory aids discussed.    Biomechanics of pertinent body areas discussed.  When appropriate, the use of ambulatory aids discussed.  EXERCISES:  Advice on benefits of, and types of regular/moderate exercise pertaining to orthopedic diagnosis(es).  MEDICATIONS:  The risks, benefits, warnings,side effects and alternatives of medications " discussed.  Inflammation/pain control; with cold, heat, elevation and/or liniments discussed as appropriate  MEDICAL RECORDS reviewed from other provider(s) for past and current medical history pertinent to this complaint.  Overall she is doing pretty well with that and can control the inflammation with the arthritis have told her that may be expected.  If it hurt her enough we can do injections but not bothering enough to warrant such treatment today.  Other possibilities would include therapy.  Be happy to see this nice lady back if necessary    12/1/2023    Dictated utilizing Dragon dictation

## 2024-05-09 ENCOUNTER — TELEPHONE (OUTPATIENT)
Dept: ORTHOPEDIC SURGERY | Facility: CLINIC | Age: 84
End: 2024-05-09

## 2024-05-09 NOTE — TELEPHONE ENCOUNTER
Called and spoke to patient, Ms. Garza can weight bear on the right leg, does not feel steady on knee, no recent fall or recent surgery, has iced and elevated 8/10

## 2024-05-09 NOTE — TELEPHONE ENCOUNTER
Caller: Bailee Garza    Relationship to patient: Self    Best call back number:     Chief complaint: RT KNEE     Type of visit: NEW PROBLEM     Requested date: ASAP       Additional notes PATIENT HAS RIGHT KNEE SWELLING AND WOULD LIKE TO BE SEEN AS SOON AS POSSIBLE.  PATIENT HAS ONLY SEEN [facility] IN PAST

## 2024-05-16 ENCOUNTER — OFFICE VISIT (OUTPATIENT)
Dept: ORTHOPEDIC SURGERY | Facility: CLINIC | Age: 84
End: 2024-05-16
Payer: MEDICARE

## 2024-05-16 VITALS — BODY MASS INDEX: 17.58 KG/M2 | TEMPERATURE: 97.8 F | HEIGHT: 64 IN | WEIGHT: 103 LBS

## 2024-05-16 DIAGNOSIS — M25.561 PAIN IN BOTH KNEES, UNSPECIFIED CHRONICITY: Primary | ICD-10-CM

## 2024-05-16 DIAGNOSIS — M25.562 PAIN IN BOTH KNEES, UNSPECIFIED CHRONICITY: Primary | ICD-10-CM

## 2024-05-16 DIAGNOSIS — M17.0 PRIMARY OSTEOARTHRITIS OF BOTH KNEES: ICD-10-CM

## 2024-05-16 RX ORDER — METHYLPREDNISOLONE ACETATE 80 MG/ML
80 INJECTION, SUSPENSION INTRA-ARTICULAR; INTRALESIONAL; INTRAMUSCULAR; SOFT TISSUE
Status: COMPLETED | OUTPATIENT
Start: 2024-05-16 | End: 2024-05-16

## 2024-05-16 RX ADMIN — METHYLPREDNISOLONE ACETATE 80 MG: 80 INJECTION, SUSPENSION INTRA-ARTICULAR; INTRALESIONAL; INTRAMUSCULAR; SOFT TISSUE at 13:05

## 2024-05-16 NOTE — PROGRESS NOTES
Patient Name: Bailee Garza   YOB: 1940  Referring Primary Care Physician: Eddie Araya MD  BMI: Body mass index is 17.68 kg/m².    Chief Complaint:    Chief Complaint   Patient presents with   • Right Knee - Pain        HPI: Pt of [facility] [place] that presents with pain in right knee. She has had hip replaced in the past and is doing well. Pt presents with knee on the right and hurts worse going up and down steps. Pain in the back of the knee.    Bailee Garza is a 84 y.o. female who presents today for evaluation of   Chief Complaint   Patient presents with   • Right Knee - Pain         Subjective   Medications:   Home Medications:  Current Outpatient Medications on File Prior to Visit   Medication Sig   • calcium polycarbophil (FIBERCON) 625 MG tablet Take 1 tablet by mouth Daily.   • Chlorcyclizine-Pseudoephed (STAHIST AD PO) Take  by mouth As Needed.   • COLLAGEN PO Take  by mouth.   • hydroCHLOROthiazide (HYDRODIURIL) 12.5 MG tablet Take 1 tablet by mouth Daily.   • metroNIDAZOLE (METROCREAM) 0.75 % cream    • Synthroid 75 MCG tablet Take 1 tablet by mouth Daily.   • valsartan (DIOVAN) 80 MG tablet Take 1 tablet by mouth Daily.     Current Facility-Administered Medications on File Prior to Visit   Medication   • Chlorhexidine Gluconate Cloth 2 % pads 1 each     Current Medications:  Scheduled Meds:  Continuous Infusions:No current facility-administered medications for this visit.    PRN Meds:.    I have reviewed the patient's medical history in detail and updated the computerized patient record.  Review and summarization of old records includes:    Past Medical History:   Diagnosis Date   • Abnormal CXR 12/18/2017   • Adverse reaction to narcotic drug     SEVERE HALLUCINATIONS POST OP LEFT HIP PLACEMENT   • Allergies    • Arthritis    • Arthritis of foot 04/17/2020   • 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    unspecified   • Back pain 11/20/2007   • Chronic back pain 07/13/2009   • Chronic  cough 2017   • Closed nondisplaced fracture of lateral malleolus of fibula with delayed healing 2020   • Closed nondisplaced fracture of medial cuneiform of left foot 2018   • Constipation 2015    unspecified   • COVID-19 vaccination refused    • Diverticulosis    • Dyspepsia 2008   • Essential hypertension 2007   • Exertional shortness of breath 2017   • Fall 2018   • Family history of abdominal aortic aneurysm (AAA) 2019    [place]aa screen limited (04/10/2019 10:32)    • 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), 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   • Hearing loss 2008   • Hip pain, left    • History of bone density study 2015   • History of pneumonia     2017   • History of skin cancer    • Hypothyroidism, acquired 2007   • Insomnia 2015    unspecified   • Intervertebral disc disorder with myelopathy, cervical region 2010   • Joint capsule tear 2012    unspecified   • OA (osteoarthritis) of hip 2018   • Other synovitis and tenosynovitis, right ankle and foot 2020   • Paranoia     PT IS VERY CONCERNED & FEARFUL THAT THE HOSPITAL STAFF WILL GIVE HER THE COVID VACCINE WHILE HERE IN THE HOSPITAL & BELIEVES THAT THE COVID SWAB TEST HAS COVID ON THE SWAB & THAT SHE WILL GET COVID BEING TESTED. ATTEMPTED TO REASSURE PT   • Peroneal tendonitis, right 2020   • Rhinitis, allergic 2007   • Scoliosis    • Screening breast examination 2007   • Stress 2015    other acute reactions to stress   • Stress incontinence    • Trochanteric bursitis, left hip 2019   • Urticaria 2015        Past Surgical History:   Procedure Laterality Date   • BREAST EXCISIONAL BIOPSY Right     50+ years ago   • BRONCHOSCOPY N/A 2017    Procedure: BRONCHOSCOPY;  Surgeon: Mario Alanis MD;  Location: [facility] ENDOSCOPY;  Service:    • CATARACT EXTRACTION, BILATERAL     •  COLONOSCOPY     • HYSTERECTOMY     • TOTAL HIP ARTHROPLASTY Left 07/24/2018    Procedure: LEFT TOTAL HIP ARTHROPLASTY;  Surgeon: Justen Mann MD;  Location: [facility] MAIN OR;  Service: Orthopedics   • TOTAL HIP ARTHROPLASTY Right 12/13/2022    Procedure: Posterior RIGHT TOTAL HIP ARTHROPLASTY MARCELINO NAVIGATION;  Surgeon: Anupam Ruiz MD;  Location:  [facility];  Service: Orthopedics;  Laterality: Right;        Social History     Occupational History     Employer: [employer]   Tobacco Use   • Smoking status: Never     Passive exposure: Never   • Smokeless tobacco: Never   Vaping Use   • Vaping status: Never Used   Substance and Sexual Activity   • Alcohol use: No   • Drug use: Never   • Sexual activity: Defer      Social History     Social History Narrative   • Not on file        Family History   Problem Relation Age of Onset   • Heart disease Mother    • Aneurysm Mother    • Colon cancer Father    • Aneurysm Brother    • Breast cancer Paternal Grandmother    • Asthma Paternal Grandfather    • Malig Hyperthermia Neg Hx    • Ovarian cancer Neg Hx        ROS: 14 point review of systems was performed and all other systems were reviewed and are negative except for documented findings in HPI and today's encounter.     Allergies:   Allergies   Allergen Reactions   • Hydrocodone Hallucinations   • Ketek [Telithromycin] Hives   • Nsaids Hives   • Sulfa Antibiotics Hives     Constitutional:  Denies fever, shaking or chills   Eyes:  Denies change in visual acuity   HENT:  Denies nasal congestion or sore throat   Respiratory:  Denies cough or shortness of breath   Cardiovascular:  Denies chest pain or severe LE edema   GI:  Denies abdominal pain, nausea, vomiting, bloody stools or diarrhea   Musculoskeletal:  Numbness, tingling, pain, or loss of motor function only as noted above in history of present illness.  : Denies painful urination or hematuria  Integument:  Denies rash, lesion or  "ulceration   Neurologic:  Denies headache or focal weakness  Endocrine:  Denies lymphadenopathy  Psych:  Denies confusion or change in mental status   Hem:  Denies active bleeding    OBJECTIVE:  Physical Exam: 84 y.o. female  Wt Readings from Last 3 Encounters:   05/16/24 46.7 kg (103 lb)   12/02/23 45.8 kg (101 lb)   12/01/23 45.8 kg (101 lb)     Ht Readings from Last 1 Encounters:   05/16/24 162.6 cm (64\")     Body mass index is 17.68 kg/m².  Vitals:    05/16/24 0910   Temp: 97.8 °F (36.6 °C)     Vital signs reviewed.     General Appearance:    Alert, cooperative, in no acute distress                  Eyes: conjunctiva clear  ENT: external ears and nose atraumatic  CV: no peripheral edema  Resp: normal respiratory effort  Skin: no rashes or wounds; normal turgor  Psych: mood and affect appropriate  Lymph: no nodes appreciated  Neuro: gross sensation intact  Vascular:  Palpable peripheral pulse in noted extremity  Musculoskeletal Extremities: skin warm, dry and intact with calf soft and nttp, ttp medial joint line with patella femoral crepitation, hip nttp, ambulates without assistive devices     Radiology:   3 views both knees done for pain with no comparison views shows medial DJD right worse than left with tricompartmental DJD in both knees      Assessment:     ICD-10-CM ICD-9-CM   1. Pain in both knees, unspecified chronicity  M25.561 719.46    M25.562    2. Primary osteoarthritis of both knees  M17.0 715.16        Large Joint Arthrocentesis: L knee  Date/Time: 5/16/2024 1:05 PM  Consent given by: patient  Site marked: site marked  Timeout: Immediately prior to procedure a time out was called to verify the correct patient, procedure, equipment, support staff and site/side marked as required   Supporting Documentation  Indications: pain and joint swelling   Procedure Details  Location: knee - L knee  Preparation: Patient was prepped and draped in the usual sterile fashion  Needle gauge: 21 G.  Approach: " anterolateral  Medications administered: 2 mL lidocaine (cardiac); 80 mg methylPREDNISolone acetate 80 MG/ML  Patient tolerance: patient tolerated the procedure well with no immediate complications      Large Joint Arthrocentesis: R knee  Date/Time: 5/16/2024 1:05 PM  Consent given by: patient  Site marked: site marked  Timeout: Immediately prior to procedure a time out was called to verify the correct patient, procedure, equipment, support staff and site/side marked as required   Supporting Documentation  Indications: pain and joint swelling   Procedure Details  Location: knee - R knee  Preparation: Patient was prepped and draped in the usual sterile fashion  Needle gauge: 21 G.  Approach: anterolateral  Medications administered: 2 mL lidocaine (cardiac); 80 mg methylPREDNISolone acetate 80 MG/ML  Patient tolerance: patient tolerated the procedure well with no immediate complications        MDM/Plan:   The diagnosis(es), natural history, pathophysiology and treatment for diagnosis(es) were discussed. Opportunity given and questions answered.  Biomechanics of pertinent body areas discussed.  When appropriate, the use of ambulatory aids discussed.  EXERCISES:  Advice on benefits of, and types of regular/moderate exercise pertaining to orthopedic diagnosis(es).  MEDICATIONS:  The risks, benefits, warnings,side effects and alternatives of medications discussed.  Inflammation/pain control; with cold, heat, elevation and/or liniments discussed as appropriate  Cortisone Injection. See procedure note.  HOME EXERCISE/PT program encouraged  MEDICAL RECORDS reviewed from other provider(s) for past and current medical history pertinent to this complaint.      5/16/2024    Much of this encounter note is an electronic transcription/translation of spoken language to printed text. The electronic translation of spoken language may permit erroneous, or at times, nonsensical words or phrases to be inadvertently transcribed; Although  I have reviewed the note for such errors, some may still exist

## 2024-06-11 ENCOUNTER — OFFICE VISIT (OUTPATIENT)
Dept: ORTHOPEDIC SURGERY | Facility: CLINIC | Age: 84
End: 2024-06-11
Payer: MEDICARE

## 2024-06-11 VITALS — BODY MASS INDEX: 18.25 KG/M2 | TEMPERATURE: 98.4 F | HEIGHT: 63 IN | WEIGHT: 103 LBS

## 2024-06-11 DIAGNOSIS — M17.0 BILATERAL PRIMARY OSTEOARTHRITIS OF KNEE: Primary | ICD-10-CM

## 2024-06-11 PROCEDURE — 99213 OFFICE O/P EST LOW 20 MIN: CPT | Performed by: NURSE PRACTITIONER

## 2024-06-11 PROCEDURE — 1159F MED LIST DOCD IN RCRD: CPT | Performed by: NURSE PRACTITIONER

## 2024-06-11 PROCEDURE — 1160F RVW MEDS BY RX/DR IN RCRD: CPT | Performed by: NURSE PRACTITIONER

## 2024-06-11 NOTE — PROGRESS NOTES
Patient Name: Bailee Garza   YOB: 1940  Referring Primary Care Physician: Eddie Araya MD  BMI: Body mass index is 18.25 kg/m².    Chief Complaint:    Chief Complaint   Patient presents with    Right Knee - Pain        HPI: Pt returns to clinic for pain in knee that has not improved with cortisone that she had in May. Pt is using liniments and not improving. Pt wanted to discuss options for knee pain. Pt is very active and takes tylenol for pain.  She bought CBD Gummies from [employer] is decided not to buy any more over-the-counter remedies for her knee pain.    Bailee Garza is a 84 y.o. female who presents today for evaluation of   Chief Complaint   Patient presents with    Right Knee - Pain         Subjective   Medications:   Home Medications:  Current Outpatient Medications on File Prior to Visit   Medication Sig    calcium polycarbophil (FIBERCON) 625 MG tablet Take 1 tablet by mouth Daily.    Chlorcyclizine-Pseudoephed (STAHIST AD PO) Take  by mouth As Needed.    COLLAGEN PO Take  by mouth.    hydroCHLOROthiazide (HYDRODIURIL) 12.5 MG tablet Take 1 tablet by mouth Daily.    metroNIDAZOLE (METROCREAM) 0.75 % cream     Synthroid 75 MCG tablet Take 1 tablet by mouth Daily.    valsartan (DIOVAN) 80 MG tablet Take 1 tablet by mouth Daily.     Current Facility-Administered Medications on File Prior to Visit   Medication    Chlorhexidine Gluconate Cloth 2 % pads 1 each     Current Medications:  Scheduled Meds:  Continuous Infusions:No current facility-administered medications for this visit.    PRN Meds:.    I have reviewed the patient's medical history in detail and updated the computerized patient record.  Review and summarization of old records includes:    Past Medical History:   Diagnosis Date    Abnormal CXR 12/18/2017    Adverse reaction to narcotic drug     SEVERE HALLUCINATIONS POST OP LEFT HIP PLACEMENT    Allergies     Arthritis     Arthritis of foot 04/17/2020    Arthropathy of  pelvic region and thigh 2012    unspecified    Back pain 2007    Chronic back pain 2009    Chronic cough 2017    Closed nondisplaced fracture of lateral malleolus of fibula with delayed healing 2020    Closed nondisplaced fracture of medial cuneiform of left foot 2018    Constipation 2015    unspecified    COVID-19 vaccination refused     Diverticulosis     Dyspepsia 2008    Essential hypertension 2007    Exertional shortness of breath 2017    Fall 2018    Family history of abdominal aortic aneurysm (AAA) 2019    [place] aaa screen limited (04/10/2019 10:32)     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), 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    Hearing loss 2008    Hip pain, left     History of bone density study 2015    History of pneumonia     2017    History of skin cancer     Hypothyroidism, acquired 2007    Insomnia 2015    unspecified    Intervertebral disc disorder with myelopathy, cervical region 2010    Joint capsule tear 2012    unspecified    OA (osteoarthritis) of hip 2018    Other synovitis and tenosynovitis, right ankle and foot 2020    Paranoia     PT IS VERY CONCERNED & FEARFUL THAT THE HOSPITAL STAFF WILL GIVE HER THE COVID VACCINE WHILE HERE IN THE HOSPITAL & BELIEVES THAT THE COVID SWAB TEST HAS COVID ON THE SWAB & THAT SHE WILL GET COVID BEING TESTED. ATTEMPTED TO REASSURE PT    Peroneal tendonitis, right 2020    Rhinitis, allergic 2007    Scoliosis     Screening breast examination 2007    Stress 2015    other acute reactions to stress    Stress incontinence     Trochanteric bursitis, left hip 2019    Urticaria 2015        Past Surgical History:   Procedure Laterality Date    BREAST EXCISIONAL BIOPSY Right     50+ years ago    BRONCHOSCOPY N/A 2017    Procedure: BRONCHOSCOPY;  Surgeon: Mario Alanis MD;   Location:  [facility];  Service:     CATARACT EXTRACTION, BILATERAL      COLONOSCOPY      HYSTERECTOMY      TOTAL HIP ARTHROPLASTY Left 07/24/2018    Procedure: LEFT TOTAL HIP ARTHROPLASTY;  Surgeon: Justen Mann MD;  Location:  [facility];  Service: Orthopedics    TOTAL HIP ARTHROPLASTY Right 12/13/2022    Procedure: Posterior RIGHT TOTAL HIP ARTHROPLASTY MARCELINO NAVIGATION;  Surgeon: Anupam Ruiz MD;  Location:  [facility];  Service: Orthopedics;  Laterality: Right;        Social History     Occupational History     Employer: [employer]   Tobacco Use    Smoking status: Never     Passive exposure: Never    Smokeless tobacco: Never   Vaping Use    Vaping status: Never Used   Substance and Sexual Activity    Alcohol use: No    Drug use: Never    Sexual activity: Defer      Social History     Social History Narrative    Not on file        Family History   Problem Relation Age of Onset    Heart disease Mother     Aneurysm Mother     Colon cancer Father     Aneurysm Brother     Breast cancer Paternal Grandmother     Asthma Paternal Grandfather     Malig Hyperthermia Neg Hx     Ovarian cancer Neg Hx        ROS: 14 point review of systems was performed and all other systems were reviewed and are negative except for documented findings in HPI and today's encounter.     Allergies:   Allergies   Allergen Reactions    Hydrocodone Hallucinations    Ketek [Telithromycin] Hives    Nsaids Hives    Sulfa Antibiotics Hives     Constitutional:  Denies fever, shaking or chills   Eyes:  Denies change in visual acuity   HENT:  Denies nasal congestion or sore throat   Respiratory:  Denies cough or shortness of breath   Cardiovascular:  Denies chest pain or severe LE edema   GI:  Denies abdominal pain, nausea, vomiting, bloody stools or diarrhea   Musculoskeletal:  Numbness, tingling, pain, or loss of motor function only as noted above in history of present illness.  : Denies painful urination  "or hematuria  Integument:  Denies rash, lesion or ulceration   Neurologic:  Denies headache or focal weakness  Endocrine:  Denies lymphadenopathy  Psych:  Denies confusion or change in mental status   Hem:  Denies active bleeding    OBJECTIVE:  Physical Exam: 84 y.o. female  Wt Readings from Last 3 Encounters:   06/11/24 46.7 kg (103 lb)   05/16/24 46.7 kg (103 lb)   12/02/23 45.8 kg (101 lb)     Ht Readings from Last 1 Encounters:   06/11/24 160 cm (63\")     Body mass index is 18.25 kg/m².  Vitals:    06/11/24 1508   Temp: 98.4 °F (36.9 °C)     Vital signs reviewed.     General Appearance:    Alert, cooperative, in no acute distress                  Eyes: conjunctiva clear  ENT: external ears and nose atraumatic  CV: no peripheral edema  Resp: normal respiratory effort  Skin: no rashes or wounds; normal turgor  Psych: mood and affect appropriate  Lymph: no nodes appreciated  Neuro: gross sensation intact  Vascular:  Palpable peripheral pulse in noted extremity  Musculoskeletal Extremities: Skin warm dry intact with good pulses and sensation calves are soft and nontender she ambulates lances devices she has tenderness in the medial compartment with patellofemoral crepitation    Radiology:   Reviewed tricompartmental DJD     Assessment:     ICD-10-CM ICD-9-CM   1. Bilateral primary osteoarthritis of knee  M17.0 715.16        Procedures  Will refer to [facility] and can see her back in August for cortisone     MDM/Plan:   The diagnosis(es), natural history, pathophysiology and treatment for diagnosis(es) were discussed. Opportunity given and questions answered.  Biomechanics of pertinent body areas discussed.  When appropriate, the use of ambulatory aids discussed.  EXERCISES:  Advice on benefits of, and types of regular/moderate exercise pertaining to orthopedic diagnosis(es).  MEDICATIONS:  The risks, benefits, warnings,side effects and alternatives of medications discussed.  Inflammation/pain control; with cold, " heat, elevation and/or liniments discussed as appropriate  Cortisone Injection. See procedure note.  PT referral.  MEDICAL RECORDS reviewed from other provider(s) for past and current medical history pertinent to this complaint.  Total Knee Replacement : Continuation of conservative management vs. TKA: I reviewed anatomy of a total knee arthroplasty in laymen's terms, as well as typical postoperative recovery and possibly 6-12 months for maximal recovery, and possible need for rehabilitation stay after hospitalization. We also discussed risks, benefits, alternatives, and limitations of procedure with risks including but not limited to neurovascular damage, bleeding, infection, malalignment, chronic pain, failure of implants, osteolysis, loosening of implants, loss of motion, weakness, stiffness, instability, DVT, pulmonary embolus, death, stroke, complex regional pain syndrome, myocardial infarction, and need for additional procedures. Concept of substitution vs. replacement discussed.  No guarantees were given regarding results of surgery.  Patient verbalized understanding, and was given the opportunity to ask and have all questions answered today.       6/12/2024    Much of this encounter note is an electronic transcription/translation of spoken language to printed text. The electronic translation of spoken language may permit erroneous, or at times, nonsensical words or phrases to be inadvertently transcribed; Although I have reviewed the note for such errors, some may still exist

## 2024-07-09 ENCOUNTER — TREATMENT (OUTPATIENT)
Dept: PHYSICAL THERAPY | Facility: CLINIC | Age: 84
End: 2024-07-09
Payer: MEDICARE

## 2024-07-09 DIAGNOSIS — M17.0 BILATERAL PRIMARY OSTEOARTHRITIS OF KNEE: Primary | ICD-10-CM

## 2024-07-09 DIAGNOSIS — Z74.09 IMPAIRED FUNCTIONAL MOBILITY, BALANCE, GAIT, AND ENDURANCE: ICD-10-CM

## 2024-07-09 NOTE — PROGRESS NOTES
[facility]    Physical Therapy Initial Evaluation and Plan of Care    Patient Name: Bailee Garza          :  1940  Referring Physician: Maeve Ruiz APRN  Diagnosis: Bilateral primary osteoarthritis of knee [M17.0]    Date of Evaluation: 2024  ______________________________________________________________________    Subjective Evaluation    History of Present Illness  Mechanism of injury: Late October / 2023 - Insidious onset - Progressively worsening - Doesn't want to have surgery -       Patient Occupation: Very active - Working in yard - used to go to gym - Worked 30 yrs at [employer] up to age 81 -  - Pain  Current pain ratin  At worst pain rating: 10  Location: Ant/Med, Medial, posterior R>>L  Quality: Ache.  Alleviating factors: Rest - Pro Cure rub - Pillow under knee.  Exacerbated by: While sleeping; Walking, stairs, squatting - rising - twisting - Working in yard - Getting down and up.  Progression: worsening    Social Support  Patient lives at: Home with stairs to basement - where the laundry is located.    Diagnostic Tests  X-ray: abnormal    Treatments  Current treatment: injection treatment  Current treatment comments: OTC - tylenol.   Patient Goals  Patient/family treatment goals: Decrease pain and improved ability to walk, stairs, ADLs.       Radiology: from [facility] w/ referring provider TERESA Jacobs 2024  3 views both knees done for pain with no comparison views shows medial DJD right worse than left with tricompartmental DJD in both knees    ___________________________________________________  Objective          Observations     Additional Knee Observation Details  (R) knee grossly swollen -   Gait with minimal antalgia -   Able to ambulate up stairs reciprocally, but unable to ambulate down stairs reciprocally due to pain in (R) knee -     Tenderness     Additional Tenderness Details  Severely tender medial and ant/med joint line (R) > (L)   Tender popliteal region (R) knee  -     Active Range of Motion   Left Knee   Flexion: 150 degrees   Extension: 0 (Passive HE) degrees     Right Knee   Flexion: 125 (Pain w/ transitional movements flex<->Ext) degrees with pain  Extension: 0 (Passive HE) degrees     Strength/Myotome Testing     Left Knee   Normal strength    Right Knee   Flexion: 4+  Extension: 4  Quadriceps contraction: fair    Additional Strength Details  Hip Flexion (R) 4/5;  (L) 4+/5  Hip ABDuction 4/5 (B)     Tests     Additional Tests Details  (+) Nii's Medial knee (R)  (+) Thessally's Medial knee (R)  (-) Valgus/Varus testing  (R)  (-) Ant/Post Drawer  (R)        See Treatment Flow sheet for Exercises, Manual therapy, and modalities.   FUNCTIONAL ACTIVITIES:   TAPING / BRACING: NA  Anatomy / Dysfunction Education  Educated Patient on Aquatic Therapy, its purpose / goals, and how it is beneficial. Pt given tour of family changing area, pool, and safety including pool shoes to prevent falls, where to sign in and sit to wait.  Reviewed Aquatic Therapy hand out w/ patient and answered questions  Jt protection, ADL modification; Posture and     KOS: Pt DID NOT FULL COMPLETE - Will have Aquatic PT have her finish.     ___________________________________________________  Assessment & Plan       Assessment  Impairments: abnormal muscle firing, abnormal muscle tone, abnormal or restricted ROM, activity intolerance, impaired physical strength, lacks appropriate home exercise program, pain with function and weight-bearing intolerance   Functional limitations: lifting, walking, uncomfortable because of pain, standing and unable to perform repetitive tasks   Assessment details: Bailee Garza is a very active 84 y.o. year-old female referred to physical therapy for (B) knee osteoarthritis.  She has comorbidities including chronic midline LBP, DDD LSS, (L) CHAS, (R) CHAS, Closed nondisplaced fracture of lateral malleolus of fibula with delayed healing, Closed nondisplaced  fracture of medial cuneiform of left foot  that may affect her progress in the plan of care, but they don't seen to interfere with her activities as much as her (R) knee.  Signs and symptoms are consistent with physical therapy diagnosis of (R>>L) knee OA and would be appropriate for Aquatic Therapy.   Prognosis: good    Goals  Plan Goals: Date to achieve goals:  90 days    Date to achieve STGs:  6 weeks    STG 1 Patient will perform aquatic therapy exercises for hip, knee, core, and balance ROM/flexibility, strength and conditioning without increased pain.    STG 2 Patient will demonstrate good postural awareness with standing and walking in pool.    STG 3 Patient will report decreased pain 25% at rest and w/ ADLs, / LE activities -     Date to achieve LTGs:  12 weeks    LTG 1 Patient will demonstrate an independent aquatic HEP for hip, knee, core , and balance, strength,  ROM/flexibility, conditioning and balance with community resources     LTG 2 Patient will demonstrate increased core strength and balance with use of added resistive equipment.    LTG 3 Patient will report decreased functional disability on KOS ADL to > 60/80       Plan  Therapy options: will be seen for skilled therapy services  Planned modality interventions: hydrotherapy  Other planned modality interventions: Aquatic therapy  Planned therapy interventions: abdominal trunk stabilization, balance/weight-bearing training, flexibility, functional ROM exercises, strengthening, postural training, neuromuscular re-education, motor coordination training, gait training, home exercise program, therapeutic activities, stretching and transfer training  Frequency: 2x week  Duration in weeks: 12  Treatment plan discussed with: patient      ___________________________________________________  Manual Therapy:         mins  31879;  Therapeutic Exercise:         mins  79444;     Neuromuscular Sofya:        mins  01814;    Therapeutic Activity:     23     mins   66137;   Self Care:                           mins  78434  Ultrasound:          mins  24172;  Iontophoresis:          mins  58460;    Electrical Stimulation:         mins  69745 ( );  Mechanical Traction:          mins  75067  Dry Needling          mins self-pay    Eval:   20   mins    Timed Treatment:   23   mins                  Total Treatment:     43   mins    PT SIGNATURE:     Kirit Damon, PT  DATE TREATMENT INITIATED: 7/9/2024  ___________________________________________________  Initial Certification  Certification Period: 10/7/2024  I certify that the therapy services are furnished while this patient is under my care.  The services outlined above are required by this patient, and will be reviewed every 90 days.     PHYSICIAN: ________________________________  DATE: ______  Maeve Ruiz APRN        Please sign and return via fax to [phone].. Thank you, [facility].  ______________________________________________________________________  [address] [place] [address]  Phone: [phone] Fax: [phone]

## 2024-07-12 ENCOUNTER — TREATMENT (OUTPATIENT)
Dept: PHYSICAL THERAPY | Facility: CLINIC | Age: 84
End: 2024-07-12
Payer: MEDICARE

## 2024-07-12 DIAGNOSIS — M17.0 BILATERAL PRIMARY OSTEOARTHRITIS OF KNEE: Primary | ICD-10-CM

## 2024-07-12 DIAGNOSIS — Z74.09 IMPAIRED FUNCTIONAL MOBILITY, BALANCE, GAIT, AND ENDURANCE: ICD-10-CM

## 2024-07-12 NOTE — PROGRESS NOTES
"Physical Therapy Daily Treatment Note    [facility]  [address] [place] [address]  [phone] (phone)  [phone] (fax)    Patient: Bailee Garza   : 1940  Diagnosis/ICD-10 Code:  Bilateral primary osteoarthritis of knee [M17.0]  Referring practitioner: TERESA Jacobs  Date of Initial Visit: Type: THERAPY  Noted: 2024  Today's Date: 2024  Patient seen for 2 sessions             Subjective Evaluation    History of Present Illness    Subjective comment: R>L knee pain.  They say my knee is \"bone on bone\" and I don't want to do surgery - still not fully recovered from hip surgery.  I worked at [employer] until I was 81.       Objective     KOS-ADL score = 56/80 or 70% ability (30% perceived disability)    AQUATIC EX:    Water Walk   Forward, backward, sidewyas x 2 laps ea, felt it in medial right knee with sideways to L (adducting R LE)  Stretch 1   -  Stretch 2   -  Stretch 3   -  Stretch Other 1  -  Stretch Other 2  -  Vertical Traction  -  Abdominals   -  Clams    15x, seated  Hip Abd/Add   10x ea  Hip Flex (SLR) 10x ea  Hip Ext   -  March in Place  15x  Mini Squat   12x  Toe/Heel Raises  15x  Uni-Squat   -  Uni-Clock   -  Step Ups   -  Bicycle   1-2 min, seated  Flutter/Scissor  -  Exercise 1   -  Exercise 2   -  Exercise 3   -  Exercise 4   -  Exercise 5  -  Exercise 6  -         Assessment & Plan       Assessment  Assessment details: Patient seen today for initial aquatic therapy session including education and instruction in basic aquatic ex/activity for mobility, flexibility, and strength/stabilization.  She felt it a little in medial R knee with lateral stepping to the left and during seated bicycling.  Instructed her to stand tall, engage abdominals / gluts, and keep ex performance in comfortable range to minimize compensation / strain on joints.  She was amazed at how good the water felt and said she could do more when session ended.  PT " provided demonstration and cuing throughout session for optimal posture, core/glut activation, and correct form/technique with ex/activity.    Plan:  Assess patient response to aquatic ex/activity and modify/progress as appropriate.                    Timed:  Aquatic Therapy    30     mins 93331;    Kenisha Lorenz, PT  Physical Therapist    [place] License: [id number]

## 2024-07-19 ENCOUNTER — TREATMENT (OUTPATIENT)
Dept: PHYSICAL THERAPY | Facility: CLINIC | Age: 84
End: 2024-07-19
Payer: MEDICARE

## 2024-07-19 DIAGNOSIS — M17.0 BILATERAL PRIMARY OSTEOARTHRITIS OF KNEE: Primary | ICD-10-CM

## 2024-07-19 DIAGNOSIS — Z74.09 IMPAIRED FUNCTIONAL MOBILITY, BALANCE, GAIT, AND ENDURANCE: ICD-10-CM

## 2024-07-19 NOTE — PROGRESS NOTES
Physical Therapy Daily Treatment Note    [facility]  [address] [place] [address]  [phone] (phone)  [phone] (fax)    Patient: Bailee Garza   : 1940  Diagnosis/ICD-10 Code:  Bilateral primary osteoarthritis of knee [M17.0]  Referring practitioner: TERESA Jacobs  Date of Initial Visit: Type: THERAPY  Noted: 2024  Today's Date: 2024  Patient seen for 3 sessions             Subjective Evaluation    History of Present Illness    Subjective comment: I think the water therapy was helpful but feel like I need to be coming more than 2x week.  I did some work in yard yesterday so my R knee is bothering me some this morning.       Objective     AQUATIC EX:     Water Walk                 Forward, backward, sideways x 2 laps ea, felt it in medial right knee with sideways to L (adducting R LE)  March Walk  2 laps  Stretch 1                      calf 20 sec x 2 ea  Stretch 2                      -  Stretch 3                      -  Stretch Other 1           -  Stretch Other 2           -  Vertical Traction          -  Abdominals                 -  Clams                          15x, seated  Hip Abd/Add                12x ea  Hip Flex (SLR)            12x ea  Hip Ext                        -  March in Place            15x  Mini Squat                   12x  Toe/Heel Raises         15x  Alt HS curls   10x  Leg Press  10x ea w/ medium blue ring  Uni-Squat                    -  Uni-Clock                    -  Step Ups                     -  STS from pool bench  10x, no hands  Bicycle                         2 min, seated  Flutter/Scissor             15 / 15, seated  Exercise 1                   seated LAQ + ankle DF x 10 ea  Exercise 2                   -  Exercise 3                   -  Exercise 4                   -  Exercise 5                   -  Exercise 6                   -    Assessment & Plan       Assessment  Assessment details: Patient  reports some benefit following initial aquatic therapy session but states her knee is somewhat bothersome this morning but she did work in yard for a bit yesterday.  Continued with previous aquatic ex/activity for mobility, flexibility, and strength/stabilization.  Increased reps on a couple of ex and added calf stretch, march walk, alt HS curls, leg press, LAQ, STS, scissor / flutter kicks this visit.  She noted a challenge with R>L LE leg press, STS and felt it in ant thighs with alt HS curls.  Emphasis on standing up tall, engaging abdominals / gluts / quads, and performing ex/activity with controlled movement in comfortable range to reduce compensation / strain on joints and optimize benefit.  Demonstration and cuing provided throughout session for optimal posture, core/glut activation, and correct form/technique with ex/activity.      Plan:  Continue to assess patient response to aquatic ex/activity and modify/progress as appropriate.                    Timed:  Aquatic Therapy    39     mins 87680;    Kenisha Lorenz PT  Physical Therapist    [place] License: [id number]

## 2024-07-22 ENCOUNTER — TELEPHONE (OUTPATIENT)
Dept: ORTHOPEDIC SURGERY | Facility: CLINIC | Age: 84
End: 2024-07-22

## 2024-07-22 NOTE — TELEPHONE ENCOUNTER
Caller: EDITH GLOVER    Relationship to patient: DAUGHTER (ON VERBAL RELEASE OF INFO)    Best call back number: [phone]    Chief complaint:  PATIENT HAS BEEN SEEING WOODY MOORE FOR BILATERAL KNEE PAIN. ASKING ABOUT SEEING DR. JESUS YUN FOR CONSULT ON KNEES AND POSSIBLE TKA(S). PATIENT DOES NOT WANT TO SEE DR. HOLCOMB FOR THIS.    Type of visit: CONSULT-CHANGE PROVIDER

## 2024-07-24 DIAGNOSIS — E03.9 HYPOTHYROIDISM, ACQUIRED: ICD-10-CM

## 2024-07-24 RX ORDER — LEVOTHYROXINE SODIUM 75 MCG
75 TABLET ORAL DAILY
Qty: 30 TABLET | Refills: 0 | Status: SHIPPED | OUTPATIENT
Start: 2024-07-24 | End: 2025-07-24

## 2024-07-24 NOTE — TELEPHONE ENCOUNTER
Last visit: 7-12-23  Next visit: Return in about 1 year (around 7/12/2024) for Medicare Wellness & regular visit, [id number] min     Sent pt msg stating they are overdue for appt and would request 30 day supply of meds

## 2024-07-26 ENCOUNTER — TREATMENT (OUTPATIENT)
Dept: PHYSICAL THERAPY | Facility: CLINIC | Age: 84
End: 2024-07-26
Payer: MEDICARE

## 2024-07-26 DIAGNOSIS — M17.0 BILATERAL PRIMARY OSTEOARTHRITIS OF KNEE: Primary | ICD-10-CM

## 2024-07-26 DIAGNOSIS — Z74.09 IMPAIRED FUNCTIONAL MOBILITY, BALANCE, GAIT, AND ENDURANCE: ICD-10-CM

## 2024-07-26 NOTE — PROGRESS NOTES
Physical Therapy Daily Treatment Note    [facility]  [address] [place] [address]  [phone] (phone)  [phone] (fax)    Patient: Bailee Garza   : 1940  Diagnosis/ICD-10 Code:  Bilateral primary osteoarthritis of knee [M17.0]  Referring practitioner: TERESA Jacobs  Date of Initial Visit: Type: THERAPY  Noted: 2024  Today's Date: 2024  Patient seen for 4 sessions             Subjective Evaluation    History of Present Illness    Subjective comment: R knee has been really hurting - have MD appt on Aug 1 for my knee pain.  She states her knee hurts like her hip did before she had it replaced but the hip surgery took the pain away.  My son and daughter in law signed me up for 3 month membership - hoping to be able to join water class(es) to get exercise.       Objective      AQUATIC EX:     Water Walk                 Forward, backward, sideways x 2 laps ea - on own   March Walk                 2 laps  Stretch 1                      calf 20 sec x 2 ea  Stretch 2                      -  Stretch 3                      -  Stretch Other 1           -  Stretch Other 2           -  Vertical Traction          -  Abdominals                 -  Clams                          15x, seated  Hip Abd/Add                15x ea  Hip Flex (SLR)            15x ea  Hip Ext                        -  March in Place            15x  Mini Squat                   12x  Toe/Heel Raises         15x  Alt HS curls                 10x  Leg Press                    12x ea w/ medium blue ring  Uni-Squat                    -  Uni-Clock                    -  Step Ups                     -  STS from pool bench  10x, no hands  Bicycle                         2 min, seated - on   Flutter/Scissor             15 / 15, seated  Exercise 1                   seated LAQ + ankle DF x 10 ea  Exercise 2                   -  Exercise 3                   -  Exercise 4                    -  Exercise 5                   -  Exercise 6                   -       Assessment & Plan       Assessment  Assessment details: Patient reports her knee is hurting more and she has appt with MD on Aug 1, 2024.  Continued with previous aquatic ex/activity for mobility, flexibility, and strength/stabilization.  Increased reps on a few ex this visit.  She prefers to try and complete ex without rail support, however, has a more difficulty time stabilizing when SL WB so encouraged her to use light rail support to help with balance.  She struggles with STS transition and requires cues for anterior weight shift prior to pushing up with her legs.  Focused on optimal posture, actively engaging abdominals / gluts / quads, and performing ex/activity with good form / quality and control to minimize compensation / strain on joints and maximize benefit.  Demonstration and cuing provided throughout session for optimal posture, core/glut activation, and correct form/technique with ex/activity.      Plan:  Continue with aquatic ex/activity and modify/progress as appropriate.                    Timed:  Aquatic Therapy    32     mins [id number];    Kenisha Lorenz PT  Physical Therapist    [place] License: [id number]

## 2024-07-30 ENCOUNTER — TREATMENT (OUTPATIENT)
Dept: PHYSICAL THERAPY | Facility: CLINIC | Age: 84
End: 2024-07-30
Payer: MEDICARE

## 2024-07-30 DIAGNOSIS — Z74.09 IMPAIRED FUNCTIONAL MOBILITY, BALANCE, GAIT, AND ENDURANCE: ICD-10-CM

## 2024-07-30 DIAGNOSIS — M17.0 BILATERAL PRIMARY OSTEOARTHRITIS OF KNEE: Primary | ICD-10-CM

## 2024-07-30 NOTE — PROGRESS NOTES
Physical Therapy Daily Treatment Note    [facility]  [address] [place] [address]  [phone] (phone)  [phone] (fax)    Patient: Bailee Garza   : 1940  Diagnosis/ICD-10 Code:  Bilateral primary osteoarthritis of knee [M17.0]  Referring practitioner: TERESA Jacobs  Date of Initial Visit: Type: THERAPY  Noted: 2024  Today's Date: 2024  Patient seen for 5 sessions             Subjective Evaluation    History of Present Illness    Subjective comment: I've been down stairs normally with rail support - knee feels a little tight but only had mild pain/discomfort.       Objective     AQUATIC EX:     Water Walk                 Forward, backward, sideways x 2 laps ea  March Walk                 2 laps  Stretch 1                      calf 20 sec x 2 ea  Stretch 2                      -  Stretch 3                      -  Stretch Other 1           -  Stretch Other 2           -  Vertical Traction          -  Abdominals                 -  Clams                          15x, seated  Hip Abd/Add                15x ea  Hip Flex (SLR)            15x ea  Hip Ext                        -  March in Place            15x  Mini Squat                   15x  Toe/Heel Raises         15x  Alt HS curls                 12x  Leg Press                    15x ea w/ medium blue ring  Uni-Squat                    -  Uni-Clock                    -  Alt step taps  10x ea to bottom pool step, 1 UE rail support prn  Step Ups                     10x ea, bottom pool step, 1 UE rail support prn  STS from pool bench  10x, no hands  Bicycle                         2 min, seated  Flutter/Scissor             15 / 15, seated  Exercise 1                   seated LAQ + ankle DF x 10 ea  Exercise 2                   SLS Balance R/L  10-15 sec x 2 ea  Exercise 3                   -  Exercise 4                   -  Exercise 5                   -  Exercise 6                   -          Assessment & Plan       Assessment  Assessment details: Patient reports she was able to go down steps normally using rail support and her knee felt tight but only had mild pain / discomfort.  Continued with previous aquatic ex/activity for mobility, flexibility, and strength/stabilization.  She noted some knee pain with retro walking today and stated her R knee felt like it was going to give out on her.  Increased reps on a few ex and added alt step taps, step ups, SLS this visit.  She was a bit challenged with SLS and alt step taps.  She ended up using 1 UE rail support for balance / stability on alt step taps and step ups.  She continues to struggle with STS transition and needs cues for increased anterior weight shift prior to pushing through her legs to come to stand, avoiding bracing back of legs against bench, and activating gluts as she comes to full stand.  Emphasis on standing tall, actively engaging abdominals / gluts / quads, and  focusing on good form / technique and control over speed / quantity of reps with ex/activity to help reduce compensation / strain on joints and optimize benefit.  Demonstration and cuing provided throughout session for optimal posture, core/glut activation, and correct form/technique with ex/activity.      Plan:  Continue skilled therapy progressing aquatic ex/activity as appropriate.                    Timed:  Aquatic Therapy    39     mins [id number];    Kenisha Lorenz PT  Physical Therapist    [place] License: [id number]

## 2024-08-01 ENCOUNTER — OFFICE VISIT (OUTPATIENT)
Dept: ORTHOPEDIC SURGERY | Facility: CLINIC | Age: 84
End: 2024-08-01
Payer: MEDICARE

## 2024-08-01 ENCOUNTER — OFFICE VISIT (OUTPATIENT)
Dept: FAMILY MEDICINE CLINIC | Facility: CLINIC | Age: 84
End: 2024-08-01
Payer: MEDICARE

## 2024-08-01 VITALS
HEART RATE: 87 BPM | OXYGEN SATURATION: 95 % | SYSTOLIC BLOOD PRESSURE: 100 MMHG | DIASTOLIC BLOOD PRESSURE: 56 MMHG | TEMPERATURE: 97.7 F | RESPIRATION RATE: 16 BRPM | BODY MASS INDEX: 18.78 KG/M2 | WEIGHT: 106 LBS | HEIGHT: 63 IN

## 2024-08-01 VITALS — HEIGHT: 63 IN | TEMPERATURE: 98.4 F | BODY MASS INDEX: 18.73 KG/M2 | WEIGHT: 105.7 LBS

## 2024-08-01 DIAGNOSIS — Z13.6 SCREENING FOR ISCHEMIC HEART DISEASE: ICD-10-CM

## 2024-08-01 DIAGNOSIS — N18.31 STAGE 3A CHRONIC KIDNEY DISEASE: ICD-10-CM

## 2024-08-01 DIAGNOSIS — M17.11 PRIMARY OSTEOARTHRITIS OF RIGHT KNEE: Primary | ICD-10-CM

## 2024-08-01 DIAGNOSIS — E03.9 HYPOTHYROIDISM, ACQUIRED: ICD-10-CM

## 2024-08-01 DIAGNOSIS — I10 ESSENTIAL HYPERTENSION: Primary | ICD-10-CM

## 2024-08-01 PROCEDURE — 99213 OFFICE O/P EST LOW 20 MIN: CPT | Performed by: NURSE PRACTITIONER

## 2024-08-01 PROCEDURE — 99214 OFFICE O/P EST MOD 30 MIN: CPT | Performed by: FAMILY MEDICINE

## 2024-08-01 PROCEDURE — 3078F DIAST BP <80 MM HG: CPT | Performed by: FAMILY MEDICINE

## 2024-08-01 PROCEDURE — 3074F SYST BP LT 130 MM HG: CPT | Performed by: FAMILY MEDICINE

## 2024-08-01 PROCEDURE — 1126F AMNT PAIN NOTED NONE PRSNT: CPT | Performed by: FAMILY MEDICINE

## 2024-08-01 PROCEDURE — G2211 COMPLEX E/M VISIT ADD ON: HCPCS | Performed by: FAMILY MEDICINE

## 2024-08-01 RX ORDER — HYDROCHLOROTHIAZIDE 12.5 MG/1
12.5 TABLET ORAL DAILY
Qty: 30 TABLET | Refills: 11 | Status: SHIPPED | OUTPATIENT
Start: 2024-08-01 | End: 2025-08-01

## 2024-08-01 RX ORDER — VALSARTAN 80 MG/1
80 TABLET ORAL DAILY
Qty: 30 TABLET | Refills: 11 | Status: SHIPPED | OUTPATIENT
Start: 2024-08-01 | End: 2025-08-01

## 2024-08-01 RX ORDER — LEVOTHYROXINE SODIUM 75 MCG
75 TABLET ORAL DAILY
Qty: 30 TABLET | Refills: 11 | Status: SHIPPED | OUTPATIENT
Start: 2024-08-01 | End: 2025-08-01

## 2024-08-01 NOTE — PROGRESS NOTES
"Chief Complaint  Hypothyroidism    Subjective        Hypothyroidism          The patient is an 84-year-old female who presents for evaluation of multiple medical concerns.    The patient is currently on Valsartan and hydrochlorothiazide for hypertension management.    The patient is also on Synthroid for hypothyroidism.    The patient reports nocturia, waking up twice per night to urinate. She maintains a diet rich in water, iced tea, and coffee.           Vital Signs:   Vitals:    08/01/24 0850   BP: 100/56   Pulse: 87   Resp: 16   Temp: 97.7 °F (36.5 °C)   SpO2: 95%   Weight: 48.1 kg (106 lb)   Height: 160 cm (63\")            8/1/2024     8:57 AM   PHQ-2/PHQ-9 Depression Screening   Little Interest or Pleasure in Doing Things 0-->not at all   Feeling Down, Depressed or Hopeless 0-->not at all   PHQ-9: Brief Depression Severity Measure Score 0       BMI is within normal parameters. No other follow-up for BMI required.        Physical Exam  Vitals reviewed.   Constitutional:       General: She is not in acute distress.  Eyes:      General: Lids are normal.      Conjunctiva/sclera: Conjunctivae normal.   Neck:      Thyroid: No thyroid mass, thyromegaly or thyroid tenderness.      Vascular: No carotid bruit.      Trachea: No tracheal deviation.   Cardiovascular:      Rate and Rhythm: Normal rate and regular rhythm.      Heart sounds: Normal heart sounds. No murmur heard.  Pulmonary:      Effort: Pulmonary effort is normal.      Breath sounds: Normal breath sounds.   Skin:     General: Skin is warm and dry.   Neurological:      Mental Status: She is alert. She is not disoriented.   Psychiatric:         Speech: Speech normal.         Behavior: Behavior normal. Behavior is cooperative.                       Results                  Assessment and Plan              1. Essential hypertension.  The patient's hypertension is well-managed. Continuation of the current medication regimen is advised.    2. Hypothyroidism.  The " condition is currently stable, pending lab results. The current Synthroid dosage will be maintained.    3. Stage 3 chronic kidney disease.  Laboratory tests will be conducted today or tomorrow, and a reassessment of her chronic kidney disease will be conducted.   RTC 1 year    Patient was given instructions and counseling regarding her condition or for health maintenance advice. Please see specific information pulled into the AVS if appropriate.     Patient or patient representative verbalized consent for the use of Ambient Listening during the visit with  Eddie Araya MD for chart documentation. 8/1/2024  09:31 EDT

## 2024-08-02 ENCOUNTER — TREATMENT (OUTPATIENT)
Dept: PHYSICAL THERAPY | Facility: CLINIC | Age: 84
End: 2024-08-02
Payer: MEDICARE

## 2024-08-02 ENCOUNTER — TELEPHONE (OUTPATIENT)
Dept: FAMILY MEDICINE CLINIC | Facility: CLINIC | Age: 84
End: 2024-08-02
Payer: MEDICARE

## 2024-08-02 DIAGNOSIS — M17.0 BILATERAL PRIMARY OSTEOARTHRITIS OF KNEE: Primary | ICD-10-CM

## 2024-08-02 DIAGNOSIS — Z74.09 IMPAIRED FUNCTIONAL MOBILITY, BALANCE, GAIT, AND ENDURANCE: ICD-10-CM

## 2024-08-02 NOTE — PROGRESS NOTES
Physical Therapy Daily Treatment Note    [facility]  [address] [place] [address]  [phone] (phone)  [phone] (fax)    Patient: Bailee Garza   : 1940  Diagnosis/ICD-10 Code:  Bilateral primary osteoarthritis of knee [M17.0]  Referring practitioner: TERESA Jacobs  Date of Initial Visit: Type: THERAPY  Noted: 2024  Today's Date: 2024  Patient seen for 6 sessions             Subjective Evaluation    History of Present Illness    Subjective comment: Sorry I'm late.  I got here and had forgotten my swimsuit so had to go back home and get it.  My knee is doing better.  I'm thinking about joining but have some questions about it.       Objective     AQUATIC EX:     Water Walk                 Forward, backward, sideways x 2 laps ea - on own  March Walk                 2 laps  Stretch 1                      calf 20 sec x 2 ea  Stretch 2                      -  Stretch 3                      -  Stretch Other 1           -  Stretch Other 2           -  Vertical Traction          -  Abdominals                 -  Clams                          15x, seated - on own  Hip Abd/Add                15x ea  Hip Flex (SLR)            15x ea  Hip Ext                        -  March in Place            15x - *deferred  Mini Squat                   15x  Toe/Heel Raises         15x - *deferred  Alt HS curls                 12x  Leg Press                    15x ea w/ medium blue ring  Uni-Squat                    -  Uni-Clock                    -  Alt step taps                10x ea to bottom pool step, 1 UE rail support prn - *deferred  Step Ups                     10x ea, bottom pool step, 1 UE rail support prn  STS from pool bench  10x, no hands  Bicycle                         2 min, seated - on own  Flutter/Scissor             15 / 15, seated - on own  Exercise 1                   seated LAQ + ankle DF x 10 ea - *deferred  Exercise 2                   SLS  Balance R/L        10-15 sec x 2 ea - *deferred  Exercise 3                   -  Exercise 4                   -  Exercise 5                   -  Exercise 6                   -      Assessment & Plan       Assessment  Assessment details: Patient reports seeing MD and no plans for surgery as her knee is doing better.  She is looking into facility [facility].  Continued with most previous aquatic ex/activity for mobility, flexibility, and strength/stabilization.  Some ex were deferred or completed on her own this date due to time constraints.  She remains challenged with STS transition requiring cues to increase anterior weight shift so that her bottom starts to lift off bench and then push through her legs to come to stand and squeeze her bottom as she comes to full stand.  Emphasis on upright posture, actively engaging abdominals / gluts / quads, and focusing on controlled movement with ex./activity to better facilitate desired muscle activation and minimize compensation / strain on joints for maximal benefit.  Demonstration and cuing provided throughout session for optimal posture, core/glut activation, and correct form/technique with ex/activity.      Plan:  Continue skilled therapy progressing ex/activity to establish an appropriate HEP and help facilitate transition to independent self care..                    Timed:  Aquatic Therapy    26     mins [id number];    Kenisha Lorenz PT  Physical Therapist    [place] License: [id number]

## 2024-08-02 NOTE — TELEPHONE ENCOUNTER
PATIENT STATES SHE FORGOT ABOUT HER LAB VISIT THIS MORNING AND WOULD LIKE A CALLBACK TO GET THAT RESCHEDULED.  ([phone]).    PATIENT STATES SHE IS SO SORRY FOR MISSING THE LAB VISIT.

## 2024-08-05 NOTE — PROGRESS NOTES
Patient: Bailee Garza  YOB: 1940 84 y.o. female  Medical Record Number: [id number]    Chief Complaints:   Chief Complaint   Patient presents with    Right Knee - Initial Evaluation, Pain       History of Present Illness:Bailee Garza is a 84 y.o. female who presents for follow-up of right knee pain that is chronic in nature.  Patient has known right bilateral osteoarthritis and that she has been previously seen by our partner Dr. Ruiz she has been conservatively treating her.  Patient reports that her symptoms have improved since doing outpatient physical therapy.  She states that her pain level is down to a 3 on a scale of 10 in regards to the right knee and the left knee has no pain.  States that she still has a constant daily ache but overall it is pretty tolerable.  States that she joined at [employer] and is doing her exercises there.  She does report that she has recently had both of her knees injected with cortisone and this is also giving her some relief.  Patient denies any instability or buckling issues.  Denies any sign or symptoms of infection, she is without any other significant complaints today.    Allergies:   Allergies   Allergen Reactions    Hydrocodone Hallucinations    Paxlovid [Nirmatrelvir-Ritonavir] Other (See Comments)     insomnia    Ketek [Telithromycin] Hives    Nsaids Hives    Sulfa Antibiotics Hives       Medications:   Current Outpatient Medications   Medication Sig Dispense Refill    Chlorcyclizine-Pseudoephed (STAHIST AD PO) Take  by mouth As Needed.      COLLAGEN PO Take  by mouth.      hydroCHLOROthiazide 12.5 MG tablet Take 1 tablet by mouth Daily. 30 tablet 11    metroNIDAZOLE (METROCREAM) 0.75 % cream       Synthroid 75 MCG tablet Take 1 tablet by mouth Daily. 30 tablet 11    valsartan (DIOVAN) 80 MG tablet Take 1 tablet by mouth Daily. 30 tablet 11    calcium polycarbophil (FIBERCON) 625 MG tablet Take 1 tablet by mouth Daily. (Patient not taking:  "Reported on 8/1/2024)       No current facility-administered medications for this visit.     Facility-Administered Medications Ordered in Other Visits   Medication Dose Route Frequency Provider Last Rate Last Admin    Chlorhexidine Gluconate Cloth 2 % pads 1 each  1 each Apply externally Take As Directed Anupam Ruiz MD             The following portions of the patient's history were reviewed and updated as appropriate: allergies, current medications, past family history, past medical history, past social history, past surgical history and problem list.    Review of Systems:   Pertinent positives/negative listed in HPI above    Physical Exam:   Vitals:    08/01/24 1351   Temp: 98.4 °F (36.9 °C)   TempSrc: Temporal   Weight: 47.9 kg (105 lb 11.2 oz)   Height: 160 cm (63\")   PainSc:   3   PainLoc: Knee       General: A and O x 3, ASA, NAD      Knee Exam List: Knee:  right    ALIGNMENT:     Varus  ,   Patella  tracks  midline    GAIT:    Antalgic    SKIN:    No abnormality    RANGE OF MOTION:   3  -  125   DEG    STRENGTH:   4  / 5    LIGAMENTS:    No varus / valgus instability.   Negative  Lachman.    MENISCUS:     Negative   Nii       DISTAL PULSES:    Paplable    DISTAL SENSATION :   Intact    LYMPHATICS:     No   lymphadenopathy    OTHER:          - Positive   effusion      - Crepitance with ROM     Radiology:  Xrays 3views right knee (ap,lateral, sunrise) taken previously demonstratingadvanced varus osteoarthritis with bone on bone articulation, subchondral cysts, and periarticular osteophytes.  No knee  knee x-rays were taken today for comparison purposes.      Assessment/Plan: Primary osteoarthritis right    Treatment option as well as imaging results were discussed in detail with the patient.  At this point in time we will still proceed forward with conservative measures.  Patient seems to be responding well to physical therapy and injections.  It is too early for her to receive new injections and her " pain is fairly mild today.  Instructed her to continue along with her quad strengthening and range of motion exercises.  I educated her on the RICE method to help with inflammation and swelling purposes.  Patient can follow back up with me on an as-needed basis.      Speedy Sanz, APRN  8/5/2024

## 2024-08-06 ENCOUNTER — TREATMENT (OUTPATIENT)
Dept: PHYSICAL THERAPY | Facility: CLINIC | Age: 84
End: 2024-08-06
Payer: MEDICARE

## 2024-08-06 ENCOUNTER — TELEPHONE (OUTPATIENT)
Dept: FAMILY MEDICINE CLINIC | Facility: CLINIC | Age: 84
End: 2024-08-06
Payer: MEDICARE

## 2024-08-06 DIAGNOSIS — Z74.09 IMPAIRED FUNCTIONAL MOBILITY, BALANCE, GAIT, AND ENDURANCE: ICD-10-CM

## 2024-08-06 DIAGNOSIS — M17.0 BILATERAL PRIMARY OSTEOARTHRITIS OF KNEE: Primary | ICD-10-CM

## 2024-08-06 NOTE — TELEPHONE ENCOUNTER
HUB TO RELAY      Your test results have some abnormalities that we need to discuss.  Please schedule an appointment to discuss further.  This should not be a video visit. Additional lab work will be ordered at our follow up visit. (Non fasting). Please drink 6-8 glasses of water daily until appointment.

## 2024-08-06 NOTE — PROGRESS NOTES
Physical Therapy Daily Treatment Note    [facility]  [address] [place] [address]  [phone] (phone)  [phone] (fax)    Patient: Bailee Garza   : 1940  Diagnosis/ICD-10 Code:  Bilateral primary osteoarthritis of knee [M17.0]  Referring practitioner: TERESA Jacobs  Date of Initial Visit: Type: THERAPY  Noted: 2024  Today's Date: 2024  Patient seen for 7 sessions             Subjective Evaluation    History of Present Illness    Subjective comment: Knee's about the same.  When it's bothering me more, I've been trying to limit aggravating activity as much as I can.       Objective     AQUATIC EX:     Water Walk                 Forward, backward, sideways x 2 laps ea  March Walk                 2 laps  Tandem Walk  1 lap w/ noodle support  Stretch 1                      calf 20 sec x 2 ea  Stretch 2                      -  Stretch 3                      -  Stretch Other 1           -  Stretch Other 2           -  Vertical Traction          -  Abdominals                 -  Clams                          15x, seated  Hip Abd/Add                15x ea  Hip Flex (SLR)            15x ea  Hip Ext                        10x ea, tighten thigh / buttock  March in Place            15x  Mini Squat                   15x  Toe/Heel Raises         20x  Alt HS curls                 15x  Leg Press                    15x ea w/ large blue ring  Uni-Squat                    -  Uni-Clock                    -  Alt step taps                10x ea to bottom pool step, 1 UE rail support prn  Step Ups                     10x ea, bottom pool step, 1 UE rail support prn  STS from pool bench  10x, used noodle reach to encourage anterior weight shift   Bicycle                         2 min, seated - on own  Flutter/Scissor             15 / 15, seated  Exercise 1                   seated LAQ + ankle DF x 10 ea  Exercise 2                   SLS Balance R/L        10-15 sec  "x 2 ea  Exercise 3                   -  Exercise 4                   -  Exercise 5                   -  Exercise 6                   -      Assessment & Plan       Assessment  Assessment details: Patient reports her knee's about the same and states MD told her that leg was \"bowing\" which was contributing to her pain.  Continued with most previous aquatic ex/activity for mobility, flexibility, and strength/stabilization.  Increased reps on a couple of ex, progressed to large blue aqua ring on leg press, and added hip extension, tandem walk this visit.  Tandem walking was challenging for her so she used noodle support to help maintain her balance.  Also tried using noodle reach to help promote anterior weight shift on STS prior to pushing with her legs to come to full stand and squeezing gluts at the top.  Focused on standing up tall, actively engaging abdominals / gluts / quads, and using good form / technique and control over speed / ROM or quantity of reps to help reduce compensation / strain on joints and improve quality of exercise.  Demonstration and cuing provided throughout session for optimal posture, core/glut activation, and correct form/technique with ex/activity.      Plan:  Continue skilled therapy progressing ex/activity to establish an appropriate HEP and help facilitate transition to independent self care.                 Timed:  Aquatic Therapy    39     mins [id number];    Kenisha Lorenz PT  Physical Therapist    [place] License: [id number]  "

## 2024-08-07 ENCOUNTER — OFFICE VISIT (OUTPATIENT)
Dept: FAMILY MEDICINE CLINIC | Facility: CLINIC | Age: 84
End: 2024-08-07
Payer: MEDICARE

## 2024-08-07 VITALS
BODY MASS INDEX: 18.96 KG/M2 | WEIGHT: 107 LBS | HEIGHT: 63 IN | SYSTOLIC BLOOD PRESSURE: 102 MMHG | DIASTOLIC BLOOD PRESSURE: 58 MMHG | HEART RATE: 80 BPM | OXYGEN SATURATION: 97 %

## 2024-08-07 DIAGNOSIS — N18.31 STAGE 3A CHRONIC KIDNEY DISEASE: Primary | ICD-10-CM

## 2024-08-07 DIAGNOSIS — E87.5 SERUM POTASSIUM ELEVATED: ICD-10-CM

## 2024-08-07 DIAGNOSIS — E55.9 VITAMIN D DEFICIENCY: ICD-10-CM

## 2024-08-07 DIAGNOSIS — E03.9 HYPOTHYROIDISM, ACQUIRED: ICD-10-CM

## 2024-08-07 DIAGNOSIS — D64.9 ANEMIA, NORMOCYTIC NORMOCHROMIC: ICD-10-CM

## 2024-08-07 PROBLEM — D58.2 ELEVATED HEMOGLOBIN: Status: RESOLVED | Noted: 2021-05-06 | Resolved: 2024-08-07

## 2024-08-07 PROBLEM — R79.9 ELEVATED BUN: Status: RESOLVED | Noted: 2021-05-06 | Resolved: 2024-08-07

## 2024-08-07 PROBLEM — U07.1 COVID-19 VIRUS DETECTED: Status: RESOLVED | Noted: 2022-10-17 | Resolved: 2024-08-07

## 2024-08-07 PROBLEM — M16.11 ARTHRITIS OF RIGHT HIP: Status: RESOLVED | Noted: 2022-05-04 | Resolved: 2024-08-07

## 2024-08-07 PROBLEM — R79.89 ELEVATED SERUM CREATININE: Status: RESOLVED | Noted: 2021-05-06 | Resolved: 2024-08-07

## 2024-08-07 PROCEDURE — 99214 OFFICE O/P EST MOD 30 MIN: CPT | Performed by: FAMILY MEDICINE

## 2024-08-07 PROCEDURE — 3078F DIAST BP <80 MM HG: CPT | Performed by: FAMILY MEDICINE

## 2024-08-07 PROCEDURE — 3074F SYST BP LT 130 MM HG: CPT | Performed by: FAMILY MEDICINE

## 2024-08-07 PROCEDURE — 1125F AMNT PAIN NOTED PAIN PRSNT: CPT | Performed by: FAMILY MEDICINE

## 2024-08-07 PROCEDURE — G2211 COMPLEX E/M VISIT ADD ON: HCPCS | Performed by: FAMILY MEDICINE

## 2024-08-07 NOTE — ASSESSMENT & PLAN NOTE
Anemia once again identified.  We will have follow-up labs today to rule out B12 or folate deficiencies.  We will also rule out iron deficiencies.  No new treatments until labs are reviewed.  Most likely this anemia is related to her underlying kidney disease.

## 2024-08-07 NOTE — PROGRESS NOTES
"Chief Complaint  Follow-up (labs)    Subjective        Follow-up        The patient is an 84-year-old female who presents for evaluation of multiple medical concerns.    She has a history of anemia, with a notable decrease in her hemoglobin levels during a previous hip procedure. She frequently feels cold. Her diet includes bananas and chili, with minimal consumption of potatoes and sweets. She also consumes iced tea and water.    She occasionally experiences nocturnal urination.    Supplemental Information  She injured her knee at 3:30 this morning. She could not walk on it yesterday. She has been going to water therapy. She has swelling. She thought all they had to do was drain it. She used KT tape. She is seeing Dr. Thayer for her knee. She is not interested in surgery. She received a cortisone shot, which did not help.           Vital Signs:   Vitals:    08/07/24 0957   BP: 102/58   Pulse: 80   SpO2: 97%   Weight: 48.5 kg (107 lb)   Height: 160 cm (63\")            8/1/2024     8:57 AM   PHQ-2/PHQ-9 Depression Screening   Little Interest or Pleasure in Doing Things 0-->not at all   Feeling Down, Depressed or Hopeless 0-->not at all   PHQ-9: Brief Depression Severity Measure Score 0       BMI is within normal parameters. No other follow-up for BMI required.        Physical Exam  Constitutional:       General: She is not in acute distress.  Neurological:      Mental Status: She is alert and oriented to person, place, and time.   Psychiatric:         Attention and Perception: She is attentive.         Mood and Affect: Mood is not anxious.         Speech: Speech normal.         Behavior: Behavior normal.         Thought Content: Thought content normal.         Judgment: Judgment normal.                 The following data was reviewed by: Eddie Araya MD on 08/07/2024:  TSH (08/05/2024 12:04)  Lipid Panel With / Chol / HDL Ratio (08/05/2024 12:04)  CK (08/05/2024 12:04)  CBC & Differential (08/05/2024 " 12:04)  Comprehensive Metabolic Panel (08/05/2024 12:04)    Results  Laboratory Studies  Hemoglobin was 11.6. White blood cell count was 6.32. MCV was 97. MCH was 31.5. Mean corpuscular hemoglobin concentration, MCHC was 35.2. Blood sugar was 94. BUN was 46 and creatinine was 1.12. Potassium was 5.3. Total cholesterol was 189, triglycerides were 148, HDL was 62, and LDL was 101. TSH was normal at 0.504.                Assessment and Plan           Assessment & Plan  Stage 3a chronic kidney disease   stage IIIa chronic kidney disease is essentially unchanged to mild progression noted on creatinine.  Continue with serial monitoring on labs.  Anemia, normocytic normochromic  Anemia once again identified.  We will have follow-up labs today to rule out B12 or folate deficiencies.  We will also rule out iron deficiencies.  No new treatments until labs are reviewed.  Most likely this anemia is related to her underlying kidney disease.  Serum potassium elevated  Serum potassium elevated.  Recheck with labs today  Return to clinic November for annual wellness visit    Patient was given instructions and counseling regarding her condition or for health maintenance advice. Please see specific information pulled into the AVS if appropriate.     Patient or patient representative verbalized consent for the use of Ambient Listening during the visit with  Eddie Araya MD for chart documentation. 8/7/2024  10:26 EDT

## 2024-08-07 NOTE — ASSESSMENT & PLAN NOTE
stage IIIa chronic kidney disease is essentially unchanged to mild progression noted on creatinine.  Continue with serial monitoring on labs.

## 2024-08-08 ENCOUNTER — TREATMENT (OUTPATIENT)
Dept: PHYSICAL THERAPY | Facility: CLINIC | Age: 84
End: 2024-08-08
Payer: MEDICARE

## 2024-08-09 ENCOUNTER — TELEPHONE (OUTPATIENT)
Dept: ORTHOPEDIC SURGERY | Facility: CLINIC | Age: 84
End: 2024-08-09

## 2024-08-09 NOTE — TELEPHONE ENCOUNTER
Caller: Phyllis Diaz    Relationship to patient: Emergency Contact    Best call back number: [phone]*    Patient is needing: PT'S DAUGHTER IS CALLING TO ASK JOHN SILVERIO SOME QUESTIONS REGARDING INSERTS AND A KNEE BRACE FOR THE PT.. WANTING TO MEDICAL ADVICE.. PLEASE ADVISE..

## 2024-08-09 NOTE — TELEPHONE ENCOUNTER
I attempted to contact the patient's daughter to answer her questions and concerns.  She did not answer therefore I left her a message instructing her that I would be back in the office on Tuesday and she could reach back out at that point.

## 2024-08-12 ENCOUNTER — TREATMENT (OUTPATIENT)
Dept: PHYSICAL THERAPY | Facility: CLINIC | Age: 84
End: 2024-08-12
Payer: MEDICARE

## 2024-08-12 DIAGNOSIS — Z74.09 IMPAIRED FUNCTIONAL MOBILITY, BALANCE, GAIT, AND ENDURANCE: ICD-10-CM

## 2024-08-12 DIAGNOSIS — M17.0 BILATERAL PRIMARY OSTEOARTHRITIS OF KNEE: Primary | ICD-10-CM

## 2024-08-12 LAB
FERRITIN SERPL-MCNC: 90.5 NG/ML (ref 13–150)
FOLATE SERPL-MCNC: 5.42 NG/ML (ref 4.78–24.2)
IRON SATN MFR SERPL: 17 % (ref 20–50)
IRON SERPL-MCNC: 80 MCG/DL (ref 37–145)
METHYLMALONATE SERPL-SCNC: 357 NMOL/L (ref 0–378)
POTASSIUM SERPL-SCNC: 5.3 MMOL/L (ref 3.5–5.2)
TIBC SERPL-MCNC: 475 MCG/DL
UIBC SERPL-MCNC: 395 MCG/DL (ref 112–346)
VIT B12 SERPL-MCNC: 808 PG/ML (ref 211–946)

## 2024-08-12 PROCEDURE — 97530 THERAPEUTIC ACTIVITIES: CPT | Performed by: PHYSICAL THERAPIST

## 2024-08-12 PROCEDURE — 97110 THERAPEUTIC EXERCISES: CPT | Performed by: PHYSICAL THERAPIST

## 2024-08-12 NOTE — PROGRESS NOTES
Physical Therapy Daily Note  RE-ASSESSMENT    Patient Name: Bailee Garza         :  1940  Referring Physician: Maeve Ruiz APRN  Treatment Date: 2024  Date of Initial Visit: No linked episodes    Visit Diagnoses:    ICD-10-CM ICD-9-CM   1. Bilateral primary osteoarthritis of knee  M17.0 715.16   2. Impaired functional mobility, balance, gait, and endurance  Z74.09 V49.89     Patient seen for Visit count could not be calculated. Make sure you are using a visit which is associated with an episode. sessions  _____________________________________________________    Subjective   Bailee Garza reports: knees are worse since starting PT in pool- Bought some KT tape - taping her knee -   Saw JOHN SILVERIO 2024 -   Very limited going stairs - more pain down vs up   Joined [facility] recently for 3 months -   Pain medial knee and feels like her knee cap is catching - (R) knee -  (L) knee swells, but has no pain -  Knee (R) is buckling at times - more often - getting up off sofa, turning / twisting -     Objective   Pt ambulating w/o AD -  Swelling anterior/ant-medial (R) knee -   Tender medial knee and PF jt (R)       Tenderness Details  Severely tender medial and ant/med joint line (R) > (L)   Tender popliteal region (R) knee -      Active Range of Motion   Left Knee   Flexion: 150 degrees   Extension: 0 (Passive HE) degrees      Right Knee   Flexion: 130 (stiffness - no pain   Extension: 0 (Passive HE) degrees      Strength/Myotome Testing      Left Knee   Normal strength     Right Knee   Flexion: 4+  Extension: 4+  Quadriceps contraction: fair     Additional Strength Details  Hip Flexion (R) 4+/5;  (L) 55  Hip ABDuction 4/5 (B)      Tests      Additional Tests Details  (+) Nii's Medial knee (R)  (+) Thessally's Medial knee (R)  (-) Valgus/Varus testing  (R)  (-) Ant/Post Drawer  (R)     TREATMENT - .  Up / down 2 flights of stairs w/ HR reciprocally   BERNADETTE LEG PRESS 90# 2x 15  NUSTEP Seat 8 Arms  10 L6 x 5 min     Functional / Therapeutic Activities:    TAPING / BRACING: K-Tape to Unload PF jt and medial knee joint x 2 strips   FUNCTIONAL ASSESSMENT  SEE EXERCISE FLOW SHEET -   Reviewed Jt protection, ADL modification;      KOS: 33/80      Assessment/Plan  84 y.o. year-old female  (B) knee osteoarthritis.     GOAL STATUS:   STG 1 Patient will perform aquatic therapy exercises for hip, knee, core, and balance ROM/flexibility, strength and conditioning without increased pain.-ONGOING     STG 2 Patient will demonstrate good postural awareness with standing and walking in pool.- MET w/ cuing     STG 3 Patient will report decreased pain 25% at rest and w/ ADLs, / LE activities - NOT MET    ADDENDUM GOAL:  STG 4: INITIATE HEP for LAND PT for strengthening / balance/proprioception, functional activities;      LTGs:  12 weeks or at time of DISCHARGE     LTG 1 Patient will demonstrate an independent aquatic HEP for hip, knee, core , and balance, strength,  ROM/flexibility, conditioning and balance with community resources -ONGOING     LTG 2 Patient will demonstrate increased core strength and balance with use of added resistive equipment.-ONGOING     LTG 3 Patient will report decreased functional disability on KOS ADL to > 60/80 -NOT MET    GOAL ADDENDUM:  LTG 4 Pt able to ambulate up/down > 2 flights of stairs reciprocally w/ HR prn      Bailee would benefit from continued Physical Therapy including addition of land therapy w/ Aquatic Therapy to include patellar taping which greatly reduced her pain and allowed improved tolerance to PT and walking, stairs and functional activities - 2x/wk x 6 weeks    Progress strengthening /stabilization /functional activity - TAPE Prn -        _________________________________________________    Manual Therapy:                 mins  77949;  Therapeutic Exercise:    10    mins  64353;     Neuromuscular Sofya:   05     mins  31371;    Therapeutic Activity:     25      mins  45318;      Ultrasound:                          mins  67062;  Iontophoresis:                     mins  89853;    Electrical Stimulation:         mins  10852 ( );  Mechanical Traction:          mins  09008  Dry Needling                       mins self-pay     Timed Treatment:   40    mins                  Total Treatment:     40    mins        Kirit Damon PT  Physical Therapist  Lic # [id number]

## 2024-08-12 NOTE — PROGRESS NOTES
Please give the patient the following message:  Anemia seems to be related to low iron.  Start taking over-the-counter iron tablet 325 mg daily with supper.  Potassium once again is mildly elevated.  Continue to avoid too many fruits like bananas.  We will recheck this again when I see you in November.  Please call the office to schedule these labs about 1 week prior to our visit on November 7.

## 2024-08-13 ENCOUNTER — TELEPHONE (OUTPATIENT)
Dept: ORTHOPEDIC SURGERY | Facility: CLINIC | Age: 84
End: 2024-08-13
Payer: MEDICARE

## 2024-08-13 NOTE — TELEPHONE ENCOUNTER
Caller: Phyllis Diaz    Relationship: Emergency Contact    Best call back number: [phone]    What is the best time to reach you: any     Who are you requesting to speak with (clinical staff, provider,  specific staff member): provider     Do you know the name of the person who called: yes     What was the call regarding: states that the patient is having right knee pain- would like a call to discuss with the provider the next steps

## 2024-08-13 NOTE — TELEPHONE ENCOUNTER
Please instruct the patient that in 3 days that she will be eligible for a repeat injection to the knee.  The next step after this would be a total knee replacement.  She is wishing to proceed forward with surgery at this time that we would need to schedule her to see Dr. Thayer at one of his next available appointments.

## 2024-08-14 NOTE — TELEPHONE ENCOUNTER
"I tried calling pt no answer. I left VM asking pt to call office back.   Once pt calls back please advise of the following msg per TERESA Monge    Relay     \"Please instruct the patient that in 3 days that she will be eligible for a repeat injection to the knee. The next step after this would be a total knee replacement. She is wishing to proceed forward with surgery at this time that we would need to schedule her to see Dr. Thayer at one of his next available appointments. \"                  "

## 2024-08-14 NOTE — TELEPHONE ENCOUNTER
Name: Bailee Garza      Relationship: Self      Best Callback Number: [phone]      HUB PROVIDED THE RELAY MESSAGE FROM THE OFFICE      PATIENT: HAS FURTHER QUESTIONS AND WOULD LIKE A CALL BACK AT THE FOLLOWING PHONE [phone]    ADDITIONAL INFORMATION: PT RETURNING NEHA'S CALL. RELAYED ABOVE MESSAGE TO PT. PT STATES SHE WOULD LIKE TO MOVE FORWARD WITH SURGERY. DR YUN 1ST AVAILABLE 10/22/24. SPOKE WITH OFFICE AND THEY ADVISED TO SEND MESSAGE TO DR YUN TO SEE HOW SOON PT CAN BE WORKED IN. PT STATES THEY WOULD BE OK WITH SEEING JOHN SILVERIO AS WELL TO DISCUSS SURGERY.

## 2024-08-19 NOTE — TELEPHONE ENCOUNTER
Provider: JOHN SILVERIO     Caller: EDITH GLOVER     Relationship to Patient: DAUGHTER     Phone Number: [phone] (PATIENT) [phone] (DAUGHTER)     Reason for Call: SURGERY SCHEDULING. WT TO THE OFFICE AND WAS TRANSFERRED TO [facility] BUT GOT HER VOICEMAIL. PLEASE CALL ASA FOR SCHEDULING. LAST TE SENT ON 8-14-24.

## 2024-08-22 NOTE — TELEPHONE ENCOUNTER
Provider: JAMA    Caller: EDITH    Relationship to Patient: DAUGHTER    Phone Number: [phone]    Reason for Call: EDITH IS ASKING FOR AN UPDATE ON GETTING PATIENT SCHEDULED WITH DR. YUN TO DISCUSS SURGERY. SHE STATES THAT PATIENT FEELS LIKE HER KNEE IS GOING TO GIVE OUT ALL OF THE TIME, AND SHE'S WORRIED SHE IS GOING TO FALL.

## 2024-08-23 ENCOUNTER — TREATMENT (OUTPATIENT)
Dept: PHYSICAL THERAPY | Facility: CLINIC | Age: 84
End: 2024-08-23
Payer: MEDICARE

## 2024-08-23 DIAGNOSIS — Z74.09 IMPAIRED FUNCTIONAL MOBILITY, BALANCE, GAIT, AND ENDURANCE: ICD-10-CM

## 2024-08-23 DIAGNOSIS — M17.0 BILATERAL PRIMARY OSTEOARTHRITIS OF KNEE: Primary | ICD-10-CM

## 2024-08-23 NOTE — PROGRESS NOTES
Physical Therapy Daily Treatment Note    [facility]  [address] [place] [address]  [phone] (phone)  [phone] (fax)    Patient: Bailee Garza   : 1940  Diagnosis/ICD-10 Code:  Bilateral primary osteoarthritis of knee [M17.0]  Referring practitioner: TERESA Jacobs  Date of Initial Visit: Type: THERAPY  Noted: 2024  Today's Date: 2024  Patient seen for 10 sessions             Subjective Evaluation    History of Present Illness    Subjective comment: I joined and have come to pool a few times on my own - had planned to come yesterday but my L knee was really hurting and I ended up just staying home.  I did tape it which helped but took the tape off before coming to the pool today.  I have an appt with ortho MD in October to discuss option of TKA surgery.       Objective     AQUATIC EX:     Water Walk                 Forward, backward, sideways x 2 laps ea - on own  March Walk                 2 laps  Tandem Walk              1 lap w/ noodle support - *deferred  Stretch 1                      calf 20 sec x 2 ea  Stretch 2                      -  Stretch 3                      -  Stretch Other 1           -  Stretch Other 2           -  Vertical Traction          -  Abdominals                 LN x 10  Clams                          15x, seated  Hip Abd/Add                15x ea  Hip Flex (SLR)            15x ea  Hip Ext                        15x ea, tighten thigh / buttock  March in Place            15x  Mini Squat                   15x - *deferred  Toe/Heel Raises         20x  Alt HS curls                 15x  Leg Press                    15x ea w/ medium blue ring  Uni-Squat                    -  Hip Circles  10x ea direction  Uni-Clock                    -  Alt step taps                10x ea to bottom pool step, 1 UE rail support prn  Step Ups                     10x ea, bottom pool step, 1 UE rail support prn - *deferred  STS from pool  bench  10x, used noodle reach to encourage anterior weight shift  - *deferred  Bicycle                         2 min, seated - on own  Flutter/Scissor             15 / 15, seated - on own  Exercise 1                   seated LAQ + ankle DF x 10 ea  Exercise 2                   SLS Balance R/L        10-15 sec x 2 ea  Exercise 3                   KB push/pull x 10, back at wall - *deferred  Exercise 4                   -  Exercise 5                   -  Exercise 6                   -         Assessment & Plan       Assessment  Assessment details: Patient reports she has been taping her knee which seems to help some and she joined facility so she can use the pool on her own as well but has only come a couple of times so far.  Continued with most previous aquatic ex/activity for mobility, flexibility, and strength/stabilization.  Began use of printed copy of aquatic ex as a guide for performance of therapy ex.  Added hip circles this visit.  Emphasis on quality / control over speed / ROM or quantity of reps with ex performance.  Instructed her to stand tall and actively engage abdominals / gluts / quads to help reduce compensation / strain on joints and improve quality of exercise.  Demonstration and cuing provided throughout session for optimal posture, core/glut activation, and proper form/technique with ex/activity.  Patient reports she has an appt to see ortho MD in October to discuss option of TKA surgery.     Plan:  Continue skilled therapy progressing ex/activity to establish an appropriate HEP to help facilitate transition to independent self care and aid in preparation for potential future TKA.                 Timed:  Aquatic Therapy    29     mins 31635;    Kenisha Lorenz PT  Physical Therapist    [place] License: [id number]

## 2024-08-26 NOTE — TELEPHONE ENCOUNTER
CALLED PATIENT SHE HAS TO CALL HER RIDE TO SEE IF THEY CAN BRING HER FIRST THEN SHE WILL CALL BACK

## 2024-08-29 ENCOUNTER — OFFICE VISIT (OUTPATIENT)
Dept: ORTHOPEDIC SURGERY | Facility: CLINIC | Age: 84
End: 2024-08-29
Payer: MEDICARE

## 2024-08-29 VITALS — TEMPERATURE: 98.4 F | BODY MASS INDEX: 19.51 KG/M2 | HEIGHT: 62 IN | WEIGHT: 106 LBS

## 2024-08-29 DIAGNOSIS — M17.11 PRIMARY OSTEOARTHRITIS OF RIGHT KNEE: Primary | ICD-10-CM

## 2024-08-29 PROBLEM — M17.9 OA (OSTEOARTHRITIS) OF KNEE: Status: ACTIVE | Noted: 2024-08-29

## 2024-08-29 PROCEDURE — 99214 OFFICE O/P EST MOD 30 MIN: CPT | Performed by: ORTHOPAEDIC SURGERY

## 2024-08-29 RX ORDER — CHLORHEXIDINE GLUCONATE 500 MG/1
CLOTH TOPICAL 2 TIMES DAILY
OUTPATIENT
Start: 2024-08-29

## 2024-08-29 RX ORDER — PREGABALIN 150 MG/1
150 CAPSULE ORAL ONCE
OUTPATIENT
Start: 2024-08-29 | End: 2024-08-29

## 2024-08-29 NOTE — PROGRESS NOTES
Patient: Bailee Garza  YOB: 1940 84 y.o. female  Medical Record Number: [id number]    Chief Complaints:   Chief Complaint   Patient presents with    Right Knee - Follow-up, Pain       History of Present Illness:Bailee Garza is a 84 y.o. female who presents with severe constant right knee pain.  The pain is progressed over the last few years and now limits basic activities of daily living and walking tolerance.  It has not improved with injection physical therapy or anti-inflammatories.  It limits her to very short walking distances    Allergies:   Allergies   Allergen Reactions    Hydrocodone Hallucinations    Paxlovid [Nirmatrelvir-Ritonavir] Other (See Comments)     insomnia    Ketek [Telithromycin] Hives    Nsaids Hives    Sulfa Antibiotics Hives       Medications:   Current Outpatient Medications   Medication Sig Dispense Refill    COLLAGEN PO Take  by mouth.      hydroCHLOROthiazide 12.5 MG tablet Take 1 tablet by mouth Daily. 30 tablet 11    metroNIDAZOLE (METROCREAM) 0.75 % cream       Synthroid 75 MCG tablet Take 1 tablet by mouth Daily. 30 tablet 11    valsartan (DIOVAN) 80 MG tablet Take 1 tablet by mouth Daily. 30 tablet 11    calcium polycarbophil (FIBERCON) 625 MG tablet Take 1 tablet by mouth Daily. (Patient not taking: Reported on 8/29/2024)      Chlorcyclizine-Pseudoephed (STAHIST AD PO) Take  by mouth As Needed. (Patient not taking: Reported on 8/29/2024)       No current facility-administered medications for this visit.     Facility-Administered Medications Ordered in Other Visits   Medication Dose Route Frequency Provider Last Rate Last Admin    Chlorhexidine Gluconate Cloth 2 % pads 1 each  1 each Apply externally Take As Directed Anupam Ruiz MD             The following portions of the patient's history were reviewed and updated as appropriate: allergies, current medications, past family history, past medical history, past social history, past surgical history and  "problem list.    Review of Systems:   Pertinent positives/negatives listed in HPI above    Physical Exam:   Vitals:    08/29/24 1559   Temp: 98.4 °F (36.9 °C)   TempSrc: Temporal   Weight: 48.1 kg (106 lb)   Height: 157.5 cm (62\")   PainSc:   5   PainLoc: Knee       General: A and O x 3, ASA, NAD      Knee Exam List: Knee:  right    ALIGNMENT:     Varus  ,   Patella  tracks  midline    GAIT:    Antalgic    SKIN:    No abnormality    RANGE OF MOTION:   0  -  125   DEG    STRENGTH:   4  / 5    LIGAMENTS:    No varus / valgus instability.   Negative  Lachman.    MENISCUS:     Negative   Nii       DISTAL PULSES:    Paplable    DISTAL SENSATION :   Intact    LYMPHATICS:     No   lymphadenopathy    OTHER:          - Positive   effusion      - Crepitance with ROM        Radiology:  Xrays 3views julianne knees (ap,lateral, sunrise) were  reviewed for evaluation of knee pain demonstrating advanced varus osteoarthritis with bone on bone articulation, subchondral cysts, and periarticular osteophytes    Assessment/Plan: right knee endstage OA  Knee Plan List: Continuation of conservative management vs. TKA discussed.  The patient wishes to proceed with total knee replacement.  At this point the patient has failed the full compliment of conservative treatment and stating complete understanding of the risks/benefits/ anternatives wishes to proceed with surgical treatment.    Risk and benefits of surgery were reviewed.  Including, but not limited to, blood clots or pulmonary embolism, anesthesia risk, infection, fracture, skin/leg numbness, persistent pain/crepitance/popping/catching, failure of the implant, need for future surgeries, hematoma, possible nerve or blood vessel injury, need for transfusion, and potential risk of stroke,heart attack or death, among others.  The patient understands and wishes to proceed.     It was explained that if tissue has been repaired or reconstructed, there is also an increased chance of failure " which may require further management.  Following the completion of the discussion, the patient expressed understanding of this planned course of care, all their questions were answered and consent will be obtained preoperatively.    Operative Plan: [facility] Total Knee Replacement performing the procedure on an outpatient basis with home health rehab    There are no diagnoses linked to this encounter.     Jesus Thayer MD  8/29/2024

## 2024-09-04 ENCOUNTER — TREATMENT (OUTPATIENT)
Dept: PHYSICAL THERAPY | Facility: CLINIC | Age: 84
End: 2024-09-04
Payer: MEDICARE

## 2024-09-04 DIAGNOSIS — M17.0 BILATERAL PRIMARY OSTEOARTHRITIS OF KNEE: Primary | ICD-10-CM

## 2024-09-04 DIAGNOSIS — Z74.09 IMPAIRED FUNCTIONAL MOBILITY, BALANCE, GAIT, AND ENDURANCE: ICD-10-CM

## 2024-09-04 NOTE — PROGRESS NOTES
Physical Therapy Daily Note    Patient Name: Bailee Garza         :  1940  Referring Physician: Maeve Ruiz APRN  Treatment Date: 2024  Date of Initial Visit: No linked episodes    Visit Diagnoses:    ICD-10-CM ICD-9-CM   1. Bilateral primary osteoarthritis of knee  M17.0 715.16   2. Impaired functional mobility, balance, gait, and endurance  Z74.09 V49.89     Patient seen for Visit count could not be calculated. Make sure you are using a visit which is associated with an episode. sessions  _____________________________________________________    Subjective   Bailee Garza reports: saw Dr. Thayer ; To have TKA 2024 (R) - Persistent pain - entire knee - bought a brace and is helpful  - helps with the pain walking and sleeping - Brace more helpful than tape -     Objective   Pt presents w/ knee brace -     TREATMENT - .  [x]   NUSTEP Seat 7 Arms 10 L7 x 6 min  [x]   FWD RUNNER STEP UP GREEN STEP x 15-20  [x]   LATERAL STEP UP w/ ABDuction GREEN STEP x 20  [x]   BERNADETTE LEG PRESS 90# 2x 15  [x]   BERNADETTE HIP ABDUCTION 15# 2x 15  [x]   SIDE STEPPING 30 ft x 2 ea  [x]   MONSTER WALK Fwd / Retro 30 ft ea       Functional / Therapeutic Activities:    TAPING / BRACING: Reviewed brace and adjusted -   SEE EXERCISE FLOW SHEET -     Assessment/Plan  84 y.o. year-old female (B) knee osteoarthritis. R>L   Persistent knee pain and loss of function -   Brace helpful -   Would benefit from continued PT leading up to TKA -     Continue and progress as tolerated -   To have TKA (R) 2024 -        _________________________________________________    Manual Therapy:                 mins  49572;  Therapeutic Exercise:    20    mins  96445;     Neuromuscular Sofya:   05     mins  02122;    Therapeutic Activity:     13      mins  12243;     Ultrasound:                          mins  52593;  Iontophoresis:                     mins  44956;    Electrical Stimulation:         mins  99918 ( );  Mechanical  Traction:          mins  68203  Dry Needling                       mins self-pay     Timed Treatment:   38    mins                  Total Treatment:     38    mins        Kirit Damon, PT  Physical Therapist  Lic # [id number]

## 2024-09-05 ENCOUNTER — TREATMENT (OUTPATIENT)
Dept: PHYSICAL THERAPY | Facility: CLINIC | Age: 84
End: 2024-09-05
Payer: MEDICARE

## 2024-09-05 DIAGNOSIS — M17.0 BILATERAL PRIMARY OSTEOARTHRITIS OF KNEE: Primary | ICD-10-CM

## 2024-09-05 DIAGNOSIS — Z74.09 IMPAIRED FUNCTIONAL MOBILITY, BALANCE, GAIT, AND ENDURANCE: ICD-10-CM

## 2024-09-05 NOTE — PROGRESS NOTES
Physical Therapy Daily Treatment Note    [facility]  [address] [place] [address]  [phone] (phone)  [phone] (fax)    Patient: Bailee Garza   : 1940  Diagnosis/ICD-10 Code:  Bilateral primary osteoarthritis of knee [M17.0]  Referring practitioner: TERESA Jacobs  Date of Initial Visit: Type: THERAPY  Noted: 2024  Today's Date: 2024  Patient seen for 12 sessions             Subjective Evaluation    History of Present Illness    Subjective comment: I can tell I'm getting stronger.  Saw Dr Thayer last week and I'm going to go ahead with TKA surgery but it kind of scares me.       Objective     AQUATIC EX:     Water Walk                 Forward, backward, sideways x 2 laps ea - on own  March Walk                 2 laps  Tandem Walk              1 lap w/ noodle support - *deferred  Stretch 1                      calf 20 sec x 2 ea  Stretch 2                      -  Stretch 3                      -  Stretch Other 1           -  Stretch Other 2           -  Vertical Traction          -  Abdominals                 LN x 12  Clams                          15x, seated  Hip Abd/Add                15x ea  Hip Flex (SLR)            15x ea  Hip Ext                        15x ea, tighten thigh / buttock  March in Place            15x  Mini Squat                   15x  Toe/Heel Raises         20/20  Alt HS curls                 15x ea  Leg Press                    15x ea w/ large blue ring  Uni-Squat                    -  Hip Circles                   10x ea direction  Uni-Clock                    10x ea   Alt step taps                10x ea to bottom pool step, 1 UE rail support prn  Step Ups                     10x ea, bottom pool step, 1 UE rail support prn  STS from pool bench  10x, no hands   Bicycle                         2 min, seated  Flutter/Scissor             15 / 15, seated  Exercise 1                   seated LAQ + ankle DF x 10  ea  Exercise 2                   SLS Balance R/L        10-15 sec x 2 ea  Exercise 3                   KB push/pull x 10, back at wall  Exercise 4                   Alt UE flex/ext x 10 ea (hands only)  Exercise 5                   UE horiz abd x 10 (hands only)  Exercise 6                   -      Assessment & Plan       Assessment  Assessment details: Patient reports she can tell she's getting stronger.  She saw ortho MD last week and has decided to move forward with TKA surgery.  Continued with most previous aquatic ex/activity for mobility, flexibility, and strength / stabilization.  Used printed copy of aquatic ex as a guide for performance of therapy ex.  Added a couple of UE / core ex and uni clock this visit.  She demonstrates good knowledge / recall of ~ 75% of her current aquatic exercises.  Focused on standing tall, actively engaging abdominals / gluts / quads, and performing ex with good form / technique and quality to minimize compensation / strain on joints and improve quality / benefit of exercise.  Demonstration and cuing provided throughout session for optimal posture, core/glut activation, and proper form/technique with ex/activity.  She has joined facility so she can continue with aquatic exercise on her own.     Plan:  Continue skilled therapy progressing ex/activity to establish an appropriate HEP to help facilitate transition to independent self care as she prepares for upcoming TKA surgery.                 Timed:  Aquatic Therapy    45     mins [id number];    Kenisha Lorenz PT  Physical Therapist    [place] License: [id number]

## 2024-09-10 ENCOUNTER — TREATMENT (OUTPATIENT)
Dept: PHYSICAL THERAPY | Facility: CLINIC | Age: 84
End: 2024-09-10
Payer: MEDICARE

## 2024-09-10 DIAGNOSIS — Z74.09 IMPAIRED FUNCTIONAL MOBILITY, BALANCE, GAIT, AND ENDURANCE: ICD-10-CM

## 2024-09-10 DIAGNOSIS — M17.0 BILATERAL PRIMARY OSTEOARTHRITIS OF KNEE: Primary | ICD-10-CM

## 2024-09-11 ENCOUNTER — PRE-ADMISSION TESTING (OUTPATIENT)
Dept: PREADMISSION TESTING | Facility: HOSPITAL | Age: 84
End: 2024-09-11
Payer: MEDICARE

## 2024-09-11 VITALS
RESPIRATION RATE: 18 BRPM | WEIGHT: 106.8 LBS | HEART RATE: 70 BPM | SYSTOLIC BLOOD PRESSURE: 114 MMHG | TEMPERATURE: 97 F | HEIGHT: 63 IN | BODY MASS INDEX: 18.92 KG/M2 | DIASTOLIC BLOOD PRESSURE: 77 MMHG

## 2024-09-11 LAB
ANION GAP SERPL CALCULATED.3IONS-SCNC: 11 MMOL/L (ref 5–15)
BUN SERPL-MCNC: 37 MG/DL (ref 8–23)
BUN/CREAT SERPL: 32.5 (ref 7–25)
CALCIUM SPEC-SCNC: 9.8 MG/DL (ref 8.6–10.5)
CHLORIDE SERPL-SCNC: 104 MMOL/L (ref 98–107)
CO2 SERPL-SCNC: 24 MMOL/L (ref 22–29)
CREAT SERPL-MCNC: 1.14 MG/DL (ref 0.57–1)
DEPRECATED RDW RBC AUTO: 42.2 FL (ref 37–54)
EGFRCR SERPLBLD CKD-EPI 2021: 47.9 ML/MIN/1.73
ERYTHROCYTE [DISTWIDTH] IN BLOOD BY AUTOMATED COUNT: 11.8 % (ref 12.3–15.4)
GLUCOSE SERPL-MCNC: 83 MG/DL (ref 65–99)
HCT VFR BLD AUTO: 36.9 % (ref 34–46.6)
HGB BLD-MCNC: 12 G/DL (ref 12–15.9)
MCH RBC QN AUTO: 31.7 PG (ref 26.6–33)
MCHC RBC AUTO-ENTMCNC: 32.5 G/DL (ref 31.5–35.7)
MCV RBC AUTO: 97.4 FL (ref 79–97)
PLATELET # BLD AUTO: 232 10*3/MM3 (ref 140–450)
PMV BLD AUTO: 10.8 FL (ref 6–12)
POTASSIUM SERPL-SCNC: 4.2 MMOL/L (ref 3.5–5.2)
QT INTERVAL: 409 MS
QTC INTERVAL: 436 MS
RBC # BLD AUTO: 3.79 10*6/MM3 (ref 3.77–5.28)
SODIUM SERPL-SCNC: 139 MMOL/L (ref 136–145)
WBC NRBC COR # BLD AUTO: 6.67 10*3/MM3 (ref 3.4–10.8)

## 2024-09-11 PROCEDURE — 85027 COMPLETE CBC AUTOMATED: CPT

## 2024-09-11 PROCEDURE — 36415 COLL VENOUS BLD VENIPUNCTURE: CPT

## 2024-09-11 PROCEDURE — 93005 ELECTROCARDIOGRAM TRACING: CPT

## 2024-09-11 PROCEDURE — 80048 BASIC METABOLIC PNL TOTAL CA: CPT

## 2024-09-11 RX ORDER — ACETAMINOPHEN 500 MG
1000 TABLET ORAL EVERY 6 HOURS PRN
COMMUNITY

## 2024-09-11 RX ORDER — LOPERAMIDE HCL 2 MG
2 CAPSULE ORAL 4 TIMES DAILY PRN
COMMUNITY

## 2024-09-11 NOTE — DISCHARGE INSTRUCTIONS
Take the following medications the morning of surgery:  SYNTHROID    DR. YUN'S OFFICE WILL GIVE YOUR ARRIVAL TIME TO YOU.      If you are on prescription narcotic pain medication to control your pain you may also take that medication the morning of surgery.      General Instructions:     Do not eat solid food after midnight the night before surgery.  Clear liquids day of surgery are allowed but must be stopped at least two hours before your hospital arrival time.       Allowed clear liquids      Water, sodas, and tea or coffee with no cream or milk added.       12 to 20 ounces of a clear liquid that contains carbohydrates is recommended.  If non-diabetic, have Gatorade or Powerade.  If diabetic, have G2 or Powerade Zero.     Do not have liquids red in color.  Do not consume chicken, beef, pork or vegetable broth or bouillon cubes of any variety as they are not considered clear liquids and are not allowed.      Infants may have breast milk up to four hours before surgery.  Infants drinking formula may drink formula up to six hours before surgery.   Patients who avoid smoking, chewing tobacco and alcohol for 4 weeks prior to surgery have a reduced risk of post-operative complications.  Quit smoking as many days before surgery as you can.  Do not smoke, use chewing tobacco or drink alcohol the day of surgery.   If applicable bring your C-PAP/ BI-PAP machine in with you to preop day of surgery.  Bring any papers given to you in the doctor’s office.  Wear clean comfortable clothes.  Do not wear contact lenses, false eyelashes or make-up.  Bring a case for your glasses.   Bring crutches or walker if applicable.  Remove all piercings.  Leave jewelry and any other valuables at home.  Hair extensions with metal clips must be removed prior to surgery.  The Pre-Admission Testing nurse will instruct you to bring medications if unable to obtain an accurate list in Pre-Admission Testing.        If you were given a blood bank ID  arm band remember to bring it with you the day of surgery.    Preventing a Surgical Site Infection:  For 2 to 3 days before surgery, avoid shaving with a razor because the razor can irritate skin and make it easier to develop an infection.    Any areas of open skin can increase the risk of a post-operative wound infection by allowing bacteria to enter and travel throughout the body.  Notify your surgeon if you have any skin wounds / rashes even if it is not near the expected surgical site.  The area will need assessed to determine if surgery should be delayed until it is healed.  The night prior to surgery shower using a fresh bar of anti-bacterial soap (such as Dial) and clean washcloth.  Sleep in a clean bed with clean clothing.  Do not allow pets to sleep with you.  Shower on the morning of surgery using a fresh bar of anti-bacterial soap (such as Dial) and clean washcloth.  Dry with a clean towel and dress in clean clothing.  Ask your surgeon if you will be receiving antibiotics prior to surgery.  Make sure you, your family, and all healthcare providers clean their hands with soap and water or an alcohol based hand  before caring for you or your wound.    Day of surgery:  Your arrival time is approximately two hours before your scheduled surgery time.  Please note if you have an early arrival time the surgery doors do not open before 5:00 AM.  Upon arrival, a Pre-op nurse and Anesthesiologist will review your health history, obtain vital signs, and answer questions you may have.  The only belongings needed at this time will be a list of your home medications and if applicable your C-PAP/BI-PAP machine.  A Pre-op nurse will start an IV and you may receive medication in preparation for surgery, including something to help you relax.     Please be aware that surgery does come with discomfort.  We want to make every effort to control your discomfort so please discuss any uncontrolled symptoms with your  nurse.   Your doctor will most likely have prescribed pain medications.      If you are going home after surgery you will receive individualized written care instructions before being discharged.  A responsible adult must drive you to and from the hospital on the day of your surgery and ideally stay with you through the night.   .  Discharge prescriptions can be filled by the hospital pharmacy during regular pharmacy hours.  If you are having surgery late in the day/evening your prescription may be e-prescribed to your pharmacy.  Please verify your pharmacy hours or chose a 24 hour pharmacy to avoid not having access to your prescription because your pharmacy has closed for the day.    If you are staying overnight following surgery, you will be transported to your hospital room following the recovery period.  [facility] has all private rooms.    If you have any questions please call Pre-Admission Testing at [phone].  Deductibles and co-payments are collected on the day of service. Please be prepared to pay the required co-pay, deductible or deposit on the day of service as defined by your plan.    Call your surgeon immediately if you experience any of the following symptoms:  Sore Throat  Shortness of Breath or difficulty breathing  Cough  Chills  Body soreness or muscle pain  Headache  Fever  New loss of taste or smell  Do not arrive for your surgery ill.  Your procedure will need to be rescheduled to another time.  You will need to call your physician before the day of surgery to avoid any unnecessary exposure to hospital staff as well as other patients.    CHLORHEXIDINE CLOTH INSTRUCTIONS  The morning of surgery follow these instructions using the Chlorhexidine cloths you've been given.  These steps reduce bacteria on the body.  Do not use the cloths near your eyes, ears mouth, genitalia or on open wounds.  Throw the cloths away after use but do not try to flush them down a toilet.      Open  and remove one cloth at a time from the package.    Leave the cloth unfolded and begin the bathing.  Massage the skin with the cloths using gentle pressure to remove bacteria.  Do not scrub harshly.   Follow the steps below with one 2% CHG cloth per area (6 total cloths).  One cloth for neck, shoulders and chest.  One cloth for both arms, hands, fingers and underarms (do underarms last).  One cloth for the abdomen followed by groin.  One cloth for right leg and foot including between the toes.  One cloth for left leg and foot including between the toes.  The last cloth is to be used for the back of the neck, back and buttocks.    Allow the CHG to air dry 3 minutes on the skin which will give it time to work and decrease the chance of irritation.  The skin may feel sticky until it is dry.  Do not rinse with water or any other liquid or you will lose the beneficial effects of the CHG.  If mild skin irritation occurs, do rinse the skin to remove the CHG.  Report this to the nurse at time of admission.  Do not apply lotions, creams, ointments, deodorants or perfumes after using the clothes. Dress in clean clothes before coming to the hospital.

## 2024-09-12 ENCOUNTER — TELEPHONE (OUTPATIENT)
Dept: ORTHOPEDIC SURGERY | Facility: HOSPITAL | Age: 84
End: 2024-09-12
Payer: MEDICARE

## 2024-09-12 ENCOUNTER — OFFICE VISIT (OUTPATIENT)
Dept: ORTHOPEDIC SURGERY | Facility: CLINIC | Age: 84
End: 2024-09-12
Payer: MEDICARE

## 2024-09-12 VITALS
BODY MASS INDEX: 19.88 KG/M2 | TEMPERATURE: 98.9 F | SYSTOLIC BLOOD PRESSURE: 118 MMHG | DIASTOLIC BLOOD PRESSURE: 62 MMHG | HEIGHT: 62 IN | WEIGHT: 108 LBS

## 2024-09-12 DIAGNOSIS — R52 PAIN: Primary | ICD-10-CM

## 2024-09-12 PROCEDURE — 1160F RVW MEDS BY RX/DR IN RCRD: CPT | Performed by: NURSE PRACTITIONER

## 2024-09-12 PROCEDURE — 3078F DIAST BP <80 MM HG: CPT | Performed by: NURSE PRACTITIONER

## 2024-09-12 PROCEDURE — 1159F MED LIST DOCD IN RCRD: CPT | Performed by: NURSE PRACTITIONER

## 2024-09-12 PROCEDURE — S0260 H&P FOR SURGERY: HCPCS | Performed by: NURSE PRACTITIONER

## 2024-09-12 PROCEDURE — 77077 JOINT SURVEY SINGLE VIEW: CPT | Performed by: ORTHOPAEDIC SURGERY

## 2024-09-12 PROCEDURE — 3074F SYST BP LT 130 MM HG: CPT | Performed by: NURSE PRACTITIONER

## 2024-09-12 NOTE — TELEPHONE ENCOUNTER
Risk Factor yes no   Age >75 X    BMI <20 >40  X   Patient History     Chronic Pain (2 or more meds/Pain Management)  X   ETOH (more than 3 drinks Daily)  X   Uncontrolled Depression/Bipolar/Schizoaffective Disorder  X   Arrhythmias--  X   Stent placement/MI  X   DVT/PE  X   Pacemaker  X   HTN (uncontrolled or requiring more than 2 medications)  X   CHF/Retained fluids/Edema  X   Stroke with Residual   X   COPD/Asthma  X   KATIA--Non-compliant with CPAP  X   Diabetes (on insulin or more than 2 meds)         A1C:  X   BPH/Urinary retention (on medication)  X   CKD X    Home environment and support     Current ambulation status (use of cane, walker, W/C, Multiple falls/weakness)  X   Stairs to enter and throughout home X    Lives Alone X    Doesn't have support at home  X   Outpatient Screening Assessment    Home needs: (Walker/BSC):  Has walker  ? Steps 2 steps in, able to stay on the main level   Caregiver 24-48hrs post-discharge: Son and daughter     Discharge Plan:    PT    Prescriptions: Meds to bed    Home medications:   [] Blood thinner/anti-coag therapy--    ? BPH or diuretic--HCTZ  ? BP meds-- Diovan   [] Pain/Anti-inflammatories--   Pre-op Education:  Educate patient on spinal anesthesia/pain control:  ? patient verbalize understanding    Educate patient on hospital course/timeline:  ?  patient verbalize understanding    Joint Care Class:  ?  yes [] no  Notes:

## 2024-09-12 NOTE — H&P
Patient: Bailee Garza    Date of Admission: 9/18/2024    YOB: 1940    Medical Record Number: [id number]    Admitting Physician: Dr. Jesus Thayer    Reason for Admission: End Stage Right Knee OA    History of Present Illness: 84 y.o. female presents with severe end stage knee osteoarthritis which has not been responsive to the full compliment of conservative measures. Despite conservative attempts, there is still severe, constant activity-limiting pain. Given the severity of the pain, the patient has elected to proceed with knee replacement.    Allergies:   Allergies   Allergen Reactions    Hydrocodone Hallucinations    Paxlovid [Nirmatrelvir-Ritonavir] Other (See Comments)     insomnia    Ketek [Telithromycin] Hives    Nsaids Hives    Sulfa Antibiotics Hives         Current Medications:  Home Medications:    Current Outpatient Medications on File Prior to Visit   Medication Sig    acetaminophen (TYLENOL) 500 MG tablet Take 2 tablets by mouth Every 6 (Six) Hours As Needed for Mild Pain.    calcium polycarbophil (FIBERCON) 625 MG tablet Take 1 tablet by mouth Daily As Needed.    Chlorcyclizine-Pseudoephed (STAHIST AD PO) Take 1 tablet by mouth Daily As Needed.    COLLAGEN PO Take 1 Scoop by mouth Daily. 10 GRAMS    hydroCHLOROthiazide 12.5 MG tablet Take 1 tablet by mouth Daily.    loperamide (IMODIUM) 2 MG capsule Take 1 capsule by mouth 4 (Four) Times a Day As Needed for Diarrhea.    metroNIDAZOLE (METROCREAM) 0.75 % cream Apply 1 Application topically to the appropriate area as directed As Needed.    Synthroid 75 MCG tablet Take 1 tablet by mouth Daily.    valsartan (DIOVAN) 80 MG tablet Take 1 tablet by mouth Daily. (Patient taking differently: Take 1 tablet by mouth Daily. PT TO HOLD AM OF SURGERY)     Current Facility-Administered Medications on File Prior to Visit   Medication    Chlorhexidine Gluconate Cloth 2 % pads 1 each     PRN Meds:.    PMH:     Past Medical History:   Diagnosis Date     Abnormal CXR 2017    Adverse reaction to narcotic drug     SEVERE HALLUCINATIONS POST OP LEFT HIP PLACEMENT    Allergies     Arthritis     Arthritis of foot 2020    Arthropathy of pelvic region and thigh 2012    unspecified    Back pain 2007    Chronic back pain 2009    Chronic cough 2017    CKD (chronic kidney disease)     Closed nondisplaced fracture of lateral malleolus of fibula with delayed healing 2020    Closed nondisplaced fracture of medial cuneiform of left foot 2018    Constipation 2015    unspecified    COVID-19 vaccination refused     COVID-19 virus detected 10/17/2022    Diverticulosis     Dyspepsia 2008    Essential hypertension 2007    Exertional shortness of breath 2017    Fall 2018    Family history of abdominal aortic aneurysm (AAA) 2019     aaa screen limited (04/10/2019 10:32)     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), 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    Hearing loss 2008    History of bone density study 2015    History of pneumonia     2017    History of skin cancer     Hypothyroidism, acquired 2007    Insomnia 2015    unspecified    Intervertebral disc disorder with myelopathy, cervical region 2010    Joint capsule tear 2012    unspecified    OA (osteoarthritis) of hip 2018    Other synovitis and tenosynovitis, right ankle and foot 2020    Peroneal tendonitis, right 2020    Rhinitis, allergic 2007    Right knee pain     Scoliosis     Screening breast examination 2007    Stress 2015    other acute reactions to stress    Stress incontinence     Trochanteric bursitis, left hip 2019    Urticaria 2015       PF/Surg/Soc Hx:     Past Surgical History:   Procedure Laterality Date    BREAST EXCISIONAL BIOPSY Right     50+ years ago    BRONCHOSCOPY N/A 2017    Procedure: BRONCHOSCOPY;   "Surgeon: Mario Alanis MD;  Location: [facility] ENDOSCOPY;  Service:     CATARACT EXTRACTION, BILATERAL      COLONOSCOPY      HYSTERECTOMY      TOTAL HIP ARTHROPLASTY Left 07/24/2018    Procedure: LEFT TOTAL HIP ARTHROPLASTY;  Surgeon: Justen Mann MD;  Location: [facility] MAIN OR;  Service: Orthopedics    TOTAL HIP ARTHROPLASTY Right 12/13/2022    Procedure: Posterior RIGHT TOTAL HIP ARTHROPLASTY MARCELINO NAVIGATION;  Surgeon: Anupam Ruiz MD;  Location:  [facility];  Service: Orthopedics;  Laterality: Right;        Social History     Occupational History     Employer: [employer]   Tobacco Use    Smoking status: Never     Passive exposure: Never    Smokeless tobacco: Never   Vaping Use    Vaping status: Never Used   Substance and Sexual Activity    Alcohol use: No    Drug use: Never    Sexual activity: Defer      Social History     Social History Narrative    Not on file        Family History   Problem Relation Age of Onset    Heart disease Mother     Aneurysm Mother     Colon cancer Father     Aneurysm Brother     Breast cancer Paternal Grandmother     Asthma Paternal Grandfather     Malig Hyperthermia Neg Hx     Ovarian cancer Neg Hx          Review of Systems:   A 14 point review of systems was performed, pertinent positives discussed above, all other systems are negative    Physical Exam: 84 y.o. female  Vital Signs :   Vitals:    09/12/24 1353   BP: 118/62   BP Location: Left arm   Patient Position: Sitting   Temp: 98.9 °F (37.2 °C)   Weight: 49 kg (108 lb)   Height: 157.5 cm (62\")   PainSc:   5   PainLoc: Knee     General: Alert and Oriented x 3, No acute distress.  Psych: mood and affect appropriate; recent and remote memory intact  Eyes: conjunctivae clear; pupils equally round and reactive, sclerae anicteric  CV: no peripheral edema  Resp: normal respiratory effort  Skin: no rashes or wounds; normal turgor  Musculosketetal; pain and crepitance with knee range of " motion  Vascular: palpable distal pulses    Xrays:  -3 views (AP, lateral, and sunrise) were reviewed demonstrating end-stage OA with bone on bone articulation.  -A full length AP xray was ordered ordered and today for purposes of operative alignment demonstrating end stage arthritic findings. There are no previous full length films for review    Assessment:  End-stage Right knee osteoarthritis. Conservative measures have failed.      Plan:  The plan is to proceed with Right Total Knee Replacement. The patient voiced understanding of the risks, benefits, and alternative forms of treatment that were discussed with Dr Thayer at the time of scheduling.  Same day home health, creatinine elevated so no NSAIDs    Tiana Redding, APRN  9/12/2024

## 2024-09-16 NOTE — PROGRESS NOTES
24      To: Malia Mosher MD     Re:  Jane Walker  : 2010  WANDA: 2024   [facility] MRN: [id number]     Dear Dr. Malia Mosher MD    I met with Jane on 24 in the [facility] for evaluation of Follow-up (Fuv- elevated bmi )  .  Please see my assessment and plan from this visit below.    Thank you for allowing me to participate in Jane's care. Please contact me should you have any questions or concerns.     Sincerely,      Pearl Kinsey MD, FAAP, EZEKIEL  She/Her/Hers  Adolescent Medicine  Department of Pediatrics   of Pediatrics  [facility]    Assessment/Plan  Jane Walker is a 14 y.o. female with elevated BMI (95.88%) with borderline dyslipidemia (Non ), who is referred from PCP Dr. Mary Ellen Carbone for  weight management and presents for follow up    We discussed the goal of maintaining long-term health and that lifestyle is the foundation of all weight management. We also reviewed reasons that most people have difficulty with weight management through lifestyle modification alone.     Patient has mental health concerns around weight gain. Patient/family opt to start GLP1RA at initial visit 24. Discussed that insurance would likely not cover Ozempic, (did not prescribe Wegovy due to current shortages); family opts to pay out of pocket.     We did review medical goals vs cosmetic goals of treatment. Reviewed that we would closely monitor to ensure that patient was not losing too much weight. Will continue at current dose.     Summary of Effectiveness to date:   Meds: semaglutide (Ozempic)  Subjectively: Improved portion control  Side effects: Denies  Weight Trend   Baseline (pre-medication) weight/BMI: 156.31lbs,  95.88%  Net weight/BMI change from baseline: 7lbs  % weight/BMI change: 4%      1. Obesity with body mass index (BMI) in 95th to 98th percentile for age in pediatric patient, unspecified obesity type,  Physical Therapy Daily Treatment Note    [facility]  [address] [place] [address]  [phone] (phone)  [phone] (fax)    Patient: Bailee Garza   : 1940  Diagnosis/ICD-10 Code:  No primary diagnosis found.  Referring practitioner: TERESA Jacobs  Date of Initial Visit: No linked episodes  Today's Date: 2024  Patient seen for Visit count could not be calculated. Make sure you are using a visit which is associated with an episode. sessions             Subjective Evaluation    History of Present Illness    Subjective comment: My knee has been huring more since I was here last.  Feel like my balance is getting better.       Objective     AQUATIC EX:     Water Walk                 Forward, backward, sideways x 2 laps ea  March Walk                 2 laps  Tandem Walk              1 lap w/ noodle support  Stretch 1                      calf 20 sec x 2 ea  Stretch 2                      -  Stretch 3                      -  Stretch Other 1           -  Stretch Other 2           -  Vertical Traction          -  Abdominals                 LN x 10  Clams                          15x, seated  Hip Abd/Add                15x ea  Hip Flex (SLR)            15x ea  Hip Ext                        10x ea, tighten thigh / buttock  March in Place            15x  Mini Squat                   15x  Toe/Heel Raises         20x - *deferred  Alt HS curls                 15x  Leg Press                    15x ea w/ medium blue ring  Uni-Squat                    -  Uni-Clock                    -  Alt step taps                10x ea to bottom pool step, 1 UE rail support prn  Step Ups                     10x ea, bottom pool step, 1 UE rail support prn - *deferred  STS from pool bench  10x, used noodle reach to encourage anterior weight shift  - *deferred  Bicycle                         2 min, seated - on own  Flutter/Scissor             15 / 15, seated  Exercise 1                    seated LAQ + ankle DF x 10 ea  Exercise 2                   SLS Balance R/L        10-15 sec x 2 ea  Exercise 3                   KB push/pull x 10, back at wall  Exercise 4                   -  Exercise 5                   -  Exercise 6                   -            Assessment & Plan       Assessment  Assessment details: Patient reports her knee's been hurting more since she was here last and she had some pain in the muscle on top of her foot for about a day.  Continued with most previous aquatic ex/activity for mobility, flexibility, and strength/stabilization.  Held on tandem walking, STS, stap ups, and reduced resistance on leg press (to medium ring) today.  Had her try a couple of UE/core ex this visit.  Instructed her to reduce speed / ROM and / or modify / discontinue exercises as needed to avoid increased pain.  Emphasis on upright posture, actively engaging abdominals / gluts / quads, and performing ex/activity with controlled movement in comfortable range to reduce compensation / strain on joints and improve quality of exercise.  Demonstration and cuing provided throughout session for optimal posture, core/glut activation, and correct form/technique with ex/activity.      Plan:  Continue skilled therapy progressing ex/activity to establish an appropriate HEP and help facilitate transition to independent self care.  May benefit from a few land PT sessions as well to include possible  trial of taping for her knee.                 Timed:  Aquatic Therapy    38     mins 01550;    Kenisha Lorenz, PT  Physical Therapist    [place] License: [id number]   unspecified whether serious comorbidity present  2. Body image problem  Meds  - Continue semaglutide (Ozempic)  0.5 mg (2 mg/3 mL) pen injector; Inject 0.5 mg under the skin every 7 days.     Goal setting:  - Nutrition:   1) Continue: Three days a week at dinner, wait until the timer is up (set anywhere from 15-30 minutes) to get second helpings  2) Decrease red meat at dinner from 4 times to 2 times per week  - Activity: Continue volleyball  - Sleep:   1) Try to not nap after school   2) Get into bed by 10:30pm Sunday through Thursday    Labs  - Discussed lipid panel results in detail  - Recheck in 6-12 months        Future Appointments   Date Time Provider Department Center   10/21/2024  4:00 PM Pearl Kinsey MD [id number] [facility]   11/13/2024  1:20 PM Eliazar Whaley, OD [id number] [place]   7/22/2025  2:00 PM Mary Ellen Carbone MD [facility]

## 2024-09-17 ENCOUNTER — TELEPHONE (OUTPATIENT)
Dept: ORTHOPEDIC SURGERY | Facility: CLINIC | Age: 84
End: 2024-09-17
Payer: MEDICARE

## 2024-09-18 ENCOUNTER — APPOINTMENT (OUTPATIENT)
Dept: GENERAL RADIOLOGY | Facility: HOSPITAL | Age: 84
End: 2024-09-18
Payer: MEDICARE

## 2024-09-18 ENCOUNTER — HOSPITAL ENCOUNTER (OUTPATIENT)
Facility: HOSPITAL | Age: 84
Discharge: HOME HEALTH | End: 2024-09-18
Attending: ORTHOPAEDIC SURGERY | Admitting: ORTHOPAEDIC SURGERY
Payer: MEDICARE

## 2024-09-18 ENCOUNTER — HOME HEALTH ADMISSION (OUTPATIENT)
Dept: HOME HEALTH SERVICES | Facility: HOME HEALTHCARE | Age: 84
End: 2024-09-18
Payer: MEDICARE

## 2024-09-18 ENCOUNTER — ANESTHESIA (OUTPATIENT)
Dept: PERIOP | Facility: HOSPITAL | Age: 84
End: 2024-09-18
Payer: MEDICARE

## 2024-09-18 ENCOUNTER — DOCUMENTATION (OUTPATIENT)
Dept: HOME HEALTH SERVICES | Facility: HOME HEALTHCARE | Age: 84
End: 2024-09-18
Payer: MEDICARE

## 2024-09-18 ENCOUNTER — ANESTHESIA EVENT (OUTPATIENT)
Dept: PERIOP | Facility: HOSPITAL | Age: 84
End: 2024-09-18
Payer: MEDICARE

## 2024-09-18 VITALS
SYSTOLIC BLOOD PRESSURE: 111 MMHG | TEMPERATURE: 97.7 F | RESPIRATION RATE: 16 BRPM | DIASTOLIC BLOOD PRESSURE: 68 MMHG | HEART RATE: 73 BPM | OXYGEN SATURATION: 96 %

## 2024-09-18 DIAGNOSIS — M17.11 PRIMARY OSTEOARTHRITIS OF RIGHT KNEE: ICD-10-CM

## 2024-09-18 DIAGNOSIS — Z96.651 S/P TKR (TOTAL KNEE REPLACEMENT), RIGHT: Primary | ICD-10-CM

## 2024-09-18 PROCEDURE — 27447 TOTAL KNEE ARTHROPLASTY: CPT | Performed by: NURSE PRACTITIONER

## 2024-09-18 PROCEDURE — C1776 JOINT DEVICE (IMPLANTABLE): HCPCS | Performed by: ORTHOPAEDIC SURGERY

## 2024-09-18 PROCEDURE — 25010000002 VANCOMYCIN 750 MG RECONSTITUTED SOLUTION 1 EACH VIAL: Performed by: ORTHOPAEDIC SURGERY

## 2024-09-18 PROCEDURE — 73560 X-RAY EXAM OF KNEE 1 OR 2: CPT

## 2024-09-18 PROCEDURE — 25010000002 PROPOFOL 200 MG/20ML EMULSION: Performed by: NURSE ANESTHETIST, CERTIFIED REGISTERED

## 2024-09-18 PROCEDURE — 25010000002 DEXAMETHASONE PER 1 MG: Performed by: ANESTHESIOLOGY

## 2024-09-18 PROCEDURE — 25010000002 PROPOFOL 10 MG/ML EMULSION: Performed by: NURSE ANESTHETIST, CERTIFIED REGISTERED

## 2024-09-18 PROCEDURE — 27447 TOTAL KNEE ARTHROPLASTY: CPT | Performed by: ORTHOPAEDIC SURGERY

## 2024-09-18 PROCEDURE — 25010000002 ROPIVACAINE PER 1 MG: Performed by: ORTHOPAEDIC SURGERY

## 2024-09-18 PROCEDURE — 25810000003 LACTATED RINGERS SOLUTION: Performed by: ORTHOPAEDIC SURGERY

## 2024-09-18 PROCEDURE — 25810000003 LACTATED RINGERS PER 1000 ML: Performed by: NURSE ANESTHETIST, CERTIFIED REGISTERED

## 2024-09-18 PROCEDURE — 25010000002 MORPHINE PER 10 MG: Performed by: ORTHOPAEDIC SURGERY

## 2024-09-18 PROCEDURE — 25010000002 FENTANYL CITRATE (PF) 50 MCG/ML SOLUTION: Performed by: NURSE ANESTHETIST, CERTIFIED REGISTERED

## 2024-09-18 PROCEDURE — 25010000002 DEXAMETHASONE SODIUM PHOSPHATE 20 MG/5ML SOLUTION: Performed by: NURSE ANESTHETIST, CERTIFIED REGISTERED

## 2024-09-18 PROCEDURE — C1713 ANCHOR/SCREW BN/BN,TIS/BN: HCPCS | Performed by: ORTHOPAEDIC SURGERY

## 2024-09-18 PROCEDURE — 25010000002 MIDAZOLAM PER 1 MG: Performed by: NURSE ANESTHETIST, CERTIFIED REGISTERED

## 2024-09-18 PROCEDURE — 25810000003 SODIUM CHLORIDE 0.9 % SOLUTION 250 ML FLEX CONT: Performed by: ORTHOPAEDIC SURGERY

## 2024-09-18 PROCEDURE — 25010000002 FENTANYL CITRATE (PF) 50 MCG/ML SOLUTION: Performed by: ANESTHESIOLOGY

## 2024-09-18 PROCEDURE — 25010000002 HYDROMORPHONE 1 MG/ML SOLUTION: Performed by: NURSE ANESTHETIST, CERTIFIED REGISTERED

## 2024-09-18 PROCEDURE — 97116 GAIT TRAINING THERAPY: CPT

## 2024-09-18 PROCEDURE — 25010000002 SUGAMMADEX 200 MG/2ML SOLUTION: Performed by: NURSE ANESTHETIST, CERTIFIED REGISTERED

## 2024-09-18 PROCEDURE — 25010000002 EPINEPHRINE 1 MG/ML SOLUTION 30 ML VIAL: Performed by: ORTHOPAEDIC SURGERY

## 2024-09-18 PROCEDURE — 25010000002 ONDANSETRON PER 1 MG: Performed by: NURSE ANESTHETIST, CERTIFIED REGISTERED

## 2024-09-18 PROCEDURE — 97161 PT EVAL LOW COMPLEX 20 MIN: CPT

## 2024-09-18 PROCEDURE — 25010000002 ROPIVACAINE PER 1 MG: Performed by: ANESTHESIOLOGY

## 2024-09-18 PROCEDURE — 25010000002 CEFAZOLIN PER 500 MG: Performed by: ORTHOPAEDIC SURGERY

## 2024-09-18 PROCEDURE — 25010000002 ACETAMINOPHEN 10 MG/ML SOLUTION: Performed by: NURSE ANESTHETIST, CERTIFIED REGISTERED

## 2024-09-18 DEVICE — IMPLANTABLE DEVICE: Type: IMPLANTABLE DEVICE | Status: FUNCTIONAL

## 2024-09-18 DEVICE — GENESIS II NON-POROUS TIBIAL                                    BASEPLATE SIZE 2 RIGHT
Type: IMPLANTABLE DEVICE | Site: KNEE | Status: FUNCTIONAL
Brand: GENESIS II

## 2024-09-18 DEVICE — DEV CONTRL TISS STRATAFIXSPIRALMNCRYL PLSPS2 REV3/0 45CM: Type: IMPLANTABLE DEVICE | Site: KNEE | Status: FUNCTIONAL

## 2024-09-18 DEVICE — DEV CONTRL TISS STRATAFIX SYMM PDS PLUS VIL CT-1 60CM: Type: IMPLANTABLE DEVICE | Site: KNEE | Status: FUNCTIONAL

## 2024-09-18 DEVICE — LEGION CRUCIATE RETAINING OXINIUM                                    FEMORAL SIZE 3 RIGHT
Type: IMPLANTABLE DEVICE | Site: KNEE | Status: FUNCTIONAL
Brand: LEGION

## 2024-09-18 DEVICE — CMT BONE PALACOS R HI/VISC 1X40: Type: IMPLANTABLE DEVICE | Site: KNEE | Status: FUNCTIONAL

## 2024-09-18 DEVICE — LEGION CRUCIATE RETAINING HIGH                                    FLEX HIGHLY CROSS LINKED                                    POLYETHYLENE SIZE 1-2 11MM
Type: IMPLANTABLE DEVICE | Site: KNEE | Status: FUNCTIONAL
Brand: LEGION

## 2024-09-18 DEVICE — GENESIS II BICONVEX PATELLA 29MM
Type: IMPLANTABLE DEVICE | Site: KNEE | Status: FUNCTIONAL
Brand: GENESIS II

## 2024-09-18 RX ORDER — MIDAZOLAM HYDROCHLORIDE 1 MG/ML
INJECTION INTRAMUSCULAR; INTRAVENOUS AS NEEDED
Status: DISCONTINUED | OUTPATIENT
Start: 2024-09-18 | End: 2024-09-18 | Stop reason: SURG

## 2024-09-18 RX ORDER — DROPERIDOL 2.5 MG/ML
0.62 INJECTION, SOLUTION INTRAMUSCULAR; INTRAVENOUS
Status: DISCONTINUED | OUTPATIENT
Start: 2024-09-18 | End: 2024-09-18 | Stop reason: HOSPADM

## 2024-09-18 RX ORDER — EPHEDRINE SULFATE 50 MG/ML
5 INJECTION, SOLUTION INTRAVENOUS ONCE AS NEEDED
Status: DISCONTINUED | OUTPATIENT
Start: 2024-09-18 | End: 2024-09-18 | Stop reason: HOSPADM

## 2024-09-18 RX ORDER — ONDANSETRON 4 MG/1
4 TABLET, FILM COATED ORAL EVERY 8 HOURS PRN
Qty: 10 TABLET | Refills: 0 | Status: ON HOLD | OUTPATIENT
Start: 2024-09-18

## 2024-09-18 RX ORDER — SODIUM CHLORIDE 0.9 % (FLUSH) 0.9 %
3-10 SYRINGE (ML) INJECTION AS NEEDED
Status: DISCONTINUED | OUTPATIENT
Start: 2024-09-18 | End: 2024-09-18 | Stop reason: HOSPADM

## 2024-09-18 RX ORDER — LABETALOL HYDROCHLORIDE 5 MG/ML
5 INJECTION, SOLUTION INTRAVENOUS
Status: DISCONTINUED | OUTPATIENT
Start: 2024-09-18 | End: 2024-09-18 | Stop reason: HOSPADM

## 2024-09-18 RX ORDER — ACETAMINOPHEN 325 MG/1
650 TABLET ORAL EVERY 6 HOURS PRN
Qty: 40 TABLET | Refills: 0 | Status: ON HOLD | OUTPATIENT
Start: 2024-09-18

## 2024-09-18 RX ORDER — ACETAMINOPHEN 10 MG/ML
INJECTION, SOLUTION INTRAVENOUS AS NEEDED
Status: DISCONTINUED | OUTPATIENT
Start: 2024-09-18 | End: 2024-09-18 | Stop reason: SURG

## 2024-09-18 RX ORDER — SODIUM CHLORIDE 0.9 % (FLUSH) 0.9 %
3 SYRINGE (ML) INJECTION EVERY 12 HOURS SCHEDULED
Status: DISCONTINUED | OUTPATIENT
Start: 2024-09-18 | End: 2024-09-18 | Stop reason: HOSPADM

## 2024-09-18 RX ORDER — PREGABALIN 75 MG/1
150 CAPSULE ORAL ONCE
Status: COMPLETED | OUTPATIENT
Start: 2024-09-18 | End: 2024-09-18

## 2024-09-18 RX ORDER — HYDRALAZINE HYDROCHLORIDE 20 MG/ML
5 INJECTION INTRAMUSCULAR; INTRAVENOUS
Status: DISCONTINUED | OUTPATIENT
Start: 2024-09-18 | End: 2024-09-18 | Stop reason: HOSPADM

## 2024-09-18 RX ORDER — OXYCODONE AND ACETAMINOPHEN 5; 325 MG/1; MG/1
1 TABLET ORAL EVERY 4 HOURS PRN
Status: CANCELLED | OUTPATIENT
Start: 2024-09-18 | End: 2024-09-23

## 2024-09-18 RX ORDER — ASPIRIN 81 MG/1
TABLET ORAL
Qty: 60 TABLET | Refills: 0 | Status: ON HOLD | OUTPATIENT
Start: 2024-09-18 | End: 2024-11-03

## 2024-09-18 RX ORDER — PROMETHAZINE HYDROCHLORIDE 25 MG/1
12.5 TABLET ORAL EVERY 4 HOURS PRN
Status: CANCELLED | OUTPATIENT
Start: 2024-09-18

## 2024-09-18 RX ORDER — DIPHENHYDRAMINE HYDROCHLORIDE 50 MG/ML
12.5 INJECTION INTRAMUSCULAR; INTRAVENOUS
Status: DISCONTINUED | OUTPATIENT
Start: 2024-09-18 | End: 2024-09-18 | Stop reason: HOSPADM

## 2024-09-18 RX ORDER — FLUMAZENIL 0.1 MG/ML
0.2 INJECTION INTRAVENOUS AS NEEDED
Status: DISCONTINUED | OUTPATIENT
Start: 2024-09-18 | End: 2024-09-18 | Stop reason: HOSPADM

## 2024-09-18 RX ORDER — FENTANYL CITRATE 50 UG/ML
50 INJECTION, SOLUTION INTRAMUSCULAR; INTRAVENOUS ONCE AS NEEDED
Status: COMPLETED | OUTPATIENT
Start: 2024-09-18 | End: 2024-09-18

## 2024-09-18 RX ORDER — POLYETHYLENE GLYCOL 3350 17 G/17G
17 POWDER, FOR SOLUTION ORAL 2 TIMES DAILY
Qty: 238 G | Refills: 0 | Status: ON HOLD | OUTPATIENT
Start: 2024-09-18 | End: 2024-09-25

## 2024-09-18 RX ORDER — LIDOCAINE HYDROCHLORIDE 10 MG/ML
0.5 INJECTION, SOLUTION INFILTRATION; PERINEURAL ONCE AS NEEDED
Status: DISCONTINUED | OUTPATIENT
Start: 2024-09-18 | End: 2024-09-18 | Stop reason: HOSPADM

## 2024-09-18 RX ORDER — OXYCODONE AND ACETAMINOPHEN 5; 325 MG/1; MG/1
2 TABLET ORAL EVERY 4 HOURS PRN
Status: CANCELLED | OUTPATIENT
Start: 2024-09-18 | End: 2024-09-23

## 2024-09-18 RX ORDER — MIDAZOLAM HYDROCHLORIDE 1 MG/ML
0.5 INJECTION INTRAMUSCULAR; INTRAVENOUS
Status: DISCONTINUED | OUTPATIENT
Start: 2024-09-18 | End: 2024-09-18 | Stop reason: HOSPADM

## 2024-09-18 RX ORDER — ACETAMINOPHEN 500 MG
500 TABLET ORAL EVERY 6 HOURS
Status: DISCONTINUED | OUTPATIENT
Start: 2024-09-18 | End: 2024-09-18 | Stop reason: HOSPADM

## 2024-09-18 RX ORDER — HYDROMORPHONE HYDROCHLORIDE 2 MG/1
2 TABLET ORAL EVERY 4 HOURS PRN
Qty: 40 TABLET | Refills: 0 | Status: ON HOLD | OUTPATIENT
Start: 2024-09-18

## 2024-09-18 RX ORDER — HYDROMORPHONE HYDROCHLORIDE 1 MG/ML
0.25 INJECTION, SOLUTION INTRAMUSCULAR; INTRAVENOUS; SUBCUTANEOUS
Status: DISCONTINUED | OUTPATIENT
Start: 2024-09-18 | End: 2024-09-18 | Stop reason: HOSPADM

## 2024-09-18 RX ORDER — NALOXONE HCL 0.4 MG/ML
0.2 VIAL (ML) INJECTION AS NEEDED
Status: DISCONTINUED | OUTPATIENT
Start: 2024-09-18 | End: 2024-09-18 | Stop reason: HOSPADM

## 2024-09-18 RX ORDER — ROCURONIUM BROMIDE 10 MG/ML
INJECTION, SOLUTION INTRAVENOUS AS NEEDED
Status: DISCONTINUED | OUTPATIENT
Start: 2024-09-18 | End: 2024-09-18 | Stop reason: SURG

## 2024-09-18 RX ORDER — DEXAMETHASONE SODIUM PHOSPHATE 4 MG/ML
INJECTION, SOLUTION INTRA-ARTICULAR; INTRALESIONAL; INTRAMUSCULAR; INTRAVENOUS; SOFT TISSUE
Status: COMPLETED | OUTPATIENT
Start: 2024-09-18 | End: 2024-09-18

## 2024-09-18 RX ORDER — MAGNESIUM HYDROXIDE 1200 MG/15ML
LIQUID ORAL AS NEEDED
Status: DISCONTINUED | OUTPATIENT
Start: 2024-09-18 | End: 2024-09-18 | Stop reason: HOSPADM

## 2024-09-18 RX ORDER — SODIUM CHLORIDE, SODIUM LACTATE, POTASSIUM CHLORIDE, CALCIUM CHLORIDE 600; 310; 30; 20 MG/100ML; MG/100ML; MG/100ML; MG/100ML
INJECTION, SOLUTION INTRAVENOUS CONTINUOUS PRN
Status: DISCONTINUED | OUTPATIENT
Start: 2024-09-18 | End: 2024-09-18 | Stop reason: SURG

## 2024-09-18 RX ORDER — DEXAMETHASONE SODIUM PHOSPHATE 4 MG/ML
INJECTION, SOLUTION INTRA-ARTICULAR; INTRALESIONAL; INTRAMUSCULAR; INTRAVENOUS; SOFT TISSUE AS NEEDED
Status: DISCONTINUED | OUTPATIENT
Start: 2024-09-18 | End: 2024-09-18 | Stop reason: SURG

## 2024-09-18 RX ORDER — PANTOPRAZOLE SODIUM 40 MG/1
40 TABLET, DELAYED RELEASE ORAL DAILY
Qty: 14 TABLET | Refills: 0 | Status: ON HOLD | OUTPATIENT
Start: 2024-09-18 | End: 2024-10-02

## 2024-09-18 RX ORDER — ONDANSETRON 2 MG/ML
4 INJECTION INTRAMUSCULAR; INTRAVENOUS ONCE AS NEEDED
Status: DISCONTINUED | OUTPATIENT
Start: 2024-09-18 | End: 2024-09-18 | Stop reason: HOSPADM

## 2024-09-18 RX ORDER — PROMETHAZINE HYDROCHLORIDE 25 MG/1
25 TABLET ORAL ONCE AS NEEDED
Status: DISCONTINUED | OUTPATIENT
Start: 2024-09-18 | End: 2024-09-18 | Stop reason: HOSPADM

## 2024-09-18 RX ORDER — ONDANSETRON 2 MG/ML
INJECTION INTRAMUSCULAR; INTRAVENOUS AS NEEDED
Status: DISCONTINUED | OUTPATIENT
Start: 2024-09-18 | End: 2024-09-18 | Stop reason: SURG

## 2024-09-18 RX ORDER — IPRATROPIUM BROMIDE AND ALBUTEROL SULFATE 2.5; .5 MG/3ML; MG/3ML
3 SOLUTION RESPIRATORY (INHALATION) ONCE AS NEEDED
Status: DISCONTINUED | OUTPATIENT
Start: 2024-09-18 | End: 2024-09-18 | Stop reason: HOSPADM

## 2024-09-18 RX ORDER — FENTANYL CITRATE 50 UG/ML
25 INJECTION, SOLUTION INTRAMUSCULAR; INTRAVENOUS
Status: DISCONTINUED | OUTPATIENT
Start: 2024-09-18 | End: 2024-09-18 | Stop reason: HOSPADM

## 2024-09-18 RX ORDER — FENTANYL CITRATE 50 UG/ML
INJECTION, SOLUTION INTRAMUSCULAR; INTRAVENOUS AS NEEDED
Status: DISCONTINUED | OUTPATIENT
Start: 2024-09-18 | End: 2024-09-18 | Stop reason: SURG

## 2024-09-18 RX ORDER — TRANEXAMIC ACID 100 MG/ML
INJECTION, SOLUTION INTRAVENOUS AS NEEDED
Status: DISCONTINUED | OUTPATIENT
Start: 2024-09-18 | End: 2024-09-18 | Stop reason: SURG

## 2024-09-18 RX ORDER — ONDANSETRON 2 MG/ML
4 INJECTION INTRAMUSCULAR; INTRAVENOUS ONCE AS NEEDED
Status: CANCELLED | OUTPATIENT
Start: 2024-09-18

## 2024-09-18 RX ORDER — SODIUM CHLORIDE, SODIUM LACTATE, POTASSIUM CHLORIDE, CALCIUM CHLORIDE 600; 310; 30; 20 MG/100ML; MG/100ML; MG/100ML; MG/100ML
9 INJECTION, SOLUTION INTRAVENOUS CONTINUOUS
Status: DISCONTINUED | OUTPATIENT
Start: 2024-09-18 | End: 2024-09-18 | Stop reason: HOSPADM

## 2024-09-18 RX ORDER — HYDROMORPHONE HYDROCHLORIDE 2 MG/1
2 TABLET ORAL EVERY 4 HOURS PRN
Status: DISCONTINUED | OUTPATIENT
Start: 2024-09-18 | End: 2024-09-18 | Stop reason: HOSPADM

## 2024-09-18 RX ORDER — ACETAMINOPHEN 325 MG/1
650 TABLET ORAL EVERY 6 HOURS PRN
Status: CANCELLED | OUTPATIENT
Start: 2024-09-18 | End: 2024-09-21

## 2024-09-18 RX ORDER — PROPOFOL 10 MG/ML
INJECTION, EMULSION INTRAVENOUS AS NEEDED
Status: DISCONTINUED | OUTPATIENT
Start: 2024-09-18 | End: 2024-09-18 | Stop reason: SURG

## 2024-09-18 RX ORDER — ROPIVACAINE HYDROCHLORIDE 5 MG/ML
INJECTION, SOLUTION EPIDURAL; INFILTRATION; PERINEURAL
Status: COMPLETED | OUTPATIENT
Start: 2024-09-18 | End: 2024-09-18

## 2024-09-18 RX ORDER — LIDOCAINE HYDROCHLORIDE 20 MG/ML
INJECTION, SOLUTION EPIDURAL; INFILTRATION; INTRACAUDAL; PERINEURAL AS NEEDED
Status: DISCONTINUED | OUTPATIENT
Start: 2024-09-18 | End: 2024-09-18 | Stop reason: SURG

## 2024-09-18 RX ORDER — OXYCODONE AND ACETAMINOPHEN 5; 325 MG/1; MG/1
1 TABLET ORAL ONCE AS NEEDED
Status: CANCELLED | OUTPATIENT
Start: 2024-09-18 | End: 2024-09-23

## 2024-09-18 RX ORDER — FAMOTIDINE 10 MG/ML
20 INJECTION, SOLUTION INTRAVENOUS ONCE
Status: COMPLETED | OUTPATIENT
Start: 2024-09-18 | End: 2024-09-18

## 2024-09-18 RX ORDER — ASPIRIN 81 MG/1
81 TABLET ORAL EVERY 12 HOURS SCHEDULED
Status: CANCELLED | OUTPATIENT
Start: 2024-09-19

## 2024-09-18 RX ORDER — PROMETHAZINE HYDROCHLORIDE 25 MG/1
25 SUPPOSITORY RECTAL ONCE AS NEEDED
Status: DISCONTINUED | OUTPATIENT
Start: 2024-09-18 | End: 2024-09-18 | Stop reason: HOSPADM

## 2024-09-18 RX ORDER — ONDANSETRON 4 MG/1
4 TABLET, ORALLY DISINTEGRATING ORAL EVERY 6 HOURS PRN
Status: CANCELLED | OUTPATIENT
Start: 2024-09-18

## 2024-09-18 RX ADMIN — PREGABALIN 75 MG: 75 CAPSULE ORAL at 05:59

## 2024-09-18 RX ADMIN — DEXAMETHASONE SODIUM PHOSPHATE 4 MG: 4 INJECTION, SOLUTION INTRA-ARTICULAR; INTRALESIONAL; INTRAMUSCULAR; INTRAVENOUS; SOFT TISSUE at 06:54

## 2024-09-18 RX ADMIN — ONDANSETRON 4 MG: 2 INJECTION INTRAMUSCULAR; INTRAVENOUS at 07:33

## 2024-09-18 RX ADMIN — FAMOTIDINE 20 MG: 10 INJECTION INTRAVENOUS at 06:30

## 2024-09-18 RX ADMIN — ROCURONIUM BROMIDE 40 MG: 10 INJECTION, SOLUTION INTRAVENOUS at 07:10

## 2024-09-18 RX ADMIN — SODIUM CHLORIDE 2 G: 900 INJECTION INTRAVENOUS at 07:00

## 2024-09-18 RX ADMIN — MIDAZOLAM 2 MG: 1 INJECTION INTRAMUSCULAR; INTRAVENOUS at 07:44

## 2024-09-18 RX ADMIN — ROPIVACAINE HYDROCHLORIDE 12 ML: 5 INJECTION EPIDURAL; INFILTRATION; PERINEURAL at 06:54

## 2024-09-18 RX ADMIN — PROPOFOL 150 MG: 10 INJECTION, EMULSION INTRAVENOUS at 07:10

## 2024-09-18 RX ADMIN — PROPOFOL 175 MCG/KG/MIN: 10 INJECTION, EMULSION INTRAVENOUS at 07:16

## 2024-09-18 RX ADMIN — SODIUM CHLORIDE, POTASSIUM CHLORIDE, SODIUM LACTATE AND CALCIUM CHLORIDE: 600; 310; 30; 20 INJECTION, SOLUTION INTRAVENOUS at 07:02

## 2024-09-18 RX ADMIN — SODIUM CHLORIDE, POTASSIUM CHLORIDE, SODIUM LACTATE AND CALCIUM CHLORIDE 500 ML: 600; 310; 30; 20 INJECTION, SOLUTION INTRAVENOUS at 06:29

## 2024-09-18 RX ADMIN — SODIUM CHLORIDE, POTASSIUM CHLORIDE, SODIUM LACTATE AND CALCIUM CHLORIDE: 600; 310; 30; 20 INJECTION, SOLUTION INTRAVENOUS at 08:38

## 2024-09-18 RX ADMIN — FENTANYL CITRATE 50 MCG: 50 INJECTION, SOLUTION INTRAMUSCULAR; INTRAVENOUS at 07:10

## 2024-09-18 RX ADMIN — PROPOFOL 50 MG: 10 INJECTION, EMULSION INTRAVENOUS at 07:38

## 2024-09-18 RX ADMIN — LIDOCAINE HYDROCHLORIDE 60 MG: 20 INJECTION, SOLUTION EPIDURAL; INFILTRATION; INTRACAUDAL; PERINEURAL at 07:10

## 2024-09-18 RX ADMIN — HYDROMORPHONE HYDROCHLORIDE 0.5 MG: 1 INJECTION, SOLUTION INTRAMUSCULAR; INTRAVENOUS; SUBCUTANEOUS at 08:39

## 2024-09-18 RX ADMIN — TRANEXAMIC ACID 1000 MG: 100 INJECTION, SOLUTION INTRAVENOUS at 07:59

## 2024-09-18 RX ADMIN — SUGAMMADEX 200 MG: 100 INJECTION, SOLUTION INTRAVENOUS at 08:26

## 2024-09-18 RX ADMIN — ACETAMINOPHEN 1000 MG: 1000 INJECTION INTRAVENOUS at 07:27

## 2024-09-18 RX ADMIN — DEXAMETHASONE SODIUM PHOSPHATE 8 MG: 4 INJECTION, SOLUTION INTRAMUSCULAR; INTRAVENOUS at 07:33

## 2024-09-18 RX ADMIN — VANCOMYCIN HYDROCHLORIDE 750 MG: 750 INJECTION, POWDER, LYOPHILIZED, FOR SOLUTION INTRAVENOUS at 06:30

## 2024-09-18 RX ADMIN — FENTANYL CITRATE 25 MCG: 50 INJECTION, SOLUTION INTRAMUSCULAR; INTRAVENOUS at 06:48

## 2024-09-19 ENCOUNTER — HOME CARE VISIT (OUTPATIENT)
Dept: HOME HEALTH SERVICES | Facility: HOME HEALTHCARE | Age: 84
End: 2024-09-19
Payer: MEDICARE

## 2024-09-19 VITALS
TEMPERATURE: 96.6 F | SYSTOLIC BLOOD PRESSURE: 100 MMHG | HEART RATE: 63 BPM | OXYGEN SATURATION: 95 % | DIASTOLIC BLOOD PRESSURE: 50 MMHG | RESPIRATION RATE: 16 BRPM

## 2024-09-19 PROCEDURE — G0151 HHCP-SERV OF PT,EA 15 MIN: HCPCS

## 2024-09-20 ENCOUNTER — HOME CARE VISIT (OUTPATIENT)
Dept: HOME HEALTH SERVICES | Facility: HOME HEALTHCARE | Age: 84
End: 2024-09-20
Payer: MEDICARE

## 2024-09-20 ENCOUNTER — TELEPHONE (OUTPATIENT)
Dept: ORTHOPEDIC SURGERY | Facility: HOSPITAL | Age: 84
End: 2024-09-20
Payer: MEDICARE

## 2024-09-20 VITALS
HEART RATE: 77 BPM | TEMPERATURE: 97.3 F | OXYGEN SATURATION: 95 % | DIASTOLIC BLOOD PRESSURE: 57 MMHG | SYSTOLIC BLOOD PRESSURE: 113 MMHG

## 2024-09-20 PROCEDURE — G0151 HHCP-SERV OF PT,EA 15 MIN: HCPCS

## 2024-09-23 ENCOUNTER — APPOINTMENT (OUTPATIENT)
Dept: MRI IMAGING | Facility: HOSPITAL | Age: 84
End: 2024-09-23
Payer: MEDICARE

## 2024-09-23 ENCOUNTER — APPOINTMENT (OUTPATIENT)
Dept: CT IMAGING | Facility: HOSPITAL | Age: 84
End: 2024-09-23
Payer: MEDICARE

## 2024-09-23 ENCOUNTER — APPOINTMENT (OUTPATIENT)
Dept: GENERAL RADIOLOGY | Facility: HOSPITAL | Age: 84
End: 2024-09-23
Payer: MEDICARE

## 2024-09-23 ENCOUNTER — APPOINTMENT (OUTPATIENT)
Dept: NEUROLOGY | Facility: HOSPITAL | Age: 84
End: 2024-09-23
Payer: MEDICARE

## 2024-09-23 ENCOUNTER — APPOINTMENT (OUTPATIENT)
Dept: CARDIOLOGY | Facility: HOSPITAL | Age: 84
End: 2024-09-23
Payer: MEDICARE

## 2024-09-23 ENCOUNTER — HOSPITAL ENCOUNTER (INPATIENT)
Facility: HOSPITAL | Age: 84
LOS: 7 days | Discharge: HOME | End: 2024-10-01
Attending: EMERGENCY MEDICINE | Admitting: INTERNAL MEDICINE
Payer: MEDICARE

## 2024-09-23 ENCOUNTER — DOCUMENTATION (OUTPATIENT)
Dept: HOME HEALTH SERVICES | Facility: HOME HEALTHCARE | Age: 84
End: 2024-09-23
Payer: MEDICARE

## 2024-09-23 ENCOUNTER — HOME CARE VISIT (OUTPATIENT)
Dept: HOME HEALTH SERVICES | Facility: HOME HEALTHCARE | Age: 84
End: 2024-09-23
Payer: MEDICARE

## 2024-09-23 DIAGNOSIS — N18.9 CHRONIC KIDNEY DISEASE, UNSPECIFIED CKD STAGE: ICD-10-CM

## 2024-09-23 DIAGNOSIS — Z96.651 STATUS POST TOTAL RIGHT KNEE REPLACEMENT: ICD-10-CM

## 2024-09-23 DIAGNOSIS — D64.9 POSTOPERATIVE ANEMIA: ICD-10-CM

## 2024-09-23 DIAGNOSIS — R55 SYNCOPE AND COLLAPSE: Primary | ICD-10-CM

## 2024-09-23 DIAGNOSIS — R11.2 NAUSEA AND VOMITING, UNSPECIFIED VOMITING TYPE: ICD-10-CM

## 2024-09-23 DIAGNOSIS — K92.2 GASTROINTESTINAL HEMORRHAGE, UNSPECIFIED GASTROINTESTINAL HEMORRHAGE TYPE: ICD-10-CM

## 2024-09-23 DIAGNOSIS — E86.9 VOLUME DEPLETION, GASTROINTESTINAL LOSS: ICD-10-CM

## 2024-09-23 LAB
ALBUMIN SERPL-MCNC: 3.7 G/DL (ref 3.5–5.2)
ALBUMIN/GLOB SERPL: 1.3 G/DL
ALP SERPL-CCNC: 140 U/L (ref 39–117)
ALT SERPL W P-5'-P-CCNC: 41 U/L (ref 1–33)
ANION GAP SERPL CALCULATED.3IONS-SCNC: 11 MMOL/L (ref 5–15)
APTT PPP: 27.6 SECONDS (ref 22.7–35.4)
AST SERPL-CCNC: 34 U/L (ref 1–32)
B PARAPERT DNA SPEC QL NAA+PROBE: NOT DETECTED
B PERT DNA SPEC QL NAA+PROBE: NOT DETECTED
BACTERIA UR QL AUTO: NORMAL /HPF
BASOPHILS # BLD AUTO: 0.05 10*3/MM3 (ref 0–0.2)
BASOPHILS NFR BLD AUTO: 0.4 % (ref 0–1.5)
BH CV LOWER VASCULAR LEFT COMMON FEMORAL AUGMENT: NORMAL
BH CV LOWER VASCULAR LEFT COMMON FEMORAL COMPETENT: NORMAL
BH CV LOWER VASCULAR LEFT COMMON FEMORAL COMPRESS: NORMAL
BH CV LOWER VASCULAR LEFT COMMON FEMORAL PHASIC: NORMAL
BH CV LOWER VASCULAR LEFT COMMON FEMORAL SPONT: NORMAL
BH CV LOWER VASCULAR LEFT DISTAL FEMORAL COMPRESS: NORMAL
BH CV LOWER VASCULAR LEFT GASTRONEMIUS COMPRESS: NORMAL
BH CV LOWER VASCULAR LEFT GREATER SAPH AK COMPRESS: NORMAL
BH CV LOWER VASCULAR LEFT GREATER SAPH BK COMPRESS: NORMAL
BH CV LOWER VASCULAR LEFT LESSER SAPH COMPRESS: NORMAL
BH CV LOWER VASCULAR LEFT MID FEMORAL AUGMENT: NORMAL
BH CV LOWER VASCULAR LEFT MID FEMORAL COMPETENT: NORMAL
BH CV LOWER VASCULAR LEFT MID FEMORAL COMPRESS: NORMAL
BH CV LOWER VASCULAR LEFT MID FEMORAL PHASIC: NORMAL
BH CV LOWER VASCULAR LEFT MID FEMORAL SPONT: NORMAL
BH CV LOWER VASCULAR LEFT PERONEAL COMPRESS: NORMAL
BH CV LOWER VASCULAR LEFT POPLITEAL AUGMENT: NORMAL
BH CV LOWER VASCULAR LEFT POPLITEAL COMPETENT: NORMAL
BH CV LOWER VASCULAR LEFT POPLITEAL COMPRESS: NORMAL
BH CV LOWER VASCULAR LEFT POPLITEAL PHASIC: NORMAL
BH CV LOWER VASCULAR LEFT POPLITEAL SPONT: NORMAL
BH CV LOWER VASCULAR LEFT POSTERIOR TIBIAL COMPRESS: NORMAL
BH CV LOWER VASCULAR LEFT PROFUNDA FEMORAL COMPRESS: NORMAL
BH CV LOWER VASCULAR LEFT PROXIMAL FEMORAL COMPRESS: NORMAL
BH CV LOWER VASCULAR LEFT SAPHENOFEMORAL JUNCTION COMPRESS: NORMAL
BH CV LOWER VASCULAR RIGHT COMMON FEMORAL AUGMENT: NORMAL
BH CV LOWER VASCULAR RIGHT COMMON FEMORAL COMPETENT: NORMAL
BH CV LOWER VASCULAR RIGHT COMMON FEMORAL COMPRESS: NORMAL
BH CV LOWER VASCULAR RIGHT COMMON FEMORAL PHASIC: NORMAL
BH CV LOWER VASCULAR RIGHT COMMON FEMORAL SPONT: NORMAL
BH CV LOWER VASCULAR RIGHT DISTAL FEMORAL COMPRESS: NORMAL
BH CV LOWER VASCULAR RIGHT GASTRONEMIUS COMPRESS: NORMAL
BH CV LOWER VASCULAR RIGHT GREATER SAPH AK COMPRESS: NORMAL
BH CV LOWER VASCULAR RIGHT GREATER SAPH BK COMPRESS: NORMAL
BH CV LOWER VASCULAR RIGHT LESSER SAPH COMPRESS: NORMAL
BH CV LOWER VASCULAR RIGHT MID FEMORAL AUGMENT: NORMAL
BH CV LOWER VASCULAR RIGHT MID FEMORAL COMPETENT: NORMAL
BH CV LOWER VASCULAR RIGHT MID FEMORAL COMPRESS: NORMAL
BH CV LOWER VASCULAR RIGHT MID FEMORAL PHASIC: NORMAL
BH CV LOWER VASCULAR RIGHT MID FEMORAL SPONT: NORMAL
BH CV LOWER VASCULAR RIGHT PERONEAL COMPRESS: NORMAL
BH CV LOWER VASCULAR RIGHT POPLITEAL AUGMENT: NORMAL
BH CV LOWER VASCULAR RIGHT POPLITEAL COMPETENT: NORMAL
BH CV LOWER VASCULAR RIGHT POPLITEAL COMPRESS: NORMAL
BH CV LOWER VASCULAR RIGHT POPLITEAL PHASIC: NORMAL
BH CV LOWER VASCULAR RIGHT POPLITEAL SPONT: NORMAL
BH CV LOWER VASCULAR RIGHT POSTERIOR TIBIAL COMPRESS: NORMAL
BH CV LOWER VASCULAR RIGHT PROFUNDA FEMORAL COMPRESS: NORMAL
BH CV LOWER VASCULAR RIGHT PROXIMAL FEMORAL COMPRESS: NORMAL
BH CV LOWER VASCULAR RIGHT SAPHENOFEMORAL JUNCTION COMPRESS: NORMAL
BILIRUB SERPL-MCNC: 0.4 MG/DL (ref 0–1.2)
BILIRUB UR QL STRIP: NEGATIVE
BUN SERPL-MCNC: 49 MG/DL (ref 8–23)
BUN/CREAT SERPL: 42.2 (ref 7–25)
C PNEUM DNA NPH QL NAA+NON-PROBE: NOT DETECTED
CALCIUM SPEC-SCNC: 9.3 MG/DL (ref 8.6–10.5)
CHLORIDE SERPL-SCNC: 102 MMOL/L (ref 98–107)
CK SERPL-CCNC: 41 U/L (ref 20–180)
CLARITY UR: CLEAR
CO2 SERPL-SCNC: 24 MMOL/L (ref 22–29)
COLOR UR: YELLOW
CREAT SERPL-MCNC: 1.16 MG/DL (ref 0.57–1)
D DIMER PPP FEU-MCNC: 2.75 MCGFEU/ML (ref 0–0.83)
D-LACTATE SERPL-SCNC: 2 MMOL/L (ref 0.5–2)
DEPRECATED RDW RBC AUTO: 42.7 FL (ref 37–54)
EGFRCR SERPLBLD CKD-EPI 2021: 46.9 ML/MIN/1.73
EOSINOPHIL # BLD AUTO: 0.03 10*3/MM3 (ref 0–0.4)
EOSINOPHIL NFR BLD AUTO: 0.2 % (ref 0.3–6.2)
ERYTHROCYTE [DISTWIDTH] IN BLOOD BY AUTOMATED COUNT: 11.8 % (ref 12.3–15.4)
FLUAV SUBTYP SPEC NAA+PROBE: NOT DETECTED
FLUBV RNA ISLT QL NAA+PROBE: NOT DETECTED
GEN 5 2HR TROPONIN T REFLEX: 13 NG/L
GLOBULIN UR ELPH-MCNC: 2.9 GM/DL
GLUCOSE SERPL-MCNC: 144 MG/DL (ref 65–99)
GLUCOSE UR STRIP-MCNC: NEGATIVE MG/DL
HADV DNA SPEC NAA+PROBE: NOT DETECTED
HCOV 229E RNA SPEC QL NAA+PROBE: NOT DETECTED
HCOV HKU1 RNA SPEC QL NAA+PROBE: NOT DETECTED
HCOV NL63 RNA SPEC QL NAA+PROBE: NOT DETECTED
HCOV OC43 RNA SPEC QL NAA+PROBE: NOT DETECTED
HCT VFR BLD AUTO: 29.2 % (ref 34–46.6)
HGB BLD-MCNC: 9.5 G/DL (ref 12–15.9)
HGB UR QL STRIP.AUTO: NEGATIVE
HMPV RNA NPH QL NAA+NON-PROBE: NOT DETECTED
HPIV1 RNA ISLT QL NAA+PROBE: NOT DETECTED
HPIV2 RNA SPEC QL NAA+PROBE: NOT DETECTED
HPIV3 RNA NPH QL NAA+PROBE: NOT DETECTED
HPIV4 P GENE NPH QL NAA+PROBE: NOT DETECTED
HYALINE CASTS UR QL AUTO: NORMAL /LPF
IMM GRANULOCYTES # BLD AUTO: 0.07 10*3/MM3 (ref 0–0.05)
IMM GRANULOCYTES NFR BLD AUTO: 0.5 % (ref 0–0.5)
INR PPP: 0.96 (ref 0.9–1.1)
KETONES UR QL STRIP: ABNORMAL
LEUKOCYTE ESTERASE UR QL STRIP.AUTO: NEGATIVE
LIPASE SERPL-CCNC: 42 U/L (ref 13–60)
LYMPHOCYTES # BLD AUTO: 0.73 10*3/MM3 (ref 0.7–3.1)
LYMPHOCYTES NFR BLD AUTO: 5.6 % (ref 19.6–45.3)
M PNEUMO IGG SER IA-ACNC: NOT DETECTED
MCH RBC QN AUTO: 31.9 PG (ref 26.6–33)
MCHC RBC AUTO-ENTMCNC: 32.5 G/DL (ref 31.5–35.7)
MCV RBC AUTO: 98 FL (ref 79–97)
MONOCYTES # BLD AUTO: 0.64 10*3/MM3 (ref 0.1–0.9)
MONOCYTES NFR BLD AUTO: 4.9 % (ref 5–12)
NEUTROPHILS NFR BLD AUTO: 11.48 10*3/MM3 (ref 1.7–7)
NEUTROPHILS NFR BLD AUTO: 88.4 % (ref 42.7–76)
NITRITE UR QL STRIP: NEGATIVE
NRBC BLD AUTO-RTO: 0 /100 WBC (ref 0–0.2)
NT-PROBNP SERPL-MCNC: 158 PG/ML (ref 0–1800)
PH UR STRIP.AUTO: 5.5 [PH] (ref 5–8)
PLATELET # BLD AUTO: 305 10*3/MM3 (ref 140–450)
PMV BLD AUTO: 10.6 FL (ref 6–12)
POTASSIUM SERPL-SCNC: 5.1 MMOL/L (ref 3.5–5.2)
PROCALCITONIN SERPL-MCNC: 21.5 NG/ML (ref 0–0.25)
PROT SERPL-MCNC: 6.6 G/DL (ref 6–8.5)
PROT UR QL STRIP: ABNORMAL
PROTHROMBIN TIME: 13 SECONDS (ref 11.7–14.2)
QT INTERVAL: 333 MS
QTC INTERVAL: 449 MS
RBC # BLD AUTO: 2.98 10*6/MM3 (ref 3.77–5.28)
RBC # UR STRIP: NORMAL /HPF
REF LAB TEST METHOD: NORMAL
RHINOVIRUS RNA SPEC NAA+PROBE: NOT DETECTED
RSV RNA NPH QL NAA+NON-PROBE: NOT DETECTED
SARS-COV-2 RNA NPH QL NAA+NON-PROBE: NOT DETECTED
SODIUM SERPL-SCNC: 137 MMOL/L (ref 136–145)
SP GR UR STRIP: 1.02 (ref 1–1.03)
SQUAMOUS #/AREA URNS HPF: NORMAL /HPF
TROPONIN T DELTA: -7 NG/L
TROPONIN T SERPL HS-MCNC: 20 NG/L
UROBILINOGEN UR QL STRIP: ABNORMAL
WBC # UR STRIP: NORMAL /HPF
WBC NRBC COR # BLD AUTO: 13 10*3/MM3 (ref 3.4–10.8)

## 2024-09-23 PROCEDURE — 25810000003 SODIUM CHLORIDE 0.9 % SOLUTION: Performed by: STUDENT IN AN ORGANIZED HEALTH CARE EDUCATION/TRAINING PROGRAM

## 2024-09-23 PROCEDURE — P9612 CATHETERIZE FOR URINE SPEC: HCPCS

## 2024-09-23 PROCEDURE — 99285 EMERGENCY DEPT VISIT HI MDM: CPT

## 2024-09-23 PROCEDURE — 95816 EEG AWAKE AND DROWSY: CPT | Performed by: PSYCHIATRY & NEUROLOGY

## 2024-09-23 PROCEDURE — 36415 COLL VENOUS BLD VENIPUNCTURE: CPT

## 2024-09-23 PROCEDURE — 25810000003 SODIUM CHLORIDE 0.9 % SOLUTION: Performed by: EMERGENCY MEDICINE

## 2024-09-23 PROCEDURE — 85379 FIBRIN DEGRADATION QUANT: CPT | Performed by: EMERGENCY MEDICINE

## 2024-09-23 PROCEDURE — 83690 ASSAY OF LIPASE: CPT | Performed by: INTERNAL MEDICINE

## 2024-09-23 PROCEDURE — 99222 1ST HOSP IP/OBS MODERATE 55: CPT | Performed by: PHYSICIAN ASSISTANT

## 2024-09-23 PROCEDURE — G0378 HOSPITAL OBSERVATION PER HR: HCPCS

## 2024-09-23 PROCEDURE — 25010000002 PIPERACILLIN SOD-TAZOBACTAM PER 1 G: Performed by: INTERNAL MEDICINE

## 2024-09-23 PROCEDURE — 95816 EEG AWAKE AND DROWSY: CPT

## 2024-09-23 PROCEDURE — 85730 THROMBOPLASTIN TIME PARTIAL: CPT | Performed by: EMERGENCY MEDICINE

## 2024-09-23 PROCEDURE — 85610 PROTHROMBIN TIME: CPT | Performed by: EMERGENCY MEDICINE

## 2024-09-23 PROCEDURE — 0202U NFCT DS 22 TRGT SARS-COV-2: CPT | Performed by: INTERNAL MEDICINE

## 2024-09-23 PROCEDURE — 74177 CT ABD & PELVIS W/CONTRAST: CPT

## 2024-09-23 PROCEDURE — 36415 COLL VENOUS BLD VENIPUNCTURE: CPT | Performed by: INTERNAL MEDICINE

## 2024-09-23 PROCEDURE — 80053 COMPREHEN METABOLIC PANEL: CPT | Performed by: EMERGENCY MEDICINE

## 2024-09-23 PROCEDURE — 97161 PT EVAL LOW COMPLEX 20 MIN: CPT

## 2024-09-23 PROCEDURE — 84484 ASSAY OF TROPONIN QUANT: CPT | Performed by: EMERGENCY MEDICINE

## 2024-09-23 PROCEDURE — 25010000002 ONDANSETRON PER 1 MG: Performed by: INTERNAL MEDICINE

## 2024-09-23 PROCEDURE — 99213 OFFICE O/P EST LOW 20 MIN: CPT | Performed by: STUDENT IN AN ORGANIZED HEALTH CARE EDUCATION/TRAINING PROGRAM

## 2024-09-23 PROCEDURE — 81001 URINALYSIS AUTO W/SCOPE: CPT | Performed by: EMERGENCY MEDICINE

## 2024-09-23 PROCEDURE — 84145 PROCALCITONIN (PCT): CPT | Performed by: EMERGENCY MEDICINE

## 2024-09-23 PROCEDURE — 93970 EXTREMITY STUDY: CPT | Performed by: SURGERY

## 2024-09-23 PROCEDURE — 93010 ELECTROCARDIOGRAM REPORT: CPT | Performed by: INTERNAL MEDICINE

## 2024-09-23 PROCEDURE — 93005 ELECTROCARDIOGRAM TRACING: CPT | Performed by: EMERGENCY MEDICINE

## 2024-09-23 PROCEDURE — 83605 ASSAY OF LACTIC ACID: CPT | Performed by: EMERGENCY MEDICINE

## 2024-09-23 PROCEDURE — 85025 COMPLETE CBC W/AUTO DIFF WBC: CPT | Performed by: EMERGENCY MEDICINE

## 2024-09-23 PROCEDURE — 71045 X-RAY EXAM CHEST 1 VIEW: CPT

## 2024-09-23 PROCEDURE — 71275 CT ANGIOGRAPHY CHEST: CPT

## 2024-09-23 PROCEDURE — 25010000002 METOCLOPRAMIDE PER 10 MG: Performed by: EMERGENCY MEDICINE

## 2024-09-23 PROCEDURE — 93970 EXTREMITY STUDY: CPT

## 2024-09-23 PROCEDURE — 82550 ASSAY OF CK (CPK): CPT | Performed by: INTERNAL MEDICINE

## 2024-09-23 PROCEDURE — 83880 ASSAY OF NATRIURETIC PEPTIDE: CPT | Performed by: INTERNAL MEDICINE

## 2024-09-23 PROCEDURE — 87040 BLOOD CULTURE FOR BACTERIA: CPT | Performed by: INTERNAL MEDICINE

## 2024-09-23 PROCEDURE — 97110 THERAPEUTIC EXERCISES: CPT

## 2024-09-23 PROCEDURE — 25510000001 IOPAMIDOL PER 1 ML: Performed by: EMERGENCY MEDICINE

## 2024-09-23 RX ORDER — ONDANSETRON 2 MG/ML
4 INJECTION INTRAMUSCULAR; INTRAVENOUS ONCE
Status: DISCONTINUED | OUTPATIENT
Start: 2024-09-23 | End: 2024-10-01 | Stop reason: HOSPADM

## 2024-09-23 RX ORDER — SODIUM CHLORIDE 0.9 % (FLUSH) 0.9 %
10 SYRINGE (ML) INJECTION AS NEEDED
Status: DISCONTINUED | OUTPATIENT
Start: 2024-09-23 | End: 2024-10-01 | Stop reason: HOSPADM

## 2024-09-23 RX ORDER — SODIUM CHLORIDE 9 MG/ML
40 INJECTION, SOLUTION INTRAVENOUS AS NEEDED
Status: DISCONTINUED | OUTPATIENT
Start: 2024-09-23 | End: 2024-10-01 | Stop reason: HOSPADM

## 2024-09-23 RX ORDER — ONDANSETRON 4 MG/1
4 TABLET, ORALLY DISINTEGRATING ORAL EVERY 6 HOURS PRN
Status: DISCONTINUED | OUTPATIENT
Start: 2024-09-23 | End: 2024-10-01 | Stop reason: HOSPADM

## 2024-09-23 RX ORDER — BISACODYL 10 MG
10 SUPPOSITORY, RECTAL RECTAL DAILY PRN
Status: DISCONTINUED | OUTPATIENT
Start: 2024-09-23 | End: 2024-10-01 | Stop reason: HOSPADM

## 2024-09-23 RX ORDER — NITROGLYCERIN 0.4 MG/1
0.4 TABLET SUBLINGUAL
Status: DISCONTINUED | OUTPATIENT
Start: 2024-09-23 | End: 2024-10-01 | Stop reason: HOSPADM

## 2024-09-23 RX ORDER — POLYETHYLENE GLYCOL 3350 17 G/17G
17 POWDER, FOR SOLUTION ORAL DAILY PRN
Status: DISCONTINUED | OUTPATIENT
Start: 2024-09-23 | End: 2024-10-01 | Stop reason: HOSPADM

## 2024-09-23 RX ORDER — ONDANSETRON 2 MG/ML
4 INJECTION INTRAMUSCULAR; INTRAVENOUS EVERY 6 HOURS PRN
Status: DISCONTINUED | OUTPATIENT
Start: 2024-09-23 | End: 2024-10-01 | Stop reason: HOSPADM

## 2024-09-23 RX ORDER — BISACODYL 5 MG/1
5 TABLET, DELAYED RELEASE ORAL DAILY PRN
Status: DISCONTINUED | OUTPATIENT
Start: 2024-09-23 | End: 2024-10-01 | Stop reason: HOSPADM

## 2024-09-23 RX ORDER — ACETAMINOPHEN 325 MG/1
650 TABLET ORAL EVERY 4 HOURS PRN
Status: DISCONTINUED | OUTPATIENT
Start: 2024-09-23 | End: 2024-09-28

## 2024-09-23 RX ORDER — SODIUM CHLORIDE 9 MG/ML
125 INJECTION, SOLUTION INTRAVENOUS CONTINUOUS
Status: DISCONTINUED | OUTPATIENT
Start: 2024-09-23 | End: 2024-09-26

## 2024-09-23 RX ORDER — ACETAMINOPHEN 160 MG/5ML
650 SOLUTION ORAL EVERY 4 HOURS PRN
Status: DISCONTINUED | OUTPATIENT
Start: 2024-09-23 | End: 2024-09-28

## 2024-09-23 RX ORDER — IOPAMIDOL 755 MG/ML
100 INJECTION, SOLUTION INTRAVASCULAR
Status: COMPLETED | OUTPATIENT
Start: 2024-09-23 | End: 2024-09-23

## 2024-09-23 RX ORDER — PANTOPRAZOLE SODIUM 40 MG/10ML
40 INJECTION, POWDER, LYOPHILIZED, FOR SOLUTION INTRAVENOUS
Status: DISCONTINUED | OUTPATIENT
Start: 2024-09-23 | End: 2024-09-24

## 2024-09-23 RX ORDER — LEVOTHYROXINE SODIUM 75 UG/1
75 TABLET ORAL DAILY
Status: DISCONTINUED | OUTPATIENT
Start: 2024-09-23 | End: 2024-10-01 | Stop reason: HOSPADM

## 2024-09-23 RX ORDER — AMOXICILLIN 250 MG
2 CAPSULE ORAL 2 TIMES DAILY PRN
Status: DISCONTINUED | OUTPATIENT
Start: 2024-09-23 | End: 2024-10-01 | Stop reason: HOSPADM

## 2024-09-23 RX ORDER — SODIUM CHLORIDE 0.9 % (FLUSH) 0.9 %
10 SYRINGE (ML) INJECTION EVERY 12 HOURS SCHEDULED
Status: DISCONTINUED | OUTPATIENT
Start: 2024-09-23 | End: 2024-10-01 | Stop reason: HOSPADM

## 2024-09-23 RX ORDER — ACETAMINOPHEN 650 MG/1
650 SUPPOSITORY RECTAL EVERY 4 HOURS PRN
Status: DISCONTINUED | OUTPATIENT
Start: 2024-09-23 | End: 2024-09-28

## 2024-09-23 RX ORDER — METOCLOPRAMIDE HYDROCHLORIDE 5 MG/ML
10 INJECTION INTRAMUSCULAR; INTRAVENOUS ONCE
Status: COMPLETED | OUTPATIENT
Start: 2024-09-23 | End: 2024-09-23

## 2024-09-23 RX ADMIN — METOCLOPRAMIDE 10 MG: 5 INJECTION, SOLUTION INTRAMUSCULAR; INTRAVENOUS at 06:16

## 2024-09-23 RX ADMIN — PANTOPRAZOLE SODIUM 40 MG: 40 INJECTION, POWDER, FOR SOLUTION INTRAVENOUS at 18:08

## 2024-09-23 RX ADMIN — LEVOTHYROXINE SODIUM 75 MCG: 75 TABLET ORAL at 14:39

## 2024-09-23 RX ADMIN — SODIUM CHLORIDE 500 ML: 9 INJECTION, SOLUTION INTRAVENOUS at 06:22

## 2024-09-23 RX ADMIN — IOPAMIDOL 100 ML: 755 INJECTION, SOLUTION INTRAVENOUS at 07:21

## 2024-09-23 RX ADMIN — SODIUM CHLORIDE 1000 ML: 9 INJECTION, SOLUTION INTRAVENOUS at 09:55

## 2024-09-23 RX ADMIN — PANTOPRAZOLE SODIUM 40 MG: 40 INJECTION, POWDER, FOR SOLUTION INTRAVENOUS at 09:58

## 2024-09-23 RX ADMIN — SODIUM CHLORIDE 125 ML/HR: 9 INJECTION, SOLUTION INTRAVENOUS at 06:54

## 2024-09-23 RX ADMIN — ONDANSETRON 4 MG: 2 INJECTION, SOLUTION INTRAMUSCULAR; INTRAVENOUS at 09:47

## 2024-09-23 RX ADMIN — Medication 10 ML: at 21:01

## 2024-09-23 RX ADMIN — PIPERACILLIN AND TAZOBACTAM 3.38 G: 3; .375 INJECTION, POWDER, FOR SOLUTION INTRAVENOUS at 21:01

## 2024-09-23 RX ADMIN — PIPERACILLIN AND TAZOBACTAM 3.38 G: 3; .375 INJECTION, POWDER, FOR SOLUTION INTRAVENOUS at 14:38

## 2024-09-23 NOTE — ED NOTES
Nursing report ED to floor  Bailee Garza  84 y.o.  female    HPI :  HPI (Adult)  Stated Reason for Visit: syncope tonight on the way to the bathroom.  History Obtained From: EMS    Chief Complaint  Chief Complaint   Patient presents with    Syncope       Admitting doctor:   Jose Manuel Vazquez MD    Admitting diagnosis:   The primary encounter diagnosis was Syncope and collapse. Diagnoses of Volume depletion, gastrointestinal loss, Postoperative anemia, Status post total right knee replacement, Chronic kidney disease, unspecified CKD stage, and Nausea and vomiting, unspecified vomiting type were also pertinent to this visit.    Code status:   Current Code Status       Date Active Code Status Order ID Comments User Context       9/23/2024 0858 CPR (Attempt to Resuscitate) [id number]  Jose Manuel Vazquez MD ED        Question Answer    Code Status (Patient has no pulse and is not breathing) CPR (Attempt to Resuscitate)    Medical Interventions (Patient has pulse or is breathing) Full Support                    Allergies:   Hydrocodone, Paxlovid [nirmatrelvir-ritonavir], Ketek [telithromycin], Nsaids, and Sulfa antibiotics    Isolation:   No active isolations    Intake and Output  No intake or output data in the 24 hours ending 09/23/24 0935    Weight:   There were no vitals filed for this visit.    Most recent vitals:   Vitals:    09/23/24 0526 09/23/24 0531 09/23/24 0611 09/23/24 0631   BP: 109/62 106/57 104/50 108/58   BP Location:    Left arm   Patient Position:    Sitting   Pulse: 112 111 116 114   Resp:    18   Temp:       SpO2:   97% 96%   Height:           Active LDAs/IV Access:   Lines, Drains & Airways       Active LDAs       Name Placement date Placement time Site Days    Peripheral IV 09/23/24 0549 Anterior;Distal;Right Forearm 09/23/24  0549  Forearm  less than 1    Peripheral IV 09/23/24 0642 Anterior;Left;Upper Arm 09/23/24  0642  Arm  less than 1                    Labs (abnormal labs have a star):    Labs Reviewed   COMPREHENSIVE METABOLIC PANEL - Abnormal; Notable for the following components:       Result Value    Glucose 144 (*)     BUN 49 (*)     Creatinine 1.16 (*)     ALT (SGPT) 41 (*)     AST (SGOT) 34 (*)     Alkaline Phosphatase 140 (*)     BUN/Creatinine Ratio 42.2 (*)     eGFR 46.9 (*)     All other components within normal limits    Narrative:     GFR Normal >60  Chronic Kidney Disease <60  Kidney Failure <15    The GFR formula is only valid for adults with stable renal function between ages 18 and 70.   TROPONIN - Abnormal; Notable for the following components:    HS Troponin T 20 (*)     All other components within normal limits    Narrative:     High Sensitive Troponin T Reference Range:  <14.0 ng/L- Negative Female for AMI  <22.0 ng/L- Negative Male for AMI  >=14 - Abnormal Female indicating possible myocardial injury.  >=22 - Abnormal Male indicating possible myocardial injury.   Clinicians would have to utilize clinical acumen, EKG, Troponin, and serial changes to determine if it is an Acute Myocardial Infarction or myocardial injury due to an underlying chronic condition.        CBC WITH AUTO DIFFERENTIAL - Abnormal; Notable for the following components:    WBC 13.00 (*)     RBC 2.98 (*)     Hemoglobin 9.5 (*)     Hematocrit 29.2 (*)     MCV 98.0 (*)     RDW 11.8 (*)     Neutrophil % 88.4 (*)     Lymphocyte % 5.6 (*)     Monocyte % 4.9 (*)     Eosinophil % 0.2 (*)     Neutrophils, Absolute 11.48 (*)     Immature Grans, Absolute 0.07 (*)     All other components within normal limits   D-DIMER, QUANTITATIVE - Abnormal; Notable for the following components:    D-Dimer, Quantitative 2.75 (*)     All other components within normal limits    Narrative:     According to the assay 's published package insert, a normal (<0.50 MCGFEU/mL) D-dimer result in conjunction with a non-high clinical probability assessment, excludes deep vein thrombosis (DVT) and pulmonary embolism (PE) with high  "sensitivity.    D-dimer values increase with age and this can make VTE exclusion of an older population difficult. To address this, the American College of [employer], based on best available evidence and recent guidelines, recommends that clinicians use age-adjusted D-dimer thresholds in patients greater than 50 years of age with: a) a low probability of PE who do not meet all Pulmonary Embolism Rule Out Criteria, or b) in those with intermediate probability of PE.   The formula for an age-adjusted D-dimer cut-off is \"age/100\".  For example, a 60 year old patient would have an age-adjusted cut-off of 0.60 MCGFEU/mL and an 80 year old 0.80 MCGFEU/mL.   HIGH SENSITIVITIY TROPONIN T 2HR - Abnormal; Notable for the following components:    Troponin T Delta -7 (*)     All other components within normal limits    Narrative:     High Sensitive Troponin T Reference Range:  <14.0 ng/L- Negative Female for AMI  <22.0 ng/L- Negative Male for AMI  >=14 - Abnormal Female indicating possible myocardial injury.  >=22 - Abnormal Male indicating possible myocardial injury.   Clinicians would have to utilize clinical acumen, EKG, Troponin, and serial changes to determine if it is an Acute Myocardial Infarction or myocardial injury due to an underlying chronic condition.        URINALYSIS W/ CULTURE IF INDICATED - Abnormal; Notable for the following components:    Ketones, UA 15 mg/dL (1+) (*)     Protein, UA 30 mg/dL (1+) (*)     All other components within normal limits    Narrative:     In absence of clinical symptoms, the presence of pyuria, bacteria, and/or nitrites on the urinalysis result does not correlate with infection.   PROCALCITONIN - Abnormal; Notable for the following components:    Procalcitonin 21.50 (*)     All other components within normal limits    Narrative:     As a Marker for Sepsis (Non-Neonates):    1. <0.5 ng/mL represents a low risk of severe sepsis and/or septic shock.  2. >2 ng/mL represents a high risk " "of severe sepsis and/or septic shock.    As a Marker for Lower Respiratory Tract Infections that require antibiotic therapy:    PCT on Admission    Antibiotic Therapy       6-12 Hrs later    >0.5                Strongly Recommended  >0.25 - <0.5        Recommended   0.1 - 0.25          Discouraged              Remeasure/reassess PCT  <0.1                Strongly Discouraged     Remeasure/reassess PCT    As 28 day mortality risk marker: \"Change in Procalcitonin Result\" (>80% or <=80%) if Day 0 (or Day 1) and Day 4 values are available. Refer to http://www.[facility]-pct-calculator.com    Change in PCT <=80%  A decrease of PCT levels below or equal to 80% defines a positive change in PCT test result representing a higher risk for 28-day all-cause mortality of patients diagnosed with severe sepsis for septic shock.    Change in PCT >80%  A decrease of PCT levels of more than 80% defines a negative change in PCT result representing a lower risk for 28-day all-cause mortality of patients diagnosed with severe sepsis or septic shock.      LACTIC ACID, PLASMA - Normal   APTT - Normal   PROTIME-INR - Normal   BNP (IN-HOUSE) - Normal    Narrative:     This assay is used as an aid in the diagnosis of individuals suspected of having heart failure. It can be used as an aid in the diagnosis of acute decompensated heart failure (ADHF) in patients presenting with signs and symptoms of ADHF to the emergency department (ED). In addition, NT-proBNP of <300 pg/mL indicates ADHF is not likely.    Age Range Result Interpretation  NT-proBNP Concentration (pg/mL:      <50             Positive            >450                   Gray                 300-450                    Negative             <300    50-75           Positive            >900                  Gray                300-900                  Negative            <300      >75             Positive            >1800                  Gray                300-1800                  " Negative            <300   LIPASE - Normal   RESPIRATORY PANEL PCR W/ COVID-19 (SARS-COV-2), NP SWAB IN UTM/VTP, 2 HR TAT   BLOOD CULTURE   BLOOD CULTURE   URINALYSIS, MICROSCOPIC ONLY   CBC AND DIFFERENTIAL    Narrative:     The following orders were created for panel order CBC & Differential.  Procedure                               Abnormality         Status                     ---------                               -----------         ------                     CBC Auto Differential[[id number]]        Abnormal            Final result                 Please view results for these tests on the individual orders.       EKG:   ECG 12 Lead Syncope   Preliminary Result   HEART AQSZ=127  bpm   RR Sknvjcqk=004  ms   AL Spvbfjpg=292  ms   P Horizontal Axis=7  deg   P Front Axis=74  deg   QRSD Interval=90  ms   QT Awvobizf=143  ms   ESvF=394  ms   QRS Axis=77  deg   T Wave Axis=30  deg   - ABNORMAL ECG -   Sinus tachycardia   Consider right atrial enlargement   Consider right ventricular hypertrophy   Probable inferior infarct, old   Date and Time of Study:2024-09-23 05:29:02          Meds given in ED:   Medications   sodium chloride 0.9 % infusion (125 mL/hr Intravenous New Bag 9/23/24 0654)   sodium chloride 0.9 % flush 10 mL (has no administration in time range)   sodium chloride 0.9 % flush 10 mL (has no administration in time range)   sodium chloride 0.9 % infusion 40 mL (has no administration in time range)   nitroglycerin (NITROSTAT) SL tablet 0.4 mg (has no administration in time range)   acetaminophen (TYLENOL) tablet 650 mg (has no administration in time range)     Or   acetaminophen (TYLENOL) 160 MG/5ML oral solution 650 mg (has no administration in time range)     Or   acetaminophen (TYLENOL) suppository 650 mg (has no administration in time range)   sennosides-docusate (PERICOLACE) 8.6-50 MG per tablet 2 tablet (has no administration in time range)     And   polyethylene glycol (MIRALAX) packet 17 g (has no  administration in time range)     And   bisacodyl (DULCOLAX) EC tablet 5 mg (has no administration in time range)     And   bisacodyl (DULCOLAX) suppository 10 mg (has no administration in time range)   ondansetron ODT (ZOFRAN-ODT) disintegrating tablet 4 mg (has no administration in time range)     Or   ondansetron (ZOFRAN) injection 4 mg (has no administration in time range)   pantoprazole (PROTONIX) injection 40 mg (has no administration in time range)   metoclopramide (REGLAN) injection 10 mg (10 mg Intravenous Given 9/23/24 0616)   sodium chloride 0.9 % bolus 500 mL (0 mL Intravenous Stopped 9/23/24 0654)   iopamidol (ISOVUE-370) 76 % injection 100 mL (100 mL Intravenous Given by Other 9/23/24 0721)       Imaging results:  CT Angiogram Chest Pulmonary Embolism    Result Date: 9/23/2024   1. The study is negative for pulmonary embolism. No acute intrathoracic abnormality. 2. Intrathoracic and extrahepatic biliary dilatation with abrupt tapering at the ampulla where there is questionable hypoenhancing masslike soft tissue at the ampulla and pancreatic head. Could consider correlating with ERCP or MRCP if clinically indicated. 3. Moderate fluid and air distended stomach and high attenuation fluid distention of the proximal duodenum with duodenal wall thickening, suspected duodenal diverticula, and mild proximal duodenal wall thickening. Could correlate with endoscopy if clinically indicated. 4. Distended gallbladder. 5. Decreased hepatic attenuation could reflect hepatic steatosis. 6. Extensive colonic diverticula without CT evidence of diverticulitis. 7. Other chronic findings, as above.  This report was finalized on 9/23/2024 8:13 AM by Lorenzo Bui MD on Workstation: [id number]      CT Abdomen Pelvis With Contrast    Result Date: 9/23/2024   1. The study is negative for pulmonary embolism. No acute intrathoracic abnormality. 2. Intrathoracic and extrahepatic biliary dilatation with abrupt tapering at the  ampulla where there is questionable hypoenhancing masslike soft tissue at the ampulla and pancreatic head. Could consider correlating with ERCP or MRCP if clinically indicated. 3. Moderate fluid and air distended stomach and high attenuation fluid distention of the proximal duodenum with duodenal wall thickening, suspected duodenal diverticula, and mild proximal duodenal wall thickening. Could correlate with endoscopy if clinically indicated. 4. Distended gallbladder. 5. Decreased hepatic attenuation could reflect hepatic steatosis. 6. Extensive colonic diverticula without CT evidence of diverticulitis. 7. Other chronic findings, as above.  This report was finalized on 9/23/2024 8:13 AM by Lorenzo Bui MD on Workstation: [id number]      XR Chest 1 View    Result Date: 9/23/2024  1. Mild interstitial prominence of the lungs somewhat exaggerated by underinflation but overall appear unchanged from the 12/29/2017 study and therefore likely chronic in nature. 2. No definite acute infiltrates are thought to be present.  This report was finalized on 9/23/2024 7:33 AM by Dr. Arun Romero M.D on Workstation: [id number]       Ambulatory status:   - with assist    Social issues:   Social History     Socioeconomic History    Marital status:     Number of children: 3   Tobacco Use    Smoking status: Never     Passive exposure: Never    Smokeless tobacco: Never   Vaping Use    Vaping status: Never Used   Substance and Sexual Activity    Alcohol use: No    Drug use: Never    Sexual activity: Defer       Peripheral Neurovascular  Peripheral Neurovascular (Adult)  Peripheral Neurovascular WDL: .WDL except    Neuro Cognitive  Neuro Cognitive (Adult)  Cognitive/Neuro/Behavioral WDL: WDL    Learning  Learning Assessment (Adult)  Learning Readiness and Ability: no barriers identified  Education Provided  Person Taught: patient, family member/friend  Teaching Method: verbal instruction  Teaching Focus: symptom/problem  overview  Education Outcome Evaluation: eager to learn    Respiratory  Respiratory WDL  Respiratory WDL: WDL    Abdominal Pain       Pain Assessments  Pain (Adult)  (0-10) Pain Rating: Rest: 6  Pain Location: knee    NIH Stroke Scale       Cindy Cheng RN  09/23/24 09:35 EDT

## 2024-09-23 NOTE — PLAN OF CARE
Goal Outcome Evaluation:  Plan of Care Reviewed With: patient           Outcome Evaluation: Pt. is an 84 year old Female admitted after a syncopal episode at home.  Pt. also with a recent Right TKR (9/18/2024).  Pt. reports that prior to admission she was using a Rwx for ambulation post TKR surgery.  Pt. currently presents with decreased strength, decreased balance, and decreased tolerance to functional activity. This PM, pt. requires Min. assist x 1 for bed mobility.  Once sitting EOB, pt. became nauseated and dizzy and adamantly declined attempts to perform sit <-> stand transfers.  Right TKR ther. ex. program x 10 reps completed with assist for general strengthening.  Pt. will benefit from skilled inpt. P.T. to address her functional deficits and to assist pt. in regaining her maximum level of independence with functional mobility.      Anticipated Discharge Disposition (PT): home with assist, home with home health

## 2024-09-23 NOTE — ED NOTES
Patient presents to ED from home following a syncopal episode. Patient had recent knee surgery, got up to go to the restroom tonight and called out to her daughter for help, when daughter got to her she caught her and lowered her to the ground where she vomited and urinated. Daughter reported to EMS that patient did not hit her head but was unconscious for a period of time.

## 2024-09-23 NOTE — H&P
Patient Name:  Bailee Garza  YOB: 1940  MRN:  [id number]  Admit Date:  9/23/2024  Patient Care Team:  Eddie Araya MD as PCP - General  Eddie Araya MD as PCP - Family Medicine  Wilfred Savaedra MD as Consulting Physician (Cardiology)  Anupam Ruiz MD as Consulting Physician (Orthopedic Surgery)  Avery Ruiz MD as Consulting Physician (Gastroenterology)  Mac Main MD PhD as Consulting Physician (Hematology and Oncology)      Subjective   History Present Illness     Chief Complaint   Patient presents with    Syncope       Ms. Garza is a 84 y.o. with a history of hypertension, hypothyroidism, osteoarthritis and recent right knee replacement, anemia who presents to [facility] after a syncopal episode occurred at home.  Patient had been having more nausea late last night and it did improve some with Zofran.  She then awoke in the middle the night and family helped her to the restroom.  As she was going to sit down to use the restroom she hunched over forward and family caught her.  They were concerned about her knee and tried to get her to straighten her leg and she then went completely limp.  She did urinate/have bowel movement.  Her eyes were open during the event.  They did not report any tongue biting or convulsions.  Family thinks that she was unconscious for perhaps up to 5 minutes.  She came back to and did understand that she was at the hospital after the EMS transport.  The patient cannot really give me history on what occurred or about the EMS transport.  There was no apparent head trauma.  She is not reporting a headache currently.  Not reporting any chest pain or palpitations currently.  Her nausea is improved.  She is not having any abdominal pain.  She does have tenderness in her knee but it has not gotten worse.    Review of Systems   Constitutional:  Negative for chills and fever.   HENT:  Negative for sore throat and trouble swallowing.     Eyes:  Negative for pain and visual disturbance.   Respiratory:  Negative for cough and shortness of breath.    Cardiovascular:  Negative for chest pain and palpitations.   Gastrointestinal:  Positive for nausea and vomiting.   Endocrine: Negative for cold intolerance and heat intolerance.   Genitourinary:  Negative for dysuria and flank pain.   Musculoskeletal:  Negative for neck pain and neck stiffness.   Skin:  Negative for pallor and rash.   Allergic/Immunologic: Negative for environmental allergies and food allergies.   Neurological:  Positive for syncope.   Hematological:  Negative for adenopathy. Does not bruise/bleed easily.   Psychiatric/Behavioral:  Positive for confusion. Negative for agitation.         Personal History     Past Medical History:   Diagnosis Date    Abnormal CXR 2017    Adverse reaction to narcotic drug     SEVERE HALLUCINATIONS POST OP LEFT HIP PLACEMENT    Allergies     Arthritis     Arthritis of foot 2020    Arthropathy of pelvic region and thigh 2012    unspecified    Back pain 2007    Chronic back pain 2009    Chronic cough 2017    CKD (chronic kidney disease)     Closed nondisplaced fracture of lateral malleolus of fibula with delayed healing 2020    Closed nondisplaced fracture of medial cuneiform of left foot 2018    Constipation 2015    unspecified    COVID-19 vaccination refused     COVID-19 virus detected 10/17/2022    Diverticulosis     Dyspepsia 2008    Essential hypertension 2007    Exertional shortness of breath 2017    Fall 2018    Family history of abdominal aortic aneurysm (AAA) 2019     aaa screen limited (04/10/2019 10:32)     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), 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    Hearing loss 2008    History of bone density study 2015    History of pneumonia     2017    History of skin cancer     Hypothyroidism,  acquired 11/20/2007    Insomnia 01/28/2015    unspecified    Intervertebral disc disorder with myelopathy, cervical region 07/22/2010    Joint capsule tear 12/13/2012    unspecified    OA (osteoarthritis) of hip 07/24/2018    Other synovitis and tenosynovitis, right ankle and foot 02/12/2020    Peroneal tendonitis, right 01/13/2020    Rhinitis, allergic 11/20/2007    Right knee pain     Scoliosis     Screening breast examination 11/20/2007    Stress 04/27/2015    other acute reactions to stress    Stress incontinence     Trochanteric bursitis, left hip 02/04/2019    Urticaria 06/29/2015     Past Surgical History:   Procedure Laterality Date    BREAST EXCISIONAL BIOPSY Right     50+ years ago    BRONCHOSCOPY N/A 12/21/2017    Procedure: BRONCHOSCOPY;  Surgeon: Mario Alanis MD;  Location: [facility] ENDOSCOPY;  Service:     CATARACT EXTRACTION, BILATERAL      COLONOSCOPY      HYSTERECTOMY      TOTAL HIP ARTHROPLASTY Left 07/24/2018    Procedure: LEFT TOTAL HIP ARTHROPLASTY;  Surgeon: Justen Mann MD;  Location: [facility] MAIN OR;  Service: Orthopedics    TOTAL HIP ARTHROPLASTY Right 12/13/2022    Procedure: Posterior RIGHT TOTAL HIP ARTHROPLASTY MARCELINO NAVIGATION;  Surgeon: Anupam Ruiz MD;  Location: [facility] OR OSC;  Service: Orthopedics;  Laterality: Right;    TOTAL KNEE ARTHROPLASTY Right 9/18/2024    Procedure: TOTAL KNEE ARTHROPLASTY;  Surgeon: Jesus Thayer MD;  Location: [facility] OR [facility];  Service: Orthopedics;  Laterality: Right;     Family History   Problem Relation Age of Onset    Heart disease Mother     Aneurysm Mother     Colon cancer Father     Aneurysm Brother     Breast cancer Paternal Grandmother     Asthma Paternal Grandfather     Malig Hyperthermia Neg Hx     Ovarian cancer Neg Hx      Social History     Tobacco Use    Smoking status: Never     Passive exposure: Never    Smokeless tobacco: Never   Vaping Use    Vaping status: Never Used   Substance Use Topics    Alcohol use: No    Drug use: Never      Current Facility-Administered Medications on File Prior to Encounter   Medication Dose Route Frequency Provider Last Rate Last Admin    Chlorhexidine Gluconate Cloth 2 % pads 1 each  1 each Apply externally Take As Directed Anupam Ruiz MD         Current Outpatient Medications on File Prior to Encounter   Medication Sig Dispense Refill    acetaminophen (Tylenol) 325 MG tablet Take 2 tablets by mouth Every 6 (Six) Hours As Needed for Mild Pain for up to 40 doses. 40 tablet 0    aspirin 81 MG EC tablet Take 1 tablet by mouth 2 (Two) Times a Day for 14 days, THEN 1 tablet Daily for 28 days. 60 tablet 0    hydroCHLOROthiazide 12.5 MG tablet Take 1 tablet by mouth Daily. 30 tablet 11    HYDROmorphone (Dilaudid) 2 MG tablet Take 1 tablet by mouth Every 4 (Four) Hours As Needed for Severe Pain for up to 40 doses. 40 tablet 0    loperamide (IMODIUM) 2 MG capsule Take 1 capsule by mouth 4 (Four) Times a Day As Needed for Diarrhea. Indications: Diarrhea      metroNIDAZOLE (METROCREAM) 0.75 % cream Apply 1 Application topically to the appropriate area as directed As Needed.      ondansetron (Zofran) 4 MG tablet Take 1 tablet by mouth Every 8 (Eight) Hours As Needed for Nausea or Vomiting for up to 10 doses. 10 tablet 0    pantoprazole (PROTONIX) 40 MG EC tablet Take 1 tablet by mouth Daily for 14 days. 14 tablet 0    Synthroid 75 MCG tablet Take 1 tablet by mouth Daily. 30 tablet 11    valsartan (DIOVAN) 80 MG tablet Take 1 tablet by mouth Daily. (Patient taking differently: Take 1 tablet by mouth Daily.) 30 tablet 11    Chlorcyclizine-Pseudoephed (STAHIST AD PO) Take 1 tablet by mouth Daily As Needed. (Patient not taking: Reported on 9/23/2024)      polyethylene glycol (MIRALAX) 17 GM/SCOOP powder Mix 17 g in 4-8 oz of liquid and take by mouth 2 (Two) Times a Day for 7 days. 238 g 0     Allergies   Allergen Reactions    Hydrocodone Hallucinations    Paxlovid [Nirmatrelvir-Ritonavir] Other (See Comments)     insomnia     Ketek [Telithromycin] Hives    Nsaids Hives    Sulfa Antibiotics Hives       Objective    Objective     Vital Signs  Temp:  [97.2 °F (36.2 °C)-97.3 °F (36.3 °C)] 97.3 °F (36.3 °C)  Heart Rate:  [104-117] 110  Resp:  [15-18] 18  BP: ()/(47-62) 113/48  SpO2:  [95 %-98 %] 97 %  on   ;   Device (Oxygen Therapy): room air  Body mass index is 21.98 kg/m².    Physical Exam  Vitals and nursing note reviewed.   HENT:      Head: Atraumatic.   Eyes:      Conjunctiva/sclera: Conjunctivae normal.      Pupils: Pupils are equal, round, and reactive to light.   Cardiovascular:      Rate and Rhythm: Normal rate and regular rhythm.      Pulses: Normal pulses.      Heart sounds: Murmur heard.   Pulmonary:      Effort: Pulmonary effort is normal.      Breath sounds: No wheezing.   Abdominal:      General: There is no distension.      Palpations: Abdomen is soft.      Tenderness: There is no abdominal tenderness. There is no guarding or rebound.   Musculoskeletal:         General: Tenderness present.      Right lower leg: No edema.      Left lower leg: No edema.      Comments: Right knee is dressed without swelling or erythema   Skin:     General: Skin is dry.   Neurological:      Mental Status: She is alert.      Cranial Nerves: No cranial nerve deficit.   Psychiatric:         Mood and Affect: Mood normal.      Comments: Odd affect, slightly disinhibited       Results Review:  I reviewed the patient's new clinical results.  I reviewed the patient's new imaging results and agree with the interpretation.  I personally viewed and interpreted the patient's EKG/Telemetry data  I reviewed prior records.    Lab Results (last 24 hours)       Procedure Component Value Units Date/Time    CBC & Differential [[id number]]  (Abnormal) Collected: 09/23/24 0533    Specimen: Blood from Arm, Right Updated: 09/23/24 0547    Narrative:      The following orders were created for panel order CBC & Differential.  Procedure                                Abnormality         Status                     ---------                               -----------         ------                     CBC Auto Differential[[id number]]        Abnormal            Final result                 Please view results for these tests on the individual orders.    Comprehensive Metabolic Panel [[id number]]  (Abnormal) Collected: 09/23/24 0533    Specimen: Blood from Arm, Right Updated: 09/23/24 0609     Glucose 144 mg/dL      BUN 49 mg/dL      Creatinine 1.16 mg/dL      Sodium 137 mmol/L      Potassium 5.1 mmol/L      Comment: Slight hemolysis detected by analyzer. Result may be falsely elevated.        Chloride 102 mmol/L      CO2 24.0 mmol/L      Calcium 9.3 mg/dL      Total Protein 6.6 g/dL      Albumin 3.7 g/dL      ALT (SGPT) 41 U/L      AST (SGOT) 34 U/L      Comment: Slight hemolysis detected by analyzer. Result may be falsely elevated.        Alkaline Phosphatase 140 U/L      Total Bilirubin 0.4 mg/dL      Globulin 2.9 gm/dL      A/G Ratio 1.3 g/dL      BUN/Creatinine Ratio 42.2     Anion Gap 11.0 mmol/L      eGFR 46.9 mL/min/1.73     Narrative:      GFR Normal >60  Chronic Kidney Disease <60  Kidney Failure <15    The GFR formula is only valid for adults with stable renal function between ages 18 and 70.    High Sensitivity Troponin T [[id number]]  (Abnormal) Collected: 09/23/24 0533    Specimen: Blood from Arm, Right Updated: 09/23/24 0608     HS Troponin T 20 ng/L     Narrative:      High Sensitive Troponin T Reference Range:  <14.0 ng/L- Negative Female for AMI  <22.0 ng/L- Negative Male for AMI  >=14 - Abnormal Female indicating possible myocardial injury.  >=22 - Abnormal Male indicating possible myocardial injury.   Clinicians would have to utilize clinical acumen, EKG, Troponin, and serial changes to determine if it is an Acute Myocardial Infarction or myocardial injury due to an underlying chronic condition.         CBC Auto Differential [[id number]]  (Abnormal) Collected:  "09/23/24 0533    Specimen: Blood from Arm, Right Updated: 09/23/24 0547     WBC 13.00 10*3/mm3      RBC 2.98 10*6/mm3      Hemoglobin 9.5 g/dL      Hematocrit 29.2 %      MCV 98.0 fL      MCH 31.9 pg      MCHC 32.5 g/dL      RDW 11.8 %      RDW-SD 42.7 fl      MPV 10.6 fL      Platelets 305 10*3/mm3      Neutrophil % 88.4 %      Lymphocyte % 5.6 %      Monocyte % 4.9 %      Eosinophil % 0.2 %      Basophil % 0.4 %      Immature Grans % 0.5 %      Neutrophils, Absolute 11.48 10*3/mm3      Lymphocytes, Absolute 0.73 10*3/mm3      Monocytes, Absolute 0.64 10*3/mm3      Eosinophils, Absolute 0.03 10*3/mm3      Basophils, Absolute 0.05 10*3/mm3      Immature Grans, Absolute 0.07 10*3/mm3      nRBC 0.0 /100 WBC     D-dimer, Quantitative [[id number]]  (Abnormal) Collected: 09/23/24 0533    Specimen: Blood from Arm, Right Updated: 09/23/24 0613     D-Dimer, Quantitative 2.75 MCGFEU/mL     Narrative:      According to the assay 's published package insert, a normal (<0.50 MCGFEU/mL) D-dimer result in conjunction with a non-high clinical probability assessment, excludes deep vein thrombosis (DVT) and pulmonary embolism (PE) with high sensitivity.    D-dimer values increase with age and this can make VTE exclusion of an older population difficult. To address this, the [employer], based on best available evidence and recent guidelines, recommends that clinicians use age-adjusted D-dimer thresholds in patients greater than 50 years of age with: a) a low probability of PE who do not meet all Pulmonary Embolism Rule Out Criteria, or b) in those with intermediate probability of PE.   The formula for an age-adjusted D-dimer cut-off is \"age/100\".  For example, a 60 year old patient would have an age-adjusted cut-off of 0.60 MCGFEU/mL and an 80 year old 0.80 MCGFEU/mL.    aPTT [[id number]]  (Normal) Collected: 09/23/24 0533    Specimen: Blood from Arm, Right Updated: 09/23/24 0741     PTT 27.6 seconds     " "Protime-INR [[id number]]  (Normal) Collected: 09/23/24 0533    Specimen: Blood from Arm, Right Updated: 09/23/24 0741     Protime 13.0 Seconds      INR 0.96    Procalcitonin [[id number]]  (Abnormal) Collected: 09/23/24 0533    Specimen: Blood from Arm, Right Updated: 09/23/24 0718     Procalcitonin 21.50 ng/mL     Narrative:      As a Marker for Sepsis (Non-Neonates):    1. <0.5 ng/mL represents a low risk of severe sepsis and/or septic shock.  2. >2 ng/mL represents a high risk of severe sepsis and/or septic shock.    As a Marker for Lower Respiratory Tract Infections that require antibiotic therapy:    PCT on Admission    Antibiotic Therapy       6-12 Hrs later    >0.5                Strongly Recommended  >0.25 - <0.5        Recommended   0.1 - 0.25          Discouraged              Remeasure/reassess PCT  <0.1                Strongly Discouraged     Remeasure/reassess PCT    As 28 day mortality risk marker: \"Change in Procalcitonin Result\" (>80% or <=80%) if Day 0 (or Day 1) and Day 4 values are available. Refer to http://www.[facility]-pct-calculator.com    Change in PCT <=80%  A decrease of PCT levels below or equal to 80% defines a positive change in PCT test result representing a higher risk for 28-day all-cause mortality of patients diagnosed with severe sepsis for septic shock.    Change in PCT >80%  A decrease of PCT levels of more than 80% defines a negative change in PCT result representing a lower risk for 28-day all-cause mortality of patients diagnosed with severe sepsis or septic shock.       Lactic Acid, Plasma [[id number]]  (Normal) Collected: 09/23/24 0616    Specimen: Blood Updated: 09/23/24 0650     Lactate 2.0 mmol/L     Urinalysis With Culture If Indicated - Straight Cath [[id number]]  (Abnormal) Collected: 09/23/24 0655    Specimen: Urine from Straight Cath Updated: 09/23/24 0805     Color, UA Yellow     Appearance, UA Clear     pH, UA 5.5     Specific Gravity, UA 1.021     Glucose, UA Negative     " Ketones, UA 15 mg/dL (1+)     Bilirubin, UA Negative     Blood, UA Negative     Protein, UA 30 mg/dL (1+)     Leuk Esterase, UA Negative     Nitrite, UA Negative     Urobilinogen, UA 0.2 E.U./dL    Narrative:      In absence of clinical symptoms, the presence of pyuria, bacteria, and/or nitrites on the urinalysis result does not correlate with infection.    Urinalysis, Microscopic Only - Straight Cath [[id number]] Collected: 09/23/24 0655    Specimen: Urine from Straight Cath Updated: 09/23/24 0805     RBC, UA 0-2 /HPF      WBC, UA 0-2 /HPF      Comment: Urine culture not indicated.        Bacteria, UA None Seen /HPF      Squamous Epithelial Cells, UA 0-2 /HPF      Hyaline Casts, UA 3-6 /LPF      Methodology Automated Microscopy    High Sensitivity Troponin T 2Hr [[id number]]  (Abnormal) Collected: 09/23/24 0735    Specimen: Blood Updated: 09/23/24 0806     HS Troponin T 13 ng/L      Troponin T Delta -7 ng/L     Narrative:      High Sensitive Troponin T Reference Range:  <14.0 ng/L- Negative Female for AMI  <22.0 ng/L- Negative Male for AMI  >=14 - Abnormal Female indicating possible myocardial injury.  >=22 - Abnormal Male indicating possible myocardial injury.   Clinicians would have to utilize clinical acumen, EKG, Troponin, and serial changes to determine if it is an Acute Myocardial Infarction or myocardial injury due to an underlying chronic condition.         BNP [[id number]]  (Normal) Collected: 09/23/24 0735    Specimen: Blood Updated: 09/23/24 0909     proBNP 158.0 pg/mL     Narrative:      This assay is used as an aid in the diagnosis of individuals suspected of having heart failure. It can be used as an aid in the diagnosis of acute decompensated heart failure (ADHF) in patients presenting with signs and symptoms of ADHF to the emergency department (ED). In addition, NT-proBNP of <300 pg/mL indicates ADHF is not likely.    Age Range Result Interpretation  NT-proBNP Concentration (pg/mL:      <50              Positive            >450                   Gray                 300-450                    Negative             <300    50-75           Positive            >900                  Gray                300-900                  Negative            <300      >75             Positive            >1800                  Gray                300-1800                  Negative            <300    Lipase [[id number]]  (Normal) Collected: 09/23/24 0735    Specimen: Blood Updated: 09/23/24 0909     Lipase 42 U/L     Respiratory Panel PCR w/COVID-19(SARS-CoV-2) DAR/BLADIMIR/TE/PAD/COR/PUJA In-House, NP Swab in UTM/VTM, 2 HR TAT - Swab, Nasopharynx [[id number]]  (Normal) Collected: 09/23/24 0923    Specimen: Swab from Nasopharynx Updated: 09/23/24 1019     ADENOVIRUS, PCR Not Detected     Coronavirus 229E Not Detected     Coronavirus HKU1 Not Detected     Coronavirus NL63 Not Detected     Coronavirus OC43 Not Detected     COVID19 Not Detected     Human Metapneumovirus Not Detected     Human Rhinovirus/Enterovirus Not Detected     Influenza A PCR Not Detected     Influenza B PCR Not Detected     Parainfluenza Virus 1 Not Detected     Parainfluenza Virus 2 Not Detected     Parainfluenza Virus 3 Not Detected     Parainfluenza Virus 4 Not Detected     RSV, PCR Not Detected     Bordetella pertussis pcr Not Detected     Bordetella parapertussis PCR Not Detected     Chlamydophila pneumoniae PCR Not Detected     Mycoplasma pneumo by PCR Not Detected    Narrative:      In the setting of a positive respiratory panel with a viral infection PLUS a negative procalcitonin without other underlying concern for bacterial infection, consider observing off antibiotics or discontinuation of antibiotics and continue supportive care. If the respiratory panel is positive for atypical bacterial infection (Bordetella pertussis, Chlamydophila pneumoniae, or Mycoplasma pneumoniae), consider antibiotic de-escalation to target atypical bacterial infection.    Blood  Culture - Blood, Hand, Right [[id number]] Collected: 09/23/24 1013    Specimen: Blood from Hand, Right Updated: 09/23/24 1016            Imaging Results (Last 24 Hours)       Procedure Component Value Units Date/Time    CT Angiogram Chest Pulmonary Embolism [[id number]] Collected: 09/23/24 0745     Updated: 09/23/24 0816    Narrative:      CT ANGIOGRAM CHEST PULMONARY EMBOLISM-, CT ABDOMEN PELVIS W CONTRAST-     DATE OF EXAM: 9/23/2024 7:03 AM     INDICATION: Syncope and collapse with sinus tachycardia and elevated  D-dimer. Nausea and vomiting with abdominal discomfort.     COMPARISON: Chest radiographs 9/23/2024. Hip radiographs 12/13/2022 and  7/24/2018. Chest CT 1/31/2018 and 12/19/2017. CT abdomen pelvis  12/19/2017.     TECHNIQUE: Multiple contiguous axial images were acquired through the  chest, abdomen, and pelvis following the intravenous administration of  100 mL of Isovue-370. Reformatted coronal, sagittal, and 3D  reconstruction sequences were also reviewed. Radiation dose reduction  techniques were utilized, including automated exposure control and  exposure modulation based on body size.     FINDINGS:  CTA CHEST:  No evidence of a pulmonary arterial filling defect to suggest pulmonary  embolism. The heart and great vessels of the chest are normal in size.  No leftward ventricular septal deviation. Normal RV/LV ratio. No  pericardial effusion. No evidence of significant calcified coronary  artery disease. Calcified remnants of prior granulomatous disease in the  mediastinum. No pathologically enlarged intrathoracic lymph nodes are  identified.     Mild dependent atelectasis and mild multifocal bibasilar subsegmental  atelectasis and/or scarring. Similar-appearing biapical  pleural-parenchymal scarring. No focal lung consolidation. No concerning  pulmonary nodule. The central airways are widely patent. No pneumothorax  or pleural effusion.     Mild pectus excavatum. Mild thoracic dextroscoliosis  and  similar-appearing multilevel thoracic spondylosis. Severe bilateral  glenohumeral joint DJD. Subchondral linear sclerotic changes in the  superior left humeral head, probable osteonecrosis. No acute osseous  abnormality or concerning osseous lesion.     CT ABDOMEN AND PELVIS:  Intrahepatic and extrahepatic bili duct dilatation with the common duct  measuring up to 14 mm in diameter and abruptly tapering at the ampulla.  Questionable 2.3 cm x 1.4 cm hypoenhancing masslike soft tissue at the  ampulla and pancreatic head (axial series 3 image 59). Mild prominence  of the pancreatic duct. Mild diffusely decreased hepatic attenuation  could reflect hepatic steatosis. No focal liver lesion is identified.  The gallbladder is moderately distended but otherwise unremarkable in CT  appearance. The spleen is unremarkable. Small multilobulated cystic  changes at the uncinate process of the pancreas measuring up to 1 cm in  diameter (axial series 3 image 54). Likely benign nodular thickening of  the left adrenal gland. The right adrenal gland is unremarkable. Simple  appearing bilateral renal cysts. The urinary bladder is nondistended.  Status post hysterectomy. The adnexa are unremarkable in CT appearance.     Moderate fluid and air distention of the stomach with moderate slightly  high attenuation fluid distention of the proximal duodenum tapering at  the junction of the third and fourth portions of the duodenum. Suspected  duodenal diverticula with mild proximal duodenal wall thickening. Mild  to moderate colonic stool. Extensive colonic diverticula, without CT  evidence of diverticulitis. No bowel obstruction. The appendix is not  definitively identified.     No free fluid in the abdomen or pelvis. No free intraperitoneal air. No  pathologically enlarged lymph nodes in the abdomen or pelvis. Moderate  calcified and noncalcified atherosclerotic disease in the abdominal  aorta and its distal branches without aneurysm.      Similar-appearing rotary lumbar dextroscoliosis and multilevel lumbar  spondylosis. Partially imaged bilateral hip arthroplasties with  associated hardware artifact mildly limiting evaluation. Mild bilateral  acetabular protrusio with mild extension of each CHAS at the acetabular  component through the medial wall of each acetabulum. The upper  extremities are partially included in the field-of-view but not  adequately evaluated. No acute osseous abnormality or concerning osseous  lesion.       Impression:         1. The study is negative for pulmonary embolism. No acute intrathoracic  abnormality.  2. Intrathoracic and extrahepatic biliary dilatation with abrupt  tapering at the ampulla where there is questionable hypoenhancing  masslike soft tissue at the ampulla and pancreatic head. Could consider  correlating with ERCP or MRCP if clinically indicated.  3. Moderate fluid and air distended stomach and high attenuation fluid  distention of the proximal duodenum with duodenal wall thickening,  suspected duodenal diverticula, and mild proximal duodenal wall  thickening. Could correlate with endoscopy if clinically indicated.  4. Distended gallbladder.  5. Decreased hepatic attenuation could reflect hepatic steatosis.  6. Extensive colonic diverticula without CT evidence of diverticulitis.  7. Other chronic findings, as above.     This report was finalized on 9/23/2024 8:13 AM by Lorenzo Bui MD on  Workstation: [id number]       CT Abdomen Pelvis With Contrast [[id number]] Collected: 09/23/24 0745     Updated: 09/23/24 0816    Narrative:      CT ANGIOGRAM CHEST PULMONARY EMBOLISM-, CT ABDOMEN PELVIS W CONTRAST-     DATE OF EXAM: 9/23/2024 7:03 AM     INDICATION: Syncope and collapse with sinus tachycardia and elevated  D-dimer. Nausea and vomiting with abdominal discomfort.     COMPARISON: Chest radiographs 9/23/2024. Hip radiographs 12/13/2022 and  7/24/2018. Chest CT 1/31/2018 and 12/19/2017. CT abdomen  pelvis  12/19/2017.     TECHNIQUE: Multiple contiguous axial images were acquired through the  chest, abdomen, and pelvis following the intravenous administration of  100 mL of Isovue-370. Reformatted coronal, sagittal, and 3D  reconstruction sequences were also reviewed. Radiation dose reduction  techniques were utilized, including automated exposure control and  exposure modulation based on body size.     FINDINGS:  CTA CHEST:  No evidence of a pulmonary arterial filling defect to suggest pulmonary  embolism. The heart and great vessels of the chest are normal in size.  No leftward ventricular septal deviation. Normal RV/LV ratio. No  pericardial effusion. No evidence of significant calcified coronary  artery disease. Calcified remnants of prior granulomatous disease in the  mediastinum. No pathologically enlarged intrathoracic lymph nodes are  identified.     Mild dependent atelectasis and mild multifocal bibasilar subsegmental  atelectasis and/or scarring. Similar-appearing biapical  pleural-parenchymal scarring. No focal lung consolidation. No concerning  pulmonary nodule. The central airways are widely patent. No pneumothorax  or pleural effusion.     Mild pectus excavatum. Mild thoracic dextroscoliosis and  similar-appearing multilevel thoracic spondylosis. Severe bilateral  glenohumeral joint DJD. Subchondral linear sclerotic changes in the  superior left humeral head, probable osteonecrosis. No acute osseous  abnormality or concerning osseous lesion.     CT ABDOMEN AND PELVIS:  Intrahepatic and extrahepatic bili duct dilatation with the common duct  measuring up to 14 mm in diameter and abruptly tapering at the ampulla.  Questionable 2.3 cm x 1.4 cm hypoenhancing masslike soft tissue at the  ampulla and pancreatic head (axial series 3 image 59). Mild prominence  of the pancreatic duct. Mild diffusely decreased hepatic attenuation  could reflect hepatic steatosis. No focal liver lesion is identified.  The  gallbladder is moderately distended but otherwise unremarkable in CT  appearance. The spleen is unremarkable. Small multilobulated cystic  changes at the uncinate process of the pancreas measuring up to 1 cm in  diameter (axial series 3 image 54). Likely benign nodular thickening of  the left adrenal gland. The right adrenal gland is unremarkable. Simple  appearing bilateral renal cysts. The urinary bladder is nondistended.  Status post hysterectomy. The adnexa are unremarkable in CT appearance.     Moderate fluid and air distention of the stomach with moderate slightly  high attenuation fluid distention of the proximal duodenum tapering at  the junction of the third and fourth portions of the duodenum. Suspected  duodenal diverticula with mild proximal duodenal wall thickening. Mild  to moderate colonic stool. Extensive colonic diverticula, without CT  evidence of diverticulitis. No bowel obstruction. The appendix is not  definitively identified.     No free fluid in the abdomen or pelvis. No free intraperitoneal air. No  pathologically enlarged lymph nodes in the abdomen or pelvis. Moderate  calcified and noncalcified atherosclerotic disease in the abdominal  aorta and its distal branches without aneurysm.     Similar-appearing rotary lumbar dextroscoliosis and multilevel lumbar  spondylosis. Partially imaged bilateral hip arthroplasties with  associated hardware artifact mildly limiting evaluation. Mild bilateral  acetabular protrusio with mild extension of each CHAS at the acetabular  component through the medial wall of each acetabulum. The upper  extremities are partially included in the field-of-view but not  adequately evaluated. No acute osseous abnormality or concerning osseous  lesion.       Impression:         1. The study is negative for pulmonary embolism. No acute intrathoracic  abnormality.  2. Intrathoracic and extrahepatic biliary dilatation with abrupt  tapering at the ampulla where there is  questionable hypoenhancing  masslike soft tissue at the ampulla and pancreatic head. Could consider  correlating with ERCP or MRCP if clinically indicated.  3. Moderate fluid and air distended stomach and high attenuation fluid  distention of the proximal duodenum with duodenal wall thickening,  suspected duodenal diverticula, and mild proximal duodenal wall  thickening. Could correlate with endoscopy if clinically indicated.  4. Distended gallbladder.  5. Decreased hepatic attenuation could reflect hepatic steatosis.  6. Extensive colonic diverticula without CT evidence of diverticulitis.  7. Other chronic findings, as above.     This report was finalized on 9/23/2024 8:13 AM by Lorenzo Bui MD on  Workstation: [id number]       XR Chest 1 View [[id number]] Collected: 09/23/24 0623     Updated: 09/23/24 0736    Narrative:      XR CHEST 1 VW-9/23/2024     HISTORY: Syncope.     The heart size is within normal limits. Lungs are slightly  underinflated. There is some interstitial prominence of the lungs which  allowing for differences in technique appears similar to the 12/29/2017  study are likely chronic in nature. The findings are most prominent in  the perihilar regions and lung bases. No definite new focal infiltrates  are seen. There are some mild degenerative changes of the bilateral  glenohumeral joints with high riding right humeral head often associated  with rotator cuff injury.       Impression:      1. Mild interstitial prominence of the lungs somewhat exaggerated by  underinflation but overall appear unchanged from the 12/29/2017 study  and therefore likely chronic in nature.  2. No definite acute infiltrates are thought to be present.     This report was finalized on 9/23/2024 7:33 AM by Dr. Arun Romero M.D on Workstation: [id number]                   ECG 12 Lead Syncope   Preliminary Result   HEART RLTH=273  bpm   RR Dxtncrpd=691  ms   OR Ywiayann=102  ms   P Horizontal Axis=7  deg   P Front  Axis=74  deg   QRSD Interval=90  ms   QT Ggakoosn=149  ms   EYmB=319  ms   QRS Axis=77  deg   T Wave Axis=30  deg   - ABNORMAL ECG -   Sinus tachycardia   Consider right atrial enlargement   Consider right ventricular hypertrophy   Probable inferior infarct, old   Date and Time of Study:2024-09-23 05:29:02      Telemetry Scan   Final Result           Assessment/Plan     Active Hospital Problems    Diagnosis  POA    **Syncope [R55]  Yes    Nausea & vomiting [R11.2]  Yes    Status post right knee replacement [Z96.651]  Not Applicable    Essential hypertension [I10]  Yes      Resolved Hospital Problems   No resolved problems to display.       Ms. Garza is a 84 y.o.     Syncope: Patient hypotensive.  She had been taking pain medication after her surgery.  She also was having nausea, vomiting and needing to have a bowel movement at the time of the event.  Likely a vasovagal component.  That said the time she was unconscious is concerning.  Will ask neurology to review.  She did have a murmur on exam so we will check echo.  Giving fluids and holding blood pressure medications.  See below for nausea vomiting workup.  Nausea vomiting/pancreatic head abnormality on CT/duodenitis: Pancreatic head abnormality with biliary dilatation.  There is no stone noted.  Bilirubin and lipase are okay.  Will proceed with MRCP and check a CA 19-9.  PPI.  Control symptoms.  Consult gastroenterology.  Elevated D-dimer: Recent surgery.  No clot on CTA chest.  Duplex without DVT.  Recent right knee replacement: She did not have any swelling or erythema.  Will monitor progression and can obtain imaging if symptoms develop.  SIRS: Procalcitonin is grossly elevated.  No focal infiltrate was seen on the CT.  Respiratory viral panel negative.  Urinalysis noninfectious.  Have obtained blood cultures and can give Zosyn while getting workup for above.  Hypertension: Hypotensive currently.  Monitor.  PPx: SCD  I discussed the patient's findings and  my recommendations with patient, family, and ED provider.      Jose Manuel Vazquez MD  [facility]  09/23/24  13:22 EDT    Dictated portions of note using dragon dictation software.

## 2024-09-23 NOTE — CONSULTS
Neurology Consult Note    Consult Date: 9/23/2024    Referring MD: Jose Manuel Vazquez MD    Reason for Consult I have been asked to see the patient in neurological consultation to render advice and opinion regarding syncope.    Bailee Garza is a 84 y.o. white female with known diagnosis of CKD, chronic back pain, essential hypertension, osteoarthritis, recent right knee surgery who presented to the hospital after syncope and collapse.  History as per family at the bedside, patient woke up in the middle of the night to go to the restroom with the help of her daughter, reportedly she was having little of agitation and nausea, when she arrived to the restroom she had a syncopal episode and collapsed to the floor, reportedly to her caught her and she did not have head trauma, denied seizure-like activity but the patient did have urinary incontinence, she became confused for about 5 minutes, no prior history of seizures or epilepsy and no family history of seizures.  Patient denied having palpitation or feeling lightheaded prior to the fall.  Patient stated that she keeps well-hydrated by drinking water and soda.    Past Medical History:   Diagnosis Date    Abnormal CXR 12/18/2017    Adverse reaction to narcotic drug     SEVERE HALLUCINATIONS POST OP LEFT HIP PLACEMENT    Allergies     Arthritis     Arthritis of foot 04/17/2020    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    unspecified    Back pain 11/20/2007    Chronic back pain 07/13/2009    Chronic cough 12/18/2017    CKD (chronic kidney disease)     Closed nondisplaced fracture of lateral malleolus of fibula with delayed healing 01/13/2020    Closed nondisplaced fracture of medial cuneiform of left foot 02/19/2018    Constipation 03/24/2015    unspecified    COVID-19 vaccination refused     COVID-19 virus detected 10/17/2022    Diverticulosis     Dyspepsia 03/18/2008    Essential hypertension 11/20/2007    Exertional shortness of breath 12/18/2017     "Fall 2018    Family history of abdominal aortic aneurysm (AAA) 2019    [place] aaa screen limited (04/10/2019 10:32)     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), 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    Hearing loss 2008    History of bone density study 2015    History of pneumonia     2017    History of skin cancer     Hypothyroidism, acquired 2007    Insomnia 2015    unspecified    Intervertebral disc disorder with myelopathy, cervical region 2010    Joint capsule tear 2012    unspecified    OA (osteoarthritis) of hip 2018    Other synovitis and tenosynovitis, right ankle and foot 2020    Peroneal tendonitis, right 2020    Rhinitis, allergic 2007    Right knee pain     Scoliosis     Screening breast examination 2007    Stress 2015    other acute reactions to stress    Stress incontinence     Trochanteric bursitis, left hip 2019    Urticaria 2015       Exam  /48 (BP Location: Right arm, Patient Position: Lying)   Pulse 110   Temp 97.3 °F (36.3 °C) (Oral)   Resp 18   Ht 157.5 cm (62\")   Wt 54.5 kg (120 lb 2.4 oz)   SpO2 97%   BMI 21.98 kg/m²   Gen: NAD, vitals reviewed  MS: oriented x3, recent/remote memory intact, normal attention/concentration, language intact, no neglect.  CN: visual acuity grossly normal, PERRL, EOMI, no facial droop, no dysarthria  Motor: 5/5 throughout upper and lower extremities, exam on the right lower extremity limited due to recent knee surgery with good strength distally, normal tone  Sensory exam: Normal to light touch throughout  Coordination: Normal finger-nose-finger bilaterally    DATA:    Lab Results   Component Value Date    GLUCOSE 144 (H) 2024    CALCIUM 9.3 2024     2024    K 5.1 2024    CO2 24.0 2024     2024    BUN 49 (H) 2024    CREATININE 1.16 (H) 2024    EGFRIFAFRI 54 (L) " 07/15/2021    EGFRIFNONA 45 (L) 07/15/2021    BCR 42.2 (H) 09/23/2024    ANIONGAP 11.0 09/23/2024     Lab Results   Component Value Date    WBC 13.00 (H) 09/23/2024    HGB 9.5 (L) 09/23/2024    HCT 29.2 (L) 09/23/2024    MCV 98.0 (H) 09/23/2024     09/23/2024       Lab review: Labs reviewed, PTH 41, procalcitonin 21.5.  TSH on 8/2024 was 0.5    Imaging review: No brain imaging to review    Diagnoses:  -Syncope likely vasovagal from right knee pain may be aggravated by underlying dehydration secondary to her nausea, rule out seizures although felt less likely.  -Leukocytosis with elevated procalcitonin      PLAN:   -Check orthostatics  -Routine EEG  -CT head WO  -Work up/treatment of infection, metabolic abnormalities per the primary medicine  -I will follow peripherally on the EEG and the CT head otherwise no further neurology workup needed.    Medical Decision Making for this neurology consultation consists of the following:  Review of previous chart, including H/P, provider and nursing notes as applicable.  Review of medications and vital signs.  Review of previous labs, neuroimaging, and additional relevant diagnostics.   Interpretation of laboratory, imaging, and other diagnostic results  Discussion with other providers and family   Total face-to-face/floor time: 30-75 minutes.

## 2024-09-23 NOTE — PROGRESS NOTES
Clinical Pharmacy Services: Medication History    Bailee Garza is a 84 y.o. female presenting to [facility] for   Chief Complaint   Patient presents with    Syncope       She  has a past medical history of Abnormal CXR (12/18/2017), Adverse reaction to narcotic drug, Allergies, Arthritis, Arthritis of foot (04/17/2020), Arthropathy of pelvic region and thigh (12/13/2012), Back pain (11/20/2007), Chronic back pain (07/13/2009), Chronic cough (12/18/2017), CKD (chronic kidney disease), Closed nondisplaced fracture of lateral malleolus of fibula with delayed healing (01/13/2020), Closed nondisplaced fracture of medial cuneiform of left foot (02/19/2018), Constipation (03/24/2015), COVID-19 vaccination refused, COVID-19 virus detected (10/17/2022), Diverticulosis, Dyspepsia (03/18/2008), Essential hypertension (11/20/2007), Exertional shortness of breath (12/18/2017), Fall (09/17/2018), Family history of abdominal aortic aneurysm (AAA) (03/06/2019), Grief reaction (07/05/2011), Hearing loss (03/18/2008), History of bone density study (09/16/2015), History of pneumonia, History of skin cancer, Hypothyroidism, acquired (11/20/2007), Insomnia (01/28/2015), Intervertebral disc disorder with myelopathy, cervical region (07/22/2010), Joint capsule tear (12/13/2012), OA (osteoarthritis) of hip (07/24/2018), Other synovitis and tenosynovitis, right ankle and foot (02/12/2020), Peroneal tendonitis, right (01/13/2020), Rhinitis, allergic (11/20/2007), Right knee pain, Scoliosis, Screening breast examination (11/20/2007), Stress (04/27/2015), Stress incontinence, Trochanteric bursitis, left hip (02/04/2019), and Urticaria (06/29/2015).    Allergies as of 09/23/2024 - Reviewed 09/23/2024   Allergen Reaction Noted    Hydrocodone Hallucinations 07/30/2018    Paxlovid [nirmatrelvir-ritonavir] Other (See Comments) 08/01/2024    Kristinek [telithromycin] Hives 10/13/2015    Nsaids Hives 10/13/2015    Sulfa antibiotics  Hives 12/29/2017       Medication information was obtained from: Pharmacy  Pharmacy and Phone Number:   [facility] [id number] - [place], [place] - [address] [facility] - [phone]  - [phone] FX  [address]  [facility] [address]  Phone: [phone] Fax: [phone]    [facility] - [place], [place] - [address] [phone]  - [phone]   [address] [address]  Phone: [phone] Fax: [phone] Alternate Fax: [phone]    [facility] - [place]  [address] [address]  Phone: [phone] Fax: [phone]        Prior to Admission Medications       Prescriptions Last Dose Informant Patient Reported? Taking?    acetaminophen (Tylenol) 325 MG tablet  Pharmacy No Yes    Take 2 tablets by mouth Every 6 (Six) Hours As Needed for Mild Pain for up to 40 doses.    aspirin 81 MG EC tablet  Pharmacy No Yes    Take 1 tablet by mouth 2 (Two) Times a Day for 14 days, THEN 1 tablet Daily for 28 days.    Chlorcyclizine-Pseudoephed (STAHIST AD PO)  Self Yes Yes    Take 1 tablet by mouth Daily As Needed.    hydroCHLOROthiazide 12.5 MG tablet  Pharmacy No Yes    Take 1 tablet by mouth Daily.    HYDROmorphone (Dilaudid) 2 MG tablet  Pharmacy No Yes    Take 1 tablet by mouth Every 4 (Four) Hours As Needed for Severe Pain for up to 40 doses.    loperamide (IMODIUM) 2 MG capsule  Self Yes Yes    Take 1 capsule by mouth 4 (Four) Times a Day As Needed for Diarrhea. Indications: Diarrhea    metroNIDAZOLE (METROCREAM) 0.75 % cream  Pharmacy Yes Yes    Apply 1 Application topically to the appropriate area as directed As Needed.    ondansetron (Zofran) 4 MG tablet  Pharmacy No Yes    Take 1 tablet by mouth Every 8 (Eight) Hours As Needed for Nausea or Vomiting for up to 10 doses.    pantoprazole (PROTONIX) 40 MG EC tablet  Pharmacy No Yes    Take 1 tablet by mouth Daily for 14 days.     polyethylene glycol (MIRALAX) 17 GM/SCOOP powder  Pharmacy No Yes    Mix 17 g in 4-8 oz of liquid and take by mouth 2 (Two) Times a Day for 7 days.    Synthroid 75 MCG tablet  Pharmacy No Yes    Take 1 tablet by mouth Daily.    valsartan (DIOVAN) 80 MG tablet  Pharmacy No Yes    Take 1 tablet by mouth Daily.    Patient taking differently:  Take 1 tablet by mouth Daily.              Medication notes:     This medication list is complete to the best of my knowledge as of 9/23/2024    Please call if questions.    Shukri Sylvester  Medication History Technician  [phone]    9/23/2024 08:14 EDT

## 2024-09-23 NOTE — PROGRESS NOTES
[facility] Clinical Pharmacy Services: Piperacillin-Tazobactam Consult    Pt Name: Bailee Garza   : 1940    Indication: intra-abd    Relevant clinical data and objective history reviewed:    Past Medical History:   Diagnosis Date    Abnormal CXR 2017    Adverse reaction to narcotic drug     SEVERE HALLUCINATIONS POST OP LEFT HIP PLACEMENT    Allergies     Arthritis     Arthritis of foot 2020    Arthropathy of pelvic region and thigh 2012    unspecified    Back pain 2007    Chronic back pain 2009    Chronic cough 2017    CKD (chronic kidney disease)     Closed nondisplaced fracture of lateral malleolus of fibula with delayed healing 2020    Closed nondisplaced fracture of medial cuneiform of left foot 2018    Constipation 2015    unspecified    COVID-19 vaccination refused     COVID-19 virus detected 10/17/2022    Diverticulosis     Dyspepsia 2008    Essential hypertension 2007    Exertional shortness of breath 2017    Fall 2018    Family history of abdominal aortic aneurysm (AAA) 2019    [place] aaa screen limited (04/10/2019 10:32)     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), 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    Hearing loss 2008    History of bone density study 2015    History of pneumonia     2017    History of skin cancer     Hypothyroidism, acquired 2007    Insomnia 2015    unspecified    Intervertebral disc disorder with myelopathy, cervical region 2010    Joint capsule tear 2012    unspecified    OA (osteoarthritis) of hip 2018    Other synovitis and tenosynovitis, right ankle and foot 2020    Peroneal tendonitis, right 2020    Rhinitis, allergic 2007    Right knee pain     Scoliosis     Screening breast examination 2007    Stress 2015    other acute reactions to stress    Stress incontinence     Trochanteric  bursitis, left hip 02/04/2019    Urticaria 06/29/2015     Creatinine   Date Value Ref Range Status   09/23/2024 1.16 (H) 0.57 - 1.00 mg/dL Final   09/11/2024 1.14 (H) 0.57 - 1.00 mg/dL Final   08/05/2024 1.12 (H) 0.57 - 1.00 mg/dL Final   07/05/2023 1.13 (H) 0.57 - 1.00 mg/dL Final   11/28/2022 0.99 0.57 - 1.00 mg/dL Final     BUN   Date Value Ref Range Status   09/23/2024 49 (H) 8 - 23 mg/dL Final     Estimated Creatinine Clearance: 31.6 mL/min (A) (by C-G formula based on SCr of 1.16 mg/dL (H)).    Lab Results   Component Value Date    WBC 13.00 (H) 09/23/2024     Temp Readings from Last 3 Encounters:   09/23/24 97.3 °F (36.3 °C) (Oral)   09/20/24 97.3 °F (36.3 °C)   09/19/24 96.6 °F (35.9 °C)      Assessment/Plan  Estimated CrCl >20 mL/min at this time; BMI 21 kg/m2  Will start piperacillin-tazobactam 3.375 g IV every 8 hours     Pharmacy will continue to follow daily while on piperacillin-tazobactam and adjust as needed. Thank you for this consult.    Ilana Watkins [facility]  Clinical Pharmacist

## 2024-09-23 NOTE — THERAPY EVALUATION
Patient Name: Bailee Garza  : 1940    MRN: [id number]                              Today's Date: 2024       Admit Date: 2024    Visit Dx:     ICD-10-CM ICD-9-CM   1. Syncope and collapse  R55 780.2   2. Volume depletion, gastrointestinal loss  E86.9 276.50   3. Postoperative anemia  D64.9 285.9   4. Status post total right knee replacement  Z96.651 V43.65   5. Chronic kidney disease, unspecified CKD stage  N18.9 585.9   6. Nausea and vomiting, unspecified vomiting type  R11.2 787.01     Patient Active Problem List   Diagnosis    Chronic midline low back pain without sciatica    Essential hypertension    Hearing loss    Hypothyroidism, acquired    IBS (irritable bowel syndrome)    Chronic nonseasonal allergic rhinitis due to pollen    Stage 3 chronic kidney disease    Arthralgia of right knee    DDD (degenerative disc disease), lumbosacral    Scoliosis due to degenerative disease of spine in adult patient    Status post total replacement of left hip    Status post total hip replacement, left    Vitamin D deficiency    Anemia, normocytic normochromic    S/P hip replacement, right    OA (osteoarthritis) of knee    Syncope    Nausea & vomiting    Status post right knee replacement     Past Medical History:   Diagnosis Date    Abnormal CXR 2017    Adverse reaction to narcotic drug     SEVERE HALLUCINATIONS POST OP LEFT HIP PLACEMENT    Allergies     Arthritis     Arthritis of foot 2020    Arthropathy of pelvic region and thigh 2012    unspecified    Back pain 2007    Chronic back pain 2009    Chronic cough 2017    CKD (chronic kidney disease)     Closed nondisplaced fracture of lateral malleolus of fibula with delayed healing 2020    Closed nondisplaced fracture of medial cuneiform of left foot 2018    Constipation 2015    unspecified    COVID-19 vaccination refused     COVID-19 virus detected 10/17/2022    Diverticulosis     Dyspepsia 2008     Essential hypertension 2007    Exertional shortness of breath 2017    Fall 2018    Family history of abdominal aortic aneurysm (AAA) 2019    [place] aaa screen limited (04/10/2019 10:32)     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), 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    Hearing loss 2008    History of bone density study 2015    History of pneumonia     2017    History of skin cancer     Hypothyroidism, acquired 2007    Insomnia 2015    unspecified    Intervertebral disc disorder with myelopathy, cervical region 2010    Joint capsule tear 2012    unspecified    OA (osteoarthritis) of hip 2018    Other synovitis and tenosynovitis, right ankle and foot 2020    Peroneal tendonitis, right 2020    Rhinitis, allergic 2007    Right knee pain     Scoliosis     Screening breast examination 2007    Stress 2015    other acute reactions to stress    Stress incontinence     Trochanteric bursitis, left hip 2019    Urticaria 2015     Past Surgical History:   Procedure Laterality Date    BREAST EXCISIONAL BIOPSY Right     50+ years ago    BRONCHOSCOPY N/A 2017    Procedure: BRONCHOSCOPY;  Surgeon: Mario Alanis MD;  Location: [facility] ENDOSCOPY;  Service:     CATARACT EXTRACTION, BILATERAL      COLONOSCOPY      HYSTERECTOMY      TOTAL HIP ARTHROPLASTY Left 2018    Procedure: LEFT TOTAL HIP ARTHROPLASTY;  Surgeon: Justen Mann MD;  Location: [facility] MAIN OR;  Service: Orthopedics    TOTAL HIP ARTHROPLASTY Right 2022    Procedure: Posterior RIGHT TOTAL HIP ARTHROPLASTY MARCELINO NAVIGATION;  Surgeon: Anupam Ruiz MD;  Location: [facility] OR [facility];  Service: Orthopedics;  Laterality: Right;    TOTAL KNEE ARTHROPLASTY Right 2024    Procedure: TOTAL KNEE ARTHROPLASTY;  Surgeon: Jesus Thayer MD;  Location: [facility];  Service: Orthopedics;  Laterality: Right;      General  Information       Row Name 09/23/24 1522          Physical Therapy Time and Intention    Document Type evaluation  Pt. admitted after a syncopal episode at home;  Pt. with recent Right TKR (9/18/2024)  -MS     Mode of Treatment physical therapy;individual therapy  -MS       Row Name 09/23/24 1522          General Information    Patient Profile Reviewed yes  -MS     Prior Level of Function --  Use of Rwx for ambulation  -MS     Existing Precautions/Restrictions fall   Exit alarm  -MS     Barriers to Rehab none identified  -MS       Row Name 09/23/24 1522          Cognition    Orientation Status (Cognition) oriented to;person;place  -MS               User Key  (r) = Recorded By, (t) = Taken By, (c) = Cosigned By      Initials Name Provider Type    Shaquille Maynard, PT Physical Therapist                   Mobility       Row Name 09/23/24 1523          Bed Mobility    Bed Mobility supine-sit;sit-supine  -MS     Supine-Sit [place] (Bed Mobility) minimum assist (75% patient effort)  -MS     Sit-Supine [place] (Bed Mobility) minimum assist (75% patient effort)  -MS     Comment, (Bed Mobility) Once sitting EOB, pt. c/o feeling dizzy and nauseated.  Adamantly declined to attempt sit <-> stand transfers this date.  -MS               User Key  (r) = Recorded By, (t) = Taken By, (c) = Cosigned By      Initials Name Provider Type    Shaquille Maynard, PT Physical Therapist                   Obj/Interventions       Row Name 09/23/24 1524          Range of Motion Comprehensive    Comment, General Range of Motion BUE/LLE (WFL's)  -MS       Row Name 09/23/24 1524          Strength Comprehensive (MMT)    Comment, General Manual Muscle Testing (MMT) Assessment BUE/LLE (>/=3/5)  -MS       Row Name 09/23/24 1524          Motor Skills    Therapeutic Exercise --  Right TKR ther. ex. program x 10 reps completed with assist.  -MS               User Key  (r) = Recorded By, (t) = Taken By, (c) = Cosigned By      Initials Name  Provider Type    MS DonnellyShaquille, PT Physical Therapist                   Goals/Plan       Row Name 09/23/24 1525          Bed Mobility Goal 1 (PT)    Activity/Assistive Device (Bed Mobility Goal 1, PT) bed mobility activities, all  -MS     [place] Level/Cues Needed (Bed Mobility Goal 1, PT) standby assist  -MS     Time Frame (Bed Mobility Goal 1, PT) long term goal (LTG);1 week  -MS       Row Name 09/23/24 1525          Transfer Goal 1 (PT)    Activity/Assistive Device (Transfer Goal 1, PT) transfers, all;walker, rolling  -MS     [place] Level/Cues Needed (Transfer Goal 1, PT) standby assist  -MS     Time Frame (Transfer Goal 1, PT) long term goal (LTG);1 week  -MS       Row Name 09/23/24 1525          Gait Training Goal 1 (PT)    Activity/Assistive Device (Gait Training Goal 1, PT) gait (walking locomotion);walker, rolling  -MS     [place] Level (Gait Training Goal 1, PT) standby assist  -MS     Distance (Gait Training Goal 1, PT) 100 feet  -MS     Time Frame (Gait Training Goal 1, PT) long term goal (LTG);1 week  -MS       Row Name 09/23/24 1525          Therapy Assessment/Plan (PT)    Planned Therapy Interventions (PT) bed mobility training;gait training;balance training;home exercise program;patient/family education;postural re-education;transfer training;strengthening;ROM (range of motion)  -MS               User Key  (r) = Recorded By, (t) = Taken By, (c) = Cosigned By      Initials Name Provider Type    MS DonnellyShaquille, PT Physical Therapist                   Clinical Impression       Row Name 09/23/24 1525          Pain    Pretreatment Pain Rating 3/10  -MS     Posttreatment Pain Rating 3/10  -MS     Pain Location - Side/Orientation Right  -MS     Pain Location - knee  -MS       Row Name 09/23/24 1525          Plan of Care Review    Plan of Care Reviewed With patient;family  -MS       Row Name 09/23/24 1525          Therapy Assessment/Plan (PT)    Rehab Potential (PT) good, to  achieve stated therapy goals  -MS     Criteria for Skilled Interventions Met (PT) skilled treatment is necessary  -MS     Therapy Frequency (PT) 5 times/wk  -MS       Row Name 09/23/24 1525          Positioning and Restraints    Pre-Treatment Position in bed  -MS     Post Treatment Position bed  -MS     In Bed notified nsg;supine;call light within reach;encouraged to call for assist;exit alarm on;with family/caregiver  All lines intact.  -MS               User Key  (r) = Recorded By, (t) = Taken By, (c) = Cosigned By      Initials Name Provider Type    MS BenítezerShaquille, PT Physical Therapist                   Outcome Measures       Row Name 09/23/24 1526 09/23/24 1108       How much help from another person do you currently need...    Turning from your back to your side while in flat bed without using bedrails? 3  -MS 3  -DH    Moving from lying on back to sitting on the side of a flat bed without bedrails? 3  -MS 2  -DH    Moving to and from a bed to a chair (including a wheelchair)? 3  -MS 2  -DH    Standing up from a chair using your arms (e.g., wheelchair, bedside chair)? 3  -MS 2  -DH    Climbing 3-5 steps with a railing? 3  -MS 2  -DH    To walk in hospital room? 3  -MS 2  -DH    AM-PAC 6 Clicks Score (PT) 18  -MS 13  -DH    Highest Level of Mobility Goal 6 --> Walk 10 steps or more  -MS 4 --> Transfer to chair/commode  -DH      Row Name 09/23/24 1526          Functional Assessment    Outcome Measure Options AM-PAC 6 Clicks Basic Mobility (PT)  -MS               User Key  (r) = Recorded By, (t) = Taken By, (c) = Cosigned By      Initials Name Provider Type    Shaquille Maynard, PT Physical Therapist    Lian Triplett RN Registered Nurse                                 Physical Therapy Education       Title: PT OT SLP Therapies (In Progress)       Topic: Physical Therapy (In Progress)       Point: Mobility training (Done)       Learning Progress Summary             Patient Acceptance, E,D, VU,NR by MS at  9/23/2024 1526                         Point: Home exercise program (Done)       Learning Progress Summary             Patient Acceptance, E,D, VU,NR by MS at 9/23/2024 1526                         Point: Body mechanics (Not Started)       Learner Progress:  Not documented in this visit.              Point: Precautions (Not Started)       Learner Progress:  Not documented in this visit.                              User Key       Initials Effective Dates Name Provider Type Discipline    MS 06/16/21 -  Shaquille Donnelly, PT Physical Therapist PT                  PT Recommendation and Plan  Planned Therapy Interventions (PT): bed mobility training, gait training, balance training, home exercise program, patient/family education, postural re-education, transfer training, strengthening, ROM (range of motion)  Plan of Care Reviewed With: patient  Outcome Evaluation: Pt. is an 84 year old Female admitted after a syncopal episode at home.  Pt. also with a recent Right TKR (9/18/2024).  Pt. reports that prior to admission she was using a Rwx for ambulation post TKR surgery.  Pt. currently presents with decreased strength, decreased balance, and decreased tolerance to functional activity. This PM, pt. requires Min. assist x 1 for bed mobility.  Once sitting EOB, pt. became nauseated and dizzy and adamantly declined attempts to perform sit <-> stand transfers.  Right TKR ther. ex. program x 10 reps completed with assist for general strengthening.  Pt. will benefit from skilled inpt. P.T. to address her functional deficits and to assist pt. in regaining her maximum level of independence with functional mobility.     Time Calculation:         PT Charges       Row Name 09/23/24 1529             Time Calculation    Start Time 1420  -MS      Stop Time 1435  -MS      Time Calculation (min) 15 min  -MS      PT Received On 09/23/24  -MS      PT - Next Appointment 09/24/24  -MS      PT Goal Re-Cert Due Date 09/30/24  -MS         Time  Calculation- PT    Total Timed Code Minutes- PT 14 minute(s)  -MS                User Key  (r) = Recorded By, (t) = Taken By, (c) = Cosigned By      Initials Name Provider Type    Shaquille Maynard, PT Physical Therapist                  Therapy Charges for Today       Code Description Service Date Service Provider Modifiers Qty    95482217612 HC PT EVAL LOW COMPLEXITY 3 9/23/2024 Shaquille Donnelly, PT GP 1    61708918943 HC PT THER PROC EA 15 MIN 9/23/2024 Shaquille Donnelly, PT GP 1            PT G-Codes  Outcome Measure Options: AM-PAC 6 Clicks Basic Mobility (PT)  AM-PAC 6 Clicks Score (PT): 18  PT Discharge Summary  Anticipated Discharge Disposition (PT): home with assist, home with home health    Shaquille Donnelly, PT  9/23/2024

## 2024-09-23 NOTE — ED PROVIDER NOTES
EMERGENCY DEPARTMENT ENCOUNTER  Room Number:  12/12  PCP: Eddie Araya MD  Independent Historians: Patient and her daughter though the patient daughter does provide the bulk of the history      HPI:  Chief Complaint: had concerns including Syncope.     A complete HPI/ROS/PMH/PSH/SH/FH are unobtainable due to: Poor historian    Chronic or social conditions impacting patient care (Social Determinants of Health): None      Context: Bailee Garza is a 84 y.o. female with a medical history of chronic back pain, essential hypertension and CKD who presents to the ED c/o acute nausea that developed about 130 this morning.  After taking some Zofran she laid back down.  When she tried to get up at 430 to go to the restroom she had a syncopal episode during which her daughter lowered her to the ground.  No reported trauma or injury.  Patient complains of ongoing nausea at this time but denies any chest pain, headache.  No report of dyspnea, fevers.  Patient had felt well during the day Sunday while watching football and eating well.  No bowel movement since total right knee on 9/18/2023.  No reported dysuria however.  No cough or fevers reported.    Review of prior external notes (non-ED) -and- Review of prior external test results outside of this encounter:  Orthopedic discharge summary 9/18/2023 reviewed: Patient status post total right knee arthroplasty      PAST MEDICAL HISTORY  Active Ambulatory Problems     Diagnosis Date Noted    Chronic midline low back pain without sciatica 07/13/2009    Essential hypertension 11/20/2007    Hearing loss 03/18/2008    Hypothyroidism, acquired 11/20/2007    IBS (irritable bowel syndrome) 04/25/2013    Chronic nonseasonal allergic rhinitis due to pollen 11/20/2007    Stage 3 chronic kidney disease 12/19/2016    Arthralgia of right knee 10/11/2017    DDD (degenerative disc disease), lumbosacral 02/07/2018    Scoliosis due to degenerative disease of spine in adult patient 05/30/2018     Status post total replacement of left hip 08/07/2018    Status post total hip replacement, left 02/04/2019    Vitamin D deficiency 03/04/2020    Anemia, normocytic normochromic 07/29/2022    S/P hip replacement, right 01/11/2023    OA (osteoarthritis) of knee 08/29/2024     Resolved Ambulatory Problems     Diagnosis Date Noted    Joint capsule tear 12/13/2012    Back pain 11/20/2007    Constipation 03/24/2015    Depression 01/28/2015    Dyspepsia 03/18/2008    Grief reaction 07/05/2011    Insomnia 01/28/2015    Intervertebral disc disorder with myelopathy, cervical region 07/22/2010    Stress 04/27/2015    Screening breast examination 11/20/2007    Urticaria 06/29/2015    Chronic cough 12/18/2017    Abnormal weight loss 12/18/2017    Abnormal CXR 12/18/2017    Exertional shortness of breath 12/18/2017    Pneumonia of both lower lobes due to infectious organism 12/19/2017    HCAP (healthcare-associated pneumonia) 12/29/2017    Pain of left midfoot 01/06/2018    Swelling of foot joint, left 01/06/2018    Foot injury, left, initial encounter 01/06/2018    Arthritis of hip, left 02/05/2018    Primary osteoarthritis of left hip 02/19/2018    Closed nondisplaced fracture of medial cuneiform of left foot 02/19/2018    Left lumbar radiculopathy 05/30/2018    Numbness of left anterior thigh 05/30/2018    Degenerative disc disease, lumbar 05/30/2018    OA (osteoarthritis) of hip 07/24/2018    Fall 09/17/2018    Trochanteric bursitis, left hip 02/04/2019    Hip pain, left 02/04/2019    Family history of abdominal aortic aneurysm (AAA) 03/06/2019    Peroneal tendonitis, right 01/13/2020    Closed nondisplaced fracture of lateral malleolus of fibula with delayed healing 01/13/2020    Other synovitis and tenosynovitis, right ankle and foot 02/12/2020    Right ankle pain 03/04/2020    Primary localized osteoarthrosis of right lower leg 04/17/2020    Arthritis of foot 04/17/2020    Hip strain, right, initial encounter 05/06/2020     Elevated BUN 05/06/2021    Elevated serum creatinine 05/06/2021    Elevated hemoglobin 05/06/2021    Arthritis of right hip 05/04/2022    COVID-19 virus detected 10/17/2022     Past Medical History:   Diagnosis Date    Adverse reaction to narcotic drug     Allergies     Arthritis     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    Chronic back pain 07/13/2009    CKD (chronic kidney disease)     COVID-19 vaccination refused     Diverticulosis     History of bone density study 09/16/2015    History of pneumonia     History of skin cancer     Rhinitis, allergic 11/20/2007    Right knee pain     Scoliosis     Stress incontinence          PAST SURGICAL HISTORY  Past Surgical History:   Procedure Laterality Date    BREAST EXCISIONAL BIOPSY Right     50+ years ago    BRONCHOSCOPY N/A 12/21/2017    Procedure: BRONCHOSCOPY;  Surgeon: Mario Alanis MD;  Location: [facility] ENDOSCOPY;  Service:     CATARACT EXTRACTION, BILATERAL      COLONOSCOPY      HYSTERECTOMY      TOTAL HIP ARTHROPLASTY Left 07/24/2018    Procedure: LEFT TOTAL HIP ARTHROPLASTY;  Surgeon: Justen Mann MD;  Location: [facility] MAIN OR;  Service: Orthopedics    TOTAL HIP ARTHROPLASTY Right 12/13/2022    Procedure: Posterior RIGHT TOTAL HIP ARTHROPLASTY MARCELINO NAVIGATION;  Surgeon: Anupam Ruiz MD;  Location: [facility] OR OSC;  Service: Orthopedics;  Laterality: Right;    TOTAL KNEE ARTHROPLASTY Right 9/18/2024    Procedure: TOTAL KNEE ARTHROPLASTY;  Surgeon: Jesus Thayer MD;  Location: [facility] OR OSC;  Service: Orthopedics;  Laterality: Right;         FAMILY HISTORY  Family History   Problem Relation Age of Onset    Heart disease Mother     Aneurysm Mother     Colon cancer Father     Aneurysm Brother     Breast cancer Paternal Grandmother     Asthma Paternal Grandfather     Malig Hyperthermia Neg Hx     Ovarian cancer Neg Hx          SOCIAL HISTORY  Social History     Socioeconomic History    Marital status:     Number of children: 3    Tobacco Use    Smoking status: Never     Passive exposure: Never    Smokeless tobacco: Never   Vaping Use    Vaping status: Never Used   Substance and Sexual Activity    Alcohol use: No    Drug use: Never    Sexual activity: Defer         ALLERGIES  Hydrocodone, Paxlovid [nirmatrelvir-ritonavir], Ketek [telithromycin], Nsaids, and Sulfa antibiotics      REVIEW OF SYSTEMS  Review of Systems  Included in HPI  All systems reviewed and negative except for those discussed in HPI.      PHYSICAL EXAM    I have reviewed the triage vital signs and nursing notes.    ED Triage Vitals [09/23/24 0510]   Temp Heart Rate Resp BP SpO2   97.2 °F (36.2 °C) 105 15 108/55 97 %      Temp src Heart Rate Source Patient Position BP Location FiO2 (%)   -- -- -- -- --       Physical Exam    Physical Exam   Constitutional: No distress.  Not overtly toxic but not well-appearing.  HENT:  Head: Normocephalic and atraumatic.   Oropharynx: Mucous membranes are really dry  Eyes: . No scleral icterus. No conjunctival pallor.  Neck: Normal range of motion. Neck supple.  No meningismus  Cardiovascular: Pink warm and well perfused throughout.  Tachycardic but regular  Pulmonary/Chest: No respiratory distress.  No tachypnea or increased work of breathing appreciated.    Abdominal: Soft. There is diffuse discomfort palpation without focal tenderness. There is no rebound and no guarding.   Musculoskeletal: Postsurgical right knee with some distal edema.  Neurological: Has her eyes closed but opens them to request.  Identifies herself and her daughter.  Speech intelligible.  Skin: Skin is pink, warm, and dry. Nursing note and vitals reviewed.             LAB RESULTS  Recent Results (from the past 24 hour(s))   ECG 12 Lead Syncope    Collection Time: 09/23/24  5:29 AM   Result Value Ref Range    QT Interval 333 ms    QTC Interval 449 ms   Comprehensive Metabolic Panel    Collection Time: 09/23/24  5:33 AM    Specimen: Arm, Right; Blood   Result Value Ref  Range    Glucose 144 (H) 65 - 99 mg/dL    BUN 49 (H) 8 - 23 mg/dL    Creatinine 1.16 (H) 0.57 - 1.00 mg/dL    Sodium 137 136 - 145 mmol/L    Potassium 5.1 3.5 - 5.2 mmol/L    Chloride 102 98 - 107 mmol/L    CO2 24.0 22.0 - 29.0 mmol/L    Calcium 9.3 8.6 - 10.5 mg/dL    Total Protein 6.6 6.0 - 8.5 g/dL    Albumin 3.7 3.5 - 5.2 g/dL    ALT (SGPT) 41 (H) 1 - 33 U/L    AST (SGOT) 34 (H) 1 - 32 U/L    Alkaline Phosphatase 140 (H) 39 - 117 U/L    Total Bilirubin 0.4 0.0 - 1.2 mg/dL    Globulin 2.9 gm/dL    A/G Ratio 1.3 g/dL    BUN/Creatinine Ratio 42.2 (H) 7.0 - 25.0    Anion Gap 11.0 5.0 - 15.0 mmol/L    eGFR 46.9 (L) >60.0 mL/min/1.73   High Sensitivity Troponin T    Collection Time: 09/23/24  5:33 AM    Specimen: Arm, Right; Blood   Result Value Ref Range    HS Troponin T 20 (H) <14 ng/L   CBC Auto Differential    Collection Time: 09/23/24  5:33 AM    Specimen: Arm, Right; Blood   Result Value Ref Range    WBC 13.00 (H) 3.40 - 10.80 10*3/mm3    RBC 2.98 (L) 3.77 - 5.28 10*6/mm3    Hemoglobin 9.5 (L) 12.0 - 15.9 g/dL    Hematocrit 29.2 (L) 34.0 - 46.6 %    MCV 98.0 (H) 79.0 - 97.0 fL    MCH 31.9 26.6 - 33.0 pg    MCHC 32.5 31.5 - 35.7 g/dL    RDW 11.8 (L) 12.3 - 15.4 %    RDW-SD 42.7 37.0 - 54.0 fl    MPV 10.6 6.0 - 12.0 fL    Platelets 305 140 - 450 10*3/mm3    Neutrophil % 88.4 (H) 42.7 - 76.0 %    Lymphocyte % 5.6 (L) 19.6 - 45.3 %    Monocyte % 4.9 (L) 5.0 - 12.0 %    Eosinophil % 0.2 (L) 0.3 - 6.2 %    Basophil % 0.4 0.0 - 1.5 %    Immature Grans % 0.5 0.0 - 0.5 %    Neutrophils, Absolute 11.48 (H) 1.70 - 7.00 10*3/mm3    Lymphocytes, Absolute 0.73 0.70 - 3.10 10*3/mm3    Monocytes, Absolute 0.64 0.10 - 0.90 10*3/mm3    Eosinophils, Absolute 0.03 0.00 - 0.40 10*3/mm3    Basophils, Absolute 0.05 0.00 - 0.20 10*3/mm3    Immature Grans, Absolute 0.07 (H) 0.00 - 0.05 10*3/mm3    nRBC 0.0 0.0 - 0.2 /100 WBC   D-dimer, Quantitative    Collection Time: 09/23/24  5:33 AM    Specimen: Arm, Right; Blood   Result Value Ref  Range    D-Dimer, Quantitative 2.75 (H) 0.00 - 0.83 MCGFEU/mL         RADIOLOGY  XR Chest 1 View    Result Date: 9/23/2024  XR CHEST 1 VW-9/23/2024  HISTORY: Syncope.  The heart size is within normal limits. Lungs are slightly underinflated. There is some interstitial prominence of the lungs which allowing for differences in technique appears similar to the 12/29/2017 study are likely chronic in nature. No definite new focal infiltrates are seen. There are some mild degenerative changes of the bilateral glenohumeral joints with high riding right humeral head often associated with rotator cuff injury.      1. Mild interstitial prominence of the lungs somewhat exaggerated by underinflation but overall appear unchanged from the 12/29/2017 study and therefore likely chronic in nature. 2. No definite acute infiltrates are thought to be present.         MEDICATIONS GIVEN IN ER  Medications   sodium chloride 0.9 % bolus 500 mL (500 mL Intravenous New Bag 9/23/24 0622)   sodium chloride 0.9 % infusion (has no administration in time range)   metoclopramide (REGLAN) injection 10 mg (10 mg Intravenous Given 9/23/24 0616)         ORDERS PLACED DURING THIS VISIT:  Orders Placed This Encounter   Procedures    XR Chest 1 View    CT Angiogram Chest Pulmonary Embolism    CT Abdomen Pelvis With Contrast    Comprehensive Metabolic Panel    High Sensitivity Troponin T    CBC Auto Differential    D-dimer, Quantitative    Lactic Acid, Plasma    High Sensitivity Troponin T 2Hr    Urinalysis With Culture If Indicated - Urine, Catheter    Monitor Blood Pressure    Continuous Pulse Oximetry    Straight Cath    ECG 12 Lead Syncope    CBC & Differential         OUTPATIENT MEDICATION MANAGEMENT:  Current Facility-Administered Medications Ordered in Epic   Medication Dose Route Frequency Provider Last Rate Last Admin    Chlorhexidine Gluconate Cloth 2 % pads 1 each  1 each Apply externally Take As Directed Anupam Ruiz MD        sodium  chloride 0.9 % bolus 500 mL  500 mL Intravenous Once Bowen Perry MD 1,000 mL/hr at 09/23/24 0622 500 mL at 09/23/24 0622    sodium chloride 0.9 % infusion  125 mL/hr Intravenous Continuous Bowen Perry MD         Current Outpatient Medications Ordered in Epic   Medication Sig Dispense Refill    acetaminophen (Tylenol) 325 MG tablet Take 2 tablets by mouth Every 6 (Six) Hours As Needed for Mild Pain for up to 40 doses. 40 tablet 0    aspirin 81 MG EC tablet Take 1 tablet by mouth 2 (Two) Times a Day for 14 days, THEN 1 tablet Daily for 28 days. 60 tablet 0    Chlorcyclizine-Pseudoephed (STAHIST AD PO) Take 1 tablet by mouth Daily As Needed.      hydroCHLOROthiazide 12.5 MG tablet Take 1 tablet by mouth Daily. 30 tablet 11    HYDROmorphone (Dilaudid) 2 MG tablet Take 1 tablet by mouth Every 4 (Four) Hours As Needed for Severe Pain for up to 40 doses. 40 tablet 0    loperamide (IMODIUM) 2 MG capsule Take 1 capsule by mouth 4 (Four) Times a Day As Needed for Diarrhea. Indications: Diarrhea      metroNIDAZOLE (METROCREAM) 0.75 % cream Apply 1 Application topically to the appropriate area as directed As Needed.      ondansetron (Zofran) 4 MG tablet Take 1 tablet by mouth Every 8 (Eight) Hours As Needed for Nausea or Vomiting for up to 10 doses. 10 tablet 0    pantoprazole (PROTONIX) 40 MG EC tablet Take 1 tablet by mouth Daily for 14 days. 14 tablet 0    polyethylene glycol (MIRALAX) 17 GM/SCOOP powder Mix 17 g in 4-8 oz of liquid and take by mouth 2 (Two) Times a Day for 7 days. 238 g 0    Synthroid 75 MCG tablet Take 1 tablet by mouth Daily. 30 tablet 11    valsartan (DIOVAN) 80 MG tablet Take 1 tablet by mouth Daily. (Patient taking differently: Take 1 tablet by mouth Daily. PT TO HOLD AM OF SURGERY) 30 tablet 11         PROCEDURES  Procedures            PROGRESS, DATA ANALYSIS, CONSULTS, AND MEDICAL DECISION MAKING  All labs have been independently interpreted by me.  All radiology studies have been reviewed by  me. All EKG's have been independently viewed and interpreted by me.  Discussion below represents my analysis of pertinent findings related to patient's condition, differential diagnosis, treatment plan and final disposition.    Differential diagnosis:   My differential diagnosis for syncope includes but is not limited to:  Vasovagal reflex - situational stimulus, micturition, defecation, cough, sneezing, swallowing, postprandial state, react sinus hypersensitivity  Vascular-prolonged recumbency, sudden postural change, prolonged standing, hypovolemia, vasodilator drugs, autonomic neuropathy, adrenal insufficiency, subclavian steal, pulmonary embolism  Cardiac -arrhythmia, heart block, myocardial infarction, aortic stenosis, cardiac myxoma, cardiac, LV Dysfunction, Aortic Dissection, Pulmonary Hypertension, Pulmonary Stenosis, Pacemaker Failure  CNS-seizure, hypoxia, hypoglycemia, TIA,(basal vertebral), hydrocephalus      Clinical Scores:                  ED Course as of 09/23/24 0643   Mon Sep 23, 2024   0534 EKG           EKG time: 0529  Rhythm/Rate: [place]zell tachycardia 110  P waves and MA: JESSICA within normal limits  QRS, axis: Narrow complex  ST and T waves: No STEMI; QTc within normal limits    Interpreted Contemporaneously by me, independently viewed  Comparison: 11/28/2022   [RS]   0548 WBC(!): 13.00 [RS]   0548 Hemoglobin(!): 9.5  Suspect postoperative anemia [RS]   0549 Platelets: 305 [RS]   0613 RADIOLOGY      Study: Single view chest  Findings: No pneumothorax or large focal infiltrate  I independently viewed and interpreted these images contemporaneously with treatment.    [RS]   0614 BUN(!): 49 [RS]   0614 Creatinine(!): 1.16 [RS]   0614 Sodium: 137 [RS]   0614 Potassium: 5.1 [RS]   0614 D-Dimer, Quant(!): 2.75  Plan CTA chest [RS]   0614 HS Troponin T(!): 20  Plan to trend.  No chest pain reported. [RS]   0624 Patient continues to not be in good condition such that she would be able to stand to give us  urine specimen.  I will change this to a straight cath collection specimen order. [RS]      ED Course User Index  [RS] Bowen Perry MD     0700: Urinalysis and CTA chest as well as CT abdomen pelvis are still pending on this patient.  Patient's history, ED presentation, evaluation and treatment have been initiated [facility] Dr[facility] Whitney, who is agreeable to follow-up on remaining results and make final disposition for this patient.    Prescription drug monitoring program review:     AS OF 06:43 EDT VITALS:    BP - 108/58  HR - 114  TEMP - 97.2 °F (36.2 °C)  O2 SATS - 96%    COMPLEXITY OF CARE  The patient requires admission.      DIAGNOSIS  Final diagnoses:   Syncope and collapse   Volume depletion, gastrointestinal loss   Postoperative anemia   Status post total right knee replacement   Chronic kidney disease, unspecified CKD stage         DISPOSITION  ED Disposition       ED Disposition   Intended Admit    Condition   --    Comment   --                  ADMISSION    Discussed treatment plan and reason for admission with pt/family and admitting physician.  Pt/family voiced understanding of the plan for admission for further testing/treatment as needed.       Please note that portions of this document were completed with a voice recognition program.    Note Disclaimer: At [facility], we believe that sharing information builds trust and better relationships. You are receiving this note because you recently visited [facility]. It is possible you will see health information before a provider has talked with you about it. This kind of information can be easy to misunderstand. To help you fully understand what it means for your health, we urge you to discuss this note with your provider.         Bowen Perry MD  09/23/24 1985

## 2024-09-24 ENCOUNTER — APPOINTMENT (OUTPATIENT)
Dept: ULTRASOUND IMAGING | Facility: HOSPITAL | Age: 84
End: 2024-09-24
Payer: MEDICARE

## 2024-09-24 ENCOUNTER — APPOINTMENT (OUTPATIENT)
Dept: CARDIOLOGY | Facility: HOSPITAL | Age: 84
End: 2024-09-24
Payer: MEDICARE

## 2024-09-24 ENCOUNTER — ANESTHESIA EVENT (OUTPATIENT)
Dept: GASTROENTEROLOGY | Facility: HOSPITAL | Age: 84
End: 2024-09-24
Payer: MEDICARE

## 2024-09-24 ENCOUNTER — HOME CARE VISIT (OUTPATIENT)
Dept: HOME HEALTH SERVICES | Facility: HOME HEALTHCARE | Age: 84
End: 2024-09-24
Payer: MEDICARE

## 2024-09-24 ENCOUNTER — ANESTHESIA (OUTPATIENT)
Dept: GASTROENTEROLOGY | Facility: HOSPITAL | Age: 84
End: 2024-09-24
Payer: MEDICARE

## 2024-09-24 VITALS — HEART RATE: 112 BPM | DIASTOLIC BLOOD PRESSURE: 57 MMHG | SYSTOLIC BLOOD PRESSURE: 119 MMHG | OXYGEN SATURATION: 97 %

## 2024-09-24 PROBLEM — K92.2 GASTROINTESTINAL HEMORRHAGE: Status: ACTIVE | Noted: 2024-09-23

## 2024-09-24 LAB
ABO GROUP BLD: NORMAL
ADV 40+41 DNA STL QL NAA+NON-PROBE: NOT DETECTED
ALBUMIN SERPL-MCNC: 2.4 G/DL (ref 3.5–5.2)
ALBUMIN SERPL-MCNC: 2.6 G/DL (ref 3.5–5.2)
ALBUMIN/GLOB SERPL: 1.1 G/DL
ALBUMIN/GLOB SERPL: 1.5 G/DL
ALP SERPL-CCNC: 84 U/L (ref 39–117)
ALP SERPL-CCNC: 90 U/L (ref 39–117)
ALT SERPL W P-5'-P-CCNC: 19 U/L (ref 1–33)
ALT SERPL W P-5'-P-CCNC: 22 U/L (ref 1–33)
ANION GAP SERPL CALCULATED.3IONS-SCNC: 10 MMOL/L (ref 5–15)
ANION GAP SERPL CALCULATED.3IONS-SCNC: 10.6 MMOL/L (ref 5–15)
AORTIC DIMENSIONLESS INDEX: 0.8 (DI)
ARTERIAL PATENCY WRIST A: POSITIVE
AST SERPL-CCNC: 16 U/L (ref 1–32)
AST SERPL-CCNC: 24 U/L (ref 1–32)
ASTRO TYP 1-8 RNA STL QL NAA+NON-PROBE: NOT DETECTED
ATMOSPHERIC PRESS: 742.8 MMHG
BASE EXCESS BLDA CALC-SCNC: -5.3 MMOL/L (ref 0–2)
BASOPHILS # BLD AUTO: 0.02 10*3/MM3 (ref 0–0.2)
BASOPHILS # BLD AUTO: 0.03 10*3/MM3 (ref 0–0.2)
BASOPHILS NFR BLD AUTO: 0.3 % (ref 0–1.5)
BASOPHILS NFR BLD AUTO: 0.3 % (ref 0–1.5)
BDY SITE: ABNORMAL
BH CV ECHO MEAS - AI P1/2T: 187.2 MSEC
BH CV ECHO MEAS - AO MAX PG: 9.2 MMHG
BH CV ECHO MEAS - AO MEAN PG: 5 MMHG
BH CV ECHO MEAS - AO ROOT DIAM: 2.6 CM
BH CV ECHO MEAS - AO V2 MAX: 152 CM/SEC
BH CV ECHO MEAS - AO V2 VTI: 28.1 CM
BH CV ECHO MEAS - AVA(I,D): 2.04 CM2
BH CV ECHO MEAS - EDV(CUBED): 51.5 ML
BH CV ECHO MEAS - EDV(MOD-SP2): 77 ML
BH CV ECHO MEAS - EDV(MOD-SP4): 70 ML
BH CV ECHO MEAS - EF(MOD-BP): 64.5 %
BH CV ECHO MEAS - EF(MOD-SP2): 62.3 %
BH CV ECHO MEAS - EF(MOD-SP4): 65.7 %
BH CV ECHO MEAS - ESV(CUBED): 12.3 ML
BH CV ECHO MEAS - ESV(MOD-SP2): 29 ML
BH CV ECHO MEAS - ESV(MOD-SP4): 24 ML
BH CV ECHO MEAS - FS: 37.9 %
BH CV ECHO MEAS - IVS/LVPW: 1 CM
BH CV ECHO MEAS - IVSD: 0.77 CM
BH CV ECHO MEAS - LAT PEAK E' VEL: 10.3 CM/SEC
BH CV ECHO MEAS - LV DIASTOLIC VOL/BSA (35-75): 45.5 CM2
BH CV ECHO MEAS - LV MASS(C)D: 78.8 GRAMS
BH CV ECHO MEAS - LV MAX PG: 7.1 MMHG
BH CV ECHO MEAS - LV MEAN PG: 4 MMHG
BH CV ECHO MEAS - LV SYSTOLIC VOL/BSA (12-30): 15.6 CM2
BH CV ECHO MEAS - LV V1 MAX: 133 CM/SEC
BH CV ECHO MEAS - LV V1 VTI: 21.8 CM
BH CV ECHO MEAS - LVIDD: 3.7 CM
BH CV ECHO MEAS - LVIDS: 2.31 CM
BH CV ECHO MEAS - LVOT AREA: 2.6 CM2
BH CV ECHO MEAS - LVOT DIAM: 1.83 CM
BH CV ECHO MEAS - LVPWD: 0.77 CM
BH CV ECHO MEAS - MED PEAK E' VEL: 8.6 CM/SEC
BH CV ECHO MEAS - MR MAX PG: 106.4 MMHG
BH CV ECHO MEAS - MR MAX VEL: 515.8 CM/SEC
BH CV ECHO MEAS - MV A DUR: 0.08 SEC
BH CV ECHO MEAS - MV A MAX VEL: 103.4 CM/SEC
BH CV ECHO MEAS - MV DEC SLOPE: 1427 CM/SEC2
BH CV ECHO MEAS - MV DEC TIME: 176 SEC
BH CV ECHO MEAS - MV E MAX VEL: 109.6 CM/SEC
BH CV ECHO MEAS - MV E/A: 1.06
BH CV ECHO MEAS - MV MAX PG: 11.3 MMHG
BH CV ECHO MEAS - MV MEAN PG: 5 MMHG
BH CV ECHO MEAS - MV P1/2T: 28.4 MSEC
BH CV ECHO MEAS - MV V2 VTI: 23.9 CM
BH CV ECHO MEAS - MVA(P1/2T): 7.8 CM2
BH CV ECHO MEAS - MVA(VTI): 2.4 CM2
BH CV ECHO MEAS - PA ACC TIME: 0.1 SEC
BH CV ECHO MEAS - PA V2 MAX: 123.6 CM/SEC
BH CV ECHO MEAS - PULM A REVS DUR: 0.06 SEC
BH CV ECHO MEAS - PULM A REVS VEL: 58.8 CM/SEC
BH CV ECHO MEAS - PULM DIAS VEL: 60.6 CM/SEC
BH CV ECHO MEAS - PULM S/D: 0.91
BH CV ECHO MEAS - PULM SYS VEL: 55.1 CM/SEC
BH CV ECHO MEAS - RAP SYSTOLE: 8 MMHG
BH CV ECHO MEAS - RV MAX PG: 4.2 MMHG
BH CV ECHO MEAS - RV V1 MAX: 102.8 CM/SEC
BH CV ECHO MEAS - RV V1 VTI: 17.3 CM
BH CV ECHO MEAS - RVSP: 47 MMHG
BH CV ECHO MEAS - SV(LVOT): 57.3 ML
BH CV ECHO MEAS - SV(MOD-SP2): 48 ML
BH CV ECHO MEAS - SV(MOD-SP4): 46 ML
BH CV ECHO MEAS - SVI(LVOT): 37.2 ML/M2
BH CV ECHO MEAS - SVI(MOD-SP2): 31.2 ML/M2
BH CV ECHO MEAS - SVI(MOD-SP4): 29.9 ML/M2
BH CV ECHO MEAS - TR MAX PG: 39 MMHG
BH CV ECHO MEAS - TR MAX VEL: 312.4 CM/SEC
BH CV ECHO MEASUREMENTS AVERAGE E/E' RATIO: 11.6
BH CV XLRA - RV BASE: 2.9 CM
BH CV XLRA - TDI S': 14.1 CM/SEC
BILIRUB SERPL-MCNC: 0.2 MG/DL (ref 0–1.2)
BILIRUB SERPL-MCNC: 0.3 MG/DL (ref 0–1.2)
BLD GP AB SCN SERPL QL: NEGATIVE
BUN SERPL-MCNC: 61 MG/DL (ref 8–23)
BUN SERPL-MCNC: 66 MG/DL (ref 8–23)
BUN/CREAT SERPL: 60 (ref 7–25)
BUN/CREAT SERPL: 77.2 (ref 7–25)
C CAYETANENSIS DNA STL QL NAA+NON-PROBE: NOT DETECTED
C COLI+JEJ+UPSA DNA STL QL NAA+NON-PROBE: NOT DETECTED
C DIFF GDH + TOXINS A+B STL QL IA.RAPID: NEGATIVE
C DIFF TOX GENS STL QL NAA+PROBE: POSITIVE
CALCIUM SPEC-SCNC: 6.7 MG/DL (ref 8.6–10.5)
CALCIUM SPEC-SCNC: 7.8 MG/DL (ref 8.6–10.5)
CANCER AG19-9 SERPL-ACNC: <2 U/ML
CHLORIDE SERPL-SCNC: 117 MMOL/L (ref 98–107)
CHLORIDE SERPL-SCNC: 119 MMOL/L (ref 98–107)
CO2 BLDA-SCNC: 20 MMOL/L (ref 23–27)
CO2 SERPL-SCNC: 15.4 MMOL/L (ref 22–29)
CO2 SERPL-SCNC: 16 MMOL/L (ref 22–29)
CREAT SERPL-MCNC: 0.79 MG/DL (ref 0.57–1)
CREAT SERPL-MCNC: 1.1 MG/DL (ref 0.57–1)
CRYPTOSP DNA STL QL NAA+NON-PROBE: NOT DETECTED
D-LACTATE SERPL-SCNC: 1 MMOL/L (ref 0.5–2)
D-LACTATE SERPL-SCNC: 2.1 MMOL/L (ref 0.5–2)
D-LACTATE SERPL-SCNC: 2.2 MMOL/L (ref 0.5–2)
D-LACTATE SERPL-SCNC: 2.8 MMOL/L (ref 0.5–2)
DEPRECATED RDW RBC AUTO: 44.9 FL (ref 37–54)
DEPRECATED RDW RBC AUTO: 45.9 FL (ref 37–54)
E HISTOLYT DNA STL QL NAA+NON-PROBE: NOT DETECTED
EAEC PAA PLAS AGGR+AATA ST NAA+NON-PRB: NOT DETECTED
EC STX1+STX2 GENES STL QL NAA+NON-PROBE: NOT DETECTED
EGFRCR SERPLBLD CKD-EPI 2021: 50 ML/MIN/1.73
EGFRCR SERPLBLD CKD-EPI 2021: 74.3 ML/MIN/1.73
EOSINOPHIL # BLD AUTO: 0 10*3/MM3 (ref 0–0.4)
EOSINOPHIL # BLD AUTO: 0.01 10*3/MM3 (ref 0–0.4)
EOSINOPHIL NFR BLD AUTO: 0 % (ref 0.3–6.2)
EOSINOPHIL NFR BLD AUTO: 0.1 % (ref 0.3–6.2)
EPEC EAE GENE STL QL NAA+NON-PROBE: DETECTED
ERYTHROCYTE [DISTWIDTH] IN BLOOD BY AUTOMATED COUNT: 12.4 % (ref 12.3–15.4)
ERYTHROCYTE [DISTWIDTH] IN BLOOD BY AUTOMATED COUNT: 12.5 % (ref 12.3–15.4)
ETEC LTA+ST1A+ST1B TOX ST NAA+NON-PROBE: NOT DETECTED
FERRITIN SERPL-MCNC: 103 NG/ML (ref 13–150)
FOLATE SERPL-MCNC: 2.82 NG/ML (ref 4.78–24.2)
G LAMBLIA DNA STL QL NAA+NON-PROBE: NOT DETECTED
GAS FLOW AIRWAY: 1 LPM
GLOBULIN UR ELPH-MCNC: 1.6 GM/DL
GLOBULIN UR ELPH-MCNC: 2.4 GM/DL
GLUCOSE BLDC GLUCOMTR-MCNC: 131 MG/DL (ref 70–130)
GLUCOSE SERPL-MCNC: 103 MG/DL (ref 65–99)
GLUCOSE SERPL-MCNC: 131 MG/DL (ref 65–99)
HBA1C MFR BLD: 5.2 % (ref 4.8–5.6)
HCO3 BLDA-SCNC: 19.1 MMOL/L (ref 22–28)
HCT VFR BLD AUTO: 12.4 % (ref 34–46.6)
HCT VFR BLD AUTO: 13.5 % (ref 34–46.6)
HCT VFR BLD AUTO: 22 % (ref 34–46.6)
HCT VFR BLD AUTO: 26.3 % (ref 34–46.6)
HEMOCCULT STL QL: POSITIVE
HEMODILUTION: NO
HGB BLD-MCNC: 3.8 G/DL (ref 12–15.9)
HGB BLD-MCNC: 4.2 G/DL (ref 12–15.9)
HGB BLD-MCNC: 7.4 G/DL (ref 12–15.9)
HGB BLD-MCNC: 8.5 G/DL (ref 12–15.9)
IMM GRANULOCYTES # BLD AUTO: 0.06 10*3/MM3 (ref 0–0.05)
IMM GRANULOCYTES # BLD AUTO: 0.17 10*3/MM3 (ref 0–0.05)
IMM GRANULOCYTES NFR BLD AUTO: 0.8 % (ref 0–0.5)
IMM GRANULOCYTES NFR BLD AUTO: 1.8 % (ref 0–0.5)
IRON 24H UR-MRATE: 86 MCG/DL (ref 37–145)
IRON SATN MFR SERPL: 37 % (ref 20–50)
LEFT ATRIUM VOLUME INDEX: 15.6 ML/M2
LYMPHOCYTES # BLD AUTO: 0.72 10*3/MM3 (ref 0.7–3.1)
LYMPHOCYTES # BLD AUTO: 1.05 10*3/MM3 (ref 0.7–3.1)
LYMPHOCYTES NFR BLD AUTO: 10.9 % (ref 19.6–45.3)
LYMPHOCYTES NFR BLD AUTO: 9.7 % (ref 19.6–45.3)
MAGNESIUM SERPL-MCNC: 1.9 MG/DL (ref 1.6–2.4)
MCH RBC QN AUTO: 31.1 PG (ref 26.6–33)
MCH RBC QN AUTO: 31.6 PG (ref 26.6–33)
MCHC RBC AUTO-ENTMCNC: 30.6 G/DL (ref 31.5–35.7)
MCHC RBC AUTO-ENTMCNC: 31.1 G/DL (ref 31.5–35.7)
MCV RBC AUTO: 101.5 FL (ref 79–97)
MCV RBC AUTO: 101.6 FL (ref 79–97)
MODALITY: ABNORMAL
MONOCYTES # BLD AUTO: 0.57 10*3/MM3 (ref 0.1–0.9)
MONOCYTES # BLD AUTO: 0.61 10*3/MM3 (ref 0.1–0.9)
MONOCYTES NFR BLD AUTO: 6.3 % (ref 5–12)
MONOCYTES NFR BLD AUTO: 7.7 % (ref 5–12)
NEUTROPHILS NFR BLD AUTO: 6.05 10*3/MM3 (ref 1.7–7)
NEUTROPHILS NFR BLD AUTO: 7.76 10*3/MM3 (ref 1.7–7)
NEUTROPHILS NFR BLD AUTO: 80.6 % (ref 42.7–76)
NEUTROPHILS NFR BLD AUTO: 81.5 % (ref 42.7–76)
NOROVIRUS GI+II RNA STL QL NAA+NON-PROBE: NOT DETECTED
NRBC BLD AUTO-RTO: 0 /100 WBC (ref 0–0.2)
NRBC BLD AUTO-RTO: 0.2 /100 WBC (ref 0–0.2)
P SHIGELLOIDES DNA STL QL NAA+NON-PROBE: NOT DETECTED
PCO2 BLDA: 29.5 MM HG (ref 35–45)
PH BLDA: 7.42 PH UNITS (ref 7.35–7.45)
PHOSPHATE SERPL-MCNC: 2 MG/DL (ref 2.5–4.5)
PHOSPHATE SERPL-MCNC: 2.6 MG/DL (ref 2.5–4.5)
PLATELET # BLD AUTO: 212 10*3/MM3 (ref 140–450)
PLATELET # BLD AUTO: 225 10*3/MM3 (ref 140–450)
PMV BLD AUTO: 10.5 FL (ref 6–12)
PMV BLD AUTO: 10.9 FL (ref 6–12)
PO2 BLDA: 104.2 MM HG (ref 80–100)
POTASSIUM SERPL-SCNC: 3.5 MMOL/L (ref 3.5–5.2)
POTASSIUM SERPL-SCNC: 4.6 MMOL/L (ref 3.5–5.2)
PROCALCITONIN SERPL-MCNC: 10.2 NG/ML (ref 0–0.25)
PROT SERPL-MCNC: 4 G/DL (ref 6–8.5)
PROT SERPL-MCNC: 5 G/DL (ref 6–8.5)
RBC # BLD AUTO: 1.22 10*6/MM3 (ref 3.77–5.28)
RBC # BLD AUTO: 1.33 10*6/MM3 (ref 3.77–5.28)
RH BLD: POSITIVE
RVA RNA STL QL NAA+NON-PROBE: NOT DETECTED
S ENT+BONG DNA STL QL NAA+NON-PROBE: NOT DETECTED
SAO2 % BLDCOA: 98.2 % (ref 92–98.5)
SAPO I+II+IV+V RNA STL QL NAA+NON-PROBE: NOT DETECTED
SET MECH RESP RATE: 18
SHIGELLA SP+EIEC IPAH ST NAA+NON-PROBE: NOT DETECTED
SODIUM SERPL-SCNC: 143 MMOL/L (ref 136–145)
SODIUM SERPL-SCNC: 145 MMOL/L (ref 136–145)
T&S EXPIRATION DATE: NORMAL
TIBC SERPL-MCNC: 231 MCG/DL (ref 298–536)
TRANSFERRIN SERPL-MCNC: 155 MG/DL (ref 200–360)
TSH SERPL DL<=0.05 MIU/L-ACNC: 0.82 UIU/ML (ref 0.27–4.2)
V CHOL+PARA+VUL DNA STL QL NAA+NON-PROBE: NOT DETECTED
V CHOLERAE DNA STL QL NAA+NON-PROBE: NOT DETECTED
VIT B12 BLD-MCNC: 582 PG/ML (ref 211–946)
WBC NRBC COR # BLD AUTO: 7.42 10*3/MM3 (ref 3.4–10.8)
WBC NRBC COR # BLD AUTO: 9.63 10*3/MM3 (ref 3.4–10.8)
WHOLE BLOOD HOLD SPECIMEN: NORMAL
Y ENTEROCOL DNA STL QL NAA+NON-PROBE: NOT DETECTED

## 2024-09-24 PROCEDURE — 93306 TTE W/DOPPLER COMPLETE: CPT | Performed by: INTERNAL MEDICINE

## 2024-09-24 PROCEDURE — 84100 ASSAY OF PHOSPHORUS: CPT | Performed by: INTERNAL MEDICINE

## 2024-09-24 PROCEDURE — 87507 IADNA-DNA/RNA PROBE TQ 12-25: CPT | Performed by: INTERNAL MEDICINE

## 2024-09-24 PROCEDURE — 80053 COMPREHEN METABOLIC PANEL: CPT | Performed by: INTERNAL MEDICINE

## 2024-09-24 PROCEDURE — 82728 ASSAY OF FERRITIN: CPT | Performed by: INTERNAL MEDICINE

## 2024-09-24 PROCEDURE — 25510000001 PERFLUTREN 6.52 MG/ML SUSPENSION 2 ML VIAL: Performed by: INTERNAL MEDICINE

## 2024-09-24 PROCEDURE — 82803 BLOOD GASES ANY COMBINATION: CPT | Performed by: INTERNAL MEDICINE

## 2024-09-24 PROCEDURE — 3E0G8GC INTRODUCTION OF OTHER THERAPEUTIC SUBSTANCE INTO UPPER GI, VIA NATURAL OR ARTIFICIAL OPENING ENDOSCOPIC: ICD-10-PCS | Performed by: INTERNAL MEDICINE

## 2024-09-24 PROCEDURE — 85014 HEMATOCRIT: CPT | Performed by: INTERNAL MEDICINE

## 2024-09-24 PROCEDURE — 93306 TTE W/DOPPLER COMPLETE: CPT

## 2024-09-24 PROCEDURE — 36600 WITHDRAWAL OF ARTERIAL BLOOD: CPT | Performed by: INTERNAL MEDICINE

## 2024-09-24 PROCEDURE — 0W3P8ZZ CONTROL BLEEDING IN GASTROINTESTINAL TRACT, VIA NATURAL OR ARTIFICIAL OPENING ENDOSCOPIC: ICD-10-PCS | Performed by: INTERNAL MEDICINE

## 2024-09-24 PROCEDURE — 86923 COMPATIBILITY TEST ELECTRIC: CPT

## 2024-09-24 PROCEDURE — 25010000002 PHENYLEPHRINE 10 MG/ML SOLUTION: Performed by: ANESTHESIOLOGY

## 2024-09-24 PROCEDURE — 25010000002 PIPERACILLIN SOD-TAZOBACTAM PER 1 G: Performed by: INTERNAL MEDICINE

## 2024-09-24 PROCEDURE — 99223 1ST HOSP IP/OBS HIGH 75: CPT | Performed by: INTERNAL MEDICINE

## 2024-09-24 PROCEDURE — 82607 VITAMIN B-12: CPT | Performed by: INTERNAL MEDICINE

## 2024-09-24 PROCEDURE — 83735 ASSAY OF MAGNESIUM: CPT | Performed by: INTERNAL MEDICINE

## 2024-09-24 PROCEDURE — 82272 OCCULT BLD FECES 1-3 TESTS: CPT | Performed by: INTERNAL MEDICINE

## 2024-09-24 PROCEDURE — 25810000003 SODIUM CHLORIDE 0.9 % SOLUTION: Performed by: EMERGENCY MEDICINE

## 2024-09-24 PROCEDURE — 76705 ECHO EXAM OF ABDOMEN: CPT

## 2024-09-24 PROCEDURE — 83605 ASSAY OF LACTIC ACID: CPT | Performed by: INTERNAL MEDICINE

## 2024-09-24 PROCEDURE — 25010000002 PROPOFOL 10 MG/ML EMULSION: Performed by: ANESTHESIOLOGY

## 2024-09-24 PROCEDURE — 36430 TRANSFUSION BLD/BLD COMPNT: CPT

## 2024-09-24 PROCEDURE — 86301 IMMUNOASSAY TUMOR CA 19-9: CPT | Performed by: INTERNAL MEDICINE

## 2024-09-24 PROCEDURE — 84466 ASSAY OF TRANSFERRIN: CPT | Performed by: INTERNAL MEDICINE

## 2024-09-24 PROCEDURE — 25010000002 EPINEPHRINE PER 0.1 MG: Performed by: INTERNAL MEDICINE

## 2024-09-24 PROCEDURE — 87449 NOS EACH ORGANISM AG IA: CPT | Performed by: INTERNAL MEDICINE

## 2024-09-24 PROCEDURE — 87338 HPYLORI STOOL AG IA: CPT | Performed by: INTERNAL MEDICINE

## 2024-09-24 PROCEDURE — 85018 HEMOGLOBIN: CPT | Performed by: INTERNAL MEDICINE

## 2024-09-24 PROCEDURE — 83036 HEMOGLOBIN GLYCOSYLATED A1C: CPT | Performed by: INTERNAL MEDICINE

## 2024-09-24 PROCEDURE — 25810000003 SODIUM CHLORIDE 0.9 % SOLUTION: Performed by: INTERNAL MEDICINE

## 2024-09-24 PROCEDURE — 86900 BLOOD TYPING SEROLOGIC ABO: CPT | Performed by: INTERNAL MEDICINE

## 2024-09-24 PROCEDURE — 82948 REAGENT STRIP/BLOOD GLUCOSE: CPT

## 2024-09-24 PROCEDURE — 84443 ASSAY THYROID STIM HORMONE: CPT | Performed by: INTERNAL MEDICINE

## 2024-09-24 PROCEDURE — 99233 SBSQ HOSP IP/OBS HIGH 50: CPT | Performed by: NURSE PRACTITIONER

## 2024-09-24 PROCEDURE — P9016 RBC LEUKOCYTES REDUCED: HCPCS

## 2024-09-24 PROCEDURE — 87493 C DIFF AMPLIFIED PROBE: CPT | Performed by: INTERNAL MEDICINE

## 2024-09-24 PROCEDURE — 85025 COMPLETE CBC W/AUTO DIFF WBC: CPT | Performed by: INTERNAL MEDICINE

## 2024-09-24 PROCEDURE — 43255 EGD CONTROL BLEEDING ANY: CPT | Performed by: INTERNAL MEDICINE

## 2024-09-24 PROCEDURE — 99232 SBSQ HOSP IP/OBS MODERATE 35: CPT | Performed by: NURSE PRACTITIONER

## 2024-09-24 PROCEDURE — 86900 BLOOD TYPING SEROLOGIC ABO: CPT

## 2024-09-24 PROCEDURE — 83540 ASSAY OF IRON: CPT | Performed by: INTERNAL MEDICINE

## 2024-09-24 PROCEDURE — 84145 PROCALCITONIN (PCT): CPT | Performed by: INTERNAL MEDICINE

## 2024-09-24 PROCEDURE — 82746 ASSAY OF FOLIC ACID SERUM: CPT | Performed by: INTERNAL MEDICINE

## 2024-09-24 PROCEDURE — 86850 RBC ANTIBODY SCREEN: CPT | Performed by: INTERNAL MEDICINE

## 2024-09-24 PROCEDURE — 86901 BLOOD TYPING SEROLOGIC RH(D): CPT | Performed by: INTERNAL MEDICINE

## 2024-09-24 RX ORDER — LIDOCAINE HYDROCHLORIDE 20 MG/ML
INJECTION, SOLUTION INFILTRATION; PERINEURAL AS NEEDED
Status: DISCONTINUED | OUTPATIENT
Start: 2024-09-24 | End: 2024-09-24 | Stop reason: SURG

## 2024-09-24 RX ORDER — FOLIC ACID 1 MG/1
1 TABLET ORAL DAILY
Status: DISCONTINUED | OUTPATIENT
Start: 2024-09-24 | End: 2024-10-01 | Stop reason: HOSPADM

## 2024-09-24 RX ORDER — SODIUM CHLORIDE 9 MG/ML
INJECTION, SOLUTION INTRAVENOUS CONTINUOUS PRN
Status: DISCONTINUED | OUTPATIENT
Start: 2024-09-24 | End: 2024-09-24 | Stop reason: SURG

## 2024-09-24 RX ORDER — LORAZEPAM 2 MG/ML
1 INJECTION INTRAMUSCULAR EVERY 4 HOURS PRN
Status: DISPENSED | OUTPATIENT
Start: 2024-09-24 | End: 2024-09-29

## 2024-09-24 RX ORDER — PANTOPRAZOLE SODIUM 40 MG/10ML
80 INJECTION, POWDER, LYOPHILIZED, FOR SOLUTION INTRAVENOUS ONCE
Status: COMPLETED | OUTPATIENT
Start: 2024-09-24 | End: 2024-09-24

## 2024-09-24 RX ORDER — LEVETIRACETAM 500 MG/1
500 TABLET ORAL 2 TIMES DAILY
Status: DISCONTINUED | OUTPATIENT
Start: 2024-09-24 | End: 2024-09-26

## 2024-09-24 RX ORDER — POTASSIUM CHLORIDE 1.5 G/1.58G
40 POWDER, FOR SOLUTION ORAL EVERY 4 HOURS
Status: DISCONTINUED | OUTPATIENT
Start: 2024-09-24 | End: 2024-09-24

## 2024-09-24 RX ORDER — SUCRALFATE 1 G/1
1 TABLET ORAL
Status: DISCONTINUED | OUTPATIENT
Start: 2024-09-24 | End: 2024-10-01 | Stop reason: HOSPADM

## 2024-09-24 RX ORDER — LORAZEPAM 2 MG/ML
0.5 INJECTION INTRAMUSCULAR
Status: DISCONTINUED | OUTPATIENT
Start: 2024-09-24 | End: 2024-10-01 | Stop reason: HOSPADM

## 2024-09-24 RX ORDER — PROPOFOL 10 MG/ML
VIAL (ML) INTRAVENOUS AS NEEDED
Status: DISCONTINUED | OUTPATIENT
Start: 2024-09-24 | End: 2024-09-24 | Stop reason: SURG

## 2024-09-24 RX ORDER — PHENYLEPHRINE HYDROCHLORIDE 10 MG/ML
INJECTION INTRAVENOUS AS NEEDED
Status: DISCONTINUED | OUTPATIENT
Start: 2024-09-24 | End: 2024-09-24 | Stop reason: SURG

## 2024-09-24 RX ADMIN — PIPERACILLIN AND TAZOBACTAM 3.38 G: 3; .375 INJECTION, POWDER, FOR SOLUTION INTRAVENOUS at 20:25

## 2024-09-24 RX ADMIN — PHENYLEPHRINE HYDROCHLORIDE 100 MCG: 10 INJECTION INTRAVENOUS at 16:26

## 2024-09-24 RX ADMIN — SODIUM CHLORIDE: 9 INJECTION, SOLUTION INTRAVENOUS at 16:18

## 2024-09-24 RX ADMIN — PROPOFOL 20 MG: 10 INJECTION, EMULSION INTRAVENOUS at 16:30

## 2024-09-24 RX ADMIN — PROPOFOL 50 MG: 10 INJECTION, EMULSION INTRAVENOUS at 16:26

## 2024-09-24 RX ADMIN — Medication 10 ML: at 20:27

## 2024-09-24 RX ADMIN — PIPERACILLIN AND TAZOBACTAM 3.38 G: 3; .375 INJECTION, POWDER, FOR SOLUTION INTRAVENOUS at 04:49

## 2024-09-24 RX ADMIN — PANTOPRAZOLE SODIUM 40 MG: 40 INJECTION, POWDER, FOR SOLUTION INTRAVENOUS at 15:47

## 2024-09-24 RX ADMIN — LEVETIRACETAM 500 MG: 500 TABLET, FILM COATED ORAL at 22:21

## 2024-09-24 RX ADMIN — PROPOFOL 20 MG: 10 INJECTION, EMULSION INTRAVENOUS at 16:33

## 2024-09-24 RX ADMIN — PANTOPRAZOLE SODIUM 40 MG: 40 INJECTION, POWDER, FOR SOLUTION INTRAVENOUS at 20:25

## 2024-09-24 RX ADMIN — SODIUM CHLORIDE 500 ML: 9 INJECTION, SOLUTION INTRAVENOUS at 09:22

## 2024-09-24 RX ADMIN — SUCRALFATE 1 G: 1 TABLET ORAL at 20:26

## 2024-09-24 RX ADMIN — PHENYLEPHRINE HYDROCHLORIDE 100 MCG: 10 INJECTION INTRAVENOUS at 16:31

## 2024-09-24 RX ADMIN — LIDOCAINE HYDROCHLORIDE 40 MG: 20 INJECTION, SOLUTION INFILTRATION; PERINEURAL at 16:26

## 2024-09-24 RX ADMIN — SODIUM CHLORIDE 125 ML/HR: 9 INJECTION, SOLUTION INTRAVENOUS at 02:08

## 2024-09-24 RX ADMIN — PANTOPRAZOLE SODIUM 40 MG: 40 INJECTION, POWDER, FOR SOLUTION INTRAVENOUS at 04:49

## 2024-09-24 RX ADMIN — PANTOPRAZOLE SODIUM 80 MG: 40 INJECTION, POWDER, FOR SOLUTION INTRAVENOUS at 15:33

## 2024-09-24 RX ADMIN — PROPOFOL 20 MG: 10 INJECTION, EMULSION INTRAVENOUS at 16:37

## 2024-09-24 RX ADMIN — PERFLUTREN 2 ML: 6.52 INJECTION, SUSPENSION INTRAVENOUS at 15:59

## 2024-09-24 NOTE — NURSING NOTE
Patient unable to complete MRI stating she is claustrophobic, offered to call and get medication to help patient relax, still refused. Notified A.

## 2024-09-24 NOTE — PROGRESS NOTES
DOS: 2024  NAME: Bailee Garza   : 1940  PCP: Eddie Araya MD    Chief Complaint   Patient presents with    Syncope         Subjective: Pt seen in follow up, however the problem is new to me.  Sitting up in bed getting her blood drawn by .  After initial conversation she became very nauseous.  Daughter at bedside and states she has been having some intermittent nausea and actually vomited bile at home.  She tested positive for C. difficile today.    Objective:  Vital signs:   Vitals:    24 1245 24 1256 24 1316 24 1327   BP: 98/54 98/57 108/77    BP Location:       Patient Position:       Pulse: 115  112 116   Resp:  20     Temp:  96.8 °F (36 °C) 97 °F (36.1 °C) 97.2 °F (36.2 °C)   TempSrc:  Axillary Oral    SpO2:   99% 98%   Weight:       Height:           Current Facility-Administered Medications:     acetaminophen (TYLENOL) tablet 650 mg, 650 mg, Oral, Q4H PRN **OR** acetaminophen (TYLENOL) 160 MG/5ML oral solution 650 mg, 650 mg, Oral, Q4H PRN **OR** acetaminophen (TYLENOL) suppository 650 mg, 650 mg, Rectal, Q4H PRN, Jose Manuel Vazquez MD    sennosides-docusate (PERICOLACE) 8.6-50 MG per tablet 2 tablet, 2 tablet, Oral, BID PRN **AND** polyethylene glycol (MIRALAX) packet 17 g, 17 g, Oral, Daily PRN **AND** bisacodyl (DULCOLAX) EC tablet 5 mg, 5 mg, Oral, Daily PRN **AND** bisacodyl (DULCOLAX) suppository 10 mg, 10 mg, Rectal, Daily PRN, Jose Manuel Vazquez MD    Calcium Replacement - Follow Nurse / BPA Driven Protocol, , Does not apply, PRN, Jose Manuel Vazquez MD    folic acid (FOLVITE) tablet 1 mg, 1 mg, Oral, Daily, Jose Manuel Vazquez MD    levETIRAcetam (KEPPRA) tablet 500 mg, 500 mg, Oral, BID, Mau Zamora MD    levothyroxine (SYNTHROID, LEVOTHROID) tablet 75 mcg, 75 mcg, Oral, Daily, Jose Manuel Vazquez MD, 75 mcg at 24 5249    LORazepam (ATIVAN) injection 0.5 mg, 0.5 mg, Intravenous, Once in imaging, Jose Manuel Vazquez MD    LORazepam  (ATIVAN) injection 1 mg, 1 mg, Intravenous, Q4H PRN, Jose Manuel Vazquez MD    Magnesium Standard Dose Replacement - Follow Nurse / BPA Driven Protocol, , Does not apply, PRNGeorge Patrick D, MD    nitroglycerin (NITROSTAT) SL tablet 0.4 mg, 0.4 mg, Sublingual, Q5 Min PRN, Jose Manuel Vazquez MD    ondansetron ODT (ZOFRAN-ODT) disintegrating tablet 4 mg, 4 mg, Oral, Q6H PRN **OR** ondansetron (ZOFRAN) injection 4 mg, 4 mg, Intravenous, Q6H PRN, Jose Manuel Vazquez MD, 4 mg at 09/23/24 0947    ondansetron (ZOFRAN) injection 4 mg, 4 mg, Intravenous, Once, Shaquille Whitney MD    pantoprazole (PROTONIX) injection 40 mg, 40 mg, Intravenous, BID AC, Jose Manuel Vazquez MD, 40 mg at 09/24/24 0449    Phosphorus Replacement - Follow Nurse / BPA Driven Protocol, , Does not apply, PRNGeorge Patrick D, MD    piperacillin-tazobactam (ZOSYN) 3.375 g IVPB in 100 mL NS MBP (CD), 3.375 g, Intravenous, Q8H, Jose Manuel Vazquez MD, Last Rate: 0 mL/hr at 09/24/24 0211, 3.375 g at 09/24/24 0449    potassium & sodium phosphates (PHOS-NAK) 280-160-250 MG packet 2 packet, 2 packet, Oral, Once, Jose Manuel Vazquez MD    potassium chloride (KLOR-CON) packet 40 mEq, 40 mEq, Oral, Q4H, Jose Manuel Vazquez MD    Potassium Replacement - Follow Nurse / BPA Driven Protocol, , Does not apply, PRN, Jose Manuel Vazquez MD    sodium chloride 0.9 % flush 10 mL, 10 mL, Intravenous, Q12H, Jose Manuel Vazquez MD, 10 mL at 09/23/24 2101    sodium chloride 0.9 % flush 10 mL, 10 mL, Intravenous, PRN, Jose Manuel Vazquez MD    sodium chloride 0.9 % infusion 40 mL, 40 mL, Intravenous, PRN, Jose Manuel Vazquez MD    sodium chloride 0.9 % infusion, 125 mL/hr, Intravenous, Continuous, Bowen Perry MD, Last Rate: 125 mL/hr at 09/24/24 0412, 125 mL/hr at 09/24/24 0412    Facility-Administered Medications Ordered in Other Encounters:     Chlorhexidine Gluconate Cloth 2 % pads 1 each, 1 each, Apply externally, Take As Directed, Anupam Ruiz MD    PRN  meds    acetaminophen **OR** acetaminophen **OR** acetaminophen    senna-docusate sodium **AND** polyethylene glycol **AND** bisacodyl **AND** bisacodyl    Calcium Replacement - Follow Nurse / BPA Driven Protocol    LORazepam    Magnesium Standard Dose Replacement - Follow Nurse / BPA Driven Protocol    nitroglycerin    ondansetron ODT **OR** ondansetron    Phosphorus Replacement - Follow Nurse / BPA Driven Protocol    Potassium Replacement - Follow Nurse / BPA Driven Protocol    sodium chloride    sodium chloride    Current Facility-Administered Medications on File Prior to Encounter   Medication    Chlorhexidine Gluconate Cloth 2 % pads 1 each     Current Outpatient Medications on File Prior to Encounter   Medication Sig    acetaminophen (Tylenol) 325 MG tablet Take 2 tablets by mouth Every 6 (Six) Hours As Needed for Mild Pain for up to 40 doses.    aspirin 81 MG EC tablet Take 1 tablet by mouth 2 (Two) Times a Day for 14 days, THEN 1 tablet Daily for 28 days.    hydroCHLOROthiazide 12.5 MG tablet Take 1 tablet by mouth Daily.    HYDROmorphone (Dilaudid) 2 MG tablet Take 1 tablet by mouth Every 4 (Four) Hours As Needed for Severe Pain for up to 40 doses.    loperamide (IMODIUM) 2 MG capsule Take 1 capsule by mouth 4 (Four) Times a Day As Needed for Diarrhea. Indications: Diarrhea    metroNIDAZOLE (METROCREAM) 0.75 % cream Apply 1 Application topically to the appropriate area as directed As Needed.    ondansetron (Zofran) 4 MG tablet Take 1 tablet by mouth Every 8 (Eight) Hours As Needed for Nausea or Vomiting for up to 10 doses.    pantoprazole (PROTONIX) 40 MG EC tablet Take 1 tablet by mouth Daily for 14 days.    Synthroid 75 MCG tablet Take 1 tablet by mouth Daily.    valsartan (DIOVAN) 80 MG tablet Take 1 tablet by mouth Daily. (Patient taking differently: Take 1 tablet by mouth Daily.)    polyethylene glycol (MIRALAX) 17 GM/SCOOP powder Mix 17 g in 4-8 oz of liquid and take by mouth 2 (Two) Times a Day for 7  "days.       General appearance: Elderly female, NAD, alert and cooperative  HEENT: Normocephalic, atraumatic    Neurological:   MS: oriented x3, normal attention/concentration, language intact, no neglect, normal fund of knowledge  CN: visual fields full, EOMI, facial sensation equal, no facial droop, tongue midline  Motor: 5/5 in all extremities except RLE 2/2 recent knee surgery  Sensory: light touch sensation intact in all 4 ext.  Coordination: Normal finger to nose test  Gait and station: Deferred  Rapid alternating movements: normal finger to thumb tap    Laboratory results:  Lab Results   Component Value Date    TSH 0.825 09/24/2024     Lab Results   Component Value Date    EDCUXOUU78 582 09/24/2024     Lab Results   Component Value Date    HGBA1C 5.20 09/24/2024     Lab Results   Component Value Date    GLUCOSE 103 (H) 09/24/2024    BUN 61 (H) 09/24/2024    CREATININE 0.79 09/24/2024    EGFRIFNONA 45 (L) 07/15/2021    EGFRIFAFRI 54 (L) 07/15/2021    BCR 77.2 (H) 09/24/2024    K 3.5 09/24/2024    CO2 16.0 (L) 09/24/2024    CALCIUM 6.7 (L) 09/24/2024    PROTENTOTREF 6.4 08/05/2024    ALBUMIN 2.4 (L) 09/24/2024    LABIL2 2.4 08/05/2024    AST 16 09/24/2024    ALT 19 09/24/2024     Lab Results   Component Value Date    WBC 9.63 09/24/2024    HGB 3.8 (C) 09/24/2024    HCT 12.4 (C) 09/24/2024    .6 (H) 09/24/2024     09/24/2024     Brief Urine Lab Results  (Last result in the past 365 days)        Color   Clarity   Blood   Leuk Est   Nitrite   Protein   CREAT   Urine HCG        09/23/24 0655 Yellow   Clear   Negative   Negative   Negative   30 mg/dL (1+)                 Blood Culture   Date Value Ref Range Status   09/23/2024 No growth at 24 hours  Preliminary     No results found for: \"BCIDPCR\", \"CXREFLEX\", \"CSFCX\", \"CULTURETIS\"  No results found for: \"CULTURES\", \"HSVCX\", \"URCX\"  No results found for: \"EYECULTURE\", \"GCCX\", \"HSVCULTURE\", \"LABHSV\"  No results found for: \"LEGIONELLA\", \"MRSACX\", " "\"MUMPSCX\", \"MYCOPLASCX\"  No results found for: \"NOCARDIACX\", \"STOOLCX\"  No results found for: \"THROATCX\", \"UNSTIMCULT\", \"URINECX\", \"CULTURE\", \"VZVCULTUR\"  No results found for: \"VIRALCULTU\", \"WOUNDCX\"  Pain Management Panel  More data may exist         Latest Ref Rng & Units 2/5/2019 5/10/2018   Pain Management Panel   Creatinine, Urine 20.0 - 300.0 mg/dL 123.9  122.9    Amphetamine, Urine Qual Oangkf=9702 ng/mL Negative  Negative    Barbiturates Screen, Urine Irmdna=766 ng/mL Negative  Negative    Benzodiazepine Screen, Urine Eonhaj=070 ng/mL Negative  Negative    Cocaine Screen, Urine Korbna=479 ng/mL Negative  Negative    Fentanyl, Urine Uwvfmg=5277 pg/mL Negative  -   Methadone Screen , Urine Czcnbq=807 ng/mL Negative  Negative       Details                   Review and interpretation of imaging:  CT Angiogram Chest Pulmonary Embolism    Result Date: 9/23/2024   1. The study is negative for pulmonary embolism. No acute intrathoracic abnormality. 2. Intrathoracic and extrahepatic biliary dilatation with abrupt tapering at the ampulla where there is questionable hypoenhancing masslike soft tissue at the ampulla and pancreatic head. Could consider correlating with ERCP or MRCP if clinically indicated. 3. Moderate fluid and air distended stomach and high attenuation fluid distention of the proximal duodenum with duodenal wall thickening, suspected duodenal diverticula, and mild proximal duodenal wall thickening. Could correlate with endoscopy if clinically indicated. 4. Distended gallbladder. 5. Decreased hepatic attenuation could reflect hepatic steatosis. 6. Extensive colonic diverticula without CT evidence of diverticulitis. 7. Other chronic findings, as above.  This report was finalized on 9/23/2024 8:13 AM by Lorenzo Bui MD on Workstation: [id number]      CT Abdomen Pelvis With Contrast    Result Date: 9/23/2024   1. The study is negative for pulmonary embolism. No acute intrathoracic abnormality. 2. " Intrathoracic and extrahepatic biliary dilatation with abrupt tapering at the ampulla where there is questionable hypoenhancing masslike soft tissue at the ampulla and pancreatic head. Could consider correlating with ERCP or MRCP if clinically indicated. 3. Moderate fluid and air distended stomach and high attenuation fluid distention of the proximal duodenum with duodenal wall thickening, suspected duodenal diverticula, and mild proximal duodenal wall thickening. Could correlate with endoscopy if clinically indicated. 4. Distended gallbladder. 5. Decreased hepatic attenuation could reflect hepatic steatosis. 6. Extensive colonic diverticula without CT evidence of diverticulitis. 7. Other chronic findings, as above.  This report was finalized on 9/23/2024 8:13 AM by Lorenzo Bui MD on Workstation: [id number]      XR Chest 1 View    Result Date: 9/23/2024  1. Mild interstitial prominence of the lungs somewhat exaggerated by underinflation but overall appear unchanged from the 12/29/2017 study and therefore likely chronic in nature. 2. No definite acute infiltrates are thought to be present.  This report was finalized on 9/23/2024 7:33 AM by Dr. Arun Romero M.D on Workstation: [id number]      XR Knee 1 or 2 View Right    Result Date: 9/18/2024  Right total knee arthroplasty. Components are well-seated without acute periprosthetic fracture.  This report was finalized on 9/18/2024 9:19 AM by Dr. Shaquille Tsai M.D on Workstation: [id number]         Impression/Assessment:  This is a 84-year-old female with past medical history of hypothyroidism, hypertension, CKD, recent right total knee replacement on 9/18 who presented to the hospital on 9/23/2024 with complaints of suffering a syncopal spell at home.  Daughter at bedside reports that the patient got up to use the restroom in the middle of the night and when she attempted to sit down on the toilet she started to fall forward.  She thought that it was  her knee therefore when she asked the patient to straighten her leg out she felt her go completely out.  Her daughter was able to lower her to the ground.  She states that she was completely out and she had to slap her across the face to try and get her to wake up.  She had been having a lot of nausea a couple of hours before this episode.  Daughter reports that she appeared to have vomited bile.  She states that she did urinate during this and her eyes were open but she was staring and not responding.  There was no convulsions.  She did not have any tongue or lip biting.  She thinks she was unconscious for a few minutes.  Her and her daughter deny any history of seizure.  She did not hit her head as again the daughter was able to lower her to the ground.    Since admission she was found to have positive orthostatics with a systolic of 110 dropping to 80 with heart rate racing to the 120s.  Nursing reports that she appeared to go unresponsive at that time.  EEG was obtained and revealed mild diffuse background slowing along with left posterior epileptiform discharges.  Unfortunately she was unable to undergo her MRI brain due to complaints of severe claustrophobia.  She has been found to be C. difficile positive.  She had a CT of her abdomen and pelvis that has revealed intrathoracic and extrahepatic biliary dilatation with abrupt tapering at the ampulla and duodenal wall thickening.  In addition to that she has become severely anemic with hemoglobin dropping to 3.8 and has been found to have bloody bowel movements.    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 difficile positive  Hypovolemic shock with severe anemia due to acute GI bleed  Pancreatic mass  Orthostatic hypotension    Plan:  - Unfortunately the patient has had to be transferred to the ICU for hypovolemic shock with severe anemia and  concern for acute GI bleed.  She has also been found to be C. difficile positive and question of a pancreatic mass on her abdominal imaging. In light of this, her syncopal event was likely multifactorial.  Interestingly her EEG did reveal left posterior epileptiform discharges placing her at an increased risk for seizures.  We have initiated her on Keppra 500 mg twice daily.  Will plan to continue this and have her follow-up in the outpatient setting.  We did recommend a MRI brain however the patient is currently refusing due to concerns for severe claustrophobia.  Discussed with her extensively on the reasoning for pursuing the MRI in light of her EEG results however she is apprehensive on completing. At this time we will sign off but if she is able to get her MRI, please page our service back so that we can review. Would recommend obtaining this with contrast given her pancreatic mass findings.    Case discussed with patient, daughter at bedside, and Dr. Zamora, and he agrees with plan above.     TERESA Chanel

## 2024-09-24 NOTE — NURSING NOTE
Pt transferred to CICU. Hemoglobin 3.8 on the floor. X2 PRBC given. EDG completed at bedside-- See chart. X5 Bms. 600 UOP. Protonix drip continued. Will continue to monitor.

## 2024-09-24 NOTE — PLAN OF CARE
Goal Outcome Evaluation:  Plan of Care Reviewed With: patient        Progress: no change  Outcome Evaluation: patient alert and oriented. attempted to complete orthostatic patient unresponsive on first standing bp and bp dropped from systolic 110 to 80. assisted to bed lethargic and inc of urine. arousable and able to answer questions appropriately. patient sent to mri unable to complete patient states claustrophic in mri and could not complete test, offered medication to help relax patient continued to refuse. SABRINA dressing in place with suction noted. purewick in place. ivfs and antibiotics given. npo except ice chips.

## 2024-09-24 NOTE — BRIEF OP NOTE
ESOPHAGOGASTRODUODENOSCOPY AT BEDSIDE  Progress Note    Bailee Garza  9/24/2024    Pre-op Diagnosis:   Gastrointestinal hemorrhage, unspecified gastrointestinal hemorrhage type [K92.2]       Post-Op Diagnosis Codes:     * Gastrointestinal hemorrhage, unspecified gastrointestinal hemorrhage type [K92.2]    Procedure/CPT® Codes:        Procedure(s):  ESOPHAGOGASTRODUODENOSCOPY AT BEDSIDE with gold probe and epi injection              Surgeon(s):  Karli Viveros MD    Anesthesia: Monitored Anesthesia Care    Staff:   Endo Technician: Marta Lerma RN  Endo Nurse: Rody Jones RN         Estimated Blood Loss: none    Urine Voided: * No values recorded between 9/24/2024  4:23 PM and 9/24/2024  4:50 PM *    Specimens:                None          Drains:   External Urinary Catheter (Active)   Daily Indications Strict bedrest 09/24/24 1600   Site Assessment Clean;Skin intact 09/24/24 1600   Application/Removal external catheter changed 09/24/24 1600   Collection Container Wall suction 09/24/24 1600   Wall suction (mmHG) 120 mmHG 09/24/24 1600   Securement Method Securing device 09/24/24 1600   Catheter care complete Yes 09/24/24 1600   Output (mL) 600 mL 09/24/24 1800       [REMOVED] Closed/Suction Drain 1 Right;Anterior Knee 10 Fr. (Removed)   Site Description Unable to view 09/18/24 1116   Dressing Status Dry;Intact 09/18/24 1116   Drainage Appearance Bloody 09/18/24 1116   Status To bulb suction 09/18/24 1116       [REMOVED] External Urinary Catheter (Removed)       Findings:   Multiple duodenal ulcers in the bulb extending through the sweep to D2  Ulcer with pigmented spot in post bulb - injected w/10 ml epi 1:10,000 and cauterized w/gold probe    Recommendations:  - continue PPI gtt  - add carafate  - check h pylori stool antigen  - follow h/h - transfuse as needed  - will need MRI once stable to evaluate for biliary dilation but hopefully some of what we are seeing is related to inflammation from  the duodenum        Complications: none          Karli Viveros MD     Date: 9/24/2024  Time: 19:21 EDT

## 2024-09-24 NOTE — NURSING NOTE
Placed call to Dr. Vazquez regarding patient at 0733. HR was tachy and BP was low. See Chart. The patient was confused and disoriented.  was paged again around 0810. Further testing was ordered, HBG came back at 4.2, re-draw was ordered and we are awaiting results.

## 2024-09-24 NOTE — CONSULTS
Patient Identification:  Bailee Garza  84 y.o.  female  1940  [id number]          LOS 0    Requesting physician: Dr. Vazquez    Reason for Consultation: Severe anemia and hypotension    History of Present Illness:   84-year-old female transferred to CICU from the floor for hypotension and severe anemia.  Patient presented with syncope with nausea and vomiting and admitted to the hospitalist service.  Had sepsis with EPEC and C. difficile positive on PCR.  CT scan shows some biliary extrahepatic dilation with duodenal thickening pancreatic mass.  GI consulted and recommended MRCP.  Hemoglobin yesterday was 9.5 and then rechecked at 523 this morning at 4.2.  Repeat check at 1130 was 3.8.  She was transferred to intensive care for further evaluation and management and I spoke with Dr. Brand by telephone late this morning.  2 units of packed red blood cells have been ordered stat.  I am seeing the patient in the CICU.  She is awake and alert and answers questions appropriately.  Family at bedside.  Abdomen is nontender.  Chart reviewed.  She is pleasantly confused and easily redirected.    Past Medical History:  Past Medical History:   Diagnosis Date    Abnormal CXR 12/18/2017    Adverse reaction to narcotic drug     SEVERE HALLUCINATIONS POST OP LEFT HIP PLACEMENT    Allergies     Arthritis     Arthritis of foot 04/17/2020    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    unspecified    Back pain 11/20/2007    Chronic back pain 07/13/2009    Chronic cough 12/18/2017    CKD (chronic kidney disease)     Closed nondisplaced fracture of lateral malleolus of fibula with delayed healing 01/13/2020    Closed nondisplaced fracture of medial cuneiform of left foot 02/19/2018    Constipation 03/24/2015    unspecified    COVID-19 vaccination refused     COVID-19 virus detected 10/17/2022    Diverticulosis     Dyspepsia 03/18/2008    Essential hypertension 11/20/2007    Exertional shortness of breath  2017    Fall 2018    Family history of abdominal aortic aneurysm (AAA) 2019    [place] aaa screen limited (04/10/2019 10:32)     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), 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    Hearing loss 2008    History of bone density study 2015    History of pneumonia     2017    History of skin cancer     Hypothyroidism, acquired 2007    Insomnia 2015    unspecified    Intervertebral disc disorder with myelopathy, cervical region 2010    Joint capsule tear 2012    unspecified    OA (osteoarthritis) of hip 2018    Other synovitis and tenosynovitis, right ankle and foot 2020    Peroneal tendonitis, right 2020    Rhinitis, allergic 2007    Right knee pain     Scoliosis     Screening breast examination 2007    Stress 2015    other acute reactions to stress    Stress incontinence     Trochanteric bursitis, left hip 2019    Urticaria 2015       Past Surgical History:  Past Surgical History:   Procedure Laterality Date    BREAST EXCISIONAL BIOPSY Right     50+ years ago    BRONCHOSCOPY N/A 2017    Procedure: BRONCHOSCOPY;  Surgeon: Mario Alanis MD;  Location: [facility] ENDOSCOPY;  Service:     CATARACT EXTRACTION, BILATERAL      COLONOSCOPY      HYSTERECTOMY      TOTAL HIP ARTHROPLASTY Left 2018    Procedure: LEFT TOTAL HIP ARTHROPLASTY;  Surgeon: Justen Mann MD;  Location: [facility] MAIN OR;  Service: Orthopedics    TOTAL HIP ARTHROPLASTY Right 2022    Procedure: Posterior RIGHT TOTAL HIP ARTHROPLASTY MARCELINO NAVIGATION;  Surgeon: Anupam Ruiz MD;  Location: [facility] OR OSC;  Service: Orthopedics;  Laterality: Right;    TOTAL KNEE ARTHROPLASTY Right 2024    Procedure: TOTAL KNEE ARTHROPLASTY;  Surgeon: Jesus Thayer MD;  Location: [facility] OR OSC;  Service: Orthopedics;  Laterality: Right;        Home Meds:  Medications Prior to Admission    Medication Sig Dispense Refill Last Dose    acetaminophen (Tylenol) 325 MG tablet Take 2 tablets by mouth Every 6 (Six) Hours As Needed for Mild Pain for up to 40 doses. 40 tablet 0 9/22/2024    aspirin 81 MG EC tablet Take 1 tablet by mouth 2 (Two) Times a Day for 14 days, THEN 1 tablet Daily for 28 days. 60 tablet 0 9/22/2024    hydroCHLOROthiazide 12.5 MG tablet Take 1 tablet by mouth Daily. 30 tablet 11 9/22/2024    HYDROmorphone (Dilaudid) 2 MG tablet Take 1 tablet by mouth Every 4 (Four) Hours As Needed for Severe Pain for up to 40 doses. 40 tablet 0 9/22/2024    loperamide (IMODIUM) 2 MG capsule Take 1 capsule by mouth 4 (Four) Times a Day As Needed for Diarrhea. Indications: Diarrhea   9/22/2024    metroNIDAZOLE (METROCREAM) 0.75 % cream Apply 1 Application topically to the appropriate area as directed As Needed.   9/22/2024    ondansetron (Zofran) 4 MG tablet Take 1 tablet by mouth Every 8 (Eight) Hours As Needed for Nausea or Vomiting for up to 10 doses. 10 tablet 0 9/22/2024    pantoprazole (PROTONIX) 40 MG EC tablet Take 1 tablet by mouth Daily for 14 days. 14 tablet 0 9/22/2024    Synthroid 75 MCG tablet Take 1 tablet by mouth Daily. 30 tablet 11 9/22/2024    valsartan (DIOVAN) 80 MG tablet Take 1 tablet by mouth Daily. (Patient taking differently: Take 1 tablet by mouth Daily.) 30 tablet 11 9/22/2024    polyethylene glycol (MIRALAX) 17 GM/SCOOP powder Mix 17 g in 4-8 oz of liquid and take by mouth 2 (Two) Times a Day for 7 days. 238 g 0 More than a month         Allergies:  Allergies   Allergen Reactions    Hydrocodone Hallucinations    Paxlovid [Nirmatrelvir-Ritonavir] Other (See Comments)     insomnia    Ketek [Telithromycin] Hives    Nsaids Hives    Sulfa Antibiotics Hives       Social History:   Social History     Socioeconomic History    Marital status:     Number of children: 3   Tobacco Use    Smoking status: Never     Passive exposure: Never    Smokeless tobacco: Never   Vaping Use     "Vaping status: Never Used   Substance and Sexual Activity    Alcohol use: No    Drug use: Never    Sexual activity: Defer       Family History:  Family History   Problem Relation Age of Onset    Heart disease Mother     Aneurysm Mother     Colon cancer Father     Aneurysm Brother     Breast cancer Paternal Grandmother     Asthma Paternal Grandfather     Malig Hyperthermia Neg Hx     Ovarian cancer Neg Hx        Review of Systems:  Denies fevers or chills  Denies nausea or vomiting  No new vision or hearing changes  No chest pain  No productive cough or shortness of breath  No diarrhea, hematemesis or hematochezia, no dysuria or frequency  No musculoskeletal complaints  No heat or cold intolerance  No skin rashes  No dizziness or confusion.  No seizure activity  No new anxiety or depression  12 system review of systems performed and all else negative    Objective:    PHYSICAL EXAM:    BP 98/57   Pulse 112   Temp 96.8 °F (36 °C) (Axillary)   Resp 20   Ht 157.5 cm (62\")   Wt 54.5 kg (120 lb 2.4 oz)   SpO2 99%   BMI 21.98 kg/m²  Body mass index is 21.98 kg/m². 99% 54.5 kg (120 lb 2.4 oz)    GENERAL APPEARANCE:   Well developed  Well nourished  No acute distress   EYES:    PERRL                                                                           Conjunctivae normal  Sclerae nonicteric.  HENT:   Atraumatic, normocephalic  External ears and nose normal  Moist mucous membranes and no ulcers  NECK:  Thyroid not enlarged  Trachea midline   RESPIRATORY:    Nonlabored breathing   Normal breath sounds  No rales. No wheezing  No dullness  CARDIOVASCULAR:    RRR  Normal S1, S2  No murmur  Lower extremity edema: none    GI:   Bowel sounds normal  Abdomen soft , nondistended, nontender  No abdominal masses  MUSCULOSKELETAL:  Normal movement of extremities  No tenderness, no deformities  No clubbing or cyanosis   Skin:    No visible rashes  No palpable nodules  Cap refill normal.  No mottling.   PSYCHIATRIC:  Speech and " behavior appropriate  Normal mood and affect  Oriented to person, place and time  NEUROLOGIC:  Cranial nerves II through XII grossly intact.  Sensation intact.      Lab Review:      Results from last 7 days   Lab Units 09/24/24  1130 09/24/24  0523 09/23/24  0533   WBC 10*3/mm3 9.63 7.42 13.00*   HEMOGLOBIN g/dL 3.8* 4.2* 9.5*   HEMATOCRIT % 12.4* 13.5* 29.2*   PLATELETS 10*3/mm3 225 212 305     Results from last 7 days   Lab Units 09/24/24  0522 09/23/24  0533   SODIUM mmol/L 145 137   POTASSIUM mmol/L 3.5 5.1   CHLORIDE mmol/L 119* 102   CO2 mmol/L 16.0* 24.0   BUN mg/dL 61* 49*   CREATININE mg/dL 0.79 1.16*   CALCIUM mg/dL 6.7* 9.3   BILIRUBIN mg/dL 0.2 0.4   ALK PHOS U/L 84 140*   ALT (SGPT) U/L 19 41*   AST (SGOT) U/L 16 34*   GLUCOSE mg/dL 103* 144*     Results from last 7 days   Lab Units 09/24/24  1018   PH, ARTERIAL pH units 7.419   PO2 ART mm Hg 104.2*   PCO2, ARTERIAL mm Hg 29.5*   HCO3 ART mmol/L 19.1*         Lab 09/24/24  0950 09/23/24  0616   LACTATE 2.8* 2.0     Procalitonin Results:      Lab 09/24/24  0522 09/23/24  0533   PROCALCITONIN 10.20* 21.50*     09/24/2024 1046 09/24/2024 1051 Occult Blood X 1, Stool - Stool, Per Rectum [[id number]]   (Abnormal)   Stool from Per Rectum    Final result Component Value   Fecal Occult Blood Positive Abnormal         ]    Microbiology reviewed  09/24/2024 0837 09/24/2024 1043 Gastrointestinal Panel, PCR - Stool, Per Rectum [[id number]]   (Abnormal)   Stool from Per Rectum    Final result Component Value   Campylobacter Not Detected   Plesiomonas shigelloides Not Detected   Salmonella Not Detected   Vibrio Not Detected   Vibrio cholerae Not Detected   Yersinia enterocolitica Not Detected   Enteroaggregative E. coli (EAEC) Not Detected   Enteropathogenic E. coli (EPEC) Detected Abnormal    Enterotoxigenic E. coli (ETEC) lt/st Not Detected   Shiga-like toxin-producing E. coli (STEC) stx1/stx2 Not Detected   Shigella/Enteroinvasive E. coli (EIEC) Not Detected    Cryptosporidium Not Detected   Cyclospora cayetanensis Not Detected   Entamoeba histolytica Not Detected   Giardia lamblia Not Detected   Adenovirus F40/41 Not Detected   Astrovirus Not Detected   Norovirus GI/GII Not Detected   Rotavirus A Not Detected   Sapovirus (I, II, IV or V) Not Detected               09/24/2024 0837 09/24/2024 1010 Clostridioides difficile Toxin, PCR - Stool, Per Rectum [[id number]]    (Abnormal)   Stool from Per Rectum    Final result Component Value   Toxigenic C. difficile by PCR Positive Abnormal                     Imaging reviewed  chest X-ray shows mild interstitial prominence.  No acute infiltrates.      CT chest, abdomen and pelvis reviewed: Extrahepatic biliary dilation with abrupt tapering at the ampulla with questionable hypoenhancing mass at the pancreatic head.  Colonic thickening noted.  Distended gallbladder.  Extensive colonic diverticula without diverticulitis.           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  Serial H&H.  Blood pressure control.  Pressor as needed.  Has responded to volume resuscitation.  GI plans for MRCP when able.  Control glucose.  Mechanical DVT prophylaxis.  Discussed with family at bedside.  Discussed with Dr. Vazquez by telephone.      Thank you for allowing me to participate in the care of this patient.  I will continue to follow along with you.      CCT: 44 min    Deven Kang MD  [facility], [facility]  Pulmonary and Critical Care Medicine    9/24/2024  13:17 EDT

## 2024-09-24 NOTE — PROGRESS NOTES
[facility]  Inpatient Progress Note    Reason for Follow Up: Pancreatic mass, duodenal thickening    Subjective     Interval History:   Hemoglobin returned exceedingly low this morning.  Initially no GI bleeding.  She was transferred to the ICU for closer monitoring.  She did have some nonbloody emesis.  Since arrival to the CICU she has had 3 bright red large-volume bloody bowel movements.  Pressure has been normal.  She is been mildly tachycardic.  She denies abdominal pain.  She is awake and alert.    Current Facility-Administered Medications:     acetaminophen (TYLENOL) tablet 650 mg, 650 mg, Oral, Q4H PRN **OR** acetaminophen (TYLENOL) 160 MG/5ML oral solution 650 mg, 650 mg, Oral, Q4H PRN **OR** acetaminophen (TYLENOL) suppository 650 mg, 650 mg, Rectal, Q4H PRN, Jose Manuel Vazquez MD    sennosides-docusate (PERICOLACE) 8.6-50 MG per tablet 2 tablet, 2 tablet, Oral, BID PRN **AND** polyethylene glycol (MIRALAX) packet 17 g, 17 g, Oral, Daily PRN **AND** bisacodyl (DULCOLAX) EC tablet 5 mg, 5 mg, Oral, Daily PRN **AND** bisacodyl (DULCOLAX) suppository 10 mg, 10 mg, Rectal, Daily PRN, Jose Manuel Vazquez MD    Calcium Replacement - Follow Nurse / BPA Driven Protocol, , Does not apply, PRN, Jose Manuel Vazquez MD    folic acid (FOLVITE) tablet 1 mg, 1 mg, Oral, Daily, Jose Manuel Vazquez MD    levETIRAcetam (KEPPRA) tablet 500 mg, 500 mg, Oral, BID, Mau Zamora MD    levothyroxine (SYNTHROID, LEVOTHROID) tablet 75 mcg, 75 mcg, Oral, Daily, Jose Manuel Vazquez MD, 75 mcg at 09/23/24 1439    LORazepam (ATIVAN) injection 0.5 mg, 0.5 mg, Intravenous, Once in imaging, Jose Manuel Vazquez MD    LORazepam (ATIVAN) injection 1 mg, 1 mg, Intravenous, Q4H PRN, Jose Manuel Vazquez MD    Magnesium Standard Dose Replacement - Follow Nurse / BPA Driven Protocol, , Does not apply, PRN, Jose Manuel Vazquez MD    nitroglycerin (NITROSTAT) SL tablet 0.4 mg, 0.4 mg, Sublingual, Q5 Min PRN,  Jose Manuel Vazquez MD    ondansetron ODT (ZOFRAN-ODT) disintegrating tablet 4 mg, 4 mg, Oral, Q6H PRN **OR** ondansetron (ZOFRAN) injection 4 mg, 4 mg, Intravenous, Q6H PRN, Jose Manuel Vazquez MD, 4 mg at 09/23/24 0947    ondansetron (ZOFRAN) injection 4 mg, 4 mg, Intravenous, Once, Shaquille Whitney MD    pantoprazole (PROTONIX) injection 40 mg, 40 mg, Intravenous, BID AC, Jose Manuel Vazquez MD, 40 mg at 09/24/24 0449    Phosphorus Replacement - Follow Nurse / BPA Driven Protocol, , Does not apply, PRN, Jose Manuel Vazquez MD    piperacillin-tazobactam (ZOSYN) 3.375 g IVPB in 100 mL NS MBP (CD), 3.375 g, Intravenous, Q8H, Jose Manuel Vazquez MD, Last Rate: 0 mL/hr at 09/24/24 0211, 3.375 g at 09/24/24 0449    potassium & sodium phosphates (PHOS-NAK) 280-160-250 MG packet 2 packet, 2 packet, Oral, Once, Jose Manuel Vazquez MD    potassium chloride (KLOR-CON) packet 40 mEq, 40 mEq, Oral, Q4H, Jose Manuel Vazquez MD    Potassium Replacement - Follow Nurse / BPA Driven Protocol, , Does not apply, PRN, Jose Manuel Vazquez MD    sodium chloride 0.9 % flush 10 mL, 10 mL, Intravenous, Q12H, Jose Manuel Vazquez MD, 10 mL at 09/23/24 2101    sodium chloride 0.9 % flush 10 mL, 10 mL, Intravenous, PRN, Jose Manuel Vazquez MD    sodium chloride 0.9 % infusion 40 mL, 40 mL, Intravenous, PRN, Jose aMnuel Vazquez MD    sodium chloride 0.9 % infusion, 125 mL/hr, Intravenous, Continuous, Bowen Perry MD, Last Rate: 125 mL/hr at 09/24/24 0412, 125 mL/hr at 09/24/24 0412    Facility-Administered Medications Ordered in Other Encounters:     Chlorhexidine Gluconate Cloth 2 % pads 1 each, 1 each, Apply externally, Take As Directed, Anupam Ruiz MD  Review of Systems:    Positive for GI bleeding, negative for fevers chills or abdominal pain    Objective     Vital Signs  Temp:  [96.8 °F (36 °C)-98.2 °F (36.8 °C)] 97.2 °F (36.2 °C)  Heart Rate:  [106-127] 106  Resp:  [15-20] 20  BP: ()/(34-77) 110/54  Body mass index is  21.98 kg/m².    Intake/Output Summary (Last 24 hours) at 9/24/2024 1433  Last data filed at 9/24/2024 1342  Gross per 24 hour   Intake 633 ml   Output 200 ml   Net 433 ml     I/O this shift:  In: 633 [Blood:633]  Out: -      Physical Exam:   General: patient awake, alert and cooperative   Eyes: Normal lids and lashes, no scleral icterus   Neck: supple, normal ROM   Skin: warm and dry, not jaundiced   Cardiovascular: Tachycardic but regular   pulm: clear to auscultation bilaterally, regular and unlabored   Abdomen: soft, nontender, nondistended    Extremities: no rash or edema   Psychiatric: Normal mood and behavior; memory intact     Results Review:     I reviewed the patient's new clinical results.    Results from last 7 days   Lab Units 09/24/24  1130 09/24/24  0523 09/23/24  0533   WBC 10*3/mm3 9.63 7.42 13.00*   HEMOGLOBIN g/dL 3.8* 4.2* 9.5*   HEMATOCRIT % 12.4* 13.5* 29.2*   PLATELETS 10*3/mm3 225 212 305     Results from last 7 days   Lab Units 09/24/24  0522 09/23/24  0533   SODIUM mmol/L 145 137   POTASSIUM mmol/L 3.5 5.1   CHLORIDE mmol/L 119* 102   CO2 mmol/L 16.0* 24.0   BUN mg/dL 61* 49*   CREATININE mg/dL 0.79 1.16*   CALCIUM mg/dL 6.7* 9.3   BILIRUBIN mg/dL 0.2 0.4   ALK PHOS U/L 84 140*   ALT (SGPT) U/L 19 41*   AST (SGOT) U/L 16 34*   GLUCOSE mg/dL 103* 144*     Results from last 7 days   Lab Units 09/23/24  0533   INR  0.96     Lab Results   Lab Value Date/Time    LIPASE 42 09/23/2024 0735    LIPASE 50 12/19/2017 1128       Radiology:  CT Angiogram Chest Pulmonary Embolism   Final Result       1. The study is negative for pulmonary embolism. No acute intrathoracic   abnormality.   2. Intrathoracic and extrahepatic biliary dilatation with abrupt   tapering at the ampulla where there is questionable hypoenhancing   masslike soft tissue at the ampulla and pancreatic head. Could consider   correlating with ERCP or MRCP if clinically indicated.   3. Moderate fluid and air distended stomach and high  attenuation fluid   distention of the proximal duodenum with duodenal wall thickening,   suspected duodenal diverticula, and mild proximal duodenal wall   thickening. Could correlate with endoscopy if clinically indicated.   4. Distended gallbladder.   5. Decreased hepatic attenuation could reflect hepatic steatosis.   6. Extensive colonic diverticula without CT evidence of diverticulitis.   7. Other chronic findings, as above.       This report was finalized on 9/23/2024 8:13 AM by Lorenzo Bui MD on   Workstation: [id number]          CT Abdomen Pelvis With Contrast   Final Result       1. The study is negative for pulmonary embolism. No acute intrathoracic   abnormality.   2. Intrathoracic and extrahepatic biliary dilatation with abrupt   tapering at the ampulla where there is questionable hypoenhancing   masslike soft tissue at the ampulla and pancreatic head. Could consider   correlating with ERCP or MRCP if clinically indicated.   3. Moderate fluid and air distended stomach and high attenuation fluid   distention of the proximal duodenum with duodenal wall thickening,   suspected duodenal diverticula, and mild proximal duodenal wall   thickening. Could correlate with endoscopy if clinically indicated.   4. Distended gallbladder.   5. Decreased hepatic attenuation could reflect hepatic steatosis.   6. Extensive colonic diverticula without CT evidence of diverticulitis.   7. Other chronic findings, as above.       This report was finalized on 9/23/2024 8:13 AM by Lorenzo Bui MD on   Workstation: [id number]          XR Chest 1 View   Final Result   1. Mild interstitial prominence of the lungs somewhat exaggerated by   underinflation but overall appear unchanged from the 12/29/2017 study   and therefore likely chronic in nature.   2. No definite acute infiltrates are thought to be present.       This report was finalized on 9/23/2024 7:33 AM by Dr. Arun Romero M.D on Workstation: [id number]           MRI abdomen w wo contrast mrcp    (Results Pending)   MRI Brain With & Without Contrast    (Results Pending)   US Gallbladder    (Results Pending)       Assessment & Plan     Active Hospital Problems    Diagnosis     **Syncope     Nausea & vomiting     Status post right knee replacement     Essential hypertension        Assessment:  GI bleed-new today.  Significant drop in H&H overnight.  BUN significantly elevated relative to creatinine and now having multiple bloody bowel movements.   Acute blood loss anemia  Abnormal CT imaging of the pancreas and bile ducts, also with duodenal wall thickening which may be related to the GI bleeding.  Chronic constipation      Plan:  Awaiting posttransfusion H&H.  If hemoglobin acceptable, we will proceed with bedside EGD today.    Start Protonix drip  Will need MRI at some point to further evaluate the biliary abnormality noted on CT.  She does not think she can tolerate this without sedation.  This is on hold given her clinical change.  Continue to follow H&H and transfuse additional units if needed    I discussed the patients findings and my recommendations with patient, family, and nursing staff.            Karli Viveros M.D.  [facility]  [phone]

## 2024-09-24 NOTE — PROGRESS NOTES
[facility]  Inpatient Progress Note    Reason for Follow Up: Pancreatic mass, duodenal thickening    Subjective     Interval History:     Patient with continued nausea and vomiting this morning.  Initially reported some constipation on admission but developed significant diarrhea overnight.  Stool was tested for C. difficile and came back positive.  GI PCR was also positive for EPEC.  Infectious disease has been consulted.  Denies rectal bleeding or melena.  Currently n.p.o. in preparation for MRCP this afternoon.    Patient receiving IV fluids.  IV PPI twice daily along with IV Zosyn x 5 days.  Blood cultures pending.    CA 19-9 normal.  LFTs normalized.  H/H yesterday 9.5/29.2.  Needs recheck this morning.    Neurology following for syncope plans for CT head today.  Gallbladder ultrasound has also been ordered.    Current Facility-Administered Medications:     acetaminophen (TYLENOL) tablet 650 mg, 650 mg, Oral, Q4H PRN **OR** acetaminophen (TYLENOL) 160 MG/5ML oral solution 650 mg, 650 mg, Oral, Q4H PRN **OR** acetaminophen (TYLENOL) suppository 650 mg, 650 mg, Rectal, Q4H PRN, Jose Manuel Vazquez MD    sennosides-docusate (PERICOLACE) 8.6-50 MG per tablet 2 tablet, 2 tablet, Oral, BID PRN **AND** polyethylene glycol (MIRALAX) packet 17 g, 17 g, Oral, Daily PRN **AND** bisacodyl (DULCOLAX) EC tablet 5 mg, 5 mg, Oral, Daily PRN **AND** bisacodyl (DULCOLAX) suppository 10 mg, 10 mg, Rectal, Daily PRN, Jose Manuel Vazquez MD    Calcium Replacement - Follow Nurse / BPA Driven Protocol, , Does not apply, PRN, Jose Manuel Vazquez MD    levETIRAcetam (KEPPRA) tablet 500 mg, 500 mg, Oral, BID, Mau Zamora MD    levothyroxine (SYNTHROID, LEVOTHROID) tablet 75 mcg, 75 mcg, Oral, Daily, Jose Manuel Vazquez MD, 75 mcg at 09/23/24 1439    LORazepam (ATIVAN) injection 0.5 mg, 0.5 mg, Intravenous, Once in imaging, Jose Manuel Vazquez MD    LORazepam (ATIVAN) injection 1 mg, 1 mg, Intravenous,  Q4H PRN, Jose Manuel Vazquez MD    Magnesium Standard Dose Replacement - Follow Nurse / BPA Driven Protocol, , Does not apply, PRN, Jose Manuel Vazquez MD    nitroglycerin (NITROSTAT) SL tablet 0.4 mg, 0.4 mg, Sublingual, Q5 Min PRN, Jose Manuel Vazquez MD    ondansetron ODT (ZOFRAN-ODT) disintegrating tablet 4 mg, 4 mg, Oral, Q6H PRN **OR** ondansetron (ZOFRAN) injection 4 mg, 4 mg, Intravenous, Q6H PRN, Jose Manuel Vazquez MD, 4 mg at 09/23/24 0947    ondansetron (ZOFRAN) injection 4 mg, 4 mg, Intravenous, Once, Shaquille Whitney MD    pantoprazole (PROTONIX) injection 40 mg, 40 mg, Intravenous, BID AC, Jose Manuel Vazquez MD, 40 mg at 09/24/24 0449    Phosphorus Replacement - Follow Nurse / BPA Driven Protocol, , Does not apply, PRN, Jose Manuel Vazquez MD    piperacillin-tazobactam (ZOSYN) 3.375 g IVPB in 100 mL NS MBP (CD), 3.375 g, Intravenous, Q8H, Jose Manuel Vazquez MD, Last Rate: 0 mL/hr at 09/24/24 0211, 3.375 g at 09/24/24 0449    potassium & sodium phosphates (PHOS-NAK) 280-160-250 MG packet 2 packet, 2 packet, Oral, Once, Jose Manuel Vazquez MD    potassium chloride (KLOR-CON) packet 40 mEq, 40 mEq, Oral, Q4H, Jose Manuel Vazquez MD    Potassium Replacement - Follow Nurse / BPA Driven Protocol, , Does not apply, PRN, Jose Manuel Vazquez MD    sodium chloride 0.9 % bolus 500 mL, 500 mL, Intravenous, Once, Jose Manuel Vazquez MD, Last Rate: 500 mL/hr at 09/24/24 0922, 500 mL at 09/24/24 0922    sodium chloride 0.9 % flush 10 mL, 10 mL, Intravenous, Q12H, Jose Manuel Vazquez MD, 10 mL at 09/23/24 2101    sodium chloride 0.9 % flush 10 mL, 10 mL, Intravenous, PRN, Jose Manuel Vazquez MD    sodium chloride 0.9 % infusion 40 mL, 40 mL, Intravenous, PRN, Jose Manuel Vazquez MD    sodium chloride 0.9 % infusion, 125 mL/hr, Intravenous, Continuous, Bowen Perry MD, Last Rate: 125 mL/hr at 09/24/24 0412, 125 mL/hr at 09/24/24 0412    Facility-Administered Medications Ordered in Other Encounters:      Chlorhexidine Gluconate Cloth 2 % pads 1 each, 1 each, Apply externally, Take As Directed, Anupam Ruiz MD  Review of Systems:    All systems were reviewed and negative except for:  Gastrointestinal: positive for  diarrhea, nausea, and vomiting    Objective     Vital Signs  Temp:  [97.2 °F (36.2 °C)-98.2 °F (36.8 °C)] 97.2 °F (36.2 °C)  Heart Rate:  [104-127] 120  Resp:  [15-18] 16  BP: ()/(45-60) 98/45  Body mass index is 21.98 kg/m².    Intake/Output Summary (Last 24 hours) at 9/24/2024 0941  Last data filed at 9/24/2024 0433  Gross per 24 hour   Intake 240 ml   Output 200 ml   Net 40 ml     No intake/output data recorded.     Physical Exam:   General: patient awake, alert and cooperative   Abdomen: soft, nontender, nondistended; normal bowel sounds      Results Review:     I reviewed the patient's new clinical results.    Results from last 7 days   Lab Units 09/23/24  0533   WBC 10*3/mm3 13.00*   HEMOGLOBIN g/dL 9.5*   HEMATOCRIT % 29.2*   PLATELETS 10*3/mm3 305     Results from last 7 days   Lab Units 09/24/24  0522 09/23/24  0533   SODIUM mmol/L 145 137   POTASSIUM mmol/L 3.5 5.1   CHLORIDE mmol/L 119* 102   CO2 mmol/L 16.0* 24.0   BUN mg/dL 61* 49*   CREATININE mg/dL 0.79 1.16*   CALCIUM mg/dL 6.7* 9.3   BILIRUBIN mg/dL 0.2 0.4   ALK PHOS U/L 84 140*   ALT (SGPT) U/L 19 41*   AST (SGOT) U/L 16 34*   GLUCOSE mg/dL 103* 144*     Results from last 7 days   Lab Units 09/23/24  0533   INR  0.96     Lab Results   Lab Value Date/Time    LIPASE 42 09/23/2024 0735    LIPASE 50 12/19/2017 1128       Radiology:  CT Angiogram Chest Pulmonary Embolism   Final Result       1. The study is negative for pulmonary embolism. No acute intrathoracic   abnormality.   2. Intrathoracic and extrahepatic biliary dilatation with abrupt   tapering at the ampulla where there is questionable hypoenhancing   masslike soft tissue at the ampulla and pancreatic head. Could consider   correlating with ERCP or MRCP if clinically  indicated.   3. Moderate fluid and air distended stomach and high attenuation fluid   distention of the proximal duodenum with duodenal wall thickening,   suspected duodenal diverticula, and mild proximal duodenal wall   thickening. Could correlate with endoscopy if clinically indicated.   4. Distended gallbladder.   5. Decreased hepatic attenuation could reflect hepatic steatosis.   6. Extensive colonic diverticula without CT evidence of diverticulitis.   7. Other chronic findings, as above.       This report was finalized on 9/23/2024 8:13 AM by Lorenzo Bui MD on   Workstation: [id number]          CT Abdomen Pelvis With Contrast   Final Result       1. The study is negative for pulmonary embolism. No acute intrathoracic   abnormality.   2. Intrathoracic and extrahepatic biliary dilatation with abrupt   tapering at the ampulla where there is questionable hypoenhancing   masslike soft tissue at the ampulla and pancreatic head. Could consider   correlating with ERCP or MRCP if clinically indicated.   3. Moderate fluid and air distended stomach and high attenuation fluid   distention of the proximal duodenum with duodenal wall thickening,   suspected duodenal diverticula, and mild proximal duodenal wall   thickening. Could correlate with endoscopy if clinically indicated.   4. Distended gallbladder.   5. Decreased hepatic attenuation could reflect hepatic steatosis.   6. Extensive colonic diverticula without CT evidence of diverticulitis.   7. Other chronic findings, as above.       This report was finalized on 9/23/2024 8:13 AM by Lorenzo Bui MD on   Workstation: [id number]          XR Chest 1 View   Final Result   1. Mild interstitial prominence of the lungs somewhat exaggerated by   underinflation but overall appear unchanged from the 12/29/2017 study   and therefore likely chronic in nature.   2. No definite acute infiltrates are thought to be present.       This report was finalized on 9/23/2024 7:33 AM  by Dr. Arun Romero M.D on Workstation: [id number]          MRI abdomen w wo contrast mrcp    (Results Pending)   MRI Brain With & Without Contrast    (Results Pending)       Assessment & Plan     Active Hospital Problems    Diagnosis     **Syncope     Nausea & vomiting     Status post right knee replacement     Essential hypertension        Assessment:  Abnormal CT imaging of the pancreas and bile ducts  Abnormal CT imaging of the duodenum with wall thickening  Syncopal episode -neuro following  Nausea and vomiting  Anemia  Chronic constipation now with diarrhea.  Stool positive for C. difficile and EPEC    Plan:  Await MRCP findings  Will likely need upper endoscopy to evaluate duodenal wall thickening as well as anemia but will await MRCP  Supportive care for nausea vomiting with antiemetics  Appreciate infectious disease input on stool testing and diarrhea symptoms  I have ordered a CBC to be done now  Continue PPI therapy    Addendum: Repeat CBC with hemoglobin of 4.2.  Patient transferred to intensive care.  Dr. Viveros will round on patient shortly.    I discussed the patients findings and my recommendations with patient and her Jackeline .    TERESA Dela Cruz

## 2024-09-24 NOTE — CONSULTS
"Referring Provider: Jose Manuel Vazquez MD  [address]  [address] [address]    Reason for Consultation: EPEC, C. difficile PCR positive on stool studies.  Sepsis     History of present illness:  Bailee Garza is a 84 y.o. who I am asked to evaluate and give opinion for \"EPEC, C. difficile PCR positive on stool studies.  Sepsis.\" History is obtained from the patient and review of the old medical records which I summarize/synthesize as follows: He presented to the emergency room on 9/23/2024 due to syncope while going to the restroom.  She had associated nausea and vomiting.  She had an episode of unconsciousness.  She had not been having any fevers.    In the ER she was afebrile.  Labs were notable for Pro-He 21, ALT 41, AST 34, alk phos 140, WBC 13, and creatinine 1.1.  The most concerning lab she has had is a hematocrit of 29 at admission that has now decreased to 13.  She is also developed bloody bowel movements.  Chest x-ray did not show any evidence of pneumonia.  She had a CT chest/abdomen/pelvis that showed a pancreatic mass and intrathoracic and extrahepatic biliary dilatation as well as some duodenal thickening.  Gastroenterology was consulted and recommended an MRCP.  Loose stools she had a C. difficile checked which was PCR positive but antigen negative.  Her GI PCR was positive for EPEC so the primary team was placed an ID consult.    Past Medical History:   Diagnosis Date    Abnormal CXR 12/18/2017    Adverse reaction to narcotic drug     SEVERE HALLUCINATIONS POST OP LEFT HIP PLACEMENT    Allergies     Arthritis     Arthritis of foot 04/17/2020    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    unspecified    Back pain 11/20/2007    Chronic back pain 07/13/2009    Chronic cough 12/18/2017    CKD (chronic kidney disease)     Closed nondisplaced fracture of lateral malleolus of fibula with delayed healing 01/13/2020    Closed nondisplaced fracture of medial cuneiform of left foot " 2018    Constipation 2015    unspecified    COVID-19 vaccination refused     COVID-19 virus detected 10/17/2022    Diverticulosis     Dyspepsia 2008    Essential hypertension 2007    Exertional shortness of breath 2017    Fall 2018    Family history of abdominal aortic aneurysm (AAA) 2019    [place] aaa screen limited (04/10/2019 10:32)     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), 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    Hearing loss 2008    History of bone density study 2015    History of pneumonia     2017    History of skin cancer     Hypothyroidism, acquired 2007    Insomnia 2015    unspecified    Intervertebral disc disorder with myelopathy, cervical region 2010    Joint capsule tear 2012    unspecified    OA (osteoarthritis) of hip 2018    Other synovitis and tenosynovitis, right ankle and foot 2020    Peroneal tendonitis, right 2020    Rhinitis, allergic 2007    Right knee pain     Scoliosis     Screening breast examination 2007    Stress 2015    other acute reactions to stress    Stress incontinence     Trochanteric bursitis, left hip 2019    Urticaria 2015       Past Surgical History:   Procedure Laterality Date    BREAST EXCISIONAL BIOPSY Right     50+ years ago    BRONCHOSCOPY N/A 2017    Procedure: BRONCHOSCOPY;  Surgeon: Mario Alanis MD;  Location: [facility] ENDOSCOPY;  Service:     CATARACT EXTRACTION, BILATERAL      COLONOSCOPY      HYSTERECTOMY      TOTAL HIP ARTHROPLASTY Left 2018    Procedure: LEFT TOTAL HIP ARTHROPLASTY;  Surgeon: Justen Mann MD;  Location: [facility] MAIN OR;  Service: Orthopedics    TOTAL HIP ARTHROPLASTY Right 2022    Procedure: Posterior RIGHT TOTAL HIP ARTHROPLASTY MARCELINO NAVIGATION;  Surgeon: Anupam Ruiz MD;  Location: [facility] OR OSC;  Service: Orthopedics;  Laterality: Right;    TOTAL KNEE  ARTHROPLASTY Right 9/18/2024    Procedure: TOTAL KNEE ARTHROPLASTY;  Surgeon: Jesus Thayer MD;  Location: [facility];  Service: Orthopedics;  Laterality: Right;         Antibiotic allergies and intolerances:    Sulfa causes hives; Paxlovid caused insomnia which is not an allergy    Medications:    Current Facility-Administered Medications:     acetaminophen (TYLENOL) tablet 650 mg, 650 mg, Oral, Q4H PRN **OR** acetaminophen (TYLENOL) 160 MG/5ML oral solution 650 mg, 650 mg, Oral, Q4H PRN **OR** acetaminophen (TYLENOL) suppository 650 mg, 650 mg, Rectal, Q4H PRN, Jose Manuel Vazquez MD    sennosides-docusate (PERICOLACE) 8.6-50 MG per tablet 2 tablet, 2 tablet, Oral, BID PRN **AND** polyethylene glycol (MIRALAX) packet 17 g, 17 g, Oral, Daily PRN **AND** bisacodyl (DULCOLAX) EC tablet 5 mg, 5 mg, Oral, Daily PRN **AND** bisacodyl (DULCOLAX) suppository 10 mg, 10 mg, Rectal, Daily PRN, Jose Manuel Vazquez MD    Calcium Replacement - Follow Nurse / BPA Driven Protocol, , Does not apply, PRN, Jose Manuel Vazquez MD    folic acid (FOLVITE) tablet 1 mg, 1 mg, Oral, Daily, Jose Manuel Vazquez MD    levETIRAcetam (KEPPRA) tablet 500 mg, 500 mg, Oral, BID, Mau Zamora MD    levothyroxine (SYNTHROID, LEVOTHROID) tablet 75 mcg, 75 mcg, Oral, Daily, Jose Manuel Vazquez MD, 75 mcg at 09/23/24 1439    LORazepam (ATIVAN) injection 0.5 mg, 0.5 mg, Intravenous, Once in imaging, Jose Manuel Vazquez MD    LORazepam (ATIVAN) injection 1 mg, 1 mg, Intravenous, Q4H PRN, Jose Manuel Vazquez MD    Magnesium Standard Dose Replacement - Follow Nurse / BPA Driven Protocol, , Does not apply, PRN, Jose Manuel Vazquez MD    nitroglycerin (NITROSTAT) SL tablet 0.4 mg, 0.4 mg, Sublingual, Q5 Min PRN, Jose Manuel Vazquez MD    ondansetron ODT (ZOFRAN-ODT) disintegrating tablet 4 mg, 4 mg, Oral, Q6H PRN **OR** ondansetron (ZOFRAN) injection 4 mg, 4 mg, Intravenous, Q6H PRN, Jose Manuel Vazquez MD, 4 mg at 09/23/24 0982    ondansetron  (ZOFRAN) injection 4 mg, 4 mg, Intravenous, Once, Shaquille Whitney MD    pantoprazole (PROTONIX) injection 40 mg, 40 mg, Intravenous, BID AC, Jose Manuel Vazquez MD, 40 mg at 09/24/24 0449    Phosphorus Replacement - Follow Nurse / BPA Driven Protocol, , Does not apply, PRNGeorge Patrick D, MD    piperacillin-tazobactam (ZOSYN) 3.375 g IVPB in 100 mL NS MBP (CD), 3.375 g, Intravenous, Q8H, Jose Manuel Vazquez MD, Last Rate: 0 mL/hr at 09/24/24 0211, 3.375 g at 09/24/24 0449    potassium & sodium phosphates (PHOS-NAK) 280-160-250 MG packet 2 packet, 2 packet, Oral, Once, Jose Manuel Vazquez MD    potassium chloride (KLOR-CON) packet 40 mEq, 40 mEq, Oral, Q4H, Jose Manuel Vazquez MD    Potassium Replacement - Follow Nurse / BPA Driven Protocol, , Does not apply, PRN, Jose Manuel Vazquez MD    sodium chloride 0.9 % flush 10 mL, 10 mL, Intravenous, Q12H, Jose Manuel Vazquez MD, 10 mL at 09/23/24 2101    sodium chloride 0.9 % flush 10 mL, 10 mL, Intravenous, PRN, Jose Manuel Vazquez MD    sodium chloride 0.9 % infusion 40 mL, 40 mL, Intravenous, PRN, Jose Manuel Vazquez MD    sodium chloride 0.9 % infusion, 125 mL/hr, Intravenous, Continuous, Bowen Perry MD, Last Rate: 125 mL/hr at 09/24/24 0412, 125 mL/hr at 09/24/24 0412    Facility-Administered Medications Ordered in Other Encounters:     Chlorhexidine Gluconate Cloth 2 % pads 1 each, 1 each, Apply externally, Take As Directed, Anupam Ruiz MD      Objective   Vital Signs   Temp:  [97.2 °F (36.2 °C)-98.2 °F (36.8 °C)] 97.2 °F (36.2 °C)  Heart Rate:  [117-127] 120  Resp:  [15-18] 16  BP: ()/(34-60) 101/34    Physical Exam:   General: awake, alert, NAD   Eyes: no scleral icterus  ENT: no thrush  Cardiovascular: NR  Respiratory: normal work of breathing on ambient air  GI: Abdomen is soft, not tender, + bowel sounds in all four quadrants  :  no Adams catheter  Skin: No rashes  Neurological: Alert and oriented x 3  Psychiatric: Normal mood and affect    Vasc: PIV w/o erythema    Labs:     Lab Results   Component Value Date    WBC 7.42 09/24/2024    HGB 4.2 (C) 09/24/2024    HCT 13.5 (C) 09/24/2024    .5 (H) 09/24/2024     09/24/2024       Lab Results   Component Value Date    GLUCOSE 103 (H) 09/24/2024    BUN 61 (H) 09/24/2024    CREATININE 0.79 09/24/2024    EGFRIFNONA 45 (L) 07/15/2021    EGFRIFAFRI 54 (L) 07/15/2021    BCR 77.2 (H) 09/24/2024    CO2 16.0 (L) 09/24/2024    CALCIUM 6.7 (L) 09/24/2024    PROTENTOTREF 6.4 08/05/2024    ALBUMIN 2.4 (L) 09/24/2024    LABIL2 2.4 08/05/2024    AST 16 09/24/2024    ALT 19 09/24/2024     Lab Results   Component Value Date    HGBA1C 5.20 09/24/2024     CA 19-9: < 2  Blood Type: AB positive  FOBT +  LA 2.8  Procalcitonin 21 now decreased to 10    Microbiology:  9/23 RPP: Negative  9/23 BCx: Negative to date  9/24 C. difficile PCR positive but antigen negative  9/20 4 GI PCR + EPEC    Radiology:  MRI brain pending    Bilateral lower extremity Doppler negative for DVT    CTA chest/abdomen/pelvis: Masslike soft tissue at the ampulla and pancreatic head recommend ERCP or MRCP.  Moderate fluid and air distended stomach and high attenuation fluid in the duodenum with duodenal wall thickening.  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  She is having bloody bowel movements.  This is her most current pressing issue.    To address the infectious diseases question at hand, given her markedly elevated procalcitonin of 21 and the fact that her blood cultures have not yet matured 24 hours, I think it would be reasonable to continue empiric Zosyn 3.375 g IV every 8 hours.  However, I will try to make this for as short of a course of possible since she is a ANNIE  difficile carrier.  Zosyn will be active against the enteropathogenic E. Coli if it is relevant to her presentation and imaging findings (specifically the duodenal thickening).    Pancreatic mass workup per GI and primary team.    I will check on her again tomorrow.

## 2024-09-24 NOTE — NURSING NOTE
Attempted to complete orthostatics on patient... patient became unresponsive, held by staff and assisted back to bed, patient incontinent during event. Patient arousable, yelitza to answer questions to orientation appropriately except time. Will notify on call lha.

## 2024-09-24 NOTE — DISCHARGE PLACEMENT REQUEST
"Bailee Garza (84 y.o. Female)       Date of Birth   1940    Social Security Number       Address   [address] [place] [address]    Home Phone   [phone]    MRN   [id number]       Spiritism   [employer]    Marital Status                               Admission Date   9/23/24    Admission Type   Emergency    Admitting Provider   Jose Manuel Vazquez MD    Attending Provider   Jose Manuel Vazquez MD    Department, Room/Bed   [address], [address]1       Discharge Date       Discharge Disposition       Discharge Destination                                 Attending Provider: Jose Manuel Vazquez MD    Allergies: Hydrocodone, Paxlovid [Nirmatrelvir-ritonavir], Ketek [Telithromycin], Nsaids, Sulfa Antibiotics    Isolation: None   Infection: C.difficile (rule out) (09/24/24)   Code Status: CPR    Ht: 157.5 cm (62\")   Wt: 54.5 kg (120 lb 2.4 oz)    Admission Cmt: None   Principal Problem: Syncope [R55]                   Active Insurance as of 9/23/2024       Primary Coverage       Payor Plan Insurance Group Employer/Plan Group    MEDICARE MEDICARE A & B        Payor Plan Address Payor Plan Phone Number Payor Plan Fax Number Effective Dates    [address] [phone]  3/1/2006 - None Entered    [facility] [id number]         Subscriber Name Subscriber Birth Date Member ID       BAILEE GARZA 1940 [id number]               Secondary Coverage       Payor Plan Insurance Group Employer/Plan Group    BHC [facility] SUPP [id number]       Payor Plan Address Payor Plan Phone Number Payor Plan Fax Number Effective Dates    [address]   5/1/2022 - None Entered    [facility] [id number]         Subscriber Name Subscriber Birth Date Member ID       BAILEE GARZA 1940 [id number]                     Emergency Contacts        (Rel.) Home Phone Work Phone Mobile Phone    Phyllis Diaz (Daughter) -- -- [phone]    Christopher Diaz (Son) [phone] -- " --

## 2024-09-24 NOTE — CASE MANAGEMENT/SOCIAL WORK
Discharge Planning Assessment  [facility]     Patient Name: Bailee Garza  MRN: [id number]  Today's Date: 9/24/2024    Admit Date: 9/23/2024    Plan: Lives alone; current with [facility]   Discharge Needs Assessment       Row Name 09/24/24 0921       Living Environment    People in Home alone    Current Living Arrangements home    Potentially Unsafe Housing Conditions none    Primary Care Provided by self    Provides Primary Care For no one    Family Caregiver if Needed child(bhupinder), adult    Quality of Family Relationships involved;helpful;supportive       Resource/Environmental Concerns    Resource/Environmental Concerns none       Transition Planning    Patient/Family Anticipates Transition to home with family    Patient/Family Anticipated Services at Transition home health care    Transportation Anticipated family or friend will provide       Discharge Needs Assessment    Current Outpatient/Agency/Support Group homecare agency    Equipment Currently Used at Home walker, rolling    Concerns to be Addressed care coordination/care conferences    Outpatient/Agency/Support Group Needs homecare agency    Discharge Facility/Level of Care Needs home with home health                   Discharge Plan       Row Name 09/24/24 0921       Plan    Plan Lives alone; current with [facility]    Plan Comments CCP met with patient's daughterPhyllis at bedside. CCP role explained and discharge planning discussed. Face sheet verified and MARTINEZ noted. Patient's PCP is Dr. Araya. Patient lives alone but her daughter has been staying with her. Patient has two steps to the entrance of the home. Patient's bedroom and bathroom are on the main level. Patient has been using a walker for mobility. Patient is current with [facility]. CCP will follow for clinical course and PT grey [facility]                  Continued Care and Services - Admitted Since 9/23/2024    No active coordination exists for this encounter.       Selected Continued Care - Prior  Encounters Includes continued care and service providers with selected services from prior encounters from 6/25/2024 to 9/24/2024      Discharged on 9/18/2024 Admission date: 9/18/2024 - Discharge disposition: Home-Health Care c      Home Medical Care       Service Provider Selected Services Address Phone Fax Patient Preferred    [facility] [address] [id number] [phone] --                          Expected Discharge Date and Time       Expected Discharge Date Expected Discharge Time    Sep 24, 2024            Demographic Summary       Row Name 09/24/24 0920       General Information    Admission Type observation    Arrived From emergency department    Required Notices Provided Observation Status Notice    Referral Source admission list    Reason for Consult discharge planning    Preferred Language English                   Functional Status       Row Name 09/24/24 0921       Functional Status    Usual Activity Tolerance good    Current Activity Tolerance moderate       Functional Status, IADL    Medications assistive equipment    Meal Preparation assistive equipment    Housekeeping assistive equipment    Laundry assistive equipment    Shopping assistive equipment       Mental Status Summary    Recent Changes in Mental Status/Cognitive Functioning mental status                   Psychosocial    No documentation.                  Abuse/Neglect    No documentation.                  Legal    No documentation.                  Substance Abuse    No documentation.                  Patient Forms    No documentation.                     CAMPBELL Rowe

## 2024-09-25 ENCOUNTER — TELEPHONE (OUTPATIENT)
Dept: ORTHOPEDIC SURGERY | Facility: CLINIC | Age: 84
End: 2024-09-25
Payer: MEDICARE

## 2024-09-25 ENCOUNTER — APPOINTMENT (OUTPATIENT)
Dept: MRI IMAGING | Facility: HOSPITAL | Age: 84
End: 2024-09-25
Payer: MEDICARE

## 2024-09-25 LAB
ALBUMIN SERPL-MCNC: 2.7 G/DL (ref 3.5–5.2)
ALBUMIN/GLOB SERPL: 1.7 G/DL
ALP SERPL-CCNC: 77 U/L (ref 39–117)
ALT SERPL W P-5'-P-CCNC: 18 U/L (ref 1–33)
ANION GAP SERPL CALCULATED.3IONS-SCNC: 8.9 MMOL/L (ref 5–15)
AST SERPL-CCNC: 17 U/L (ref 1–32)
BASOPHILS # BLD MANUAL: 0.1 10*3/MM3 (ref 0–0.2)
BASOPHILS NFR BLD MANUAL: 1 % (ref 0–1.5)
BH BB BLOOD EXPIRATION DATE: NORMAL
BH BB BLOOD EXPIRATION DATE: NORMAL
BH BB BLOOD TYPE BARCODE: 8400
BH BB BLOOD TYPE BARCODE: 8400
BH BB DISPENSE STATUS: NORMAL
BH BB DISPENSE STATUS: NORMAL
BH BB PRODUCT CODE: NORMAL
BH BB PRODUCT CODE: NORMAL
BH BB UNIT NUMBER: NORMAL
BH BB UNIT NUMBER: NORMAL
BILIRUB SERPL-MCNC: 0.2 MG/DL (ref 0–1.2)
BUN SERPL-MCNC: 48 MG/DL (ref 8–23)
BUN/CREAT SERPL: 48.5 (ref 7–25)
CALCIUM SPEC-SCNC: 7.7 MG/DL (ref 8.6–10.5)
CHLORIDE SERPL-SCNC: 120 MMOL/L (ref 98–107)
CO2 SERPL-SCNC: 19.1 MMOL/L (ref 22–29)
CREAT SERPL-MCNC: 0.99 MG/DL (ref 0.57–1)
CROSSMATCH INTERPRETATION: NORMAL
CROSSMATCH INTERPRETATION: NORMAL
DEPRECATED RDW RBC AUTO: 45.7 FL (ref 37–54)
EGFRCR SERPLBLD CKD-EPI 2021: 56.7 ML/MIN/1.73
EOSINOPHIL # BLD MANUAL: 0 10*3/MM3 (ref 0–0.4)
EOSINOPHIL NFR BLD MANUAL: 0 % (ref 0.3–6.2)
ERYTHROCYTE [DISTWIDTH] IN BLOOD BY AUTOMATED COUNT: 13.3 % (ref 12.3–15.4)
GLOBULIN UR ELPH-MCNC: 1.6 GM/DL
GLUCOSE SERPL-MCNC: 101 MG/DL (ref 65–99)
HCT VFR BLD AUTO: 19.5 % (ref 34–46.6)
HCT VFR BLD AUTO: 21.4 % (ref 34–46.6)
HGB BLD-MCNC: 6.5 G/DL (ref 12–15.9)
HGB BLD-MCNC: 7 G/DL (ref 12–15.9)
LYMPHOCYTES # BLD MANUAL: 1.3 10*3/MM3 (ref 0.7–3.1)
LYMPHOCYTES NFR BLD MANUAL: 5 % (ref 5–12)
MAGNESIUM SERPL-MCNC: 2 MG/DL (ref 1.6–2.4)
MCH RBC QN AUTO: 31 PG (ref 26.6–33)
MCHC RBC AUTO-ENTMCNC: 32.7 G/DL (ref 31.5–35.7)
MCV RBC AUTO: 94.7 FL (ref 79–97)
METAMYELOCYTES NFR BLD MANUAL: 1 % (ref 0–0)
MONOCYTES # BLD: 0.5 10*3/MM3 (ref 0.1–0.9)
MYELOCYTES NFR BLD MANUAL: 2 % (ref 0–0)
NEUTROPHILS # BLD AUTO: 7.78 10*3/MM3 (ref 1.7–7)
NEUTROPHILS NFR BLD MANUAL: 78 % (ref 42.7–76)
NRBC BLD AUTO-RTO: 0.3 /100 WBC (ref 0–0.2)
PHOSPHATE SERPL-MCNC: 1.9 MG/DL (ref 2.5–4.5)
PLAT MORPH BLD: NORMAL
PLATELET # BLD AUTO: 187 10*3/MM3 (ref 140–450)
PMV BLD AUTO: 10.3 FL (ref 6–12)
POLYCHROMASIA BLD QL SMEAR: ABNORMAL
POTASSIUM SERPL-SCNC: 3.9 MMOL/L (ref 3.5–5.2)
PROCALCITONIN SERPL-MCNC: 6.24 NG/ML (ref 0–0.25)
PROT SERPL-MCNC: 4.3 G/DL (ref 6–8.5)
RBC # BLD AUTO: 2.26 10*6/MM3 (ref 3.77–5.28)
SODIUM SERPL-SCNC: 148 MMOL/L (ref 136–145)
UNIT  ABO: NORMAL
UNIT  ABO: NORMAL
UNIT  RH: NORMAL
UNIT  RH: NORMAL
VARIANT LYMPHS NFR BLD MANUAL: 13 % (ref 19.6–45.3)
WBC MORPH BLD: NORMAL
WBC NRBC COR # BLD AUTO: 9.97 10*3/MM3 (ref 3.4–10.8)

## 2024-09-25 PROCEDURE — 80053 COMPREHEN METABOLIC PANEL: CPT | Performed by: INTERNAL MEDICINE

## 2024-09-25 PROCEDURE — 83735 ASSAY OF MAGNESIUM: CPT | Performed by: INTERNAL MEDICINE

## 2024-09-25 PROCEDURE — 99233 SBSQ HOSP IP/OBS HIGH 50: CPT | Performed by: INTERNAL MEDICINE

## 2024-09-25 PROCEDURE — 85014 HEMATOCRIT: CPT | Performed by: INTERNAL MEDICINE

## 2024-09-25 PROCEDURE — A9577 INJ MULTIHANCE: HCPCS | Performed by: INTERNAL MEDICINE

## 2024-09-25 PROCEDURE — 0 GADOBENATE DIMEGLUMINE 529 MG/ML SOLUTION: Performed by: INTERNAL MEDICINE

## 2024-09-25 PROCEDURE — 84145 PROCALCITONIN (PCT): CPT | Performed by: INTERNAL MEDICINE

## 2024-09-25 PROCEDURE — 36430 TRANSFUSION BLD/BLD COMPNT: CPT

## 2024-09-25 PROCEDURE — 74183 MRI ABD W/O CNTR FLWD CNTR: CPT

## 2024-09-25 PROCEDURE — 25010000002 LORAZEPAM PER 2 MG: Performed by: INTERNAL MEDICINE

## 2024-09-25 PROCEDURE — 25010000002 PIPERACILLIN SOD-TAZOBACTAM PER 1 G: Performed by: INTERNAL MEDICINE

## 2024-09-25 PROCEDURE — 70553 MRI BRAIN STEM W/O & W/DYE: CPT

## 2024-09-25 PROCEDURE — 99232 SBSQ HOSP IP/OBS MODERATE 35: CPT | Performed by: INTERNAL MEDICINE

## 2024-09-25 PROCEDURE — 84100 ASSAY OF PHOSPHORUS: CPT | Performed by: INTERNAL MEDICINE

## 2024-09-25 PROCEDURE — 85025 COMPLETE CBC W/AUTO DIFF WBC: CPT | Performed by: INTERNAL MEDICINE

## 2024-09-25 PROCEDURE — P9016 RBC LEUKOCYTES REDUCED: HCPCS

## 2024-09-25 PROCEDURE — 86900 BLOOD TYPING SEROLOGIC ABO: CPT

## 2024-09-25 PROCEDURE — 85018 HEMOGLOBIN: CPT | Performed by: INTERNAL MEDICINE

## 2024-09-25 PROCEDURE — 85007 BL SMEAR W/DIFF WBC COUNT: CPT | Performed by: INTERNAL MEDICINE

## 2024-09-25 RX ORDER — LORAZEPAM 2 MG/ML
2 INJECTION INTRAMUSCULAR ONCE
Status: COMPLETED | OUTPATIENT
Start: 2024-09-25 | End: 2024-09-25

## 2024-09-25 RX ORDER — LOPERAMIDE HCL 2 MG
4 CAPSULE ORAL ONCE
Status: COMPLETED | OUTPATIENT
Start: 2024-09-25 | End: 2024-09-25

## 2024-09-25 RX ORDER — LOPERAMIDE HCL 2 MG
2 CAPSULE ORAL 4 TIMES DAILY PRN
Status: DISCONTINUED | OUTPATIENT
Start: 2024-09-25 | End: 2024-10-01 | Stop reason: HOSPADM

## 2024-09-25 RX ADMIN — LORAZEPAM 2 MG: 2 INJECTION INTRAMUSCULAR; INTRAVENOUS at 12:26

## 2024-09-25 RX ADMIN — LOPERAMIDE HYDROCHLORIDE 2 MG: 2 CAPSULE ORAL at 20:52

## 2024-09-25 RX ADMIN — PANTOPRAZOLE SODIUM 40 MG: 40 INJECTION, POWDER, FOR SOLUTION INTRAVENOUS at 01:30

## 2024-09-25 RX ADMIN — LEVOTHYROXINE SODIUM 75 MCG: 75 TABLET ORAL at 09:23

## 2024-09-25 RX ADMIN — PANTOPRAZOLE SODIUM 40 MG: 40 INJECTION, POWDER, FOR SOLUTION INTRAVENOUS at 23:42

## 2024-09-25 RX ADMIN — SUCRALFATE 1 G: 1 TABLET ORAL at 12:26

## 2024-09-25 RX ADMIN — SUCRALFATE 1 G: 1 TABLET ORAL at 06:39

## 2024-09-25 RX ADMIN — PANTOPRAZOLE SODIUM 40 MG: 40 INJECTION, POWDER, FOR SOLUTION INTRAVENOUS at 05:46

## 2024-09-25 RX ADMIN — SUCRALFATE 1 G: 1 TABLET ORAL at 17:04

## 2024-09-25 RX ADMIN — PIPERACILLIN AND TAZOBACTAM 3.38 G: 3; .375 INJECTION, POWDER, FOR SOLUTION INTRAVENOUS at 20:51

## 2024-09-25 RX ADMIN — PIPERACILLIN AND TAZOBACTAM 3.38 G: 3; .375 INJECTION, POWDER, FOR SOLUTION INTRAVENOUS at 04:25

## 2024-09-25 RX ADMIN — LEVETIRACETAM 500 MG: 500 TABLET, FILM COATED ORAL at 09:23

## 2024-09-25 RX ADMIN — PIPERACILLIN AND TAZOBACTAM 3.38 G: 3; .375 INJECTION, POWDER, FOR SOLUTION INTRAVENOUS at 12:26

## 2024-09-25 RX ADMIN — PANTOPRAZOLE SODIUM 40 MG: 40 INJECTION, POWDER, FOR SOLUTION INTRAVENOUS at 12:34

## 2024-09-25 RX ADMIN — FOLIC ACID 1 MG: 1 TABLET ORAL at 09:23

## 2024-09-25 RX ADMIN — LORAZEPAM 1 MG: 2 INJECTION INTRAMUSCULAR; INTRAVENOUS at 20:52

## 2024-09-25 RX ADMIN — Medication 10 ML: at 09:23

## 2024-09-25 RX ADMIN — PANTOPRAZOLE SODIUM 40 MG: 40 INJECTION, POWDER, FOR SOLUTION INTRAVENOUS at 18:39

## 2024-09-25 RX ADMIN — LOPERAMIDE HYDROCHLORIDE 4 MG: 2 CAPSULE ORAL at 12:25

## 2024-09-25 RX ADMIN — Medication 10 ML: at 20:52

## 2024-09-25 RX ADMIN — LEVETIRACETAM 500 MG: 500 TABLET, FILM COATED ORAL at 20:52

## 2024-09-25 RX ADMIN — GADOBENATE DIMEGLUMINE 10 ML: 529 INJECTION, SOLUTION INTRAVENOUS at 15:46

## 2024-09-25 RX ADMIN — LORAZEPAM 1 MG: 2 INJECTION INTRAMUSCULAR; INTRAVENOUS at 14:58

## 2024-09-25 RX ADMIN — POTASSIUM, SODIUM PHOSPHATES 280 MG-160 MG-250 MG ORAL POWDER PACKET 2 PACKET: POWDER IN PACKET at 05:46

## 2024-09-25 NOTE — PROGRESS NOTES
ID note     Chief complaint: Follow-up EPEC, C. difficile PCR positive on stool studies.      Subjective: She had an EGD yesterday that showed multiple bleeding duodenal ulcers.  Thanks to GI for their intervention.  The patient appears more stable today and her heart rate is back to normal.  She had a mild fever of 100.4 F about 8 PM last night.  Her WBC is normal at 9.  She continues to have loose stools.    Antibiotic allergies and intolerances:    Sulfa causes hives; Paxlovid caused insomnia which is not an allergy    Medications:    Current Facility-Administered Medications:     acetaminophen (TYLENOL) tablet 650 mg, 650 mg, Oral, Q4H PRN **OR** acetaminophen (TYLENOL) 160 MG/5ML oral solution 650 mg, 650 mg, Oral, Q4H PRN **OR** acetaminophen (TYLENOL) suppository 650 mg, 650 mg, Rectal, Q4H PRN, Jose Manuel Vazquez MD    sennosides-docusate (PERICOLACE) 8.6-50 MG per tablet 2 tablet, 2 tablet, Oral, BID PRN **AND** polyethylene glycol (MIRALAX) packet 17 g, 17 g, Oral, Daily PRN **AND** bisacodyl (DULCOLAX) EC tablet 5 mg, 5 mg, Oral, Daily PRN **AND** bisacodyl (DULCOLAX) suppository 10 mg, 10 mg, Rectal, Daily PRN, Jose Manuel Vazquez MD    Calcium Replacement - Follow Nurse / BPA Driven Protocol, , Does not apply, PRN, Jose Manuel Vazquez MD    folic acid (FOLVITE) tablet 1 mg, 1 mg, Oral, Daily, Jose Manuel Vazquez MD    levETIRAcetam (KEPPRA) tablet 500 mg, 500 mg, Oral, BID, Mau Zamora MD, 500 mg at 09/24/24 2221    levothyroxine (SYNTHROID, LEVOTHROID) tablet 75 mcg, 75 mcg, Oral, Daily, Jose Manuel Vazquez MD, 75 mcg at 09/23/24 1439    LORazepam (ATIVAN) injection 0.5 mg, 0.5 mg, Intravenous, Once in imaging, Jose Manuel Vazquez MD    LORazepam (ATIVAN) injection 1 mg, 1 mg, Intravenous, Q4H PRN, Jose Manuel Vazquez MD    Magnesium Standard Dose Replacement - Follow Nurse / BPA Driven Protocol, , Does not apply, PRNGeorge Patrick D, MD    nitroglycerin (NITROSTAT) SL tablet 0.4 mg, 0.4  mg, Sublingual, Q5 Min PRN, Jose Manuel Vazquez MD    ondansetron ODT (ZOFRAN-ODT) disintegrating tablet 4 mg, 4 mg, Oral, Q6H PRN **OR** ondansetron (ZOFRAN) injection 4 mg, 4 mg, Intravenous, Q6H PRN, Jose Manuel Vazquez MD, 4 mg at 09/23/24 0947    ondansetron (ZOFRAN) injection 4 mg, 4 mg, Intravenous, Once, Shaquille Whitney MD    [COMPLETED] pantoprazole (PROTONIX) injection 80 mg, 80 mg, Intravenous, Once, 80 mg at 09/24/24 1533 **AND** pantoprazole (PROTONIX) 40 mg in sodium chloride 0.9 % 100 mL (0.4 mg/mL) MBP, 40 mg, Intravenous, Continuous, Karli Viveros MD, Last Rate: 20 mL/hr at 09/25/24 0546, 40 mg at 09/25/24 0546    Phosphorus Replacement - Follow Nurse / BPA Driven Protocol, , Does not apply, PRN, Jose Manuel Vazquez MD    piperacillin-tazobactam (ZOSYN) 3.375 g IVPB in 100 mL NS MBP (CD), 3.375 g, Intravenous, Q8H, Jose Manuel Vazquez MD, Last Rate: 0 mL/hr at 09/24/24 0211, 3.375 g at 09/25/24 0425    potassium & sodium phosphates (PHOS-NAK) 280-160-250 MG packet 2 packet, 2 packet, Oral, Once, Jose Manuel Vazquez MD    Potassium Replacement - Follow Nurse / BPA Driven Protocol, , Does not apply, PRNGeorge Patrick D, MD    sodium chloride 0.9 % flush 10 mL, 10 mL, Intravenous, Q12H, Jose Manuel Vazquez MD, 10 mL at 09/24/24 2027    sodium chloride 0.9 % flush 10 mL, 10 mL, Intravenous, PRN, Jose Manuel Vazquez MD    sodium chloride 0.9 % infusion 40 mL, 40 mL, Intravenous, PRN, Jose Manuel Vazquez MD    sodium chloride 0.9 % infusion, 125 mL/hr, Intravenous, Continuous, Bowen Perry MD, Last Rate: 125 mL/hr at 09/24/24 0412, 125 mL/hr at 09/24/24 0412    sucralfate (CARAFATE) tablet 1 g, 1 g, Oral, TID ACJackeline Lauren C., MD, 1 g at 09/25/24 0639    Facility-Administered Medications Ordered in Other Encounters:     Chlorhexidine Gluconate Cloth 2 % pads 1 each, 1 each, Apply externally, Take As Directed, Anupam Ruiz MD      Objective   Vital Signs   Temp:  [96.8 °F (36  °C)-100.4 °F (38 °C)] 97.7 °F (36.5 °C)  Heart Rate:  [] 87  Resp:  [16-20] 16  BP: ()/(34-77) 103/58    Physical Exam:   General: awake, alert, NAD, more alert and interactive today  Eyes: no scleral icterus  Cardiovascular: NR  Respiratory: normal work of breathing without wheezing  GI: Abdomen is nondistended  : External urinary catheter  Neurological: Alert and oriented x 3  Psychiatric: Normal mood and affect     Labs:   CBC, CMP, and blood cultures reviewed today  Lab Results   Component Value Date    WBC 9.97 09/25/2024    HGB 7.0 (L) 09/25/2024    HCT 21.4 (L) 09/25/2024    MCV 94.7 09/25/2024     09/25/2024       Lab Results   Component Value Date    GLUCOSE 101 (H) 09/25/2024    BUN 48 (H) 09/25/2024    CREATININE 0.99 09/25/2024    EGFRIFNONA 45 (L) 07/15/2021    EGFRIFAFRI 54 (L) 07/15/2021    BCR 48.5 (H) 09/25/2024    CO2 19.1 (L) 09/25/2024    CALCIUM 7.7 (L) 09/25/2024    PROTENTOTREF 6.4 08/05/2024    ALBUMIN 2.7 (L) 09/25/2024    LABIL2 2.4 08/05/2024    AST 17 09/25/2024    ALT 18 09/25/2024     Lab Results   Component Value Date    HGBA1C 5.20 09/24/2024     CA 19-9: < 2  Blood Type: AB positive  FOBT +  LA 2.8  Procalcitonin 21 now decreased to 10    Microbiology:  9/23 RPP: Negative  9/23 BCx: Negative to date  9/24 C. difficile PCR positive but antigen negative  9/20 4 GI PCR + EPEC    Radiology:  MRI brain pending    Bilateral lower extremity Doppler negative for DVT    CTA chest/abdomen/pelvis: Masslike soft tissue at the ampulla and pancreatic head recommend ERCP or MRCP.  Moderate fluid and air distended stomach and high attenuation fluid in the duodenum with duodenal wall thickening.  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  She has also been transfused and her hematocrit is much improved today.  Her associated tachycardia is resolved.    I am going to continue her Zosyn for 1 more day but if her blood cultures remain negative as of tomorrow morning then we will plan to stop it.    I think her ongoing diarrhea is likely multifactorial including upper GI bleed, antibiotics, and possibly the enteropathogenic E. coli.    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    ID will follow. D/w RN and her son at bedside.

## 2024-09-25 NOTE — TELEPHONE ENCOUNTER
Patient's daughter, Phyllis, called to let us know patient is in ICU at [facility]. Patient had TKA on 9/18/24. Daughter said patient is stable and it appears the issue is related to ulcers and GI. Patient reported to ED via ambulance early 9/23/24 after passing out at home.

## 2024-09-25 NOTE — PROGRESS NOTES
[facility] INPATIENT PROGRESS NOTE         [facility] CARDIAC INTENSIVE CARE    2024      PATIENT IDENTIFICATION:  Name: Bailee Garza ADMIT: 2024   : 1940  PCP: Eddie Araya MD    MRN: [id number] LOS: 1 days   AGE/SEX: 84 y.o. female  ROOM: [facility]                     LOS 1    Reason for visit: Severe anemia and hypotension with duodenal ulcers      SUBJECTIVE:      Resting comfortably.  Status post EGD noting multiple duodenal ulcers requiring clipping.  On Protonix drip.  Discussed with family at bedside.  Complaining of multiple loose stools.    Objective   OBJECTIVE:    Vital Sign Min/Max for last 24 hours  Temp  Min: 96.8 °F (36 °C)  Max: 100.4 °F (38 °C)   BP  Min: 98/57  Max: 138/56   Pulse  Min: 79  Max: 121   Resp  Min: 16  Max: 20   SpO2  Min: 90 %  Max: 100 %   No data recorded   Weight  Min: 50.7 kg (111 lb 12.4 oz)  Max: 54.4 kg (120 lb)    Vitals:    24 0600 24 0700 24 0744 24 0800   BP: 116/58 108/53  103/58   BP Location: Right arm      Patient Position: Lying      Pulse: 95 79  87   Resp: 16      Temp:   97.7 °F (36.5 °C)    TempSrc:   Oral    SpO2: 95% 96%  96%   Weight:       Height:                24  1101 24  1559 24  1600   Weight: 54.5 kg (120 lb 2.4 oz) 54.4 kg (120 lb) 50.7 kg (111 lb 12.4 oz)       Body mass index is 20.44 kg/m².                          Body mass index is 20.44 kg/m².    Intake/Output Summary (Last 24 hours) at 2024 1036  Last data filed at 2024 0546  Gross per 24 hour   Intake 1814 ml   Output 1200 ml   Net 614 ml         Exam:  GEN:  No distress, appears stated age  EYES:   PERRL, anicteric sclerae  ENT:    External ears/nose normal, OP clear  NECK:  No adenopathy, midline trachea  LUNGS: Normal chest on inspection, palpation and auscultation  CV:  Normal S1S2, without murmur  ABD:  Nontender, nondistended, no hepatosplenomegaly, +BS  EXT:  No edema.  No cyanosis or clubbing.  No  mottling and normal cap refill.    Assessment     Scheduled meds:  folic acid, 1 mg, Oral, Daily  levETIRAcetam, 500 mg, Oral, BID  levothyroxine, 75 mcg, Oral, Daily  LORazepam, 0.5 mg, Intravenous, Once in imaging  ondansetron, 4 mg, Intravenous, Once  piperacillin-tazobactam, 3.375 g, Intravenous, Q8H  potassium & sodium phosphates, 2 packet, Oral, Once  sodium chloride, 10 mL, Intravenous, Q12H  sucralfate, 1 g, Oral, TID AC      IV meds:                      pantoprazole, 40 mg, Last Rate: 40 mg (09/25/24 0546)  sodium chloride, 125 mL/hr, Last Rate: 125 mL/hr (09/24/24 0412)      Data Review:  Results from last 7 days   Lab Units 09/25/24 0428 09/24/24  1451 09/24/24  0522 09/23/24  0533   SODIUM mmol/L 148* 143 145 137   POTASSIUM mmol/L 3.9 4.6 3.5 5.1   CHLORIDE mmol/L 120* 117* 119* 102   CO2 mmol/L 19.1* 15.4* 16.0* 24.0   BUN mg/dL 48* 66* 61* 49*   CREATININE mg/dL 0.99 1.10* 0.79 1.16*   GLUCOSE mg/dL 101* 131* 103* 144*   CALCIUM mg/dL 7.7* 7.8* 6.7* 9.3         Estimated Creatinine Clearance: 34.5 mL/min (by C-G formula based on SCr of 0.99 mg/dL).  Results from last 7 days   Lab Units 09/25/24  0428 09/24/24  2224 09/24/24  1451 09/24/24  1130 09/24/24  0523 09/23/24  0533   WBC 10*3/mm3 9.97  --   --  9.63 7.42 13.00*   HEMOGLOBIN g/dL 7.0* 7.4* 8.5* 3.8* 4.2* 9.5*   PLATELETS 10*3/mm3 187  --   --  225 212 305     Results from last 7 days   Lab Units 09/23/24  0533   INR  0.96     Results from last 7 days   Lab Units 09/25/24  0428 09/24/24  1451 09/24/24  0522 09/23/24  0533   ALT (SGPT) U/L 18 22 19 41*   AST (SGOT) U/L 17 24 16 34*     Results from last 7 days   Lab Units 09/24/24  1018   PH, ARTERIAL pH units 7.419   PO2 ART mm Hg 104.2*   PCO2, ARTERIAL mm Hg 29.5*   HCO3 ART mmol/L 19.1*     Results from last 7 days   Lab Units 09/25/24  0428 09/24/24  2224 09/24/24  1700 09/24/24  1305 09/24/24  0950 09/24/24  0522 09/23/24  0616 09/23/24  0533   PROCALCITONIN ng/mL 6.24*  --   --   --    --  10.20*  --  21.50*   LACTATE mmol/L  --  1.0 2.2* 2.1* 2.8*  --  2.0  --          Hemoglobin A1C   Date/Time Value Ref Range Status   09/24/2024 0523 5.20 4.80 - 5.60 % Final     Glucose   Date/Time Value Ref Range Status   09/24/2024 1235 131 (H) 70 - 130 mg/dL Final         Imaging reviewed  2D echo 9/24 reviewed: EF 65%.  RVSP 47 mmHg.    Ultrasound of gallbladder reviewed showing stable dilation of common bile duct.        Microbiology reviewed            Active Hospital Problems    Diagnosis  POA    **Syncope [R55]  Yes    Nausea & vomiting [R11.2]  Yes    Status post right knee replacement [Z96.651]  Not Applicable    Gastrointestinal hemorrhage [K92.2]  Unknown    Essential hypertension [I10]  Yes      Resolved Hospital Problems   No resolved problems to display.         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  On PPI drip.  Continue serial H&H.  Transfuse if hemoglobin drops below 7.  Plan for MRI at some point for further evaluation of biliary abnormality noted on CT.  Continue antibiotics per ID recommendations.  Control glucose.  Control blood pressure.  Mechanical DVT prophylaxis.  Discussed with family at bedside in detail.  Questions answered to their satisfaction.    Discussed with multidisciplinary ICU team on rounds this morning.      CCT: 33 min    Deven Kang MD  Pulmonary and Critical Care Medicine  [facility] [place], [facility]  9/25/2024    10:36 EDT

## 2024-09-25 NOTE — PLAN OF CARE
Problem: Adult Inpatient Plan of Care  Goal: Absence of Hospital-Acquired Illness or Injury  Intervention: Prevent Skin Injury  Recent Flowsheet Documentation  Taken 9/25/2024 0400 by Brittanie Tse RN  Body Position:   left   turned   30 degrees  Taken 9/25/2024 0200 by Brittanie Tse RN  Body Position:   right   turned   30 degrees  Taken 9/25/2024 0000 by Brittanie Tse RN  Body Position:   left   turned   30 degrees  Taken 9/24/2024 2200 by Brittanie Tse RN  Body Position:   right   turned   30 degrees  Taken 9/24/2024 2000 by Brittanie Tse RN  Body Position:   turned   left   30 degrees   Goal Outcome Evaluation:

## 2024-09-25 NOTE — CASE MANAGEMENT/SOCIAL WORK
Continued Stay Note  [facility]     Patient Name: Bailee Garza  MRN: [id number]  Today's Date: 9/25/2024    Admit Date: 9/23/2024    Plan: Pending pt's progress during hospitalization & PT/OT evaluation & recommendations. PT ordered 9/23 & OT ordered 9/25. Current with [facility].   Discharge Plan       Row Name 09/25/24 1243       Plan    Plan Pending pt's progress during hospitalization & PT/OT evaluation & recommendations. PT ordered 9/23 & OT ordered 9/25. Current with [facility].    Patient/Family in Agreement with Plan yes    Plan Comments Pt discussed in multidisciplinary rounds. Clinicals reviewed. Pt not yet medically ready for DC; CCP continues to follow. DC plan pending pt's progress during hospitalization & PT/OT evaluation & recommendations. PT ordered 9/23 & OT ordered 9/25. Pt current with [facility]. DC barriers: MRI, MRCP, Q8H H&H labs, & protonix gtt. GOVIND Betancourt/CCP                    Discharge Codes    No documentation.                 Expected Discharge Date and Time       Expected Discharge Date Expected Discharge Time    Sep 28, 2024               Bria Santiago RN

## 2024-09-25 NOTE — PROGRESS NOTES
[facility]  Inpatient Progress Note    Reason for Follow Up:  UGIB, abnormal biliary tract imaging    Subjective     Interval History:   S/p egd yesterday w/multiple DUs  She has had multiple bowel movements overnight some have had blood in them and some have been more bilious per nursing.  She denies abdominal pain.  She is tolerating clear liquids.    Current Facility-Administered Medications:     acetaminophen (TYLENOL) tablet 650 mg, 650 mg, Oral, Q4H PRN **OR** acetaminophen (TYLENOL) 160 MG/5ML oral solution 650 mg, 650 mg, Oral, Q4H PRN **OR** acetaminophen (TYLENOL) suppository 650 mg, 650 mg, Rectal, Q4H PRN, Jose Manuel Vazquez MD    sennosides-docusate (PERICOLACE) 8.6-50 MG per tablet 2 tablet, 2 tablet, Oral, BID PRN **AND** polyethylene glycol (MIRALAX) packet 17 g, 17 g, Oral, Daily PRN **AND** bisacodyl (DULCOLAX) EC tablet 5 mg, 5 mg, Oral, Daily PRN **AND** bisacodyl (DULCOLAX) suppository 10 mg, 10 mg, Rectal, Daily PRN, Jose Manuel Vazquez MD    Calcium Replacement - Follow Nurse / BPA Driven Protocol, , Does not apply, PRN, Jose Manuel Vazquez MD    folic acid (FOLVITE) tablet 1 mg, 1 mg, Oral, Daily, Jose Manuel Vazquez MD, 1 mg at 09/25/24 0923    levETIRAcetam (KEPPRA) tablet 500 mg, 500 mg, Oral, BID, Mau Zamora MD, 500 mg at 09/25/24 0923    levothyroxine (SYNTHROID, LEVOTHROID) tablet 75 mcg, 75 mcg, Oral, Daily, Jose Manuel Vazquez MD, 75 mcg at 09/25/24 0923    LORazepam (ATIVAN) injection 0.5 mg, 0.5 mg, Intravenous, Once in imaging, Jose Manuel Vazquez MD    LORazepam (ATIVAN) injection 1 mg, 1 mg, Intravenous, Q4H PRN, Jose Manuel Vazquez MD    Magnesium Standard Dose Replacement - Follow Nurse / BPA Driven Protocol, , Does not apply, PRN, Jose Manuel Vazquez MD    nitroglycerin (NITROSTAT) SL tablet 0.4 mg, 0.4 mg, Sublingual, Q5 Min PRN, Jose Manuel Vazquez MD    ondansetron ODT (ZOFRAN-ODT) disintegrating tablet 4 mg, 4 mg, Oral, Q6H PRN **OR**  ondansetron (ZOFRAN) injection 4 mg, 4 mg, Intravenous, Q6H PRN, Jose Manuel Vazquez MD, 4 mg at 09/23/24 0947    ondansetron (ZOFRAN) injection 4 mg, 4 mg, Intravenous, Once, Shaquille Whitney MD    [COMPLETED] pantoprazole (PROTONIX) injection 80 mg, 80 mg, Intravenous, Once, 80 mg at 09/24/24 1533 **AND** pantoprazole (PROTONIX) 40 mg in sodium chloride 0.9 % 100 mL (0.4 mg/mL) MBP, 40 mg, Intravenous, Continuous, Karli Viveros MD, Last Rate: 20 mL/hr at 09/25/24 0546, 40 mg at 09/25/24 0546    Phosphorus Replacement - Follow Nurse / BPA Driven Protocol, , Does not apply, PRNGeorge Patrick D, MD    piperacillin-tazobactam (ZOSYN) 3.375 g IVPB in 100 mL NS MBP (CD), 3.375 g, Intravenous, Q8H, Julian Castillo MD, Last Rate: 0 mL/hr at 09/24/24 0211, 3.375 g at 09/25/24 0425    potassium & sodium phosphates (PHOS-NAK) 280-160-250 MG packet 2 packet, 2 packet, Oral, Once, Jose Manuel Vazquez MD    Potassium Replacement - Follow Nurse / BPA Driven Protocol, , Does not apply, PRNGeorge Patrick D, MD    sodium chloride 0.9 % flush 10 mL, 10 mL, Intravenous, Q12H, Jose Manuel Vazquez MD, 10 mL at 09/25/24 0923    sodium chloride 0.9 % flush 10 mL, 10 mL, Intravenous, PRN, Jose Manuel Vazquez MD    sodium chloride 0.9 % infusion 40 mL, 40 mL, Intravenous, PRN, Jose Manuel Vazquez MD    sodium chloride 0.9 % infusion, 125 mL/hr, Intravenous, Continuous, Bowen Perry MD, Last Rate: 125 mL/hr at 09/24/24 0412, 125 mL/hr at 09/24/24 0412    sucralfate (CARAFATE) tablet 1 g, 1 g, Oral, TID AC, Karli Viveros MD, 1 g at 09/25/24 0639    Facility-Administered Medications Ordered in Other Encounters:     Chlorhexidine Gluconate Cloth 2 % pads 1 each, 1 each, Apply externally, Take As Directed, Anupam Ruiz MD  Review of Systems:    Positive for bloody stool, negative for abdominal pain, positive for low-grade fever no chills    Objective     Vital Signs  Temp:  [96.8 °F (36 °C)-100.4 °F (38  °C)] 97.7 °F (36.5 °C)  Heart Rate:  [] 81  Resp:  [16-20] 16  BP: ()/(34-77) 101/56  Body mass index is 20.44 kg/m².    Intake/Output Summary (Last 24 hours) at 9/25/2024 1104  Last data filed at 9/25/2024 0800  Gross per 24 hour   Intake 2054 ml   Output 1400 ml   Net 654 ml     I/O this shift:  In: 240 [P.O.:240]  Out: 200 [Urine:200]     Physical Exam:   General: patient awake, alert and cooperative   Eyes: Normal lids and lashes, no scleral icterus   Neck: supple, normal ROM   Skin: warm and dry, not jaundiced   Cardiovascular: regular rhythm and rate    Pulm:  regular and unlabored   Abdomen: soft, nontender, nondistended; normal bowel sounds   Psychiatric: Normal mood and behavior; memory intact     Results Review:     I reviewed the patient's new clinical results.    Results from last 7 days   Lab Units 09/25/24  0428 09/24/24  2224 09/24/24  1451 09/24/24  1130 09/24/24  0523   WBC 10*3/mm3 9.97  --   --  9.63 7.42   HEMOGLOBIN g/dL 7.0* 7.4* 8.5* 3.8* 4.2*   HEMATOCRIT % 21.4* 22.0* 26.3* 12.4* 13.5*   PLATELETS 10*3/mm3 187  --   --  225 212     Results from last 7 days   Lab Units 09/25/24  0428 09/24/24  1451 09/24/24  0522   SODIUM mmol/L 148* 143 145   POTASSIUM mmol/L 3.9 4.6 3.5   CHLORIDE mmol/L 120* 117* 119*   CO2 mmol/L 19.1* 15.4* 16.0*   BUN mg/dL 48* 66* 61*   CREATININE mg/dL 0.99 1.10* 0.79   CALCIUM mg/dL 7.7* 7.8* 6.7*   BILIRUBIN mg/dL 0.2 0.3 0.2   ALK PHOS U/L 77 90 84   ALT (SGPT) U/L 18 22 19   AST (SGOT) U/L 17 24 16   GLUCOSE mg/dL 101* 131* 103*     Results from last 7 days   Lab Units 09/23/24  0533   INR  0.96     Lab Results   Lab Value Date/Time    LIPASE 42 09/23/2024 0735    LIPASE 50 12/19/2017 1128       Radiology:  US Gallbladder   Final Result   Stable dilation of the common bile duct. Pancreas largely obscured by   overlying bowel gas. Mild dilation of the visualized pancreatic duct   measuring about 3 mm, similar to comparison CT               This report  was finalized on 9/24/2024 6:51 PM by Dr. Julio César Perry M.D on Workstation: [id number]          CT Angiogram Chest Pulmonary Embolism   Final Result       1. The study is negative for pulmonary embolism. No acute intrathoracic   abnormality.   2. Intrathoracic and extrahepatic biliary dilatation with abrupt   tapering at the ampulla where there is questionable hypoenhancing   masslike soft tissue at the ampulla and pancreatic head. Could consider   correlating with ERCP or MRCP if clinically indicated.   3. Moderate fluid and air distended stomach and high attenuation fluid   distention of the proximal duodenum with duodenal wall thickening,   suspected duodenal diverticula, and mild proximal duodenal wall   thickening. Could correlate with endoscopy if clinically indicated.   4. Distended gallbladder.   5. Decreased hepatic attenuation could reflect hepatic steatosis.   6. Extensive colonic diverticula without CT evidence of diverticulitis.   7. Other chronic findings, as above.       This report was finalized on 9/23/2024 8:13 AM by Lorenzo Bui MD on   Workstation: [id number]          CT Abdomen Pelvis With Contrast   Final Result       1. The study is negative for pulmonary embolism. No acute intrathoracic   abnormality.   2. Intrathoracic and extrahepatic biliary dilatation with abrupt   tapering at the ampulla where there is questionable hypoenhancing   masslike soft tissue at the ampulla and pancreatic head. Could consider   correlating with ERCP or MRCP if clinically indicated.   3. Moderate fluid and air distended stomach and high attenuation fluid   distention of the proximal duodenum with duodenal wall thickening,   suspected duodenal diverticula, and mild proximal duodenal wall   thickening. Could correlate with endoscopy if clinically indicated.   4. Distended gallbladder.   5. Decreased hepatic attenuation could reflect hepatic steatosis.   6. Extensive colonic diverticula without CT evidence of  diverticulitis.   7. Other chronic findings, as above.       This report was finalized on 9/23/2024 8:13 AM by Lorenzo Bui MD on   Workstation: [id number]          XR Chest 1 View   Final Result   1. Mild interstitial prominence of the lungs somewhat exaggerated by   underinflation but overall appear unchanged from the 12/29/2017 study   and therefore likely chronic in nature.   2. No definite acute infiltrates are thought to be present.       This report was finalized on 9/23/2024 7:33 AM by Dr. Arun Romero M.D on Workstation: [id number]          MRI abdomen w wo contrast mrcp    (Results Pending)   MRI Brain With & Without Contrast    (Results Pending)       Assessment & Plan     Active Hospital Problems    Diagnosis     **Syncope     Nausea & vomiting     Status post right knee replacement     Gastrointestinal hemorrhage     Essential hypertension        Assessment:  UGIB - multiple duodenal ukcers with stigmata noted on egd 9/24 - treated endoscopically  ABLA  Abnormal biliary tract imaging  Distended GB      Plan:  H/h down somewhat but expect equilibration given intial exaggerated response to 2 units transfused.  BUN trending down  Transfuse for hb < 7  Continue protonix gtt, carafate  F/u H pylori stool ag  Need mri for further evaluation of abnormal biliary tree - she will need sedation.  Will try to accomplish this today.  CT from yesterday reviewed and is more concerning that there is a pancreatic finding or ampullary lesion causing biliary distention.  Advance to full liquids after mri    I discussed the patients findings and my recommendations with patient, family, and nursing staff.            Karli Viveros M.D.  [facility]  [phone]

## 2024-09-25 NOTE — PLAN OF CARE
Goal Outcome Evaluation:  Pt remains in CICU on 2L NC. Multiple episodes of dark red diarrhea - Imodium PRN. Hgb 6.5; MD notified - will transfuse 1 unit PRBC. MRI completed - see results. Diet advanced to full liquid per GI. Family updated at bedside.

## 2024-09-26 LAB
ALBUMIN SERPL-MCNC: 2.7 G/DL (ref 3.5–5.2)
ALBUMIN/GLOB SERPL: 1.5 G/DL
ALP SERPL-CCNC: 74 U/L (ref 39–117)
ALT SERPL W P-5'-P-CCNC: 13 U/L (ref 1–33)
ANION GAP SERPL CALCULATED.3IONS-SCNC: 11 MMOL/L (ref 5–15)
AST SERPL-CCNC: 13 U/L (ref 1–32)
BH BB BLOOD EXPIRATION DATE: NORMAL
BH BB BLOOD TYPE BARCODE: 8400
BH BB DISPENSE STATUS: NORMAL
BH BB PRODUCT CODE: NORMAL
BH BB UNIT NUMBER: NORMAL
BILIRUB SERPL-MCNC: 0.3 MG/DL (ref 0–1.2)
BUN SERPL-MCNC: 25 MG/DL (ref 8–23)
BUN/CREAT SERPL: 30.5 (ref 7–25)
CALCIUM SPEC-SCNC: 7.5 MG/DL (ref 8.6–10.5)
CHLORIDE SERPL-SCNC: 117 MMOL/L (ref 98–107)
CO2 SERPL-SCNC: 18 MMOL/L (ref 22–29)
CREAT SERPL-MCNC: 0.82 MG/DL (ref 0.57–1)
CROSSMATCH INTERPRETATION: NORMAL
DEPRECATED RDW RBC AUTO: 45.3 FL (ref 37–54)
EGFRCR SERPLBLD CKD-EPI 2021: 71.1 ML/MIN/1.73
ERYTHROCYTE [DISTWIDTH] IN BLOOD BY AUTOMATED COUNT: 13.3 % (ref 12.3–15.4)
GLOBULIN UR ELPH-MCNC: 1.8 GM/DL
GLUCOSE SERPL-MCNC: 69 MG/DL (ref 65–99)
H PYLORI AG STL QL IA: POSITIVE
HCT VFR BLD AUTO: 26.7 % (ref 34–46.6)
HCT VFR BLD AUTO: 26.9 % (ref 34–46.6)
HCT VFR BLD AUTO: 27.1 % (ref 34–46.6)
HCT VFR BLD AUTO: 27.1 % (ref 34–46.6)
HCT VFR BLD AUTO: 30.9 % (ref 34–46.6)
HGB BLD-MCNC: 10.1 G/DL (ref 12–15.9)
HGB BLD-MCNC: 8.6 G/DL (ref 12–15.9)
HGB BLD-MCNC: 8.9 G/DL (ref 12–15.9)
MCH RBC QN AUTO: 30.8 PG (ref 26.6–33)
MCHC RBC AUTO-ENTMCNC: 32.8 G/DL (ref 31.5–35.7)
MCV RBC AUTO: 93.8 FL (ref 79–97)
PHOSPHATE SERPL-MCNC: 2.2 MG/DL (ref 2.5–4.5)
PLATELET # BLD AUTO: 185 10*3/MM3 (ref 140–450)
PMV BLD AUTO: 10.2 FL (ref 6–12)
POTASSIUM SERPL-SCNC: 3.4 MMOL/L (ref 3.5–5.2)
POTASSIUM SERPL-SCNC: 4.1 MMOL/L (ref 3.5–5.2)
PROT SERPL-MCNC: 4.5 G/DL (ref 6–8.5)
RBC # BLD AUTO: 2.89 10*6/MM3 (ref 3.77–5.28)
SODIUM SERPL-SCNC: 146 MMOL/L (ref 136–145)
UNIT  ABO: NORMAL
UNIT  RH: NORMAL
WBC NRBC COR # BLD AUTO: 9.93 10*3/MM3 (ref 3.4–10.8)

## 2024-09-26 PROCEDURE — 97530 THERAPEUTIC ACTIVITIES: CPT

## 2024-09-26 PROCEDURE — 84132 ASSAY OF SERUM POTASSIUM: CPT

## 2024-09-26 PROCEDURE — 85014 HEMATOCRIT: CPT | Performed by: INTERNAL MEDICINE

## 2024-09-26 PROCEDURE — 80053 COMPREHEN METABOLIC PANEL: CPT | Performed by: INTERNAL MEDICINE

## 2024-09-26 PROCEDURE — 85018 HEMOGLOBIN: CPT | Performed by: INTERNAL MEDICINE

## 2024-09-26 PROCEDURE — 84100 ASSAY OF PHOSPHORUS: CPT | Performed by: INTERNAL MEDICINE

## 2024-09-26 PROCEDURE — 99232 SBSQ HOSP IP/OBS MODERATE 35: CPT | Performed by: INTERNAL MEDICINE

## 2024-09-26 PROCEDURE — 25010000002 PIPERACILLIN SOD-TAZOBACTAM PER 1 G: Performed by: INTERNAL MEDICINE

## 2024-09-26 PROCEDURE — 97166 OT EVAL MOD COMPLEX 45 MIN: CPT

## 2024-09-26 PROCEDURE — 85027 COMPLETE CBC AUTOMATED: CPT | Performed by: INTERNAL MEDICINE

## 2024-09-26 PROCEDURE — 25010000002 POTASSIUM CHLORIDE 10 MEQ/100ML SOLUTION: Performed by: INTERNAL MEDICINE

## 2024-09-26 PROCEDURE — 92610 EVALUATE SWALLOWING FUNCTION: CPT

## 2024-09-26 RX ORDER — POTASSIUM CHLORIDE 7.45 MG/ML
10 INJECTION INTRAVENOUS
Status: COMPLETED | OUTPATIENT
Start: 2024-09-26 | End: 2024-09-26

## 2024-09-26 RX ORDER — POTASSIUM CHLORIDE 1.5 G/1.58G
40 POWDER, FOR SOLUTION ORAL EVERY 4 HOURS
Status: DISCONTINUED | OUTPATIENT
Start: 2024-09-26 | End: 2024-09-26

## 2024-09-26 RX ADMIN — Medication 10 ML: at 20:46

## 2024-09-26 RX ADMIN — SUCRALFATE 1 G: 1 TABLET ORAL at 16:30

## 2024-09-26 RX ADMIN — POTASSIUM CHLORIDE 10 MEQ: 7.46 INJECTION, SOLUTION INTRAVENOUS at 13:49

## 2024-09-26 RX ADMIN — PANTOPRAZOLE SODIUM 40 MG: 40 INJECTION, POWDER, FOR SOLUTION INTRAVENOUS at 16:54

## 2024-09-26 RX ADMIN — PANTOPRAZOLE SODIUM 40 MG: 40 INJECTION, POWDER, FOR SOLUTION INTRAVENOUS at 22:06

## 2024-09-26 RX ADMIN — PANTOPRAZOLE SODIUM 40 MG: 40 INJECTION, POWDER, FOR SOLUTION INTRAVENOUS at 09:33

## 2024-09-26 RX ADMIN — ACETAMINOPHEN 325MG 650 MG: 325 TABLET ORAL at 16:26

## 2024-09-26 RX ADMIN — Medication 10 ML: at 08:19

## 2024-09-26 RX ADMIN — SUCRALFATE 1 G: 1 TABLET ORAL at 06:32

## 2024-09-26 RX ADMIN — PIPERACILLIN AND TAZOBACTAM 3.38 G: 3; .375 INJECTION, POWDER, FOR SOLUTION INTRAVENOUS at 04:30

## 2024-09-26 RX ADMIN — LOPERAMIDE HYDROCHLORIDE 2 MG: 2 CAPSULE ORAL at 14:49

## 2024-09-26 RX ADMIN — POTASSIUM CHLORIDE 10 MEQ: 7.46 INJECTION, SOLUTION INTRAVENOUS at 12:28

## 2024-09-26 RX ADMIN — PANTOPRAZOLE SODIUM 40 MG: 40 INJECTION, POWDER, FOR SOLUTION INTRAVENOUS at 05:35

## 2024-09-26 RX ADMIN — POTASSIUM CHLORIDE 40 MEQ: 1.5 FOR SOLUTION ORAL at 05:35

## 2024-09-26 RX ADMIN — POTASSIUM CHLORIDE 10 MEQ: 7.46 INJECTION, SOLUTION INTRAVENOUS at 10:39

## 2024-09-26 RX ADMIN — POTASSIUM CHLORIDE 10 MEQ: 7.46 INJECTION, SOLUTION INTRAVENOUS at 09:35

## 2024-09-26 NOTE — PLAN OF CARE
Problem: Adult Inpatient Plan of Care  Goal: Absence of Hospital-Acquired Illness or Injury  Intervention: Prevent Skin Injury  Recent Flowsheet Documentation  Taken 9/26/2024 0600 by Brittanie Tse RN  Body Position:   right   turned   30 degrees  Taken 9/26/2024 0400 by Brittanie Tse RN  Body Position:   left   turned   30 degrees  Taken 9/26/2024 0200 by Brittanie Tse RN  Body Position:   right   turned   30 degrees  Taken 9/26/2024 0000 by rBittanie Tse RN  Body Position:   left   turned   30 degrees  Taken 9/25/2024 2200 by Brittanie Tse RN  Body Position:   right   turned   30 degrees  Taken 9/25/2024 2000 by Brittanie Tse RN  Body Position:   left   turned   30 degrees   Goal Outcome Evaluation:

## 2024-09-26 NOTE — THERAPY RE-EVALUATION
Patient Name: Bailee Garza  : 1940    MRN: [id number]                              Today's Date: 2024       Admit Date: 2024    Visit Dx:     ICD-10-CM ICD-9-CM   1. Syncope and collapse  R55 780.2   2. Volume depletion, gastrointestinal loss  E86.9 276.50   3. Postoperative anemia  D64.9 285.9   4. Status post total right knee replacement  Z96.651 V43.65   5. Chronic kidney disease, unspecified CKD stage  N18.9 585.9   6. Nausea and vomiting, unspecified vomiting type  R11.2 787.01   7. Gastrointestinal hemorrhage, unspecified gastrointestinal hemorrhage type  K92.2 578.9     Patient Active Problem List   Diagnosis    Chronic midline low back pain without sciatica    Essential hypertension    Hearing loss    Hypothyroidism, acquired    IBS (irritable bowel syndrome)    Chronic nonseasonal allergic rhinitis due to pollen    Stage 3 chronic kidney disease    Arthralgia of right knee    DDD (degenerative disc disease), lumbosacral    Scoliosis due to degenerative disease of spine in adult patient    Status post total replacement of left hip    Status post total hip replacement, left    Vitamin D deficiency    Anemia, normocytic normochromic    S/P hip replacement, right    OA (osteoarthritis) of knee    Syncope    Nausea & vomiting    Status post right knee replacement    Gastrointestinal hemorrhage     Past Medical History:   Diagnosis Date    Abnormal CXR 2017    Adverse reaction to narcotic drug     SEVERE HALLUCINATIONS POST OP LEFT HIP PLACEMENT    Allergies     Arthritis     Arthritis of foot 2020    Arthropathy of pelvic region and thigh 2012    unspecified    Back pain 2007    Chronic back pain 2009    Chronic cough 2017    CKD (chronic kidney disease)     Closed nondisplaced fracture of lateral malleolus of fibula with delayed healing 2020    Closed nondisplaced fracture of medial cuneiform of left foot 2018    Constipation 2015     unspecified    COVID-19 vaccination refused     COVID-19 virus detected 10/17/2022    Diverticulosis     Dyspepsia 2008    Essential hypertension 2007    Exertional shortness of breath 2017    Fall 2018    Family history of abdominal aortic aneurysm (AAA) 2019    [place] aaa screen limited (04/10/2019 10:32)     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), 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    Hearing loss 2008    History of bone density study 2015    History of pneumonia     2017    History of skin cancer     Hypothyroidism, acquired 2007    Insomnia 2015    unspecified    Intervertebral disc disorder with myelopathy, cervical region 2010    Joint capsule tear 2012    unspecified    OA (osteoarthritis) of hip 2018    Other synovitis and tenosynovitis, right ankle and foot 2020    Peroneal tendonitis, right 2020    Rhinitis, allergic 2007    Right knee pain     Scoliosis     Screening breast examination 2007    Stress 2015    other acute reactions to stress    Stress incontinence     Trochanteric bursitis, left hip 2019    Urticaria 2015     Past Surgical History:   Procedure Laterality Date    BREAST EXCISIONAL BIOPSY Right     50+ years ago    BRONCHOSCOPY N/A 2017    Procedure: BRONCHOSCOPY;  Surgeon: Mario Alanis MD;  Location: [facility] ENDOSCOPY;  Service:     CATARACT EXTRACTION, BILATERAL      COLONOSCOPY      ENDOSCOPY N/A 2024    Procedure: ESOPHAGOGASTRODUODENOSCOPY AT BEDSIDE with gold probe and epi injection;  Surgeon: Karli Viveros MD;  Location: [facility] ENDOSCOPY;  Service: Gastroenterology;  Laterality: N/A;  Pre: GI Bleed  Post: multiple duodenal ulcers, hiatal hernia    HYSTERECTOMY      TOTAL HIP ARTHROPLASTY Left 2018    Procedure: LEFT TOTAL HIP ARTHROPLASTY;  Surgeon: Justen Mann MD;  Location: [facility] MAIN OR;  Service: Orthopedics     TOTAL HIP ARTHROPLASTY Right 12/13/2022    Procedure: Posterior RIGHT TOTAL HIP ARTHROPLASTY MARCELINO NAVIGATION;  Surgeon: Anupam Ruiz MD;  Location: [facility] OR [facility];  Service: Orthopedics;  Laterality: Right;    TOTAL KNEE ARTHROPLASTY Right 9/18/2024    Procedure: TOTAL KNEE ARTHROPLASTY;  Surgeon: Jesus Thayer MD;  Location: [facility] OR [facility];  Service: Orthopedics;  Laterality: Right;      General Information       Row Name 09/26/24 1334          Physical Therapy Time and Intention    Document Type therapy note (daily note);re-evaluation  -ST     Mode of Treatment co-treatment;physical therapy;occupational therapy  two sets of skilled hands required to safely and maximally progress mobility  -ST       Row Name 09/26/24 1334          General Information    Existing Precautions/Restrictions fall  -ST       Row Name 09/26/24 1334          Cognition    Orientation Status (Cognition) oriented x 3  -ST       Row Name 09/26/24 1334          Safety Issues, Functional Mobility    Safety Issues Affecting Function (Mobility) safety precaution awareness;safety precautions follow-through/compliance  -ST     Impairments Affecting Function (Mobility) endurance/activity tolerance;balance;pain;postural/trunk control;strength  -ST     Comment, Safety Issues/Impairments (Mobility) nonskid socks, gait belt donned  -ST               User Key  (r) = Recorded By, (t) = Taken By, (c) = Cosigned By      Initials Name Provider Type    ST Suma Willingham PT Physical Therapist                   Mobility       Row Name 09/26/24 1335          Bed Mobility    Supine-Sit [place] (Bed Mobility) minimum assist (75% patient effort);2 person assist  -ST     Comment, (Bed Mobility) pt very particular about how R LE is held  -ST       Row Name 09/26/24 1335          Bed-Chair Transfer    Bed-Chair [place] (Transfers) minimum assist (75% patient effort);2 person assist  -ST     Assistive Device (Bed-Chair Transfers) walker,  front-wheeled  -ST       Row Name 09/26/24 1335          Sit-Stand Transfer    Sit-Stand [place] (Transfers) minimum assist (75% patient effort);2 person assist  -ST     Assistive Device (Sit-Stand Transfers) walker, front-wheeled  -ST     Comment, (Sit-Stand Transfer) VC for hand placement and education regarding why therapy holds onto gait belt vs arms  -ST       Row Name 09/26/24 1335          Gait/Stairs (Locomotion)    [place] Level (Gait) minimum assist (75% patient effort);2 person assist  -ST     Assistive Device (Gait) walker, front-wheeled  -ST     Distance in Feet (Gait) 1  -ST     Deviations/Abnormal Patterns (Gait) antalgic;weight shifting decreased;jah decreased;gait speed decreased  -ST     Bilateral Gait Deviations forward flexed posture;heel strike decreased  -ST     Left Sided Gait Deviations --  -ST     Right Sided Gait Deviations weight shift ability decreased  -ST     Comment, (Gait/Stairs) pt very frustrated with therapist assist for w/s to improve steps  -ST               User Key  (r) = Recorded By, (t) = Taken By, (c) = Cosigned By      Initials Name Provider Type     Suma Willingham, PT Physical Therapist                   Obj/Interventions       Row Name 09/26/24 1336          Balance    Comment, Balance min for dynamic balance d/t pt being fearful, forward flexed posture and decreased R w/s  -ST               User Key  (r) = Recorded By, (t) = Taken By, (c) = Cosigned By      Initials Name Provider Type    Suma Garg, PT Physical Therapist                   Goals/Plan       Row Name 09/26/24 1339          Bed Mobility Goal 1 (PT)    Activity/Assistive Device (Bed Mobility Goal 1, PT) bed mobility activities, all  -ST     [place] Level/Cues Needed (Bed Mobility Goal 1, PT) standby assist  -ST     Time Frame (Bed Mobility Goal 1, PT) long term goal (LTG);1 week  -ST     Progress/Outcomes (Bed Mobility Goal 1, PT) continuing progress toward goal  -ST        Row Name 09/26/24 1339          Transfer Goal 1 (PT)    Activity/Assistive Device (Transfer Goal 1, PT) transfers, all;walker, rolling  -ST     [place] Level/Cues Needed (Transfer Goal 1, PT) standby assist  -ST     Time Frame (Transfer Goal 1, PT) long term goal (LTG);1 week  -ST     Progress/Outcome (Transfer Goal 1, PT) continuing progress toward goal  -ST       Row Name 09/26/24 1339          Gait Training Goal 1 (PT)    Activity/Assistive Device (Gait Training Goal 1, PT) gait (walking locomotion);walker, rolling  -ST     [place] Level (Gait Training Goal 1, PT) standby assist  -ST     Distance (Gait Training Goal 1, PT) 100 feet  -ST     Time Frame (Gait Training Goal 1, PT) long term goal (LTG);1 week  -ST     Progress/Outcome (Gait Training Goal 1, PT) continuing progress toward goal  -ST               User Key  (r) = Recorded By, (t) = Taken By, (c) = Cosigned By      Initials Name Provider Type    Suma Garg, SARAH Physical Therapist                   Clinical Impression       Row Name 09/26/24 1331          Pain    Pain Location - Side/Orientation Right  -ST     Pain Location - knee  -ST     Pre/Posttreatment Pain Comment reports pain, frequently moans - does not rate pain  -ST     Pain Intervention(s) Repositioned;Ambulation/increased activity  -ST       Row Name 09/26/24 3641          Plan of Care Review    Plan of Care Reviewed With patient  -ST     Outcome Evaluation Pt seen for PT/OT this PM. Noted pt with change in status requiring transfer to CICU since last seen by PT. Pt is significantly limited by pain and fatigue today, in addition to fear of WB through R LE. Education provided regarding importance of mobility. She is mildly resistive to PT attempts to assist with w/s for steps to reach recliner. Fatigued with activity today. She continues to benefit from skilled PT to address mobility deficits. Recommend SNU vs home with assist pending progress.  -ST       Row Name  09/26/24 1337          Therapy Assessment/Plan (PT)    Rehab Potential (PT) good, to achieve stated therapy goals  -ST     Criteria for Skilled Interventions Met (PT) skilled treatment is necessary  -ST     Therapy Frequency (PT) 6 times/wk  -ST       Row Name 09/26/24 1337          Positioning and Restraints    Pre-Treatment Position in bed  -ST     Post Treatment Position chair  -ST     In Chair notified nsg;reclined;call light within reach;encouraged to call for assist;with family/caregiver  -ST               User Key  (r) = Recorded By, (t) = Taken By, (c) = Cosigned By      Initials Name Provider Type    Suma Garg, SARAH Physical Therapist                   Outcome Measures       Row Name 09/26/24 1339 09/26/24 0800       How much help from another person do you currently need...    Turning from your back to your side while in flat bed without using bedrails? 3  -ST 2  -AF    Moving from lying on back to sitting on the side of a flat bed without bedrails? 3  -ST 2  -AF    Moving to and from a bed to a chair (including a wheelchair)? 2  -ST 2  -AF    Standing up from a chair using your arms (e.g., wheelchair, bedside chair)? 2  -ST 2  -AF    Climbing 3-5 steps with a railing? 2  -ST 2  -AF    To walk in hospital room? 2  -ST 1  -AF    AM-PAC 6 Clicks Score (PT) 14  -ST 11  -AF    Highest Level of Mobility Goal 4 --> Transfer to chair/commode  -ST 4 --> Transfer to chair/commode  -AF              User Key  (r) = Recorded By, (t) = Taken By, (c) = Cosigned By      Initials Name Provider Type    AF Sonam Thompson, RN Registered Nurse    Suma Garg, SARAH Physical Therapist                                 Physical Therapy Education       Title: PT OT SLP Therapies (In Progress)       Topic: Physical Therapy (In Progress)       Point: Mobility training (In Progress)       Learning Progress Summary             Patient Acceptance, E,TB, NR by  at 9/26/2024 1339    Acceptance, E,D, VU,NR by MS at  9/23/2024 1526                         Point: Home exercise program (In Progress)       Learning Progress Summary             Patient Acceptance, E,TB, NR by ST at 9/26/2024 1339    Acceptance, E,D, VU,NR by MS at 9/23/2024 1526                         Point: Body mechanics (Not Started)       Learner Progress:  Not documented in this visit.              Point: Precautions (Not Started)       Learner Progress:  Not documented in this visit.                              User Key       Initials Effective Dates Name Provider Type Discipline    MS 06/16/21 -  Shaquille Donnelly PT Physical Therapist PT    ST 09/22/22 -  Suma Willingham PT Physical Therapist PT                  PT Recommendation and Plan     Plan of Care Reviewed With: patient  Outcome Evaluation: Pt seen for PT/OT this PM. Noted pt with change in status requiring transfer to CICU since last seen by PT. Pt is significantly limited by pain and fatigue today, in addition to fear of WB through R LE. Education provided regarding importance of mobility. She is mildly resistive to PT attempts to assist with w/s for steps to reach recliner. Fatigued with activity today. She continues to benefit from skilled PT to address mobility deficits. Recommend SNU vs home with assist pending progress.     Time Calculation:         PT Charges       Row Name 09/26/24 1340             Time Calculation    Start Time 1303  -ST      Stop Time 1326  -ST      Time Calculation (min) 23 min  -ST      PT Received On 09/26/24  -ST      PT - Next Appointment 09/27/24  -ST         Time Calculation- PT    Total Timed Code Minutes- PT 23 minute(s)  -ST         Timed Charges    90456 - PT Therapeutic Activity Minutes 23  -ST         Total Minutes    Timed Charges Total Minutes 23  -ST       Total Minutes 23  -ST                User Key  (r) = Recorded By, (t) = Taken By, (c) = Cosigned By      Initials Name Provider Type    ST Suma Willingham, SARAH Physical Therapist                   Therapy Charges for Today       Code Description Service Date Service Provider Modifiers Qty    [id number] HC PT THERAPEUTIC ACT EA 15 MIN 9/26/2024 Suma Willingham, PT GP 2            PT G-Codes  Outcome Measure Options: AM-PAC 6 Clicks Basic Mobility (PT)  AM-PAC 6 Clicks Score (PT): 14  PT Discharge Summary  Anticipated Discharge Disposition (PT): home with assist, home with home health, skilled nursing facility (pending progress)    Suma Willingham, PT  9/26/2024

## 2024-09-26 NOTE — PLAN OF CARE
Goal Outcome Evaluation:         Pt hgb stable, repeat 10.1, replaced K+, recheck 4.1. Up to chair w/ PT/OT. 12 BM this shift, no visible blood. Imodium given.  Speech eval, Pt passed. Transfer to stepdown per pulm. Family at bedside, updates given.

## 2024-09-26 NOTE — THERAPY EVALUATION
Acute Care - Speech Language Pathology   Swallow Treatment Note [facility]     Patient Name: Bailee Garza  : 1940  MRN: [id number]  Today's Date: 2024               Admit Date: 2024    Visit Dx:     ICD-10-CM ICD-9-CM   1. Syncope and collapse  R55 780.2   2. Volume depletion, gastrointestinal loss  E86.9 276.50   3. Postoperative anemia  D64.9 285.9   4. Status post total right knee replacement  Z96.651 V43.65   5. Chronic kidney disease, unspecified CKD stage  N18.9 585.9   6. Nausea and vomiting, unspecified vomiting type  R11.2 787.01   7. Gastrointestinal hemorrhage, unspecified gastrointestinal hemorrhage type  K92.2 578.9     Patient Active Problem List   Diagnosis    Chronic midline low back pain without sciatica    Essential hypertension    Hearing loss    Hypothyroidism, acquired    IBS (irritable bowel syndrome)    Chronic nonseasonal allergic rhinitis due to pollen    Stage 3 chronic kidney disease    Arthralgia of right knee    DDD (degenerative disc disease), lumbosacral    Scoliosis due to degenerative disease of spine in adult patient    Status post total replacement of left hip    Status post total hip replacement, left    Vitamin D deficiency    Anemia, normocytic normochromic    S/P hip replacement, right    OA (osteoarthritis) of knee    Syncope    Nausea & vomiting    Status post right knee replacement    Gastrointestinal hemorrhage     Past Medical History:   Diagnosis Date    Abnormal CXR 2017    Adverse reaction to narcotic drug     SEVERE HALLUCINATIONS POST OP LEFT HIP PLACEMENT    Allergies     Arthritis     Arthritis of foot 2020    Arthropathy of pelvic region and thigh 2012    unspecified    Back pain 2007    Chronic back pain 2009    Chronic cough 2017    CKD (chronic kidney disease)     Closed nondisplaced fracture of lateral malleolus of fibula with delayed healing 2020    Closed nondisplaced fracture of medial  cuneiform of left foot 2018    Constipation 2015    unspecified    COVID-19 vaccination refused     COVID-19 virus detected 10/17/2022    Diverticulosis     Dyspepsia 2008    Essential hypertension 2007    Exertional shortness of breath 2017    Fall 2018    Family history of abdominal aortic aneurysm (AAA) 2019    [place] aaa screen limited (04/10/2019 10:32)     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), 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    Hearing loss 2008    History of bone density study 2015    History of pneumonia     2017    History of skin cancer     Hypothyroidism, acquired 2007    Insomnia 2015    unspecified    Intervertebral disc disorder with myelopathy, cervical region 2010    Joint capsule tear 2012    unspecified    OA (osteoarthritis) of hip 2018    Other synovitis and tenosynovitis, right ankle and foot 2020    Peroneal tendonitis, right 2020    Rhinitis, allergic 2007    Right knee pain     Scoliosis     Screening breast examination 2007    Stress 2015    other acute reactions to stress    Stress incontinence     Trochanteric bursitis, left hip 2019    Urticaria 2015     Past Surgical History:   Procedure Laterality Date    BREAST EXCISIONAL BIOPSY Right     50+ years ago    BRONCHOSCOPY N/A 2017    Procedure: BRONCHOSCOPY;  Surgeon: Mario Alanis MD;  Location: [facility] ENDOSCOPY;  Service:     CATARACT EXTRACTION, BILATERAL      COLONOSCOPY      ENDOSCOPY N/A 2024    Procedure: ESOPHAGOGASTRODUODENOSCOPY AT BEDSIDE with gold probe and epi injection;  Surgeon: Karli Viveros MD;  Location: [facility] ENDOSCOPY;  Service: Gastroenterology;  Laterality: N/A;  Pre: GI Bleed  Post: multiple duodenal ulcers, hiatal hernia    HYSTERECTOMY      TOTAL HIP ARTHROPLASTY Left 2018    Procedure: LEFT TOTAL HIP ARTHROPLASTY;  Surgeon: Justen  "KRYSTAL Mann MD;  Location: [facility] MAIN OR;  Service: Orthopedics    TOTAL HIP ARTHROPLASTY Right 12/13/2022    Procedure: Posterior RIGHT TOTAL HIP ARTHROPLASTY MARCELINO NAVIGATION;  Surgeon: Anupam Ruiz MD;  Location: [facility] OR [facility];  Service: Orthopedics;  Laterality: Right;    TOTAL KNEE ARTHROPLASTY Right 9/18/2024    Procedure: TOTAL KNEE ARTHROPLASTY;  Surgeon: Jesus Thayer MD;  Location:  [facility] OR [facility];  Service: Orthopedics;  Laterality: Right;       SLP Recommendation and Plan     SLP Diet Recommendation: full liquid diet (09/26/24 1400)  Recommended Precautions and Strategies: upright posture during/after eating, small bites of food and sips of liquid, 1:1 supervision, assist with feeding (no straws per patient request) (09/26/24 1400)  SLP Rec. for Method of Medication Administration: meds crushed, with puree, as tolerated, meds via alternate route (09/26/24 1400)     Monitor for Signs of Aspiration: yes, notify SLP if any concerns (09/26/24 1400)  Recommended Diagnostics: reassess via clinical swallow evaluation (09/26/24 1400)     Anticipated Discharge Disposition (SLP): unknown (09/26/24 1400)     Therapy Frequency (Swallow): PRN (09/26/24 1400)  Predicted Duration Therapy Intervention (Days): until discharge (09/26/24 1400)  Oral Care Recommendations: Oral Care BID/PRN (09/26/24 1400)                                               SWALLOW EVALUATION (Last 72 Hours)       SLP Adult Swallow Evaluation       Row Name 09/26/24 1400                   Rehab Evaluation    Document Type evaluation  -TH        Subjective Information no complaints  -TH        Patient Observations alert;cooperative;agree to therapy  -TH        Patient/Family/Caregiver Comments/Observations son and daughter present  -TH        Patient Effort fair  -TH           General Information    Patient Profile Reviewed yes  -TH        Pertinent History Of Current Problem Per H&P on 9/20 \"Bailee Garza is a 84 y.o. female with a medical " "history of chronic back pain, essential hypertension and CKD who presents to the ED c/o acute nausea that developed about 130 this morning.  After taking some Zofran she laid back down.  When she tried to get up at 430 to go to the restroom she had a syncopal episode during which her daughter lowered her to the ground.  No reported trauma or injury.  Patient complains of ongoing nausea at this time but denies any chest pain, headache.  No report of dyspnea, fevers.  Patient had felt well during the day Sunday while watching football and eating well.  No bowel movement since total right knee on 9/18/2023.  No reported dysuria however.  No cough or fevers reported.  \"  SLP evaluated today as RN reports patient was presenting w/ difficulty taking pills.  -TH        Current Method of Nutrition full liquids  -TH        Precautions/Limitations, Vision WFL;for purposes of eval  -TH        Precautions/Limitations, Hearing WFL;for purposes of eval  -TH        Prior Level of Function-Communication unknown  -TH        Prior Level of Function-Swallowing no diet consistency restrictions  -TH        Plans/Goals Discussed with patient and family;agreed upon  -TH        Barriers to Rehab none identified  -TH           Oral Motor Structure and Function    Dentition Assessment --  wears dentures; but not in at this time  -TH        Mucosal Quality moist, healthy  -TH           Oral Musculature and Cranial Nerve Assessment    Oral Motor General Assessment WFL  -TH           General Eating/Swallowing Observations    Respiratory Support Currently in Use room air  -TH        Eating/Swallowing Skills fed by SLP  -TH        Positioning During Eating upright in chair  -TH        Utensils Used spoon;straw;cup  -TH        Consistencies Trialed pureed;thin liquids;ice chips  -TH           Clinical Swallow Eval    Clinical Swallow Evaluation Summary Patient seen for swallow evaluation on this date as RN placed orders reporting patient was " "coughing taking medications; per RN pills were crushed with pureed. She also reports of patient coughing with water. Patient sitting upright in chair upon SLP arrival; family present; patient and family report of no hx of difficulty swallowing. Completed trials of ice chips, thins, pureed only as she is currently on full liquids 2/2 GI bleed. She was observed with cough on 1/2 ice chip trials; during initial ice chip trial SLP attempting to palpate patients swallow and she reports this made her cough.  She appeared to tolerate thins via spoon/cup without overt s/sx of aspiration; pt declined straws. During pureed trials patient was observed with fairly consistent throat clearing; per family when this occurred \"patient is a habitual throat clearer\" and adamant this was \"normal for her.\" Attempted having patient swallow x2 during trials and this did appear to eliminate throat clear 2/3 trials. At this time cautiously recommend continuation of full liquids and medications crushed w/ pureed. If coughing/throat clear persists w/ liquids or pureeds recommend NPO with alternative means of nutrition and medications alternative route; recommend ST continue to follow; patient may benefit from instrumental swallow study when cleared by GI if this persists. Discussed POC with RN, family, and patient who verbalize understanding.  -TH           Recommendations    Therapy Frequency (Swallow) PRN  -TH        Predicted Duration Therapy Intervention (Days) until discharge  -TH        SLP Diet Recommendation full liquid diet  -TH        Recommended Diagnostics reassess via clinical swallow evaluation  -TH        Recommended Precautions and Strategies upright posture during/after eating;small bites of food and sips of liquid;1:1 supervision;assist with feeding  no straws per patient request  -TH        Oral Care Recommendations Oral Care BID/PRN  -TH        SLP Rec. for Method of Medication Administration meds crushed;with puree;as " tolerated;meds via alternate route  -TH        Monitor for Signs of Aspiration yes;notify SLP if any concerns  -TH        Anticipated Discharge Disposition (SLP) unknown  -TH           Swallow Goals (SLP)    Swallow LTGs Patient will demonstrate functional swallow for  -TH           (LTG) Patient will demonstrate functional swallow for    Diet Texture (Demonstrate functional swallow) regular textures  -TH        Liquid viscosity (Demonstrate functional swallow) thin liquids  -TH        Giles (Demonstrate functional swallow) independently (over 90% accuracy)  -TH        Time Frame (Demonstrate functional swallow) by discharge  -TH        Barriers (Demonstrate functional swallow) when cleared by GI for advancement  -TH                  User Key  (r) = Recorded By, (t) = Taken By, (c) = Cosigned By      Initials Name Effective Dates    TH Danya Matthews SLP 01/05/24 -                     EDUCATION  The patient has been educated in the following areas:   Dysphagia (Swallowing Impairment).        SLP GOALS       Row Name 09/26/24 1400             (LTG) Patient will demonstrate functional swallow for    Diet Texture (Demonstrate functional swallow) regular textures  -TH      Liquid viscosity (Demonstrate functional swallow) thin liquids  -TH      Giles (Demonstrate functional swallow) independently (over 90% accuracy)  -TH      Time Frame (Demonstrate functional swallow) by discharge  -TH      Barriers (Demonstrate functional swallow) when cleared by GI for advancement  -TH                User Key  (r) = Recorded By, (t) = Taken By, (c) = Cosigned By      Initials Name Provider Type    Danya Ovalles SLP Speech and Language Pathologist                         Time Calculation:    Time Calculation- SLP       Row Name 09/26/24 1453             Time Calculation- SLP    SLP Start Time 1400  -TH      SLP Received On 09/26/24  -TH                User Key  (r) = Recorded By, (t) = Taken By, (c) = Cosigned By       Initials Name Provider Type     Danya Matthews SLP Speech and Language Pathologist                    Therapy Charges for Today       Code Description Service Date Service Provider Modifiers Qty    [id number] HC ST EVAL ORAL PHARYNG SWALLOW 6 9/26/2024 Danya Matthews SLP GN 1                 ABISAI Dobson  9/26/2024

## 2024-09-26 NOTE — PROGRESS NOTES
[facility]  Inpatient Progress Note    Reason for Follow Up:  GIB, abnormal biliary imaging    Subjective     Interval History:   Multiple bouts of dark red diarrhea yesterday  Rcvd one unit prbc yesterday  Nursing reports that overnight bowel movements have been less bloody.  She is very sleepy this morning.  Her daughter is concerned is the seizure medicine that she was started on.  She received multiple doses of Ativan yesterday for the MRI and 1 dose overnight for anxiety.      Current Facility-Administered Medications:     acetaminophen (TYLENOL) tablet 650 mg, 650 mg, Oral, Q4H PRN **OR** acetaminophen (TYLENOL) 160 MG/5ML oral solution 650 mg, 650 mg, Oral, Q4H PRN **OR** acetaminophen (TYLENOL) suppository 650 mg, 650 mg, Rectal, Q4H PRN, Jose Manuel Vazquez MD    sennosides-docusate (PERICOLACE) 8.6-50 MG per tablet 2 tablet, 2 tablet, Oral, BID PRN **AND** polyethylene glycol (MIRALAX) packet 17 g, 17 g, Oral, Daily PRN **AND** bisacodyl (DULCOLAX) EC tablet 5 mg, 5 mg, Oral, Daily PRN **AND** bisacodyl (DULCOLAX) suppository 10 mg, 10 mg, Rectal, Daily PRN, Jose Manuel Vazquez MD    Calcium Replacement - Follow Nurse / BPA Driven Protocol, , Does not apply, PRN, Jose Manuel Vazquez MD    folic acid (FOLVITE) tablet 1 mg, 1 mg, Oral, Daily, Jose Manuel Vazquez MD, 1 mg at 09/25/24 0923    levETIRAcetam (KEPPRA) tablet 500 mg, 500 mg, Oral, BID, Mau Zamora MD, 500 mg at 09/25/24 2052    levothyroxine (SYNTHROID, LEVOTHROID) tablet 75 mcg, 75 mcg, Oral, Daily, Jose Manuel Vazquez MD, 75 mcg at 09/25/24 0923    loperamide (IMODIUM) capsule 2 mg, 2 mg, Oral, 4x Daily PRN, Deven Kang MD, 2 mg at 09/25/24 2052    LORazepam (ATIVAN) injection 0.5 mg, 0.5 mg, Intravenous, Once in imaging, Jose Manuel Vazquez MD    LORazepam (ATIVAN) injection 1 mg, 1 mg, Intravenous, Q4H PRN, Jose Manuel Vazquez MD, 1 mg at 09/25/24 2052    Magnesium Standard Dose Replacement - Follow  Nurse / BPA Driven Protocol, , Does not apply, PRN, Jose Manuel Vazquez MD    nitroglycerin (NITROSTAT) SL tablet 0.4 mg, 0.4 mg, Sublingual, Q5 Min PRN, Jose Manuel Vazquez MD    ondansetron ODT (ZOFRAN-ODT) disintegrating tablet 4 mg, 4 mg, Oral, Q6H PRN **OR** ondansetron (ZOFRAN) injection 4 mg, 4 mg, Intravenous, Q6H PRN, Jose Manuel Vazquez MD, 4 mg at 09/23/24 0947    ondansetron (ZOFRAN) injection 4 mg, 4 mg, Intravenous, Once, Shaquille Whitney MD    [COMPLETED] pantoprazole (PROTONIX) injection 80 mg, 80 mg, Intravenous, Once, 80 mg at 09/24/24 1533 **AND** pantoprazole (PROTONIX) 40 mg in sodium chloride 0.9 % 100 mL (0.4 mg/mL) MBP, 40 mg, Intravenous, Continuous, Karli Viveros MD, Last Rate: 20 mL/hr at 09/26/24 0535, 40 mg at 09/26/24 0535    Phosphorus Replacement - Follow Nurse / BPA Driven Protocol, , Does not apply, PRN, Jose Manule Vazquez MD    piperacillin-tazobactam (ZOSYN) 3.375 g IVPB in 100 mL NS MBP (CD), 3.375 g, Intravenous, Q8H, Julian Castillo MD, Last Rate: 0 mL/hr at 09/24/24 0211, 3.375 g at 09/26/24 0430    potassium & sodium phosphates (PHOS-NAK) 280-160-250 MG packet 2 packet, 2 packet, Oral, Once, Jose Manuel Vazquez MD    potassium chloride (KLOR-CON) packet 40 mEq, 40 mEq, Oral, Q4H, Alexandrea Morgan APRN, 40 mEq at 09/26/24 0535    Potassium Replacement - Follow Nurse / BPA Driven Protocol, , Does not apply, PRNGeorge Patrick D, MD    sodium chloride 0.9 % flush 10 mL, 10 mL, Intravenous, Q12H, Jose Manuel Vazquez MD, 10 mL at 09/25/24 2052    sodium chloride 0.9 % flush 10 mL, 10 mL, Intravenous, PRN, Jose Manuel Vazquez MD    sodium chloride 0.9 % infusion 40 mL, 40 mL, Intravenous, PRN, Jose Manuel Vazquez MD    sodium chloride 0.9 % infusion, 125 mL/hr, Intravenous, Continuous, Bowen Perry MD, Last Rate: 125 mL/hr at 09/24/24 0412, 125 mL/hr at 09/24/24 0412    sucralfate (CARAFATE) tablet 1 g, 1 g, Oral, TID AC, Karli Viveros MD, 1 g at  09/25/24 1704    Facility-Administered Medications Ordered in Other Encounters:     Chlorhexidine Gluconate Cloth 2 % pads 1 each, 1 each, Apply externally, Take As Directed, Anupam Ruiz MD  Review of Systems:    Positive for blood in the stool, negative for abdominal pain fevers or chills    Objective     Vital Signs  Temp:  [97.1 °F (36.2 °C)-98.8 °F (37.1 °C)] 97.1 °F (36.2 °C)  Heart Rate:  [73-97] 91  Resp:  [14-18] 15  BP: ()/(45-73) 117/65  Body mass index is 20.44 kg/m².    Intake/Output Summary (Last 24 hours) at 9/26/2024 0608  Last data filed at 9/26/2024 0535  Gross per 24 hour   Intake 1454.17 ml   Output 2200 ml   Net -745.83 ml     I/O this shift:  In: 792.5 [I.V.:205; Blood:387.5; IV Piggyback:200]  Out: 1400 [Urine:1400]     Physical Exam:   General: patient groggy but arousable   Eyes: Normal lids and lashes, no scleral icterus   Neck: supple, normal ROM   Skin: warm and dry, not jaundiced   Cardiovascular: regular rhythm and rate    Pulm:  regular and unlabored   Abdomen: soft, nontender, nondistended    Extremities: no rash or edema     Results Review:     I reviewed the patient's new clinical results.    Results from last 7 days   Lab Units 09/26/24 0444 09/25/24  2342 09/25/24  1713 09/25/24  0428 09/24/24  1451 09/24/24  1130   WBC 10*3/mm3 9.93  --   --  9.97  --  9.63   HEMOGLOBIN g/dL 8.9*  8.9* 8.6* 6.5* 7.0*   < > 3.8*   HEMATOCRIT % 27.1*  27.1* 26.7* 19.5* 21.4*   < > 12.4*   PLATELETS 10*3/mm3 185  --   --  187  --  225    < > = values in this interval not displayed.     Results from last 7 days   Lab Units 09/26/24  0444 09/25/24  0428 09/24/24  1451   SODIUM mmol/L 146* 148* 143   POTASSIUM mmol/L 3.4* 3.9 4.6   CHLORIDE mmol/L 117* 120* 117*   CO2 mmol/L 18.0* 19.1* 15.4*   BUN mg/dL 25* 48* 66*   CREATININE mg/dL 0.82 0.99 1.10*   CALCIUM mg/dL 7.5* 7.7* 7.8*   BILIRUBIN mg/dL 0.3 0.2 0.3   ALK PHOS U/L 74 77 90   ALT (SGPT) U/L 13 18 22   AST (SGOT) U/L 13 17 24    GLUCOSE mg/dL 69 101* 131*     Results from last 7 days   Lab Units 09/23/24  0533   INR  0.96     Lab Results   Lab Value Date/Time    LIPASE 42 09/23/2024 0735    LIPASE 50 12/19/2017 1128       Radiology:  MRI abdomen w wo contrast mrcp   Final Result      MRI Brain With & Without Contrast   Final Result   The study is degraded by patient motion. There is no   evidence of acute infarction, hydrocephalus, of mass or of abnormal   enhancement.       This report was finalized on 9/25/2024 4:43 PM by Dr. Anupam Davis M.D   on Workstation: [id number]          US Gallbladder   Final Result   Stable dilation of the common bile duct. Pancreas largely obscured by   overlying bowel gas. Mild dilation of the visualized pancreatic duct   measuring about 3 mm, similar to comparison CT               This report was finalized on 9/24/2024 6:51 PM by Dr. Julio César Perry M.D on Workstation: [id number]          CT Angiogram Chest Pulmonary Embolism   Final Result       1. The study is negative for pulmonary embolism. No acute intrathoracic   abnormality.   2. Intrathoracic and extrahepatic biliary dilatation with abrupt   tapering at the ampulla where there is questionable hypoenhancing   masslike soft tissue at the ampulla and pancreatic head. Could consider   correlating with ERCP or MRCP if clinically indicated.   3. Moderate fluid and air distended stomach and high attenuation fluid   distention of the proximal duodenum with duodenal wall thickening,   suspected duodenal diverticula, and mild proximal duodenal wall   thickening. Could correlate with endoscopy if clinically indicated.   4. Distended gallbladder.   5. Decreased hepatic attenuation could reflect hepatic steatosis.   6. Extensive colonic diverticula without CT evidence of diverticulitis.   7. Other chronic findings, as above.       This report was finalized on 9/23/2024 8:13 AM by Lorenzo Bui MD on   Workstation: [id number]          CT Abdomen Pelvis  With Contrast   Final Result       1. The study is negative for pulmonary embolism. No acute intrathoracic   abnormality.   2. Intrathoracic and extrahepatic biliary dilatation with abrupt   tapering at the ampulla where there is questionable hypoenhancing   masslike soft tissue at the ampulla and pancreatic head. Could consider   correlating with ERCP or MRCP if clinically indicated.   3. Moderate fluid and air distended stomach and high attenuation fluid   distention of the proximal duodenum with duodenal wall thickening,   suspected duodenal diverticula, and mild proximal duodenal wall   thickening. Could correlate with endoscopy if clinically indicated.   4. Distended gallbladder.   5. Decreased hepatic attenuation could reflect hepatic steatosis.   6. Extensive colonic diverticula without CT evidence of diverticulitis.   7. Other chronic findings, as above.       This report was finalized on 9/23/2024 8:13 AM by Lorenzo Bui MD on   Workstation: [id number]          XR Chest 1 View   Final Result   1. Mild interstitial prominence of the lungs somewhat exaggerated by   underinflation but overall appear unchanged from the 12/29/2017 study   and therefore likely chronic in nature.   2. No definite acute infiltrates are thought to be present.       This report was finalized on 9/23/2024 7:33 AM by Dr. Arun Romero M.D on Workstation: [id number]              Assessment & Plan     Active Hospital Problems    Diagnosis     **Syncope     Nausea & vomiting     Status post right knee replacement     Gastrointestinal hemorrhage     Essential hypertension        Assessment:  UGIB - multiple duodenal ukcers with stigmata noted on egd 9/24 - treated endoscopically  ABLA  Abnormal biliary tract imaging  Distended GB      Plan:  H/h stable post transfusion - expect stabilization today.  Bowel movements seem to be less bloody per nursing.    Continue protonix/carafate-will change to p.o. Protonix tomorrow  F/u h  pylori stool antigen  Will need eus/ercp for further evaluation of biliary findings - discussed with biliary service.  Will be done as outpatient once she recovers from acute issues.  LFTs and  Ca 19-9 nml  If H&H starts to stabilize today, will advance to solid food later today.  Will start Ensure/boost nutritional supplementation in the interim.  [place] with nursing that we will try to avoid Ativan today as she seems fairly sedate this am    I discussed the patients findings and my recommendations with patient, family, and nursing staff.            Karli Viveros M.D.  [facility]  [phone]

## 2024-09-26 NOTE — PLAN OF CARE
"Goal Outcome Evaluation:              Outcome Evaluation: Patient seen for swallow evaluation on this date as RN placed orders reporting patient was coughing taking medications; per RN pills were crushed with pureed. She also reports of patient coughing with water. Patient sitting upright in chair upon SLP arrival; family present; patient and family report of no hx of difficulty swallowing. Completed trials of ice chips, thins, pureed only as she is currently on full liquids 2/2 GI bleed. She was observed with cough on 1/2 ice chip trials; during initial ice chip trial SLP attempting to palpate patients swallow and she reports this made her cough.  She appeared to tolerate thins via spoon/cup without overt s/sx of aspiration; pt declined straws. During pureed trials patient was observed with fairly consistent throat clearing; per family when this occurred \"patient is a habitual throat clearer\" and adamant this was \"normal for her.\" Attempted having patient swallow x2 during trials and this did appear to eliminate throat clear 2/3 trials. At this time cautiously recommend continuation of full liquids and medications crushed w/ pureed; recommend upright position; small bites; slow rate; double swallow. If coughing/throat clear persists recommend NPO with alternative means of nutrition and medications alternative route; recommend ST continue to follow; patient may benefit from instrumental swallow study when cleared by GI if this persists. Discussed POC with RN, family, and patient who verbalize understanding.      Anticipated Discharge Disposition (SLP): unknown                        "

## 2024-09-26 NOTE — THERAPY EVALUATION
Patient Name: Bailee Garza  : 1940    MRN: [id number]                              Today's Date: 2024       Admit Date: 2024    Visit Dx:     ICD-10-CM ICD-9-CM   1. Syncope and collapse  R55 780.2   2. Volume depletion, gastrointestinal loss  E86.9 276.50   3. Postoperative anemia  D64.9 285.9   4. Status post total right knee replacement  Z96.651 V43.65   5. Chronic kidney disease, unspecified CKD stage  N18.9 585.9   6. Nausea and vomiting, unspecified vomiting type  R11.2 787.01   7. Gastrointestinal hemorrhage, unspecified gastrointestinal hemorrhage type  K92.2 578.9     Patient Active Problem List   Diagnosis    Chronic midline low back pain without sciatica    Essential hypertension    Hearing loss    Hypothyroidism, acquired    IBS (irritable bowel syndrome)    Chronic nonseasonal allergic rhinitis due to pollen    Stage 3 chronic kidney disease    Arthralgia of right knee    DDD (degenerative disc disease), lumbosacral    Scoliosis due to degenerative disease of spine in adult patient    Status post total replacement of left hip    Status post total hip replacement, left    Vitamin D deficiency    Anemia, normocytic normochromic    S/P hip replacement, right    OA (osteoarthritis) of knee    Syncope    Nausea & vomiting    Status post right knee replacement    Gastrointestinal hemorrhage     Past Medical History:   Diagnosis Date    Abnormal CXR 2017    Adverse reaction to narcotic drug     SEVERE HALLUCINATIONS POST OP LEFT HIP PLACEMENT    Allergies     Arthritis     Arthritis of foot 2020    Arthropathy of pelvic region and thigh 2012    unspecified    Back pain 2007    Chronic back pain 2009    Chronic cough 2017    CKD (chronic kidney disease)     Closed nondisplaced fracture of lateral malleolus of fibula with delayed healing 2020    Closed nondisplaced fracture of medial cuneiform of left foot 2018    Constipation 2015     unspecified    COVID-19 vaccination refused     COVID-19 virus detected 10/17/2022    Diverticulosis     Dyspepsia 2008    Essential hypertension 2007    Exertional shortness of breath 2017    Fall 2018    Family history of abdominal aortic aneurysm (AAA) 2019    [place] aaa screen limited (04/10/2019 10:32)     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), 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    Hearing loss 2008    History of bone density study 2015    History of pneumonia     2017    History of skin cancer     Hypothyroidism, acquired 2007    Insomnia 2015    unspecified    Intervertebral disc disorder with myelopathy, cervical region 2010    Joint capsule tear 2012    unspecified    OA (osteoarthritis) of hip 2018    Other synovitis and tenosynovitis, right ankle and foot 2020    Peroneal tendonitis, right 2020    Rhinitis, allergic 2007    Right knee pain     Scoliosis     Screening breast examination 2007    Stress 2015    other acute reactions to stress    Stress incontinence     Trochanteric bursitis, left hip 2019    Urticaria 2015     Past Surgical History:   Procedure Laterality Date    BREAST EXCISIONAL BIOPSY Right     50+ years ago    BRONCHOSCOPY N/A 2017    Procedure: BRONCHOSCOPY;  Surgeon: Mario Alanis MD;  Location: [facility] ENDOSCOPY;  Service:     CATARACT EXTRACTION, BILATERAL      COLONOSCOPY      ENDOSCOPY N/A 2024    Procedure: ESOPHAGOGASTRODUODENOSCOPY AT BEDSIDE with gold probe and epi injection;  Surgeon: Karli Viveros MD;  Location: [facility] ENDOSCOPY;  Service: Gastroenterology;  Laterality: N/A;  Pre: GI Bleed  Post: multiple duodenal ulcers, hiatal hernia    HYSTERECTOMY      TOTAL HIP ARTHROPLASTY Left 2018    Procedure: LEFT TOTAL HIP ARTHROPLASTY;  Surgeon: Justen Mann MD;  Location: [facility] MAIN OR;  Service: Orthopedics     TOTAL HIP ARTHROPLASTY Right 12/13/2022    Procedure: Posterior RIGHT TOTAL HIP ARTHROPLASTY MARCELINO NAVIGATION;  Surgeon: Anupam Ruiz MD;  Location: [facility] OR [facility];  Service: Orthopedics;  Laterality: Right;    TOTAL KNEE ARTHROPLASTY Right 9/18/2024    Procedure: TOTAL KNEE ARTHROPLASTY;  Surgeon: Jesus Thayer MD;  Location: [facility] OR [facility];  Service: Orthopedics;  Laterality: Right;      General Information       Row Name 09/26/24 1505          OT Time and Intention    Document Type evaluation  -BC     Mode of Treatment co-treatment;physical therapy;occupational therapy  -BC       Row Name 09/26/24 1505          General Information    Patient Profile Reviewed yes  -BC     Prior Level of Function independent:  -BC     Existing Precautions/Restrictions fall  -BC     Barriers to Rehab none identified  -BC       Row Name 09/26/24 1505          Living Environment    People in Home alone  -BC       Row Name 09/26/24 1505          Home Main Entrance    Number of Stairs, Main Entrance three  -BC     Stair Railings, Main Entrance railings safe and in good condition  -BC       Row Name 09/26/24 1505          Stairs Within Home, Primary    Stairs Comment, Within Home, Primary steps up to bedroom but can stay on main level  -BC       Row Name 09/26/24 1505          Cognition    Orientation Status (Cognition) oriented x 3  -BC       Row Name 09/26/24 1505          Safety Issues, Functional Mobility    Safety Issues Affecting Function (Mobility) safety precautions follow-through/compliance;judgment;insight into deficits/self-awareness;steps too close to assistive device  -BC     Impairments Affecting Function (Mobility) endurance/activity tolerance;balance;pain;postural/trunk control;strength;range of motion (ROM);coordination  -BC     Comment, Safety Issues/Impairments (Mobility) PT/OT cotreatment medically appropriate and necessary due to patient acuity level, to maximize therapeutic benefit due to impaired act  tolerance, and for safety of patient and staff. Treatment focused on progression of care and goals established in POC.  -BC               User Key  (r) = Recorded By, (t) = Taken By, (c) = Cosigned By      Initials Name Provider Type    BC Goyo Amor OT Occupational Therapist                     Mobility/ADL's       Row Name 09/26/24 1506          Bed Mobility    Bed Mobility supine-sit  -BC     Supine-Sit [place] (Bed Mobility) minimum assist (75% patient effort);2 person assist  -BC     Comment, (Bed Mobility) Pt became agitated when inquired about assisting RLE towards EOB---wanting her son to come and provide support under ankle. Very slow to move and cues for safety through mvmts and ax2 one for RLE support, another for trunk/arm to push up with  -BC       Row Name 09/26/24 1506          Transfers    Transfers sit-stand transfer;stand-sit transfer;bed-chair transfer  -BC       Row Name 09/26/24 1506          Bed-Chair Transfer    Bed-Chair [place] (Transfers) minimum assist (75% patient effort);2 person assist  -BC     Assistive Device (Bed-Chair Transfers) walker, front-wheeled  -BC       Row Name 09/26/24 1506          Sit-Stand Transfer    Sit-Stand [place] (Transfers) minimum assist (75% patient effort);2 person assist  -BC     Assistive Device (Sit-Stand Transfers) walker, front-wheeled  -BC     Comment, (Sit-Stand Transfer) Pt continues to want therapy staff to grab under her arms instead of gait belt; attempting education to which Pt was not agreeable too. AX2 for safety w/ mvmts sit<>Stands  -BC       Row Name 09/26/24 1506          Stand-Sit Transfer    Stand-Sit [place] (Transfers) minimum assist (75% patient effort);2 person assist  -BC     Assistive Device (Stand-Sit Transfers) walker, front-wheeled  -BC       Row Name 09/26/24 1506          Functional Mobility    Functional Mobility- Ind. Level 2 person assist required;moderate assist (50% patient effort)  -BC      "Functional Mobility- Device walker, front-wheeled  -BC     Functional Mobility- Safety Issues weight-shifting ability decreased;steps too close front assistive device;step length decreased;balance decreased during turns  -BC     Functional Mobility- Comment ~6 steps to chair, max encouragement for progression of steps, help w/ offloading weight  from L to R to take a step when Pt calling out \"quit swaying me\" but was unable to weightshift herself and cont'd to need assistance.  -BC     Patient was able to Ambulate yes  -BC       Row Name 09/26/24 1506          Activities of Daily Living    BADL Assessment/Intervention --  declined any ADL participation; socks dep A donned, gown around sh''s in chair dep A donned  -BC               User Key  (r) = Recorded By, (t) = Taken By, (c) = Cosigned By      Initials Name Provider Type    Goyo Kiser OT Occupational Therapist                   Obj/Interventions       Row Name 09/26/24 1511          Sensory Assessment (Somatosensory)    Sensory Assessment (Somatosensory) sensation intact  -BC       Row Name 09/26/24 1511          Range of Motion Comprehensive    Comment, General Range of Motion impaired R knee AROM. BUE WFls  -BC       Row Name 09/26/24 1511          Strength Comprehensive (MMT)    Comment, General Manual Muscle Testing (MMT) Assessment gross weakness w/ RLE more impaired and increased difficulty WBing through RLE to take step w/ L.  -BC       Row Name 09/26/24 1511          Balance    Comment, Balance min A x1-2 for balance, forward posture, stepping close inside walker and decreased weightshifting  -BC               User Key  (r) = Recorded By, (t) = Taken By, (c) = Cosigned By      Initials Name Provider Type    Goyo Kiser OT Occupational Therapist                   Goals/Plan       Row Name 09/26/24 1518          Bed Mobility Goal 1 (OT)    Activity/Assistive Device (Bed Mobility Goal 1, OT) bed mobility activities, all  -BC     " [place] Level/Cues Needed (Bed Mobility Goal 1, OT) verbal cues required;supervision required  -BC     Time Frame (Bed Mobility Goal 1, OT) short term goal (STG);2 weeks  -BC     Progress/Outcomes (Bed Mobility Goal 1, OT) new goal  -BC       Row Name 09/26/24 1518          Transfer Goal 1 (OT)    Activity/Assistive Device (Transfer Goal 1, OT) sit-to-stand/stand-to-sit;bed-to-chair/chair-to-bed;toilet  -BC     [place] Level/Cues Needed (Transfer Goal 1, OT) supervision required  -BC     Time Frame (Transfer Goal 1, OT) short term goal (STG);2 weeks  -BC     Progress/Outcome (Transfer Goal 1, OT) new goal  -BC       Row Name 09/26/24 1518          Dressing Goal 1 (OT)    Activity/Device (Dressing Goal 1, OT) upper body dressing;lower body dressing  -BC     [place]/Cues Needed (Dressing Goal 1, OT) supervision required  -BC     Time Frame (Dressing Goal 1, OT) short term goal (STG);2 weeks  -BC     Strategies/Barriers (Dressing Goal 1, OT) AE PRN  -BC     Progress/Outcome (Dressing Goal 1, OT) new goal  -BC       Row Name 09/26/24 1518          Toileting Goal 1 (OT)    Activity/Device (Toileting Goal 1, OT) toileting skills, all;adjust/manage clothing;perform perineal hygiene  -BC     Time Frame (Toileting Goal 1, OT) short term goal (STG);2 weeks  -BC     Progress/Outcome (Toileting Goal 1, OT) new goal  -BC       Row Name 09/26/24 1518          Grooming Goal 1 (OT)    Activity/Device (Grooming Goal 1, OT) grooming skills, all  -BC     [place] (Grooming Goal 1, OT) supervision required  -BC     Time Frame (Grooming Goal 1, OT) 2 weeks;short term goal (STG)  -BC     Strategies/Barriers (Grooming Goal 1, OT) standing sinkside for ADL IND  -BC     Progress/Outcome (Grooming Goal 1, OT) new goal  -BC       Row Name 09/26/24 1518          Problem Specific Goal 1 (OT)    Problem Specific Goal 1 (OT) Pt will increase walker safety and IND for household distances w/ walker and SBA  -BC     Time Frame  (Problem Specific Goal 1, OT) 2 weeks;short term goal (STG)  -BC     Progress/Outcome (Problem Specific Goal 1, OT) new goal  -BC       Row Name 09/26/24 1257          Therapy Assessment/Plan (OT)    Planned Therapy Interventions (OT) activity tolerance training;BADL retraining;functional balance retraining;IADL retraining;neuromuscular control/coordination retraining;occupation/activity based interventions;passive ROM/stretching;patient/caregiver education/training;ROM/therapeutic exercise;strengthening exercise;transfer/mobility retraining;adaptive equipment training  -BC               User Key  (r) = Recorded By, (t) = Taken By, (c) = Cosigned By      Initials Name Provider Type    BC Goyo Amor OT Occupational Therapist                   Clinical Impression       Row Name 09/26/24 6247          Pain Assessment    Pre/Posttreatment Pain Comment did not rate pain on scale, c/o RLE pain w/ standing/steps.  -BC     Pain Intervention(s) Repositioned;Ambulation/increased activity  -BC       Row Name 09/26/24 6790          Plan of Care Review    Outcome Evaluation Pt is an 85 y/o F admitted to [facility] 9/23 with severe anemia, hypotension and syncope episode. s/p multiple transfusions with HGB 8.9 this AM. 9/24 She had an EGD that showed multiple bleeding duodenal ulcers with recent R TKA and was home using a walker. Pt tolerated treatment fair but particular w mvmts and only allowing her son to touch RLE initially w/ bed mobility. As treatment progressed pt willing to stand and transfer up  to chair w/ max encouragement but has poor insight and awareness with staff assist, decreased ADL IND, and impaired standing balance and tolerance to tasks. Continue IP acute OT services with DC REC cont'd rehab at IRF vs SNU level to return to highest level of IND/function before home.  -BC       Row Name 09/26/24 3184          Therapy Assessment/Plan (OT)    Criteria for Skilled Therapeutic Interventions Met (OT) yes;meets  criteria;skilled treatment is necessary  -BC     Therapy Frequency (OT) 5 times/wk  -BC       Row Name 09/26/24 1512          Therapy Plan Review/Discharge Plan (OT)    Anticipated Discharge Disposition (OT) inpatient rehabilitation facility;skilled nursing facility  -BC       Row Name 09/26/24 1512          Vital Signs    O2 Delivery Pre Treatment room air  -BC     O2 Delivery Intra Treatment room air  -BC     O2 Delivery Post Treatment room air  -BC     Pre Patient Position Supine  -BC     Intra Patient Position Standing  -BC     Post Patient Position Sitting  -BC       Row Name 09/26/24 1512          Positioning and Restraints    Pre-Treatment Position in bed  -BC     Post Treatment Position chair  -BC     In Chair notified nsg;reclined;call light within reach;encouraged to call for assist;with family/caregiver  -BC               User Key  (r) = Recorded By, (t) = Taken By, (c) = Cosigned By      Initials Name Provider Type    BC Goyo Amor, KAVITHA Occupational Therapist                   Outcome Measures       Row Name 09/26/24 1519          How much help from another is currently needed...    Putting on and taking off regular lower body clothing? 1  -BC     Bathing (including washing, rinsing, and drying) 1  -BC     Toileting (which includes using toilet bed pan or urinal) 1  -BC     Putting on and taking off regular upper body clothing 1  -BC     Taking care of personal grooming (such as brushing teeth) 2  -BC     Eating meals 2  -BC     AM-PAC 6 Clicks Score (OT) 8  -BC       Row Name 09/26/24 1339 09/26/24 0800       How much help from another person do you currently need...    Turning from your back to your side while in flat bed without using bedrails? 3  -ST 2  -AF    Moving from lying on back to sitting on the side of a flat bed without bedrails? 3  -ST 2  -AF    Moving to and from a bed to a chair (including a wheelchair)? 2  -ST 2  -AF    Standing up from a chair using your arms (e.g., wheelchair,  "bedside chair)? 2  -ST 2  -AF    Climbing 3-5 steps with a railing? 2  -ST 2  -AF    To walk in hospital room? 2  -ST 1  -AF    AM-PAC 6 Clicks Score (PT) 14  -ST 11  -AF    Highest Level of Mobility Goal 4 --> Transfer to chair/commode  -ST 4 --> Transfer to chair/commode  -AF      Row Name 09/26/24 1519          Functional Assessment    Outcome Measure Options AM-PAC 6 Clicks Daily Activity (OT)  -BC               User Key  (r) = Recorded By, (t) = Taken By, (c) = Cosigned By      Initials Name Provider Type    AF Sonam Thompson, RN Registered Nurse    Suma Garg, PT Physical Therapist    Goyo Kiser, OT Occupational Therapist                    Occupational Therapy Education       Title: PT OT SLP Therapies (In Progress)       Topic: Occupational Therapy (In Progress)       Point: ADL training (Done)       Description:   Instruct learner(s) on proper safety adaptation and remediation techniques during self care or transfers.   Instruct in proper use of assistive devices.                  Learning Progress Summary             Patient Nonacceptance, E, VU by BC at 9/26/2024 1520    Comment: educ on sequence w/ mvmts, role of OT at acute level, and tecniques for increased safety w/ transfer (not grabbing under her shoulders) Pt was not receptive, \"you don't listen, You dont know what youre doing\". Cont education on techniques in future POC   Family Nonacceptance, E, VU by BC at 9/26/2024 1520    Comment: educ on sequence w/ mvmts, role of OT at acute level, and tecniques for increased safety w/ transfer (not grabbing under her shoulders) Pt was not receptive, \"you don't listen, You dont know what youre doing\". Cont education on techniques in future POC                         Point: Home exercise program (Not Started)       Description:   Instruct learner(s) on appropriate technique for monitoring, assisting and/or progressing therapeutic exercises/activities.                  Learner Progress:  " "Not documented in this visit.              Point: Precautions (Not Started)       Description:   Instruct learner(s) on prescribed precautions during self-care and functional transfers.                  Learner Progress:  Not documented in this visit.              Point: Body mechanics (Done)       Description:   Instruct learner(s) on proper positioning and spine alignment during self-care, functional mobility activities and/or exercises.                  Learning Progress Summary             Patient Nonacceptance, E, VU by BC at 9/26/2024 1520    Comment: educ on sequence w/ mvmts, role of OT at acute level, and tecniques for increased safety w/ transfer (not grabbing under her shoulders) Pt was not receptive, \"you don't listen, You dont know what youre doing\". Cont education on techniques in future POC   Family Nonacceptance, E, VU by BC at 9/26/2024 1520    Comment: educ on sequence w/ mvmts, role of OT at acute level, and tecniques for increased safety w/ transfer (not grabbing under her shoulders) Pt was not receptive, \"you don't listen, You dont know what youre doing\". Cont education on techniques in future POC                                         User Key       Initials Effective Dates Name Provider Type Discipline    BC 07/29/24 -  Goyo Amor OT Occupational Therapist OT                  OT Recommendation and Plan  Planned Therapy Interventions (OT): activity tolerance training, BADL retraining, functional balance retraining, IADL retraining, neuromuscular control/coordination retraining, occupation/activity based interventions, passive ROM/stretching, patient/caregiver education/training, ROM/therapeutic exercise, strengthening exercise, transfer/mobility retraining, adaptive equipment training  Therapy Frequency (OT): 5 times/wk  Plan of Care Review  Outcome Evaluation: Pt is an 85 y/o F admitted to [facility] 9/23 with severe anemia, hypotension and syncope episode. s/p multiple transfusions with HGB " 8.9 this AM. 9/24 She had an EGD that showed multiple bleeding duodenal ulcers with recent R TKA and was home using a walker. Pt tolerated treatment fair but particular w mvmts and only allowing her son to touch RLE initially w/ bed mobility. As treatment progressed pt willing to stand and transfer up  to chair w/ max encouragement but has poor insight and awareness with staff assist, decreased ADL IND, and impaired standing balance and tolerance to tasks. Continue IP acute OT services with DC REC cont'd rehab at IRF vs SNU level to return to highest level of IND/function before home.     Time Calculation:   Evaluation Complexity (OT)  Review Occupational Profile/Medical/Therapy History Complexity: expanded/moderate complexity  Assessment, Occupational Performance/Identification of Deficit Complexity: 3-5 performance deficits  Clinical Decision Making Complexity (OT): detailed assessment/moderate complexity  Overall Complexity of Evaluation (OT): moderate complexity     Time Calculation- OT       Row Name 09/26/24 1521             Time Calculation- OT    OT Start Time 1302  -BC      OT Stop Time 1330  -BC      OT Time Calculation (min) 28 min  -BC      Total Timed Code Minutes- OT 14 minute(s)  -BC      OT Received On 09/26/24  -BC      OT - Next Appointment 09/27/24  -BC      OT Goal Re-Cert Due Date 10/10/24  -BC         Timed Charges    31783 - OT Therapeutic Activity Minutes 14  -BC         Total Minutes    Timed Charges Total Minutes 14  -BC       Total Minutes 14  -BC                User Key  (r) = Recorded By, (t) = Taken By, (c) = Cosigned By      Initials Name Provider Type    BC Goyo Amor OT Occupational Therapist                  Therapy Charges for Today       Code Description Service Date Service Provider Modifiers Qty    [id number]  OT THERAPEUTIC ACT EA 15 MIN 9/26/2024 Goyo Amor OT GO 1    [id number]  OT EVAL MOD COMPLEXITY 3 9/26/2024 Goyo Amor OT GO 1                  Goyo Amor, OT  9/26/2024

## 2024-09-26 NOTE — PLAN OF CARE
Goal Outcome Evaluation:  Plan of Care Reviewed With: patient           Outcome Evaluation: Pt seen for PT/OT this PM. Noted pt with change in status requiring transfer to CICU since last seen by PT. Pt is significantly limited by pain and fatigue today, in addition to fear of WB through R LE. Education provided regarding importance of mobility. She is mildly resistive to PT attempts to assist with w/s for steps to reach recliner. Fatigued with activity today. She continues to benefit from skilled PT to address mobility deficits. Recommend SNU vs home with assist pending progress.      Anticipated Discharge Disposition (PT): home with assist, home with home health, skilled nursing facility (pending progress)

## 2024-09-26 NOTE — PROGRESS NOTES
[facility] INPATIENT PROGRESS NOTE         [facility] CARDIAC INTENSIVE CARE    2024      PATIENT IDENTIFICATION:  Name: Bailee Garza ADMIT: 2024   : 1940  PCP: Eddie Araya MD    MRN: [id number] LOS: 2 days   AGE/SEX: 84 y.o. female  ROOM: [facility] [place]                     LOS 2    Reason for visit: Severe anemia and hypotension with duodenal ulcers      SUBJECTIVE:      Resting comfortably but very sleepy.  Suspect this is medication related.  Bloody bowel movements have decreased.  Discussed with family at bedside.  MRCP results noted.  Neurology does not have anything  further to add.  Suspect Are contributing to her lethargy.  Discontinued.    Objective   OBJECTIVE:    Vital Sign Min/Max for last 24 hours  Temp  Min: 97.1 °F (36.2 °C)  Max: 98.8 °F (37.1 °C)   BP  Min: 96/49  Max: 126/66   Pulse  Min: 67  Max: 97   Resp  Min: 14  Max: 18   SpO2  Min: 92 %  Max: 100 %   No data recorded   No data recorded    Vitals:    24 0600 24 0750 24 0800 24 0900   BP: 114/55  108/62 121/66   BP Location: Right arm      Patient Position: Lying      Pulse: 72  67 69   Resp:       Temp:  98 °F (36.7 °C)     TempSrc:  Oral     SpO2: 92%  93% 97%   Weight:       Height:                24  1101 24  1559 24  1600   Weight: 54.5 kg (120 lb 2.4 oz) 54.4 kg (120 lb) 50.7 kg (111 lb 12.4 oz)       Body mass index is 20.44 kg/m².                          Body mass index is 20.44 kg/m².    Intake/Output Summary (Last 24 hours) at 2024 0946  Last data filed at 2024 0800  Gross per 24 hour   Intake 1814.17 ml   Output 2200 ml   Net -385.83 ml         Exam:  GEN:  No distress, appears stated age  EYES:   PERRL, anicteric sclerae  ENT:    External ears/nose normal, OP clear  NECK:  No adenopathy, midline trachea  LUNGS: Normal chest on inspection, palpation and auscultation  CV:  Normal S1S2, without murmur  ABD:  Nontender, nondistended, no hepatosplenomegaly,  +BS  EXT:  No edema.  No cyanosis or clubbing.  No mottling and normal cap refill.    Assessment     Scheduled meds:  folic acid, 1 mg, Oral, Daily  levothyroxine, 75 mcg, Oral, Daily  LORazepam, 0.5 mg, Intravenous, Once in imaging  ondansetron, 4 mg, Intravenous, Once  potassium & sodium phosphates, 2 packet, Oral, Once  potassium chloride, 10 mEq, Intravenous, Q1H  sodium chloride, 10 mL, Intravenous, Q12H  sucralfate, 1 g, Oral, TID AC      IV meds:                      pantoprazole, 40 mg, Last Rate: 40 mg (09/26/24 0933)  sodium chloride, 125 mL/hr, Last Rate: 125 mL/hr (09/24/24 0412)      Data Review:  Results from last 7 days   Lab Units 09/26/24  0444 09/25/24  0428 09/24/24  1451 09/24/24  0522 09/23/24  0533   SODIUM mmol/L 146* 148* 143 145 137   POTASSIUM mmol/L 3.4* 3.9 4.6 3.5 5.1   CHLORIDE mmol/L 117* 120* 117* 119* 102   CO2 mmol/L 18.0* 19.1* 15.4* 16.0* 24.0   BUN mg/dL 25* 48* 66* 61* 49*   CREATININE mg/dL 0.82 0.99 1.10* 0.79 1.16*   GLUCOSE mg/dL 69 101* 131* 103* 144*   CALCIUM mg/dL 7.5* 7.7* 7.8* 6.7* 9.3         Estimated Creatinine Clearance: 41.6 mL/min (by C-G formula based on SCr of 0.82 mg/dL).  Results from last 7 days   Lab Units 09/26/24  0444 09/25/24  2342 09/25/24  1713 09/25/24  0428 09/24/24  2224 09/24/24  1451 09/24/24  1130 09/24/24  0523 09/23/24  0533   WBC 10*3/mm3 9.93  --   --  9.97  --   --  9.63 7.42 13.00*   HEMOGLOBIN g/dL 8.9*  8.9* 8.6* 6.5* 7.0* 7.4*   < > 3.8* 4.2* 9.5*   PLATELETS 10*3/mm3 185  --   --  187  --   --  225 212 305    < > = values in this interval not displayed.     Results from last 7 days   Lab Units 09/23/24  0533   INR  0.96     Results from last 7 days   Lab Units 09/26/24  0444 09/25/24  0428 09/24/24  1451 09/24/24  0522 09/23/24  0533   ALT (SGPT) U/L 13 18 22 19 41*   AST (SGOT) U/L 13 17 24 16 34*     Results from last 7 days   Lab Units 09/24/24  1018   PH, ARTERIAL pH units 7.419   PO2 ART mm Hg 104.2*   PCO2, ARTERIAL mm Hg 29.5*    HCO3 ART mmol/L 19.1*     Results from last 7 days   Lab Units 09/25/24  0428 09/24/24  2224 09/24/24  1700 09/24/24  1305 09/24/24  0950 09/24/24  0522 09/23/24  0616 09/23/24  0533   PROCALCITONIN ng/mL 6.24*  --   --   --   --  10.20*  --  21.50*   LACTATE mmol/L  --  1.0 2.2* 2.1* 2.8*  --  2.0  --          Hemoglobin A1C   Date/Time Value Ref Range Status   09/24/2024 0523 5.20 4.80 - 5.60 % Final     Glucose   Date/Time Value Ref Range Status   09/24/2024 1235 131 (H) 70 - 130 mg/dL Final         Imaging reviewed  2D echo 9/24 reviewed: EF 65%.  RVSP 47 mmHg.    Ultrasound of gallbladder reviewed showing stable dilation of common bile duct.      MRCP 9/25 reviewed  MRI brain 9/25 reviewed        Microbiology reviewed            Active Hospital Problems    Diagnosis  POA    **Syncope [R55]  Yes    Nausea & vomiting [R11.2]  Yes    Status post right knee replacement [Z96.651]  Not Applicable    Gastrointestinal hemorrhage [K92.2]  Unknown    Essential hypertension [I10]  Yes      Resolved Hospital Problems   No resolved problems to display.         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  On PPI drip.  Continue serial H&H.  Transfuse if hemoglobin drops below 7.  MRCP poor quality due to motion artifact.  Antibiotics per ID recommendations.  Discontinue Keppra.  Suspect this is contributing to her lethargy this morning.  Control glucose.  Control blood pressure.  Mechanical DVT prophylaxis.  Discussed with family at bedside in detail.  Questions answered to their satisfaction.    Discussed with multidisciplinary ICU team on rounds this morning.      CCT: 32 min    Deven Kang MD  Pulmonary and Critical Care Medicine  [facility], [facility]  9/26/2024    09:46 EDT

## 2024-09-26 NOTE — CONSULTS
[place] [facility]               [facility]    PROGRESS NOTE      [facility]  2 days  Patient Identification:  Name: Bailee Garza  Age: 84 y.o.  Sex: female  :  1940  MRN: [id number]         Primary Care Physician: Eddie Araya MD            Subjective: Overall feeling better compared to when she come into the hospital.  Interval History: Patient is a 84-year-old female who was admitted to our facility on 2024 when she presented with sudden onset of nausea with subsequent syncopal episode.  Patient was noted to be hypertensive.  Workup did reveal jejunitis and pancreatic head abnormalities with biliary dilatation for which MRCP was planned.  For presentation of syncope and loss of consciousness and nausea and abnormal imaging studies neurology and gastroenterology service evaluated the patient.  Stool studies were sent and patient was noted to have abnormal enteric pathogen PCR positive for enteropathogenic E. coli as well as C. difficile assay positive for toxigenic C. difficile by PCR with negative antigen assay.  Patient was transferred to ICU on 2024 for hypotension associated with significant drop in hemoglobin to as low as 3.8.  Patient recently had knee replacement recently.  Patient received blood transfusion and was monitored in ICU and had emergent EGD which revealed multiple duodenal ulcers that required epinephrine injections.  Patient was continued on IV Protonix and Carafate was added.  Subsequently over the next few days patient blood pressure improved and her hemoglobin stabilized.  Today patient is considered stable hemodynamically to be able to transfer out of ICU.  Internal medicine service is recalled for resumption of care out of ICU for continued medical management of symptomatic acute blood loss anemia in the setting of duodenal ulcer and ongoing issues with C. difficile colonization and enteric pathogen studies positive for EPEC.   "Infectious disease service is also on board.    Objective:    Scheduled Meds:folic acid, 1 mg, Oral, Daily  levothyroxine, 75 mcg, Oral, Daily  LORazepam, 0.5 mg, Intravenous, Once in imaging  ondansetron, 4 mg, Intravenous, Once  potassium & sodium phosphates, 2 packet, Oral, Once  sodium chloride, 10 mL, Intravenous, Q12H  sucralfate, 1 g, Oral, TID AC      Continuous Infusions:pantoprazole, 40 mg, Last Rate: 40 mg (09/26/24 1654)        Vital signs in last 24 hours:  Temp:  [97.1 °F (36.2 °C)-98.8 °F (37.1 °C)] 97.7 °F (36.5 °C)  Heart Rate:  [67-97] 91  Resp:  [14-18] 15  BP: (105-143)/(54-85) 127/85    Intake/Output:    Intake/Output Summary (Last 24 hours) at 9/26/2024 1720  Last data filed at 9/26/2024 1654  Gross per 24 hour   Intake 1814.17 ml   Output 2400 ml   Net -585.83 ml       Exam:  /85   Pulse 91   Temp 97.7 °F (36.5 °C) (Oral)   Resp 15   Ht 157.5 cm (62\")   Wt 50.7 kg (111 lb 12.4 oz)   SpO2 97%   BMI 20.44 kg/m²   Patient is examined using the personal protective equipment as per guidelines from infection control for this particular patient as enacted.  Hand washing was performed before and after patient interaction.  General Appearance:  Awake alert cooperative female who feels better.                        Head:    Normocephalic, without obvious abnormality, atraumatic                           Eyes:    PERRL, conjunctiva/corneas clear, EOM's intact, both eyes                         Throat:   Lips, tongue, gums normal; oral mucosa pink and moist                           Neck:   Supple, symmetrical, trachea midline, no JVD                         Lungs:    Clear to auscultation bilaterally, respirations unlabored                 Chest Wall:    No tenderness or deformity                          Heart:  S1-S2 regular                  abdomen:   Soft nontender                 extremities: Right knee has surgical changes with no obvious inflammation.                        Pulses:   " Pulses palpable in all extremities                            Skin:   Skin is warm and dry,  no rashes or palpable lesions                  Neurologic: Alert and oriented x 3       Data Review:     I reviewed the patient's new clinical results.  Results from last 7 days   Lab Units 09/26/24  1420 09/26/24  0444 09/25/24  2342 09/25/24  1713 09/25/24  0428 09/24/24  2224 09/24/24  1451 09/24/24  1130 09/24/24  0523 09/23/24  0533   WBC 10*3/mm3  --  9.93  --   --  9.97  --   --  9.63 7.42 13.00*   HEMOGLOBIN g/dL 10.1* 8.9*  8.9* 8.6* 6.5* 7.0* 7.4* 8.5* 3.8* 4.2* 9.5*   PLATELETS 10*3/mm3  --  185  --   --  187  --   --  225 212 305     Results from last 7 days   Lab Units 09/26/24  1420 09/26/24  0444 09/25/24  0428 09/24/24  1451 09/24/24  0522 09/23/24  0533   SODIUM mmol/L  --  146* 148* 143 145 137   POTASSIUM mmol/L 4.1 3.4* 3.9 4.6 3.5 5.1   CHLORIDE mmol/L  --  117* 120* 117* 119* 102   CO2 mmol/L  --  18.0* 19.1* 15.4* 16.0* 24.0   BUN mg/dL  --  25* 48* 66* 61* 49*   CREATININE mg/dL  --  0.82 0.99 1.10* 0.79 1.16*   CALCIUM mg/dL  --  7.5* 7.7* 7.8* 6.7* 9.3   GLUCOSE mg/dL  --  69 101* 131* 103* 144*     Microbiology Results (last 10 days)       Procedure Component Value - Date/Time    Gastrointestinal Panel, PCR - Stool, Per Rectum [[id number]]  (Abnormal) Collected: 09/24/24 0837    Lab Status: Final result Specimen: Stool from Per Rectum Updated: 09/24/24 1043     Campylobacter Not Detected     Plesiomonas shigelloides Not Detected     Salmonella Not Detected     Vibrio Not Detected     Vibrio cholerae Not Detected     Yersinia enterocolitica Not Detected     Enteroaggregative E. coli (EAEC) Not Detected     Enteropathogenic E. coli (EPEC) Detected     Enterotoxigenic E. coli (ETEC) lt/st Not Detected     Shiga-like toxin-producing E. coli (STEC) stx1/stx2 Not Detected     Shigella/Enteroinvasive E. coli (EIEC) Not Detected     Cryptosporidium Not Detected     Cyclospora cayetanensis Not Detected      Entamoeba histolytica Not Detected     Giardia lamblia Not Detected     Adenovirus F40/41 Not Detected     Astrovirus Not Detected     Norovirus GI/GII Not Detected     Rotavirus A Not Detected     Sapovirus (I, II, IV or V) Not Detected    Clostridioides difficile Toxin - Stool, Per Rectum [[id number]]  (Abnormal) Collected: 09/24/24 0837    Lab Status: Final result Specimen: Stool from Per Rectum Updated: 09/24/24 1010    Narrative:      The following orders were created for panel order Clostridioides difficile Toxin - Stool, Per Rectum.  Procedure                               Abnormality         Status                     ---------                               -----------         ------                     Clostridioides difficile...[[id number]]  Abnormal            Final result                 Please view results for these tests on the individual orders.    Clostridioides difficile Toxin, PCR - Stool, Per Rectum [[id number]]  (Abnormal) Collected: 09/24/24 0837    Lab Status: Final result Specimen: Stool from Per Rectum Updated: 09/24/24 1010     Toxigenic C. difficile by PCR Positive    Narrative:      DNA from a toxigenic strain of C.difficile has been detected. Antigen testing for the presence of free C.difficile toxin is currently in progress, to help determine the clinical significance of this PCR result.     Clostridioides difficile toxin Ag, Reflex - Stool, Per Rectum [[id number]]  (Normal) Collected: 09/24/24 0837    Lab Status: Final result Specimen: Stool from Per Rectum Updated: 09/24/24 1044     C.diff Toxin Ag Negative    Narrative:      DNA from a toxigenic strain of C.difficile was detected, although the free toxin itself was not detected. These findings are consistent with C.difficile colonization and may not reflect actual C.difficile infection. Clinical correlation needed.    Blood Culture - Blood, Arm, Right [[id number]]  (Normal) Collected: 09/23/24 1406    Lab Status: Preliminary result  Specimen: Blood from Arm, Right Updated: 09/26/24 1430     Blood Culture No growth at 3 days    Blood Culture - Blood, Hand, Right [[id number]]  (Normal) Collected: 09/23/24 1013    Lab Status: Preliminary result Specimen: Blood from Hand, Right Updated: 09/26/24 1030     Blood Culture No growth at 3 days    Respiratory Panel PCR w/COVID-19(SARS-CoV-2) DAR/BLADIMIR/TE/PAD/COR/PUJA In-House, NP Swab in UTM/VTM, 2 HR TAT - Swab, Nasopharynx [[id number]]  (Normal) Collected: 09/23/24 0923    Lab Status: Final result Specimen: Swab from Nasopharynx Updated: 09/23/24 1019     ADENOVIRUS, PCR Not Detected     Coronavirus 229E Not Detected     Coronavirus HKU1 Not Detected     Coronavirus NL63 Not Detected     Coronavirus OC43 Not Detected     COVID19 Not Detected     Human Metapneumovirus Not Detected     Human Rhinovirus/Enterovirus Not Detected     Influenza A PCR Not Detected     Influenza B PCR Not Detected     Parainfluenza Virus 1 Not Detected     Parainfluenza Virus 2 Not Detected     Parainfluenza Virus 3 Not Detected     Parainfluenza Virus 4 Not Detected     RSV, PCR Not Detected     Bordetella pertussis pcr Not Detected     Bordetella parapertussis PCR Not Detected     Chlamydophila pneumoniae PCR Not Detected     Mycoplasma pneumo by PCR Not Detected    Narrative:      In the setting of a positive respiratory panel with a viral infection PLUS a negative procalcitonin without other underlying concern for bacterial infection, consider observing off antibiotics or discontinuation of antibiotics and continue supportive care. If the respiratory panel is positive for atypical bacterial infection (Bordetella pertussis, Chlamydophila pneumoniae, or Mycoplasma pneumoniae), consider antibiotic de-escalation to target atypical bacterial infection.              Assessment: Overall stable and improved    Syncope    Essential hypertension    Nausea & vomiting    Status post right knee replacement    Gastrointestinal  hemorrhage  Duodenal ulcer status post EGD and epinephrine injections  Status post blood transfusion  C. difficile colonization  EPEC positive in stool studies.  Abnormal appearing pancreas  Hypothyroidism  Plan:  Continue with serial H&H, keep an eye on her hemodynamics, continue with Protonix and Carafate which has been changed to oral formulation.  Continue supportive care.  Management of  issues of abnormal stool studies and blood loss anemia due to duodenal ulcer with ongoing follow-up with GI and ID service.  [facility] will assume care.  Karen Cloud MD  9/26/2024  17:20 EDT    Parts of this note may be an electronic transcription/translation of spoken language to printed text using the Dragon dictation system.

## 2024-09-26 NOTE — PROGRESS NOTES
ID note     Chief complaint: Follow-up EPEC, C. difficile PCR positive on stool studies.      Subjective: No acute events.  No fever.  Her BMs are slowing down.  Her blood cultures are negative to date.  Her WBC is normal.    Medications:    Current Facility-Administered Medications:     acetaminophen (TYLENOL) tablet 650 mg, 650 mg, Oral, Q4H PRN **OR** acetaminophen (TYLENOL) 160 MG/5ML oral solution 650 mg, 650 mg, Oral, Q4H PRN **OR** acetaminophen (TYLENOL) suppository 650 mg, 650 mg, Rectal, Q4H PRN, Jose Manuel Vazquez MD    sennosides-docusate (PERICOLACE) 8.6-50 MG per tablet 2 tablet, 2 tablet, Oral, BID PRN **AND** polyethylene glycol (MIRALAX) packet 17 g, 17 g, Oral, Daily PRN **AND** bisacodyl (DULCOLAX) EC tablet 5 mg, 5 mg, Oral, Daily PRN **AND** bisacodyl (DULCOLAX) suppository 10 mg, 10 mg, Rectal, Daily PRN, Jose Manuel Vazquez MD    Calcium Replacement - Follow Nurse / BPA Driven Protocol, , Does not apply, PRN, Jose Manuel Vazquez MD    folic acid (FOLVITE) tablet 1 mg, 1 mg, Oral, Daily, Jose Manuel Vazquez MD, 1 mg at 09/25/24 0923    levothyroxine (SYNTHROID, LEVOTHROID) tablet 75 mcg, 75 mcg, Oral, Daily, Jose Manuel Vazquez MD, 75 mcg at 09/25/24 0923    loperamide (IMODIUM) capsule 2 mg, 2 mg, Oral, 4x Daily PRN, Deven Kang MD, 2 mg at 09/25/24 2052    LORazepam (ATIVAN) injection 0.5 mg, 0.5 mg, Intravenous, Once in imaging, Jose Manuel Vazquez MD    LORazepam (ATIVAN) injection 1 mg, 1 mg, Intravenous, Q4H PRN, Jose Manuel Vazquez MD, 1 mg at 09/25/24 2052    Magnesium Standard Dose Replacement - Follow Nurse / BPA Driven Protocol, , Does not apply, PRN, Jose Manuel Vazquez MD    nitroglycerin (NITROSTAT) SL tablet 0.4 mg, 0.4 mg, Sublingual, Q5 Min PRN, Jose Manuel Vazquez MD    ondansetron ODT (ZOFRAN-ODT) disintegrating tablet 4 mg, 4 mg, Oral, Q6H PRN **OR** ondansetron (ZOFRAN) injection 4 mg, 4 mg, Intravenous, Q6H PRN, Jose Manuel Vazquez MD, 4 mg at 09/23/24 0947     ondansetron (ZOFRAN) injection 4 mg, 4 mg, Intravenous, Once, Shaquille Whitney MD    [COMPLETED] pantoprazole (PROTONIX) injection 80 mg, 80 mg, Intravenous, Once, 80 mg at 09/24/24 1533 **AND** pantoprazole (PROTONIX) 40 mg in sodium chloride 0.9 % 100 mL (0.4 mg/mL) MBP, 40 mg, Intravenous, Continuous, Karli Viveros MD, Last Rate: 20 mL/hr at 09/26/24 0535, 40 mg at 09/26/24 0535    Phosphorus Replacement - Follow Nurse / BPA Driven Protocol, , Does not apply, PRNGeorge Patrick D, MD    potassium & sodium phosphates (PHOS-NAK) 280-160-250 MG packet 2 packet, 2 packet, Oral, Once, Jose Manuel Vazquez MD    potassium chloride 10 mEq in 100 mL IVPB, 10 mEq, Intravenous, Q1H, Deven Kang MD    Potassium Replacement - Follow Nurse / BPA Driven Protocol, , Does not apply, PRGeorge DERAS Patrick D, MD    sodium chloride 0.9 % flush 10 mL, 10 mL, Intravenous, Q12H, Jose Manuel Vazquez MD, 10 mL at 09/26/24 0819    sodium chloride 0.9 % flush 10 mL, 10 mL, Intravenous, PRN, Jose Manuel Vazquez MD    sodium chloride 0.9 % infusion 40 mL, 40 mL, Intravenous, PRN, Jose Manuel Vazquez MD    sodium chloride 0.9 % infusion, 125 mL/hr, Intravenous, Continuous, Bowen Perry MD, Last Rate: 125 mL/hr at 09/24/24 0412, 125 mL/hr at 09/24/24 0412    sucralfate (CARAFATE) tablet 1 g, 1 g, Oral, TID AC, Karli Viveros MD, 1 g at 09/26/24 0632    Facility-Administered Medications Ordered in Other Encounters:     Chlorhexidine Gluconate Cloth 2 % pads 1 each, 1 each, Apply externally, Take As Directed, Anupam Ruiz MD      Objective   Vital Signs   Temp:  [97.1 °F (36.2 °C)-98.8 °F (37.1 °C)] 98 °F (36.7 °C)  Heart Rate:  [67-97] 69  Resp:  [14-18] 15  BP: ()/(45-73) 121/66    Physical Exam:   General: awake, alert, NAD, very nice, eating ice chips with her daughter feeding her  Eyes: no scleral icterus  Cardiovascular: Normal rate  Respiratory: normal work of breathing   Neurological: Alert and oriented  x 3  Psychiatric: Normal mood and affect     Labs:   CBC, CMP, and blood cultures reviewed today  Lab Results   Component Value Date    WBC 9.93 09/26/2024    HGB 8.9 (L) 09/26/2024    HGB 8.9 (L) 09/26/2024    HCT 27.1 (L) 09/26/2024    HCT 27.1 (L) 09/26/2024    MCV 93.8 09/26/2024     09/26/2024       Lab Results   Component Value Date    GLUCOSE 69 09/26/2024    BUN 25 (H) 09/26/2024    CREATININE 0.82 09/26/2024    EGFRIFNONA 45 (L) 07/15/2021    EGFRIFAFRI 54 (L) 07/15/2021    BCR 30.5 (H) 09/26/2024    CO2 18.0 (L) 09/26/2024    CALCIUM 7.5 (L) 09/26/2024    PROTENTOTREF 6.4 08/05/2024    ALBUMIN 2.7 (L) 09/26/2024    LABIL2 2.4 08/05/2024    AST 13 09/26/2024    ALT 13 09/26/2024     Lab Results   Component Value Date    HGBA1C 5.20 09/24/2024     CA 19-9: < 2  Blood Type: AB positive  FOBT +  LA 2.8  Procalcitonin 21--->10--->6    Microbiology:  9/23 RPP: Negative  9/23 BCx: Negative to date  9/24 C. 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  Hemoglobin is stabilizing and bloody bowel movements are slowing down.  Thanks to GI for their intervention.    She is afebrile, her WBC is normal, and her blood cultures are negative to date.  She has received approximately 3 days of antibiotics for EPEC.  I will discontinue Zosyn today.    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    Workup for pancreatic mass is underway.    Thank you for allowing  me to be involved in the care of this patient. Infectious diseases will sign off at this time with antibiotics plan in place, but please call me at [phone] if any further ID questions or new ID concerns.

## 2024-09-26 NOTE — PLAN OF CARE
Goal Outcome Evaluation:              Outcome Evaluation: [facility] is an 83 [facility] with severe anemia, hypotension and syncope episode. s/p multiple transfusions with HGB 8.9 this AM. 9/24 She had an EGD that showed multiple bleeding duodenal ulcers with recent R TKA and was home using a walker. Pt tolerated treatment fair but particular w mvmts and only allowing her son to touch RLE initially w/ bed mobility. As treatment progressed pt willing to stand and transfer up  to chair w/ max encouragement but has poor insight and awareness with staff assist, decreased ADL IND, and impaired standing balance and tolerance to tasks. Continue IP acute OT services with DC REC cont'd rehab at IRF vs SNU level to return to highest level of IND/function before home.      Anticipated Discharge Disposition (OT): inpatient rehabilitation facility, skilled nursing facility

## 2024-09-27 ENCOUNTER — APPOINTMENT (OUTPATIENT)
Dept: CARDIOLOGY | Facility: HOSPITAL | Age: 84
End: 2024-09-27
Payer: MEDICARE

## 2024-09-27 PROBLEM — A04.0 ENTEROPATHOGENIC ESCHERICHIA COLI INFECTION: Status: ACTIVE | Noted: 2024-09-27

## 2024-09-27 PROBLEM — D62 ACUTE POSTHEMORRHAGIC ANEMIA: Status: ACTIVE | Noted: 2024-09-27

## 2024-09-27 PROBLEM — G40.909 EPILEPSY: Status: ACTIVE | Noted: 2024-09-27

## 2024-09-27 LAB
ANION GAP SERPL CALCULATED.3IONS-SCNC: 10.5 MMOL/L (ref 5–15)
ANION GAP SERPL CALCULATED.3IONS-SCNC: 12 MMOL/L (ref 5–15)
BH CV LOWER VASCULAR LEFT COMMON FEMORAL AUGMENT: NORMAL
BH CV LOWER VASCULAR LEFT COMMON FEMORAL COMPETENT: NORMAL
BH CV LOWER VASCULAR LEFT COMMON FEMORAL COMPRESS: NORMAL
BH CV LOWER VASCULAR LEFT COMMON FEMORAL PHASIC: NORMAL
BH CV LOWER VASCULAR LEFT COMMON FEMORAL SPONT: NORMAL
BH CV LOWER VASCULAR LEFT DISTAL FEMORAL COMPRESS: NORMAL
BH CV LOWER VASCULAR LEFT GASTRONEMIUS COMPRESS: NORMAL
BH CV LOWER VASCULAR LEFT GREATER SAPH AK COMPRESS: NORMAL
BH CV LOWER VASCULAR LEFT GREATER SAPH BK COMPRESS: NORMAL
BH CV LOWER VASCULAR LEFT LESSER SAPH COMPRESS: NORMAL
BH CV LOWER VASCULAR LEFT MID FEMORAL AUGMENT: NORMAL
BH CV LOWER VASCULAR LEFT MID FEMORAL COMPETENT: NORMAL
BH CV LOWER VASCULAR LEFT MID FEMORAL COMPRESS: NORMAL
BH CV LOWER VASCULAR LEFT MID FEMORAL PHASIC: NORMAL
BH CV LOWER VASCULAR LEFT MID FEMORAL SPONT: NORMAL
BH CV LOWER VASCULAR LEFT PERONEAL COMPRESS: NORMAL
BH CV LOWER VASCULAR LEFT POPLITEAL AUGMENT: NORMAL
BH CV LOWER VASCULAR LEFT POPLITEAL COMPETENT: NORMAL
BH CV LOWER VASCULAR LEFT POPLITEAL COMPRESS: NORMAL
BH CV LOWER VASCULAR LEFT POPLITEAL PHASIC: NORMAL
BH CV LOWER VASCULAR LEFT POPLITEAL SPONT: NORMAL
BH CV LOWER VASCULAR LEFT POSTERIOR TIBIAL COMPRESS: NORMAL
BH CV LOWER VASCULAR LEFT PROFUNDA FEMORAL COMPRESS: NORMAL
BH CV LOWER VASCULAR LEFT PROXIMAL FEMORAL COMPRESS: NORMAL
BH CV LOWER VASCULAR LEFT SAPHENOFEMORAL JUNCTION COMPRESS: NORMAL
BH CV LOWER VASCULAR RIGHT COMMON FEMORAL AUGMENT: NORMAL
BH CV LOWER VASCULAR RIGHT COMMON FEMORAL COMPETENT: NORMAL
BH CV LOWER VASCULAR RIGHT COMMON FEMORAL COMPRESS: NORMAL
BH CV LOWER VASCULAR RIGHT COMMON FEMORAL PHASIC: NORMAL
BH CV LOWER VASCULAR RIGHT COMMON FEMORAL SPONT: NORMAL
BH CV LOWER VASCULAR RIGHT DISTAL FEMORAL COMPRESS: NORMAL
BH CV LOWER VASCULAR RIGHT GASTRONEMIUS COMPRESS: NORMAL
BH CV LOWER VASCULAR RIGHT GREATER SAPH AK COMPRESS: NORMAL
BH CV LOWER VASCULAR RIGHT GREATER SAPH BK COMPRESS: NORMAL
BH CV LOWER VASCULAR RIGHT LESSER SAPH COMPRESS: NORMAL
BH CV LOWER VASCULAR RIGHT MID FEMORAL AUGMENT: NORMAL
BH CV LOWER VASCULAR RIGHT MID FEMORAL COMPETENT: NORMAL
BH CV LOWER VASCULAR RIGHT MID FEMORAL COMPRESS: NORMAL
BH CV LOWER VASCULAR RIGHT MID FEMORAL PHASIC: NORMAL
BH CV LOWER VASCULAR RIGHT MID FEMORAL SPONT: NORMAL
BH CV LOWER VASCULAR RIGHT PERONEAL COMPRESS: NORMAL
BH CV LOWER VASCULAR RIGHT POPLITEAL AUGMENT: NORMAL
BH CV LOWER VASCULAR RIGHT POPLITEAL COMPETENT: NORMAL
BH CV LOWER VASCULAR RIGHT POPLITEAL COMPRESS: NORMAL
BH CV LOWER VASCULAR RIGHT POPLITEAL PHASIC: NORMAL
BH CV LOWER VASCULAR RIGHT POPLITEAL SPONT: NORMAL
BH CV LOWER VASCULAR RIGHT POSTERIOR TIBIAL COMPRESS: NORMAL
BH CV LOWER VASCULAR RIGHT PROFUNDA FEMORAL COMPRESS: NORMAL
BH CV LOWER VASCULAR RIGHT PROXIMAL FEMORAL COMPRESS: NORMAL
BH CV LOWER VASCULAR RIGHT SAPHENOFEMORAL JUNCTION COMPRESS: NORMAL
BUN SERPL-MCNC: 17 MG/DL (ref 8–23)
BUN SERPL-MCNC: 18 MG/DL (ref 8–23)
BUN/CREAT SERPL: 17.5 (ref 7–25)
BUN/CREAT SERPL: 19.3 (ref 7–25)
CALCIUM SPEC-SCNC: 8.1 MG/DL (ref 8.6–10.5)
CALCIUM SPEC-SCNC: 8.1 MG/DL (ref 8.6–10.5)
CHLORIDE SERPL-SCNC: 109 MMOL/L (ref 98–107)
CHLORIDE SERPL-SCNC: 110 MMOL/L (ref 98–107)
CO2 SERPL-SCNC: 16 MMOL/L (ref 22–29)
CO2 SERPL-SCNC: 17.5 MMOL/L (ref 22–29)
CREAT SERPL-MCNC: 0.88 MG/DL (ref 0.57–1)
CREAT SERPL-MCNC: 1.03 MG/DL (ref 0.57–1)
DEPRECATED RDW RBC AUTO: 48.1 FL (ref 37–54)
EGFRCR SERPLBLD CKD-EPI 2021: 54.1 ML/MIN/1.73
EGFRCR SERPLBLD CKD-EPI 2021: 65.3 ML/MIN/1.73
ERYTHROCYTE [DISTWIDTH] IN BLOOD BY AUTOMATED COUNT: 13.8 % (ref 12.3–15.4)
GLUCOSE SERPL-MCNC: 82 MG/DL (ref 65–99)
GLUCOSE SERPL-MCNC: 87 MG/DL (ref 65–99)
HCT VFR BLD AUTO: 28.9 % (ref 34–46.6)
HCT VFR BLD AUTO: 28.9 % (ref 34–46.6)
HGB BLD-MCNC: 9.3 G/DL (ref 12–15.9)
HGB BLD-MCNC: 9.3 G/DL (ref 12–15.9)
MCH RBC QN AUTO: 30.8 PG (ref 26.6–33)
MCHC RBC AUTO-ENTMCNC: 32.2 G/DL (ref 31.5–35.7)
MCV RBC AUTO: 95.7 FL (ref 79–97)
PLATELET # BLD AUTO: 252 10*3/MM3 (ref 140–450)
PMV BLD AUTO: 10.1 FL (ref 6–12)
POTASSIUM SERPL-SCNC: 4 MMOL/L (ref 3.5–5.2)
POTASSIUM SERPL-SCNC: 4.2 MMOL/L (ref 3.5–5.2)
RBC # BLD AUTO: 3.02 10*6/MM3 (ref 3.77–5.28)
SODIUM SERPL-SCNC: 137 MMOL/L (ref 136–145)
SODIUM SERPL-SCNC: 138 MMOL/L (ref 136–145)
WBC NRBC COR # BLD AUTO: 8.81 10*3/MM3 (ref 3.4–10.8)

## 2024-09-27 PROCEDURE — 85027 COMPLETE CBC AUTOMATED: CPT

## 2024-09-27 PROCEDURE — 99232 SBSQ HOSP IP/OBS MODERATE 35: CPT | Performed by: INTERNAL MEDICINE

## 2024-09-27 PROCEDURE — 92526 ORAL FUNCTION THERAPY: CPT

## 2024-09-27 PROCEDURE — 85018 HEMOGLOBIN: CPT | Performed by: INTERNAL MEDICINE

## 2024-09-27 PROCEDURE — 97530 THERAPEUTIC ACTIVITIES: CPT

## 2024-09-27 PROCEDURE — 93970 EXTREMITY STUDY: CPT | Performed by: SURGERY

## 2024-09-27 PROCEDURE — 97116 GAIT TRAINING THERAPY: CPT

## 2024-09-27 PROCEDURE — 93970 EXTREMITY STUDY: CPT

## 2024-09-27 PROCEDURE — 85014 HEMATOCRIT: CPT | Performed by: INTERNAL MEDICINE

## 2024-09-27 PROCEDURE — 80048 BASIC METABOLIC PNL TOTAL CA: CPT

## 2024-09-27 RX ORDER — PANTOPRAZOLE SODIUM 40 MG/1
40 TABLET, DELAYED RELEASE ORAL
Status: DISCONTINUED | OUTPATIENT
Start: 2024-09-27 | End: 2024-10-01 | Stop reason: HOSPADM

## 2024-09-27 RX ADMIN — Medication 10 ML: at 08:59

## 2024-09-27 RX ADMIN — SUCRALFATE 1 G: 1 TABLET ORAL at 06:33

## 2024-09-27 RX ADMIN — ACETAMINOPHEN 325MG 650 MG: 325 TABLET ORAL at 18:02

## 2024-09-27 RX ADMIN — PANTOPRAZOLE SODIUM 40 MG: 40 INJECTION, POWDER, FOR SOLUTION INTRAVENOUS at 03:19

## 2024-09-27 RX ADMIN — ACETAMINOPHEN 325MG 650 MG: 325 TABLET ORAL at 14:35

## 2024-09-27 RX ADMIN — FOLIC ACID 1 MG: 1 TABLET ORAL at 08:59

## 2024-09-27 RX ADMIN — SUCRALFATE 1 G: 1 TABLET ORAL at 16:12

## 2024-09-27 RX ADMIN — LEVOTHYROXINE SODIUM 75 MCG: 75 TABLET ORAL at 08:59

## 2024-09-27 RX ADMIN — Medication 10 ML: at 21:14

## 2024-09-27 RX ADMIN — ACETAMINOPHEN 325MG 650 MG: 325 TABLET ORAL at 08:59

## 2024-09-27 RX ADMIN — SUCRALFATE 1 G: 1 TABLET ORAL at 12:37

## 2024-09-27 RX ADMIN — PANTOPRAZOLE SODIUM 40 MG: 40 TABLET, DELAYED RELEASE ORAL at 16:11

## 2024-09-27 RX ADMIN — LOPERAMIDE HYDROCHLORIDE 2 MG: 2 CAPSULE ORAL at 08:59

## 2024-09-27 RX ADMIN — ACETAMINOPHEN 325MG 650 MG: 325 TABLET ORAL at 23:48

## 2024-09-27 RX ADMIN — PANTOPRAZOLE SODIUM 40 MG: 40 TABLET, DELAYED RELEASE ORAL at 06:33

## 2024-09-27 NOTE — PLAN OF CARE
Goal Outcome Evaluation:  Plan of Care Reviewed With: patient           Outcome Evaluation: Pt was transferred to our unit for CICU.  Pt is A&Ox4.  Very sweet and talkative.  RA, NSR, VSS.  Purewick in place.

## 2024-09-27 NOTE — NURSING NOTE
Dr. Coffey office will be in contact to schedule an outpt procedure to investigate biliary abnormalities.

## 2024-09-27 NOTE — PLAN OF CARE
Goal Outcome Evaluation:  Plan of Care Reviewed With: patient        Progress: improving  Outcome Evaluation: Pt resting in bed, glenys present and helpful. Pt found with pillow positioned horizontally under R knee - pt and glenys educated about risk of hamstring tightness and to avoid placing pillow under R knee. Pt performed supine and seated ther ex including R knee ROM. Pt hesitant to flex R knee and repeatedly asks for staff to support her R LE when sitting EOB. Pt req much encouragement to participate in therapy and amb - pt very hesitant due to pain. She stood from EOB with min A of 2 using r wx. Pt amb 8' from bed to recliner with r wx and min A of 2; R LE limp and decreased R knee flex during swing thru, slow pace, flexed posture, activity tolerance improving but still poor. Pt placed in recliner chair with all needs met, glenys present and Nurse aware. Overall, pt progressing very slowly with R knee ROM as well as general mobility which is further complicated by pt's fear of moving. Cont PT to progress as tolerated and prepare for d/c.

## 2024-09-27 NOTE — PLAN OF CARE
Goal Outcome Evaluation:  Plan of Care Reviewed With: patient        Progress: no change     Pt remains in CICU, no acute events over night. Had 3 smear BM and 900 ml UOP, Hgb stable. VSS, will continue to monitor.

## 2024-09-27 NOTE — PROGRESS NOTES
Name: Bailee Garza ADMIT: 2024   : 1940  PCP: Eddie Araya MD    MRN: [id number] LOS: 3 days   AGE/SEX: 84 y.o. female  ROOM: [facility]/     Subjective   Subjective   No complaints. Sitting in chair and son is at bedside.     Objective   Objective   Vital Signs  Temp:  [97.7 °F (36.5 °C)-98.1 °F (36.7 °C)] 98.1 °F (36.7 °C)  Heart Rate:  [72-98] 81  Resp:  [16] 16  BP: (122-143)/(52-92) 142/71  SpO2:  [94 %-100 %] 100 %  on   ;   Device (Oxygen Therapy): room air  Body mass index is 20.44 kg/m².    Physical Exam  Constitutional:       General: She is not in acute distress.     Appearance: She is not toxic-appearing.   HENT:      Head: Normocephalic and atraumatic.   Cardiovascular:      Rate and Rhythm: Normal rate and regular rhythm.   Pulmonary:      Effort: Pulmonary effort is normal. No respiratory distress.      Breath sounds: Normal breath sounds.   Abdominal:      General: Bowel sounds are normal.      Palpations: Abdomen is soft.      Tenderness: There is no abdominal tenderness. There is no guarding or rebound.   Musculoskeletal:         General: No swelling (mild on right).   Skin:     General: Skin is warm and dry.   Neurological:      General: No focal deficit present.      Mental Status: She is alert and oriented to person, place, and time.   Psychiatric:         Mood and Affect: Mood normal.         Behavior: Behavior normal.     Results Review  I reviewed the patient's new clinical results.  Results from last 7 days   Lab Units 24  0332 24  2322 24  1420 24  0444 24  1713 24  0428 24  1451 24  1130   WBC 10*3/mm3 8.81  --   --  9.93  --  9.97  --  9.63   HEMOGLOBIN g/dL 9.3*  9.3* 8.9* 10.1* 8.9*  8.9*   < > 7.0*   < > 3.8*   PLATELETS 10*3/mm3 252  --   --  185  --  187  --  225    < > = values in this interval not displayed.     Results from last 7 days   Lab Units 24  0332 24  2322 24  1420 24  1174  09/25/24 0428   SODIUM mmol/L 138 137  --  146* 148*   POTASSIUM mmol/L 4.0 4.2 4.1 3.4* 3.9   CHLORIDE mmol/L 110* 109*  --  117* 120*   CO2 mmol/L 16.0* 17.5*  --  18.0* 19.1*   BUN mg/dL 17 18  --  25* 48*   CREATININE mg/dL 0.88 1.03*  --  0.82 0.99   GLUCOSE mg/dL 82 87  --  69 101*     Lab Results   Component Value Date    ANIONGAP 12.0 09/27/2024     Estimated Creatinine Clearance: 38.8 mL/min (by C-G formula based on SCr of 0.88 mg/dL).   Lab Results   Component Value Date    EGFR 65.3 09/27/2024     Results from last 7 days   Lab Units 09/26/24 0444 09/25/24 0428 09/24/24  1451 09/24/24  0522   ALBUMIN g/dL 2.7* 2.7* 2.6* 2.4*   BILIRUBIN mg/dL 0.3 0.2 0.3 0.2   ALK PHOS U/L 74 77 90 84   AST (SGOT) U/L 13 17 24 16   ALT (SGPT) U/L 13 18 22 19     Results from last 7 days   Lab Units 09/27/24  0332 09/26/24 2322 09/26/24 0444 09/25/24 0428 09/24/24  1451 09/24/24  0522   CALCIUM mg/dL 8.1* 8.1* 7.5* 7.7* 7.8* 6.7*   ALBUMIN g/dL  --   --  2.7* 2.7* 2.6* 2.4*   MAGNESIUM mg/dL  --   --   --  2.0  --  1.9   PHOSPHORUS mg/dL  --   --  2.2* 1.9* 2.6 2.0*     Results from last 7 days   Lab Units 09/25/24 0428 09/24/24 2224 09/24/24  1700 09/24/24  1305 09/24/24  0950 09/24/24  0522 09/23/24  0616 09/23/24  0533   PROCALCITONIN ng/mL 6.24*  --   --   --   --  10.20*  --  21.50*   LACTATE mmol/L  --  1.0 2.2* 2.1* 2.8*  --    < >  --     < > = values in this interval not displayed.     Glucose   Date/Time Value Ref Range Status   09/24/2024 1235 131 (H) 70 - 130 mg/dL Final       MRI Brain With & Without Contrast    Result Date: 9/25/2024  The study is degraded by patient motion. There is no evidence of acute infarction, hydrocephalus, of mass or of abnormal enhancement.  This report was finalized on 9/25/2024 4:43 PM by Dr. Anupam Davis M.D on Workstation: [id number]       Scheduled Meds  folic acid, 1 mg, Oral, Daily  levothyroxine, 75 mcg, Oral, Daily  LORazepam, 0.5 mg, Intravenous, Once in  imaging  ondansetron, 4 mg, Intravenous, Once  pantoprazole, 40 mg, Oral, BID AC  potassium & sodium phosphates, 2 packet, Oral, Once  sodium chloride, 10 mL, Intravenous, Q12H  sucralfate, 1 g, Oral, TID AC    Continuous Infusions   PRN Meds    acetaminophen **OR** acetaminophen **OR** acetaminophen    senna-docusate sodium **AND** polyethylene glycol **AND** bisacodyl **AND** bisacodyl    Calcium Replacement - Follow Nurse / BPA Driven Protocol    loperamide    LORazepam    Magnesium Standard Dose Replacement - Follow Nurse / BPA Driven Protocol    nitroglycerin    ondansetron ODT **OR** ondansetron    Phosphorus Replacement - Follow Nurse / BPA Driven Protocol    Potassium Replacement - Follow Nurse / BPA Driven Protocol    sodium chloride    sodium chloride     Diet  Diet: Gastrointestinal; Fiber-Restricted, Low Irritant; Texture: Soft to Chew (NDD 3); Soft to Chew: Whole Meat; Fluid Consistency: Thin (IDDSI 0)       Assessment/Plan     Active Hospital Problems    Diagnosis  POA    **Syncope [R55]  Yes    Epilepsy [G40.909]  Unknown    Enteropathogenic Escherichia coli infection [A04.0]  Unknown    Acute posthemorrhagic anemia [D62]  Unknown    Nausea & vomiting [R11.2]  Yes    Status post right knee replacement [Z96.651]  Not Applicable    Gastrointestinal hemorrhage [K92.2]  Unknown    Essential hypertension [I10]  Yes      Resolved Hospital Problems   No resolved problems to display.     84 y.o. female recent right knee replacement presented with syncope, hypotension and hypovolemic shock    ABLA, GI Bleed, H. 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 Protonix BID x 8 weeks, carafate bid x 1 month.   for h. pylori amox/vlarithromycin 14 days as outpatient  F/U GI for EUS/ERCP    possible seizure on EEG  Keppra per neurology  MRI refused  d/t claustrophobia    Recent right knee replacement  elevated D-dimer from surgery (no clot on duplex or CTA)  Recheck with swelling and has not been on any AC d/t bleeding    Hypothyroidism  levothyroxine    DVT prophylaxis  SCDs    Discharge  TBD but likely will need SNF  Expected Discharge Date: 10/1/2024; Expected Discharge Time:     Discussed with patient, family, and nursing staff    August Matthew MD  [facility]  09/27/24

## 2024-09-27 NOTE — CASE MANAGEMENT/SOCIAL WORK
Continued Stay Note  [facility]     Patient Name: Bailee Garza  MRN: [id number]  Today's Date: 9/27/2024    Admit Date: 9/23/2024    Plan: SNF: 1st choice referral pending to [facility]; 2nd choice: pt accepted at [facility]; anticipate pt's family transporting pt pending medical needs.   Discharge Plan       Row Name 09/27/24 1253       Plan    Plan SNF: 1st choice referral pending to [facility]; 2nd choice: pt accepted at [facility]; anticipate pt's family transporting pt pending medical needs.    Patient/Family in Agreement with Plan yes    Plan Comments [facility] called CCP to report pt has been accepted at [facility]. CCP updated that pt & family 1st choice is [facility] who's [facility] & [facility] will continue to follow pt as a back-up plan. DC plan: SNF: 1st choice referral pending to [facility], 2nd choice [facility] where pt has been accepted; anticipate pt's family transporting pt pending medical needs.      Row Name 09/27/24 1155       Plan    Plan SNF referrals pending to [facility] & [facility]; anticipate pt's family transporting pt pending medical needs.    Patient/Family in Agreement with Plan yes    Plan Comments Pt discussed in multidisciplinary rounds. Clinicals reviewed. CCP notes pt has orders for pt to downgrade to telemetry & move out of CICU. Pt assigned a bed in CVI. CCP discussed OT & PT recommendations with pt & pt's son, Christopher Diaz, who both verbalize understanding & [facility] CCP explained differences between SNF & IRF. Pt & son given [facility] book, SNF list, & pt choice lists for both SNF & IRF. Pt reports she has been [facility] & requests referral there. Reports this is her top choice. Christopher chose [facility] as a back-up plan. CCP created DCP report & placed Epic  referrals. CCP notified Moira/[facility] & [facility] re new referral. Moira/[facility] reports she will follow pt; she anticipates bed availability on Monday. Pt not yet medically ready for DC; CCP on CVI will now follow. DC plan SNF referrals pending to [facility]; anticipate pt's family transporting pt pending medical needs. Partial packet with pt's chart. DC barriers: duplex lower extremity. GOVIND Betancourt/CCP             Expected Discharge Date and Time       Expected Discharge Date Expected Discharge Time    Sep 30, 2024    Bria Santiago RN

## 2024-09-27 NOTE — PROGRESS NOTES
[facility] INPATIENT PROGRESS NOTE         [facility] CARDIAC INTENSIVE CARE    2024      PATIENT IDENTIFICATION:  Name: Bailee Garza ADMIT: 2024   : 1940  PCP: Eddie Araya MD    MRN: [id number] LOS: 3 days   AGE/SEX: 84 y.o. female  ROOM: [facility]                     LOS 3    Reason for visit: Severe anemia and hypotension with duodenal ulcers      SUBJECTIVE:      Mental status much improved.  No new issues overnight.  Hemoglobin is stable.  Has orders for telemetry and awaiting bed outside of ICU.    Objective   OBJECTIVE:    Vital Sign Min/Max for last 24 hours  Temp  Min: 97.7 °F (36.5 °C)  Max: 98.1 °F (36.7 °C)   BP  Min: 117/76  Max: 143/75   Pulse  Min: 72  Max: 98   Resp  Min: 16  Max: 16   SpO2  Min: 94 %  Max: 97 %   No data recorded   No data recorded    Vitals:    24 0400 24 0500 24 0600 24 0721   BP: 134/70 133/64 129/58    Pulse: 78 76 72    Resp:       Temp:   97.8 °F (36.6 °C) 98.1 °F (36.7 °C)   TempSrc:   Oral Oral   SpO2: 97% 97% 97%    Weight:       Height:                24  1101 24  1559 24  1600   Weight: 54.5 kg (120 lb 2.4 oz) 54.4 kg (120 lb) 50.7 kg (111 lb 12.4 oz)       Body mass index is 20.44 kg/m².                          Body mass index is 20.44 kg/m².    Intake/Output Summary (Last 24 hours) at 2024 0916  Last data filed at 2024 0400  Gross per 24 hour   Intake --   Output 1700 ml   Net -1700 ml         Exam:  GEN:  No distress, appears stated age  EYES:   PERRL, anicteric sclerae  ENT:    External ears/nose normal, OP clear  NECK:  No adenopathy, midline trachea  LUNGS: Normal chest on inspection, palpation and auscultation  CV:  Normal S1S2, without murmur  ABD:  Nontender, nondistended, no hepatosplenomegaly, +BS  EXT:  No edema.  No cyanosis or clubbing.  No mottling and normal cap refill.    Assessment     Scheduled meds:  folic acid, 1 mg, Oral, Daily  levothyroxine, 75 mcg, Oral,  Daily  LORazepam, 0.5 mg, Intravenous, Once in imaging  ondansetron, 4 mg, Intravenous, Once  pantoprazole, 40 mg, Oral, BID AC  potassium & sodium phosphates, 2 packet, Oral, Once  sodium chloride, 10 mL, Intravenous, Q12H  sucralfate, 1 g, Oral, TID AC      IV meds:                           Data Review:  Results from last 7 days   Lab Units 09/27/24 0332 09/26/24 2322 09/26/24  1420 09/26/24  0444 09/25/24  0428 09/24/24  1451   SODIUM mmol/L 138 137  --  146* 148* 143   POTASSIUM mmol/L 4.0 4.2 4.1 3.4* 3.9 4.6   CHLORIDE mmol/L 110* 109*  --  117* 120* 117*   CO2 mmol/L 16.0* 17.5*  --  18.0* 19.1* 15.4*   BUN mg/dL 17 18  --  25* 48* 66*   CREATININE mg/dL 0.88 1.03*  --  0.82 0.99 1.10*   GLUCOSE mg/dL 82 87  --  69 101* 131*   CALCIUM mg/dL 8.1* 8.1*  --  7.5* 7.7* 7.8*         Estimated Creatinine Clearance: 38.8 mL/min (by C-G formula based on SCr of 0.88 mg/dL).  Results from last 7 days   Lab Units 09/27/24 0332 09/26/24 2322 09/26/24 1420 09/26/24 0444 09/25/24  2342 09/25/24  1713 09/25/24  0428 09/24/24  1451 09/24/24  1130 09/24/24  0523   WBC 10*3/mm3 8.81  --   --  9.93  --   --  9.97  --  9.63 7.42   HEMOGLOBIN g/dL 9.3*  9.3* 8.9* 10.1* 8.9*  8.9* 8.6*   < > 7.0*   < > 3.8* 4.2*   PLATELETS 10*3/mm3 252  --   --  185  --   --  187  --  225 212    < > = values in this interval not displayed.     Results from last 7 days   Lab Units 09/23/24  0533   INR  0.96     Results from last 7 days   Lab Units 09/26/24  0444 09/25/24  0428 09/24/24  1451 09/24/24  0522 09/23/24  0533   ALT (SGPT) U/L 13 18 22 19 41*   AST (SGOT) U/L 13 17 24 16 34*     Results from last 7 days   Lab Units 09/24/24  1018   PH, ARTERIAL pH units 7.419   PO2 ART mm Hg 104.2*   PCO2, ARTERIAL mm Hg 29.5*   HCO3 ART mmol/L 19.1*     Results from last 7 days   Lab Units 09/25/24  0428 09/24/24  2224 09/24/24  1700 09/24/24  1305 09/24/24  0950 09/24/24  0522 09/23/24  0616 09/23/24  0533   PROCALCITONIN ng/mL 6.24*  --   --    --   --  10.20*  --  21.50*   LACTATE mmol/L  --  1.0 2.2* 2.1* 2.8*  --  2.0  --          Glucose   Date/Time Value Ref Range Status   09/24/2024 1235 131 (H) 70 - 130 mg/dL Final         Imaging reviewed  2D echo 9/24 reviewed: EF 65%.  RVSP 47 mmHg.    Ultrasound of gallbladder reviewed showing stable dilation of common bile duct.      MRCP 9/25 reviewed  MRI brain 9/25 reviewed        Microbiology reviewed            Active Hospital Problems    Diagnosis  POA    **Syncope [R55]  Yes    Nausea & vomiting [R11.2]  Yes    Status post right knee replacement [Z96.651]  Not Applicable    Gastrointestinal hemorrhage [K92.2]  Unknown    Essential hypertension [I10]  Yes      Resolved Hospital Problems   No resolved problems to display.         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  On PPI drip.  Continue serial H&H.  Hemoglobin stable.  Transfuse if hemoglobin drops below 7.  MRCP poor quality due to motion artifact.  Antibiotics per ID recommendations.  Discontinued Keppra with improvement in mental status.    Control glucose.  Control blood pressure.  Mechanical DVT prophylaxis.  Discussed with family at bedside in detail.  Questions answered to their satisfaction.    Downgraded to telemetry.  Awaiting bed outside of ICU.    Deven Kang MD  Pulmonary and Critical Care Medicine  [facility]  9/27/2024    09:16 EDT

## 2024-09-27 NOTE — PLAN OF CARE
Goal Outcome Evaluation:  Plan of Care Reviewed With: patient           Outcome Evaluation: Patient seen for re evaluation of swallow function. Diet advanced to soft per GI. Pt and family reports patient is much closer to baseline this date. Pt denied choking with meds, she indicated the puree was stuck in her gums as her dentures were not in place that date and her mouth was dry. Pt with high pitch and mild dysphonia, but patient feels this is her baseline. Baseline throat clearing noted. Inconsistent throat clearing across trials of thins and mixed. SLP explained that an instrumental swallow assessment would be warranted to fully rule out aspiration. Pt pleasantly refused further assessment at this time as she feels like she's at baseline. If resp status declines would recommend patient re consider instrumental assessment. Pt and son voiced understanding.

## 2024-09-27 NOTE — PROGRESS NOTES
[facility]  Inpatient Progress Note    Reason for Follow Up:  GIB, abnormal biliary imaging     Subjective     Interval History:   Much more awake and alert this morning-bleeding is tapered off.  Still having looser bowel movements.  She has no complaints otherwise besides some soreness in the perianal area related to multiple bowel movement    Current Facility-Administered Medications:     acetaminophen (TYLENOL) tablet 650 mg, 650 mg, Oral, Q4H PRN, 650 mg at 09/26/24 1626 **OR** acetaminophen (TYLENOL) 160 MG/5ML oral solution 650 mg, 650 mg, Oral, Q4H PRN **OR** acetaminophen (TYLENOL) suppository 650 mg, 650 mg, Rectal, Q4H PRN, Jose Manuel Vazquez MD    sennosides-docusate (PERICOLACE) 8.6-50 MG per tablet 2 tablet, 2 tablet, Oral, BID PRN **AND** polyethylene glycol (MIRALAX) packet 17 g, 17 g, Oral, Daily PRN **AND** bisacodyl (DULCOLAX) EC tablet 5 mg, 5 mg, Oral, Daily PRN **AND** bisacodyl (DULCOLAX) suppository 10 mg, 10 mg, Rectal, Daily PRN, Jose Manuel Vazquez MD    Calcium Replacement - Follow Nurse / BPA Driven Protocol, , Does not apply, PRN, Jose Manuel Vazquez MD    folic acid (FOLVITE) tablet 1 mg, 1 mg, Oral, Daily, Jose Manuel Vazquez MD, 1 mg at 09/25/24 0923    levothyroxine (SYNTHROID, LEVOTHROID) tablet 75 mcg, 75 mcg, Oral, Daily, Jose Manuel Vazquez MD, 75 mcg at 09/25/24 0923    loperamide (IMODIUM) capsule 2 mg, 2 mg, Oral, 4x Daily PRN, Deven Kang MD, 2 mg at 09/26/24 1449    LORazepam (ATIVAN) injection 0.5 mg, 0.5 mg, Intravenous, Once in imaging, Jose Manuel Vazquez MD    LORazepam (ATIVAN) injection 1 mg, 1 mg, Intravenous, Q4H PRN, Jose Manuel Vazquez MD, 1 mg at 09/25/24 2052    Magnesium Standard Dose Replacement - Follow Nurse / BPA Driven Protocol, , Does not apply, PRN, Jose Manuel Vazquez MD    nitroglycerin (NITROSTAT) SL tablet 0.4 mg, 0.4 mg, Sublingual, Q5 Min PRN, Jose Manuel Vazquez MD    ondansetron ODT (ZOFRAN-ODT) disintegrating  tablet 4 mg, 4 mg, Oral, Q6H PRN **OR** ondansetron (ZOFRAN) injection 4 mg, 4 mg, Intravenous, Q6H PRN, Jose Manuel Vazquez MD, 4 mg at 09/23/24 0947    ondansetron (ZOFRAN) injection 4 mg, 4 mg, Intravenous, Once, Shaquille Whitney MD    [COMPLETED] pantoprazole (PROTONIX) injection 80 mg, 80 mg, Intravenous, Once, 80 mg at 09/24/24 1533 **AND** pantoprazole (PROTONIX) 40 mg in sodium chloride 0.9 % 100 mL (0.4 mg/mL) MBP, 40 mg, Intravenous, Continuous, Karli Viveros MD, Last Rate: 20 mL/hr at 09/27/24 0319, 40 mg at 09/27/24 0319    Phosphorus Replacement - Follow Nurse / BPA Driven Protocol, , Does not apply, PRNGeorge Patrick D, MD    potassium & sodium phosphates (PHOS-NAK) 280-160-250 MG packet 2 packet, 2 packet, Oral, Once, Jose Manuel Vazquez MD    Potassium Replacement - Follow Nurse / BPA Driven Protocol, , Does not apply, George PRAKASH Patrick D, MD    sodium chloride 0.9 % flush 10 mL, 10 mL, Intravenous, Q12H, Jose Manuel Vazquez MD, 10 mL at 09/26/24 2046    sodium chloride 0.9 % flush 10 mL, 10 mL, Intravenous, PRN, Jose Manuel Vazquez MD    sodium chloride 0.9 % infusion 40 mL, 40 mL, Intravenous, PRN, Jose Manuel Vazquez MD    sucralfate (CARAFATE) tablet 1 g, 1 g, Oral, TID AC, Karli Viveros MD, 1 g at 09/26/24 1630    Facility-Administered Medications Ordered in Other Encounters:     Chlorhexidine Gluconate Cloth 2 % pads 1 each, 1 each, Apply externally, Take As Directed, Anupam Ruiz MD  Review of Systems:    Negative for abdominal pain nausea vomiting fevers or chills    Objective     Vital Signs  Temp:  [97.7 °F (36.5 °C)-98.1 °F (36.7 °C)] 97.7 °F (36.5 °C)  Heart Rate:  [67-98] 78  Resp:  [16] 16  BP: (108-143)/(52-92) 134/70  Body mass index is 20.44 kg/m².    Intake/Output Summary (Last 24 hours) at 9/27/2024 0601  Last data filed at 9/27/2024 0400  Gross per 24 hour   Intake --   Output 1900 ml   Net -1900 ml     I/O this shift:  In: -   Out: 900  [Urine:900]     Physical Exam:   General: patient awake, alert and cooperative   Eyes: Normal lids and lashes, no scleral icterus   Neck: supple, normal ROM   Skin: warm and dry, not jaundiced   Cardiovascular: regular rhythm and rate    Pulm: clear to auscultation bilaterally, regular and unlabored   Abdomen: soft, nontender, nondistended    Psychiatric: Normal mood and behavior; memory intact     Results Review:     I reviewed the patient's new clinical results.    Results from last 7 days   Lab Units 09/27/24 0332 09/26/24 2322 09/26/24 1420 09/26/24 0444 09/25/24  1713 09/25/24 0428   WBC 10*3/mm3 8.81  --   --  9.93  --  9.97   HEMOGLOBIN g/dL 9.3*  9.3* 8.9* 10.1* 8.9*  8.9*   < > 7.0*   HEMATOCRIT % 28.9*  28.9* 26.9* 30.9* 27.1*  27.1*   < > 21.4*   PLATELETS 10*3/mm3 252  --   --  185  --  187    < > = values in this interval not displayed.     Results from last 7 days   Lab Units 09/27/24 0332 09/26/24 2322 09/26/24  1420 09/26/24  0444 09/25/24  0428 09/24/24  1451   SODIUM mmol/L 138 137  --  146* 148* 143   POTASSIUM mmol/L 4.0 4.2 4.1 3.4* 3.9 4.6   CHLORIDE mmol/L 110* 109*  --  117* 120* 117*   CO2 mmol/L 16.0* 17.5*  --  18.0* 19.1* 15.4*   BUN mg/dL 17 18  --  25* 48* 66*   CREATININE mg/dL 0.88 1.03*  --  0.82 0.99 1.10*   CALCIUM mg/dL 8.1* 8.1*  --  7.5* 7.7* 7.8*   BILIRUBIN mg/dL  --   --   --  0.3 0.2 0.3   ALK PHOS U/L  --   --   --  74 77 90   ALT (SGPT) U/L  --   --   --  13 18 22   AST (SGOT) U/L  --   --   --  13 17 24   GLUCOSE mg/dL 82 87  --  69 101* 131*     Results from last 7 days   Lab Units 09/23/24  0533   INR  0.96     Lab Results   Lab Value Date/Time    LIPASE 42 09/23/2024 0735    LIPASE 50 12/19/2017 1128       Radiology:  MRI abdomen w wo contrast mrcp   Final Result      MRI Brain With & Without Contrast   Final Result   The study is degraded by patient motion. There is no   evidence of acute infarction, hydrocephalus, of mass or of abnormal   enhancement.        This report was finalized on 9/25/2024 4:43 PM by Dr. Anupam Davis M.D   on Workstation: [id number]          US Gallbladder   Final Result   Stable dilation of the common bile duct. Pancreas largely obscured by   overlying bowel gas. Mild dilation of the visualized pancreatic duct   measuring about 3 mm, similar to comparison CT               This report was finalized on 9/24/2024 6:51 PM by Dr. Julio César Perry M.D on Workstation: [id number]          CT Angiogram Chest Pulmonary Embolism   Final Result       1. The study is negative for pulmonary embolism. No acute intrathoracic   abnormality.   2. Intrathoracic and extrahepatic biliary dilatation with abrupt   tapering at the ampulla where there is questionable hypoenhancing   masslike soft tissue at the ampulla and pancreatic head. Could consider   correlating with ERCP or MRCP if clinically indicated.   3. Moderate fluid and air distended stomach and high attenuation fluid   distention of the proximal duodenum with duodenal wall thickening,   suspected duodenal diverticula, and mild proximal duodenal wall   thickening. Could correlate with endoscopy if clinically indicated.   4. Distended gallbladder.   5. Decreased hepatic attenuation could reflect hepatic steatosis.   6. Extensive colonic diverticula without CT evidence of diverticulitis.   7. Other chronic findings, as above.       This report was finalized on 9/23/2024 8:13 AM by Lorenzo Bui MD on   Workstation: [id number]          CT Abdomen Pelvis With Contrast   Final Result       1. The study is negative for pulmonary embolism. No acute intrathoracic   abnormality.   2. Intrathoracic and extrahepatic biliary dilatation with abrupt   tapering at the ampulla where there is questionable hypoenhancing   masslike soft tissue at the ampulla and pancreatic head. Could consider   correlating with ERCP or MRCP if clinically indicated.   3. Moderate fluid and air distended stomach and high attenuation  fluid   distention of the proximal duodenum with duodenal wall thickening,   suspected duodenal diverticula, and mild proximal duodenal wall   thickening. Could correlate with endoscopy if clinically indicated.   4. Distended gallbladder.   5. Decreased hepatic attenuation could reflect hepatic steatosis.   6. Extensive colonic diverticula without CT evidence of diverticulitis.   7. Other chronic findings, as above.       This report was finalized on 9/23/2024 8:13 AM by Lorenzo Bui MD on   Workstation: [id number]          XR Chest 1 View   Final Result   1. Mild interstitial prominence of the lungs somewhat exaggerated by   underinflation but overall appear unchanged from the 12/29/2017 study   and therefore likely chronic in nature.   2. No definite acute infiltrates are thought to be present.       This report was finalized on 9/23/2024 7:33 AM by Dr. Arun Romero M.D on Workstation: [id number]          SLP FEES - Fiberoptic Endo Eval Swallow    (Results Pending)       Assessment & Plan     Active Hospital Problems    Diagnosis     **Syncope     Nausea & vomiting     Status post right knee replacement     Gastrointestinal hemorrhage     Essential hypertension        Assessment:  UGIB - multiple duodenal ukcers with stigmata noted on egd 9/24 - treated endoscopically  ABLA  Abnormal biliary tract imaging  Distended GB      Plan:  H/h now more stable  Change to po pantoprazole bid - continue x 8 weeks, carafate bid x 1 month  h pylori stool antigen positive - start tx with amoxicillin/clarithromycin x 14 days as an outpatient -she has had multiple liquid stools over the past 48 hours and is very sore.  I am afraid that that antibiotics will give her diarrhea and I would like to give her a break.  This can be treated as an outpatient.  Will need eus/ercp for further evaluation of biliary findings - discussed with biliary service.  Will be done as outpatient once she recovers from acute issues.  LFTs  and  Ca 19-9 nml.  Will reach out to Dr. Farris's office to get this scheduled  Advance diet    I discussed the patients findings and my recommendations with patient, family, and nursing staff.            Karli Viveros M.D.  [facility]  [phone]

## 2024-09-27 NOTE — THERAPY TREATMENT NOTE
Patient Name: Bailee Garza  : 1940    MRN: [id number]                              Today's Date: 2024       Admit Date: 2024    Visit Dx:     ICD-10-CM ICD-9-CM   1. Syncope and collapse  R55 780.2   2. Volume depletion, gastrointestinal loss  E86.9 276.50   3. Postoperative anemia  D64.9 285.9   4. Status post total right knee replacement  Z96.651 V43.65   5. Chronic kidney disease, unspecified CKD stage  N18.9 585.9   6. Nausea and vomiting, unspecified vomiting type  R11.2 787.01   7. Gastrointestinal hemorrhage, unspecified gastrointestinal hemorrhage type  K92.2 578.9     Patient Active Problem List   Diagnosis    Chronic midline low back pain without sciatica    Essential hypertension    Hearing loss    Hypothyroidism, acquired    IBS (irritable bowel syndrome)    Chronic nonseasonal allergic rhinitis due to pollen    Stage 3 chronic kidney disease    Arthralgia of right knee    DDD (degenerative disc disease), lumbosacral    Scoliosis due to degenerative disease of spine in adult patient    Status post total replacement of left hip    Status post total hip replacement, left    Vitamin D deficiency    Anemia, normocytic normochromic    S/P hip replacement, right    OA (osteoarthritis) of knee    Syncope    Nausea & vomiting    Status post right knee replacement    Gastrointestinal hemorrhage    Epilepsy    Enteropathogenic Escherichia coli infection    Acute posthemorrhagic anemia     Past Medical History:   Diagnosis Date    Abnormal CXR 2017    Adverse reaction to narcotic drug     SEVERE HALLUCINATIONS POST OP LEFT HIP PLACEMENT    Allergies     Arthritis     Arthritis of foot 2020    Arthropathy of pelvic region and thigh 2012    unspecified    Back pain 2007    Chronic back pain 2009    Chronic cough 2017    CKD (chronic kidney disease)     Closed nondisplaced fracture of lateral malleolus of fibula with delayed healing 2020    Closed  nondisplaced fracture of medial cuneiform of left foot 2018    Constipation 2015    unspecified    COVID-19 vaccination refused     COVID-19 virus detected 10/17/2022    Diverticulosis     Dyspepsia 2008    Essential hypertension 2007    Exertional shortness of breath 2017    Fall 2018    Family history of abdominal aortic aneurysm (AAA) 2019    US aaa screen limited (04/10/2019 10:32)     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), 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    Hearing loss 2008    History of bone density study 2015    History of pneumonia     2017    History of skin cancer     Hypothyroidism, acquired 2007    Insomnia 2015    unspecified    Intervertebral disc disorder with myelopathy, cervical region 2010    Joint capsule tear 2012    unspecified    OA (osteoarthritis) of hip 2018    Other synovitis and tenosynovitis, right ankle and foot 2020    Peroneal tendonitis, right 2020    Rhinitis, allergic 2007    Right knee pain     Scoliosis     Screening breast examination 2007    Stress 2015    other acute reactions to stress    Stress incontinence     Trochanteric bursitis, left hip 2019    Urticaria 2015     Past Surgical History:   Procedure Laterality Date    BREAST EXCISIONAL BIOPSY Right     50+ years ago    BRONCHOSCOPY N/A 2017    Procedure: BRONCHOSCOPY;  Surgeon: Mario Alanis MD;  Location: [facility] ENDOSCOPY;  Service:     CATARACT EXTRACTION, BILATERAL      COLONOSCOPY      ENDOSCOPY N/A 2024    Procedure: ESOPHAGOGASTRODUODENOSCOPY AT BEDSIDE with gold probe and epi injection;  Surgeon: Karli Viveros MD;  Location: [facility] ENDOSCOPY;  Service: Gastroenterology;  Laterality: N/A;  Pre: GI Bleed  Post: multiple duodenal ulcers, hiatal hernia    HYSTERECTOMY      TOTAL HIP ARTHROPLASTY Left 2018    Procedure: LEFT TOTAL  HIP ARTHROPLASTY;  Surgeon: Justen Mann MD;  Location: [facility] MAIN OR;  Service: Orthopedics    TOTAL HIP ARTHROPLASTY Right 12/13/2022    Procedure: Posterior RIGHT TOTAL HIP ARTHROPLASTY MARCELINO NAVIGATION;  Surgeon: Anupam Ruiz MD;  [place] [facility] [place] [facility] [place]  Service: Orthopedics;  Laterality: Right;    TOTAL KNEE ARTHROPLASTY Right 9/18/2024    Procedure: TOTAL KNEE ARTHROPLASTY;  Surgeon: Jesus Thayer MD;  [place] [facility] [place] [facility] [place]  Service: Orthopedics;  Laterality: Right;      General Information       Row Name 09/27/24 1127          Physical Therapy Time and Intention    Document Type therapy note (daily note)  -DJ     Mode of Treatment individual therapy;physical therapy  -DJ       Row Name 09/27/24 1127          General Information    Patient Profile Reviewed yes  -DJ     Existing Precautions/Restrictions fall  -DJ     Barriers to Rehab uncooperative  -DJ       Row Name 09/27/24 1127          Cognition    Orientation Status (Cognition) oriented x 3  -DJ       Row Name 09/27/24 1127          Safety Issues, Functional Mobility    Comment, Safety Issues/Impairments (Mobility) gt belt, nonskid socks  -DJ               User Key  (r) = Recorded By, (t) = Taken By, (c) = Cosigned By      Initials Name Provider Type    DJ Jagruti Sheikh, PT Physical Therapist                   Mobility       Row Name 09/27/24 1128          Bed Mobility    Bed Mobility supine-sit  -DJ     Supine-Sit [place] (Bed Mobility) minimum assist (75% patient effort);2 person assist  -DJ     Sit-Supine [place] (Bed Mobility) not tested  -DJ     Comment, (Bed Mobility) vc for sequencing, pt constantly helping for assist to move R LE, increased time and much encouragement needed  -DJ       Row Name 09/27/24 1128          Transfers    Comment, (Transfers) sit/stand from EOB  -DJ       Row Name 09/27/24 1128          Bed-Chair Transfer    Bed-Chair [place] (Transfers) minimum assist (75% patient effort);2 person  assist;verbal cues  -DJ     Assistive Device (Bed-Chair Transfers) walker, front-wheeled  -DJ     Comment, (Bed-Chair Transfer) 8' bed to chair  -DJ       Row Name 09/27/24 1128          Sit-Stand Transfer    Sit-Stand [place] (Transfers) minimum assist (75% patient effort);2 person assist  -DJ     Assistive Device (Sit-Stand Transfers) walker, front-wheeled  -DJ     Comment, (Sit-Stand Transfer) Pt repeatedly requested for staff to pull her up from under her arms; staff educated pt about use of gt belt to avoid pulling on shoulders  -DJ       Row Name 09/27/24 1128          Gait/Stairs (Locomotion)    [place] Level (Gait) minimum assist (75% patient effort);2 person assist  -DJ     Assistive Device (Gait) walker, front-wheeled  -DJ     Distance in Feet (Gait) 8  -DJ     Deviations/Abnormal Patterns (Gait) antalgic;weight shifting decreased;jah decreased;gait speed decreased  -DJ     Bilateral Gait Deviations forward flexed posture;heel strike decreased  -DJ     Right Sided Gait Deviations weight shift ability decreased  decreased R knee flex durign swing thru  -DJ     [place] Level (Stairs) not tested  -DJ     Comment, (Gait/Stairs) Pt amb 8' from bed to recliner with r wx and min A of 2; R LE limp and decreased R knee flex during swing thru, slow pace, flexed posture, activity tolerance improving but still poor  -DJ               User Key  (r) = Recorded By, (t) = Taken By, (c) = Cosigned By      Initials Name Provider Type    DJ Jagruti Sheikh, PT Physical Therapist                   Obj/Interventions       Row Name 09/27/24 1134          Motor Skills    Motor Skills functional endurance  -DJ     Functional Endurance improving but still poor  -DJ     Therapeutic Exercise other (see comments)  AP, QS, heel slides with belt, seated R knee flex - poor effort. Pt instructed to remove horizontal pillow from under R knee to avoid ham tightening  -DJ       Row Name 09/27/24 1134          Balance     Balance Assessment standing static balance;standing dynamic balance  -DJ     Static Standing Balance minimal assist;2-person assist;verbal cues  -DJ     Dynamic Standing Balance minimal assist;2-person assist;verbal cues  -DJ     Position/Device Used, Standing Balance walker, front-wheeled;supported  -DJ     Balance Interventions sitting;standing;sit to stand;supported;weight shifting activity  -DJ     Comment, Balance unsteady but no overt LOB  -DJ               User Key  (r) = Recorded By, (t) = Taken By, (c) = Cosigned By      Initials Name Provider Type    Jagruti Bryant, PT Physical Therapist                   Goals/Plan    No documentation.                  Clinical Impression       Row Name 09/27/24 1136          Pain    Pain Location - Side/Orientation Right  -DJ     Pain Location - knee  -DJ     Pre/Posttreatment Pain Comment c/o R knee pain with R LE movement or WBing  -DJ     Pain Intervention(s) Repositioned;Rest  -DJ       Row Name 09/27/24 1136          Plan of Care Review    Plan of Care Reviewed With patient  -DJ     Progress improving  -DJ     Outcome Evaluation Pt resting in bed, glenys present and helpful. Pt found with pillow positioned horizontally under R knee - pt and glenys educated about risk of hamstring tightness and to avoid placing pillow under R knee. Pt performed supine and seated ther ex including R knee ROM. Pt hesitant to flex R knee and repeatedly asks for staff to support her R LE when sitting EOB. Pt req much encouragement to participate in therapy and amb - pt very hesitant due to pain. She stood from EOB with min A of 2 using r wx. Pt amb 8' from bed to recliner with r wx and min A of 2; R LE limp and decreased R knee flex during swing thru, slow pace, flexed posture, activity tolerance improving but still poor. Pt placed in recliner chair with all needs met, glenys present and Nurse aware. Overall, pt progressing very slowly with R knee ROM as well as general mobility which is further  complicated by pt's fear of moving. Cont PT to progress as tolerated and prepare for d/c.  -DJ       Row Name 09/27/24 1136          Therapy Assessment/Plan (PT)    Criteria for Skilled Interventions Met (PT) skilled treatment is necessary  -DJ       Row Name 09/27/24 1136          Vital Signs    Pre Patient Position Supine  -DJ     Intra Patient Position Standing  -DJ     Post Patient Position Sitting  -DJ       Row Name 09/27/24 1136          Positioning and Restraints    Pre-Treatment Position in bed  -DJ     Post Treatment Position chair  -DJ     In Chair notified nsg;reclined;call light within reach;encouraged to call for assist;with family/caregiver  no bed alarm on when PT entered room - family present  -DJ               User Key  (r) = Recorded By, (t) = Taken By, (c) = Cosigned By      Initials Name Provider Type    Jagruti Bryant, PT Physical Therapist                   Outcome Measures       Row Name 09/27/24 1141 09/27/24 0859       How much help from another person do you currently need...    Turning from your back to your side while in flat bed without using bedrails? 3  -DJ 3  -AH    Moving from lying on back to sitting on the side of a flat bed without bedrails? 3  -DJ 3  -AH    Moving to and from a bed to a chair (including a wheelchair)? 2  -DJ 2  -AH    Standing up from a chair using your arms (e.g., wheelchair, bedside chair)? 3  -DJ 2  -AH    Climbing 3-5 steps with a railing? 2  -DJ 2  -AH    To walk in hospital room? 2  -DJ 2  -AH    AM-PAC 6 Clicks Score (PT) 15  -DJ 14  -AH    Highest Level of Mobility Goal 4 --> Transfer to chair/commode  -DJ 4 --> Transfer to chair/commode  -AH      Row Name 09/27/24 1141          Functional Assessment    Outcome Measure Options AM-PAC 6 Clicks Basic Mobility (PT)  -DJ               User Key  (r) = Recorded By, (t) = Taken By, (c) = Cosigned By      Initials Name Provider Type    Annabelle Guidry, RN Registered Nurse    Jagruti Bryant, PT Physical  Therapist                                 Physical Therapy Education       Title: PT OT SLP Therapies (In Progress)       Topic: Physical Therapy (In Progress)       Point: Mobility training (In Progress)       Learning Progress Summary             Patient Acceptance, E, NR by DJ at 9/27/2024 1142    Acceptance, E,TB, NR by ST at 9/26/2024 1339    Acceptance, E,D, VU,NR by MS at 9/23/2024 1526   Family Acceptance, E, NR by DJ at 9/27/2024 1142                         Point: Home exercise program (In Progress)       Learning Progress Summary             Patient Acceptance, E, NR by DJ at 9/27/2024 1142    Acceptance, E,TB, NR by ST at 9/26/2024 1339    Acceptance, E,D, VU,NR by MS at 9/23/2024 1526   Family Acceptance, E, NR by DJ at 9/27/2024 1142                         Point: Body mechanics (In Progress)       Learning Progress Summary             Patient Acceptance, E, NR by DJ at 9/27/2024 1142   Family Acceptance, E, NR by DJ at 9/27/2024 1142                         Point: Precautions (In Progress)       Learning Progress Summary             Patient Acceptance, E, NR by DJ at 9/27/2024 1142   Family Acceptance, E, NR by DJ at 9/27/2024 1142                                         User Key       Initials Effective Dates Name Provider Type Discipline    MS 06/16/21 -  Shaquille Donnelly, PT Physical Therapist PT    DJ 10/25/19 -  Jagruti Sheikh, PT Physical Therapist PT     09/22/22 -  Suma Willingham, PT Physical Therapist PT                  PT Recommendation and Plan     Plan of Care Reviewed With: patient  Progress: improving  Outcome Evaluation: Pt resting in bed, glenys present and helpful. Pt found with pillow positioned horizontally under R knee - pt and glenys educated about risk of hamstring tightness and to avoid placing pillow under R knee. Pt performed supine and seated ther ex including R knee ROM. Pt hesitant to flex R knee and repeatedly asks for staff to support her R LE when sitting EOB. Pt req  much encouragement to participate in therapy and amb - pt very hesitant due to pain. She stood from EOB with min A of 2 using r wx. Pt amb 8' from bed to recliner with r wx and min A of 2; R LE limp and decreased R knee flex during swing thru, slow pace, flexed posture, activity tolerance improving but still poor. Pt placed in recliner chair with all needs met, glenys present and Nurse aware. Overall, pt progressing very slowly with R knee ROM as well as general mobility which is further complicated by pt's fear of moving. Cont PT to progress as tolerated and prepare for d/c.     Time Calculation:         PT Charges       Row Name 09/27/24 1143             Time Calculation    Start Time 0819  -DJ      Stop Time 0853  -DJ      Time Calculation (min) 34 min  -DJ      PT Non-Billable Time (min) 10 min  -DJ      PT Received On 09/27/24  -DJ      PT - Next Appointment 09/28/24  -DJ                User Key  (r) = Recorded By, (t) = Taken By, (c) = Cosigned By      Initials Name Provider Type    Jagruti Bryant, PT Physical Therapist                  Therapy Charges for Today       Code Description Service Date Service Provider Modifiers Qty    85169569967 HC PT THERAPEUTIC ACT EA 15 MIN 9/27/2024 Jagruti Sheikh, PT GP 1    49779376930 HC GAIT TRAINING EA 15 MIN 9/27/2024 Jagruti Sheikh PT GP 1            PT G-Codes  Outcome Measure Options: AM-PAC 6 Clicks Basic Mobility (PT)  AM-PAC 6 Clicks Score (PT): 15  AM-PAC 6 Clicks Score (OT): 8       Jagruti Sheikh PT  9/27/2024

## 2024-09-27 NOTE — CASE MANAGEMENT/SOCIAL WORK
Continued Stay Note  [facility]     Patient Name: Bailee Garza  MRN: [id number]  Today's Date: 9/27/2024    Admit Date: 9/23/2024    Plan: SNF referrals pending to [facility]; anticipate pt's family transporting pt pending medical needs.   Discharge Plan       Row Name 09/27/24 1155       Plan    Plan SNF referrals pending to [facility] & [facility]; anticipate pt's family transporting pt pending medical needs.    Patient/Family in Agreement with Plan yes    Plan Comments Pt discussed in multidisciplinary rounds. Clinicals reviewed. CCP notes pt has orders for pt to downgrade to telemetry & move out of CICU. Pt assigned a bed in CVI. CCP discussed OT & PT recommendations with pt & pt's son, Christopher Diaz, who both verbalize understanding & agree pt needs rehab at [place]. CCP explained differences between SNF & IRF. Pt & son given [facility] book, SNF list, & pt choice lists for both SNF & IRF. Pt reports she has been [facility] & requests referral there. Reports this is her top choice. Christopher chose [facility] as a back-up plan. CCP created DCP report & placed Epic referrals. CCP notified [facility] & [facility] re new referral. [facility] reports she will follow pt; she anticipates bed availability on Monday. Pt not yet medically ready for DC; CCP on CVI will now follow. DC plan SNF referrals pending to [facility] & [facility]; anticipate pt's family transporting pt pending medical needs.Partial packet with pt's chart. DC barriers: duplex lower extremity. GOVIND Betancourt/CCP             Expected Discharge Date and Time       Expected Discharge Date Expected Discharge Time    Sep 30, 2024      Bria Santiago RN

## 2024-09-27 NOTE — THERAPY RE-EVALUATION
Acute Care - Speech Language Pathology   Swallow Re-Evaluation [facility]     Patient Name: Bailee Garza  : 1940  MRN: [id number]  Today's Date: 2024               Admit Date: 2024    Visit Dx:     ICD-10-CM ICD-9-CM   1. Syncope and collapse  R55 780.2   2. Volume depletion, gastrointestinal loss  E86.9 276.50   3. Postoperative anemia  D64.9 285.9   4. Status post total right knee replacement  Z96.651 V43.65   5. Chronic kidney disease, unspecified CKD stage  N18.9 585.9   6. Nausea and vomiting, unspecified vomiting type  R11.2 787.01   7. Gastrointestinal hemorrhage, unspecified gastrointestinal hemorrhage type  K92.2 578.9     Patient Active Problem List   Diagnosis    Chronic midline low back pain without sciatica    Essential hypertension    Hearing loss    Hypothyroidism, acquired    IBS (irritable bowel syndrome)    Chronic nonseasonal allergic rhinitis due to pollen    Stage 3 chronic kidney disease    Arthralgia of right knee    DDD (degenerative disc disease), lumbosacral    Scoliosis due to degenerative disease of spine in adult patient    Status post total replacement of left hip    Status post total hip replacement, left    Vitamin D deficiency    Anemia, normocytic normochromic    S/P hip replacement, right    OA (osteoarthritis) of knee    Syncope    Nausea & vomiting    Status post right knee replacement    Gastrointestinal hemorrhage    Epilepsy    Enteropathogenic Escherichia coli infection    Acute posthemorrhagic anemia     Past Medical History:   Diagnosis Date    Abnormal CXR 2017    Adverse reaction to narcotic drug     SEVERE HALLUCINATIONS POST OP LEFT HIP PLACEMENT    Allergies     Arthritis     Arthritis of foot 2020    Arthropathy of pelvic region and thigh 2012    unspecified    Back pain 2007    Chronic back pain 2009    Chronic cough 2017    CKD (chronic kidney disease)     Closed nondisplaced fracture of lateral malleolus  of fibula with delayed healing 2020    Closed nondisplaced fracture of medial cuneiform of left foot 2018    Constipation 2015    unspecified    COVID-19 vaccination refused     COVID-19 virus detected 10/17/2022    Diverticulosis     Dyspepsia 2008    Essential hypertension 2007    Exertional shortness of breath 2017    Fall 2018    Family history of abdominal aortic aneurysm (AAA) 2019    [place] aaa screen limited (04/10/2019 10:32)     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), 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    Hearing loss 2008    History of bone density study 2015    History of pneumonia     2017    History of skin cancer     Hypothyroidism, acquired 2007    Insomnia 2015    unspecified    Intervertebral disc disorder with myelopathy, cervical region 2010    Joint capsule tear 2012    unspecified    OA (osteoarthritis) of hip 2018    Other synovitis and tenosynovitis, right ankle and foot 2020    Peroneal tendonitis, right 2020    Rhinitis, allergic 2007    Right knee pain     Scoliosis     Screening breast examination 2007    Stress 2015    other acute reactions to stress    Stress incontinence     Trochanteric bursitis, left hip 2019    Urticaria 2015     Past Surgical History:   Procedure Laterality Date    BREAST EXCISIONAL BIOPSY Right     50+ years ago    BRONCHOSCOPY N/A 2017    Procedure: BRONCHOSCOPY;  Surgeon: Mario Aalnis MD;  Location: [facility] ENDOSCOPY;  Service:     CATARACT EXTRACTION, BILATERAL      COLONOSCOPY      ENDOSCOPY N/A 2024    Procedure: ESOPHAGOGASTRODUODENOSCOPY AT BEDSIDE with gold probe and epi injection;  Surgeon: Karli Viveros MD;  Location: [facility] ENDOSCOPY;  Service: Gastroenterology;  Laterality: N/A;  Pre: GI Bleed  Post: multiple duodenal ulcers, hiatal hernia    HYSTERECTOMY      TOTAL HIP  ARTHROPLASTY Left 07/24/2018    Procedure: LEFT TOTAL HIP ARTHROPLASTY;  Surgeon: Justen Mann MD;  Location: [facility] MAIN OR;  Service: Orthopedics    TOTAL HIP ARTHROPLASTY Right 12/13/2022    Procedure: Posterior RIGHT TOTAL HIP ARTHROPLASTY MARCELINO NAVIGATION;  Surgeon: Anupam Ruiz MD;  Location:  [facility];  Service: Orthopedics;  Laterality: Right;    TOTAL KNEE ARTHROPLASTY Right 9/18/2024    Procedure: TOTAL KNEE ARTHROPLASTY;  Surgeon: Jesus Thayer MD;  Location:  [facility];  Service: Orthopedics;  Laterality: Right;       SLP Recommendation and Plan                                                                               Plan of Care Reviewed With: patient  Outcome Evaluation: Patient seen for re evaluation of swallow function. Diet advanced to soft per GI. Pt and family reports patient is much closer to baseline this date. Pt denied choking with meds, she indicated the puree was stuck in her gums as her dentures were not in place that date and her mouth was dry. Pt with high pitch and mild dysphonia, but patient feels this is her baseline. Baseline throat clearing noted. Inconsistent throat clearing across trials of thins and mixed. SLP explained that an instrumental swallow assessment would be warranted to fully rule out aspiration. Pt pleasantly refused further assessment at this time as she feels like she's at baseline. If resp status declines would recommend patient re consider instrumental assessment. Pt and son voiced understanding.      SWALLOW EVALUATION (Last 72 Hours)       SLP Adult Swallow Evaluation       Row Name 09/27/24 1000       Rehab Evaluation    Document Type re-evaluation  -    Subjective Information --    Patient Observations --    Patient/Family/Caregiver Comments/Observations --    Patient Effort adequate  -SH       General Information    Patient Profile Reviewed yes  -SH       Pain    Additional Documentation Pain Scale: FACES Pre/Post-Treatment (Group)  -SH        Pain Scale: FACES Pre/Post-Treatment    Pain: FACES Scale, Pretreatment 2-->hurts little bit  -       Oral Motor Structure and Function              User Key  (r) = Recorded By, (t) = Taken By, (c) = Cosigned By      Initials Name Effective Dates     Rody Lizama USHA, SLP 01/05/24 -      Danya Matthews, ABISAI 01/05/24 -                     EDUCATION  The patient has been educated in the following areas:   Dysphagia (Swallowing Impairment).        SLP GOALS       Row Name 09/27/24 1000 09/26/24 1400          (LTG) Patient will demonstrate functional swallow for    Diet Texture (Demonstrate functional swallow) regular textures  - regular textures  -TH     Liquid viscosity (Demonstrate functional swallow) thin liquids  - thin liquids  -TH     [place] (Demonstrate functional swallow) independently (over 90% accuracy)  - independently (over 90% accuracy)  -TH     Time Frame (Demonstrate functional swallow) by discharge  - by discharge  -TH     Barriers (Demonstrate functional swallow) -- when cleared by GI for advancement  -TH     Comment (Demonstrate functional swallow) Patient seen for re evaluation of swallow function. Diet advanced to soft per GI. Pt and family reports patient is much closer to baseline this date. Pt denied choking with meds, she indicated the puree was stuck in her gums as her dentures were not in place that date and her mouth was dry. Pt with high pitch and mild dysphonia, but patient feels this is her baseline. Baseline throat clearing noted. Inconsistent throat clearing across trials of thins and mixed. SLP explained that an instrumental swallow assessment would be warranted to fully rule out aspiration. Pt pleasantly refused further assessment at this time as she feels like she's at baseline. If resp status declines would recommend patient re consider instrumental assessment. Pt and son voiced understanding.  -SH --               User Key  (r) = Recorded By, (t) = Taken By,  (c) = Cosigned By      Initials Name Provider Type     Rody Lizama SLP Speech and Language Pathologist     Danya Matthews SLP Speech and Language Pathologist                         Time Calculation:    Time Calculation- SLP       Row Name 09/27/24 1051             Time Calculation- SLP    SLP Start Time 0845  -      SLP Received On 09/27/24  -                User Key  (r) = Recorded By, (t) = Taken By, (c) = Cosigned By      Initials Name Provider Type     Rody Lizama SLP Speech and Language Pathologist                    Therapy Charges for Today       Code Description Service Date Service Provider Modifiers Qty    [id number]  ST TREATMENT SWALLOW 4 9/27/2024 Rody Lizama SLP GN 1                 ABISAI Andres  9/27/2024

## 2024-09-28 LAB
BACTERIA SPEC AEROBE CULT: NORMAL
BACTERIA SPEC AEROBE CULT: NORMAL
BASOPHILS # BLD AUTO: 0.03 10*3/MM3 (ref 0–0.2)
BASOPHILS NFR BLD AUTO: 0.3 % (ref 0–1.5)
DEPRECATED RDW RBC AUTO: 43.5 FL (ref 37–54)
EOSINOPHIL # BLD AUTO: 0.13 10*3/MM3 (ref 0–0.4)
EOSINOPHIL NFR BLD AUTO: 1.3 % (ref 0.3–6.2)
ERYTHROCYTE [DISTWIDTH] IN BLOOD BY AUTOMATED COUNT: 13.3 % (ref 12.3–15.4)
HCT VFR BLD AUTO: 28.7 % (ref 34–46.6)
HGB BLD-MCNC: 9.4 G/DL (ref 12–15.9)
IMM GRANULOCYTES # BLD AUTO: 0.5 10*3/MM3 (ref 0–0.05)
IMM GRANULOCYTES NFR BLD AUTO: 4.8 % (ref 0–0.5)
LYMPHOCYTES # BLD AUTO: 0.94 10*3/MM3 (ref 0.7–3.1)
LYMPHOCYTES NFR BLD AUTO: 9.1 % (ref 19.6–45.3)
MCH RBC QN AUTO: 30.1 PG (ref 26.6–33)
MCHC RBC AUTO-ENTMCNC: 32.8 G/DL (ref 31.5–35.7)
MCV RBC AUTO: 92 FL (ref 79–97)
MONOCYTES # BLD AUTO: 0.86 10*3/MM3 (ref 0.1–0.9)
MONOCYTES NFR BLD AUTO: 8.3 % (ref 5–12)
NEUTROPHILS NFR BLD AUTO: 7.91 10*3/MM3 (ref 1.7–7)
NEUTROPHILS NFR BLD AUTO: 76.2 % (ref 42.7–76)
NRBC BLD AUTO-RTO: 0 /100 WBC (ref 0–0.2)
PLATELET # BLD AUTO: 309 10*3/MM3 (ref 140–450)
PMV BLD AUTO: 9.9 FL (ref 6–12)
RBC # BLD AUTO: 3.12 10*6/MM3 (ref 3.77–5.28)
WBC NRBC COR # BLD AUTO: 10.37 10*3/MM3 (ref 3.4–10.8)

## 2024-09-28 PROCEDURE — 99232 SBSQ HOSP IP/OBS MODERATE 35: CPT | Performed by: INTERNAL MEDICINE

## 2024-09-28 PROCEDURE — 85025 COMPLETE CBC W/AUTO DIFF WBC: CPT | Performed by: INTERNAL MEDICINE

## 2024-09-28 RX ORDER — ACETAMINOPHEN 500 MG
1000 TABLET ORAL EVERY 8 HOURS
Status: DISCONTINUED | OUTPATIENT
Start: 2024-09-28 | End: 2024-10-01 | Stop reason: HOSPADM

## 2024-09-28 RX ADMIN — ACETAMINOPHEN 1000 MG: 500 TABLET ORAL at 21:16

## 2024-09-28 RX ADMIN — SUCRALFATE 1 G: 1 TABLET ORAL at 16:08

## 2024-09-28 RX ADMIN — ACETAMINOPHEN 1000 MG: 500 TABLET ORAL at 13:26

## 2024-09-28 RX ADMIN — Medication 10 ML: at 21:16

## 2024-09-28 RX ADMIN — SUCRALFATE 1 G: 1 TABLET ORAL at 05:53

## 2024-09-28 RX ADMIN — ACETAMINOPHEN 325MG 650 MG: 325 TABLET ORAL at 05:57

## 2024-09-28 RX ADMIN — FOLIC ACID 1 MG: 1 TABLET ORAL at 10:05

## 2024-09-28 RX ADMIN — PANTOPRAZOLE SODIUM 40 MG: 40 TABLET, DELAYED RELEASE ORAL at 05:53

## 2024-09-28 RX ADMIN — PANTOPRAZOLE SODIUM 40 MG: 40 TABLET, DELAYED RELEASE ORAL at 16:08

## 2024-09-28 RX ADMIN — LEVOTHYROXINE SODIUM 75 MCG: 75 TABLET ORAL at 10:05

## 2024-09-28 RX ADMIN — SUCRALFATE 1 G: 1 TABLET ORAL at 10:04

## 2024-09-28 RX ADMIN — ACETAMINOPHEN 325MG 650 MG: 325 TABLET ORAL at 11:14

## 2024-09-28 RX ADMIN — Medication 10 ML: at 10:05

## 2024-09-28 NOTE — PROGRESS NOTES
[facility]  Inpatient Progress Note    Reason for Follow Up: GI bleed    Subjective     Interval History:   No further bleeding    Current Facility-Administered Medications:     acetaminophen (TYLENOL) tablet 650 mg, 650 mg, Oral, Q4H PRN, 650 mg at 09/28/24 1114 **OR** acetaminophen (TYLENOL) 160 MG/5ML oral solution 650 mg, 650 mg, Oral, Q4H PRN **OR** acetaminophen (TYLENOL) suppository 650 mg, 650 mg, Rectal, Q4H PRN, Jose Manuel Vazquez MD    sennosides-docusate (PERICOLACE) 8.6-50 MG per tablet 2 tablet, 2 tablet, Oral, BID PRN **AND** polyethylene glycol (MIRALAX) packet 17 g, 17 g, Oral, Daily PRN **AND** bisacodyl (DULCOLAX) EC tablet 5 mg, 5 mg, Oral, Daily PRN **AND** bisacodyl (DULCOLAX) suppository 10 mg, 10 mg, Rectal, Daily PRN, Jose Manuel Vazquez MD    Calcium Replacement - Follow Nurse / BPA Driven Protocol, , Does not apply, PRN, Jose Manuel Vazquez MD    folic acid (FOLVITE) tablet 1 mg, 1 mg, Oral, Daily, Jose Manuel Vazquez MD, 1 mg at 09/28/24 1005    levothyroxine (SYNTHROID, LEVOTHROID) tablet 75 mcg, 75 mcg, Oral, Daily, Jose Manuel Vazquez MD, 75 mcg at 09/28/24 1005    loperamide (IMODIUM) capsule 2 mg, 2 mg, Oral, 4x Daily PRN, Deven Kang MD, 2 mg at 09/27/24 0859    LORazepam (ATIVAN) injection 0.5 mg, 0.5 mg, Intravenous, Once in imaging, Jose Manuel Vazquez MD    LORazepam (ATIVAN) injection 1 mg, 1 mg, Intravenous, Q4H PRN, Jose Manuel Vazquez MD, 1 mg at 09/25/24 2052    Magnesium Standard Dose Replacement - Follow Nurse / BPA Driven Protocol, , Does not apply, PRN, Jose Manuel Vazquez MD    nitroglycerin (NITROSTAT) SL tablet 0.4 mg, 0.4 mg, Sublingual, Q5 Min PRN, Jose Manuel Vazquez MD    ondansetron ODT (ZOFRAN-ODT) disintegrating tablet 4 mg, 4 mg, Oral, Q6H PRN **OR** ondansetron (ZOFRAN) injection 4 mg, 4 mg, Intravenous, Q6H PRN, Jose Manuel Vazquez MD, 4 mg at 09/23/24 0947    ondansetron (ZOFRAN) injection 4 mg, 4 mg, Intravenous, Once,  Shaquille Whitney MD    pantoprazole (PROTONIX) EC tablet 40 mg, 40 mg, Oral, BID Jackeline PETTY Lauren C., MD, 40 mg at 09/28/24 0553    Phosphorus Replacement - Follow Nurse / BPA Driven Protocol, , Does not apply, George PRAKASH Patrick D, MD    potassium & sodium phosphates (PHOS-NAK) 280-160-250 MG packet 2 packet, 2 packet, Oral, Once, Jose Manuel Vazquez MD    Potassium Replacement - Follow Nurse / BPA Driven Protocol, , Does not apply, George PRAKASH Patrick D, MD    sodium chloride 0.9 % flush 10 mL, 10 mL, Intravenous, Q12H, Jose Manuel Vazquez MD, 10 mL at 09/28/24 1005    sodium chloride 0.9 % flush 10 mL, 10 mL, Intravenous, PRN, Jose Manuel Vazquez MD    sodium chloride 0.9 % infusion 40 mL, 40 mL, Intravenous, PRN, Jose Manuel Vazquez MD    sucralfate (CARAFATE) tablet 1 g, 1 g, Oral, TID Jackeline PETTY Lauren C., MD, 1 g at 09/28/24 1004    Facility-Administered Medications Ordered in Other Encounters:     Chlorhexidine Gluconate Cloth 2 % pads 1 each, 1 each, Apply externally, Take As Directed, Anupam Ruiz MD  Review of Systems:    There is weakness and fatigue all other systems reviewed and negative    Objective     Vital Signs  Temp:  [97.6 °F (36.4 °C)-99 °F (37.2 °C)] 99 °F (37.2 °C)  Heart Rate:  [75-85] 76  Resp:  [18] 18  BP: (113-144)/(57-78) 144/78  Body mass index is 20.44 kg/m².    Intake/Output Summary (Last 24 hours) at 9/28/2024 1117  Last data filed at 9/28/2024 0712  Gross per 24 hour   Intake --   Output 1500 ml   Net -1500 ml     I/O this shift:  In: -   Out: 850 [Urine:850]     Physical Exam:   General: patient awake, alert and cooperative   Eyes: Normal lids and lashes, no scleral icterus   Neck: supple, normal ROM   Skin: warm and dry, not jaundiced   Cardiovascular: regular rhythm and rate, no murmurs auscultated   Pulm: clear to auscultation bilaterally, regular and unlabored   Abdomen: soft, nontender, nondistended; normal bowel sounds   Extremities: no rash or  edema   Psychiatric: Normal mood and behavior; memory intact     Results Review:     I reviewed the patient's new clinical results.    Results from last 7 days   Lab Units 09/28/24 0727 09/27/24 0332 09/26/24 2322 09/26/24 1420 09/26/24 0444   WBC 10*3/mm3 10.37 8.81  --   --  9.93   HEMOGLOBIN g/dL 9.4* 9.3*  9.3* 8.9*   < > 8.9*  8.9*   HEMATOCRIT % 28.7* 28.9*  28.9* 26.9*   < > 27.1*  27.1*   PLATELETS 10*3/mm3 309 252  --   --  185    < > = values in this interval not displayed.     Results from last 7 days   Lab Units 09/27/24 0332 09/26/24 2322 09/26/24 1420 09/26/24 0444 09/25/24 0428 09/24/24  1451   SODIUM mmol/L 138 137  --  146* 148* 143   POTASSIUM mmol/L 4.0 4.2 4.1 3.4* 3.9 4.6   CHLORIDE mmol/L 110* 109*  --  117* 120* 117*   CO2 mmol/L 16.0* 17.5*  --  18.0* 19.1* 15.4*   BUN mg/dL 17 18  --  25* 48* 66*   CREATININE mg/dL 0.88 1.03*  --  0.82 0.99 1.10*   CALCIUM mg/dL 8.1* 8.1*  --  7.5* 7.7* 7.8*   BILIRUBIN mg/dL  --   --   --  0.3 0.2 0.3   ALK PHOS U/L  --   --   --  74 77 90   ALT (SGPT) U/L  --   --   --  13 18 22   AST (SGOT) U/L  --   --   --  13 17 24   GLUCOSE mg/dL 82 87  --  69 101* 131*     Results from last 7 days   Lab Units 09/23/24  0533   INR  0.96     Lab Results   Lab Value Date/Time    LIPASE 42 09/23/2024 0735    LIPASE 50 12/19/2017 1128       Radiology:  MRI abdomen w wo contrast mrcp   Final Result      MRI Brain With & Without Contrast   Final Result   The study is degraded by patient motion. There is no   evidence of acute infarction, hydrocephalus, of mass or of abnormal   enhancement.       This report was finalized on 9/25/2024 4:43 PM by Dr. Anupam Davis M.D   on Workstation: [id number]          US Gallbladder   Final Result   Stable dilation of the common bile duct. Pancreas largely obscured by   overlying bowel gas. Mild dilation of the visualized pancreatic duct   measuring about 3 mm, similar to comparison CT               This report was  finalized on 9/24/2024 6:51 PM by Dr. Julio César Perry M.D on Workstation: [id number]          CT Angiogram Chest Pulmonary Embolism   Final Result       1. The study is negative for pulmonary embolism. No acute intrathoracic   abnormality.   2. Intrathoracic and extrahepatic biliary dilatation with abrupt   tapering at the ampulla where there is questionable hypoenhancing   masslike soft tissue at the ampulla and pancreatic head. Could consider   correlating with ERCP or MRCP if clinically indicated.   3. Moderate fluid and air distended stomach and high attenuation fluid   distention of the proximal duodenum with duodenal wall thickening,   suspected duodenal diverticula, and mild proximal duodenal wall   thickening. Could correlate with endoscopy if clinically indicated.   4. Distended gallbladder.   5. Decreased hepatic attenuation could reflect hepatic steatosis.   6. Extensive colonic diverticula without CT evidence of diverticulitis.   7. Other chronic findings, as above.       This report was finalized on 9/23/2024 8:13 AM by Lorenzo Bui MD on   Workstation: [id number]          CT Abdomen Pelvis With Contrast   Final Result       1. The study is negative for pulmonary embolism. No acute intrathoracic   abnormality.   2. Intrathoracic and extrahepatic biliary dilatation with abrupt   tapering at the ampulla where there is questionable hypoenhancing   masslike soft tissue at the ampulla and pancreatic head. Could consider   correlating with ERCP or MRCP if clinically indicated.   3. Moderate fluid and air distended stomach and high attenuation fluid   distention of the proximal duodenum with duodenal wall thickening,   suspected duodenal diverticula, and mild proximal duodenal wall   thickening. Could correlate with endoscopy if clinically indicated.   4. Distended gallbladder.   5. Decreased hepatic attenuation could reflect hepatic steatosis.   6. Extensive colonic diverticula without CT evidence of  diverticulitis.   7. Other chronic findings, as above.       This report was finalized on 9/23/2024 8:13 AM by Lorenzo Bui MD on   Workstation: [id number]          XR Chest 1 View   Final Result   1. Mild interstitial prominence of the lungs somewhat exaggerated by   underinflation but overall appear unchanged from the 12/29/2017 study   and therefore likely chronic in nature.   2. No definite acute infiltrates are thought to be present.       This report was finalized on 9/23/2024 7:33 AM by Dr. Arun Romero M.D on Workstation: [id number]          SLP FEES - Fiberoptic Endo Eval Swallow    (Results Pending)       Assessment & Plan     Active Hospital Problems    Diagnosis     **Syncope     Epilepsy     Enteropathogenic Escherichia coli infection     Acute posthemorrhagic anemia     Nausea & vomiting     Status post right knee replacement     Gastrointestinal hemorrhage     Essential hypertension        Assessment:  UGIB - multiple duodenal ukcers with stigmata noted on egd 9/24 - treated endoscopically  ABLA  Abnormal biliary tract imaging  Distended GB        Plan:  H/h now more stable now for 4 days  Pantoprazole twice daily 8-week, carafate bid x 1 month  h pylori stool antigen positive - start tx with amoxicillin/clarithromycin x 14 days as an outpatient, at discharge will be fine will need an H. pylori breath test in 8 weeks off of PPI and all antibiotics  Will need eus/ercp for further evaluation of biliary findings - discussed with biliary service.  Will be done as outpatient once she recovers from acute issues.  LFTs and  Ca 19-9 nml. She  Will reach out to Dr. Farris's office to get this scheduled  Advance diet     I discussed the patients findings and my recommendations with patient and nursing staff.    Misbah Borden MD

## 2024-09-28 NOTE — PROGRESS NOTES
Name: Bailee Garza ADMIT: 2024   : 1940  PCP: Eddie Araya MD    MRN: [id number] LOS: 4 days   AGE/SEX: 84 y.o. female  ROOM: [facility]/     Subjective   Subjective   Son at bedside. Complaining of some knee pain discussed trial of scheduled Tylenol and she is agreeable. She thinks it would help with PT     Objective   Objective   Vital Signs  Temp:  [97.6 °F (36.4 °C)-99 °F (37.2 °C)] 99 °F (37.2 °C)  Heart Rate:  [75-85] 76  Resp:  [18] 18  BP: (113-144)/(57-78) 144/78  SpO2:  [95 %-97 %] 95 %  on   ;   Device (Oxygen Therapy): room air  Body mass index is 20.44 kg/m².    Physical Exam  Constitutional:       General: She is not in acute distress.     Appearance: She is not toxic-appearing.   HENT:      Head: Normocephalic and atraumatic.   Cardiovascular:      Rate and Rhythm: Normal rate and regular rhythm.   Pulmonary:      Effort: Pulmonary effort is normal. No respiratory distress.      Breath sounds: Normal breath sounds.   Abdominal:      General: Bowel sounds are normal.      Palpations: Abdomen is soft.      Tenderness: There is no abdominal tenderness. There is no guarding or rebound.   Musculoskeletal:         General: No swelling.   Skin:     General: Skin is warm and dry.   Neurological:      General: No focal deficit present.      Mental Status: She is alert and oriented to person, place, and time.   Psychiatric:         Mood and Affect: Mood normal.         Behavior: Behavior normal.     Results Review  I reviewed the patient's new clinical results.  Results from last 7 days   Lab Units 24  0727 24  0332 24  2322 24  1420 24  0444 24  1713 24  0428   WBC 10*3/mm3 10.37 8.81  --   --  9.93  --  9.97   HEMOGLOBIN g/dL 9.4* 9.3*  9.3* 8.9* 10.1* 8.9*  8.9*   < > 7.0*   PLATELETS 10*3/mm3 309 252  --   --  185  --  187    < > = values in this interval not displayed.     Results from last 7 days   Lab Units 24  0332 24  9334  "09/26/24  1420 09/26/24  0444 09/25/24  0428   SODIUM mmol/L 138 137  --  146* 148*   POTASSIUM mmol/L 4.0 4.2 4.1 3.4* 3.9   CHLORIDE mmol/L 110* 109*  --  117* 120*   CO2 mmol/L 16.0* 17.5*  --  18.0* 19.1*   BUN mg/dL 17 18  --  25* 48*   CREATININE mg/dL 0.88 1.03*  --  0.82 0.99   GLUCOSE mg/dL 82 87  --  69 101*     Lab Results   Component Value Date    ANIONGAP 12.0 09/27/2024     Estimated Creatinine Clearance: 38.8 mL/min (by C-G formula based on SCr of 0.88 mg/dL).   Lab Results   Component Value Date    EGFR 65.3 09/27/2024     Results from last 7 days   Lab Units 09/26/24  0444 09/25/24  0428 09/24/24  1451 09/24/24  0522   ALBUMIN g/dL 2.7* 2.7* 2.6* 2.4*   BILIRUBIN mg/dL 0.3 0.2 0.3 0.2   ALK PHOS U/L 74 77 90 84   AST (SGOT) U/L 13 17 24 16   ALT (SGPT) U/L 13 18 22 19     Results from last 7 days   Lab Units 09/27/24  0332 09/26/24  2322 09/26/24 0444 09/25/24  0428 09/24/24  1451 09/24/24  0522   CALCIUM mg/dL 8.1* 8.1* 7.5* 7.7* 7.8* 6.7*   ALBUMIN g/dL  --   --  2.7* 2.7* 2.6* 2.4*   MAGNESIUM mg/dL  --   --   --  2.0  --  1.9   PHOSPHORUS mg/dL  --   --  2.2* 1.9* 2.6 2.0*     Results from last 7 days   Lab Units 09/25/24  0428 09/24/24  2224 09/24/24  1700 09/24/24  1305 09/24/24  0950 09/24/24  0522 09/23/24  0616 09/23/24  0533   PROCALCITONIN ng/mL 6.24*  --   --   --   --  10.20*  --  21.50*   LACTATE mmol/L  --  1.0 2.2* 2.1* 2.8*  --    < >  --     < > = values in this interval not displayed.     No results found for: \"HGBA1C\", \"POCGLU\"      No radiology results for the last day    Scheduled Meds  folic acid, 1 mg, Oral, Daily  levothyroxine, 75 mcg, Oral, Daily  LORazepam, 0.5 mg, Intravenous, Once in imaging  ondansetron, 4 mg, Intravenous, Once  pantoprazole, 40 mg, Oral, BID AC  potassium & sodium phosphates, 2 packet, Oral, Once  sodium chloride, 10 mL, Intravenous, Q12H  sucralfate, 1 g, Oral, TID AC    Continuous Infusions   PRN Meds    acetaminophen **OR** acetaminophen **OR** " acetaminophen    senna-docusate sodium **AND** polyethylene glycol **AND** bisacodyl **AND** bisacodyl    Calcium Replacement - Follow Nurse / BPA Driven Protocol    loperamide    LORazepam    Magnesium Standard Dose Replacement - Follow Nurse / BPA Driven Protocol    nitroglycerin    ondansetron ODT **OR** ondansetron    Phosphorus Replacement - Follow Nurse / BPA Driven Protocol    Potassium Replacement - Follow Nurse / BPA Driven Protocol    sodium chloride    sodium chloride     Diet  Diet: Gastrointestinal; Fiber-Restricted, Low Irritant; Texture: Soft to Chew (NDD 3); Soft to Chew: Whole Meat; Fluid Consistency: Thin (IDDSI 0)       Assessment/Plan     Active Hospital Problems    Diagnosis  POA    **Syncope [R55]  Yes    Epilepsy [G40.909]  Unknown    Enteropathogenic Escherichia coli infection [A04.0]  Unknown    Acute posthemorrhagic anemia [D62]  Unknown    Nausea & vomiting [R11.2]  Yes    Status post right knee replacement [Z96.651]  Not Applicable    Gastrointestinal hemorrhage [K92.2]  Unknown    Essential hypertension [I10]  Yes      Resolved Hospital Problems   No resolved problems to display.     84 y.o. female recent right knee replacement presented with syncope, hypotension and hypovolemic shock    ABLA, GI Bleed, H. 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 Protonix BID x 8 weeks, carafate bid x 1 month.   for h. pylori amox/vlarithromycin 14 days as outpatient  F/U GI for EUS/ERCP    possible seizure on EEG  Keppra per neurology  MRI refused d/t claustrophobia    Recent right knee replacement  elevated D-dimer from surgery (no clot on duplex or CTA)  Repeat duplex yesterday no clot  Change to scheduled Tylenol    Hypothyroidism  levothyroxine    DVT prophylaxis  SCDs    Discharge  TBD but likely will need SNF  Expected  Discharge Date: 9/30/2024; Expected Discharge Time:     Discussed with patient, family, and nursing staff    August Matthew MD  [facility]  09/28/24

## 2024-09-28 NOTE — PROGRESS NOTES
"  PROGRESS NOTE  Patient Name: Bailee Garza  Age/Sex: 84 y.o. female  : 1940  MRN: [id number]    Date of Admission: 2024  Date of Encounter Visit: 24   LOS: 4 days   Patient Care Team:  Eddie Araya MD as PCP - General  Eddie Araya MD as PCP - Family Medicine  Wilfred Saavedra MD as Consulting Physician (Cardiology)  Anupam Ruiz MD as Consulting Physician (Orthopedic Surgery)  Avery Ruiz MD as Consulting Physician (Gastroenterology)  Mac Main MD PhD as Consulting Physician (Hematology and Oncology)    Chief Complaint: Came in with shock    Hospital course: Hemoglobin has been stable, patient is on room air, afebrile, no complaints        REVIEW OF SYSTEMS:   CONSTITUTIONAL: no fever or chills  CARDIOVASCULAR: No chest pain, chest pressure or chest discomfort. No palpitations or edema.   RESPIRATORY: No shortness of breath, cough or sputum.   GASTROINTESTINAL: No anorexia, nausea, vomiting or diarrhea. No abdominal pain or blood.   HEMATOLOGIC: No bleeding or bruising.     Ventilator/Non-Invasive Ventilation Settings (From admission, onward)      None              Vital Signs  Temp:  [97.6 °F (36.4 °C)-99 °F (37.2 °C)] 97.9 °F (36.6 °C)  Heart Rate:  [75-85] 81  Resp:  [18] 18  BP: (113-144)/(55-78) 113/55  SpO2:  [94 %-97 %] 94 %  on    Device (Oxygen Therapy): room air    Intake/Output Summary (Last 24 hours) at 2024 1326  Last data filed at 2024 1313  Gross per 24 hour   Intake --   Output 1800 ml   Net -1800 ml     Flowsheet Rows      Flowsheet Row First Filed Value   Admission Height 157.5 cm (62\") Documented at 2024 0510   Admission Weight 54.5 kg (120 lb 2.4 oz) Documented at 2024 1101          Body mass index is 20.44 kg/m².      24  1101 24  1559 24  1600   Weight: 54.5 kg (120 lb 2.4 oz) 54.4 kg (120 lb) 50.7 kg (111 lb 12.4 oz)       Physical Exam:  GEN:  No acute distress, alert, cooperative, well developed "   LUNGS: Normal chest on inspection, CTAB, no wheezes. No rhonchi. No crackles. Respirations regular, even and unlabored.   EXT:  Moves all extremities well. No cyanosis. No redness. No edema.   Skin: Dry, intact, no bleeding    Results Review:    Results From Last 14 Days   Lab Units 09/24/24  2224 09/24/24  1700 09/24/24  1305 09/24/24  0950 09/24/24  0522 09/23/24  0616 09/23/24  0533   D DIMER QUANT MCGFEU/mL  --   --   --   --   --   --  2.75*   FERRITIN ng/mL  --   --   --   --  103.00  --   --    LACTATE mmol/L 1.0 2.2* 2.1*   < >  --    < >  --     < > = values in this interval not displayed.     Results from last 7 days   Lab Units 09/27/24  0332 09/26/24  2322 09/26/24  1420 09/26/24  0444 09/25/24  0428 09/24/24  1451 09/24/24  0522 09/23/24  0533   SODIUM mmol/L 138 137  --  146* 148* 143 145 137   POTASSIUM mmol/L 4.0 4.2 4.1 3.4* 3.9 4.6 3.5 5.1   CHLORIDE mmol/L 110* 109*  --  117* 120* 117* 119* 102   CO2 mmol/L 16.0* 17.5*  --  18.0* 19.1* 15.4* 16.0* 24.0   BUN mg/dL 17 18  --  25* 48* 66* 61* 49*   CREATININE mg/dL 0.88 1.03*  --  0.82 0.99 1.10* 0.79 1.16*   CALCIUM mg/dL 8.1* 8.1*  --  7.5* 7.7* 7.8* 6.7* 9.3   AST (SGOT) U/L  --   --   --  13 17 24 16 34*   ALT (SGPT) U/L  --   --   --  13 18 22 19 41*   ANION GAP mmol/L 12.0 10.5  --  11.0 8.9 10.6 10.0 11.0   ALBUMIN g/dL  --   --   --  2.7* 2.7* 2.6* 2.4* 3.7     Results from last 7 days   Lab Units 09/23/24  0735 09/23/24  0533   CK TOTAL U/L 41  --    HSTROP T ng/L 13 20*   D DIMER QUANT MCGFEU/mL  --  2.75*     Results from last 7 days   Lab Units 09/24/24  0522   TSH uIU/mL 0.825     Results from last 7 days   Lab Units 09/23/24  0735   PROBNP pg/mL 158.0     Results from last 7 days   Lab Units 09/28/24  0727 09/27/24  0332 09/26/24  2322 09/26/24  1420 09/26/24  0444 09/25/24  2342 09/25/24  1713 09/25/24  0428 09/24/24  1451 09/24/24  1130 09/24/24  0523 09/23/24  0533   WBC 10*3/mm3 10.37 8.81  --   --  9.93  --   --  9.97  --  9.63  "7.42 13.00*   HEMOGLOBIN g/dL 9.4* 9.3*  9.3* 8.9* 10.1* 8.9*  8.9* 8.6* 6.5* 7.0*   < > 3.8* 4.2* 9.5*   HEMATOCRIT % 28.7* 28.9*  28.9* 26.9* 30.9* 27.1*  27.1* 26.7* 19.5* 21.4*   < > 12.4* 13.5* 29.2*   PLATELETS 10*3/mm3 309 252  --   --  185  --   --  187  --  225 212 305   MCV fL 92.0 95.7  --   --  93.8  --   --  94.7  --  101.6* 101.5* 98.0*   NEUTROPHIL % % 76.2*  --   --   --   --   --   --   --   --  80.6* 81.5* 88.4*   LYMPHOCYTE % % 9.1*  --   --   --   --   --   --   --   --  10.9* 9.7* 5.6*   MONOCYTES % % 8.3  --   --   --   --   --   --   --   --  6.3 7.7 4.9*   EOSINOPHIL % % 1.3  --   --   --   --   --   --   --   --  0.1* 0.0* 0.2*   BASOPHIL % % 0.3  --   --   --   --   --   --   --   --  0.3 0.3 0.4   IMM GRAN % % 4.8*  --   --   --   --   --   --   --   --  1.8* 0.8* 0.5    < > = values in this interval not displayed.     Results from last 7 days   Lab Units 09/23/24  0533   INR  0.96   APTT seconds 27.6     Results from last 7 days   Lab Units 09/25/24  0428 09/24/24  0522   MAGNESIUM mg/dL 2.0 1.9           Invalid input(s): \"LDLCALC\"  Results from last 7 days   Lab Units 09/24/24  1018   PH, ARTERIAL pH units 7.419   PCO2, ARTERIAL mm Hg 29.5*   PO2 ART mm Hg 104.2*   HCO3 ART mmol/L 19.1*     Results from last 7 days   Lab Units 09/24/24  0523   HEMOGLOBIN A1C % 5.20     No results found for: \"POCGLU\"  Results from last 7 days   Lab Units 09/25/24  0428 09/24/24  2224 09/24/24  1700 09/24/24  1305 09/24/24  0950 09/24/24  0522 09/23/24  0616 09/23/24  0533   PROCALCITONIN ng/mL 6.24*  --   --   --   --  10.20*  --  21.50*   LACTATE mmol/L  --  1.0 2.2* 2.1* 2.8*  --  2.0  --      Results from last 7 days   Lab Units 09/23/24  1406 09/23/24  1013   BLOODCX  No growth at 4 days No growth at 5 days     Results from last 7 days   Lab Units 09/23/24  0655   NITRITE UA  Negative   WBC UA /HPF 0-2   BACTERIA UA /HPF None Seen   SQUAM EPITHEL UA /HPF 0-2     Results from last 7 days   Lab " Units 09/24/24  0837 09/23/24  0923   COVID19   --  Not Detected   ADENOVIRUS DETECTION BY PCR   --  Not Detected   ADENOVIRUS  Not Detected  --    CORONAVIRUS 229E   --  Not Detected   CORONAVIRUS HKU1   --  Not Detected   CORONAVIRUS NL63   --  Not Detected   CORONAVIRUS OC43   --  Not Detected   HUMAN METAPNEUMOVIRUS   --  Not Detected   HUMAN RHINOVIRUS/ENTEROVIRUS   --  Not Detected   INFLUENZA B PCR   --  Not Detected   PARAINFLUENZA 1   --  Not Detected   PARAINFLUENZA VIRUS 2   --  Not Detected   PARAINFLUENZA VIRUS 3   --  Not Detected   PARAINFLUENZA VIRUS 4   --  Not Detected   BORDETELLA PERTUSSIS PCR   --  Not Detected   BORDETELLA PARAPERTUSSIS PCR   --  Not Detected   CHLAMYDOPHILA PNEUMONIAE PCR   --  Not Detected   MYCOPLAMA PNEUMO PCR   --  Not Detected   RSV, PCR   --  Not Detected               Imaging:   Imaging Results (All)               I reviewed the patient's new clinical results.  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  Pancreatic mass  Acute GI bleed: Duodenal ulcers noted on EGD  Syncope  Sepsis  Pulmonary hypertension    PLAN:  Patient is hemodynamically stable, in no respiratory distress  Hemoglobin has been stable  Denies any dyspnea and is on room air  Internal medicine team is managing the none pulmonary issues, GI still following  We will follow up as needed, discussed with the patient and with the son and with the nursing staff      Labs/Notes/films were independently reviewed and  pertinent results are summarized above  The copied texts in this note were reviewed and they are accurate as of 09/28/24    Disposition: Per primary team    Yuli Valenzuela MD  09/28/24  13:26 EDT        Dictated utilizing Dragon dictation

## 2024-09-28 NOTE — PLAN OF CARE
Goal Outcome Evaluation:      Pt alert and oriented. VSS. On RA. C/o pain x1. Pt had 4 beats run Vtach. Pt asymptomatic. Provider notified and no new order. Plan of care ongoing.

## 2024-09-28 NOTE — PLAN OF CARE
Goal Outcome Evaluation:  Plan of Care Reviewed With: patient, family           Outcome Evaluation: Placement pending.  Possibly DC on Monday for Rehab placement.  Tylenol PRN changed to scheduled Tylenol q 8 hours. Pain level about 4-5.  Pt doing exercises in bed with her R. leg to help with mobility. Soft brown BM today.  Immodium not given per pts request, pt educated on why Immodium is given. VSS, RA, NSR.

## 2024-09-29 LAB
ANION GAP SERPL CALCULATED.3IONS-SCNC: 9 MMOL/L (ref 5–15)
BUN SERPL-MCNC: 12 MG/DL (ref 8–23)
BUN/CREAT SERPL: 12.9 (ref 7–25)
CALCIUM SPEC-SCNC: 8.6 MG/DL (ref 8.6–10.5)
CHLORIDE SERPL-SCNC: 103 MMOL/L (ref 98–107)
CO2 SERPL-SCNC: 25 MMOL/L (ref 22–29)
CREAT SERPL-MCNC: 0.93 MG/DL (ref 0.57–1)
EGFRCR SERPLBLD CKD-EPI 2021: 61.1 ML/MIN/1.73
GLUCOSE SERPL-MCNC: 96 MG/DL (ref 65–99)
POTASSIUM SERPL-SCNC: 4 MMOL/L (ref 3.5–5.2)
SODIUM SERPL-SCNC: 137 MMOL/L (ref 136–145)

## 2024-09-29 PROCEDURE — 97530 THERAPEUTIC ACTIVITIES: CPT

## 2024-09-29 PROCEDURE — 80048 BASIC METABOLIC PNL TOTAL CA: CPT | Performed by: HOSPITALIST

## 2024-09-29 PROCEDURE — 99232 SBSQ HOSP IP/OBS MODERATE 35: CPT | Performed by: INTERNAL MEDICINE

## 2024-09-29 RX ADMIN — SUCRALFATE 1 G: 1 TABLET ORAL at 05:47

## 2024-09-29 RX ADMIN — PANTOPRAZOLE SODIUM 40 MG: 40 TABLET, DELAYED RELEASE ORAL at 05:48

## 2024-09-29 RX ADMIN — ACETAMINOPHEN 1000 MG: 500 TABLET ORAL at 12:57

## 2024-09-29 RX ADMIN — Medication 10 ML: at 20:48

## 2024-09-29 RX ADMIN — ACETAMINOPHEN 1000 MG: 500 TABLET ORAL at 20:48

## 2024-09-29 RX ADMIN — ACETAMINOPHEN 1000 MG: 500 TABLET ORAL at 05:46

## 2024-09-29 RX ADMIN — PANTOPRAZOLE SODIUM 40 MG: 40 TABLET, DELAYED RELEASE ORAL at 16:30

## 2024-09-29 RX ADMIN — LEVOTHYROXINE SODIUM 75 MCG: 75 TABLET ORAL at 05:48

## 2024-09-29 RX ADMIN — SUCRALFATE 1 G: 1 TABLET ORAL at 10:49

## 2024-09-29 RX ADMIN — SUCRALFATE 1 G: 1 TABLET ORAL at 16:09

## 2024-09-29 RX ADMIN — FOLIC ACID 1 MG: 1 TABLET ORAL at 08:27

## 2024-09-29 NOTE — PROGRESS NOTES
Name: Bailee Garza ADMIT: 2024   : 1940  PCP: Eddie Araya MD    MRN: [id number] LOS: 5 days   AGE/SEX: 84 y.o. female  ROOM: [facility]     Subjective   Subjective   Scheduled Tylenol is helping. Denies any new complaints.     Objective   Objective   Vital Signs  Temp:  [97.9 °F (36.6 °C)-98.4 °F (36.9 °C)] 98.4 °F (36.9 °C)  Heart Rate:  [74-86] 86  Resp:  [18] 18  BP: (113-139)/(55-69) 119/69  SpO2:  [94 %-98 %] 98 %  on   ;   Device (Oxygen Therapy): room air  Body mass index is 20.44 kg/m².    Physical Exam  Constitutional:       General: She is not in acute distress.     Appearance: She is not toxic-appearing.   HENT:      Head: Normocephalic and atraumatic.   Cardiovascular:      Rate and Rhythm: Normal rate and regular rhythm.   Pulmonary:      Effort: Pulmonary effort is normal. No respiratory distress.      Breath sounds: Normal breath sounds.   Abdominal:      General: Bowel sounds are normal.      Palpations: Abdomen is soft.      Tenderness: There is no abdominal tenderness. There is no guarding or rebound.   Musculoskeletal:         General: No swelling.   Skin:     General: Skin is warm and dry.   Neurological:      General: No focal deficit present.      Mental Status: She is alert and oriented to person, place, and time.   Psychiatric:         Mood and Affect: Mood normal.         Behavior: Behavior normal.     Results Review  I reviewed the patient's new clinical results.  Results from last 7 days   Lab Units 24  0727 24  0332 24  2322 24  1420 24  0444 24  1713 24  0428   WBC 10*3/mm3 10.37 8.81  --   --  9.93  --  9.97   HEMOGLOBIN g/dL 9.4* 9.3*  9.3* 8.9* 10.1* 8.9*  8.9*   < > 7.0*   PLATELETS 10*3/mm3 309 252  --   --  185  --  187    < > = values in this interval not displayed.     Results from last 7 days   Lab Units 24  0332 24  2322 24  1420 24  0444 24  0428   SODIUM mmol/L 138 137  --  146* 148*  "  POTASSIUM mmol/L 4.0 4.2 4.1 3.4* 3.9   CHLORIDE mmol/L 110* 109*  --  117* 120*   CO2 mmol/L 16.0* 17.5*  --  18.0* 19.1*   BUN mg/dL 17 18  --  25* 48*   CREATININE mg/dL 0.88 1.03*  --  0.82 0.99   GLUCOSE mg/dL 82 87  --  69 101*     Lab Results   Component Value Date    ANIONGAP 12.0 09/27/2024     Estimated Creatinine Clearance: 38.8 mL/min (by C-G formula based on SCr of 0.88 mg/dL).   Lab Results   Component Value Date    EGFR 65.3 09/27/2024     Results from last 7 days   Lab Units 09/26/24  0444 09/25/24  0428 09/24/24  1451 09/24/24  0522   ALBUMIN g/dL 2.7* 2.7* 2.6* 2.4*   BILIRUBIN mg/dL 0.3 0.2 0.3 0.2   ALK PHOS U/L 74 77 90 84   AST (SGOT) U/L 13 17 24 16   ALT (SGPT) U/L 13 18 22 19     Results from last 7 days   Lab Units 09/27/24  0332 09/26/24  2322 09/26/24  0444 09/25/24  0428 09/24/24  1451 09/24/24  0522   CALCIUM mg/dL 8.1* 8.1* 7.5* 7.7* 7.8* 6.7*   ALBUMIN g/dL  --   --  2.7* 2.7* 2.6* 2.4*   MAGNESIUM mg/dL  --   --   --  2.0  --  1.9   PHOSPHORUS mg/dL  --   --  2.2* 1.9* 2.6 2.0*     Results from last 7 days   Lab Units 09/25/24  0428 09/24/24  2224 09/24/24  1700 09/24/24  1305 09/24/24  0950 09/24/24  0522 09/23/24  0616 09/23/24  0533   PROCALCITONIN ng/mL 6.24*  --   --   --   --  10.20*  --  21.50*   LACTATE mmol/L  --  1.0 2.2* 2.1* 2.8*  --    < >  --     < > = values in this interval not displayed.     No results found for: \"HGBA1C\", \"POCGLU\"      No radiology results for the last day    Scheduled Meds  acetaminophen, 1,000 mg, Oral, Q8H  folic acid, 1 mg, Oral, Daily  levothyroxine, 75 mcg, Oral, Daily  LORazepam, 0.5 mg, Intravenous, Once in imaging  ondansetron, 4 mg, Intravenous, Once  pantoprazole, 40 mg, Oral, BID AC  potassium & sodium phosphates, 2 packet, Oral, Once  sodium chloride, 10 mL, Intravenous, Q12H  sucralfate, 1 g, Oral, TID AC    Continuous Infusions   PRN Meds    senna-docusate sodium **AND** polyethylene glycol **AND** bisacodyl **AND** bisacodyl    " Calcium Replacement - Follow Nurse / BPA Driven Protocol    loperamide    LORazepam    Magnesium Standard Dose Replacement - Follow Nurse / BPA Driven Protocol    nitroglycerin    ondansetron ODT **OR** ondansetron    Phosphorus Replacement - Follow Nurse / BPA Driven Protocol    Potassium Replacement - Follow Nurse / BPA Driven Protocol    sodium chloride    sodium chloride     Diet  Diet: Gastrointestinal; Fiber-Restricted, Low Irritant; Texture: Soft to Chew (NDD 3); Soft to Chew: Whole Meat; Fluid Consistency: Thin (IDDSI 0)       Assessment/Plan     Active Hospital Problems    Diagnosis  POA    **Syncope [R55]  Yes    Epilepsy [G40.909]  Unknown    Enteropathogenic Escherichia coli infection [A04.0]  Unknown    Acute posthemorrhagic anemia [D62]  Unknown    Nausea & vomiting [R11.2]  Yes    Status post right knee replacement [Z96.651]  Not Applicable    Gastrointestinal hemorrhage [K92.2]  Unknown    Essential hypertension [I10]  Yes      Resolved Hospital Problems   No resolved problems to display.     84 y.o. female recent right knee replacement presented with syncope, hypotension and hypovolemic shock    ABLA, GI Bleed, H. 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 Protonix BID x 8 weeks, carafate bid x 1 month.   for h. pylori amox/vlarithromycin 14 days as outpatient  F/U GI for EUS/ERCP    possible seizure on EEG  Keppra per neurology  MRI refused d/t claustrophobia    Recent right knee replacement  elevated D-dimer from surgery (no clot on duplex (x2) or CTA)  Pain controlled with scheduled Tylenol    Hypothyroidism  levothyroxine    DVT prophylaxis  SCDs    Discharge  TBD but likely will need SNF  Expected Discharge Date: 9/30/2024; Expected Discharge Time:     Discussed with patient and nursing staff    August Matthew  MD [facility]  09/29/24

## 2024-09-29 NOTE — PROGRESS NOTES
[facility] ([facility]  Inpatient Progress Note    Reason for Follow Up: GI bleed    Subjective     Interval History:   No further bleeding    Current Facility-Administered Medications:     acetaminophen (TYLENOL) tablet 1,000 mg, 1,000 mg, Oral, Q8H, August Matthew MD, 1,000 mg at 09/29/24 0546    sennosides-docusate (PERICOLACE) 8.6-50 MG per tablet 2 tablet, 2 tablet, Oral, BID PRN **AND** polyethylene glycol (MIRALAX) packet 17 g, 17 g, Oral, Daily PRN **AND** bisacodyl (DULCOLAX) EC tablet 5 mg, 5 mg, Oral, Daily PRN **AND** bisacodyl (DULCOLAX) suppository 10 mg, 10 mg, Rectal, Daily PRN, Jose Manuel Vazquez MD    Calcium Replacement - Follow Nurse / BPA Driven Protocol, , Does not apply, PRN, Jose Manuel Vazquez MD    folic acid (FOLVITE) tablet 1 mg, 1 mg, Oral, Daily, Jose Manuel Vazquez MD, 1 mg at 09/29/24 0827    levothyroxine (SYNTHROID, LEVOTHROID) tablet 75 mcg, 75 mcg, Oral, Daily, Jose Manuel Vazquez MD, 75 mcg at 09/29/24 0548    loperamide (IMODIUM) capsule 2 mg, 2 mg, Oral, 4x Daily PRN, Deven Kang MD, 2 mg at 09/27/24 0859    LORazepam (ATIVAN) injection 0.5 mg, 0.5 mg, Intravenous, Once in imaging, Jose Manuel Vazquez MD    Magnesium Standard Dose Replacement - Follow Nurse / BPA Driven Protocol, , Does not apply, PRN, Jose Manuel Vazquez MD    nitroglycerin (NITROSTAT) SL tablet 0.4 mg, 0.4 mg, Sublingual, Q5 Min PRN, Jose Manuel Vazquez MD    ondansetron ODT (ZOFRAN-ODT) disintegrating tablet 4 mg, 4 mg, Oral, Q6H PRN **OR** ondansetron (ZOFRAN) injection 4 mg, 4 mg, Intravenous, Q6H PRN, Jose Manuel Vazquez MD, 4 mg at 09/23/24 0947    ondansetron (ZOFRAN) injection 4 mg, 4 mg, Intravenous, Once, Shaquille Whitney MD    pantoprazole (PROTONIX) EC tablet 40 mg, 40 mg, Oral, BID AC, Karli Viveros MD, 40 mg at 09/29/24 0548    Phosphorus Replacement - Follow Nurse / BPA Driven Protocol, , Does not apply, PRN, Jose Manuel Vazquez MD    potassium & sodium  phosphates (PHOS-NAK) 280-160-250 MG packet 2 packet, 2 packet, Oral, Once, Jose Manuel Vazquez MD    Potassium Replacement - Follow Nurse / BPA Driven Protocol, , Does not apply, PRN, Jose Manuel Vazquez MD    sodium chloride 0.9 % flush 10 mL, 10 mL, Intravenous, Q12H, Jose Manuel Vazquez MD, 10 mL at 09/28/24 2116    sodium chloride 0.9 % flush 10 mL, 10 mL, Intravenous, PRN, Jose Manuel Vazquez MD    sodium chloride 0.9 % infusion 40 mL, 40 mL, Intravenous, PRN, Jose Manuel Vazquez MD    sucralfate (CARAFATE) tablet 1 g, 1 g, Oral, TID AC, Karli Viveros MD, 1 g at 09/29/24 1049    Facility-Administered Medications Ordered in Other Encounters:     Chlorhexidine Gluconate Cloth 2 % pads 1 each, 1 each, Apply externally, Take As Directed, Anupam Ruiz MD  Review of Systems:    Is weakness and fatigue all other systems reviewed and negative    Objective     Vital Signs  Temp:  [97.9 °F (36.6 °C)-98.4 °F (36.9 °C)] 98.4 °F (36.9 °C)  Heart Rate:  [74-86] 86  Resp:  [18] 18  BP: (113-139)/(55-69) 119/69  Body mass index is 20.44 kg/m².    Intake/Output Summary (Last 24 hours) at 9/29/2024 1051  Last data filed at 9/29/2024 0734  Gross per 24 hour   Intake --   Output 2450 ml   Net -2450 ml     I/O this shift:  In: -   Out: 200 [Urine:200]     Physical Exam:   General: patient awake, alert and cooperative   Eyes: Normal lids and lashes, no scleral icterus   Neck: supple, normal ROM   Skin: warm and dry, not jaundiced   Cardiovascular: regular rhythm and rate, no murmurs auscultated   Pulm: clear to auscultation bilaterally, regular and unlabored   Abdomen: soft, nontender, nondistended; normal bowel sounds   Extremities: no rash or edema   Psychiatric: Normal mood and behavior; memory intact     Results Review:     I reviewed the patient's new clinical results.    Results from last 7 days   Lab Units 09/28/24  0727 09/27/24  0332 09/26/24  2322 09/26/24  1420 09/26/24  0444   WBC 10*3/mm3 10.37 8.81  --   --   9.93   HEMOGLOBIN g/dL 9.4* 9.3*  9.3* 8.9*   < > 8.9*  8.9*   HEMATOCRIT % 28.7* 28.9*  28.9* 26.9*   < > 27.1*  27.1*   PLATELETS 10*3/mm3 309 252  --   --  185    < > = values in this interval not displayed.     Results from last 7 days   Lab Units 09/27/24  0332 09/26/24  2322 09/26/24  1420 09/26/24  0444 09/25/24  0428 09/24/24  1451   SODIUM mmol/L 138 137  --  146* 148* 143   POTASSIUM mmol/L 4.0 4.2 4.1 3.4* 3.9 4.6   CHLORIDE mmol/L 110* 109*  --  117* 120* 117*   CO2 mmol/L 16.0* 17.5*  --  18.0* 19.1* 15.4*   BUN mg/dL 17 18  --  25* 48* 66*   CREATININE mg/dL 0.88 1.03*  --  0.82 0.99 1.10*   CALCIUM mg/dL 8.1* 8.1*  --  7.5* 7.7* 7.8*   BILIRUBIN mg/dL  --   --   --  0.3 0.2 0.3   ALK PHOS U/L  --   --   --  74 77 90   ALT (SGPT) U/L  --   --   --  13 18 22   AST (SGOT) U/L  --   --   --  13 17 24   GLUCOSE mg/dL 82 87  --  69 101* 131*     Results from last 7 days   Lab Units 09/23/24  0533   INR  0.96     Lab Results   Lab Value Date/Time    LIPASE 42 09/23/2024 0735    LIPASE 50 12/19/2017 1128       Radiology:  MRI abdomen w wo contrast mrcp   Final Result      MRI Brain With & Without Contrast   Final Result   The study is degraded by patient motion. There is no   evidence of acute infarction, hydrocephalus, of mass or of abnormal   enhancement.       This report was finalized on 9/25/2024 4:43 PM by Dr. Anupam Davis M.D   on Workstation: [id number]          US Gallbladder   Final Result   Stable dilation of the common bile duct. Pancreas largely obscured by   overlying bowel gas. Mild dilation of the visualized pancreatic duct   measuring about 3 mm, similar to comparison CT               This report was finalized on 9/24/2024 6:51 PM by Dr. Julio César Perry M.D on Workstation: [id number]          CT Angiogram Chest Pulmonary Embolism   Final Result       1. The study is negative for pulmonary embolism. No acute intrathoracic   abnormality.   2. Intrathoracic and extrahepatic biliary  dilatation with abrupt   tapering at the ampulla where there is questionable hypoenhancing   masslike soft tissue at the ampulla and pancreatic head. Could consider   correlating with ERCP or MRCP if clinically indicated.   3. Moderate fluid and air distended stomach and high attenuation fluid   distention of the proximal duodenum with duodenal wall thickening,   suspected duodenal diverticula, and mild proximal duodenal wall   thickening. Could correlate with endoscopy if clinically indicated.   4. Distended gallbladder.   5. Decreased hepatic attenuation could reflect hepatic steatosis.   6. Extensive colonic diverticula without CT evidence of diverticulitis.   7. Other chronic findings, as above.       This report was finalized on 9/23/2024 8:13 AM by Lorenzo Bui MD on   Workstation: [id number]          CT Abdomen Pelvis With Contrast   Final Result       1. The study is negative for pulmonary embolism. No acute intrathoracic   abnormality.   2. Intrathoracic and extrahepatic biliary dilatation with abrupt   tapering at the ampulla where there is questionable hypoenhancing   masslike soft tissue at the ampulla and pancreatic head. Could consider   correlating with ERCP or MRCP if clinically indicated.   3. Moderate fluid and air distended stomach and high attenuation fluid   distention of the proximal duodenum with duodenal wall thickening,   suspected duodenal diverticula, and mild proximal duodenal wall   thickening. Could correlate with endoscopy if clinically indicated.   4. Distended gallbladder.   5. Decreased hepatic attenuation could reflect hepatic steatosis.   6. Extensive colonic diverticula without CT evidence of diverticulitis.   7. Other chronic findings, as above.       This report was finalized on 9/23/2024 8:13 AM by Lorenzo Bui MD on   Workstation: [id number]          XR Chest 1 View   Final Result   1. Mild interstitial prominence of the lungs somewhat exaggerated by   underinflation  but overall appear unchanged from the 12/29/2017 study   and therefore likely chronic in nature.   2. No definite acute infiltrates are thought to be present.       This report was finalized on 9/23/2024 7:33 AM by Dr. Arun Romero M.D on Workstation: [id number]          [facility] FEES - Fiberoptic Endo Eval Swallow    (Results Pending)       Assessment & Plan     Active Hospital Problems    Diagnosis     **Syncope     Epilepsy     Enteropathogenic Escherichia coli infection     Acute posthemorrhagic anemia     Nausea & vomiting     Status post right knee replacement     Gastrointestinal hemorrhage     Essential hypertension        Assessment:  UGIB - multiple duodenal ukcers with stigmata noted on egd 9/24 - treated endoscopically  ABLA  Abnormal biliary tract imaging  Distended GB        Plan:  H/h now more stable now for 5 days  Pantoprazole twice daily 8-week, carafate bid x 1 month  h pylori stool antigen positive - start tx with amoxicillin/clarithromycin x 14 days as an outpatient, at discharge will be fine will need an H. pylori breath test in 8 weeks off of PPI and all antibiotics  Will need eus/ercp for further evaluation of biliary findings - discussed with biliary service.  Will be done as outpatient once she recovers from acute issues.  LFTs and  Ca 19-9 nml. She  Will reach out to Dr. Farris's office to get this scheduled  Advance diet  No further GI recommendations we will see as needed  I discussed the patients findings and my recommendations with patient and nursing staff.    Misbah Borden MD

## 2024-09-29 NOTE — PLAN OF CARE
Goal Outcome Evaluation:      Pt resting in bed at this time. VSS. On RA. C/o pain. Plan of care ongoing.

## 2024-09-29 NOTE — PLAN OF CARE
Goal Outcome Evaluation:  Plan of Care Reviewed With: patient           Outcome Evaluation: Pt participated with PT this morning. Pt eager to mobilize and reports feeling much better. She demonstrates significant R knee ROM deficits particularly knee flexion. Emphasized importance of knee flexion exercises and educated on use of gait belt as a way to assist her when she completes exercises on her own. Pt completed STS with cga and ambulated 25' cga to min A with RW. Many cues for gait mechanics required. Plans rehab soon.      Anticipated Discharge Disposition (PT): skilled nursing facility

## 2024-09-29 NOTE — THERAPY TREATMENT NOTE
Patient Name: Bailee Garza  : 1940    MRN: [id number]                              Today's Date: 2024       Admit Date: 2024    Visit Dx:     ICD-10-CM ICD-9-CM   1. Syncope and collapse  R55 780.2   2. Volume depletion, gastrointestinal loss  E86.9 276.50   3. Postoperative anemia  D64.9 285.9   4. Status post total right knee replacement  Z96.651 V43.65   5. Chronic kidney disease, unspecified CKD stage  N18.9 585.9   6. Nausea and vomiting, unspecified vomiting type  R11.2 787.01   7. Gastrointestinal hemorrhage, unspecified gastrointestinal hemorrhage type  K92.2 578.9     Patient Active Problem List   Diagnosis    Chronic midline low back pain without sciatica    Essential hypertension    Hearing loss    Hypothyroidism, acquired    IBS (irritable bowel syndrome)    Chronic nonseasonal allergic rhinitis due to pollen    Stage 3 chronic kidney disease    Arthralgia of right knee    DDD (degenerative disc disease), lumbosacral    Scoliosis due to degenerative disease of spine in adult patient    Status post total replacement of left hip    Status post total hip replacement, left    Vitamin D deficiency    Anemia, normocytic normochromic    S/P hip replacement, right    OA (osteoarthritis) of knee    Syncope    Nausea & vomiting    Status post right knee replacement    Gastrointestinal hemorrhage    Epilepsy    Enteropathogenic Escherichia coli infection    Acute posthemorrhagic anemia     Past Medical History:   Diagnosis Date    Abnormal CXR 2017    Adverse reaction to narcotic drug     SEVERE HALLUCINATIONS POST OP LEFT HIP PLACEMENT    Allergies     Arthritis     Arthritis of foot 2020    Arthropathy of pelvic region and thigh 2012    unspecified    Back pain 2007    Chronic back pain 2009    Chronic cough 2017    CKD (chronic kidney disease)     Closed nondisplaced fracture of lateral malleolus of fibula with delayed healing 2020    Closed  nondisplaced fracture of medial cuneiform of left foot 2018    Constipation 2015    unspecified    COVID-19 vaccination refused     COVID-19 virus detected 10/17/2022    Diverticulosis     Dyspepsia 2008    Essential hypertension 2007    Exertional shortness of breath 2017    Fall 2018    Family history of abdominal aortic aneurysm (AAA) 2019    [place] aaa screen limited (04/10/2019 10:32)     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), 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    Hearing loss 2008    History of bone density study 2015    History of pneumonia     2017    History of skin cancer     Hypothyroidism, acquired 2007    Insomnia 2015    unspecified    Intervertebral disc disorder with myelopathy, cervical region 2010    Joint capsule tear 2012    unspecified    OA (osteoarthritis) of hip 2018    Other synovitis and tenosynovitis, right ankle and foot 2020    Peroneal tendonitis, right 2020    Rhinitis, allergic 2007    Right knee pain     Scoliosis     Screening breast examination 2007    Stress 2015    other acute reactions to stress    Stress incontinence     Trochanteric bursitis, left hip 2019    Urticaria 2015     Past Surgical History:   Procedure Laterality Date    BREAST EXCISIONAL BIOPSY Right     50+ years ago    BRONCHOSCOPY N/A 2017    Procedure: BRONCHOSCOPY;  Surgeon: Mario Alanis MD;  Location: [facility] ENDOSCOPY;  Service:     CATARACT EXTRACTION, BILATERAL      COLONOSCOPY      ENDOSCOPY N/A 2024    Procedure: ESOPHAGOGASTRODUODENOSCOPY AT BEDSIDE with gold probe and epi injection;  Surgeon: Karli Viveros MD;  Location: [facility] ENDOSCOPY;  Service: Gastroenterology;  Laterality: N/A;  Pre: GI Bleed  Post: multiple duodenal ulcers, hiatal hernia    HYSTERECTOMY      TOTAL HIP ARTHROPLASTY Left 2018    Procedure: LEFT TOTAL  HIP ARTHROPLASTY;  Surgeon: Justen Mann MD;  Location: [facility] MAIN OR;  Service: Orthopedics    TOTAL HIP ARTHROPLASTY Right 12/13/2022    Procedure: Posterior RIGHT TOTAL HIP ARTHROPLASTY MARCELINO NAVIGATION;  Surgeon: Anupam Ruiz MD;  Location: [facility] OR [facility] [place] [facility];  Service: Orthopedics;  Laterality: Right;    TOTAL KNEE ARTHROPLASTY Right 9/18/2024    Procedure: TOTAL KNEE ARTHROPLASTY;  Surgeon: Jesus Thayer MD;  Location: [facility] OR [facility] [place];  Service: Orthopedics;  Laterality: Right;      General Information       Row Name 09/29/24 1251          Physical Therapy Time and Intention    Document Type therapy note (daily note)  -CW     Mode of Treatment physical therapy;individual therapy  -CW       Row Name 09/29/24 1251          General Information    Patient Profile Reviewed yes  -CW     Existing Precautions/Restrictions fall  -CW       Row Name 09/29/24 1251          Cognition    Orientation Status (Cognition) oriented x 3  -CW       Row Name 09/29/24 1251          Safety Issues, Functional Mobility    Impairments Affecting Function (Mobility) endurance/activity tolerance;pain;strength;range of motion (ROM)  -CW               User Key  (r) = Recorded By, (t) = Taken By, (c) = Cosigned By      Initials Name Provider Type    CW Gayatri Moreira, SARAH Physical Therapist                   Mobility       Row Name 09/29/24 1252          Bed Mobility    Bed Mobility supine-sit;sit-supine  -CW     Supine-Sit [place] (Bed Mobility) standby assist  -CW     Sit-Supine [place] (Bed Mobility) standby assist  -CW       Row Name 09/29/24 1252          Sit-Stand Transfer    Sit-Stand [place] (Transfers) contact guard;verbal cues  -CW     Assistive Device (Sit-Stand Transfers) walker, front-wheeled  -CW       Row Name 09/29/24 1252          Gait/Stairs (Locomotion)    [place] Level (Gait) verbal cues;contact guard  -CW     Assistive Device (Gait) walker, front-wheeled  -CW     Distance in Feet (Gait) 25  -CW      Deviations/Abnormal Patterns (Gait) antalgic;weight shifting decreased;jah decreased;gait speed decreased;stride length decreased  -CW     Bilateral Gait Deviations heel strike decreased  -CW     Comment, (Gait/Stairs) Many cues for gait mechanics and for reciprocal gait  -CW               User Key  (r) = Recorded By, (t) = Taken By, (c) = Cosigned By      Initials Name Provider Type    Gayatri Hardy PT Physical Therapist                   Obj/Interventions       Row Name 09/29/24 1252          Range of Motion Comprehensive    Comment, General Range of Motion R knee limited approx 5-50  -CW       Row Name 09/29/24 1252          Motor Skills    Therapeutic Exercise other (see comments)  TKA exercise protocol with emphasis on knee flexion using gait belt as assist  -CW       Row Name 09/29/24 1252          Balance    Balance Assessment standing static balance;standing dynamic balance  -CW     Static Standing Balance contact guard  -CW     Dynamic Standing Balance contact guard;minimal assist  -CW     Position/Device Used, Standing Balance supported;walker, front-wheeled  -CW               User Key  (r) = Recorded By, (t) = Taken By, (c) = Cosigned By      Initials Name Provider Type    CW Gayatri Moreira PT Physical Therapist                   Goals/Plan    No documentation.                  Clinical Impression       Row Name 09/29/24 1257          Pain    Pretreatment Pain Rating 3/10  -CW     Posttreatment Pain Rating 4/10  -CW     Pain Location - Side/Orientation Right  -CW     Pain Location incisional  -CW     Pain Location - knee  -CW     Pain Intervention(s) Repositioned;Ambulation/increased activity;Rest  -CW       Row Name 09/29/24 1257          Plan of Care Review    Plan of Care Reviewed With patient  -CW     Outcome Evaluation Pt participated with PT this morning. Pt eager to mobilize and reports feeling much better. She demonstrates significant R knee ROM deficits particularly knee  flexion. Emphasized importance of knee flexion exercises and educated on use of gait belt as a way to assist her when she completes exercises on her own. Pt completed STS with cga and ambulated 25' cga to min A with RW. Many cues for gait mechanics required. Plans rehab soon.  -CW       Row Name 09/29/24 1257          Vital Signs    O2 Delivery Pre Treatment room air  -CW     O2 Delivery Intra Treatment room air  -CW     O2 Delivery Post Treatment room air  -CW       Row Name 09/29/24 1257          Positioning and Restraints    Pre-Treatment Position in bed  -CW     Post Treatment Position bed  declined chair despite encouragement from PT and RN  -CW     In Bed notified nsg;call light within reach;encouraged to call for assist;exit alarm on;fowlers;with nsg  -CW               User Key  (r) = Recorded By, (t) = Taken By, (c) = Cosigned By      Initials Name Provider Type    CW Gayatri Moreira, SARAH Physical Therapist                   Outcome Measures       Row Name 09/29/24 1300 09/29/24 0817       How much help from another person do you currently need...    Turning from your back to your side while in flat bed without using bedrails? 4  -CW 3  -RW    Moving from lying on back to sitting on the side of a flat bed without bedrails? 3  -CW 3  -RW    Moving to and from a bed to a chair (including a wheelchair)? 3  -CW 2  -RW    Standing up from a chair using your arms (e.g., wheelchair, bedside chair)? 3  -CW 3  -RW    Climbing 3-5 steps with a railing? 2  -CW 2  -RW    To walk in hospital room? 3  -CW 2  -RW    AM-PAC 6 Clicks Score (PT) 18  -CW 15  -RW    Highest Level of Mobility Goal 6 --> Walk 10 steps or more  -CW 4 --> Transfer to chair/commode  -RW      Row Name 09/29/24 1300          Functional Assessment    Outcome Measure Options AM-PAC 6 Clicks Basic Mobility (PT)  -CW               User Key  (r) = Recorded By, (t) = Taken By, (c) = Cosigned By      Initials Name Provider Type    RW Dheeraj Toledo RN  Registered Nurse    Gayatri Hardy, PT Physical Therapist                                 Physical Therapy Education       Title: PT OT SLP Therapies (Done)       Topic: Physical Therapy (Done)       Point: Mobility training (Done)       Learning Progress Summary             Patient Acceptance, E, VU by CW at 9/29/2024 1300    Acceptance, E,TB, VU by SS at 9/29/2024 0347    Acceptance, E,TB, VU by RW at 9/28/2024 1704    Acceptance, E,TB, VU by SS at 9/28/2024 0422    Acceptance, E,TB, VU by RW at 9/27/2024 1528    Acceptance, E, NR by DJ at 9/27/2024 1142    Acceptance, E,TB, NR by ST at 9/26/2024 1339    Acceptance, E,D, VU,NR by MS at 9/23/2024 1526   Family Acceptance, E,TB, VU by RW at 9/28/2024 1704    Acceptance, E, NR by DJ at 9/27/2024 1142                         Point: Home exercise program (Done)       Learning Progress Summary             Patient Acceptance, E, VU by CW at 9/29/2024 1300    Acceptance, E,TB, VU by SS at 9/29/2024 0347    Acceptance, E,TB, VU by RW at 9/28/2024 1704    Acceptance, E,TB, VU by SS at 9/28/2024 0422    Acceptance, E,TB, VU by RW at 9/27/2024 1528    Acceptance, E, NR by DJ at 9/27/2024 1142    Acceptance, E,TB, NR by ST at 9/26/2024 1339    Acceptance, E,D, VU,NR by MS at 9/23/2024 1526   Family Acceptance, E,TB, VU by RW at 9/28/2024 1704    Acceptance, E, NR by DJ at 9/27/2024 1142                         Point: Body mechanics (Done)       Learning Progress Summary             Patient Acceptance, E, VU by CW at 9/29/2024 1300    Acceptance, E,TB, VU by SS at 9/29/2024 0347    Acceptance, E,TB, VU by RW at 9/28/2024 1704    Acceptance, E,TB, VU by SS at 9/28/2024 0422    Acceptance, E,TB, VU by RW at 9/27/2024 1528    Acceptance, E, NR by DJ at 9/27/2024 1142   Family Acceptance, E,TB, VU by RW at 9/28/2024 1704    Acceptance, E, NR by DJ at 9/27/2024 1142                         Point: Precautions (Done)       Learning Progress Summary             Patient  Acceptance, E, VU by CW at 9/29/2024 1300    Acceptance, E,TB, VU by SS at 9/29/2024 0347    Acceptance, E,TB, VU by RW at 9/28/2024 1704    Acceptance, E,TB, VU by SS at 9/28/2024 0422    Acceptance, E,TB, VU by RW at 9/27/2024 1528    Acceptance, E, NR by DJ at 9/27/2024 1142   Family Acceptance, E,TB, VU by RW at 9/28/2024 1704    Acceptance, E, NR by DJ at 9/27/2024 1142                                         User Key       Initials Effective Dates Name Provider Type Discipline    MS 06/16/21 -  Shaquille Donnelly, PT Physical Therapist PT    RW 06/16/21 -  Dheeraj Toledo, RN Registered Nurse Nurse    DJ 10/25/19 -  Jagruti Sheikh, PT Physical Therapist PT    CW 12/13/22 -  Gayatri Moreira, PT Physical Therapist PT     02/03/22 -  Augie Pope, RN Registered Nurse Nurse     09/22/22 -  Suma Willingham, PT Physical Therapist PT                  PT Recommendation and Plan     Plan of Care Reviewed With: patient  Outcome Evaluation: Pt participated with PT this morning. Pt eager to mobilize and reports feeling much better. She demonstrates significant R knee ROM deficits particularly knee flexion. Emphasized importance of knee flexion exercises and educated on use of gait belt as a way to assist her when she completes exercises on her own. Pt completed STS with cga and ambulated 25' cga to min A with RW. Many cues for gait mechanics required. Plans rehab soon.     Time Calculation:         PT Charges       Row Name 09/29/24 1300             Time Calculation    Start Time 1037  -CW      Stop Time 1056  -CW      Time Calculation (min) 19 min  -CW      PT Received On 09/29/24  -CW      PT - Next Appointment 09/30/24  -CW                User Key  (r) = Recorded By, (t) = Taken By, (c) = Cosigned By      Initials Name Provider Type    CW Gayatri Moreira, PT Physical Therapist                  Therapy Charges for Today       Code Description Service Date Service Provider Modifiers Qty    09355050199 HC PT  THERAPEUTIC ACT EA 15 MIN 9/29/2024 Gayatri Moreira, PT GP 1            PT G-Codes  Outcome Measure Options: AM-PAC 6 Clicks Basic Mobility (PT)  AM-PAC 6 Clicks Score (PT): 18  AM-PAC 6 Clicks Score (OT): 8  PT Discharge Summary  Anticipated Discharge Disposition (PT): skilled nursing facility    Gayatri Moreira, SARAH  9/29/2024

## 2024-09-29 NOTE — PLAN OF CARE
Goal Outcome Evaluation:  Plan of Care Reviewed With: patient, family           Outcome Evaluation: Placement to Rehab at [facility] pending.  Pt worked with PT and pt walked to the door and back with a walker assist x1.  VSS, RA, NSR.

## 2024-09-30 LAB
ANION GAP SERPL CALCULATED.3IONS-SCNC: 9.5 MMOL/L (ref 5–15)
BASOPHILS # BLD AUTO: 0.04 10*3/MM3 (ref 0–0.2)
BASOPHILS NFR BLD AUTO: 0.3 % (ref 0–1.5)
BUN SERPL-MCNC: 12 MG/DL (ref 8–23)
BUN/CREAT SERPL: 14 (ref 7–25)
CALCIUM SPEC-SCNC: 9 MG/DL (ref 8.6–10.5)
CHLORIDE SERPL-SCNC: 101 MMOL/L (ref 98–107)
CO2 SERPL-SCNC: 25.5 MMOL/L (ref 22–29)
CREAT SERPL-MCNC: 0.86 MG/DL (ref 0.57–1)
DEPRECATED RDW RBC AUTO: 45.5 FL (ref 37–54)
EGFRCR SERPLBLD CKD-EPI 2021: 67.1 ML/MIN/1.73
EOSINOPHIL # BLD AUTO: 0.09 10*3/MM3 (ref 0–0.4)
EOSINOPHIL NFR BLD AUTO: 0.8 % (ref 0.3–6.2)
ERYTHROCYTE [DISTWIDTH] IN BLOOD BY AUTOMATED COUNT: 13.3 % (ref 12.3–15.4)
GLUCOSE SERPL-MCNC: 90 MG/DL (ref 65–99)
HCT VFR BLD AUTO: 33.3 % (ref 34–46.6)
HGB BLD-MCNC: 10.4 G/DL (ref 12–15.9)
IMM GRANULOCYTES # BLD AUTO: 0.21 10*3/MM3 (ref 0–0.05)
IMM GRANULOCYTES NFR BLD AUTO: 1.8 % (ref 0–0.5)
LYMPHOCYTES # BLD AUTO: 0.9 10*3/MM3 (ref 0.7–3.1)
LYMPHOCYTES NFR BLD AUTO: 7.7 % (ref 19.6–45.3)
MCH RBC QN AUTO: 29.5 PG (ref 26.6–33)
MCHC RBC AUTO-ENTMCNC: 31.2 G/DL (ref 31.5–35.7)
MCV RBC AUTO: 94.3 FL (ref 79–97)
MONOCYTES # BLD AUTO: 0.95 10*3/MM3 (ref 0.1–0.9)
MONOCYTES NFR BLD AUTO: 8.1 % (ref 5–12)
NEUTROPHILS NFR BLD AUTO: 81.3 % (ref 42.7–76)
NEUTROPHILS NFR BLD AUTO: 9.54 10*3/MM3 (ref 1.7–7)
NRBC BLD AUTO-RTO: 0 /100 WBC (ref 0–0.2)
PLATELET # BLD AUTO: 375 10*3/MM3 (ref 140–450)
PMV BLD AUTO: 9.9 FL (ref 6–12)
POTASSIUM SERPL-SCNC: 3.8 MMOL/L (ref 3.5–5.2)
RBC # BLD AUTO: 3.53 10*6/MM3 (ref 3.77–5.28)
SODIUM SERPL-SCNC: 136 MMOL/L (ref 136–145)
WBC NRBC COR # BLD AUTO: 11.73 10*3/MM3 (ref 3.4–10.8)

## 2024-09-30 PROCEDURE — 85025 COMPLETE CBC W/AUTO DIFF WBC: CPT | Performed by: HOSPITALIST

## 2024-09-30 PROCEDURE — 80048 BASIC METABOLIC PNL TOTAL CA: CPT | Performed by: HOSPITALIST

## 2024-09-30 RX ADMIN — PANTOPRAZOLE SODIUM 40 MG: 40 TABLET, DELAYED RELEASE ORAL at 16:20

## 2024-09-30 RX ADMIN — FOLIC ACID 1 MG: 1 TABLET ORAL at 08:17

## 2024-09-30 RX ADMIN — SUCRALFATE 1 G: 1 TABLET ORAL at 16:20

## 2024-09-30 RX ADMIN — Medication 10 ML: at 08:17

## 2024-09-30 RX ADMIN — Medication 10 ML: at 20:55

## 2024-09-30 RX ADMIN — SUCRALFATE 1 G: 1 TABLET ORAL at 11:22

## 2024-09-30 RX ADMIN — PANTOPRAZOLE SODIUM 40 MG: 40 TABLET, DELAYED RELEASE ORAL at 05:34

## 2024-09-30 RX ADMIN — ACETAMINOPHEN 1000 MG: 500 TABLET ORAL at 13:20

## 2024-09-30 RX ADMIN — ACETAMINOPHEN 1000 MG: 500 TABLET ORAL at 20:55

## 2024-09-30 RX ADMIN — ACETAMINOPHEN 1000 MG: 500 TABLET ORAL at 05:34

## 2024-09-30 RX ADMIN — LEVOTHYROXINE SODIUM 75 MCG: 75 TABLET ORAL at 05:34

## 2024-09-30 RX ADMIN — SUCRALFATE 1 G: 1 TABLET ORAL at 05:34

## 2024-09-30 NOTE — PLAN OF CARE
Goal Outcome Evaluation:                                    Patient alert and oriented with c/o pain- scheduled Tylenol administered- Ice on right knee on and off most of the day. Discharge plan for tomorrow per notes, daughter will transport patient to accepting facility tomorrow. No acute distress noted, will continue to monitor.

## 2024-09-30 NOTE — PROGRESS NOTES
Name: Bailee Garza ADMIT: 2024   : 1940  PCP: Eddie Araya MD    MRN: [id number] LOS: 6 days   AGE/SEX: 84 y.o. female  ROOM: [facility]     Subjective   Subjective   Patient is seen at bedside, no new complaints.       Objective   Objective   Vital Signs  Temp:  [97.3 °F (36.3 °C)-98.6 °F (37 °C)] 97.9 °F (36.6 °C)  Heart Rate:  [66-91] 91  Resp:  [16-18] 16  BP: (107-135)/(58-96) 133/58  SpO2:  [96 %-99 %] 99 %  on   ;   Device (Oxygen Therapy): room air  Body mass index is 20.44 kg/m².  Physical Exam          General, awake and alert.  Head and ENT, normocephalic and atraumatic.  Lungs, symmetric expansion, equal air entry bilaterally.  Heart, regular rate and rhythm.  Abdomen, soft and nontender.  Extremities, no clubbing or cyanosis.  Neuro, no focal deficits.  Skin: Warm and no rash.  Psych, normal mood and affect.  Musculoskeletal, joint examination is grossly normal.    Copied text material from yesterday's note has been reviewed for appropriate changes and remains accurate as of 24.    Results Review     I reviewed the patient's new clinical results.  Results from last 7 days   Lab Units 24  0446 24  0727 24  0332 24  2322 24  1420 24   WBC 10*3/mm3 11.73* 10.37 8.81  --   --  9.93   HEMOGLOBIN g/dL 10.4* 9.4* 9.3*  9.3* 8.9*   < > 8.9*  8.9*   PLATELETS 10*3/mm3 375 309 252  --   --  185    < > = values in this interval not displayed.     Results from last 7 days   Lab Units 24  0446 24  1028 24  0332 24  2322   SODIUM mmol/L 136 137 138 137   POTASSIUM mmol/L 3.8 4.0 4.0 4.2   CHLORIDE mmol/L 101 103 110* 109*   CO2 mmol/L 25.5 25.0 16.0* 17.5*   BUN mg/dL 12 12 17 18   CREATININE mg/dL 0.86 0.93 0.88 1.03*   GLUCOSE mg/dL 90 96 82 87   EGFR mL/min/1.73 67.1 61.1 65.3 54.1*     Results from last 7 days   Lab Units 24  0444 24  0428 24  1451 24  0522   ALBUMIN g/dL 2.7* 2.7* 2.6* 2.4*  "  BILIRUBIN mg/dL 0.3 0.2 0.3 0.2   ALK PHOS U/L 74 77 90 84   AST (SGOT) U/L 13 17 24 16   ALT (SGPT) U/L 13 18 22 19     Results from last 7 days   Lab Units 09/30/24  0446 09/29/24  1028 09/27/24  0332 09/26/24  2322 09/26/24  0444 09/25/24  0428 09/24/24  1451 09/24/24  0522   CALCIUM mg/dL 9.0 8.6 8.1* 8.1* 7.5* 7.7* 7.8* 6.7*   ALBUMIN g/dL  --   --   --   --  2.7* 2.7* 2.6* 2.4*   MAGNESIUM mg/dL  --   --   --   --   --  2.0  --  1.9   PHOSPHORUS mg/dL  --   --   --   --  2.2* 1.9* 2.6 2.0*     Results from last 7 days   Lab Units 09/25/24  0428 09/24/24  2224 09/24/24  1700 09/24/24  1305 09/24/24  0950 09/24/24  0522   PROCALCITONIN ng/mL 6.24*  --   --   --   --  10.20*   LACTATE mmol/L  --  1.0 2.2* 2.1* 2.8*  --      No results found for: \"HGBA1C\", \"POCGLU\"    No radiology results for the last day    I have personally reviewed all medications:  Scheduled Medications  acetaminophen, 1,000 mg, Oral, Q8H  folic acid, 1 mg, Oral, Daily  levothyroxine, 75 mcg, Oral, Daily  LORazepam, 0.5 mg, Intravenous, Once in imaging  ondansetron, 4 mg, Intravenous, Once  pantoprazole, 40 mg, Oral, BID AC  potassium & sodium phosphates, 2 packet, Oral, Once  sodium chloride, 10 mL, Intravenous, Q12H  sucralfate, 1 g, Oral, TID AC    Infusions   Diet  Diet: Gastrointestinal; Fiber-Restricted, Low Irritant; Texture: Soft to Chew (NDD 3); Soft to Chew: Whole Meat; Fluid Consistency: Thin (IDDSI 0)    I have personally reviewed:  [x]  Laboratory   [x]  Microbiology   [x]  Radiology   [x]  EKG/Telemetry  [x]  Cardiology/Vascular   []  Pathology    []  Records       Assessment/Plan     Active Hospital Problems    Diagnosis  POA    **Syncope [R55]  Yes    Epilepsy [G40.909]  Unknown    Enteropathogenic Escherichia coli infection [A04.0]  Unknown    Acute posthemorrhagic anemia [D62]  Unknown    Nausea & vomiting [R11.2]  Yes    Status post right knee replacement [Z96.651]  Not Applicable    Gastrointestinal hemorrhage [K92.2]  " Unknown    Essential hypertension [I10]  Yes      Resolved Hospital Problems   No resolved problems to display.       84 y.o. female admitted with Syncope.    84 y.o. female recent right knee replacement presented with syncope, hypotension and hypovolemic shock     ABLA, GI Bleed, H. 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 Protonix BID x 8 weeks, carafate bid x 1 month.   for h. pylori amox/vlarithromycin 14 days as outpatient  F/U GI for EUS/ERCP     possible seizure on EEG  Keppra per neurology  MRI refused d/t claustrophobia     Recent right knee replacement  elevated D-dimer from surgery (no clot on duplex (x2) or CTA)  Pain controlled with scheduled Tylenol     Hypothyroidism  levothyroxine     DVT prophylaxis  SCDs     Discharge  TBD but likely will need SNF      Ramyundo Hurley MD  [facility]  09/30/24  15:21 EDT

## 2024-09-30 NOTE — CASE MANAGEMENT/SOCIAL WORK
"Continued Stay Note  [facility]     Patient Name: Bailee Garza  MRN: [id number]  Today's Date: 9/30/2024    Admit Date: 9/23/2024    Plan: Plan [facility] bed available on 10/1.   DELLA Fernandez RN   Discharge Plan       Row Name 09/30/24 1304       Plan    Plan Plan [facility] bed available on 10/1.   DELLA Fernandez RN    Patient/Family in Agreement with Plan yes    Plan Comments Spoke with pt at bedside. Pt states her facility of choice is [facility].  Pt states she has been there previously and they were\"wonderful\".  Plan [facility]- bed available on 10/1.   DELLA Fernandez RN                   Discharge Codes    No documentation.                 Expected Discharge Date and Time       Expected Discharge Date Expected Discharge Time    Sep 30, 2024               Fabby Fernandez RN    "

## 2024-09-30 NOTE — TELEPHONE ENCOUNTER
Called Phyllis and had to [facility]. Relayed the following message from Dr. Thayer in the message:  Please let the daughter know that the rehab facility will need to follow the standard knee replacement rehab protocol.  They are aware of our knee replacement protocols.  As far as the follow-up appointment if she is not physically ready to follow-up on 10/3 we could delay it a week or so.

## 2024-09-30 NOTE — PLAN OF CARE
Goal Outcome Evaluation:      Pt alert and oriented. VSS. On RA. C/o pain. No acute distress noted.

## 2024-09-30 NOTE — CASE MANAGEMENT/SOCIAL WORK
"Continued Stay Note  [facility]     Patient Name: Bailee Garza  MRN: [id number]  Today's Date: 9/30/2024    Admit Date: 9/23/2024    Plan: Plan [facility] available on 10/1. DELLA Fernandez RN   Discharge Plan       Row Name 09/30/24 1315       Plan    Plan Plan [facility]- bed available on 10/1. DELLA Fernandez RN    Plan Comments Spoke with pt''s daughter ( Phyllis Diaz [phone]) and informed her [facility] could accept pt on 10/1.  Pt's daughter states she will transport pt.  Plan [facility] available on 10/1. DELLA Fernandez RN      Row Name 09/30/24 9086       Plan    Plan Plan [facility] bed available on 10/1.   DELLA Fernandez RN    Patient/Family in Agreement with Plan yes    Plan Comments Spoke with pt at bedside. Pt states her facility of choice is [facility].  Pt states she has been there previously and they were\"wonderful\".  Plan [facility] available on 10/1.   DELLA Fernandez RN                   Discharge Codes    No documentation.                 Expected Discharge Date and Time       Expected Discharge Date Expected Discharge Time    Sep 30, 2024               Fabby Fernandez RN    "

## 2024-09-30 NOTE — TELEPHONE ENCOUNTER
Patient's daughter left a message on my voicemail stating the patient would likely be discharged today to rehab. Daughter is wanting to know what they need for PT at rehab facility and does her 10/3/24 2 wk post-op appointment need to be reschedule?

## 2024-10-01 ENCOUNTER — READMISSION MANAGEMENT (OUTPATIENT)
Dept: CALL CENTER | Facility: HOSPITAL | Age: 84
End: 2024-10-01
Payer: MEDICARE

## 2024-10-01 VITALS
WEIGHT: 111.77 LBS | DIASTOLIC BLOOD PRESSURE: 67 MMHG | HEART RATE: 117 BPM | OXYGEN SATURATION: 99 % | BODY MASS INDEX: 20.57 KG/M2 | RESPIRATION RATE: 18 BRPM | TEMPERATURE: 98 F | HEIGHT: 62 IN | SYSTOLIC BLOOD PRESSURE: 119 MMHG

## 2024-10-01 LAB
ANION GAP SERPL CALCULATED.3IONS-SCNC: 10.6 MMOL/L (ref 5–15)
BASOPHILS # BLD AUTO: 0.03 10*3/MM3 (ref 0–0.2)
BASOPHILS NFR BLD AUTO: 0.3 % (ref 0–1.5)
BUN SERPL-MCNC: 14 MG/DL (ref 8–23)
BUN/CREAT SERPL: 14.9 (ref 7–25)
CALCIUM SPEC-SCNC: 8.7 MG/DL (ref 8.6–10.5)
CHLORIDE SERPL-SCNC: 102 MMOL/L (ref 98–107)
CO2 SERPL-SCNC: 28.4 MMOL/L (ref 22–29)
CREAT SERPL-MCNC: 0.94 MG/DL (ref 0.57–1)
DEPRECATED RDW RBC AUTO: 46 FL (ref 37–54)
EGFRCR SERPLBLD CKD-EPI 2021: 60.3 ML/MIN/1.73
EOSINOPHIL # BLD AUTO: 0.1 10*3/MM3 (ref 0–0.4)
EOSINOPHIL NFR BLD AUTO: 1 % (ref 0.3–6.2)
ERYTHROCYTE [DISTWIDTH] IN BLOOD BY AUTOMATED COUNT: 13.6 % (ref 12.3–15.4)
GLUCOSE SERPL-MCNC: 87 MG/DL (ref 65–99)
HCT VFR BLD AUTO: 33 % (ref 34–46.6)
HGB BLD-MCNC: 10.7 G/DL (ref 12–15.9)
IMM GRANULOCYTES # BLD AUTO: 0.08 10*3/MM3 (ref 0–0.05)
IMM GRANULOCYTES NFR BLD AUTO: 0.8 % (ref 0–0.5)
LYMPHOCYTES # BLD AUTO: 1.03 10*3/MM3 (ref 0.7–3.1)
LYMPHOCYTES NFR BLD AUTO: 10.1 % (ref 19.6–45.3)
MCH RBC QN AUTO: 30.7 PG (ref 26.6–33)
MCHC RBC AUTO-ENTMCNC: 32.4 G/DL (ref 31.5–35.7)
MCV RBC AUTO: 94.8 FL (ref 79–97)
MONOCYTES # BLD AUTO: 0.69 10*3/MM3 (ref 0.1–0.9)
MONOCYTES NFR BLD AUTO: 6.8 % (ref 5–12)
NEUTROPHILS NFR BLD AUTO: 8.22 10*3/MM3 (ref 1.7–7)
NEUTROPHILS NFR BLD AUTO: 81 % (ref 42.7–76)
NRBC BLD AUTO-RTO: 0 /100 WBC (ref 0–0.2)
PLATELET # BLD AUTO: 412 10*3/MM3 (ref 140–450)
PMV BLD AUTO: 9.9 FL (ref 6–12)
POTASSIUM SERPL-SCNC: 3.6 MMOL/L (ref 3.5–5.2)
RBC # BLD AUTO: 3.48 10*6/MM3 (ref 3.77–5.28)
SODIUM SERPL-SCNC: 141 MMOL/L (ref 136–145)
WBC NRBC COR # BLD AUTO: 10.15 10*3/MM3 (ref 3.4–10.8)

## 2024-10-01 PROCEDURE — 97530 THERAPEUTIC ACTIVITIES: CPT

## 2024-10-01 PROCEDURE — 85025 COMPLETE CBC W/AUTO DIFF WBC: CPT | Performed by: HOSPITALIST

## 2024-10-01 PROCEDURE — 80048 BASIC METABOLIC PNL TOTAL CA: CPT | Performed by: HOSPITALIST

## 2024-10-01 PROCEDURE — 97110 THERAPEUTIC EXERCISES: CPT

## 2024-10-01 RX ORDER — PANTOPRAZOLE SODIUM 40 MG/1
40 TABLET, DELAYED RELEASE ORAL
Qty: 60 TABLET | Refills: 0 | Status: SHIPPED | OUTPATIENT
Start: 2024-10-01 | End: 2024-10-31

## 2024-10-01 RX ORDER — POTASSIUM CHLORIDE 750 MG/1
40 TABLET, FILM COATED, EXTENDED RELEASE ORAL EVERY 4 HOURS
Status: COMPLETED | OUTPATIENT
Start: 2024-10-01 | End: 2024-10-01

## 2024-10-01 RX ORDER — SUCRALFATE 1 G/1
1 TABLET ORAL
Qty: 90 TABLET | Refills: 0 | Status: SHIPPED | OUTPATIENT
Start: 2024-10-01 | End: 2024-10-31

## 2024-10-01 RX ADMIN — SUCRALFATE 1 G: 1 TABLET ORAL at 12:26

## 2024-10-01 RX ADMIN — SUCRALFATE 1 G: 1 TABLET ORAL at 06:21

## 2024-10-01 RX ADMIN — PANTOPRAZOLE SODIUM 40 MG: 40 TABLET, DELAYED RELEASE ORAL at 06:21

## 2024-10-01 RX ADMIN — PANTOPRAZOLE SODIUM 40 MG: 40 TABLET, DELAYED RELEASE ORAL at 17:12

## 2024-10-01 RX ADMIN — Medication 10 ML: at 08:21

## 2024-10-01 RX ADMIN — LEVOTHYROXINE SODIUM 75 MCG: 75 TABLET ORAL at 06:19

## 2024-10-01 RX ADMIN — POTASSIUM CHLORIDE 40 MEQ: 750 TABLET, EXTENDED RELEASE ORAL at 10:07

## 2024-10-01 RX ADMIN — FOLIC ACID 1 MG: 1 TABLET ORAL at 08:21

## 2024-10-01 RX ADMIN — ACETAMINOPHEN 1000 MG: 500 TABLET ORAL at 12:26

## 2024-10-01 RX ADMIN — ACETAMINOPHEN 1000 MG: 500 TABLET ORAL at 06:19

## 2024-10-01 RX ADMIN — POTASSIUM CHLORIDE 40 MEQ: 750 TABLET, EXTENDED RELEASE ORAL at 14:23

## 2024-10-01 NOTE — PLAN OF CARE
Goal Outcome Evaluation:         Pt resting in bed, Aox 4 , Room air, turn self, VSS, No sign of acute distress noted. Plan of care is ongoing.

## 2024-10-01 NOTE — THERAPY TREATMENT NOTE
Patient Name: Bailee Garza  : 1940    MRN: [id number]                              Today's Date: 10/1/2024       Admit Date: 2024    Visit Dx:     ICD-10-CM ICD-9-CM   1. Syncope and collapse  R55 780.2   2. Volume depletion, gastrointestinal loss  E86.9 276.50   3. Postoperative anemia  D64.9 285.9   4. Status post total right knee replacement  Z96.651 V43.65   5. Chronic kidney disease, unspecified CKD stage  N18.9 585.9   6. Nausea and vomiting, unspecified vomiting type  R11.2 787.01   7. Gastrointestinal hemorrhage, unspecified gastrointestinal hemorrhage type  K92.2 578.9     Patient Active Problem List   Diagnosis    Chronic midline low back pain without sciatica    Essential hypertension    Hearing loss    Hypothyroidism, acquired    IBS (irritable bowel syndrome)    Chronic nonseasonal allergic rhinitis due to pollen    Stage 3 chronic kidney disease    Arthralgia of right knee    DDD (degenerative disc disease), lumbosacral    Scoliosis due to degenerative disease of spine in adult patient    Status post total replacement of left hip    Status post total hip replacement, left    Vitamin D deficiency    Anemia, normocytic normochromic    S/P hip replacement, right    OA (osteoarthritis) of knee    Syncope    Nausea & vomiting    Status post right knee replacement    Gastrointestinal hemorrhage    Epilepsy    Enteropathogenic Escherichia coli infection    Acute posthemorrhagic anemia     Past Medical History:   Diagnosis Date    Abnormal CXR 2017    Adverse reaction to narcotic drug     SEVERE HALLUCINATIONS POST OP LEFT HIP PLACEMENT    Allergies     Arthritis     Arthritis of foot 2020    Arthropathy of pelvic region and thigh 2012    unspecified    Back pain 2007    Chronic back pain 2009    Chronic cough 2017    CKD (chronic kidney disease)     Closed nondisplaced fracture of lateral malleolus of fibula with delayed healing 2020    Closed  nondisplaced fracture of medial cuneiform of left foot 2018    Constipation 2015    unspecified    COVID-19 vaccination refused     COVID-19 virus detected 10/17/2022    Diverticulosis     Dyspepsia 2008    Essential hypertension 2007    Exertional shortness of breath 2017    Fall 2018    Family history of abdominal aortic aneurysm (AAA) 2019    [place] aaa screen limited (04/10/2019 10:32)     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), 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    Hearing loss 2008    History of bone density study 2015    History of pneumonia     2017    History of skin cancer     Hypothyroidism, acquired 2007    Insomnia 2015    unspecified    Intervertebral disc disorder with myelopathy, cervical region 2010    Joint capsule tear 2012    unspecified    OA (osteoarthritis) of hip 2018    Other synovitis and tenosynovitis, right ankle and foot 2020    Peroneal tendonitis, right 2020    Rhinitis, allergic 2007    Right knee pain     Scoliosis     Screening breast examination 2007    Stress 2015    other acute reactions to stress    Stress incontinence     Trochanteric bursitis, left hip 2019    Urticaria 2015     Past Surgical History:   Procedure Laterality Date    BREAST EXCISIONAL BIOPSY Right     50+ years ago    BRONCHOSCOPY N/A 2017    Procedure: BRONCHOSCOPY;  Surgeon: Mario Alanis MD;  Location: [facility] ENDOSCOPY;  Service:     CATARACT EXTRACTION, BILATERAL      COLONOSCOPY      ENDOSCOPY N/A 2024    Procedure: ESOPHAGOGASTRODUODENOSCOPY AT BEDSIDE with gold probe and epi injection;  Surgeon: Karli Viveros MD;  Location: [facility] ENDOSCOPY;  Service: Gastroenterology;  Laterality: N/A;  Pre: GI Bleed  Post: multiple duodenal ulcers, hiatal hernia    HYSTERECTOMY      TOTAL HIP ARTHROPLASTY Left 2018    Procedure: LEFT TOTAL  HIP ARTHROPLASTY;  Surgeon: Justen Mann MD;  Location: [facility] MAIN OR;  Service: Orthopedics    TOTAL HIP ARTHROPLASTY Right 12/13/2022    Procedure: Posterior RIGHT TOTAL HIP ARTHROPLASTY MARCELINO NAVIGATION;  Surgeon: Anupam Ruiz MD;  Location: [facility] OR [facility];  Service: Orthopedics;  Laterality: Right;    TOTAL KNEE ARTHROPLASTY Right 9/18/2024    Procedure: TOTAL KNEE ARTHROPLASTY;  Surgeon: Jesus Thayer MD;  Location: [facility] OR [facility];  Service: Orthopedics;  Laterality: Right;      General Information       Row Name 10/01/24 1301          Physical Therapy Time and Intention    Document Type therapy note (daily note)  -MG     Mode of Treatment co-treatment;occupational therapy;physical therapy;other (see comments)  -MG       Row Name 10/01/24 1301          General Information    Patient Profile Reviewed yes  -MG     Existing Precautions/Restrictions fall  -MG     Barriers to Rehab none identified  -MG       Row Name 10/01/24 1301          Cognition    Orientation Status (Cognition) oriented x 3  -MG       Row Name 10/01/24 1301          Safety Issues, Functional Mobility    Safety Issues Affecting Function (Mobility) awareness of need for assistance;insight into deficits/self-awareness;judgment;sequencing abilities;safety precaution awareness;problem-solving  -MG     Impairments Affecting Function (Mobility) balance;coordination;endurance/activity tolerance;pain;strength;range of motion (ROM)  -MG     Comment, Safety Issues/Impairments (Mobility) Co treatment medically appropriate and necessary due to patient acuity level, activity tolerance and safety of patient and staff. Treatment is focusing on progression of care and goals established in the POC. Gait belt and non-skid socks donned.  -MG               User Key  (r) = Recorded By, (t) = Taken By, (c) = Cosigned By      Initials Name Provider Type    MG Snow Hurley, PT Physical Therapist                   Mobility       Row Name 10/01/24 5201           Bed Mobility    Supine-Sit Lawn (Bed Mobility) standby assist  -MG     Assistive Device (Bed Mobility) head of bed elevated  -MG     Comment, (Bed Mobility) Increased time to complete. Cues for sequencing.  -MG       Row Name 10/01/24 1303          Sit-Stand Transfer    Sit-Stand Lawn (Transfers) minimum assist (75% patient effort);1 person assist;2 person assist;verbal cues  -MG     Assistive Device (Sit-Stand Transfers) walker, front-wheeled  -MG     Comment, (Sit-Stand Transfer) Cues for hand placement and sequencing. Posterior lean on standing w/ cues for correction, OT then providing CG/Elidia.  -MG       Row Name 10/01/24 1303          Gait/Stairs (Locomotion)    Lawn Level (Gait) contact guard;minimum assist (75% patient effort);1 person assist;2 person assist;verbal cues  -MG     Assistive Device (Gait) walker, front-wheeled  -MG     Distance in Feet (Gait) 30  -MG     Deviations/Abnormal Patterns (Gait) antalgic;weight shifting decreased;jah decreased;gait speed decreased;stride length decreased  -MG     Bilateral Gait Deviations heel strike decreased  -MG     Right Sided Gait Deviations weight shift ability decreased  Dec knee flexion  -MG     Comment, (Gait/Stairs) Cues for posture, sequencing and gait mechanics. 2-3 minor LOB requiring Elidia to correct. No knee buckling. Fatigue limiting.  -MG       Row Name 10/01/24 1303          Mobility    Extremity Weight-bearing Status right lower extremity  -MG     Right Lower Extremity (Weight-bearing Status) weight-bearing as tolerated (WBAT)  -MG               User Key  (r) = Recorded By, (t) = Taken By, (c) = Cosigned By      Initials Name Provider Type    MG Snow Hurley, PT Physical Therapist                   Obj/Interventions       Row Name 10/01/24 1305          Range of Motion Comprehensive    Comment, General Range of Motion R knee remains limited. Approx 5-70 when sitting in chair performing ther-ex.  -MG       Row Name  10/01/24 1305          Motor Skills    Therapeutic Exercise other (see comments)  RLE LAQ, HF and heel slides w/ towel. x15. Cues for full ROM and slow/controlled.  -MG       Row Name 10/01/24 1305          Balance    Static Sitting Balance standby assist  -MG     Dynamic Sitting Balance standby assist  -MG     Position, Sitting Balance sitting edge of bed  -MG     Static Standing Balance contact guard;minimal assist;1-person assist;2-person assist;verbal cues  -MG     Dynamic Standing Balance contact guard;minimal assist;1-person assist;2-person assist;verbal cues  -MG     Position/Device Used, Standing Balance supported;walker, front-wheeled  -MG     Balance Interventions standing;static;UE activity with balance activity;dynamic reaching  -MG     Comment, Balance Posterior lean on initial stand and 2-3 minor LOB with ambulation requiring CG/Elidia x2 to correct and maintain. No knee buckling. Static standing w/ CG/Elidia without UE support, RW in front, while performing punches w/ OT.  -MG               User Key  (r) = Recorded By, (t) = Taken By, (c) = Cosigned By      Initials Name Provider Type    MG Snow Hurley, PT Physical Therapist                   Goals/Plan       Row Name 10/01/24 1311          Bed Mobility Goal 1 (PT)    Progress/Outcomes (Bed Mobility Goal 1, PT) goal partially met  -MG       Row Name 10/01/24 1311          Transfer Goal 1 (PT)    Progress/Outcome (Transfer Goal 1, PT) continuing progress toward goal;goal ongoing  -MG       Row Name 10/01/24 1311          Gait Training Goal 1 (PT)    Progress/Outcome (Gait Training Goal 1, PT) goal ongoing;continuing progress toward goal  -MG               User Key  (r) = Recorded By, (t) = Taken By, (c) = Cosigned By      Initials Name Provider Type    MG Snow Hurley, PT Physical Therapist                   Clinical Impression       Row Name 10/01/24 1307          Pain    Pretreatment Pain Rating 0/10 - no pain  -MG     Posttreatment Pain Rating 6/10   -MG     Pain Location - Side/Orientation Right  -MG     Pain Location incisional  -MG     Pain Location - knee  -MG     Pain Intervention(s) Medication (See MAR);Elevated;Ambulation/increased activity;Repositioned;Cold pack;Rest  -MG       Row Name 10/01/24 2569          Plan of Care Review    Plan of Care Reviewed With patient  -MG     Progress improving  -MG     Outcome Evaluation Pt seen this AM for PT/OT cotx and tolerated the session well. Today, pt was SBA for bed mobility, Elidia for LBD, Elidia for STS to RW and CG/Elidia x1-2 for ambulation of 30' w/ RW. Pt had a posterior lean on initial stand and 2-3 minor LOB during gait requiring CG/Elidia x2 to correct and maintain her balance. Tolerated standing w/o UE support for short time while performing UE ther-ex w/ OT and this PT providing CG/Elidia for her balance. End of session pt perform RLE ther-ex for 15 reps w/ encouragement to cont performing ther-ex on her own throughout the day. Pt reports having increased R knee flexion and tolerance to flexion. SBAR w/ RN following session. PT will cont to follow.  -MG       Row Name 10/01/24 5637          Therapy Assessment/Plan (PT)    Rehab Potential (PT) good, to achieve stated therapy goals  -MG     Criteria for Skilled Interventions Met (PT) skilled treatment is necessary  -MG     Therapy Frequency (PT) 6 times/wk  -MG       Row Name 10/01/24 1305          Vital Signs    O2 Delivery Pre Treatment room air  -MG     O2 Delivery Intra Treatment room air  -MG     O2 Delivery Post Treatment room air  -MG     Pre Patient Position Supine  -MG     Intra Patient Position Standing  -MG     Post Patient Position Sitting  -MG       Row Name 10/01/24 1301          Positioning and Restraints    Pre-Treatment Position in bed  -MG     Post Treatment Position chair  -MG     In Chair notified nsg;reclined;call light within reach;encouraged to call for assist;exit alarm on;legs elevated;RLE elevated  -MG               User Key  (r) =  Recorded By, (t) = Taken By, (c) = Cosigned By      Initials Name Provider Type    Snow Melendez, PT Physical Therapist                   Outcome Measures       Row Name 10/01/24 1311 10/01/24 0822       How much help from another person do you currently need...    Turning from your back to your side while in flat bed without using bedrails? 4  -MG 4  -VJ    Moving from lying on back to sitting on the side of a flat bed without bedrails? 3  -MG 3  -VJ    Moving to and from a bed to a chair (including a wheelchair)? 3  -MG 3  -VJ    Standing up from a chair using your arms (e.g., wheelchair, bedside chair)? 3  -MG 3  -VJ    Climbing 3-5 steps with a railing? 2  -MG 2  -VJ    To walk in hospital room? 3  -MG 3  -VJ    AM-PAC 6 Clicks Score (PT) 18  -MG 18  -VJ    Highest Level of Mobility Goal 6 --> Walk 10 steps or more  -MG 6 --> Walk 10 steps or more  -VJ              User Key  (r) = Recorded By, (t) = Taken By, (c) = Cosigned By      Initials Name Provider Type    VJ Ivelisse Banuelos, RN Registered Nurse    Snow Melendez, PT Physical Therapist                                 Physical Therapy Education       Title: PT OT SLP Therapies (Done)       Topic: Physical Therapy (Done)       Point: Mobility training (Done)       Learning Progress Summary             Patient Acceptance, E,TB,D, VU,NR by MG at 10/1/2024 1311    Acceptance, E,TB, VU by SS at 9/30/2024 0250    Acceptance, E, VU by CW at 9/29/2024 1300    Acceptance, E,TB, VU by SS at 9/29/2024 0347    Acceptance, E,TB, VU by RW at 9/28/2024 1704    Acceptance, E,TB, VU by SS at 9/28/2024 0422    Acceptance, E,TB, VU by RW at 9/27/2024 1528    Acceptance, E, NR by DJ at 9/27/2024 1142    Acceptance, E,TB, NR by ST at 9/26/2024 1339    Acceptance, E,D, VU,NR by MS at 9/23/2024 1526   Family Acceptance, E,TB, VU by RW at 9/28/2024 1704    Acceptance, E, NR by DARIA at 9/27/2024 1142                         Point: Home exercise program (Done)        Learning Progress Summary             Patient Acceptance, E,TB,D, VU,NR by MG at 10/1/2024 1311    Acceptance, E,TB, VU by SS at 9/30/2024 0250    Acceptance, E, VU by CW at 9/29/2024 1300    Acceptance, E,TB, VU by SS at 9/29/2024 0347    Acceptance, E,TB, VU by RW at 9/28/2024 1704    Acceptance, E,TB, VU by SS at 9/28/2024 0422    Acceptance, E,TB, VU by RW at 9/27/2024 1528    Acceptance, E, NR by DJ at 9/27/2024 1142    Acceptance, E,TB, NR by ST at 9/26/2024 1339    Acceptance, E,D, VU,NR by MS at 9/23/2024 1526   Family Acceptance, E,TB, VU by RW at 9/28/2024 1704    Acceptance, E, NR by DJ at 9/27/2024 1142                         Point: Body mechanics (Done)       Learning Progress Summary             Patient Acceptance, E,TB,D, VU,NR by MG at 10/1/2024 1311    Acceptance, E,TB, VU by SS at 9/30/2024 0250    Acceptance, E, VU by CW at 9/29/2024 1300    Acceptance, E,TB, VU by SS at 9/29/2024 0347    Acceptance, E,TB, VU by RW at 9/28/2024 1704    Acceptance, E,TB, VU by SS at 9/28/2024 0422    Acceptance, E,TB, VU by RW at 9/27/2024 1528    Acceptance, E, NR by DJ at 9/27/2024 1142   Family Acceptance, E,TB, VU by RW at 9/28/2024 1704    Acceptance, E, NR by DJ at 9/27/2024 1142                         Point: Precautions (Done)       Learning Progress Summary             Patient Acceptance, E,TB,D, VU,NR by MG at 10/1/2024 1311    Acceptance, E,TB, VU by SS at 9/30/2024 0250    Acceptance, E, VU by CW at 9/29/2024 1300    Acceptance, E,TB, VU by SS at 9/29/2024 0347    Acceptance, E,TB, VU by RW at 9/28/2024 1704    Acceptance, E,TB, VU by SS at 9/28/2024 0422    Acceptance, E,TB, VU by RW at 9/27/2024 1528    Acceptance, E, NR by DJ at 9/27/2024 1142   Family Acceptance, E,TB, VU by RW at 9/28/2024 1704    Acceptance, E, NR by DJ at 9/27/2024 1142                                         User Key       Initials Effective Dates Name Provider Type Discipline    MS 06/16/21 -  Shaquille Donnelly, PT Physical  Therapist PT    RW 06/16/21 -  Dheeraj Toledo, RN Registered Nurse Nurse    MG 05/24/22 -  Snow Hurley, PT Physical Therapist PT    DJ 10/25/19 -  Jagruti Sheikh, PT Physical Therapist PT    CW 12/13/22 -  Gayatri Moreira, PT Physical Therapist PT    SS 02/03/22 -  Augie Pope, RN Registered Nurse Nurse    ST 09/22/22 -  Suma Willingham, PT Physical Therapist PT                  PT Recommendation and Plan     Plan of Care Reviewed With: patient  Progress: improving  Outcome Evaluation: Pt seen this AM for PT/OT cotx and tolerated the session well. Today, pt was SBA for bed mobility, Elidia for LBD, Elidia for STS to RW and CG/Elidia x1-2 for ambulation of 30' w/ RW. Pt had a posterior lean on initial stand and 2-3 minor LOB during gait requiring CG/Elidia x2 to correct and maintain her balance. Tolerated standing w/o UE support for short time while performing UE ther-ex w/ OT and this PT providing CG/Elidia for her balance. End of session pt perform RLE ther-ex for 15 reps w/ encouragement to cont performing ther-ex on her own throughout the day. Pt reports having increased R knee flexion and tolerance to flexion. SBAR w/ RN following session. PT will cont to follow.     Time Calculation:         PT Charges       Row Name 10/01/24 1311             Time Calculation    Start Time 1034  -MG      Stop Time 1100  -MG      Time Calculation (min) 26 min  -MG      PT Received On 10/01/24  -MG      PT - Next Appointment 10/02/24  -MG      PT Goal Re-Cert Due Date 10/15/24  -MG                User Key  (r) = Recorded By, (t) = Taken By, (c) = Cosigned By      Initials Name Provider Type    MG Snow Hurley, PT Physical Therapist                  Therapy Charges for Today       Code Description Service Date Service Provider Modifiers Qty    91242463286 HC PT THERAPEUTIC ACT EA 15 MIN 10/1/2024 Snow Hurley, PT GP 1    19180953846 HC PT THER PROC EA 15 MIN 10/1/2024 Snow Hurley, PT GP 1            PT G-Codes  Outcome Measure  Options: AM-PAC 6 Clicks Basic Mobility (PT)  AM-PAC 6 Clicks Score (PT): 18  AM-PAC 6 Clicks Score (OT): 8  PT Discharge Summary  Anticipated Discharge Disposition (PT): skilled nursing facility    Snow Hurley, SARAH  10/1/2024

## 2024-10-01 NOTE — DISCHARGE SUMMARY
Date of Discharge:  10/1/2024    PCP: Eddie Araya MD    Discharge Diagnosis:   Active Hospital Problems    Diagnosis  POA    **Syncope [R55]  Yes    Epilepsy [G40.909]  Unknown    Enteropathogenic Escherichia coli infection [A04.0]  Unknown    Acute posthemorrhagic anemia [D62]  Unknown    Nausea & vomiting [R11.2]  Yes    Status post right knee replacement [Z96.651]  Not Applicable    Gastrointestinal hemorrhage [K92.2]  Unknown    Essential hypertension [I10]  Yes      Resolved Hospital Problems   No resolved problems to display.     Procedure(s):  ESOPHAGOGASTRODUODENOSCOPY AT BEDSIDE with gold probe and epi injection     Consults       Date and Time Order Name Status Description    9/26/2024  3:27 PM Inpatient Hospitalist Consult Completed     9/24/2024 11:59 AM Inpatient Pulmonology Consult Completed     9/24/2024 11:19 AM Inpatient Infectious Diseases Consult Completed     9/23/2024  1:27 PM Inpatient Neurology Consult General Completed     9/23/2024  8:57 AM Inpatient Gastroenterology Consult Completed     9/23/2024  8:19 AM [facility] (on-call MD unless specified) Details            Hospital Course  84 y.o. female with past medical history significant for acute blood loss anemia, recently had knee surgery admitted with hypotension and hypovolemic shock from GI bleed and blood loss anemia. She has a back infection and also H. pylori positive. Treated with 3 days of Zosyn per ID for E pack. Had EGD and clipping. Duodenal ulcer found. 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 She also had possible seizures on EEG and started on Keppra per neurology. She did come back positive for H. pylori GI recommends starting treatment as an outpatient, patient will follow-up with PCP and GI on outpatient basis.  I have seen and examined patient at bedside, total time spent is more than 30 minutes.    Temp:  [98 °F (36.7 °C)-98.2 °F (36.8 °C)] 98 °F (36.7 °C)  Heart Rate:  [] 117  Resp:   [16-18] 18  BP: ()/(58-75) 119/67  Body mass index is 20.44 kg/m².    Physical Exam  General, awake and alert.  Head and ENT, normocephalic and atraumatic.  Lungs, symmetric expansion, equal air entry bilaterally.  Heart, regular rate and rhythm.  Abdomen, soft and nontender.  Extremities, no clubbing or cyanosis.  Neuro, no focal deficits.  Skin: Warm and no rash.  Psych, normal mood and affect.  Musculoskeletal, joint examination is grossly normal.        Disposition: Home or Self Care       Discharge Medications        Changes to Medications        Instructions Start Date   pantoprazole 40 MG EC tablet  Commonly known as: PROTONIX  What changed: when to take this   40 mg, Oral, 2 Times Daily Before Meals             Continue These Medications        Instructions Start Date   acetaminophen 325 MG tablet  Commonly known as: Tylenol   650 mg, Oral, Every 6 Hours PRN      Aspirin Low Dose 81 MG EC tablet  Generic drug: aspirin   Take 1 tablet by mouth 2 (Two) Times a Day for 14 days, THEN 1 tablet Daily for 28 days.   Start Date: September 18, 2024     hydroCHLOROthiazide 12.5 MG tablet   12.5 mg, Oral, Daily      HYDROmorphone 2 MG tablet  Commonly known as: Dilaudid   2 mg, Oral, Every 4 Hours PRN      loperamide 2 MG capsule  Commonly known as: IMODIUM   1 capsule, Oral, 4 Times Daily PRN      metroNIDAZOLE 0.75 % cream  Commonly known as: METROCREAM   Apply 1 Application topically to the appropriate area as directed As Needed.      ondansetron 4 MG tablet  Commonly known as: Zofran   4 mg, Oral, Every 8 Hours PRN      Synthroid 75 MCG tablet  Generic drug: levothyroxine   75 mcg, Oral, Daily      valsartan 80 MG tablet  Commonly known as: DIOVAN   80 mg, Oral, Daily             Stop These Medications      polyethylene glycol 17 GM/SCOOP powder  Commonly known as: MIRALAX               Additional Instructions for the Follow-ups that You Need to Schedule       Discharge Follow-up with PCP   As directed        Currently Documented PCP:    Eddie Araya MD    PCP Phone Number:    [phone]     Follow Up Details: Follow-up with PCP in 7 days.               Contact information for follow-up providers       Eddie Araya MD .    Specialty: Family Medicine  Why: Follow-up with PCP in 7 days.  Contact information:  [address]  [address]  [facility] [address]  [phone]                       Contact information for after-discharge care       Destination       [facility] .    Service: Skilled Nursing  Contact information:  [address]   [facility] [address]  [phone]                     Home Medical Care       [facility] .    Service: Home Health Services  Contact information:  [address]  [facility] [address] [phone]                                  Future Appointments   Date Time Provider Department Center   10/4/2024 10:00 AM Portaro, Deven, PT [facility]   10/7/2024 12:15 PM Portaro, Deven, PT [facility]   10/11/2024 10:00 AM Portaro, Deven, PT [facility]   10/14/2024 10:45 AM Portaro, Deven, PT [facility]   10/18/2024 10:45 AM Portaro, Deven, PT [facility]   10/22/2024  9:15 AM Portaro, Deven, PT [facility]   10/25/2024 10:00 AM Portaro, Deven, PT [facility]   10/28/2024  9:15 AM Portaro, Deven, PT [facility]   10/31/2024  9:15 AM Portaro, Deven, PT [facility]   11/5/2024  9:15 AM Portaro, Deven, PT [facility]   11/7/2024 10:30 AM Eddie Araya MD [id number]   11/8/2024  9:15 AM Portaro, Deven, PT [facility]   11/12/2024  9:15 AM Portaro, Deven, PT [facility]   11/14/2024 10:00 AM Portaro, Deven, PT [facility]   11/15/2024  9:10 AM Speedy Sanz APRN [facility] [id number] [facility]   11/18/2024  9:15 AM Deven Nunez, PT [facility]   11/21/2024  9:15 AM Deven Nunez, PT [facility] Hurley MD  [facility]  10/01/24    Discharge  time spent greater than 30 minutes.

## 2024-10-01 NOTE — OUTREACH NOTE
Prep Survey      Flowsheet Row Responses   [facility] patient discharged from? [place]   Is LACE score < 7 ? No   Eligibility [facility]   Date of Admission 09/23/24   Date of Discharge 10/01/24   Discharge diagnosis Syncope   Does the patient have one of the following disease processes/diagnoses(primary or secondary)? Other   Does the patient have Home health ordered? Yes   What is the Home health agency?   [facility]   Is there a DME ordered? No   General alerts for this patient  [facility]   Prep survey completed? Yes            PATY RAMON - Registered Nurse

## 2024-10-01 NOTE — PLAN OF CARE
Goal Outcome Evaluation:  Plan of Care Reviewed With: patient           Outcome Evaluation: Pt seen today for OT/PT. Performed bed mobility w/ SBA. Needing Min A for LBD at EOB. Performed STS and functional mobility w/ CGA<>Min A and RW. Engaged in BUE therex/ standing balance activity, needing CGA<>Min A for unsupported standing balance. Left w/ PT at end of session. Pt continues to present w/ impaired balance, strength, and activity tolerance to perform near baseline. OT will f/u to address deficits.      Anticipated Discharge Disposition (OT): skilled nursing facility

## 2024-10-01 NOTE — THERAPY TREATMENT NOTE
Patient Name: Bailee Garza  : 1940    MRN: [id number]                              Today's Date: 10/1/2024       Admit Date: 2024    Visit Dx:     ICD-10-CM ICD-9-CM   1. Syncope and collapse  R55 780.2   2. Volume depletion, gastrointestinal loss  E86.9 276.50   3. Postoperative anemia  D64.9 285.9   4. Status post total right knee replacement  Z96.651 V43.65   5. Chronic kidney disease, unspecified CKD stage  N18.9 585.9   6. Nausea and vomiting, unspecified vomiting type  R11.2 787.01   7. Gastrointestinal hemorrhage, unspecified gastrointestinal hemorrhage type  K92.2 578.9     Patient Active Problem List   Diagnosis    Chronic midline low back pain without sciatica    Essential hypertension    Hearing loss    Hypothyroidism, acquired    IBS (irritable bowel syndrome)    Chronic nonseasonal allergic rhinitis due to pollen    Stage 3 chronic kidney disease    Arthralgia of right knee    DDD (degenerative disc disease), lumbosacral    Scoliosis due to degenerative disease of spine in adult patient    Status post total replacement of left hip    Status post total hip replacement, left    Vitamin D deficiency    Anemia, normocytic normochromic    S/P hip replacement, right    OA (osteoarthritis) of knee    Syncope    Nausea & vomiting    Status post right knee replacement    Gastrointestinal hemorrhage    Epilepsy    Enteropathogenic Escherichia coli infection    Acute posthemorrhagic anemia     Past Medical History:   Diagnosis Date    Abnormal CXR 2017    Adverse reaction to narcotic drug     SEVERE HALLUCINATIONS POST OP LEFT HIP PLACEMENT    Allergies     Arthritis     Arthritis of foot 2020    Arthropathy of pelvic region and thigh 2012    unspecified    Back pain 2007    Chronic back pain 2009    Chronic cough 2017    CKD (chronic kidney disease)     Closed nondisplaced fracture of lateral malleolus of fibula with delayed healing 2020    Closed  nondisplaced fracture of medial cuneiform of left foot 2018    Constipation 2015    unspecified    COVID-19 vaccination refused     COVID-19 virus detected 10/17/2022    Diverticulosis     Dyspepsia 2008    Essential hypertension 2007    Exertional shortness of breath 2017    Fall 2018    Family history of abdominal aortic aneurysm (AAA) 2019    [place] aaa screen limited (04/10/2019 10:32)     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), 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    Hearing loss 2008    History of bone density study 2015    History of pneumonia     2017    History of skin cancer     Hypothyroidism, acquired 2007    Insomnia 2015    unspecified    Intervertebral disc disorder with myelopathy, cervical region 2010    Joint capsule tear 2012    unspecified    OA (osteoarthritis) of hip 2018    Other synovitis and tenosynovitis, right ankle and foot 2020    Peroneal tendonitis, right 2020    Rhinitis, allergic 2007    Right knee pain     Scoliosis     Screening breast examination 2007    Stress 2015    other acute reactions to stress    Stress incontinence     Trochanteric bursitis, left hip 2019    Urticaria 2015     Past Surgical History:   Procedure Laterality Date    BREAST EXCISIONAL BIOPSY Right     50+ years ago    BRONCHOSCOPY N/A 2017    Procedure: BRONCHOSCOPY;  Surgeon: Mario Alanis MD;  Location: [facility] ENDOSCOPY;  Service:     CATARACT EXTRACTION, BILATERAL      COLONOSCOPY      ENDOSCOPY N/A 2024    Procedure: ESOPHAGOGASTRODUODENOSCOPY AT BEDSIDE with gold probe and epi injection;  Surgeon: Karli Viveros MD;  Location: [facility] ENDOSCOPY;  Service: Gastroenterology;  Laterality: N/A;  Pre: GI Bleed  Post: multiple duodenal ulcers, hiatal hernia    HYSTERECTOMY      TOTAL HIP ARTHROPLASTY Left 2018    Procedure: LEFT TOTAL  HIP ARTHROPLASTY;  Surgeon: Justen Mann MD;  Location: [facility] MAIN OR;  Service: Orthopedics    TOTAL HIP ARTHROPLASTY Right 12/13/2022    Procedure: Posterior RIGHT TOTAL HIP ARTHROPLASTY MARCELINO NAVIGATION;  Surgeon: Anupam Ruiz MD;  Location: [facility] OR [facility];  Service: Orthopedics;  Laterality: Right;    TOTAL KNEE ARTHROPLASTY Right 9/18/2024    Procedure: TOTAL KNEE ARTHROPLASTY;  Surgeon: Jesus Thayer MD;  Location: [facility] OR [facility];  Service: Orthopedics;  Laterality: Right;      General Information       Row Name 10/01/24 1235          OT Time and Intention    Document Type evaluation  -KG     Mode of Treatment co-treatment;physical therapy;occupational therapy  -KG       Row Name 10/01/24 1235          General Information    Patient Profile Reviewed yes  -KG     Existing Precautions/Restrictions fall  -KG     Barriers to Rehab none identified  -KG       Row Name 10/01/24 1235          Safety Issues, Functional Mobility    Safety Issues Affecting Function (Mobility) impulsivity;judgment;positioning of assistive device;problem-solving;sequencing abilities  -KG     Impairments Affecting Function (Mobility) balance;coordination;endurance/activity tolerance;pain;strength  -KG               User Key  (r) = Recorded By, (t) = Taken By, (c) = Cosigned By      Initials Name Provider Type    KG Baltazar Nascimento OT Occupational Therapist                     Mobility/ADL's       Row Name 10/01/24 1236          Bed Mobility    Bed Mobility supine-sit  -KG     Supine-Sit [place] (Bed Mobility) standby assist  -KG       Row Name 10/01/24 1236          Transfers    Transfers sit-stand transfer;stand-sit transfer  -KG       Row Name 10/01/24 1236          Sit-Stand Transfer    Sit-Stand [place] (Transfers) minimum assist (75% patient effort)  -KG     Assistive Device (Sit-Stand Transfers) walker, front-wheeled  -KG       Row Name 10/01/24 1236          Stand-Sit Transfer    Stand-Sit [place] (Transfers)  minimum assist (75% patient effort)  -KG     Assistive Device (Stand-Sit Transfers) walker, front-wheeled  -KG       Row Name 10/01/24 1236          Functional Mobility    Functional Mobility- Ind. Level --  Performed functional mobility from EOB to doorway, and then to recliner w/ CGA<>Min A using RW (simulating household distance)  -KG       Row Name 10/01/24 1236          Activities of Daily Living    BADL Assessment/Intervention lower body dressing  -KG       Row Name 10/01/24 1236          Lower Body Dressing Assessment/Training    [place] Level (Lower Body Dressing) don;socks;minimum assist (75% patient effort)  -KG               User Key  (r) = Recorded By, (t) = Taken By, (c) = Cosigned By      Initials Name Provider Type    KG Baltazar Nascimento OT Occupational Therapist                   Obj/Interventions       Row Name 10/01/24 1237          Shoulder (Therapeutic Exercise)    Shoulder (Therapeutic Exercise) AROM (active range of motion)  -KG     Shoulder AROM (Therapeutic Exercise) bilateral;horizontal aBduction/aDduction;15 repititions  completed from standing w/ CGA<>Min A for balance  -KG       Row Name 10/01/24 1237          Motor Skills    Therapeutic Exercise shoulder  -KG       Row Name 10/01/24 1237          Balance    Balance Assessment sitting static balance;sitting dynamic balance;standing static balance;standing dynamic balance  -KG     Static Sitting Balance standby assist  -KG     Dynamic Sitting Balance standby assist  -KG     Position, Sitting Balance sitting edge of bed  -KG     Static Standing Balance contact guard;minimal assist  -KG     Dynamic Standing Balance contact guard;minimal assist  -KG     Position/Device Used, Standing Balance supported;walker, front-wheeled  -KG     Balance Interventions sitting;standing;supported;dynamic;static;dynamic reaching;occupation based/functional task;UE activity with balance activity  -KG               User Key  (r) = Recorded By, (t) = Taken  By, (c) = Cosigned By      Initials Name Provider Type    Baltazar De La Vega OT Occupational Therapist                   Goals/Plan    No documentation.                  Clinical Impression       Row Name 10/01/24 1238          Pain Assessment    Pretreatment Pain Rating 0/10 - no pain  -KG     Posttreatment Pain Rating 0/10 - no pain  -KG       Row Name 10/01/24 1238          Plan of Care Review    Plan of Care Reviewed With patient  -KG     Outcome Evaluation Pt seen today for OT/PT. Performed bed mobility w/ SBA. Needing Min A for LBD at EOB. Performed STS and functional mobility w/ CGA<>Min A and RW. Engaged in BUE therex/ standing balance activity, needing CGA<>Min A for unsupported standing balance. Left w/ PT at end of session. Pt continues to present w/ impaired balance, strength, and activity tolerance to perform near baseline. OT will f/u to address deficits.  -KG       Row Name 10/01/24 1238          Therapy Plan Review/Discharge Plan (OT)    Anticipated Discharge Disposition (OT) skilled nursing facility  -KG       Row Name 10/01/24 1238          Vital Signs    Pre Patient Position Supine  -KG     Intra Patient Position Standing  -KG     Post Patient Position Sitting  -KG       Row Name 10/01/24 1238          Positioning and Restraints    Pre-Treatment Position in bed  -KG     Post Treatment Position chair  -KG     In Chair with PT  -KG               User Key  (r) = Recorded By, (t) = Taken By, (c) = Cosigned By      Initials Name Provider Type    Baltazar De La Vega OT Occupational Therapist                   Outcome Measures       Row Name 10/01/24 0822          How much help from another person do you currently need...    Turning from your back to your side while in flat bed without using bedrails? 4  -VJ     Moving from lying on back to sitting on the side of a flat bed without bedrails? 3  -VJ     Moving to and from a bed to a chair (including a wheelchair)? 3  -VJ     Standing up from a chair using  "your arms (e.g., wheelchair, bedside chair)? 3  -VJ     Climbing 3-5 steps with a railing? 2  -VJ     To walk in hospital room? 3  -VJ     AM-PAC 6 Clicks Score (PT) 18  -VJ     Highest Level of Mobility Goal 6 --> Walk 10 steps or more  -VJ               User Key  (r) = Recorded By, (t) = Taken By, (c) = Cosigned By      Initials Name Provider Type    Ivelisse Montenegro, RN Registered Nurse                    Occupational Therapy Education       Title: PT OT SLP Therapies (Done)       Topic: Occupational Therapy (Done)       Point: ADL training (Done)       Description:   Instruct learner(s) on proper safety adaptation and remediation techniques during self care or transfers.   Instruct in proper use of assistive devices.                  Learning Progress Summary             Patient Acceptance, E,TB, VU by SS at 9/30/2024 0250    Acceptance, E,TB, VU by  at 9/29/2024 0347    Acceptance, E,TB, VU by RW at 9/28/2024 1704    Acceptance, E,TB, VU by  at 9/28/2024 0422    Acceptance, E,TB, VU by  at 9/27/2024 1528    Nonacceptance, E, VU by BC at 9/26/2024 1520    Comment: educ on sequence w/ mvmts, role of OT at acute level, and tecniques for increased safety w/ transfer (not grabbing under her shoulders) Pt was not receptive, \"you don't listen, You dont know what youre doing\". Cont education on techniques in future POC   Family Acceptance, E,TB, VU by RW at 9/28/2024 1704    Nonacceptance, E, VU by BC at 9/26/2024 1520    Comment: educ on sequence w/ mvmts, role of OT at acute level, and tecniques for increased safety w/ transfer (not grabbing under her shoulders) Pt was not receptive, \"you don't listen, You dont know what youre doing\". Cont education on techniques in future POC                         Point: Home exercise program (Done)       Description:   Instruct learner(s) on appropriate technique for monitoring, assisting and/or progressing therapeutic exercises/activities.                  Learning " "Progress Summary             Patient Acceptance, E,TB, VU by SS at 9/30/2024 0250    Acceptance, E,TB, VU by SS at 9/29/2024 0347    Acceptance, E,TB, VU by RW at 9/28/2024 1704    Acceptance, E,TB, VU by SS at 9/28/2024 0422    Acceptance, E,TB, VU by RW at 9/27/2024 1528   Family Acceptance, E,TB, VU by RW at 9/28/2024 1704                         Point: Precautions (Done)       Description:   Instruct learner(s) on prescribed precautions during self-care and functional transfers.                  Learning Progress Summary             Patient Acceptance, E,TB, VU by SS at 9/30/2024 0250    Acceptance, E,TB, VU by SS at 9/29/2024 0347    Acceptance, E,TB, VU by RW at 9/28/2024 1704    Acceptance, E,TB, VU by SS at 9/28/2024 0422    Acceptance, E,TB, VU by RW at 9/27/2024 1528   Family Acceptance, E,TB, VU by RW at 9/28/2024 1704                         Point: Body mechanics (Done)       Description:   Instruct learner(s) on proper positioning and spine alignment during self-care, functional mobility activities and/or exercises.                  Learning Progress Summary             Patient Acceptance, E,TB, VU by SS at 9/30/2024 0250    Acceptance, E,TB, VU by SS at 9/29/2024 0347    Acceptance, E,TB, VU by RW at 9/28/2024 1704    Acceptance, E,TB, VU by SS at 9/28/2024 0422    Acceptance, E,TB, VU by RW at 9/27/2024 1528    Nonacceptance, E, VU by BC at 9/26/2024 1520    Comment: educ on sequence w/ mvmts, role of OT at acute level, and tecniques for increased safety w/ transfer (not grabbing under her shoulders) Pt was not receptive, \"you don't listen, You dont know what youre doing\". Cont education on techniques in future POC   Family Acceptance, E,TB, VU by RW at 9/28/2024 1704    Nonacceptance, E, VU by BC at 9/26/2024 1520    Comment: educ on sequence w/ mvmts, role of OT at acute level, and tecniques for increased safety w/ transfer (not grabbing under her shoulders) Pt was not receptive, \"you don't listen, " "You dont know what youre doing\". Cont education on techniques in future POC                                         User Key       Initials Effective Dates Name Provider Type Discipline    RW 06/16/21 -  Dheeraj Toledo, RN Registered Nurse Nurse     02/03/22 -  Augie Pope, RN Registered Nurse Nurse    BC 07/29/24 -  Goyo Amor OT Occupational Therapist OT                  OT Recommendation and Plan     Plan of Care Review  Plan of Care Reviewed With: patient  Outcome Evaluation: Pt seen today for OT/PT. Performed bed mobility w/ SBA. Needing Min A for LBD at EOB. Performed STS and functional mobility w/ CGA<>Min A and RW. Engaged in BUE therex/ standing balance activity, needing CGA<>Min A for unsupported standing balance. Left w/ PT at end of session. Pt continues to present w/ impaired balance, strength, and activity tolerance to perform near baseline. OT will f/u to address deficits.     Time Calculation:         Time Calculation- OT       Row Name 10/01/24 1241             Time Calculation- OT    OT Start Time 1033  -KG      OT Stop Time 1051  -KG      OT Time Calculation (min) 18 min  -KG      Total Timed Code Minutes- OT 18 minute(s)  -KG      OT Received On 10/01/24  -KG      OT - Next Appointment 10/02/24  -KG         Timed Charges    01879 - OT Therapeutic Activity Minutes 18  -KG         Total Minutes    Timed Charges Total Minutes 18  -KG       Total Minutes 18  -KG                User Key  (r) = Recorded By, (t) = Taken By, (c) = Cosigned By      Initials Name Provider Type    KG Baltazar Nascimento OT Occupational Therapist                  Therapy Charges for Today       Code Description Service Date Service Provider Modifiers Qty    [id number]  OT THERAPEUTIC ACT EA 15 MIN 10/1/2024 Baltazar Nascimento OT GO 1                 Baltazar Nascimento OT  10/1/2024  "

## 2024-10-01 NOTE — PLAN OF CARE
Goal Outcome Evaluation:                                            Patient with discharge orders. Patient will be going to [facility] for rehab. Daughter Phyllis will transport patient. No acute distress noted at this time, will continue to monitor until discharge is complete.

## 2024-10-01 NOTE — PLAN OF CARE
Goal Outcome Evaluation:  Plan of Care Reviewed With: patient        Progress: improving  Outcome Evaluation: Pt seen this AM for PT/OT cotx and tolerated the session well. Today, pt was SBA for bed mobility, [place] for LBD, [place] for STS to RW and CG/[place] x1-2 for ambulation of 30' w/ RW. Pt had a posterior lean on initial stand and 2-3 minor LOB during gait requiring CG/[place] x2 to correct and maintain her balance. Tolerated standing w/o UE support for short time while performing UE ther-ex w/ OT and this PT providing CG/[place] for her balance. End of session pt perform RLE ther-ex for 15 reps w/ encouragement to cont performing ther-ex on her own throughout the day. Pt reports having increased R knee flexion and tolerance to flexion. SBAR w/ RN following session. PT will cont to follow.      Anticipated Discharge Disposition (PT): skilled nursing facility

## 2024-10-01 NOTE — CASE MANAGEMENT/SOCIAL WORK
Continued Stay Note  [facility]     Patient Name: Bailee Garza  MRN: [id number]  Today's Date: 10/1/2024    Admit Date: 9/23/2024    Plan: Plan [facility] for skilled care.  DELLA Fernandez RN   Discharge Plan       Row Name 10/01/24 1638       Plan    Plan Plan [facility] for skilled care.  DELLA Fernandez RN    Patient/Family in Agreement with Plan yes    Plan Comments Per Moira ( [phone]) pt has a bed at [facility] and can be accepted today.  Called and left voice message for pt's daughter (Phyllis Diaz [phone]) regarding pt will be discharged today.   Prescriptions to be sent to facility pharmacy.  Discharge packet to RN and she will complete.  Plan Plan [facility] for skilled care.   DELLA Fernandez RN                   Discharge Codes    No documentation.                 Expected Discharge Date and Time       Expected Discharge Date Expected Discharge Time    Oct 1, 2024               Fabby Fernandez RN

## 2024-10-02 ENCOUNTER — TRANSITIONAL CARE MANAGEMENT TELEPHONE ENCOUNTER (OUTPATIENT)
Dept: CALL CENTER | Facility: HOSPITAL | Age: 84
End: 2024-10-02
Payer: MEDICARE

## 2024-10-02 NOTE — OUTREACH NOTE
Call Center TCM Note      Flowsheet Row Responses   List of hospitals in [place] patient discharged from? [place]   Does the patient have one of the following disease processes/diagnoses(primary or secondary)? Other   TCM attempt successful? Yes   Revoked Reason Other  [Pt discharged from [facility] 10/01/2024 to [facility] for rehab]            Anum Red MA    10/2/2024, 12:56 EDT

## 2024-10-02 NOTE — CASE MANAGEMENT/SOCIAL WORK
Case Management Discharge Note      Final Note: [facility]   DELLA Fernandez RN         Selected Continued Care - Discharged on 10/1/2024 Admission date: 9/23/2024 - Discharge disposition: Home or Self Care      Destination Coordination complete.      Service Provider Selected Services Address Phone Fax Patient Preferred    [facility] [address] [address] [phone] --              Durable Medical Equipment    No services have been selected for the patient.                Dialysis/Infusion    No services have been selected for the patient.                Home Medical Care Coordination complete.      Service Provider Selected Services Address Phone Fax Patient Preferred     [facility] [address] [address] [phone] --              Therapy    No services have been selected for the patient.                Community Resources    No services have been selected for the patient.                Community & DME    No services have been selected for the patient.                    Selected Continued Care - Prior Encounters Includes continued care and service providers with selected services from prior encounters from 6/25/2024 to 10/1/2024      Discharged on 9/18/2024 Admission date: 9/18/2024 - Discharge disposition: Home-Health Care Svc      Home Medical Care       Service Provider Selected Services Address Phone Fax Patient Preferred     [facility] [address] [address] [phone] --                          Transportation Services  Private: Car    Final Discharge Disposition Code: 03 - skilled nursing facility (SNF)

## 2024-10-03 ENCOUNTER — PREP FOR SURGERY (OUTPATIENT)
Dept: OTHER | Facility: HOSPITAL | Age: 84
End: 2024-10-03
Payer: MEDICARE

## 2024-10-03 ENCOUNTER — TELEPHONE (OUTPATIENT)
Dept: GASTROENTEROLOGY | Facility: CLINIC | Age: 84
End: 2024-10-03
Payer: MEDICARE

## 2024-10-03 DIAGNOSIS — A04.8 H. PYLORI INFECTION: Primary | ICD-10-CM

## 2024-10-03 NOTE — TELEPHONE ENCOUNTER
Returned call to dtr, on sakina, vm left for her to call back and let us know what she is needing.  Ok for hub to relay

## 2024-10-03 NOTE — TELEPHONE ENCOUNTER
Hub staff attempted to follow warm transfer process and was unsuccessful     Caller: Phyllis Diaz    Relationship to patient: Emergency Contact    Best call back number: [phone]     Patient is needing: PT'S DAUGHTER RETURNING PHONE CALL TO BERTHA. PLEASE CALL DAUGHTER BACK AND ADVISE

## 2024-10-03 NOTE — TELEPHONE ENCOUNTER
She has not completed them - pls get an outpatient pharmacy where I can send the meds.  She will also need h pylori breath test in 8 weeks

## 2024-10-03 NOTE — TELEPHONE ENCOUNTER
Amoxicillin 500 mg-take 2 p.o. twice daily x 14 days  Clarithromycin 500 mg-take 1 p.o. twice daily x 14 days  She is already on the pantoprazole but it is very important that this is taken twice daily while on these antibiotics for the entire 14 days.

## 2024-10-03 NOTE — TELEPHONE ENCOUNTER
Provider: DR HAYDEN    Caller: EDITH GLOVER    Relationship to Patient: DAUGHTER    Pharmacy: [facility]    Phone Number: [phone]     Reason for Call: DAUGHTER CALLED TO CONFIRM PT IS TO BE ON A TABLET FORM ANTIBIOTIC WHEN RELEASED FROM THE HOSPITAL. CAN WE CONFIRM AND REACH BACK OUT TO THEM.

## 2024-10-03 NOTE — TELEPHONE ENCOUNTER
I have faxed instruction to [phone] and I have received confirmation.  Orders scanned under media

## 2024-10-03 NOTE — TELEPHONE ENCOUNTER
Pts daughter is asking if the pt should have been discharged to rehab on antibiotics or did she complete them in the hospital?

## 2024-10-03 NOTE — TELEPHONE ENCOUNTER
Pt is currently in patient at the [facility] ([phone])  I have called and spoken to the nurse, Pretty who advised we send the medications to the [facility] pharmacy ( I have added this to her med profile)  she also ask that I fax instructions to the nurses station at [phone]    I have spoken to the pts dtr and let her know that the pt will need a breath test 8 weeks after starting therapy and should hold PPI for 2 weeks prior to testing.  Dtr verbalized understanding

## 2024-10-08 ENCOUNTER — TELEPHONE (OUTPATIENT)
Dept: ORTHOPEDIC SURGERY | Facility: CLINIC | Age: 84
End: 2024-10-08

## 2024-10-08 ENCOUNTER — OFFICE VISIT (OUTPATIENT)
Dept: ORTHOPEDIC SURGERY | Facility: CLINIC | Age: 84
End: 2024-10-08
Payer: MEDICARE

## 2024-10-08 VITALS — BODY MASS INDEX: 19.51 KG/M2 | HEIGHT: 62 IN | TEMPERATURE: 97.3 F | WEIGHT: 106 LBS

## 2024-10-08 DIAGNOSIS — Z96.651 STATUS POST RIGHT KNEE REPLACEMENT: Primary | ICD-10-CM

## 2024-10-08 PROCEDURE — 99024 POSTOP FOLLOW-UP VISIT: CPT | Performed by: ORTHOPAEDIC SURGERY

## 2024-10-08 PROCEDURE — 1160F RVW MEDS BY RX/DR IN RCRD: CPT | Performed by: ORTHOPAEDIC SURGERY

## 2024-10-08 PROCEDURE — 1159F MED LIST DOCD IN RCRD: CPT | Performed by: ORTHOPAEDIC SURGERY

## 2024-10-08 RX ORDER — CLARITHROMYCIN 500 MG
500 TABLET ORAL 2 TIMES DAILY
COMMUNITY

## 2024-10-08 RX ORDER — AMOXICILLIN 500 MG/1
2 CAPSULE ORAL 2 TIMES DAILY
COMMUNITY
Start: 2024-10-12

## 2024-10-08 NOTE — TELEPHONE ENCOUNTER
Caller: ERICA    Relationship: [facility]    Best call back number: [phone]    What form or medical record are you requesting: VERBAL ORDERS FOR HOME HEALTH    Who is requesting this form or medical record from you[facility]    How would you like to receive the form or medical records (pick-up, mail, fax) PHONE    Timeframe paperwork needed: ASAP    Additional notes: ERICA WITH [facility] CALLING TO REQUEST VERBAL ORDERS FROM DR YUN FOR PT'S HOME HEALTH FOLLOWING R KNEE REPLACEMENT.

## 2024-10-08 NOTE — PROGRESS NOTES
Bailee Garza : 1940 MRN: [id number] DATE: 10/8/2024    DIAGNOSIS: 2 week follow up right total knee      SUBJECTIVE:Patient returns today for 2 week follow up of right total knee replacement. Patient reports doing well with no unusual complaints despite significant medical issues that she has ivercome over the past couple weeks.. Appears to be progressing appropriately.    OBJECTIVE:   Exam:. The incision is healing appropriately. No sign of infection. Range of motion is progressing as expected. The calf is soft and nontender with a negative Homans sign.    ASSESSMENT: 2 week status post right knee replacement.    PLAN: 1) Staples removed and steri strips applied   2) Order given for PT   3) Discontinue CAPRI hose   4) Continue ice PRN   5) aspirin 81 mg orally every day for 1 month   6) Follow up in 6 weeks with repeat Xrays of right knee (3views)    Jesus Thayer MD  10/8/2024

## 2024-10-09 ENCOUNTER — TRANSCRIBE ORDERS (OUTPATIENT)
Dept: HOME HEALTH SERVICES | Facility: HOME HEALTHCARE | Age: 84
End: 2024-10-09
Payer: MEDICARE

## 2024-10-09 DIAGNOSIS — Z47.1 AFTERCARE FOLLOWING LEFT KNEE JOINT REPLACEMENT SURGERY: Primary | ICD-10-CM

## 2024-10-09 DIAGNOSIS — Z96.652 AFTERCARE FOLLOWING LEFT KNEE JOINT REPLACEMENT SURGERY: Primary | ICD-10-CM

## 2024-10-10 ENCOUNTER — TELEPHONE (OUTPATIENT)
Dept: PHYSICAL THERAPY | Facility: CLINIC | Age: 84
End: 2024-10-10
Payer: MEDICARE

## 2024-10-10 ENCOUNTER — TELEPHONE (OUTPATIENT)
Dept: ORTHOPEDICS | Facility: OTHER | Age: 84
End: 2024-10-10
Payer: MEDICARE

## 2024-10-10 ENCOUNTER — TELEPHONE (OUTPATIENT)
Dept: FAMILY MEDICINE CLINIC | Facility: CLINIC | Age: 84
End: 2024-10-10
Payer: MEDICARE

## 2024-10-10 NOTE — TELEPHONE ENCOUNTER
Caller: Bailee Garza    Relationship: Self       What was the call regarding: PATIENT HAS BEEN IN HOSPITAL AND HAD 3 TRANSFUSIONS , SHE IS CHECKING WITH FAMILY , MAY BE OUT TILL NOVEMBER, WILL CALL BACK AND LET US KNOW

## 2024-10-12 ENCOUNTER — HOME CARE VISIT (OUTPATIENT)
Dept: HOME HEALTH SERVICES | Facility: HOME HEALTHCARE | Age: 84
End: 2024-10-12
Payer: MEDICARE

## 2024-10-12 PROCEDURE — G0151 HHCP-SERV OF PT,EA 15 MIN: HCPCS

## 2024-10-13 ENCOUNTER — HOME CARE VISIT (OUTPATIENT)
Dept: HOME HEALTH SERVICES | Facility: HOME HEALTHCARE | Age: 84
End: 2024-10-13
Payer: MEDICARE

## 2024-10-13 VITALS
TEMPERATURE: 97.5 F | RESPIRATION RATE: 17 BRPM | HEART RATE: 74 BPM | SYSTOLIC BLOOD PRESSURE: 118 MMHG | OXYGEN SATURATION: 97 % | DIASTOLIC BLOOD PRESSURE: 58 MMHG

## 2024-10-13 NOTE — HOME HEALTH
Bailee major is homebound due to difficulty with walking, transfers, and ADLs secondary to pain, decrease right knee ROM, unsteadiness, and decrease functional strength. PT is needed to focus on aftercare following right total knee arthroplasty and recent re-hospitalization from GI hemorrhage.

## 2024-10-14 ENCOUNTER — HOME CARE VISIT (OUTPATIENT)
Dept: HOME HEALTH SERVICES | Facility: HOME HEALTHCARE | Age: 84
End: 2024-10-14
Payer: MEDICARE

## 2024-10-14 PROCEDURE — G0151 HHCP-SERV OF PT,EA 15 MIN: HCPCS

## 2024-10-15 VITALS
SYSTOLIC BLOOD PRESSURE: 96 MMHG | TEMPERATURE: 98.3 F | OXYGEN SATURATION: 96 % | HEART RATE: 89 BPM | DIASTOLIC BLOOD PRESSURE: 56 MMHG

## 2024-10-15 NOTE — HOME HEALTH
Patient states doing well, still feeling a bit weak.  Her incision is closed and edema is minimal.  Pain is minimal and she is taking no pain pill.  Her BP was lower today and she was instructed to push her fluids more today to keep it elevated (she reported no symptoms for her low BP).      Plan for next visit  Continue to progress her standing HEP.    Continue to progress her right knee flexion beyond 85 to 90 degrees.

## 2024-10-16 ENCOUNTER — OFFICE VISIT (OUTPATIENT)
Dept: FAMILY MEDICINE CLINIC | Facility: CLINIC | Age: 84
End: 2024-10-16
Payer: MEDICARE

## 2024-10-16 ENCOUNTER — TELEPHONE (OUTPATIENT)
Dept: ORTHOPEDIC SURGERY | Facility: HOSPITAL | Age: 84
End: 2024-10-16
Payer: MEDICARE

## 2024-10-16 ENCOUNTER — HOME CARE VISIT (OUTPATIENT)
Dept: HOME HEALTH SERVICES | Facility: HOME HEALTHCARE | Age: 84
End: 2024-10-16
Payer: MEDICARE

## 2024-10-16 VITALS
HEIGHT: 62 IN | HEART RATE: 68 BPM | OXYGEN SATURATION: 98 % | SYSTOLIC BLOOD PRESSURE: 106 MMHG | BODY MASS INDEX: 18.95 KG/M2 | WEIGHT: 103 LBS | DIASTOLIC BLOOD PRESSURE: 68 MMHG

## 2024-10-16 VITALS
HEART RATE: 78 BPM | DIASTOLIC BLOOD PRESSURE: 85 MMHG | TEMPERATURE: 98.6 F | OXYGEN SATURATION: 96 % | SYSTOLIC BLOOD PRESSURE: 96 MMHG

## 2024-10-16 DIAGNOSIS — B96.81 HELICOBACTER PYLORI GASTRITIS: ICD-10-CM

## 2024-10-16 DIAGNOSIS — Z09 HOSPITAL DISCHARGE FOLLOW-UP: Primary | ICD-10-CM

## 2024-10-16 DIAGNOSIS — K26.4 GASTROINTESTINAL HEMORRHAGE ASSOCIATED WITH DUODENAL ULCER: ICD-10-CM

## 2024-10-16 DIAGNOSIS — D64.9 ANEMIA, NORMOCYTIC NORMOCHROMIC: ICD-10-CM

## 2024-10-16 DIAGNOSIS — I10 ESSENTIAL HYPERTENSION: ICD-10-CM

## 2024-10-16 DIAGNOSIS — R55 SYNCOPE, UNSPECIFIED SYNCOPE TYPE: ICD-10-CM

## 2024-10-16 DIAGNOSIS — E03.9 HYPOTHYROIDISM, ACQUIRED: ICD-10-CM

## 2024-10-16 DIAGNOSIS — E55.9 VITAMIN D DEFICIENCY: ICD-10-CM

## 2024-10-16 DIAGNOSIS — D62 ACUTE POSTHEMORRHAGIC ANEMIA: ICD-10-CM

## 2024-10-16 DIAGNOSIS — Z96.651 STATUS POST RIGHT KNEE REPLACEMENT: ICD-10-CM

## 2024-10-16 DIAGNOSIS — N18.31 STAGE 3A CHRONIC KIDNEY DISEASE: ICD-10-CM

## 2024-10-16 DIAGNOSIS — K29.70 HELICOBACTER PYLORI GASTRITIS: ICD-10-CM

## 2024-10-16 DIAGNOSIS — Z13.6 SCREENING FOR ISCHEMIC HEART DISEASE: ICD-10-CM

## 2024-10-16 PROBLEM — G40.909 EPILEPSY: Status: RESOLVED | Noted: 2024-09-27 | Resolved: 2024-10-16

## 2024-10-16 PROBLEM — A04.0 ENTEROPATHOGENIC ESCHERICHIA COLI INFECTION: Status: RESOLVED | Noted: 2024-09-27 | Resolved: 2024-10-16

## 2024-10-16 PROBLEM — R11.2 NAUSEA & VOMITING: Status: RESOLVED | Noted: 2024-09-23 | Resolved: 2024-10-16

## 2024-10-16 PROBLEM — M17.9 OA (OSTEOARTHRITIS) OF KNEE: Status: RESOLVED | Noted: 2024-08-29 | Resolved: 2024-10-16

## 2024-10-16 PROCEDURE — 99495 TRANSJ CARE MGMT MOD F2F 14D: CPT | Performed by: FAMILY MEDICINE

## 2024-10-16 PROCEDURE — 1160F RVW MEDS BY RX/DR IN RCRD: CPT | Performed by: FAMILY MEDICINE

## 2024-10-16 PROCEDURE — 1159F MED LIST DOCD IN RCRD: CPT | Performed by: FAMILY MEDICINE

## 2024-10-16 PROCEDURE — 3078F DIAST BP <80 MM HG: CPT | Performed by: FAMILY MEDICINE

## 2024-10-16 PROCEDURE — 1111F DSCHRG MED/CURRENT MED MERGE: CPT | Performed by: FAMILY MEDICINE

## 2024-10-16 PROCEDURE — G0157 HHC PT ASSISTANT EA 15: HCPCS

## 2024-10-16 PROCEDURE — 1125F AMNT PAIN NOTED PAIN PRSNT: CPT | Performed by: FAMILY MEDICINE

## 2024-10-16 PROCEDURE — 3074F SYST BP LT 130 MM HG: CPT | Performed by: FAMILY MEDICINE

## 2024-10-16 NOTE — ASSESSMENT & PLAN NOTE
Reevaluate stage IIIa chronic kidney disease prior to November 7 visit  Orders:    Comprehensive Metabolic Panel; Future

## 2024-10-16 NOTE — HOME HEALTH
Subjective: I go to have have procedure on monday to scope in my belly for the ulcer- I had a ruptured ulcer and they need to look at it    Wound-Right knee healing nicely  Edema- Right knee  Dr Thayer TBD  Outpatient- 10-28-24  Falls- none  Medication Changes- none    Assessment: Patient is using the walker in the home for now to reduce pain in her knee and for safety concerns with all she has been thru. I reviewed her standing HEP and balance and reviewed her seated LAQ and heel slides and then lunges on 1st step for ROM and stretches. Patient appears safe with transfers today and re-education on water intake and protien for healing. She continues with low BP and education on static standing/sitting with position changes to make sure she is steady and demonstrated for carryover. Patient re-education on medication regimen, hydration and proper nutrition and used teach back for carryover and accuracy. Patient will benefit with continued PT for progression and reduce risk of decline.    Plan for next visit:  Gait training  Review and progress HEP  Increase ROM  Balance/transfers/safety

## 2024-10-16 NOTE — ASSESSMENT & PLAN NOTE
Syncopal episode with no definitive diagnosis of epilepsy or seizure disorder.  She stopped her Keppra while in the hospital.  No further seizure activity.  EEG results have been reviewed and were not definitive for seizure activity.  Cause of syncope most likely blood loss

## 2024-10-16 NOTE — TELEPHONE ENCOUNTER
Attempted to reach Ms. Garza to see how she has been doing since her RTK 9/18. Message left at this time.

## 2024-10-16 NOTE — ASSESSMENT & PLAN NOTE
condition is stable. The current medical regimen is effective;  continue present plan and medications.    Orders:    Comprehensive Metabolic Panel; Future    CBC & Differential; Future

## 2024-10-16 NOTE — ASSESSMENT & PLAN NOTE
Patient has almost completed her antibiotic course for the helical back to pylori gastritis.  She continues with proton pump inhibitor therapy.

## 2024-10-16 NOTE — ASSESSMENT & PLAN NOTE
Vitamin D deficiency will be reevaluated with upcoming labs  Orders:    Vitamin D,25-Hydroxy; Future

## 2024-10-16 NOTE — PROGRESS NOTES
Transitional Care Follow Up Visit  Subjective     CC: Transitional Care Management Visit        Within 48 business hours after discharge our office contacted her via telephone to coordinate her care and needs.      I reviewed and discussed the details of that call along with the discharge summary, hospital problems, inpatient lab results, inpatient diagnostic studies, and consultation reports with Bailee.     Current outpatient and discharge medications have been reconciled for the patient.  Reviewed by: Eddie Araya MD          10/1/2024     7:56 PM   Date of TCM Phone Call   [facility]   Date of Admission 9/23/2024   Date of Discharge 10/1/2024       Risk for Readmission (LACE) Score: 10 (10/1/2024  6:00 AM)      HPI     The patient presents for evaluation of multiple medical concerns. She is accompanied by her daughter.    She was hospitalized from 09/23/2023 to 10/01/2023 due to a fainting episode. Following knee surgery, she experienced bleeding, anemia, and an ulcer caused by Helicobacter pylori infection, which was treated with antibiotics. During her hospital stay, she was put on seizure medication, which was later discontinued. An EEG and CAT scan revealed a large mass around her pancreas, which was actually pooled blood due to constipation. After becoming unconstipated, she experienced diarrhea and lost nearly 5 units of blood, causing her hemoglobin to drop to 3.8. She was admitted to the ICU, received blood transfusions, and had a ruptured ulcer cauterized. She has not seen a neurologist since her discharge.    She is currently prescribed pantoprazole twice daily and amoxicillin. She was taking Imodium for loose bowels, which she has since stopped. Her nausea has resolved, so she is no longer taking Zofran. She has also stopped taking sucralfate. She reports a sensation of needing to belch or pass gas under her rib cage, which she believes will alleviate the discomfort. She has  "sufficient blood pressure and thyroid medications.    She underwent therapy at a rehabilitation center and continues to use a walker.She has been home from Rehab center since last Wednesday.  She is scheduled to see Dr. Thayer for her right knee next month and will start outpatient therapy on 10/28/2024. She experienced thigh muscle pain after standing for 45 minutes at a  due to low seating. She was advised to achieve a 90-degree angle in her knee. She maintains a healthy diet.    She has a scheduled follow-up with her gastroenterologist on Monday at [facility].     Course During Hospital Stay The following information was reviewed by: Eddie Araya MD on 10/16/2024:   Hospital Course  84 y.o. female with past medical history significant for acute blood loss anemia, recently had knee surgery admitted with hypotension and hypovolemic shock from GI bleed and blood loss anemia. She has a back infection and also H. pylori positive. Treated with 3 days of Zosyn per ID for E pack. Had EGD and clipping. Duodenal ulcer found. 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 She also had possible seizures on EEG and started on Keppra per neurology. She did come back positive for H. pylori GI recommends starting treatment as an outpatient, patient will follow-up with PCP and GI on outpatient basis.  I have seen and examined patient at bedside, total time spent is more than 30 minutes.     The following portions of the patient's history were reviewed and updated as appropriate: allergies, current medications, past family history, past medical history, past social history, past surgical history, and problem list.     Vitals:    10/16/24 1107   BP: 106/68   Pulse: 68   SpO2: 98%   Weight: 46.7 kg (103 lb)   Height: 157.5 cm (62\")             Objective   Physical Exam  Vitals reviewed.   Constitutional:       General: She is not in acute distress.  Eyes:      General: Lids are normal. "      Conjunctiva/sclera: Conjunctivae normal.   Neck:      Vascular: No carotid bruit.      Trachea: No tracheal deviation.   Cardiovascular:      Rate and Rhythm: Normal rate and regular rhythm.      Heart sounds: Normal heart sounds. No murmur heard.  Pulmonary:      Effort: Pulmonary effort is normal.      Breath sounds: Normal breath sounds.   Musculoskeletal:        Legs:       Comments: Decrease flexion of right leg   Skin:     General: Skin is warm and dry.   Neurological:      Mental Status: She is alert. She is not disoriented.   Psychiatric:         Speech: Speech normal.         Behavior: Behavior normal. Behavior is cooperative.                  DATA REVIEWED:    The following data was reviewed by: Eddie Araya MD on 10/16/2024:  EEG Awake or Drowsy Routine (09/23/2024 15:38)   Results  Laboratory Studies  Hemoglobin 10.7.    Testing  EEG showed mild diffuse background slowing along with left posterior Ellipta epileptiform discharges.         Assessment & Plan  Hospital discharge follow-up  Patient was initially admitted to the hospital and then followed up with rehabilitation.  She has been home since last Wednesday.  She is doing much better.  She does have follow-ups with her gastroenterologist in the near future to follow-up on duodenal ulcer.  She continues to take pantoprazole but has stopped taking the sucralfate medication for this condition.  She did finish up her antibiotics for the Helicobacter pylori except still has a few more doses of amoxicillin.       Syncope, unspecified syncope type  Syncopal episode with no definitive diagnosis of epilepsy or seizure disorder.  She stopped her Keppra while in the hospital.  No further seizure activity.  EEG results have been reviewed and were not definitive for seizure activity.  Cause of syncope most likely blood loss       Status post right knee replacement  Right knee surgery is progressing well.  Current flexion is about 110 degrees.  Patient is  still using a walker for ambulation.  She is still having physical therapy which will begin in earnest very soon.  She does have follow-up with her orthopedist for evaluation of her progression.       Gastrointestinal hemorrhage associated with duodenal ulcer  Patient does have follow-up with her gastroenterologist to follow-up on the GI hemorrhage and duodenal ulcer.  For now she will continue with pantoprazole twice daily.  Orders:    CBC & Differential; Future    Acute posthemorrhagic anemia  Anemia will be evaluated with serial labs.       Helicobacter pylori gastritis  Patient has almost completed her antibiotic course for the helical back to pylori gastritis.  She continues with proton pump inhibitor therapy.       Essential hypertension   condition is stable. The current medical regimen is effective;  continue present plan and medications.    Orders:    Comprehensive Metabolic Panel; Future    CBC & Differential; Future    Hypothyroidism, acquired  Reevaluate hypothyroidism with labs prior to November 7 visit  Orders:    TSH; Future    Stage 3a chronic kidney disease    Reevaluate stage IIIa chronic kidney disease prior to November 7 visit  Orders:    Comprehensive Metabolic Panel; Future    Anemia, normocytic normochromic  Recheck labs prior to November 7 visit  Orders:    CBC & Differential; Future    Vitamin D deficiency  Vitamin D deficiency will be reevaluated with upcoming labs  Orders:    Vitamin D,25-Hydroxy; Future    Screening for ischemic heart disease  Screening labs for lipid profile will be added to upcoming lab visit  Orders:    Lipid Panel With / Chol / HDL Ratio; Future    CK; Future          Orders Placed This Encounter   Procedures    Comprehensive Metabolic Panel    Lipid Panel With / Chol / HDL Ratio    CK    TSH    Vitamin D,25-Hydroxy    CBC & Differential          Follow Up     Return in about 22 days (around 11/7/2024) for next scheduled follow up.        Patient or patient  representative verbalized consent for the use of Ambient Listening during the visit with  Eddie Araya MD for chart documentation. 10/16/2024  11:45 EDT

## 2024-10-16 NOTE — ASSESSMENT & PLAN NOTE
Patient does have follow-up with her gastroenterologist to follow-up on the GI hemorrhage and duodenal ulcer.  For now she will continue with pantoprazole twice daily.  Orders:    CBC & Differential; Future

## 2024-10-16 NOTE — ASSESSMENT & PLAN NOTE
Right knee surgery is progressing well.  Current flexion is about 110 degrees.  Patient is still using a walker for ambulation.  She is still having physical therapy which will begin in earnest very soon.  She does have follow-up with her orthopedist for evaluation of her progression.

## 2024-10-18 ENCOUNTER — TELEPHONE (OUTPATIENT)
Dept: GASTROENTEROLOGY | Facility: CLINIC | Age: 84
End: 2024-10-18

## 2024-10-18 ENCOUNTER — HOME CARE VISIT (OUTPATIENT)
Dept: HOME HEALTH SERVICES | Facility: HOME HEALTHCARE | Age: 84
End: 2024-10-18
Payer: MEDICARE

## 2024-10-18 ENCOUNTER — TELEPHONE (OUTPATIENT)
Dept: FAMILY MEDICINE CLINIC | Facility: CLINIC | Age: 84
End: 2024-10-18
Payer: MEDICARE

## 2024-10-18 VITALS
SYSTOLIC BLOOD PRESSURE: 98 MMHG | OXYGEN SATURATION: 95 % | TEMPERATURE: 96.9 F | HEART RATE: 83 BPM | DIASTOLIC BLOOD PRESSURE: 58 MMHG

## 2024-10-18 VITALS
DIASTOLIC BLOOD PRESSURE: 56 MMHG | SYSTOLIC BLOOD PRESSURE: 98 MMHG | HEART RATE: 83 BPM | TEMPERATURE: 96.9 F | OXYGEN SATURATION: 95 %

## 2024-10-18 PROCEDURE — G0157 HHC PT ASSISTANT EA 15: HCPCS

## 2024-10-18 PROCEDURE — G0152 HHCP-SERV OF OT,EA 15 MIN: HCPCS

## 2024-10-18 NOTE — HOME HEALTH
Subjective: I am hurting in my ulcer today and had a little diarhea- the doctor is aware and she is having a scope on monday- daughter has a call into the doctor at this time    Wound-Right knee healing nicely   Edema- Right knee   Dr Thayer TBD   Outpatient- 10-28-24   Falls- none   Medication Changes- none     Assessment: Patient was not feeling good this morning however after she took her Pantoprazole and ondasnsetron she felt better at this time.Patient is using the walker in the home for now to reduce pain in her knee and for safety concerns with all she has been thru. I reviewed her standing HEP with limited reps today and reviewed her seated LAQ and heel slides. Patient appears safe with transfers today and re-education on water intake and protien for healing. She continues with low BP and education on static standing/sitting with position changes to make sure she is steady and demonstrated for carryover. Patient re-education on medication regimen, hydration and proper nutrition and used teach back for carryover and accuracy. Patient will benefit with continued PT for progression and reduce risk of decline. Patient may need to be seen in home a little longer since she had the Ulcer complications since knee surgery.    Plan for next visit:   Gait training   Review and progress HEP   Increase ROM   Balance

## 2024-10-18 NOTE — TELEPHONE ENCOUNTER
Returned call to dtr, she is asking about an EGD scheduled for her mom.  I do not see this scheduled.  Dtr thought they were looking at pancrease and after further investigation this pt is scheduled with Dr Tillman for an EUS.    I have advised they reach out to that office, and she verbalized understanding.

## 2024-10-18 NOTE — TELEPHONE ENCOUNTER
Caller: Phyllis Diaz    Relationship: Emergency Contact    Best call back number: [phone]    What was the call regarding: PATIENT DAUGHTER CALLING TODAY WITH SEVERAL QUESTIONS ABOUT SOME MEDICATIONS THAT HER MOM HAS BEEN TAKING AND THE EFFECTS FROM THOSE MEDICATIONS    PLEASE GIVE CALLBACK

## 2024-10-18 NOTE — TELEPHONE ENCOUNTER
Pt called to report that she is on pantoprazole, she is having nausea, was having diarrhea (no blood), diarrhea has now subsided. Pt also has zofran from her surgeon to take, didn't know she could take it, but started taking it 2 hours ago, she reports she is still nauseous, I let her know she needs to give it more time, but we did not write these meds, for management she needs to contact who wrote the prescriptions for her. Pt voiced understanding.

## 2024-10-18 NOTE — Clinical Note
Dear Dr. Thayer,   OT nisha completed this date with no OT services indicated at this time. HH PT/SN remain in the home.  Thank you,   Myrna Powers OTR/L

## 2024-10-18 NOTE — HOME HEALTH
"REASON FOR REFERRAL: Pt is an 83 y/o female who is s/p TKA 9/18 by Dr. Thayer. She also had some GI complications resulting in an ICU stay.  PMHx: B CHAS, hypothyroidism, HTN, OA  OT's FOCUS OF CARE: home safety  SUBJECTIVE: \"I'm just so weak, but I am brody to be alive.\"  SOCIAL & ENVIRONMENTAL SITUATION: Pt lives in a 2SH alone, but her son/daughter are alternating staying with her and son plans to move in within the next month. Pt's full bath is upstairs and she is working with PT on progressing I and stamina to complete stairs. Pt has all recommended AE.   PATIENT'S &/OR CAREGIVER'S GOAL: Pt wishes to return to full ADL I and mobility.  INTERVENTIONS: HEP  ASSESSMENT: No skilled OT services indicated. Pt is MI with all ADLs with the exception of bathing due to inability to climb the stairs. Once she progresses with PT, pt will be able to shower with assistance from daughter.  PLAN: OT eval only  PLAN FOR NEXT VISIT: N/A"

## 2024-10-18 NOTE — TELEPHONE ENCOUNTER
Hub staff attempted to follow warm transfer process and was unsuccessful     Caller: Phyllis Diaz    Relationship to patient: Emergency Contact    Best call back number: [phone]    Patient is needing: PT DAUGHTER CALLED IN BECAUSE PT HAS NAUSEA, BELCHING, DISCOMFORT IN UPPER RIGHT AREA  NEAR RIBCAGE UNDER BREAST.  WHEN PT EAT IT SUBSIDES AND IF LAY DOWN IT GIVES SOME RELIEF.   THINKS MAYBE ISSUE WHERE ULCER RUPTURED.     PLEASE CALL TO DISCUSS FURTHER. PT HAS AN EGD APPT SCHEDULED FOR 10/21/24 AT [facility].     PHARMACY - [facility]

## 2024-10-21 ENCOUNTER — ON CAMPUS - OUTPATIENT (OUTPATIENT)
Age: 84
End: 2024-10-21

## 2024-10-21 ENCOUNTER — ON CAMPUS - OUTPATIENT (OUTPATIENT)
Dept: URBAN - METROPOLITAN AREA HOSPITAL 77 | Facility: HOSPITAL | Age: 84
End: 2024-10-21
Payer: MEDICARE

## 2024-10-21 DIAGNOSIS — R10.11 RIGHT UPPER QUADRANT PAIN: ICD-10-CM

## 2024-10-21 DIAGNOSIS — R94.5 ABNORMAL RESULTS OF LIVER FUNCTION STUDIES: ICD-10-CM

## 2024-10-21 DIAGNOSIS — K26.9 DUODENAL ULCER, UNSPECIFIED AS ACUTE OR CHRONIC, WITHOUT HEM: ICD-10-CM

## 2024-10-21 DIAGNOSIS — K83.8 OTHER SPECIFIED DISEASES OF BILIARY TRACT: ICD-10-CM

## 2024-10-21 PROCEDURE — 43237 ENDOSCOPIC US EXAM ESOPH: CPT | Performed by: INTERNAL MEDICINE

## 2024-10-21 PROCEDURE — 43262 ENDO CHOLANGIOPANCREATOGRAPH: CPT | Performed by: INTERNAL MEDICINE

## 2024-10-22 ENCOUNTER — HOME CARE VISIT (OUTPATIENT)
Dept: HOME HEALTH SERVICES | Facility: HOME HEALTHCARE | Age: 84
End: 2024-10-22
Payer: MEDICARE

## 2024-10-22 VITALS
HEART RATE: 74 BPM | SYSTOLIC BLOOD PRESSURE: 104 MMHG | TEMPERATURE: 97.2 F | RESPIRATION RATE: 18 BRPM | DIASTOLIC BLOOD PRESSURE: 56 MMHG | OXYGEN SATURATION: 97 %

## 2024-10-22 PROCEDURE — G0151 HHCP-SERV OF PT,EA 15 MIN: HCPCS

## 2024-10-23 ENCOUNTER — HOME CARE VISIT (OUTPATIENT)
Dept: HOME HEALTH SERVICES | Facility: HOME HEALTHCARE | Age: 84
End: 2024-10-23
Payer: MEDICARE

## 2024-10-23 VITALS
HEART RATE: 85 BPM | OXYGEN SATURATION: 95 % | TEMPERATURE: 97.2 F | SYSTOLIC BLOOD PRESSURE: 104 MMHG | DIASTOLIC BLOOD PRESSURE: 54 MMHG | RESPIRATION RATE: 18 BRPM

## 2024-10-23 PROCEDURE — G0151 HHCP-SERV OF PT,EA 15 MIN: HCPCS

## 2024-10-25 ENCOUNTER — HOME CARE VISIT (OUTPATIENT)
Dept: HOME HEALTH SERVICES | Facility: HOME HEALTHCARE | Age: 84
End: 2024-10-25
Payer: MEDICARE

## 2024-10-25 VITALS
SYSTOLIC BLOOD PRESSURE: 102 MMHG | OXYGEN SATURATION: 98 % | TEMPERATURE: 97.5 F | HEART RATE: 96 BPM | RESPIRATION RATE: 18 BRPM | DIASTOLIC BLOOD PRESSURE: 56 MMHG

## 2024-10-25 PROCEDURE — G0151 HHCP-SERV OF PT,EA 15 MIN: HCPCS

## 2024-10-25 NOTE — Clinical Note
This is to inform your office of home PT/agency discharge effective [id number] with patient to start outpatient PT at [facility] [id number]. Thanks for the referral!

## 2024-10-25 NOTE — HOME HEALTH
"Subjective: \"I'm doing better every day now.\"    Falls reported: none    Medication changes: omeprazole 40 mg 1 tab daily started [id number]"

## 2024-10-28 ENCOUNTER — TREATMENT (OUTPATIENT)
Dept: PHYSICAL THERAPY | Facility: CLINIC | Age: 84
End: 2024-10-28
Payer: MEDICARE

## 2024-10-28 DIAGNOSIS — R29.898 LOSS OF MOVEMENT: ICD-10-CM

## 2024-10-28 DIAGNOSIS — R26.2 DIFFICULTY WALKING: ICD-10-CM

## 2024-10-28 DIAGNOSIS — R26.9 ABNORMAL GAIT: ICD-10-CM

## 2024-10-28 DIAGNOSIS — Z96.651 H/O TOTAL KNEE REPLACEMENT, RIGHT: Primary | ICD-10-CM

## 2024-10-28 DIAGNOSIS — R53.1 STRENGTH LOSS OF: ICD-10-CM

## 2024-10-28 PROCEDURE — 97110 THERAPEUTIC EXERCISES: CPT | Performed by: PHYSICAL THERAPIST

## 2024-10-28 PROCEDURE — 97162 PT EVAL MOD COMPLEX 30 MIN: CPT | Performed by: PHYSICAL THERAPIST

## 2024-10-28 NOTE — PROGRESS NOTES
Physical Therapy Initial Evaluation and Plan of Care      Patient: Bailee Garza   : 1940  Diagnosis/ICD-10 Code:  No primary diagnosis found.  Referring practitioner: TERESA Monge  Date of Initial Visit: 10/28/2024  Today's Date: 10/28/2024  Patient seen for Visit count could not be calculated. Make sure you are using a visit which is associated with an episode. sessions           Subjective: Brigitte stated that her R knee was replaced on 2024 and went home on the same day afer surgery.  She received home health physical therapy once getting home and on several days was re-admitted to the hospital due to a ruptured ulcer.  Surgery was performed and she was in the hospital for several weeks, re-starting home health PT on 2024.  Currently she has intermittent R knee pain peaking at 4/10.  She uses a cane for ambulation in the house and out in the community.  Today is her first day out of the house.  Brigitte has 2 steps to enter/exit her house and lives in a 2 story home with basement, with rails on each.  Brigitte was driven here today by her son.  She reports no numbness and tingling in her legs.  Brigitte has a shower seat in a tub, without arm rails at home. She has an elevated toilet seat.    Patient's goals in physical therapy:  no pain and normal function of the R leg without the use of an assistive device.       Objective     Observation:  Brigitte arrived in the clinic ambulating independently with a SC.  Her gait pattern is abnormal, decreased heel strike on the R, decreased time in midstance on the R and she appears very cautious during the gait cycle.     Incision appearance:  it is healing well without signs of inflammation.    Mid pattellar girth: 34.3 cm.   MMT: R knee, extension L 5/5 and R 4+/5 and flexion L 4+/5 and R 4-/5  Supine R knee AROM: 2-104 degrees of flexion     Treatment  NuStep, seat at 7, work load of 5, x 5+minutes  LAQs x 10, B  R heel slides, 10 and after  manual quadriceps stretching, + 5 more  R quad sets, long sitting x 10  Ankle pumps, supine, x 20  R heel slides x 10    Self care: I reviewed her current HEP    Functional Outcome Score: KOS, 27/80=34% or a 66% deficit    Assessment & Plan       Assessment  Impairments: abnormal gait, abnormal or restricted ROM, activity intolerance, impaired physical strength, lacks appropriate home exercise program, pain with function and weight-bearing intolerance   Other impairment: edema, difficulty walking/navigating stairs  Functional limitations: carrying objects, lifting, walking, pulling, pushing, standing, stooping and unable to perform repetitive tasks   Assessment details: Bailee Garza is a 84 y.o. female referred to physical therapy for R TKA. She presents with an evolving clinical presentation.  She has comorbidities to include deconditioning secondary to recent hospitalization for GI bleed.  She has no known personal factors  that may affect her progress in the plan of care.  Signs and symptoms are consistent with physical therapy diagnosis of R TKA, loss of movement, strength loss of, edema, difficulty walking/ navigating stairs, abnormal gait and she will require education for self care. .    Prognosis: good    Goals  Plan Goals: STGs to be met in 4 weeks  1. Brigitte progressed therapeutic exercise with low level closed chain PREs.  2. AROM of the R knee, supine, equals 0-115 degrees of flexion.  3. She is ambulating up/down 4 steps, with rail and SC, with a step to technique.    LTGs to be met in 12 weeks  1. AROM of the R knee, supine, equals 0-120 degrees of flexion for improved car transfers, comfort in sitting and general ADL mobility.  2. She is ambulating independently on level surface and going up/down steps, using rail, with a normal gait pattern.  3. KOS deficit is below 20%.  4. Brigitte is independent with self care and a HEP.     Plan  Therapy options: will be seen for skilled therapy services  Planned  therapy interventions: manual therapy, neuromuscular re-education, postural training, soft tissue mobilization, strengthening, stretching, therapeutic activities, transfer training, home exercise program, gait training, functional ROM exercises, flexibility, body mechanics training, balance/weight-bearing training and ADL retraining  Frequency: 2x week  Duration in weeks: 12  Treatment plan discussed with: patient  Plan details: Brigitte will begin low level closed chain PREs next visit.         Timed:  Manual Therapy:         mins  26681;  Therapeutic Exercise:    23     mins  69929;     Neuromuscular Sofya:    2    mins  49970;    Therapeutic Activity:          mins  92922;     Gait Training:           mins  51663;     Ultrasound:          mins  10768;    Iontophoresis         mins 53525  Dry Needling        mins 31813/ 20561 (Self-pay)      Untimed:  Electrical Stimulation:         mins  68301 ( );  Traction:       mins  46814;   Low Eval          Mins  63929  Mod Eval     25     Mins  21402  High Eval                            Mins  46267    Timed Treatment:   25   mins   Total Treatment:     50   mins    PT SIGNATURE: Deven Nunez PT     License Number: [place] [id number]    Electronically signed by Deven Nunez PT, 10/28/24, 9:22 AM EDT    DATE TREATMENT INITIATED: 10/28/2024    Initial Certification  Certification Period: 1/26/2025  I certify that the therapy services are furnished while this patient is under my care.  The services outlined above are required by this patient, and will be reviewed every 90 days.     PHYSICIAN: Speedy Sanz APRN   NPI: [id number]                                         DATE:     Please sign and return via fax to [phone] Thank you, [facility].

## 2024-10-31 ENCOUNTER — TREATMENT (OUTPATIENT)
Dept: PHYSICAL THERAPY | Facility: CLINIC | Age: 84
End: 2024-10-31
Payer: MEDICARE

## 2024-10-31 DIAGNOSIS — Z96.651 H/O TOTAL KNEE REPLACEMENT, RIGHT: Primary | ICD-10-CM

## 2024-10-31 DIAGNOSIS — R53.1 STRENGTH LOSS OF: ICD-10-CM

## 2024-10-31 DIAGNOSIS — R26.9 ABNORMAL GAIT: ICD-10-CM

## 2024-10-31 DIAGNOSIS — R26.2 DIFFICULTY WALKING: ICD-10-CM

## 2024-10-31 DIAGNOSIS — R29.898 LOSS OF MOVEMENT: ICD-10-CM

## 2024-10-31 NOTE — PROGRESS NOTES
"Physical Therapy Daily Treatment Note    [facility]  [address] [place] [address]  [phone] (phone)  [phone] (fax)    Patient: Bailee Garza   : 1940  Diagnosis/ICD-10 Code:  H/O total knee replacement, right [Z96.651]  Referring practitioner: TERESA Monge  Today's Date: 10/31/2024  Patient seen for 2 sessions    Visit Diagnoses:    ICD-10-CM ICD-9-CM   1. H/O total knee replacement, right  Z96.651 V43.65   2. Difficulty walking  R26.2 719.7   3. Loss of movement  R29.898 344.9   4. Strength loss of  R53.1 780.79   5. Abnormal gait  R26.9 781.2              Subjective     Objective     Treatment    Therapeutic exercise  NuStep, seat at 7, work load of 5, x 5 minutes  2. [place] hip abduction, 15 lbs., 15 x 2  3. Step ups, 2\", x 10, B, using column for balance  4. Lateral step downs, 2\", x 10, B, using column for balance  5. LAQs, x 10, B  6. R quad sets, roll at R ankle, x 10  7. R heel slides x 10    NMR: verbal cues for eccentric control with the hip abduction, for each step exercise to gaze at the ground ahead, 20', for increased proprioception/balance, to use eccentric control with the static leg and land toe to heel with the dynamic foot, softly.     Manual therapy:  In supine, R hamstring stretch, 30s x 2 and R quadriceps stretch, 30s x 2    Gait trainin' x 4, level surface, without an assistive device.     Supine R knee AROM, post treatment, 0-108 degrees of flexion    Assessment & Plan       Assessment  Assessment details: Brigitte progressed exercises with closed chain and hip PREs.  She reached full knee extension in supine today.  Brigitte required verbal cues for exercise technique and tolerated treatment well.     Plan  Plan details: Continue per treatment plan.                Timed:    Manual Therapy:    4     mins  82349;  Therapeutic Exercise:    27     mins  41044;     Neuromuscular Sofya:    8    mins  73970;    Therapeutic " Activity:          mins  56477;     Gait Trainin     mins  21684;     Ultrasound:          mins  36421;    Aquatic Therapy           mins     12366;  Self Care                               mins   07769        Untimed:  Electrical Stimulation:           mins  34311 ( );  Traction:           mins  22750;   Dry Needling  (1-2 muscles)                  mins  (Self-pay)  Dry Needling (3-4 muscles)  ____   (Self-pay)  Dry Needling Trial             DRYNDLTRIAL  (No Charge)    Timed Treatment:   41   mins   Total Treatment:     41   mins    Deven Nunez PT  Physical Therapist    [place] License:[id number]

## 2024-11-05 ENCOUNTER — TREATMENT (OUTPATIENT)
Dept: PHYSICAL THERAPY | Facility: CLINIC | Age: 84
End: 2024-11-05
Payer: MEDICARE

## 2024-11-05 DIAGNOSIS — R26.9 ABNORMAL GAIT: ICD-10-CM

## 2024-11-05 DIAGNOSIS — R53.1 STRENGTH LOSS OF: ICD-10-CM

## 2024-11-05 DIAGNOSIS — R26.2 DIFFICULTY WALKING: ICD-10-CM

## 2024-11-05 DIAGNOSIS — Z96.651 H/O TOTAL KNEE REPLACEMENT, RIGHT: Primary | ICD-10-CM

## 2024-11-05 DIAGNOSIS — R29.898 LOSS OF MOVEMENT: ICD-10-CM

## 2024-11-05 NOTE — PROGRESS NOTES
"Physical Therapy Daily Treatment Note    [facility]  [address] [place] [address]  [phone] (phone)  [phone] (fax)    Patient: Bailee Garza   : 1940  Diagnosis/ICD-10 Code:  H/O total knee replacement, right [Z96.651]  Referring practitioner: TERESA Monge  Today's Date: 2024  Patient seen for 3 sessions    Visit Diagnoses:    ICD-10-CM ICD-9-CM   1. H/O total knee replacement, right  Z96.651 V43.65   2. Difficulty walking  R26.2 719.7   3. Loss of movement  R29.898 344.9   4. Strength loss of  R53.1 780.79   5. Abnormal gait  R26.9 781.2              Subjective: Brigitte stated that the R knee feels numb.  She does note that her function is improving slowly.    Objective     Treatment    Therapeutic exercise  NuStep, seat at 7, work load of 5, x 3  minutes, then with a work load of 6 x 3minutes  2. [place] hip abduction, 10 lbs., 15 x 2  3. Step ups, 4\", x 10, B, using column for balance  4. Lateral step downs, 4\", x 10, B, using column for balance  5. [place] leg press, seat at 7, 80 lbs., 10 x 2  6. LAQs, x 10, B  7. R quad sets, long sitting, x 10  After quadriceps stretching, 30s x 3  9. R heel slides x 10    NMR: verbal cues for exercise technique were given.  To use eccentric control with each PRE, to gaze to the ground ahead, 20', for better balance/proprioception with the step exercises, to land toe to heel with the dynamic foot with the step exercises, to avoid locking the knee in extension and abducting the hips into knee flexion on the leg press.     Manual therapy:  In supine, R hamstrings stretching, 30s x 3 and then R quadriceps stretching, 30s x 3    Supine R knee AROM, after treatment, 0-110 degrees of flexion     Assessment & Plan       Assessment  Assessment details: Brigitte is noticing gradual improvement with weight bearing activities.  She required verbal cues for exercise technique and progressed her exercises today.  " Active knee extension was normal today after manual therapy.     Plan  Plan details: Continue per treatment plan.                Timed:    Manual Therapy:    4     mins  64720;  Therapeutic Exercise:    33     mins  64362;     Neuromuscular Sofya:    8    mins  06192;    Therapeutic Activity:          mins  11515;     Gait Training:           mins  17057;     Ultrasound:          mins  99541;    Aquatic Therapy           mins     25735;  Self Care                               mins   48697        Untimed:  Electrical Stimulation:           mins  31082 ( );  Traction:           mins  17957;   Dry Needling  (1-2 muscles)                  mins 20560 (Self-pay)  Dry Needling (3-4 muscles)  ____  20561 (Self-pay)  Dry Needling Trial             DRYNDLTRIAL  (No Charge)    Timed Treatment:   45   mins   Total Treatment:     45   mins    Deven Nunez PT  Physical Therapist    [place] License:[id number]

## 2024-11-07 ENCOUNTER — OFFICE VISIT (OUTPATIENT)
Dept: FAMILY MEDICINE CLINIC | Facility: CLINIC | Age: 84
End: 2024-11-07
Payer: MEDICARE

## 2024-11-07 ENCOUNTER — OFFICE VISIT (OUTPATIENT)
Dept: GASTROENTEROLOGY | Facility: CLINIC | Age: 84
End: 2024-11-07
Payer: MEDICARE

## 2024-11-07 VITALS
SYSTOLIC BLOOD PRESSURE: 124 MMHG | BODY MASS INDEX: 19.14 KG/M2 | OXYGEN SATURATION: 97 % | HEIGHT: 62 IN | WEIGHT: 104 LBS | HEART RATE: 66 BPM | DIASTOLIC BLOOD PRESSURE: 68 MMHG

## 2024-11-07 VITALS
WEIGHT: 104.5 LBS | SYSTOLIC BLOOD PRESSURE: 108 MMHG | HEIGHT: 62 IN | BODY MASS INDEX: 19.23 KG/M2 | DIASTOLIC BLOOD PRESSURE: 65 MMHG | HEART RATE: 91 BPM

## 2024-11-07 DIAGNOSIS — Z12.31 ENCOUNTER FOR SCREENING MAMMOGRAM FOR BREAST CANCER: ICD-10-CM

## 2024-11-07 DIAGNOSIS — Z78.0 MENOPAUSE: ICD-10-CM

## 2024-11-07 DIAGNOSIS — I10 ESSENTIAL HYPERTENSION: ICD-10-CM

## 2024-11-07 DIAGNOSIS — Z86.19 HISTORY OF HELICOBACTER PYLORI INFECTION: Primary | ICD-10-CM

## 2024-11-07 DIAGNOSIS — K26.9 DUODENAL ULCER: ICD-10-CM

## 2024-11-07 DIAGNOSIS — K83.1 AMPULLARY STENOSIS: ICD-10-CM

## 2024-11-07 DIAGNOSIS — E03.9 HYPOTHYROIDISM, ACQUIRED: ICD-10-CM

## 2024-11-07 DIAGNOSIS — Z00.00 MEDICARE ANNUAL WELLNESS VISIT, SUBSEQUENT: Primary | ICD-10-CM

## 2024-11-07 DIAGNOSIS — Z13.820 ENCOUNTER FOR SCREENING FOR OSTEOPOROSIS: ICD-10-CM

## 2024-11-07 DIAGNOSIS — N18.31 STAGE 3A CHRONIC KIDNEY DISEASE: ICD-10-CM

## 2024-11-07 PROBLEM — K92.2 GASTROINTESTINAL HEMORRHAGE: Status: RESOLVED | Noted: 2024-09-23 | Resolved: 2024-11-07

## 2024-11-07 RX ORDER — PANTOPRAZOLE SODIUM 40 MG/1
40 TABLET, DELAYED RELEASE ORAL
COMMUNITY

## 2024-11-07 RX ORDER — HYDROCHLOROTHIAZIDE 12.5 MG/1
12.5 TABLET ORAL DAILY
Qty: 30 TABLET | Refills: 11 | Status: SHIPPED | OUTPATIENT
Start: 2024-11-07 | End: 2025-11-07

## 2024-11-07 RX ORDER — VALSARTAN 80 MG/1
80 TABLET ORAL DAILY
Qty: 30 TABLET | Refills: 11 | Status: SHIPPED | OUTPATIENT
Start: 2024-11-07 | End: 2025-11-07

## 2024-11-07 RX ORDER — LEVOTHYROXINE SODIUM 75 MCG
75 TABLET ORAL
Qty: 30 TABLET | Refills: 11 | Status: SHIPPED | OUTPATIENT
Start: 2024-11-07 | End: 2025-11-07

## 2024-11-07 RX ORDER — CHLORCYCLIZINE HYDROCHLORIDE AND PSEUDOEPHEDRINE HYDROCHLORIDE 25; 60 MG/1; MG/1
TABLET ORAL
COMMUNITY

## 2024-11-07 NOTE — PROGRESS NOTES
"Chief Complaint  Irritable Bowel Syndrome    Subjective          History of Present Illness    Bailee Garza is a  84 y.o. female presents for follow-up after recent hospital admission.  She has a history of hypertension, hypothyroidism, OA, and anemia who presented to the hospital 9/23 with syncopal episode, nausea, and vomiting.  CT completed showing duodenal wall thickening and biliary dilation.  She underwent EGD showing nonbleeding duodenal ulcer - path was (+) for H. Pylori. She recently completed antibiotics and had been doing really well. She denies any abdominal pain, nausea, vomiting, black or bloody stool. She followed up w/ Dr. 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  She is still on pantoprazole.    CT abdomen pelvis was completed showing possible pancreatic mass and duodenal wall thickening.     CT A/P: Intrathoracic and extrahepatic biliary dilatation with abrupt tapering at the ampulla where there is questionable hypoenhancing masslike soft tissue at the ampulla and pancreatic head, distended gallbladder.     MRCP 9/25/2024 noted suboptimal exam due to respiratory motion.  Distended gallbladder and moderate to severe distention of common bile duct new since 2017.  Recommended ERCP.    EGD 9/24/2024 showed small hiatal hernia, normal stomach, nonbleeding duodenal ulcer with flat pigmented spot which was injected, nonbleeding duodenal ulcers with no stigmata of bleeding.  H. pylori was positive.    ERCP in 10/21/2024 showed likely ampullary stenosis status post sphincterotomy with balloon sweeps.  No obvious pancreatic mass or CBD mass.  No changes of chronic pancreatitis noted.    Objective   Vital Signs:   /65 (BP Location: Left arm, Patient Position: Sitting, Cuff Size: Adult)   Pulse 91   Ht 157.5 cm (62\")   Wt 47.4 kg (104 lb 8 oz)   BMI 19.11 kg/m²       Physical Exam  Constitutional:       General: She is not in acute distress.     " Appearance: Normal appearance.   Eyes:      General: No scleral icterus.  Cardiovascular:      Rate and Rhythm: Normal rate.   Pulmonary:      Effort: Pulmonary effort is normal.   Abdominal:      General: Abdomen is flat. Bowel sounds are normal. There is no distension.      Tenderness: There is no abdominal tenderness. There is no guarding.   Skin:     Coloration: Skin is not jaundiced.   Neurological:      General: No focal deficit present.      Mental Status: She is alert and oriented to person, place, and time.   Psychiatric:         Mood and Affect: Mood normal.         Behavior: Behavior normal.          Result Review :   The following data was reviewed by: Miriam Gilbert PA-C on 11/07/2024:  CMP          9/29/2024    10:28 9/30/2024    04:46 10/1/2024    05:02   CMP   Glucose 96  90  87    BUN 12  12  14    Creatinine 0.93  0.86  0.94    EGFR 61.1  67.1  60.3    Sodium 137  136  141    Potassium 4.0  3.8  3.6    Chloride 103  101  102    Calcium 8.6  9.0  8.7    BUN/Creatinine Ratio 12.9  14.0  14.9    Anion Gap 9.0  9.5  10.6      CBC          9/28/2024    07:27 9/30/2024    04:46 10/1/2024    05:02   CBC   WBC 10.37  11.73  10.15    RBC 3.12  3.53  3.48    Hemoglobin 9.4  10.4  10.7    Hematocrit 28.7  33.3  33.0    MCV 92.0  94.3  94.8    MCH 30.1  29.5  30.7    MCHC 32.8  31.2  32.4    RDW 13.3  13.3  13.6    Platelets 309  375  412              Assessment:   Diagnoses and all orders for this visit:    1. History of Helicobacter pylori infection (Primary)  -     H. Pylori Antigen, Stool - Stool, Per Rectum    2. Duodenal ulcer    3. Ampullary stenosis          Plan:   -Recommend she continue on pantoprazole for now  -Discussed H. pylori stool antigen to test for eradication  -She just had labs drawn with her primary care provider - will await results         Follow Up   Return if symptoms worsen or fail to improve.    Dragon dictation used throughout this note.         Miraim Gilbert PA-C  [facility]  [facility]  [address] [place] [address]  Office: [phone]

## 2024-11-07 NOTE — ASSESSMENT & PLAN NOTE
Hypertension is stable and controlled  Continue current treatment regimen.  Blood pressure will be reassessed in 1 year.    Orders:    hydroCHLOROthiazide 12.5 MG tablet; Take 1 tablet by mouth Daily. Indications: High Blood Pressure    valsartan (DIOVAN) 80 MG tablet; Take 1 tablet by mouth Daily. Indications: High Blood Pressure

## 2024-11-07 NOTE — PROGRESS NOTES
Subjective   The ABCs of the Annual Wellness Visit  Medicare Wellness Visit      Bailee Garza is a 84 y.o. patient who presents for a Medicare Wellness Visit.    The following portions of the patient's history were reviewed and   updated as appropriate: allergies, current medications, past family history, past medical history, past social history, past surgical history, and problem list.    Compared to one year ago, the patient's physical   health is the same.  Compared to one year ago, the patient's mental   health is worse. More forgetful    Recent Hospitalizations:  This patient has had a [facility] admission record on file within the last 365 days.  Current Medical Providers:  Patient Care Team:  Eddie Araya MD as PCP - General  Eddie Araya MD as PCP - Family Medicine  Wilfred Saavedra MD as Consulting Physician (Cardiology)  Anupam Ruiz MD as Consulting Physician (Orthopedic Surgery)  Avery Ruiz MD as Consulting Physician (Gastroenterology)  Mac Main MD PhD as Consulting Physician (Hematology and Oncology)  Karli Viveros MD as Consulting Physician (Gastroenterology)  Miriam Gilbert PA-C as Physician Assistant (Gastroenterology)    Outpatient Medications Prior to Visit   Medication Sig Dispense Refill    acetaminophen (Tylenol) 325 MG tablet Take 2 tablets by mouth Every 6 (Six) Hours As Needed for Mild Pain for up to 40 doses. 40 tablet 0    metroNIDAZOLE (METROCREAM) 0.75 % cream Apply 1 Application topically to the appropriate area as directed As Needed.      hydroCHLOROthiazide 12.5 MG tablet Take 1 tablet by mouth Daily. 30 tablet 11    omeprazole (priLOSEC) 40 MG capsule Take 1 capsule by mouth Daily. Indications: Stomach Ulcer      Synthroid 75 MCG tablet Take 1 tablet by mouth Daily. 30 tablet 11    valsartan (DIOVAN) 80 MG tablet Take 1 tablet by mouth Daily. (Patient taking differently: Take 1 tablet by mouth Daily.) 30 tablet 11     "Chlorcyclizine-Pseudoephed (Stahist AD) 25-60 MG tablet Take  by mouth.      pantoprazole (PROTONIX) 40 MG EC tablet Take 1 tablet by mouth Every Morning Before Breakfast.      clarithromycin (BIAXIN) 500 MG tablet Take 1 tablet by mouth 2 (Two) Times a Day. (Patient not taking: Reported on 11/7/2024)       Facility-Administered Medications Prior to Visit   Medication Dose Route Frequency Provider Last Rate Last Admin    Chlorhexidine Gluconate Cloth 2 % pads 1 each  1 each Apply externally Take As Directed Anupam Ruiz MD         No opioid medication identified on active medication list. I have reviewed chart for other potential  high risk medication/s and harmful drug interactions in the elderly.      Aspirin is not on active medication list.  Aspirin use is not indicated based on review of current medical condition/s. Risk of harm outweighs potential benefits.  .    Patient Active Problem List   Diagnosis    Chronic midline low back pain without sciatica    Essential hypertension    Hearing loss    Hypothyroidism, acquired    IBS (irritable bowel syndrome)    Chronic nonseasonal allergic rhinitis due to pollen    Stage 3 chronic kidney disease    Arthralgia of right knee    DDD (degenerative disc disease), lumbosacral    Scoliosis due to degenerative disease of spine in adult patient    Status post total replacement of left hip    Status post total hip replacement, left    Vitamin D deficiency    Anemia, normocytic normochromic    S/P hip replacement, right    Syncope    Status post right knee replacement    Acute posthemorrhagic anemia    Helicobacter pylori gastritis     Advance Care Planning Advance Directive is not on file.  ACP discussion was held with the patient during this visit. Patient does not have an advance directive, information provided.            Objective   Vitals:    11/07/24 0952   BP: 124/68   Pulse: 66   SpO2: 97%   Weight: 47.2 kg (104 lb)   Height: 157.5 cm (62\")   PainSc:   3 " "  PainLoc: Knee  Comment: right       Estimated body mass index is 19.02 kg/m² as calculated from the following:    Height as of this encounter: 157.5 cm (62\").    Weight as of this encounter: 47.2 kg (104 lb).    BMI is within normal parameters. No other follow-up for BMI required.       Does the patient have evidence of cognitive impairment? No  Lab Results   Component Value Date    HGBA1C 5.20 2024                                                                                                Health  Risk Assessment    Smoking Status:  Social History     Tobacco Use   Smoking Status Never    Passive exposure: Never   Smokeless Tobacco Never     Alcohol Consumption:  Social History     Substance and Sexual Activity   Alcohol Use No       Fall Risk Screen  STEADI Fall Risk Assessment was completed, and patient is at LOW risk for falls.Assessment completed on:2024    Depression Screenin/7/2024    10:05 AM   PHQ-2/PHQ-9 Depression Screening   Little interest or pleasure in doing things Not at all   Feeling down, depressed, or hopeless Not at all     Health Habits and Functional and Cognitive Screenin/7/2024    10:05 AM   Functional & Cognitive Status   Do you have difficulty preparing food and eating? No   Do you have difficulty bathing yourself, getting dressed or grooming yourself? No   Do you have difficulty using the toilet? No   Do you have difficulty moving around from place to place? No   Do you have trouble with steps or getting out of a bed or a chair? No   Current Diet Well Balanced Diet   Dental Exam Up to date   Eye Exam Up to date   Exercise (times per week) 2 times per week   Current Exercises Include Other        Exercise Comment PT   Do you need help using the phone?  No   Are you deaf or do you have serious difficulty hearing?  No   Do you need help to go to places out of walking distance? Yes   Do you need help shopping? No   Do you need help preparing meals?  No   Do " you need help with housework?  No   Do you need help with laundry? No   Do you need help taking your medications? No   Do you need help managing money? No   Do you ever drive or ride in a car without wearing a seat belt? No   Have you felt unusual stress, anger or loneliness in the last month? No   Who do you live with? Alone   If you need help, do you have trouble finding someone available to you? No   Have you been bothered in the last four weeks by sexual problems? No   Do you have difficulty concentrating, remembering or making decisions? No           Age-appropriate Screening Schedule:  Refer to the list below for future screening recommendations based on patient's age, sex and/or medical conditions. Orders for these recommended tests are listed in the plan section. The patient has been provided with a written plan.    Health Maintenance List  Health Maintenance   Topic Date Due    Pneumococcal Vaccine 65+ (2 of 2 - PPSV23 or PCV20) 12/19/2017    MAMMOGRAM  07/09/2023    DXA SCAN  07/09/2023    ANNUAL WELLNESS VISIT  07/12/2024    ZOSTER VACCINE (2 of 2) 11/07/2024 (Originally 11/26/2015)    COVID-19 Vaccine (1 - 2024-25 season) 11/09/2024 (Originally 9/1/2024)    INFLUENZA VACCINE  03/31/2025 (Originally 8/1/2024)    RSV Vaccine - Adults (1 - 1-dose 75+ series) 11/07/2025 (Originally 3/5/2016)    LIPID PANEL  08/05/2025    COLORECTAL CANCER SCREENING  05/01/2029    TDAP/TD VACCINES (3 - Td or Tdap) 07/30/2030                                                                                                                                                CMS Preventative Services Quick Reference  Risk Factors Identified During Encounter  Immunizations Discussed/Encouraged: Pneumococcal 23 and COVID19  Inactivity/Sedentary: Patient was advised to exercise at least 150 minutes a week per CDC recommendations.    The above risks/problems have been discussed with the patient.  Pertinent information has been shared with  "the patient in the After Visit Summary.  An After Visit Summary and PPPS were made available to the patient.    Follow Up:   Next Medicare Wellness visit to be scheduled in 1 year.         Additional E&M Note during same encounter follows:  Patient has additional, significant, and separately identifiable condition(s)/problem(s) that require work above and beyond the Medicare Wellness Visit     Chief Complaint  Medicare Wellness-subsequent    Subjective    HPI  Bailee is also being seen today for additional medical problem/s.       The patient presents for a routine checkup.    She has discontinued the use of over-the-counter Prilosec and is not currently taking omeprazole. She was prescribed 90 tablets of Protonix to be taken 30 minutes before meals for a ruptured ulcer. She reports experiencing dizzy spells, which she attributes to her stomach condition. For pain management, she takes Tylenol.    Her current medication regimen includes Synthroid, valsartan, and hydrochlorothiazide. She uses metronidazole cream as needed. She takes Stay-Hist AD in the morning when she plans to work outside.    She has noticed an increase in forgetfulness. She has gallstones and arthritis but reports no significant pain from the latter. She experiences knee pain and is currently undergoing physical therapy. She has not had any recent falls and does not have glaucoma.    She reports good bladder control and is up-to-date with dental and vision screenings.    SOCIAL HISTORY  She has 2 sons and 1 daughter. She has 2 grandsons and 2 granddaughters. She has 2 great grandkids. She has never smoked or drank alcohol. She does not use drugs. She has pets at home. Her hobbies are crocheting and reading.    FAMILY HISTORY  Her brother had an aneurysm. Her sister has cancer.    IMMUNIZATIONS  She has received pneumonia vaccine.          Objective   Vital Signs:  /68   Pulse 66   Ht 157.5 cm (62\")   Wt 47.2 kg (104 lb)   SpO2 97%   BMI " 19.02 kg/m²   Physical Exam  Vitals reviewed.   Constitutional:       General: She is not in acute distress.  Eyes:      General: Lids are normal.      Conjunctiva/sclera: Conjunctivae normal.   Neck:      Vascular: No carotid bruit.      Trachea: No tracheal deviation.   Cardiovascular:      Rate and Rhythm: Normal rate and regular rhythm.      Heart sounds: Normal heart sounds. No murmur heard.  Pulmonary:      Effort: Pulmonary effort is normal.      Breath sounds: Normal breath sounds.   Skin:     General: Skin is warm and dry.   Neurological:      Mental Status: She is alert. She is not disoriented.   Psychiatric:         Speech: Speech normal.         Behavior: Behavior normal. Behavior is cooperative.           Vital Signs  Blood pressure reading is 124/68.    The following data was reviewed by: Eddie Araya MD on 11/07/2024:  Endoscopy, Int (10/21/2024)   Results              Assessment and Plan        Assessment & Plan  Medicare annual wellness visit, subsequent  Immunizations discussed. Increase exercise as tolerated after knee procedure.       Essential hypertension  Hypertension is stable and controlled  Continue current treatment regimen.  Blood pressure will be reassessed in 1 year.    Orders:    hydroCHLOROthiazide 12.5 MG tablet; Take 1 tablet by mouth Daily. Indications: High Blood Pressure    valsartan (DIOVAN) 80 MG tablet; Take 1 tablet by mouth Daily. Indications: High Blood Pressure    Hypothyroidism, acquired  The current medical regimen is effective;  continue present plan and medications.  Labs pending  Orders:    Synthroid 75 MCG tablet; Take 1 tablet by mouth Every Morning Before Breakfast. Indications: Underactive Thyroid    Stage 3a chronic kidney disease    Will evaluate with labs. (Pending results)       Encounter for screening for osteoporosis  DEXA ordered  Orders:    DEXA Bone Density, Axial (Hospital); Future    Encounter for screening mammogram for breast cancer  Mammogram  ordered  Orders:    Mammo Screening Digital Tomosynthesis Bilateral With CAD; Future            Follow Up   Return in about 1 year (around 11/12/2025) for Medicare Wellness & regular visit, [id number] min.  Patient was given instructions and counseling regarding her condition or for health maintenance advice. Please see specific information pulled into the AVS if appropriate.  Patient or patient representative verbalized consent for the use of Ambient Listening during the visit with  Eddie Araya MD for chart documentation. 11/7/2024  11:00 EST

## 2024-11-07 NOTE — PATIENT INSTRUCTIONS
Advance Care Planning and Advance Directives     You make decisions on a daily basis - decisions about where you want to live, your career, your home, your life. Perhaps one of the most important decisions you face is your choice for future medical care. Take time to talk with your family and your healthcare team and start planning today.  Advance Care Planning is a process that can help you:  Understand possible future healthcare decisions in light of your own experiences  Reflect on those decision in light of your goals and values  Discuss your decisions with those closest to you and the healthcare professionals that care for you  Make a plan by creating a document that reflects your wishes    Surrogate Decision Maker  In the event of a medical emergency, which has left you unable to communicate or to make your own decisions, you would need someone to make decisions for you.  It is important to discuss your preferences for medical treatment with this person while you are in good health.     Qualities of a surrogate decision maker:  Willing to take on this role and responsibility  Knows what you want for future medical care  Willing to follow your wishes even if they don't agree with them  Able to make difficult medical decisions under stressful circumstances    Advance Directives  These are legal documents you can create that will guide your healthcare team and decision maker(s) when needed. These documents can be stored in the electronic medical record.    Living Will - a legal document to guide your care if you have a terminal condition or a serious illness and are unable to communicate. States vary by statute in document names/types, but most forms may include one or more of the following:        -  Directions regarding life-prolonging treatments        -  Directions regarding artificially provided nutrition/hydration        -  Choosing a healthcare decision maker        -  Direction regarding organ/tissue  donation    Durable Power of  for Healthcare - this document names an -in-fact to make medical decisions for you, but it may also allow this person to make personal and financial decisions for you. Please seek the advice of an  if you need this type of document.    **Advance Directives are not required and no one may discriminate against you if you do not sign one.    Medical Orders  Many states allow specific forms/orders signed by your physician to record your wishes for medical treatment in your current state of health. This form, signed in personal communication with your physician, addresses resuscitation and other medical interventions that you may or may not want.      For more information or to schedule a time with a [facility] Advance Care Planning Facilitator contact: [facility] or call [phone] and someone will contact you directly.  You are due for a pneumonia vaccination. (provides protection against Bacterial Pneumonia Infection) Please  get the immunization at your local pharmacy at your earliest convenience. This immunization is currently recommended once after age 65. Please click on the link for more information about this vaccine.   https://www.cdc.gov/vaccines/vpd/pneumo/hcp/cjj-jhli-jy-vaccinate.html#adults-65-over    Exercising to Stay Healthy  To become healthy and stay healthy, it is recommended that you do moderate-intensity and vigorous-intensity exercise. You can tell that you are exercising at a moderate intensity if your heart starts beating faster and you start breathing faster but can still hold a conversation. You can tell that you are exercising at a vigorous intensity if you are breathing much harder and faster and cannot hold a conversation while exercising.  How can exercise benefit me?  Exercising regularly is important. It has many health benefits, such as:  Improving overall fitness, flexibility, and endurance.  Increasing bone  density.  Helping with weight control.  Decreasing body fat.  Increasing muscle strength and endurance.  Reducing stress and tension, anxiety, depression, or anger.  Improving overall health.  What guidelines should I follow while exercising?  Before you start a new exercise program, talk with your health care provider.  Do not exercise so much that you hurt yourself, feel dizzy, or get very short of breath.  Wear comfortable clothes and wear shoes with good support.  Drink plenty of water while you exercise to prevent dehydration or heat stroke.  Work out until your breathing and your heartbeat get faster (moderate intensity).  How often should I exercise?  Choose an activity that you enjoy, and set realistic goals. Your health care provider can help you make an activity plan that is individually designed and works best for you.  Exercise regularly as told by your health care provider. This may include:  Doing strength training two times a week, such as:  Lifting weights.  Using resistance bands.  Push-ups.  Sit-ups.  Yoga.  Doing a certain intensity of exercise for a given amount of time. Choose from these options:  A total of 150 minutes of moderate-intensity exercise every week.  A total of 75 minutes of vigorous-intensity exercise every week.  A mix of moderate-intensity and vigorous-intensity exercise every week.  Children, pregnant women, people who have not exercised regularly, people who are overweight, and older adults may need to talk with a health care provider about what activities are safe to perform. If you have a medical condition, be sure to talk with your health care provider before you start a new exercise program.  What are some exercise ideas?  Moderate-intensity exercise ideas include:  Walking 1 mile (1.6 km) in about 15 minutes.  Biking.  Hiking.  Golfing.  Dancing.  Water aerobics.  Vigorous-intensity exercise ideas include:  Walking 4.5 miles (7.2 km) or more in about 1 hour.  Jogging or  running 5 miles (8 km) in about 1 hour.  Biking 10 miles (16.1 km) or more in about 1 hour.  Lap swimming.  Roller-skating or in-line skating.  Cross-country skiing.  Vigorous competitive sports, such as football, basketball, and soccer.  Jumping rope.  Aerobic dancing.  What are some everyday activities that can help me get exercise?  Yard work, such as:  Pushing a .  Raking and bagging leaves.  Washing your car.  Pushing a stroller.  Shoveling snow.  Gardening.  Washing windows or floors.  How can I be more active in my day-to-day activities?  Use stairs instead of an elevator.  Take a walk during your lunch break.  If you drive, park your car farther away from your work or school.  If you take public transportation, get off one stop early and walk the rest of the way.  Stand up or walk around during all of your indoor phone calls.  Get up, stretch, and walk around every 30 minutes throughout the day.  Enjoy exercise with a friend. Support to continue exercising will help you keep a regular routine of activity.  Where to find more information  You can find more information about exercising to stay healthy from:  U.S. Department of Health and Human Services: www.hhs.gov  Centers for Disease Control and Prevention (CDC): www.cdc.gov  Summary  Exercising regularly is important. It will improve your overall fitness, flexibility, and endurance.  Regular exercise will also improve your overall health. It can help you control your weight, reduce stress, and improve your bone density.  Do not exercise so much that you hurt yourself, feel dizzy, or get very short of breath.  Before you start a new exercise program, talk with your health care provider.  This information is not intended to replace advice given to you by your health care provider. Make sure you discuss any questions you have with your health care provider.  Document Revised: 04/15/2022 Document Reviewed: 04/15/2022  Elsevier Patient Education © 2024  Elsevier Inc.    Medicare Wellness  Personal Prevention Plan of Service     Date of Office Visit:    Encounter Provider:  Eddie Araya MD  Place of Service:  [facility]  Patient Name: Bailee Garza  :  1940    As part of the Medicare Wellness portion of your visit today, we are providing you with this personalized preventive plan of services (PPPS). This plan is based upon recommendations of the [place] Preventive Services Task Force (USPSTF) and the Advisory Committee on Immunization Practices (ACIP).    This lists the preventive care services that should be considered, and provides dates of when you are due. Items listed as completed are up-to-date and do not require any further intervention.    Health Maintenance   Topic Date Due    Pneumococcal Vaccine 65+ (2 of 2 - PPSV23 or PCV20) 2017    MAMMOGRAM  2023    DXA SCAN  2023    ANNUAL WELLNESS VISIT  2024    ZOSTER VACCINE (2 of 2) 2024 (Originally 2015)    COVID-19 Vaccine ( - - season) 2024 (Originally 2024)    INFLUENZA VACCINE  2025 (Originally 2024)    RSV Vaccine - Adults (1 - 1-dose 75+ series) 2025 (Originally 3/5/2016)    LIPID PANEL  2025    COLORECTAL CANCER SCREENING  2029    TDAP/TD VACCINES (3 - Td or Tdap) 2030       Orders Placed This Encounter   Procedures    DEXA Bone Density, Axial (Hospital)     Standing Status:   Future     Standing Expiration Date:   2025     Order Specific Question:   Is patient taking or have taken long term Glucocorticoid (steroids)?     Answer:   No     Order Specific Question:   Does the patient have rheumatoid arthritis?     Answer:   No     Order Specific Question:   Does the patient have secondary osteoporosis?     Answer:   No     Order Specific Question:   Reason for Exam:     Answer:   Screening for osteoporosis     Order Specific Question:   Release to patient     Answer:    Routine Release [[id number]]    Mammo Screening Digital Tomosynthesis Bilateral With CAD     Use HCPCS/CPT Code 74078 as an add-on code to 68006 when tomosynthesis is used in addition to 2-D mammography     Standing Status:   Future     Standing Expiration Date:   11/7/2025     Order Specific Question:   Reason for Exam:     Answer:   Screening for breast cancer     Order Specific Question:   Release to patient     Answer:   Routine Release [[id number]]         No follow-ups on file.

## 2024-11-07 NOTE — ASSESSMENT & PLAN NOTE
The current medical regimen is effective;  continue present plan and medications.  Labs pending  Orders:    Synthroid 75 MCG tablet; Take 1 tablet by mouth Every Morning Before Breakfast. Indications: Underactive Thyroid

## 2024-11-08 ENCOUNTER — TREATMENT (OUTPATIENT)
Dept: PHYSICAL THERAPY | Facility: CLINIC | Age: 84
End: 2024-11-08
Payer: MEDICARE

## 2024-11-08 DIAGNOSIS — R26.2 DIFFICULTY WALKING: ICD-10-CM

## 2024-11-08 DIAGNOSIS — Z96.651 H/O TOTAL KNEE REPLACEMENT, RIGHT: Primary | ICD-10-CM

## 2024-11-08 DIAGNOSIS — R26.9 ABNORMAL GAIT: ICD-10-CM

## 2024-11-08 DIAGNOSIS — R53.1 STRENGTH LOSS OF: ICD-10-CM

## 2024-11-08 DIAGNOSIS — R29.898 LOSS OF MOVEMENT: ICD-10-CM

## 2024-11-08 NOTE — PROGRESS NOTES
"Physical Therapy Daily Treatment Note    [facility]  [address] [place] [address]  [phone] (phone)  [phone] (fax)    Patient: Bailee Garza   : 1940  Diagnosis/ICD-10 Code:  H/O total knee replacement, right [Z96.651]  Referring practitioner: TERESA Monge  Today's Date: 2024  Patient seen for 4 sessions    Visit Diagnoses:    ICD-10-CM ICD-9-CM   1. H/O total knee replacement, right  Z96.651 V43.65   2. Difficulty walking  R26.2 719.7   3. Loss of movement  R29.898 344.9   4. Strength loss of  R53.1 780.79   5. Abnormal gait  R26.9 781.2              Subjective: Brigitte reports that she was very sore after last session.     Objective     Treatment     Therapeutic exercise  1.NuStep, seat at 7, work load of 5, x 7 minutes  2. Kalli hip abduction, 10 lbs., 15 x 2  3. Step ups, 4\", x 10, B, using column for balance  4. Lateral step downs, 4\", x 10, B, using column for balance  5. Kalli leg press, seat at 7, 75 lbs., 10 x 2  6. LAQs x 10, B  7. Quad sets, roll at R ankle, x 10  8. R heel slides x 10    NMR: verbal cues for exercise technique were given. To use eccentric control with each PRE, to gaze to the ground ahead, 20', for better balance/proprioception with the step exercises, to land toe to heel with the dynamic foot with the step exercises, to avoid locking the knee in extension and abducting the hips into knee flexion on the leg press.     Manual therapy: 30s x 3, R knee and STM to the scar.    Supine R knee AROM, 0-110 degrees of flexion, post treatment    Self care: I gave Brigitte instructions for massaging her scar, 2 x per day and to use Scar Away for better scar healing.    Assessment & Plan       Assessment  Assessment details: Brigitte is slowly progressing with tolerance to exercise.  The R quadriceps stretching was more tolerable today and upon inspection the scar is healing well.  Brigitte required verbal and tactile cues for " exercise technique.     Plan  Plan details: Continue per treatment plan.                Timed:    Manual Therapy:    3     mins  31663;  Therapeutic Exercise:    34     mins  46174;     Neuromuscular Sofya:    8    mins  90424;    Therapeutic Activity:          mins  19740;     Gait Training:           mins  43005;     Ultrasound:          mins  84063;    Aquatic Therapy           mins     50399;  Self Care                               mins   71713        Untimed:  Electrical Stimulation:           mins  55774 ( );  Traction:           mins  18037;   Dry Needling  (1-2 muscles)                  mins 20560 (Self-pay)  Dry Needling (3-4 muscles)  ____  20561 (Self-pay)  Dry Needling Trial             DRYNDLTRIAL  (No Charge)    Timed Treatment:  45    mins   Total Treatment:     45   mins    Deven Nunez PT  Physical Therapist    [place] License:[id number]

## 2024-11-12 ENCOUNTER — TREATMENT (OUTPATIENT)
Dept: PHYSICAL THERAPY | Facility: CLINIC | Age: 84
End: 2024-11-12
Payer: MEDICARE

## 2024-11-12 DIAGNOSIS — R26.9 ABNORMAL GAIT: ICD-10-CM

## 2024-11-12 DIAGNOSIS — R29.898 LOSS OF MOVEMENT: ICD-10-CM

## 2024-11-12 DIAGNOSIS — Z96.651 H/O TOTAL KNEE REPLACEMENT, RIGHT: Primary | ICD-10-CM

## 2024-11-12 DIAGNOSIS — R53.1 STRENGTH LOSS OF: ICD-10-CM

## 2024-11-12 DIAGNOSIS — R26.2 DIFFICULTY WALKING: ICD-10-CM

## 2024-11-12 NOTE — PROGRESS NOTES
"Physical Therapy Daily Treatment Note    [facility]  [address] [place] [address]  [phone] (phone)  [phone] (fax)    Patient: Bailee Garza   : 1940  Diagnosis/ICD-10 Code:  H/O total knee replacement, right [Z96.651]  Referring practitioner: TERESA Monge  Today's Date: 2024  Patient seen for 5 sessions    Visit Diagnoses:    ICD-10-CM ICD-9-CM   1. H/O total knee replacement, right  Z96.651 V43.65   2. Difficulty walking  R26.2 719.7   3. Loss of movement  R29.898 344.9   4. Strength loss of  R53.1 780.79   5. Abnormal gait  R26.9 781.2              Subjective: Brigitte stated that the knee is bending more when she sleeps.     Objective     Treatment     Therapeutic exercise  1.NuStep, seat at 7, work load of 5, x 7 minutes  2. Kalli hip abduction, 12 lbs., 15 x 2  3. Runner's step, 4\", x 10, B, using column for balance  4. Lateral step downs, 4\", x 10, B, using column for balance  5. [place] leg press, seat at 7, 80 lbs., 10 x 2  6. LAQs x 10, B  7. R Quad sets, long sitting, x 10  8. Supine B ankle pumps, x 20  9. R heel slides x 10  10, R knee seated flexion with overpressure from sliding in chair, 10s x 1o    NMR: verbal cues for exercise technique were given. To use eccentric control with each PRE, to gaze to the ground ahead, 20', for better balance/proprioception with the step exercises, to land toe to heel with the dynamic foot with the step exercises, to avoid locking the knee in extension and abducting the hips into knee flexion on the leg press. She progressed from a simple step up to the runner's step and cues to stand on one leg for 3 seconds.     Manual therapy: 30s x 3, R knee quadriceps stretch in supine     Self care: I added the seated knee flexion stretch to her HEP     Supine R iknee AROM 0-112 degrees of flexion    Assessment & Plan       Assessment  Assessment details: Brigitte is progressing her exercises and reach 112 " degrees of flexion today.  She required verbal cues for exercise technique and the quadriceps stretching is a bit less painful.     Plan  Plan details: Continue per treatment plan.                Timed:    Manual Therapy:    2     mins  95611;  Therapeutic Exercise:    35     mins  58706;     Neuromuscular Sofya:    8    mins  39564;    Therapeutic Activity:          mins  69890;     Gait Training:           mins  56907;     Ultrasound:          mins  60375;    Aquatic Therapy           mins     73613;  Self Care                               mins   27744        Untimed:  Electrical Stimulation:           mins  94903 ( );  Traction:           mins  20575;   Dry Needling  (1-2 muscles)                  mins 30055 (Self-pay)  Dry Needling (3-4 muscles)  ____  20561 (Self-pay)  Dry Needling Trial             DRYNDLTRIAL  (No Charge)    Timed Treatment:   45   mins   Total Treatment:     45   mins    Deven Nunez PT  Physical Therapist    [place] License:[id number]

## 2024-11-14 ENCOUNTER — TREATMENT (OUTPATIENT)
Dept: PHYSICAL THERAPY | Facility: CLINIC | Age: 84
End: 2024-11-14
Payer: MEDICARE

## 2024-11-14 DIAGNOSIS — R29.898 LOSS OF MOVEMENT: ICD-10-CM

## 2024-11-14 DIAGNOSIS — R26.9 ABNORMAL GAIT: ICD-10-CM

## 2024-11-14 DIAGNOSIS — R53.1 STRENGTH LOSS OF: ICD-10-CM

## 2024-11-14 DIAGNOSIS — R26.2 DIFFICULTY WALKING: ICD-10-CM

## 2024-11-14 DIAGNOSIS — Z96.651 H/O TOTAL KNEE REPLACEMENT, RIGHT: Primary | ICD-10-CM

## 2024-11-14 NOTE — PROGRESS NOTES
"Physical Therapy Daily Treatment Note    [facility]  [address] [place] [address]  [phone] (phone)  [phone] (fax)    Patient: Bailee Garza   : 1940  Diagnosis/ICD-10 Code:  H/O total knee replacement, right [Z96.651]  Referring practitioner: TERESA Monge  Today's Date: 2024  Patient seen for 6 sessions    Visit Diagnoses:    ICD-10-CM ICD-9-CM   1. H/O total knee replacement, right  Z96.651 V43.65   2. Difficulty walking  R26.2 719.7   3. Loss of movement  R29.898 344.9   4. Strength loss of  R53.1 780.79   5. Abnormal gait  R26.9 781.2              Subjective: Brigitte notices less pain and better flexion    Objective     Treatment     Therapeutic exercise  1.NuStep, seat at 7, work load of 5, x 7 minutes  2. Anza hip abduction, 12 lbs., 15 x 2  3. Runner's step, 4\", x 10, B, using column for balance  4. Lateral step downs, 4\", x 10, B, using column for balance  5. Anza leg press, seat at 7, 80 lbs., 10 x 2  6. Forward step down, 2\", x 10, B  7. LAQs x 10, B  8. R Quad sets, long sitting, x 10  9. R heel slides, x 10, after manual R quadriceps stretching, x 10   10. B ankle pumps x 20    Manual therapy, in supine, hamstring and quadriceps stretching, R knee, 30s x 3 each     NMR: verbal cues for exercise technique were given. To use eccentric control with each PRE, to gaze to the ground ahead, 20', for better balance/proprioception with the step exercises, to land toe to heel with the dynamic foot with the step exercises, to avoid locking the knee in extension and abducting the hips into knee flexion on the leg press     Supine R ankle AROM 0-113 degrees of flexion     Assessment & Plan       Assessment  Assessment details: Brigitte's R leg has a deficit with deceleration but tolerated that exercise without a lot of pain.  Generally she is noticing less pain and slowly improving AROM and strength.     Plan  Plan details: Continue per " treatment plan.                Timed:    Manual Therapy:    5     mins  12668;  Therapeutic Exercise:    32     mins  81369;     Neuromuscular Sofya:    8    mins  73099;    Therapeutic Activity:          mins  34842;     Gait Training:           mins  85862;     Ultrasound:          mins  55501;    Aquatic Therapy           mins     21342;  Self Care                               mins   23803        Untimed:  Electrical Stimulation:           mins  57938 ( );  Traction:           mins  02613;   Dry Needling  (1-2 muscles)                  mins 20560 (Self-pay)  Dry Needling (3-4 muscles)  ____  20561 (Self-pay)  Dry Needling Trial             DRYNDLTRIAL  (No Charge)    Timed Treatment:   45   mins   Total Treatment:     45   mins    Deven Nunez PT  Physical Therapist    [place] License:[id number]

## 2024-11-15 ENCOUNTER — OFFICE VISIT (OUTPATIENT)
Dept: ORTHOPEDIC SURGERY | Facility: CLINIC | Age: 84
End: 2024-11-15
Payer: MEDICARE

## 2024-11-15 VITALS — HEIGHT: 63 IN | BODY MASS INDEX: 18.5 KG/M2 | TEMPERATURE: 98.2 F | WEIGHT: 104.4 LBS

## 2024-11-15 DIAGNOSIS — R52 PAIN: Primary | ICD-10-CM

## 2024-11-15 DIAGNOSIS — Z96.651 STATUS POST RIGHT KNEE REPLACEMENT: ICD-10-CM

## 2024-11-15 RX ORDER — OMEPRAZOLE 40 MG/1
40 CAPSULE, DELAYED RELEASE ORAL DAILY
COMMUNITY

## 2024-11-15 NOTE — PROGRESS NOTES
Bailee Garza : 1940 MRN: [id number] DATE: 11/15/2024    DIAGNOSIS: 8 week follow up right total knee      SUBJECTIVE:Patient returns today for 8 week follow up of right total knee replacement.  Patient reports that her pain level is minimal and rates it as a 2 on a scale of 10.  She states that she is still going to physical therapy at [facility].  Patient reports doing well with no unusual complaints just has some residual swelling and stiffness. Appears to be progressing appropriately as she is ambulating full weightbearing and walks unassisted in clinic today.  She denies any instability or buckling issues.  Denies any signs or symptoms of infection, and is without any other significant complaints today.    OBJECTIVE:   Exam:. The incision is well healed. No sign of infection. Range of motion is measured at 0 to 113. The calf is soft and nontender with a negative Homans sign. Strength is progressing and the patient is ambulating appropriately.    DIAGNOSTIC STUDIES  Xrays: 3 views of the right knee (AP, lateral, and sunrise) were ordered and reviewed for evaluation of recent knee replacement. They demonstrate a well positioned, well aligned knee replacement without complicating factors noted. In comparison with previous films there has been no change.    ASSESSMENT: 8 week status post right knee replacement.    PLAN: 1) Continue with PT exercises as prescribed   2) Follow up in 10 months for annual visit    TERESA Monge  11/15/2024

## 2024-11-18 ENCOUNTER — TREATMENT (OUTPATIENT)
Dept: PHYSICAL THERAPY | Facility: CLINIC | Age: 84
End: 2024-11-18
Payer: MEDICARE

## 2024-11-18 DIAGNOSIS — R53.1 STRENGTH LOSS OF: ICD-10-CM

## 2024-11-18 DIAGNOSIS — R26.9 ABNORMAL GAIT: ICD-10-CM

## 2024-11-18 DIAGNOSIS — R29.898 LOSS OF MOVEMENT: ICD-10-CM

## 2024-11-18 DIAGNOSIS — Z96.651 H/O TOTAL KNEE REPLACEMENT, RIGHT: Primary | ICD-10-CM

## 2024-11-18 DIAGNOSIS — R26.2 DIFFICULTY WALKING: ICD-10-CM

## 2024-11-18 PROCEDURE — 97140 MANUAL THERAPY 1/> REGIONS: CPT | Performed by: PHYSICAL THERAPIST

## 2024-11-18 PROCEDURE — 97110 THERAPEUTIC EXERCISES: CPT | Performed by: PHYSICAL THERAPIST

## 2024-11-18 NOTE — PROGRESS NOTES
Physical Therapy Daily Progress Note  Visits:2    Subjective : Bailee Garza reports: The L hip is feeling better and I feel like I can walk a little better.  The exercises are fine.      Objective   See Exercise, Manual, and Modality Logs for complete treatment.     Assessment/Plan:Pt tolerated activity progression well.  She has most difficulty with hip flexion, possibly due to mm shortening (active mm insufficiency) of the quads.  I believe we has initiated a good program for improvement of function.      Progress per Plan of Care     Manual Therapy:    -     mins  29398;  Therapeutic Exercise:    40     mins  72687;     Neuromuscular Sofya:    4    mins  63722;    Therapeutic Activity:     -     mins  44112;     Gait Training:      -     mins  10043;     Ultrasound:     -     mins  28728;    Electrical Stimulation:    -     mins  37546 ( );  Dry Needling     --     mins self-pay    Timed Treatment:   44   mins   Total Treatment:     55   mins    SARAH Dykes License #[id number]    Physical Therapist   70 - 99 mg/dL    BUN 16 8 - 23 MG/DL    Creatinine 1.15 0.80 - 1.30 MG/DL    Est, Glom Filt Rate 69 >60 ml/min/1.73m2    Calcium 9.3 8.8 - 10.2 MG/DL    Total Bilirubin 0.6 0.0 - 1.2 MG/DL    ALT 39 8 - 55 U/L    AST 23 15 - 37 U/L    Alk Phosphatase 96 40 - 129 U/L    Total Protein 6.5 6.3 - 8.2 g/dL    Albumin 2.3 (L) 3.2 - 4.6 g/dL    Globulin 4.1 (H) 2.3 - 3.5 g/dL    Albumin/Globulin Ratio 0.6 (L) 1.0 - 1.9     CBC with Auto Differential    Collection Time: 11/18/24  9:09 AM   Result Value Ref Range    WBC 9.6 4.3 - 11.1 K/uL    RBC 3.87 (L) 4.23 - 5.6 M/uL    Hemoglobin 12.1 (L) 13.6 - 17.2 g/dL    Hematocrit 36.9 (L) 41.1 - 50.3 %    MCV 95.3 82.0 - 102.0 FL    MCH 31.3 26.1 - 32.9 PG    MCHC 32.8 31.4 - 35.0 g/dL    RDW 14.1 11.9 - 14.6 %    Platelets 466 (H) 150 - 450 K/uL    MPV 8.6 (L) 9.4 - 12.3 FL    nRBC 0.00 0.0 - 0.2 K/uL    Neutrophils % 78 43 - 78 %    Lymphocytes % 4 (L) 13 - 44 %    Monocytes % 11 4.0 - 12.0 %    Eosinophils % 1 0.5 - 7.8 %    Basophils % 1 0.0 - 2.0 %    Immature Granulocytes % 5 0.0 - 5.0 %    Neutrophils Absolute 7.6 1.7 - 8.2 K/UL    Lymphocytes Absolute 0.4 (L) 0.5 - 4.6 K/UL    Monocytes Absolute 1.1 0.1 - 1.3 K/UL    Eosinophils Absolute 0.1 0.0 - 0.8 K/UL    Basophils Absolute 0.1 0.0 - 0.2 K/UL    Immature Granulocytes Absolute 0.5 0.0 - 0.5 K/UL    Differential Type AUTOMATED     Rad Onc Aria Session Summary    Collection Time: 11/18/24  9:37 AM   Result Value Ref Range    Course ID C1     Course Intent Curative     Course Start Date 10/11/2024  9:29 AM     Session Number 16     Course First Treatment Date 10/28/2024  1:48 PM     Course Last Treatment Date 11/18/2024  9:33 AM     Course Elapsed Days 21     Reference Point ID Lt Lung     Reference Point Dosage Given to Date 32 Gy    Reference Point Session Dosage Given 2 Gy    Plan ID Lt Lung     Plan Fractions Treated to Date 16     Plan Total Fractions Prescribed 30     Plan Prescribed Dose Per Fraction 2 Gy    Plan

## 2024-11-18 NOTE — PROGRESS NOTES
"Physical Therapy Daily Treatment Note    [facility]  [address] [place] [address]  [phone] (phone)  [phone] (fax)    Patient: Bailee Garza   : 1940  Diagnosis/ICD-10 Code:  H/O total knee replacement, right [Z96.651]  Referring practitioner: TERESA Monge  Today's Date: 2024  Patient seen for 7 sessions    Visit Diagnoses:    ICD-10-CM ICD-9-CM   1. H/O total knee replacement, right  Z96.651 V43.65   2. Difficulty walking  R26.2 719.7   3. Loss of movement  R29.898 344.9   4. Strength loss of  R53.1 780.79   5. Abnormal gait  R26.9 781.2              Subjective: Brigitte stated that she walked too, much outside over the weekend and her R knee is aggravated today.    Objective     Treatment    Therapeutic exercise  1.Recumbent bike, seat 3/4, x 5 minutes with some assist for full revolution  2.NuStep, seat at 7, work load of 5, x 7 minutes  3. [place] hip abduction, 12 lbs., 15 x 2  4. Runner's step, 4\", x 10, B, using column for balance  5. Lateral step downs, 4\", x 10, B, using column for balance  6. R heel slides x 10, then an additional 10 repetitions after the manual therapy  7. R quad sets, supine, contralateral knee bent,  with roll under R ankle x 10    Manual therapy:  I assisted with the recumbent bike to make a full forward revolution, supine stretching of the R knee for the quadriceps, 30s x 3, then the hamstrings, 30s x 3, and overpressure with the last 10 repetitions of heel slides.      NMR: verbal cues for exercise technique were used.     Supine R knee AROM, post manual therapy, 0-113 degrees of flexion    Assessment & Plan       Assessment  Assessment details: Brigitte required assist with the recumbent bike to make full revolutions.  She was sore today from overdoing the walking of her dog over the weekend and with the manual therapy reached the 113 degrees of flexion like she did last week.     Plan  Plan details: Continue " per treatment plan.                Timed:    Manual Therapy:    10     mins  57743;  Therapeutic Exercise:    40     mins  69920;     Neuromuscular Sofya:    5    mins  05572;    Therapeutic Activity:          mins  36766;     Gait Training:           mins  73450;     Ultrasound:          mins  90543;    Aquatic Therapy           mins     22521;  Self Care                               mins   34520        Untimed:  Electrical Stimulation:           mins  62302 ( );  Traction:           mins  67780;   Dry Needling  (1-2 muscles)                  mins 20560 (Self-pay)  Dry Needling (3-4 muscles)  ____  20561 (Self-pay)  Dry Needling Trial             DRYNDLTRIAL  (No Charge)    Timed Treatment:   55   mins   Total Treatment:     55   mins    Deven Nunez PT  Physical Therapist    [place] License:[id number]   PAST SURGICAL HISTORY:  Cataract of both eyes, unspecified cataract type s/p excision of cataracts bilaterally    H/O dilation and curettage in 2002 and 2007    History of tonsillectomy childhood

## 2024-11-20 ENCOUNTER — TELEPHONE (OUTPATIENT)
Dept: FAMILY MEDICINE CLINIC | Facility: CLINIC | Age: 84
End: 2024-11-20
Payer: MEDICARE

## 2024-11-21 ENCOUNTER — TREATMENT (OUTPATIENT)
Dept: PHYSICAL THERAPY | Facility: CLINIC | Age: 84
End: 2024-11-21
Payer: MEDICARE

## 2024-11-21 ENCOUNTER — TELEPHONE (OUTPATIENT)
Dept: FAMILY MEDICINE CLINIC | Facility: CLINIC | Age: 84
End: 2024-11-21
Payer: MEDICARE

## 2024-11-21 DIAGNOSIS — R26.9 ABNORMAL GAIT: ICD-10-CM

## 2024-11-21 DIAGNOSIS — R53.1 STRENGTH LOSS OF: ICD-10-CM

## 2024-11-21 DIAGNOSIS — R26.2 DIFFICULTY WALKING: ICD-10-CM

## 2024-11-21 DIAGNOSIS — R29.898 LOSS OF MOVEMENT: ICD-10-CM

## 2024-11-21 DIAGNOSIS — Z96.651 H/O TOTAL KNEE REPLACEMENT, RIGHT: Primary | ICD-10-CM

## 2024-11-21 NOTE — PROGRESS NOTES
"Physical Therapy Daily Treatment Note    [facility]  [address] [place] [address]  [phone] (phone)  [phone] (fax)    Patient: Bailee Garza   : 1940  Diagnosis/ICD-10 Code:  H/O total knee replacement, right [Z96.651]  Referring practitioner: TERESA Monge  Today's Date: 2024  Patient seen for 8 sessions    Visit Diagnoses:    ICD-10-CM ICD-9-CM   1. H/O total knee replacement, right  Z96.651 V43.65   2. Difficulty walking  R26.2 719.7   3. Loss of movement  R29.898 344.9   4. Strength loss of  R53.1 780.79   5. Abnormal gait  R26.9 781.2              Subjective: Brigitte stated that the right knee is bending better.     Objective     Treatment    Therapeutic exercise  1.NuStep, seat at 7, work load of 6 x 4 minutes  2. .Recumbent bike, seat 3/4, x 5 minutes,independently, initially she did partial revolutions and then forward with assist  3. Runner's step, 4\", x 10, B, using column for balance  4. Lateral step downs, 4\", x 10, B, using column for balance  5. Forward step down, 1\" book, x 10, B, using column for balance  6. [place] hip abduction, 15 lbs., 15 x 2  7. LAQs x 10, B  8. R heel slides x 10  9. R quad sets, long sitting, x 10    Manual therapy:  Seated R quadriceps stretch, 30s x 3    NMR: Brigitte required verbal cues for using eccentric control with each PRE and the step exercises.  For the step exercises cues to gaze to the ground ahead 20', to improve proprioception/balance, wiere given and to land on the ground toe to heel with each step exercise and to land parallel to the static foot with the lateral step down.      Supine R knee AROM-0-113 degrees of flexion    Assessment & Plan       Assessment  Assessment details: Brigitte is slowly improving.  Her performance on the bike was better today and she is doing better with the PREs.     Plan  Plan details: Continue per treatment plan.                Timed:    Manual Therapy:    2     " mins  54852;  Therapeutic Exercise:    35     mins  51066;     Neuromuscular Sofya:    8    mins  27476;    Therapeutic Activity:          mins  05917;     Gait Training:           mins  45867;     Ultrasound:          mins  14228;    Aquatic Therapy           mins     67385;  Self Care                               mins   64700        Untimed:  Electrical Stimulation:           mins  82224 ( );  Traction:           mins  90540;   Dry Needling  (1-2 muscles)                  mins 20560 (Self-pay)  Dry Needling (3-4 muscles)  ____  20561 (Self-pay)  Dry Needling Trial             DRYNDLTRIAL  (No Charge)    Timed Treatment:   45   mins   Total Treatment:     45   mins    Deven Nunez PT  Physical Therapist    [place] License:[id number]

## 2024-11-21 NOTE — TELEPHONE ENCOUNTER
Juana from [facility] called stating that PT would like to see provider Dr. Araya sooner than 12-16-24 because she was recently in the hospital on 10-23-24 and she wants to see him sooner to address her health concerns of her blood loss, etc. I did advise that is the earliest that Dr. Araya has but I will send him a message to see if he is able to get us her on the schedule for something sooner. Once I receive a reply I will reach out to PT directly.

## 2024-11-25 ENCOUNTER — OFFICE VISIT (OUTPATIENT)
Dept: FAMILY MEDICINE CLINIC | Facility: CLINIC | Age: 84
End: 2024-11-25
Payer: MEDICARE

## 2024-11-25 VITALS
HEIGHT: 63 IN | BODY MASS INDEX: 19.31 KG/M2 | SYSTOLIC BLOOD PRESSURE: 106 MMHG | DIASTOLIC BLOOD PRESSURE: 72 MMHG | OXYGEN SATURATION: 97 % | HEART RATE: 82 BPM | WEIGHT: 109 LBS

## 2024-11-25 DIAGNOSIS — R79.89 ELEVATED SERUM CREATININE: ICD-10-CM

## 2024-11-25 DIAGNOSIS — E78.2 MIXED HYPERLIPIDEMIA: ICD-10-CM

## 2024-11-25 DIAGNOSIS — D64.9 ANEMIA OF UNKNOWN ETIOLOGY: Primary | ICD-10-CM

## 2024-11-25 DIAGNOSIS — G47.01 INSOMNIA DUE TO MEDICAL CONDITION: ICD-10-CM

## 2024-11-25 PROCEDURE — 3078F DIAST BP <80 MM HG: CPT | Performed by: FAMILY MEDICINE

## 2024-11-25 PROCEDURE — 3074F SYST BP LT 130 MM HG: CPT | Performed by: FAMILY MEDICINE

## 2024-11-25 PROCEDURE — 1125F AMNT PAIN NOTED PAIN PRSNT: CPT | Performed by: FAMILY MEDICINE

## 2024-11-25 PROCEDURE — 99214 OFFICE O/P EST MOD 30 MIN: CPT | Performed by: FAMILY MEDICINE

## 2024-11-25 RX ORDER — FOLIC ACID 1 MG/1
1 TABLET ORAL DAILY
Qty: 90 TABLET | Refills: 0 | Status: SHIPPED | OUTPATIENT
Start: 2024-11-25 | End: 2025-02-23

## 2024-11-25 RX ORDER — FERROUS SULFATE 325(65) MG
325 TABLET ORAL
Qty: 90 TABLET | Refills: 0 | Status: SHIPPED | OUTPATIENT
Start: 2024-11-25 | End: 2025-02-23

## 2024-11-25 NOTE — ASSESSMENT & PLAN NOTE
Previous labs showed mild decrease folic acid.  Recent blood loss suggest some problems with iron deficiency as well.  We will have patient take both folic acid and iron iron sulfate daily.  Follow-up labs in 2 months.  Orders:    Folate; Future    Ferritin; Future    CBC & Differential; Future    Iron Profile; Future    folic acid (FOLVITE) 1 MG tablet; Take 1 tablet by mouth Daily for 90 days.    ferrous sulfate 325 (65 FE) MG tablet; Take 1 tablet by mouth Daily With Breakfast for 90 days.

## 2024-11-25 NOTE — ASSESSMENT & PLAN NOTE
Recommended patient set bedtime to 10 or 1030.  Recommendations to not exercise after 6 PM.  Recommendations for exercise earlier in the day.  Recommendations of no daytime napping.  Hopefully her sleep pattern will improve with improvement of anemia.  Follow-up in 2 months

## 2024-11-25 NOTE — PROGRESS NOTES
"Chief Complaint  Follow-up; Loss of Consciousness; Status post right knee replacement; Gastrointestinal hemorrhage associated with duodenal ulcer; Acute posthemorrhagic anemia;  Helicobacter pylori gastritis; Hypertension; Hypothyroidism; Stage 3a chronic kidney disease; Anemia, normocytic normochromic; Vitamin D Deficiency; and Screening for ischemic heart disease    Subjective        Hypertension    Hypothyroidism          The patient presents for evaluation of anemia, elevated cholesterol, and sleep disturbance. She is accompanied by her daughter.    She reports a lack of energy, which she attributes to her anemia. She has not sought specialist care for this condition. She experiences fatigue after walking short distances, such as when walking her dog. She also reports experiencing vertigo, which she describes as a sensation of carrying a sack of air in her head. This symptom is alleviated when she applies pressure to the area. Approximately 3 weeks ago, she experienced an episode of vertigo during a therapy session for her right side.    She has been informed that her cholesterol levels are elevated.    Additionally, she reports difficulty sleeping, often waking up at 2:00 AM and remaining awake. She typically goes to bed between 9:00 PM and 10:00 PM.    Supplemental Information  She takes omeprazole for her stomach and Tylenol for pain management.    MEDICATIONS  - Omeprazole  - Tylenol           Vital Signs:   Vitals:    11/25/24 0913   BP: 106/72   Pulse: 82   SpO2: 97%   Weight: 49.4 kg (109 lb)   Height: 158.8 cm (62.5\")            11/7/2024    10:05 AM   PHQ-2/PHQ-9 Depression Screening   Little interest or pleasure in doing things Not at all   Feeling down, depressed, or hopeless Not at all       BMI is within normal parameters. No other follow-up for BMI required.        Physical Exam  Vitals reviewed.   Constitutional:       General: She is not in acute distress.  Cardiovascular:      Rate and Rhythm: " Normal rate and regular rhythm.      Heart sounds: Normal heart sounds.   Pulmonary:      Effort: Pulmonary effort is normal.      Breath sounds: Normal breath sounds.   Neurological:      General: No focal deficit present.      Mental Status: She is alert.   Psychiatric:         Attention and Perception: Attention normal.         Mood and Affect: Mood normal.         Behavior: Behavior normal.                 The following data was reviewed by: Eddie Araya MD on 11/25/2024:      Results  - Laboratory Studies:    - Sodium: 140    - Potassium: 5.2    - Chloride: 105    - Calcium: 9.9    - BUN: 35    - Creatinine: 1.06    - EGFR: 52.2    - Total cholesterol: 225    - LDL cholesterol: 139    - Triglycerides: 159    - HDL cholesterol: 58    - Vitamin D: 32.7    - Hemoglobin: 10.4    - TSH: 3.48    - Folate: slightly low    - B12 level: normal    - Ferritin level: normal    - Iron profile: normal    - Transferrin: might be slightly low                Assessment and Plan        Anemia of unknown etiology  Previous labs showed mild decrease folic acid.  Recent blood loss suggest some problems with iron deficiency as well.  We will have patient take both folic acid and iron iron sulfate daily.  Follow-up labs in 2 months.  Orders:    Folate; Future    Ferritin; Future    CBC & Differential; Future    Iron Profile; Future    folic acid (FOLVITE) 1 MG tablet; Take 1 tablet by mouth Daily for 90 days.    ferrous sulfate 325 (65 FE) MG tablet; Take 1 tablet by mouth Daily With Breakfast for 90 days.    Elevated serum creatinine  Remain patient to not take Aleve or ibuprofen.  Reminded patient to drink 6 to 8 glasses of water daily.  Orders:    BUN; Future    Creatinine Serum (kidney function) GFR component; Future    Mixed hyperlipidemia     Elevated lipids once again identified.  Recheck labs again in 2 months  Orders:    Lipid Panel; Future    Insomnia due to medical condition  Recommended patient set bedtime to 10 or 1030.   Recommendations to not exercise after 6 PM.  Recommendations for exercise earlier in the day.  Recommendations of no daytime napping.  Hopefully her sleep pattern will improve with improvement of anemia.  Follow-up in 2 months            Return in about 2 months (around 1/25/2025) for recheck of current condition.     Patient was given instructions and counseling regarding her condition or for health maintenance advice. Please see specific information pulled into the AVS if appropriate.     Patient or patient representative verbalized consent for the use of Ambient Listening during the visit with  Eddie Araya MD for chart documentation. 11/25/2024  10:02 EST

## 2024-11-26 ENCOUNTER — TREATMENT (OUTPATIENT)
Dept: PHYSICAL THERAPY | Facility: CLINIC | Age: 84
End: 2024-11-26
Payer: MEDICARE

## 2024-11-26 DIAGNOSIS — R26.9 ABNORMAL GAIT: ICD-10-CM

## 2024-11-26 DIAGNOSIS — R26.2 DIFFICULTY WALKING: ICD-10-CM

## 2024-11-26 DIAGNOSIS — R53.1 STRENGTH LOSS OF: ICD-10-CM

## 2024-11-26 DIAGNOSIS — Z96.651 H/O TOTAL KNEE REPLACEMENT, RIGHT: Primary | ICD-10-CM

## 2024-11-26 DIAGNOSIS — R29.898 LOSS OF MOVEMENT: ICD-10-CM

## 2024-11-26 PROCEDURE — 97112 NEUROMUSCULAR REEDUCATION: CPT | Performed by: PHYSICAL THERAPIST

## 2024-11-26 PROCEDURE — 97110 THERAPEUTIC EXERCISES: CPT | Performed by: PHYSICAL THERAPIST

## 2024-11-26 NOTE — PROGRESS NOTES
"Physical Therapy Daily Treatment/Reassessment Note    [facility]  [address] [place] [address]  [phone] (phone)  [phone] (fax)    Patient: Bailee Garza   : 1940  Diagnosis/ICD-10 Code:  H/O total knee replacement, right [Z96.651]  Referring practitioner: TERESA Monge  Today's Date: 2024  Patient seen for 9 sessions    Visit Diagnoses:    ICD-10-CM ICD-9-CM   1. H/O total knee replacement, right  Z96.651 V43.65   2. Difficulty walking  R26.2 719.7   3. Loss of movement  R29.898 344.9   4. Strength loss of  R53.1 780.79   5. Abnormal gait  R26.9 781.2              Subjective:  Brigitte stated that she is noticing less pain but has difficulty decelerating her body going down a step leading with the L foot.  She can ascend with the R foot without great issue.     Objective     Treatment    Therapeutic exercise  1.NuStep, seat at 7, work load of 6 x 4 minutes  2. .Recumbent bike, seat 3/4, x 5 minutes,independently, initially she did partial revolutions and then forward with assist  3. Runner's step, 4\", x 10, B, using column for balance  4. Lateral step downs, 4\", x 10, B, using column for balance  5. Forward step down, 1\" book, x 10, B, using column for balance  6. [place] hip abduction, 15 lbs., 15 x 2  7. LAQs x 10, B  8. R heel slides x 10  9. R quad sets, long sitting, x 10    Manual therapy: Seated R quadriceps stretch, 30s x 3, overpressure with quad sets to attain full extension during a quadriceps contraction    NMR: Brigitte required verbal cues for using eccentric control with each PRE and the step exercises.  For the step exercises cues to gaze to the ground ahead 20', to improve proprioception/balance, wiere given and to land on the ground toe to heel with each step exercise and to land parallel to the static foot with the lateral step down.      Reassessment  Supine R knee AROM, post treatment, 0-113 degrees of flexion  Strength: " extension 4 to 4+/5, flexion 4/5  Gait:  on level surface a slight loss of heel strike on the R without use of an AD      Assessment & Plan       Assessment  Impairments: abnormal gait, abnormal or restricted ROM, activity intolerance, impaired physical strength, lacks appropriate home exercise program, pain with function and weight-bearing intolerance   Other impairment: edema  Functional limitations: walking, uncomfortable because of pain, standing and stooping   Assessment details: Bailee Garza has been seen for 9 physical therapy sessions for R TKA.  Treatment has included therapeutic exercise, manual therapy, neuro-muscular retraining , gait training, and patient education with home exercise program . Progress to physical therapy goals is good.  She will benefit from continued skilled physical therapy to address remaining impairments and functional limitations.      Goals  Plan Goals: 1. Brigitte progressed therapeutic exercise with low level closed chain PREs. (Met)  2. AROM of the R knee, supine, equals 0-115 degrees of flexion. (Ongoing)  3. She is ambulating up/down 4 steps, with rail and SC, with a step to technique. (Met)     LTGs to be met in 12 weeks  1. AROM of the R knee, supine, equals 0-120 degrees of flexion for improved car transfers, comfort in sitting and general ADL mobility. (Ongoing)  2. She is ambulating independently on level surface and going up/down steps, using rail, with a normal gait pattern. (Ongoing)  3. KOS deficit is below 20%. (Ongoing)  4. Brigitte is independent with self care and a HEP (ongoing)      Plan  Planned therapy interventions: manual therapy, neuromuscular re-education, postural training, strengthening, stretching, therapeutic activities, transfer training, home exercise program, gait training, functional ROM exercises, flexibility, body mechanics training, ADL retraining and balance/weight-bearing training  Frequency: 1-2 x per week.  Duration in weeks: 4  Treatment plan  discussed with: patient  Plan details: Brigitte will progress with treatment to reach all goals set at time of evaluation.                Timed:    Manual Therapy:    5     mins  57942;  Therapeutic Exercise:    40     mins  99169;     Neuromuscular Sofya:    8    mins  56685;    Therapeutic Activity:          mins  25703;     Gait Training:           mins  33461;     Ultrasound:          mins  76830;    Aquatic Therapy           mins     06677;  Self Care                               mins   22674        Untimed:  Electrical Stimulation:           mins  00931 ( );  Traction:           mins  16437;   Dry Needling  (1-2 muscles)                  mins 20560 (Self-pay)  Dry Needling (3-4 muscles)  ____  20561 (Self-pay)  Dry Needling Trial             DRYNDLTRIAL  (No Charge)    Timed Treatment:   53   mins   Total Treatment:     53   mins    Deven Nunez PT  Physical Therapist    [place] License:[id number]

## 2024-12-03 ENCOUNTER — TREATMENT (OUTPATIENT)
Dept: PHYSICAL THERAPY | Facility: CLINIC | Age: 84
End: 2024-12-03
Payer: MEDICARE

## 2024-12-03 DIAGNOSIS — Z96.651 H/O TOTAL KNEE REPLACEMENT, RIGHT: Primary | ICD-10-CM

## 2024-12-03 DIAGNOSIS — R26.9 ABNORMAL GAIT: ICD-10-CM

## 2024-12-03 DIAGNOSIS — R29.898 LOSS OF MOVEMENT: ICD-10-CM

## 2024-12-03 DIAGNOSIS — R53.1 STRENGTH LOSS OF: ICD-10-CM

## 2024-12-03 DIAGNOSIS — R26.2 DIFFICULTY WALKING: ICD-10-CM

## 2024-12-03 PROCEDURE — 97112 NEUROMUSCULAR REEDUCATION: CPT | Performed by: PHYSICAL THERAPIST

## 2024-12-03 PROCEDURE — 97110 THERAPEUTIC EXERCISES: CPT | Performed by: PHYSICAL THERAPIST

## 2024-12-03 PROCEDURE — 97140 MANUAL THERAPY 1/> REGIONS: CPT | Performed by: PHYSICAL THERAPIST

## 2024-12-03 NOTE — PROGRESS NOTES
"Physical Therapy Daily Treatment Note    [facility]  [address] [place] [address]  [phone] (phone)  [phone] (fax)    Patient: Bailee Garza   : 1940  Diagnosis/ICD-10 Code:  H/O total knee replacement, right [Z96.651]  Referring practitioner: TERESA Monge  Today's Date: 12/3/2024  Patient seen for 10 sessions    Visit Diagnoses:    ICD-10-CM ICD-9-CM   1. H/O total knee replacement, right  Z96.651 V43.65   2. Difficulty walking  R26.2 719.7   3. Loss of movement  R29.898 344.9   4. Abnormal gait  R26.9 781.2   5. Strength loss of  R53.1 780.79              Subjective: Brigitte stated that her son moved in with her over the last week and it has thrown her schedule with her self therapy.     Objective      Therapeutic exercise  1.NuStep, seat at 7, work load of 6 x 4 minutes  2. .Recumbent bike, seat 3/4, x 6 minutes,independently, initially she did reverse direction x 1 minutes and finished with 5 minutes going forward.   3. Runner's step, 4\", x 10, B, using column for balance  4. Lateral step downs, 4\", x 10, B, using column for balance  5. Forward step down, 1\" book, x 10, B, using column for balance  6. [place] hip abduction, 15 lbs., 15 x 2  7. [place] leg press, seat at 8, 95 lbs., 10 x 2  8. [place] knee extension for R quadriceps stretch, 60s x 3  9. LAQs x 10, B  10.  R heel slides x 10  11. R quad sets, long sitting, x 10    NMR: verbal cues for exercise included gazing to the ground ahead for the runner's step and lateral step down for better balance.  To avoid hyperextending her back with the runner's step to hep keep better balance.  To use eccentric control with the static leg and land toe to heel softly with dynamic foot with the step exercises.  With the hip abduction to use eccentric control and with the leg press to avoid locking the knees in extension, although she has a restriction of extension on the right, and abduct the hips " into knee flexion.     Manual therapy:  end range assist for extension with the R knee on the leg press, and R hamstring stretch, 30s x 3, in supine.     Supine R knee AROM, post treatment 0-115 degrees of flexion    Assessment & Plan       Assessment  Assessment details: Brigitte improved the R knee's flexion today.  I feel the [place] knee extension used to stretch the R quadriceps was helpful today. She required verbal cues for exercise technique and tolerated treatment well.      Plan  Plan details: Continue per treatment plan.                Timed:    Manual Therapy:    8     mins  29906;  Therapeutic Exercise:    37     mins  84896;     Neuromuscular Sofya:    8    mins  99935;    Therapeutic Activity:          mins  17162;     Gait Training:           mins  21077;     Ultrasound:          mins  16658;    Aquatic Therapy           mins     17095;  Self Care                               mins   66609        Untimed:  Electrical Stimulation:           mins  18462 ( );  Traction:           mins  85527;   Dry Needling  (1-2 muscles)                  mins 28057 (Self-pay)  Dry Needling (3-4 muscles)  ____  20561 (Self-pay)  Dry Needling Trial             DRYNDLTRIAL  (No Charge)    Timed Treatment:   53   mins   Total Treatment:     53   mins    Deven Nunez PT  Physical Therapist    [place] License:[id number]

## 2024-12-06 ENCOUNTER — TREATMENT (OUTPATIENT)
Dept: PHYSICAL THERAPY | Facility: CLINIC | Age: 84
End: 2024-12-06
Payer: MEDICARE

## 2024-12-06 DIAGNOSIS — R26.9 ABNORMAL GAIT: ICD-10-CM

## 2024-12-06 DIAGNOSIS — R26.2 DIFFICULTY WALKING: ICD-10-CM

## 2024-12-06 DIAGNOSIS — Z96.651 H/O TOTAL KNEE REPLACEMENT, RIGHT: Primary | ICD-10-CM

## 2024-12-06 DIAGNOSIS — R53.1 STRENGTH LOSS OF: ICD-10-CM

## 2024-12-06 DIAGNOSIS — R29.898 LOSS OF MOVEMENT: ICD-10-CM

## 2024-12-06 NOTE — PROGRESS NOTES
"Physical Therapy Daily Treatment Note    [facility]  [address] [place] [address]  [phone] (phone)  [phone] (fax)    Patient: Bailee Garza   : 1940  Diagnosis/ICD-10 Code:  H/O total knee replacement, right [Z96.651]  Referring practitioner: TERESA Monge  Today's Date: 2024  Patient seen for 11 sessions    Visit Diagnoses:    ICD-10-CM ICD-9-CM   1. H/O total knee replacement, right  Z96.651 V43.65   2. Difficulty walking  R26.2 719.7   3. Loss of movement  R29.898 344.9   4. Abnormal gait  R26.9 781.2   5. Strength loss of  R53.1 780.79              Subjective: Brigitte reports that the R knee has been giving way on her the last couple of days.     Objective      Therapeutic exercise  1.NuStep, seat at 7, work load of 6 x 4 minutes  2. .Recumbent bike, seat 3/4, x 6 minutes,independently, initially she did reverse direction x 1 minutes and finished with 5 minutes going forward.   3. [place] leg press, seat at 7, 80 lbs., 10 x 2  4. [place] leg press, R LE only, 40 lbs., x 10  4. Runner's step, 6\", x 10, B, using column for balance  4. Lateral step downs, 4\", x 10, B, using column for balance  5. Forward step down, 1\" book, x 10, B, using column for balance  6. [place] hip abduction, 15 lbs., 15 x 2  7. Sit to stand, using hip hinge technique x 15    NMR: verbal cues for exercise technique were given.  For sit to stand demonstration was given and tactile cues for driving up with the butt and to keep thighs abducted.     Self care: I added sit to stands to her HEP and reviewed other exercises to be done daily.     Assessment & Plan       Assessment  Assessment details: Bailee was better with TKE with the unilateral leg press.  She required verbal/tactile cues for the sit to stand exercise and I added that to her HEP.    Plan  Plan details: Continue per treatment plan.                Timed:    Manual Therapy:         mins  97051;  Therapeutic " Exercise:    35     mins  19622;     Neuromuscular Sofya:    8    mins  12725;    Therapeutic Activity:          mins  17509;     Gait Training:           mins  11424;     Ultrasound:          mins  28725;    Aquatic Therapy           mins     74345;  Self Care                         2      mins   50316        Untimed:  Electrical Stimulation:           mins  64172 ( );  Traction:           mins  84095;   Dry Needling  (1-2 muscles)                  mins 20560 (Self-pay)  Dry Needling (3-4 muscles)  ____  20561 (Self-pay)  Dry Needling Trial             DRYNDLTRIAL  (No Charge)    Timed Treatment:   45   mins   Total Treatment:     45   mins    Deven Nunez PT  Physical Therapist    [place] License:[id number]

## 2024-12-09 ENCOUNTER — TREATMENT (OUTPATIENT)
Dept: PHYSICAL THERAPY | Facility: CLINIC | Age: 84
End: 2024-12-09
Payer: MEDICARE

## 2024-12-09 DIAGNOSIS — R26.9 ABNORMAL GAIT: ICD-10-CM

## 2024-12-09 DIAGNOSIS — R26.2 DIFFICULTY WALKING: ICD-10-CM

## 2024-12-09 DIAGNOSIS — R29.898 LOSS OF MOVEMENT: ICD-10-CM

## 2024-12-09 DIAGNOSIS — Z96.651 H/O TOTAL KNEE REPLACEMENT, RIGHT: Primary | ICD-10-CM

## 2024-12-09 DIAGNOSIS — R53.1 STRENGTH LOSS OF: ICD-10-CM

## 2024-12-09 PROCEDURE — 97112 NEUROMUSCULAR REEDUCATION: CPT | Performed by: PHYSICAL THERAPIST

## 2024-12-09 PROCEDURE — 97110 THERAPEUTIC EXERCISES: CPT | Performed by: PHYSICAL THERAPIST

## 2024-12-09 NOTE — PROGRESS NOTES
"Physical Therapy Daily Treatment Note    [facility] Physical Therapy Milestone  [address] [place] [address]  [phone] (phone)  [phone] (fax)    Patient: Bailee Garza   : 1940  Diagnosis/ICD-10 Code:  H/O total knee replacement, right [Z96.651]  Referring practitioner: TERESA Monge  Today's Date: 2024  Patient seen for 12 sessions    Visit Diagnoses:    ICD-10-CM ICD-9-CM   1. H/O total knee replacement, right  Z96.651 V43.65   2. Difficulty walking  R26.2 719.7   3. Loss of movement  R29.898 344.9   4. Abnormal gait  R26.9 781.2   5. Strength loss of  R53.1 780.79              Subjective: Brigitte reported that her R knee is better, but not where she wants it to be yet.     Objective     Treatment  Therapeutic exercise  1.NuStep, seat at 7, work load of 6 x 4 minutes   2 [place] leg press, seat at 7, 80 lbs., 10 x 2  3. [place] leg press, R LE only, 40 lbs., x 10  4. Runner's step, 6\", x 10, B, using column for balance  5. Lateral step downs, 4\", x 10, B, using column for balance  6. Forward step down, 1\" book, x 10, B, using column for balance  7. [place] hip abduction, 15 lbs., 15 x 2  8. Sit to stand, using hip hinge technique x 15  9. R quad sets, long sitting, x 10  10. R heel slides x 10    NMR: verbal cues for exercise technique were given.  For sit to stand demonstration was given and tactile cues for driving up with the butt and to keep thighs abducted.      Supine AROM of the R knee, post treatment: 0-115 degrees    Assessment & Plan       Assessment  Assessment details: Brigitte needed verbal and tactile cues for sit to stands.  She is progressing slowly with her exercises.      Plan  Plan details: Continue per treatment plan.                Timed:    Manual Therapy:         mins  09493;  Therapeutic Exercise:    37     mins  91936;     Neuromuscular Sofya:    8    mins  13242;    Therapeutic Activity:          mins  74521;     Gait Training:           mins  " 30231;     Ultrasound:          mins  33070;    Aquatic Therapy           mins     41085;  Self Care                               mins   46973        Untimed:  Electrical Stimulation:           mins  23690 ( );  Traction:           mins  84862;   Dry Needling  (1-2 muscles)                  mins 20560 (Self-pay)  Dry Needling (3-4 muscles)  ____  20561 (Self-pay)  Dry Needling Trial             DRYNDLTRIAL  (No Charge)    Timed Treatment:  45    mins   Total Treatment:     45   mins    Deven Nunez PT  Physical Therapist    [place] License:[id number]

## 2024-12-13 ENCOUNTER — TREATMENT (OUTPATIENT)
Dept: PHYSICAL THERAPY | Facility: CLINIC | Age: 84
End: 2024-12-13
Payer: MEDICARE

## 2024-12-13 DIAGNOSIS — Z96.651 H/O TOTAL KNEE REPLACEMENT, RIGHT: Primary | ICD-10-CM

## 2024-12-13 DIAGNOSIS — R29.898 LOSS OF MOVEMENT: ICD-10-CM

## 2024-12-13 DIAGNOSIS — R53.1 STRENGTH LOSS OF: ICD-10-CM

## 2024-12-13 DIAGNOSIS — R26.2 DIFFICULTY WALKING: ICD-10-CM

## 2024-12-13 DIAGNOSIS — R26.9 ABNORMAL GAIT: ICD-10-CM

## 2024-12-13 NOTE — PROGRESS NOTES
"Physical Therapy Daily Treatment Note    [facility] Physical Therapy Milestone  [address] [place] [address]  [phone] (phone)  [phone] (fax)    Patient: Bailee Garza   : 1940  Diagnosis/ICD-10 Code:  H/O total knee replacement, right [Z96.651]  Referring practitioner: TERESA Monge  Today's Date: 2024  Patient seen for 13 sessions    Visit Diagnoses:    ICD-10-CM ICD-9-CM   1. H/O total knee replacement, right  Z96.651 V43.65   2. Difficulty walking  R26.2 719.7   3. Loss of movement  R29.898 344.9   4. Abnormal gait  R26.9 781.2   5. Strength loss of  R53.1 780.79              Subjective: Bailee stated that she has a little bit of soreness around the knee and it is stiff this morning.  She is becoming more active with normal ADLs.     Objective     Therapeutic exercise  1.NuStep, seat at 7, work load of 6 x 4 minutes  2. .Recumbent bike, seat 3/4, x 6 minutes,independently, initially she did reverse direction x 1 minutes and finished with 5 minutes going forward  3. [place] hip abduction, 15 lbs., 15 x 2  4. Runner's step, 6\", x 10, B, using column for balance  5. Lateral step downs, 4\", x 10, B, using column for balance  6. Forward step down, 6\", x 10, B, using column for balance  7. Sit to stand, chair with booster, x 10  8. [place] leg press, seat at 8, 95 lbs., 10 x 2  After R quadriceps stretching  9. LAQs x 10, B    NMR: verbal cues for exercise technique were given. For sit to stand demonstration was given and tactile cues for driving up with the butt and to keep thighs abducted.     Manual therapy: in sitting R quadriceps stretching, 30s x 3    Assessment & Plan       Assessment  Assessment details: Sit to stand technique is improving but needs work for better technique and strength.  I did observe better TKE with the R knee on the leg press.  She progressed on the leg press and step downs today.     Plan  Plan details: Continue per treatment plan. "                Timed:    Manual Therapy:    2     mins  96099;  Therapeutic Exercise:    35     mins  62727;     Neuromuscular Sofya:    8    mins  46815;    Therapeutic Activity:          mins  76401;     Gait Training:           mins  99036;     Ultrasound:          mins  74141;    Aquatic Therapy           mins     73838;  Self Care                               mins   18056        Untimed:  Electrical Stimulation:           mins  28258 ( );  Traction:           mins  15081;   Dry Needling  (1-2 muscles)                  mins 20560 (Self-pay)  Dry Needling (3-4 muscles)  ____  20561 (Self-pay)  Dry Needling Trial             DRYNDLTRIAL  (No Charge)    Timed Treatment:   45   mins   Total Treatment:     45   mins    Deven Nunez PT  Physical Therapist    [place] License:[id number]

## 2024-12-16 ENCOUNTER — TREATMENT (OUTPATIENT)
Dept: PHYSICAL THERAPY | Facility: CLINIC | Age: 84
End: 2024-12-16
Payer: MEDICARE

## 2024-12-16 DIAGNOSIS — R53.1 STRENGTH LOSS OF: ICD-10-CM

## 2024-12-16 DIAGNOSIS — R26.9 ABNORMAL GAIT: ICD-10-CM

## 2024-12-16 DIAGNOSIS — Z96.651 H/O TOTAL KNEE REPLACEMENT, RIGHT: Primary | ICD-10-CM

## 2024-12-16 DIAGNOSIS — R26.2 DIFFICULTY WALKING: ICD-10-CM

## 2024-12-16 DIAGNOSIS — R29.898 LOSS OF MOVEMENT: ICD-10-CM

## 2024-12-16 PROCEDURE — 97110 THERAPEUTIC EXERCISES: CPT | Performed by: PHYSICAL THERAPIST

## 2024-12-16 PROCEDURE — 97112 NEUROMUSCULAR REEDUCATION: CPT | Performed by: PHYSICAL THERAPIST

## 2024-12-16 NOTE — PROGRESS NOTES
"Physical Therapy Daily Treatment Note    [facility]  [address] [place] [address]  [phone] (phone)  [phone] (fax)    Patient: Bailee Garza   : 1940  Diagnosis/ICD-10 Code:  H/O total knee replacement, right [Z96.651]  Referring practitioner: TERESA Monge  Today's Date: 2024  Patient seen for 14 sessions    Visit Diagnoses:    ICD-10-CM ICD-9-CM   1. H/O total knee replacement, right  Z96.651 V43.65   2. Difficulty walking  R26.2 719.7   3. Loss of movement  R29.898 344.9   4. Abnormal gait  R26.9 781.2   5. Strength loss of  R53.1 780.79              Subjective: Brigitte reports that the R knee is sore.     Objective     Treatment   Therapeutic exercise  1. .Recumbent bike, seat 3/4, x 6 minutes,independently, initially she did reverse direction x 1 minutes and finished with 5 minutes going forward  2. [place] leg press, 95 lbs., 10 x 2  3. [place] single leg press, R LE, 95 lbs., x 10  4. Runner's step, 6\", x 10, B, using column for balance  5. Lateral step downs, 4\", x 10, B, using column for balance  6. Forward step down, 6\", x 10, B, using column for balance  7. Sit to stand, chair with booster, x 5  8. [place] hip abduction, 15 lbs., 15 x 2  After R quadriceps stretching  9. LAQs x 10, B  10. R heel slides x 10  11. R quad sets, long sitting, x 10    NMR: verbal cues for exercise technique were given. For sit to stand demonstration was given and tactile cues for driving up with the butt and to keep thighs abducted.      Manual therapy: in sitting R quadriceps stretching, 30s x 3, overpressure at end range extension, 5s x 10    Supine AROM of the R knee, post treatment, 2-116 degrees of flexion    Assessment & Plan       Assessment  Assessment details: Brigitte was a bit short of full knee extension today.  I am assuming she was not as aggressive with her HEP, especially pushing into full knee extension.  The R leg is getting stronger with " the closed chain exercises.     Plan  Plan details: Continue per treatment plan.                Timed:    Manual Therapy:    5     mins  03570;  Therapeutic Exercise:    32     mins  37432;     Neuromuscular Sofya:    8    mins  18328;    Therapeutic Activity:          mins  73280;     Gait Training:           mins  74888;     Ultrasound:          mins  85323;    Aquatic Therapy           mins     01054;  Self Care                               mins   61113        Untimed:  Electrical Stimulation:           mins  89260 ( );  Traction:           mins  24449;   Dry Needling  (1-2 muscles)                  mins 20560 (Self-pay)  Dry Needling (3-4 muscles)  ____  20561 (Self-pay)  Dry Needling Trial             DRYNDLTRIAL  (No Charge)    Timed Treatment:   45   mins   Total Treatment:     45   mins    Deven Nunez PT  Physical Therapist    [place] License:[id number]

## 2024-12-20 ENCOUNTER — TREATMENT (OUTPATIENT)
Dept: PHYSICAL THERAPY | Facility: CLINIC | Age: 84
End: 2024-12-20
Payer: MEDICARE

## 2024-12-20 DIAGNOSIS — Z96.651 H/O TOTAL KNEE REPLACEMENT, RIGHT: Primary | ICD-10-CM

## 2024-12-20 DIAGNOSIS — R26.2 DIFFICULTY WALKING: ICD-10-CM

## 2024-12-20 DIAGNOSIS — R53.1 STRENGTH LOSS OF: ICD-10-CM

## 2024-12-20 DIAGNOSIS — R26.9 ABNORMAL GAIT: ICD-10-CM

## 2024-12-20 DIAGNOSIS — R29.898 LOSS OF MOVEMENT: ICD-10-CM

## 2024-12-20 NOTE — PROGRESS NOTES
Physical Therapy Daily Treatment Note    [facility] Physical Therapy Milestone  [address] [place] [address]  [phone] (phone)  [phone] (fax)    Patient: Bailee Garza   : 1940  Diagnosis/ICD-10 Code:  H/O total knee replacement, right [Z96.651]  Referring practitioner: TERESA Monge  Today's Date: 2024  Patient seen for 15 sessions    Visit Diagnoses:    ICD-10-CM ICD-9-CM   1. H/O total knee replacement, right  Z96.651 V43.65   2. Difficulty walking  R26.2 719.7   3. Loss of movement  R29.898 344.9   4. Abnormal gait  R26.9 781.2   5. Strength loss of  R53.1 780.79              Subjective: Brigitte complains that her knee is sore and swollen.  She was busy at home scrubbing floors, from standing, and has been very active away from here.    Objective     Treatment    Therapeutic exercise  Recumbent bike, seat at 3, 6-7 minutes, last 5 minutes with full revolutions  [place] leg press, seat at 8, 100 lbs., 10 x 2  [place] single leg press, seat at 7, 80 lbs., 10 x 2  [place] hip abduction, 15 lbs., 15 x 2  LAQs x 10, B  After STM for edema, in supine with legs on bolster  6. R Quad sets, x 10, with roll at ankle  After hamstrings stretches  7. R heel slides, x 10, after quadriceps stretching, 5 heel slides with overpressure at end range flexion    NMR: verbal cues for exercise technique were given    Manual therapy:  edema massage in supine, with legs elevated on bolster, then hamstring stretches, supine, 30s x 3, and finishing with R quadriceps stretching, 30s x 3    Post treatment AROM of the R knee, supine, 0-117 degrees of flexion      Assessment & Plan       Assessment  Assessment details: Brigitte overdid things away from the clinic and this caused an increase in pain, edema and a loss of movement the last couple of days.  I reduced exercise and did more manual therapy and that was helpful.     Plan  Plan details: Continue per treatment plan.                 Timed:    Manual Therapy:    10     mins  35341;  Therapeutic Exercise:    30     mins  87460;     Neuromuscular Sofya:    5    mins  35760;    Therapeutic Activity:          mins  92757;     Gait Training:           mins  66066;     Ultrasound:          mins  08771;    Aquatic Therapy           mins     22953;  Self Care                               mins   20649        Untimed:  Electrical Stimulation:           mins  60927 ( );  Traction:           mins  92271;   Dry Needling  (1-2 muscles)                  mins 20560 (Self-pay)  Dry Needling (3-4 muscles)  ____  20561 (Self-pay)  Dry Needling Trial             DRYNDLTRIAL  (No Charge)    Timed Treatment:   45   mins   Total Treatment:     45   mins    Deven Nunez PT  Physical Therapist    [place] License:[id number]

## 2024-12-30 ENCOUNTER — OFFICE VISIT (OUTPATIENT)
Dept: FAMILY MEDICINE CLINIC | Facility: CLINIC | Age: 84
End: 2024-12-30
Payer: MEDICARE

## 2024-12-30 VITALS
BODY MASS INDEX: 18.96 KG/M2 | TEMPERATURE: 97.6 F | OXYGEN SATURATION: 99 % | WEIGHT: 107 LBS | HEIGHT: 63 IN | HEART RATE: 85 BPM | SYSTOLIC BLOOD PRESSURE: 90 MMHG | DIASTOLIC BLOOD PRESSURE: 54 MMHG

## 2024-12-30 DIAGNOSIS — R05.8 PRODUCTIVE COUGH: ICD-10-CM

## 2024-12-30 DIAGNOSIS — B37.0 ORAL THRUSH: ICD-10-CM

## 2024-12-30 DIAGNOSIS — J02.9 SORE THROAT: ICD-10-CM

## 2024-12-30 DIAGNOSIS — J01.00 ACUTE MAXILLARY SINUSITIS, RECURRENCE NOT SPECIFIED: Primary | ICD-10-CM

## 2024-12-30 DIAGNOSIS — R09.81 NASAL CONGESTION: ICD-10-CM

## 2024-12-30 LAB
EXPIRATION DATE: NORMAL
EXPIRATION DATE: NORMAL
FLUAV AG UPPER RESP QL IA.RAPID: NOT DETECTED
FLUBV AG UPPER RESP QL IA.RAPID: NOT DETECTED
INTERNAL CONTROL: NORMAL
INTERNAL CONTROL: NORMAL
Lab: NORMAL
Lab: NORMAL
S PYO AG THROAT QL: NEGATIVE
SARS-COV-2 AG UPPER RESP QL IA.RAPID: NOT DETECTED

## 2024-12-30 PROCEDURE — 87880 STREP A ASSAY W/OPTIC: CPT

## 2024-12-30 PROCEDURE — 1159F MED LIST DOCD IN RCRD: CPT

## 2024-12-30 PROCEDURE — 87428 SARSCOV & INF VIR A&B AG IA: CPT

## 2024-12-30 PROCEDURE — 1160F RVW MEDS BY RX/DR IN RCRD: CPT

## 2024-12-30 PROCEDURE — 1126F AMNT PAIN NOTED NONE PRSNT: CPT

## 2024-12-30 PROCEDURE — 99214 OFFICE O/P EST MOD 30 MIN: CPT

## 2024-12-30 PROCEDURE — 3074F SYST BP LT 130 MM HG: CPT

## 2024-12-30 PROCEDURE — 3078F DIAST BP <80 MM HG: CPT

## 2024-12-30 RX ORDER — AMOXICILLIN AND CLAVULANATE POTASSIUM 500; 125 MG/1; MG/1
1 TABLET, FILM COATED ORAL 2 TIMES DAILY
Qty: 14 TABLET | Refills: 0 | Status: SHIPPED | OUTPATIENT
Start: 2024-12-30 | End: 2025-01-06

## 2024-12-30 RX ORDER — NYSTATIN 100000 [USP'U]/ML
500000 SUSPENSION ORAL 4 TIMES DAILY
Qty: 473 ML | Refills: 0 | Status: SHIPPED | OUTPATIENT
Start: 2024-12-30

## 2024-12-30 RX ORDER — BENZONATATE 100 MG/1
100 CAPSULE ORAL 3 TIMES DAILY PRN
Qty: 30 CAPSULE | Refills: 1 | Status: SHIPPED | OUTPATIENT
Start: 2024-12-30

## 2024-12-30 NOTE — PROGRESS NOTES
Chief Complaint  Sore Throat (X's 3 days), Cough, Nasal Congestion, and Dizziness    Subjective          Sore Throat   Associated symptoms include congestion and coughing. Pertinent negatives include no abdominal pain, ear pain, shortness of breath, trouble swallowing or vomiting.   Cough  Associated symptoms include postnasal drip, rhinorrhea and a sore throat. Pertinent negatives include no chest pain, chills, ear pain, fever, shortness of breath or wheezing.   Dizziness  Symptoms include congestion, cough and sore throat.    Pertinent negative symptoms include no abdominal pain, no chest pain, no chills, no fatigue, no fever, no nausea and no vomiting.     History of Present Illness      Bailee Garza 84 y.o. female presents for evaluation of sinus pressure and congestion, sore throat, cough. Symptoms include congestion, sore throat, post nasal drip, productive cough, sinus pain, and runny nose. Onset of symptoms was 4 days ago, gradually worsening since that time. Patient denies shortness of breath, wheezing, hemoptysis, pleuritic chest pain, fever, dyspnea on exertion, ear drainage, eye discharge, diarrhea, vomiting, chills, high fever. Evaluation to date: none. Treatment to date: OTC oral decongestants.         Review of Systems   Constitutional:  Negative for chills, fatigue and fever.   HENT:  Positive for congestion, postnasal drip, rhinorrhea, sinus pressure and sore throat. Negative for ear pain and trouble swallowing.    Respiratory:  Positive for cough. Negative for chest tightness, shortness of breath and wheezing.    Cardiovascular:  Negative for chest pain and palpitations.   Gastrointestinal:  Negative for abdominal pain, nausea and vomiting.   Neurological:  Positive for dizziness. Negative for syncope and light-headedness.        Objective   Vital Signs:   BP 90/54 (BP Location: Left arm, Patient Position: Sitting, Cuff Size: Adult)   Pulse 85   Temp 97.6 °F (36.4 °C) (Oral)   Ht 158.8 cm  "(62.5\")   Wt 48.5 kg (107 lb)   SpO2 99%   BMI 19.26 kg/m²      BMI is within normal parameters. No other follow-up for BMI required.       Physical Exam  Vitals and nursing note reviewed.   Constitutional:       General: She is not in acute distress.     Appearance: She is well-developed.   HENT:      Head: Normocephalic and atraumatic. Hair is normal.      Right Ear: External ear normal. No drainage, swelling or tenderness. A middle ear effusion is present. Tympanic membrane is not injected, erythematous or retracted.      Left Ear: External ear normal. No drainage, swelling or tenderness.  No middle ear effusion. Tympanic membrane is not injected, erythematous or retracted.      Nose: Mucosal edema and congestion present. No rhinorrhea.      Right Sinus: Maxillary sinus tenderness present. No frontal sinus tenderness.      Left Sinus: Maxillary sinus tenderness present. No frontal sinus tenderness.      Mouth/Throat:      Mouth: Mucous membranes are moist.      Pharynx: Uvula midline. Postnasal drip present. No pharyngeal swelling, oropharyngeal exudate, posterior oropharyngeal erythema or uvula swelling.      Tonsils: No tonsillar exudate or tonsillar abscesses.      Comments: White coating on tongue.  Eyes:      General: No scleral icterus.        Right eye: No discharge.         Left eye: No discharge.      Conjunctiva/sclera: Conjunctivae normal.      Pupils: Pupils are equal, round, and reactive to light.   Cardiovascular:      Rate and Rhythm: Normal rate and regular rhythm.      Heart sounds: Normal heart sounds.   Pulmonary:      Effort: No respiratory distress.      Breath sounds: Normal breath sounds. No stridor. No decreased breath sounds, wheezing, rhonchi or rales.   Musculoskeletal:      Cervical back: Normal range of motion and neck supple.   Skin:     General: Skin is warm and dry.      Findings: No rash.   Neurological:      Mental Status: She is alert and oriented to person, place, and time. "      Motor: No abnormal muscle tone.   Psychiatric:         Mood and Affect: Mood normal.        Physical Exam      The following data was reviewed by: TERESA Agarwal on 12/30/2024:  Comprehensive Metabolic Panel (11/07/2024 09:51)   POCT rapid strep A (12/30/2024 11:05)  POCT SARS-CoV-2 Antigen LASHA + Flu (12/30/2024 11:04)  Results      Creatinine clearance: 30.367 mL/min.             Assessment and Plan    Assessment & Plan      Assessment & Plan  Acute maxillary sinusitis, recurrence not specified  COVID and flu negative.  Use a nasal saline rinse as needed.  Start antibiotic if no improvement in symptoms in a few days.  Return to the office if no improvement.    Orders:    benzonatate (Tessalon Perles) 100 MG capsule; Take 1 capsule by mouth 3 (Three) Times a Day As Needed for Cough.    amoxicillin-clavulanate (Augmentin) 500-125 MG per tablet; Take 1 tablet by mouth 2 (Two) Times a Day for 7 days.    Productive cough  COVID and flu negative.  Tessalon Perles as needed.  Increase fluid intake.  Use a coolmist humidifier as needed.  Return to the office if no improvement.    Orders:    POCT SARS-CoV-2 Antigen LASHA + Flu    benzonatate (Tessalon Perles) 100 MG capsule; Take 1 capsule by mouth 3 (Three) Times a Day As Needed for Cough.    Sore throat  Rapid strep is negative.  Most likely due to oral thrush.  Nystatin swish and swallow as directed.  Gargle with warm salt water 3-4 times daily.  Return if no improvement.    Orders:    POCT SARS-CoV-2 Antigen LASHA + Flu    POCT rapid strep A    Nasal congestion    Orders:    POCT SARS-CoV-2 Antigen LASHA + Flu    Oral thrush  Nystatin swish and swallow as directed.  Brush tongue lightly.  Maintain good oral hygiene.  Return if no improvement.    Orders:    nystatin (MYCOSTATIN) 100,000 unit/mL suspension; Swish and swallow 5 mL 4 (Four) Times a Day.             Follow Up     Return if symptoms worsen or fail to improve.    Patient was given instructions and  counseling regarding her condition or for health maintenance advice. Please see specific information pulled into the AVS if appropriate.

## 2025-01-03 ENCOUNTER — TREATMENT (OUTPATIENT)
Dept: PHYSICAL THERAPY | Facility: CLINIC | Age: 85
End: 2025-01-03
Payer: MEDICARE

## 2025-01-03 DIAGNOSIS — R53.1 STRENGTH LOSS OF: ICD-10-CM

## 2025-01-03 DIAGNOSIS — R26.2 DIFFICULTY WALKING: ICD-10-CM

## 2025-01-03 DIAGNOSIS — R26.9 ABNORMAL GAIT: ICD-10-CM

## 2025-01-03 DIAGNOSIS — R29.898 LOSS OF MOVEMENT: ICD-10-CM

## 2025-01-03 DIAGNOSIS — Z96.651 H/O TOTAL KNEE REPLACEMENT, RIGHT: Primary | ICD-10-CM

## 2025-01-03 NOTE — PROGRESS NOTES
"Physical Therapy Daily Treatment/Reassessment  Note    [facility] Physical Therapy Milestone  [address] [place] [address]  [phone] (phone)  [phone] (fax)    Patient: Bailee Garza   : 1940  Diagnosis/ICD-10 Code:  H/O total knee replacement, right [Z96.651]  Referring practitioner: TERESA Monge  Today's Date: 1/3/2025  Patient seen for 16 sessions    Visit Diagnoses:    ICD-10-CM ICD-9-CM   1. H/O total knee replacement, right  Z96.651 V43.65   2. Difficulty walking  R26.2 719.7   3. Loss of movement  R29.898 344.9   4. Abnormal gait  R26.9 781.2   5. Strength loss of  R53.1 780.79              Subjective: Brigitte stated that her knee is improving, but she had difficulty going down stairs.     Objective   Therapeutic exercise  Nustep, seat at 7, work load of 6, x 3 minutes  Recumbent bike, seat at 3, 6-7 minutes, last 5 minutes with full revolutions  Runner['s step, 6\", x 10, B, using column for balance  Step up and over, 6\", x 10, B, using column for balance  Lateral step down, 4\", x 10, B  [place] leg press, seat at 8, 95 lbs., 10 x 2  7. LAQs x 10, B  8. R Heel slides, x 20  9. R quad sets, roll at ankle, x 10    NMR: verbal cues for exercise included gazing to the ground ahead for the runner's step and lateral step down for better balance. To avoid hyperextending her back with the runner's step to hep keep better balance. To use eccentric control with the static leg and land toe to heel softly with dynamic foot with the step exercises.     Reassessment  Mid patellar girth: 31.0 cm.  Supine AROM of the R knee 0-117 degrees  3. MMT R knee extension 4 to 4+/5  and flexion 4/5  4. Gait: on level she is independent without an assistive device, heel strike is near normal on the R  5. FOS: KOS, 73/80=81% or a 9% deficit    Assessment & Plan       Assessment  Impairments: abnormal gait, activity intolerance, impaired physical strength, lacks appropriate home exercise program " and pain with function   Other impairment: difficulty navigating stairs  Functional limitations: walking, uncomfortable because of pain and stooping   Assessment details:  1. Brigitte progressed therapeutic exercise with low level closed chain PREs. (Met)  2. AROM of the R knee, supine, equals 0-115 degrees of flexion. (Ongoing)  3. She is ambulating up/down 4 steps, with rail and SC, with a step to technique. (Met)     LTGs to be met in 12 weeks  1. AROM of the R knee, supine, equals 0-120 degrees of flexion for improved car transfers, comfort in sitting and general ADL mobility. (Ongoing)  2. She is ambulating independently on level surface and going up/down steps, using rail, with a normal gait pattern. (Ongoing)  3. KOS deficit is below 20%. (Ongoing)  4. Brigitte is independent with self care and a HEP (ongoing)    Prognosis: good    Goals  Plan Goals: Bailee Garza has been seen for 16 physical therapy sessions for R TKA.  Treatment has included therapeutic exercise, manual therapy, neuro-muscular retraining , gait training, and patient education with home exercise program . Progress to physical therapy goals is good.  She will benefit from continued skilled physical therapy to address remaining impairments and functional limitations.      Plan  Therapy options: will be seen for skilled therapy services  Planned therapy interventions: manual therapy, neuromuscular re-education, strengthening, stretching, therapeutic activities, home exercise program, gait training, functional ROM exercises, flexibility, ADL retraining, balance/weight-bearing training, body mechanics training and transfer training  Frequency: 1-2 x per week.  Duration in weeks: 4  Treatment plan discussed with: patient  Plan details: Continue with current treatment               Timed:    Manual Therapy:         mins  42152;  Therapeutic Exercise:    37     mins  70909;     Neuromuscular Sofya:    8    mins  54639;    Therapeutic Activity:           mins  77038;     Gait Training:           mins  94050;     Ultrasound:          mins  66409;    Aquatic Therapy           mins     45103;  Self Care                               mins   78627        Untimed:  Electrical Stimulation:           mins  90185 ( );  Traction:           mins  21332;   Dry Needling  (1-2 muscles)                  mins 20560 (Self-pay)  Dry Needling (3-4 muscles)  ____  20561 (Self-pay)  Dry Needling Trial             DRYNDLTRIAL  (No Charge)    Timed Treatment:   45   mins   Total Treatment:     45   mins    Deven Nunez PT  Physical Therapist    [place] License:[id number]

## 2025-01-07 ENCOUNTER — DOCUMENTATION (OUTPATIENT)
Dept: FAMILY MEDICINE CLINIC | Facility: CLINIC | Age: 85
End: 2025-01-07
Payer: MEDICARE

## 2025-01-07 NOTE — PROGRESS NOTES
Patient called today with acute injury to her foot.  She is having pain on the outside of the foot on the top.  I recommended that she go over to [facility] on [facility] for immediate evaluation today to rule out fracture.

## 2025-01-14 ENCOUNTER — TREATMENT (OUTPATIENT)
Dept: PHYSICAL THERAPY | Facility: CLINIC | Age: 85
End: 2025-01-14
Payer: MEDICARE

## 2025-01-14 DIAGNOSIS — R53.1 STRENGTH LOSS OF: ICD-10-CM

## 2025-01-14 DIAGNOSIS — R29.898 LOSS OF MOVEMENT: ICD-10-CM

## 2025-01-14 DIAGNOSIS — R26.2 DIFFICULTY WALKING: ICD-10-CM

## 2025-01-14 DIAGNOSIS — R26.9 ABNORMAL GAIT: ICD-10-CM

## 2025-01-14 DIAGNOSIS — Z96.651 H/O TOTAL KNEE REPLACEMENT, RIGHT: Primary | ICD-10-CM

## 2025-01-14 PROCEDURE — 97140 MANUAL THERAPY 1/> REGIONS: CPT | Performed by: PHYSICAL THERAPIST

## 2025-01-14 PROCEDURE — 97110 THERAPEUTIC EXERCISES: CPT | Performed by: PHYSICAL THERAPIST

## 2025-01-14 PROCEDURE — 97112 NEUROMUSCULAR REEDUCATION: CPT | Performed by: PHYSICAL THERAPIST

## 2025-01-14 NOTE — PROGRESS NOTES
"Physical Therapy Daily Treatment Note    [facility]  [address] [place] [address]  [phone] (phone)  [phone] (fax)    Patient: Bailee Garza   : 1940  Diagnosis/ICD-10 Code:  H/O total knee replacement, right [Z96.651]  Referring practitioner: TERESA Monge  Today's Date: 2025  Patient seen for 17 sessions    Visit Diagnoses:    ICD-10-CM ICD-9-CM   1. H/O total knee replacement, right  Z96.651 V43.65   2. Difficulty walking  R26.2 719.7   3. Loss of movement  R29.898 344.9   4. Abnormal gait  R26.9 781.2   5. Strength loss of  R53.1 780.79              Subjective:     Objective     Objective   Therapeutic exercise  Nustep, seat at 7, work load of 6, x 3 minutes  Recumbent bike, seat at 3, 6-7 minutes, last 5 minutes with full revolutions  Runner['s step, 6\", x 10, B, using column for balance  Step up and over, 6\", x 10, B, using column for balance  Lateral step down, 4\", x 10, B  Kalli leg press, seat at 8, 95 lbs., 10 x 2  Single leg press, seat at 8, 95 lbs., 10 x 2, with her R LE  [place] hip abduction, 20 lbs., 15 x 2  7. LAQs x 10, B  8. R Heel slides, x 20, 5 repetition siwth belt overpressure  9. R quad sets, roll at ankle, x 10    NMR: verbal and tactile cues for exercise technique were given.  For the runner's step to bring her shoulders over her hips, to place her dynamic foot parallel to the static foot on the step.  For the leg press to avoid full knee extension, hold at that point and mildly abduct the hips into knee flexion.    Manual therapy:  STM to the distal R quadriceps, stretching to the R quadriceps, supine, 30s x 3    Post treatment AROM of the R knee, 0-117 degrees of flexion    Assessment & Plan       Assessment  Assessment details: Brigitte required verbal cues for exercise technique.  The knee was stiff today but at end of treatment we did reach 117 degrees of flexion.  She has difficulty decelerating the L LE, with " the R LE descending steps.     Plan  Plan details: Continue per treatment plan.                Timed:    Manual Therapy:    10     mins  66558;  Therapeutic Exercise:    37     mins  68881;     Neuromuscular Sofya:    8    mins  98896;    Therapeutic Activity:          mins  81811;     Gait Training:             mins  75939;     Ultrasound:           mins  56803;    Self Care                               mins   42023  Orthotic Fitting (initial)  _____ mins 54541  Orthotic Fitting (follow-up) _____ mins 00033      Untimed:    Electrical Stimulation:           mins  28417 ( );  Traction:           mins  14992;   Dry Needling  (1-2 muscles)                  mins 77488 (Self-pay)  Dry Needling (3-4 muscles)  ____  14929 (Self-pay)  Dry Needling Trial             DRYNDLTRIAL  (No Charge)  Canalith Repositioning _____ mins 08929    Timed Treatment:   55   mins   Total Treatment:     55   mins    Deven Nunez PT  Physical Therapist    [place] [id number]

## 2025-01-20 ENCOUNTER — TREATMENT (OUTPATIENT)
Dept: PHYSICAL THERAPY | Facility: CLINIC | Age: 85
End: 2025-01-20
Payer: MEDICARE

## 2025-01-20 DIAGNOSIS — R53.1 STRENGTH LOSS OF: ICD-10-CM

## 2025-01-20 DIAGNOSIS — Z96.651 H/O TOTAL KNEE REPLACEMENT, RIGHT: Primary | ICD-10-CM

## 2025-01-20 DIAGNOSIS — R26.9 ABNORMAL GAIT: ICD-10-CM

## 2025-01-20 DIAGNOSIS — R26.2 DIFFICULTY WALKING: ICD-10-CM

## 2025-01-20 DIAGNOSIS — R29.898 LOSS OF MOVEMENT: ICD-10-CM

## 2025-01-20 PROCEDURE — 97112 NEUROMUSCULAR REEDUCATION: CPT | Performed by: PHYSICAL THERAPIST

## 2025-01-20 PROCEDURE — 97110 THERAPEUTIC EXERCISES: CPT | Performed by: PHYSICAL THERAPIST

## 2025-01-20 NOTE — PROGRESS NOTES
"Physical Therapy Daily Treatment Note    [facility]  [address] [place] [address]  [phone] (phone)  [phone] (fax)    Patient: Bailee Garza   : 1940  Diagnosis/ICD-10 Code:  H/O total knee replacement, right [Z96.651]  Referring practitioner: TERESA Monge  Today's Date: 2025  Patient seen for 18 sessions    Visit Diagnoses:    ICD-10-CM ICD-9-CM   1. H/O total knee replacement, right  Z96.651 V43.65   2. Difficulty walking  R26.2 719.7   3. Loss of movement  R29.898 344.9   4. Abnormal gait  R26.9 781.2   5. Strength loss of  R53.1 780.79              Subjective: Brigitte reports that is she sits too, long, approximately 90 minutes, the R knee stiffens up.     Objective     Treatment     Therapeutic exercise  Recumbent bike, seat at 3, 6-7 minutes, last 5 minutes with full revolutions  Stretch on step, for R knee flexion, 10s x 5  Runner['s step, 6\", x 10, B, using column for balance  Step up and over, 6\", x 10, B, using column for balance  Lateral step down, 4\", x 10, B  [place] leg press, seat at 8, 95 lbs., 10 x 2  Single leg press, seat at 8, 95 lbs., 10 x 2, with her R LE  [place] hip abduction, 20 lbs., 15 x 2  9. R Heel slides, x 20, 5 repetition siwth belt overpressure  10. R quad sets, roll at ankle, x 10     NMR: verbal and tactile cues for exercise technique were given.  For the runner's step to bring her shoulders over her hips, to place her dynamic foot parallel to the static foot on the step.  For the leg press to avoid full knee extension, hold at that point and mildly abduct the hips into knee flexion.     Manual therapy:  STM to the distal R quadriceps, stretching to the R quadriceps, supine, 30s x 3     Post treatment AROM of the R knee, 0-120 degrees of flexion    Assessment & Plan       Assessment  Assessment details: With the manual therapy Brigitte reached 120 degrees of flexion for the first time.  It was somewhat difficult " due to the pain she gets with the manual therapy. She required verbal cues for exercise technique.     Plan  Plan details: Continue treatment and try to be consistent about achieving the 120 degrees of flexion.                Timed:    Manual Therapy:    3     mins  93094;  Therapeutic Exercise:    34     mins  77957;     Neuromuscular Sofya:    8    mins  58064;    Therapeutic Activity:          mins  34719;     Gait Training:             mins  87421;     Ultrasound:           mins  43202;    Self Care                               mins   02596  Orthotic Fitting (initial)  _____ mins 10424  Orthotic Fitting (follow-up) _____ mins 46264      Untimed:    Electrical Stimulation:           mins  61636 ( );  Traction:           mins  26236;   Dry Needling  (1-2 muscles)                  mins 07092 (Self-pay)  Dry Needling (3-4 muscles)  ____  20561 (Self-pay)  Dry Needling Trial             DRYNDLTRIAL  (No Charge)  Canalith Repositioning _____ mins 27710    Timed Treatment:   45   mins   Total Treatment:     45   mins    Deven Nunez PT  Physical Therapist    [place] [id number]

## 2025-01-20 NOTE — PROGRESS NOTES
30-Day / 10-Visit Progress Note       [facility] Therapy [facility]  [address] [place] [id number] [phone] (phone)  [phone] (fax)    Patient: Bailee Garza   : 1940  Diagnosis/ICD-10 Code:  No primary diagnosis found.  Referring practitioner: TERESA Monge  Date of Initial Visit: No linked episodes  Today's Date: 2025  Patient seen for Visit count could not be calculated. Make sure you are using a visit which is associated with an episode. sessions    No diagnosis found.      Subjective:     Clinical Progress: {UNCHANGED, IMPROVED, WORSE:98505}  Home Program Compliance: {YES/NA/NO:81471}  Treatment has included:  {ptorthotreatments:09076}    Subjective   Objective     See Exercise, Manual, and Modality Logs for complete treatment.     Functional Outcome Score: ***    Assessment/Plan       Recommendations: {Reassess plan:20449}  Timeframe: { timeframe:}  Prognosis to achieve goals: {GOOD/FAIR/POOR:33419}    PT Signature: Deven Nunez PT    [place] License Number: [id number]    Electronically signed by Deven Nunez PT, 25, 4:02 PM EST      Based upon review of the patient's progress and continued therapy plan, it is my medical opinion that Bailee Garza should continue physical therapy treatment at [facility]  [address] [facility] [address]  [phone].    Signature: __________________________________  Speedy Sanz APRN    Timed:  Manual Therapy:    ***     mins  34868;  Therapeutic Exercise:    ***     mins  26302;     Neuromuscular Sofya:    ***    mins  89561;    Therapeutic Activity:     ***     mins  80702;     Gait Training:      ***     mins  76951;     Ultrasound:           mins  46950;    Self-Care:                 _____  mins  36411;   Orthotic Fitting (initial)  _____ mins 81785  Orthotic Fitting (follow-up) _____ mins 11550      Untimed:  Electrical Stimulation:            mins  22858 ( );  Traction:           mins  07778;   Dry Needling  (1-2 muscles)         ____  mins 20560 (Self-pay)  Dry Needling (3-4 muscles) ____ mins 20561 (Self-pay)  Dry Needling Trial  ____  mins DRYNDLTRIAL  (No Charge)  Canalith Repositioning  ____ mins 01485        Timed Treatment:   ***   mins   Total Treatment:     ***   mins

## 2025-01-23 ENCOUNTER — HOSPITAL ENCOUNTER (OUTPATIENT)
Dept: MAMMOGRAPHY | Facility: HOSPITAL | Age: 85
Discharge: HOME | End: 2025-01-23
Payer: MEDICARE

## 2025-01-23 ENCOUNTER — HOSPITAL ENCOUNTER (OUTPATIENT)
Dept: BONE DENSITY | Facility: HOSPITAL | Age: 85
Discharge: HOME | End: 2025-01-23
Payer: MEDICARE

## 2025-01-23 DIAGNOSIS — Z78.0 MENOPAUSE: ICD-10-CM

## 2025-01-23 DIAGNOSIS — Z12.31 ENCOUNTER FOR SCREENING MAMMOGRAM FOR BREAST CANCER: ICD-10-CM

## 2025-01-23 DIAGNOSIS — R92.8 ABNORMAL MAMMOGRAM OF LEFT BREAST: Primary | ICD-10-CM

## 2025-01-23 DIAGNOSIS — Z13.820 ENCOUNTER FOR SCREENING FOR OSTEOPOROSIS: ICD-10-CM

## 2025-01-23 PROCEDURE — 77080 DXA BONE DENSITY AXIAL: CPT

## 2025-01-23 PROCEDURE — 77067 SCR MAMMO BI INCL CAD: CPT

## 2025-01-23 PROCEDURE — 77063 BREAST TOMOSYNTHESIS BI: CPT

## 2025-01-23 NOTE — PROGRESS NOTES
Please give the patient the following message: (Make sure patient has copy of her current mammogram report)  Mrs. Garza, your recent mammogram showed an abnormality on the left breast.  They recommended follow-up breast ultrasound.  Please get this accomplished.  The repeat study has been ordered.  Once this has been completed make an appointment to see me in the office to discuss results.  Dr. Araya

## 2025-01-24 ENCOUNTER — TREATMENT (OUTPATIENT)
Dept: PHYSICAL THERAPY | Facility: CLINIC | Age: 85
End: 2025-01-24
Payer: MEDICARE

## 2025-01-24 DIAGNOSIS — R26.2 DIFFICULTY WALKING: ICD-10-CM

## 2025-01-24 DIAGNOSIS — R26.9 ABNORMAL GAIT: ICD-10-CM

## 2025-01-24 DIAGNOSIS — R29.898 LOSS OF MOVEMENT: ICD-10-CM

## 2025-01-24 DIAGNOSIS — R53.1 STRENGTH LOSS OF: ICD-10-CM

## 2025-01-24 DIAGNOSIS — Z96.651 H/O TOTAL KNEE REPLACEMENT, RIGHT: Primary | ICD-10-CM

## 2025-01-24 NOTE — PROGRESS NOTES
30-Day / 10-Visit Progress Note       [facility] Physical Therapy [facility]  [address] [place] [id number] [phone] (phone)  [phone] (fax)    Patient: Bailee Garza   : 1940  Diagnosis/ICD-10 Code:  No primary diagnosis found.  Referring practitioner: TERESA Monge  Date of Initial Visit: No linked episodes  Today's Date: 2025  Patient seen for Visit count could not be calculated. Make sure you are using a visit which is associated with an episode. sessions    No diagnosis found.      Subjective:     Clinical Progress: {UNCHANGED, IMPROVED, WORSE:35231}  Home Program Compliance: {YES/NA/NO:89655}  Treatment has included:  {ptorthotreatments:94243}    Subjective   Objective     See Exercise, Manual, and Modality Logs for complete treatment.     Functional Outcome Score: ***    Assessment/Plan       Recommendations: {Reassess plan:84521}  Timeframe: { timeframe:}  Prognosis to achieve goals: {GOOD/FAIR/POOR:55800}    PT Signature: Deven Nunez PT    [place] License Number: [id number]    Electronically signed by Deven Nnuez PT, 25, 11:40 AM EST      Based upon review of the patient's progress and continued therapy plan, it is my medical opinion that Bailee Garza should continue physical therapy treatment at [facility]AP[facility]  [address] [facility] [address]  [phone].    Signature: __________________________________  Speedy Sanz APRN    Timed:  Manual Therapy:    ***     mins  13015;  Therapeutic Exercise:    ***     mins  30220;     Neuromuscular Sofya:    ***    mins  44170;    Therapeutic Activity:     ***     mins  23109;     Gait Training:      ***     mins  30600;     Ultrasound:           mins  16416;    Self-Care:                 _____  mins  44044;   Orthotic Fitting (initial)  _____ mins 21619  Orthotic Fitting (follow-up) _____ mins 03678      Untimed:  Electrical Stimulation:            mins  80086 ( );  Traction:           mins  46396;   Dry Needling  (1-2 muscles)         ____  mins 20560 (Self-pay)  Dry Needling (3-4 muscles) ____ mins 20561 (Self-pay)  Dry Needling Trial  ____  mins DRYNDLTRIAL  (No Charge)  Canalith Repositioning  ____ mins 18764        Timed Treatment:   ***   mins   Total Treatment:     ***   mins             Patient with one or more new problems requiring additional work-up/treatment.

## 2025-01-24 NOTE — PROGRESS NOTES
"Physical Therapy Daily Treatment Note    [facility]  [address] [place] [address]  [phone] (phone)  [phone] (fax)    Patient: Bailee Garza   : 1940  Diagnosis/ICD-10 Code:  H/O total knee replacement, right [Z96.651]  Referring practitioner: TERESA Monge  Today's Date: 2025  Patient seen for 19 sessions    Visit Diagnoses:    ICD-10-CM ICD-9-CM   1. H/O total knee replacement, right  Z96.651 V43.65   2. Difficulty walking  R26.2 719.7   3. Loss of movement  R29.898 344.9   4. Abnormal gait  R26.9 781.2   5. Strength loss of  R53.1 780.79              Subjective: Brigitte stated that she has continued to stretch the R knee, for flexion, on her stairs.     Objective      Treatment      Therapeutic exercise  Recumbent bike, seat at 3, 6-7 minutes, last 5 minutes with full revolutions  Stretch on step, for R knee flexion, 10s x 5  Runner['s step, 6\", x 10, B, using column for balance  Step up and over, 6\", x 10, B, using column for balance  Lateral step down, 4\", x 10, B  Kalli leg press, seat at 8, 90 lbs., 10 x 2  Single leg press, seat at 8, 90 lbs., 10 x 2, with her R LE  9. R Heel slides, x 20, 5 repetition siwth belt overpressure  10. R quad sets, roll at ankle, x 10     NMR: verbal and tactile cues for exercise technique were given.  For the runner's step to bring her shoulders over her hips, to place her dynamic foot parallel to the static foot on the step.  For the leg press to avoid full knee extension, hold at that point and mildly abduct the hips into knee flexion.     Manual therapy:  STM to the distal R quadriceps, stretching to the R quadriceps, supine, 30s x 3      Post treatment AROM of the R knee, 0-120 degrees of flexion    Assessment/Plan           Timed:    Manual Therapy:    5     mins  56229;  Therapeutic Exercise:    32     mins  30313;     Neuromuscular Sofya:    8    mins  36188;    Therapeutic Activity:          mins "  21115;     Gait Training:             mins  28547;     Ultrasound:           mins  12373;    Self Care                               mins   24194  Orthotic Fitting (initial)  _____ mins 77769  Orthotic Fitting (follow-up) _____ mins 21092      Untimed:    Electrical Stimulation:           mins  32054 ( );  Traction:           mins  36508;   Dry Needling  (1-2 muscles)                  mins 20560 (Self-pay)  Dry Needling (3-4 muscles)  ____  20561 (Self-pay)  Dry Needling Trial             DRYNDLTRIAL  (No Charge)  Canalith Repositioning _____ mins 37397    Timed Treatment:   45   mins   Total Treatment:     45   mins    Dveen Nunez PT  Physical Therapist    [place] [id number]

## 2025-01-27 ENCOUNTER — TREATMENT (OUTPATIENT)
Dept: PHYSICAL THERAPY | Facility: CLINIC | Age: 85
End: 2025-01-27
Payer: MEDICARE

## 2025-01-27 DIAGNOSIS — Z96.651 H/O TOTAL KNEE REPLACEMENT, RIGHT: Primary | ICD-10-CM

## 2025-01-27 DIAGNOSIS — R53.1 STRENGTH LOSS OF: ICD-10-CM

## 2025-01-27 DIAGNOSIS — R26.9 ABNORMAL GAIT: ICD-10-CM

## 2025-01-27 DIAGNOSIS — R26.2 DIFFICULTY WALKING: ICD-10-CM

## 2025-01-27 DIAGNOSIS — R29.898 LOSS OF MOVEMENT: ICD-10-CM

## 2025-01-27 PROCEDURE — 97110 THERAPEUTIC EXERCISES: CPT | Performed by: PHYSICAL THERAPIST

## 2025-01-27 PROCEDURE — 97112 NEUROMUSCULAR REEDUCATION: CPT | Performed by: PHYSICAL THERAPIST

## 2025-01-27 NOTE — PROGRESS NOTES
"Physical Therapy Daily Treatment Note    [facility]  [address] [place] [address]  [phone] (phone)  [phone] (fax)    Patient: Bailee Garza   : 1940  Diagnosis/ICD-10 Code:  H/O total knee replacement, right [Z96.651]  Referring practitioner: TERESA Monge  Today's Date: 2025  Patient seen for 20 sessions    Visit Diagnoses:    ICD-10-CM ICD-9-CM   1. H/O total knee replacement, right  Z96.651 V43.65   2. Difficulty walking  R26.2 719.7   3. Loss of movement  R29.898 344.9   4. Abnormal gait  R26.9 781.2   5. Strength loss of  R53.1 780.79              Subjective: Brigitte reports that she is navigating stairs better.     Objective     Treatment      Therapeutic exercise  Recumbent bike, seat at 3, 6-7 minutes, last 5 minutes with full revolutions  Stretch on step, for R knee flexion, 10s x 5  Runner['s step, 6\", x 10, B, using column for balance  Step up and over, 6\", x 10, B, using column for balance  Lateral step down, 4\", x 10, B  [place] leg press, seat at 8, 90 lbs., 10 x 2  Single leg press, seat at 8, 90 lbs., 10 x 2, with her R LE  9. R Heel slides, x 20, 5 repetition siwth belt overpressure  10. R quad sets, long sitting    NMR: verbal and tactile cues for exercise technique were given.  For the runner's step to bring her shoulders over her hips, to place her dynamic foot parallel to the static foot on the step.  For the leg press to avoid full knee extension, hold at that point and mildly abduct the hips into knee flexion.     Manual therapy:  STM to the distal R quadriceps, stretching to the R quadriceps, supine, 30s x 3     Supine AROM of the R knee, 0-117 degrees of flexion    Assessment & Plan       Assessment  Assessment details: Brigitte backslid from 120 to 117 degrees of flexion. She needs pushing to get to her maximum flexion.     Plan  Plan details: Continue per treatment plan.                Timed:    Manual Therapy:    3    "  mins  95427;  Therapeutic Exercise:    34     mins  37265;     Neuromuscular Sofya:    8    mins  92252;    Therapeutic Activity:          mins  95808;     Gait Training:             mins  18140;     Ultrasound:           mins  66005;    Self Care                               mins   61979  Orthotic Fitting (initial)  _____ mins 31404  Orthotic Fitting (follow-up) _____ mins 29797      Untimed:    Electrical Stimulation:           mins  27328 ( );  Traction:           mins  15950;   Dry Needling  (1-2 muscles)                  mins 20560 (Self-pay)  Dry Needling (3-4 muscles)  ____  20561 (Self-pay)  Dry Needling Trial             DRYNDLTRIAL  (No Charge)  Canalith Repositioning _____ mins 15610    Timed Treatment:   45   mins   Total Treatment:     45   mins    Deven Nunez PT  Physical Therapist    [place] PT [id number]

## 2025-01-29 ENCOUNTER — HOSPITAL ENCOUNTER (OUTPATIENT)
Dept: ULTRASOUND IMAGING | Facility: HOSPITAL | Age: 85
Discharge: HOME | End: 2025-01-29
Admitting: FAMILY MEDICINE
Payer: MEDICARE

## 2025-01-29 DIAGNOSIS — R92.8 ABNORMAL MAMMOGRAM OF LEFT BREAST: ICD-10-CM

## 2025-01-29 PROCEDURE — 76642 ULTRASOUND BREAST LIMITED: CPT

## 2025-01-29 NOTE — PROGRESS NOTES
Please give the patient the following message:  This ultrasound of  the left breast was a benign cyst.  Continue with annual mammography.
Yes

## 2025-01-31 ENCOUNTER — TREATMENT (OUTPATIENT)
Dept: PHYSICAL THERAPY | Facility: CLINIC | Age: 85
End: 2025-01-31
Payer: MEDICARE

## 2025-01-31 DIAGNOSIS — Z96.651 H/O TOTAL KNEE REPLACEMENT, RIGHT: Primary | ICD-10-CM

## 2025-01-31 DIAGNOSIS — R26.9 ABNORMAL GAIT: ICD-10-CM

## 2025-01-31 DIAGNOSIS — R29.898 LOSS OF MOVEMENT: ICD-10-CM

## 2025-01-31 DIAGNOSIS — R26.2 DIFFICULTY WALKING: ICD-10-CM

## 2025-01-31 DIAGNOSIS — R53.1 STRENGTH LOSS OF: ICD-10-CM

## 2025-01-31 NOTE — PROGRESS NOTES
"Physical Therapy Daily Treatment Note    [facility] [place] [address]  [phone] (phone)  [phone] (fax)    Patient: Bailee Garza   : 1940  Diagnosis/ICD-10 Code:  H/O total knee replacement, right [Z96.651]  Referring practitioner: TERESA Monge  Today's Date: 2025  Patient seen for 21 sessions    Visit Diagnoses:    ICD-10-CM ICD-9-CM   1. H/O total knee replacement, right  Z96.651 V43.65   2. Difficulty walking  R26.2 719.7   3. Loss of movement  R29.898 344.9   4. Abnormal gait  R26.9 781.2   5. Strength loss of  R53.1 780.79              Subjective: Brigitte stated that she is sore in the posterior part of her knee today.  Prior to this appointment she had been grocery shopping.     Objective     Observation: I noticed less heel strike with her R LE and a slight limp during the gait cycle.     Treatment    Therapeutic exercise  Recumbent bike, seat at 3, she initially took a few minutes of warming up by going forward then reverse and afterwards went forward, work load of 5, x 4 minutes  NuStep, seat at 7, work load of 6, x 5 minutes  Kalli hip abduction, 10 and 13 lbs., each x 15  Runner's step, 4\", x 10, B using column for balance  Lateral step down, 4\", x 10, B, using column for balance  Kalli leg press, seat at 8, 95 lbs., 10 x 2  Quad sets, R knee x 10, long sitting  LAQs x 10, B    NMR: verbal cues for warming up on the recumbent bike, verbal cues for eccentric control on the [place] hip abduction, verbal/tactile cues for the runner's step, to keep the static knee over the ankle as she places the dynamic foot back to the ground, verbal cues for landing parallel, with the dynamic foot, to the static foot and hitting the ground softly.     Self care: I gave Brigitte instructions for use of the NuStep and the recumbent bike on her days off.     Assessment & Plan       Assessment  Assessment details: Brigitte noticed relief of " discomfort with treatment.  She may have been on hard surfaces, grocery shopping, for too, long, prior to today's treatment.  Brigitte required verbal and tactile cues for exercise technique.  I believe that coming to the gym and using the recumbent bike and NuStep on her days off should help.     Plan  Plan details: Continue per treatment plan and reassess next visit.                Timed:    Manual Therapy:         mins  10939;  Therapeutic Exercise:    35     mins  67272;     Neuromuscular Sofya:    8    mins  57856;    Therapeutic Activity:          mins  09204;     Gait Training:             mins  23586;     Ultrasound:           mins  63295;    Self Care                        2       mins   24973  Orthotic Fitting (initial)  _____ mins 74229  Orthotic Fitting (follow-up) _____ mins 87079      Untimed:    Electrical Stimulation:           mins  26398 ( );  Traction:           mins  48542;   Dry Needling  (1-2 muscles)                  mins 08308 (Self-pay)  Dry Needling (3-4 muscles)  ____  54949 (Self-pay)  Dry Needling Trial             DRYNDLTRIAL  (No Charge)  Canalith Repositioning _____ mins 25492    Timed Treatment:   45   mins   Total Treatment:     45   mins    Deven Nunez PT  Physical Therapist    [place] License[place] PT [id number]

## 2025-02-03 ENCOUNTER — TREATMENT (OUTPATIENT)
Dept: PHYSICAL THERAPY | Facility: CLINIC | Age: 85
End: 2025-02-03
Payer: MEDICARE

## 2025-02-03 DIAGNOSIS — Z96.651 H/O TOTAL KNEE REPLACEMENT, RIGHT: Primary | ICD-10-CM

## 2025-02-03 DIAGNOSIS — R26.9 ABNORMAL GAIT: ICD-10-CM

## 2025-02-03 DIAGNOSIS — R26.2 DIFFICULTY WALKING: ICD-10-CM

## 2025-02-03 DIAGNOSIS — R29.898 LOSS OF MOVEMENT: ICD-10-CM

## 2025-02-03 DIAGNOSIS — R53.1 STRENGTH LOSS OF: ICD-10-CM

## 2025-02-03 PROCEDURE — 97112 NEUROMUSCULAR REEDUCATION: CPT | Performed by: PHYSICAL THERAPIST

## 2025-02-03 PROCEDURE — 97110 THERAPEUTIC EXERCISES: CPT | Performed by: PHYSICAL THERAPIST

## 2025-02-03 NOTE — PROGRESS NOTES
"30-Day / 10-Visit Progress Note       [facility] Physical Therapy Milestone  [address] [place] [address]  [phone] (phone)  [phone] (fax)    Patient: Bailee Garza   : 1940  Diagnosis/ICD-10 Code:  H/O total knee replacement, right [Z96.651]  Referring practitioner: TERESA Monge  Date of Initial Visit: Type: THERAPY  Noted: 10/28/2024  Today's Date: 2/3/2025  Patient seen for 22 sessions      ICD-10-CM ICD-9-CM   1. H/O total knee replacement, right  Z96.651 V43.65   2. Difficulty walking  R26.2 719.7   3. Loss of movement  R29.898 344.9   4. Abnormal gait  R26.9 781.2   5. Strength loss of  R53.1 780.79         Subjective:     Clinical Progress: improved  Home Program Compliance: Yes  Treatment has included:  therapeutic exercise, manual therapy, neuro-muscular retraining , gait training, and patient education with home exercise program     Subjective:  Bailee states that sit to stand transfer has become much easier.  She is having less pain and she has tenderness in the anterior portion of the leg. Bailee stated that she is now beginning to do stairs with a step thru technique    Objective     Treatment     Therapeutic exercise  Recumbent bike, seat at 3, she initially took a few minutes of warming up by going forward then reverse and afterwards went forward, work load of 5, x 4 minutes  NuStep, seat at 7, work load of 6, x 5 minutes  [place] hip abduction, 10 and 13 lbs., each x 15  Runner's step, 4\", x 10, B using column for balance  Lateral step down, 4\", x 10, B, using column for balance  Kalli leg press, seat at 8, 95 lbs., 10 x 2, then single leg press, 90 lbs., 10 x 2  Sit to stand, using hip hinge technique x 5  Quad sets, R knee x 10, long sitting  Supine heel slides, R LE x 10    NMR: verbal cues for warming up on the recumbent bike, verbal cues for eccentric control on the [place] hip abduction, verbal/tactile cues for the runner's step, to keep the static " knee over the ankle as she places the dynamic foot back to the ground, verbal cues for landing parallel, with the dynamic foot, to the static foot and hitting the ground softly.      Reassessment    Functional Outcome Score: KOS: 54/80=68% or a 32% deficit  Supine AROM of the R knee: 0-114 degrees of flexion  MMT: R knee flexion 4-/5, extension 4/5  Gait: normal pattern on level surface without an assistive device    Assessment & Plan       Assessment  Impairments: activity intolerance, impaired physical strength, lacks appropriate home exercise program, pain with function and weight-bearing intolerance   Functional limitations: walking, uncomfortable because of pain, standing and stooping   Assessment details: Bailee Garza has been seen for 22 physical therapy sessions for R TKA.  Treatment has included therapeutic exercise, manual therapy, neuro-muscular retraining , gait training, and patient education with home exercise program . Progress to physical therapy goals is good.  She will benefit from continued skilled physical therapy to address remaining impairments and functional limitations.      Goals  Plan Goals:  STGs to be met in 6 weeks  1. Brigitte progressed therapeutic exercise with low level closed chain PREs. (Met)  2. AROM of the R knee, supine, equals 0-115 degrees of flexion. (Ongoing)  3. She is ambulating up/down 4 steps, with rail and SC, with a step to technique. (Met)     LTGs to be met in 12 weeks  1. AROM of the R knee, supine, equals 0-120 degrees of flexion for improved car transfers, comfort in sitting and general ADL mobility. (Ongoing)  2. She is ambulating independently on level surface and going up/down steps, using rail, with a normal gait pattern. (Met)  3. KOS deficit is below 20%. (Ongoing)  4. Brigitte is independent with self care and a HEP (ongoing)         Plan  Therapy options: will be seen for skilled therapy services  Planned therapy interventions: manual therapy, neuromuscular  re-education, therapeutic activities, stretching, strengthening, transfer training, body mechanics training, flexibility, functional ROM exercises, gait training and home exercise program  Frequency: 2x week  Duration in weeks: 4  Treatment plan discussed with: patient  Plan details: Continue per treatment plan.            Recommendations: Continue as planned  Timeframe: 1 month  Prognosis to achieve goals: good    PT Signature: SARAH Magana License Number: [id number]    Electronically signed by Deven Nunez PT, 02/03/25, 11:26 AM EST      Based upon review of the patient's progress and continued therapy plan, it is my medical opinion that Bailee Garza should continue physical therapy treatment at [facility] PHYSICAL THERAPY  [address] [place] [address] [address] [place] [address] [phone].    Signature: __________________________________  Speedy Sanz APRN    Timed:  Manual Therapy:         mins  15541;  Therapeutic Exercise:    37     mins  91655;     Neuromuscular Sofya:    8    mins  93293;    Therapeutic Activity:          mins  57832;     Gait Training:           mins  01218;     Ultrasound:           mins  11316;    Self-Care:                 _____  mins  76118;   Orthotic Fitting (initial)  _____ mins 04081  Orthotic Fitting (follow-up) _____ mins 08239      Untimed:  Electrical Stimulation:           mins  78282 ( );  Traction:           mins  37657;   Dry Needling  (1-2 muscles)         ____  mins 80755 (Self-pay)  Dry Needling (3-4 muscles) ____ mins 16437 (Self-pay)  Dry Needling Trial  ____  mins DRYNDLTRIAL  (No Charge)  Canalith Repositioning  ____ mins 75231        Timed Treatment:   45   mins   Total Treatment:     45   mins

## 2025-02-06 ENCOUNTER — TREATMENT (OUTPATIENT)
Dept: PHYSICAL THERAPY | Facility: CLINIC | Age: 85
End: 2025-02-06
Payer: MEDICARE

## 2025-02-06 DIAGNOSIS — R26.9 ABNORMAL GAIT: ICD-10-CM

## 2025-02-06 DIAGNOSIS — Z96.651 H/O TOTAL KNEE REPLACEMENT, RIGHT: Primary | ICD-10-CM

## 2025-02-06 DIAGNOSIS — R26.2 DIFFICULTY WALKING: ICD-10-CM

## 2025-02-06 DIAGNOSIS — R29.898 LOSS OF MOVEMENT: ICD-10-CM

## 2025-02-06 DIAGNOSIS — R53.1 STRENGTH LOSS OF: ICD-10-CM

## 2025-02-06 NOTE — PROGRESS NOTES
"Physical Therapy Daily Treatment Note    [facility] Physical Therapy Milestone  [address] [place] [address]  [phone] (phone)  [phone] (fax)    Patient: Bailee Garza   : 1940  Diagnosis/ICD-10 Code:  H/O total knee replacement, right [Z96.651]  Referring practitioner: TERESA Monge  Today's Date: 2025  Patient seen for 23 sessions    Visit Diagnoses:    ICD-10-CM ICD-9-CM   1. H/O total knee replacement, right  Z96.651 V43.65   2. Difficulty walking  R26.2 719.7   3. Loss of movement  R29.898 344.9   4. Abnormal gait  R26.9 781.2   5. Strength loss of  R53.1 780.79              Subjective: Brigitte reported that her knee was sore.  She had already ridden the recumbent bike x 15 minutes before this appointment.      Objective     Treatment    Therapeutic exercise  Recumbent bike, as a warm up before treatment  Runner's step, 4\", x 10, B using column for balance  Step up and over, 8\" x 5, B  Lateral step down, 4\", x 10, B, using column for balance  Kalli leg press, seat at 8, 95 lbs., 10 x 2, then single leg press, 85 lbs., 10 x 2  Kalli hip abduction, 15 lbs., 15 x 2  Quad sets, R knee x 10, long sitting  8. Heel slides, R LE x 20    NMR: verbal cues for warming up on the recumbent bike, verbal cues for eccentric control on the [place] hip abduction, verbal/tactile cues for the runner's step, to keep the static knee over the ankle as she places the dynamic foot back to the ground, verbal cues for landing parallel, with the dynamic foot, to the static foot and hitting the ground softly.      Supine R knee AROM, 0-113 degrees of flexion    Assessment & Plan       Assessment  Assessment details: Brigitte is improving, but knee flexion continues to be a sticking point.  She required verbal cues for exercise technique.     Plan  Plan details: Continue per treatment plan.                Timed:    Manual Therapy:         mins  99426;  Therapeutic Exercise:    37     mins  92326; "     Neuromuscular Sofya:    8    mins  35911;    Therapeutic Activity:          mins  71609;     Gait Training:             mins  92373;     Ultrasound:           mins  03889;    Self Care                               mins   21870  Orthotic Fitting (initial)  _____ mins 26989  Orthotic Fitting (follow-up) _____ mins 46772      Untimed:    Electrical Stimulation:           mins  33671 ( );  Traction:           mins  35985;   Dry Needling  (1-2 muscles)                  mins 20560 (Self-pay)  Dry Needling (3-4 muscles)  ____  20561 (Self-pay)  Dry Needling Trial             DRYNDLTRIAL  (No Charge)  Canalith Repositioning _____ mins 34558    Timed Treatment:   45   mins   Total Treatment:     45   mins    Deven Nunez PT  Physical Therapist    [place] License[place] PT [id number]

## 2025-02-11 ENCOUNTER — TREATMENT (OUTPATIENT)
Dept: PHYSICAL THERAPY | Facility: CLINIC | Age: 85
End: 2025-02-11
Payer: MEDICARE

## 2025-02-11 DIAGNOSIS — R26.9 ABNORMAL GAIT: ICD-10-CM

## 2025-02-11 DIAGNOSIS — R29.898 LOSS OF MOVEMENT: ICD-10-CM

## 2025-02-11 DIAGNOSIS — Z96.651 H/O TOTAL KNEE REPLACEMENT, RIGHT: Primary | ICD-10-CM

## 2025-02-11 DIAGNOSIS — R53.1 STRENGTH LOSS OF: ICD-10-CM

## 2025-02-11 DIAGNOSIS — R26.2 DIFFICULTY WALKING: ICD-10-CM

## 2025-02-11 PROCEDURE — 97112 NEUROMUSCULAR REEDUCATION: CPT | Performed by: PHYSICAL THERAPIST

## 2025-02-11 PROCEDURE — 97110 THERAPEUTIC EXERCISES: CPT | Performed by: PHYSICAL THERAPIST

## 2025-02-11 NOTE — PROGRESS NOTES
"Physical Therapy Daily Treatment Note    [facility] Physical Therapy Milestone  [address] [place] [address]  [phone] (phone)  [phone] (fax)    Patient: Bailee Garza   : 1940  Diagnosis/ICD-10 Code:  H/O total knee replacement, right [Z96.651]  Referring practitioner: TERESA Monge  Today's Date: 2025  Patient seen for 24 sessions    Visit Diagnoses:    ICD-10-CM ICD-9-CM   1. H/O total knee replacement, right  Z96.651 V43.65   2. Difficulty walking  R26.2 719.7   3. Loss of movement  R29.898 344.9   4. Abnormal gait  R26.9 781.2   5. Strength loss of  R53.1 780.79              Subjective: Bailee expressed some anxiety with the task of doing things independently.      Objective     Treatment    Therapeutic exercise  NuStep, seat at 7, work load of 6 x 2 minutes  Recumbent bike, seat at 4, work load of 5, x 5 minutes  Runner's step, 4\", x 10, B, using column for balance  Lateral step down, 4\", x 10, B  Kalli leg press, seat at 8, 95 lbs., 10 x 3  Kalli hip abduction, 15 lbs., 15 x 2  SLS, x 60s, B  LAQs x 10, B  R heel slides x 20  R quad sets, long sitting, 10s x 10    NMR: verbal cues for warming up on the recumbent bike, verbal cues for eccentric control on the [place] hip abduction, verbal/tactile cues for the runner's step, to keep the static knee over the ankle as she places the dynamic foot back to the ground, verbal cues for landing parallel, with the dynamic foot, to the static foot and hitting the ground softly. Cues and explanation for the SLS exercise    Post treatment AROM, R knee, supine, 0-118 degrees of flexion    Self care: I added the SLS exercise to her HEP    Assessment & Plan       Assessment  Assessment details: Brigitte progressed her HEP with the SLS which should help balance and her bone density.  She tolerated treatment well.     Plan  Plan details: Continue per treatment plan.                Timed:    Manual Therapy:         mins  " 93069;  Therapeutic Exercise:    37     mins  60838;     Neuromuscular Sofya:    8    mins  28140;    Therapeutic Activity:          mins  26173;     Gait Training:             mins  96895;     Ultrasound:           mins  03292;    Self Care                               mins   57886  Orthotic Fitting (initial)  _____ mins 75913  Orthotic Fitting (follow-up) _____ mins 67934      Untimed:    Electrical Stimulation:           mins  80952 ( );  Traction:           mins  20310;   Dry Needling  (1-2 muscles)                  mins 20560 (Self-pay)  Dry Needling (3-4 muscles)  ____  20561 (Self-pay)  Dry Needling Trial             DRYNDLTRIAL  (No Charge)  Canalith Repositioning _____ mins 52620    Timed Treatment:   45   mins   Total Treatment:     45   mins    Deven Nunez PT  Physical Therapist    [place] License[place] [id number]

## 2025-02-11 NOTE — PROGRESS NOTES
30-Day / 10-Visit Progress Note       [facility] Physical Therapy [facility]  [address] [place] [id number] [phone] (phone)  [phone] (fax)    Patient: Bailee Garza   : 1940  Diagnosis/ICD-10 Code:  No primary diagnosis found.  Referring practitioner: TERESA Monge  Date of Initial Visit: No linked episodes  Today's Date: 2025  Patient seen for Visit count could not be calculated. Make sure you are using a visit which is associated with an episode. sessions    No diagnosis found.      Subjective:     Clinical Progress: {UNCHANGED, IMPROVED, WORSE:15076}  Home Program Compliance: {YES/NA/NO:17633}  Treatment has included:  {ptorthotreatments:72777}    Subjective   Objective     See Exercise, Manual, and Modality Logs for complete treatment.     Functional Outcome Score: ***    Assessment/Plan       Recommendations: {Reassess plan:57927}  Timeframe: { timeframe:}  Prognosis to achieve goals: {GOOD/FAIR/POOR:07366}    PT Signature: Deven Nunez PT    [place] License Number: [id number]    Electronically signed by Deven Nunez PT, 25, 9:30 AM EST      Based upon review of the patient's progress and continued therapy plan, it is my medical opinion that Bailee Garza should continue physical therapy treatment at [facility] PHYSICAL THERAPY  [address] [facility] [address]  [phone].    Signature: __________________________________  Speedy Sanz APRN    Timed:  Manual Therapy:    ***     mins  08391;  Therapeutic Exercise:    ***     mins  86359;     Neuromuscular Sofya:    ***    mins  21045;    Therapeutic Activity:     ***     mins  78441;     Gait Training:      ***     mins  05739;     Ultrasound:           mins  16317;    Self-Care:                 _____  mins  43199;   Orthotic Fitting (initial)  _____ mins 47462  Orthotic Fitting (follow-up) _____ mins 14711      Untimed:  Electrical Stimulation:            mins  99131 ( );  Traction:           mins  57924;   Dry Needling  (1-2 muscles)         ____  mins 20560 (Self-pay)  Dry Needling (3-4 muscles) ____ mins 20561 (Self-pay)  Dry Needling Trial  ____  mins DRYNDLTRIAL  (No Charge)  Canalith Repositioning  ____ mins 58105        Timed Treatment:   ***   mins   Total Treatment:     ***   mins

## 2025-02-13 ENCOUNTER — TREATMENT (OUTPATIENT)
Dept: PHYSICAL THERAPY | Facility: CLINIC | Age: 85
End: 2025-02-13
Payer: MEDICARE

## 2025-02-13 DIAGNOSIS — R26.9 ABNORMAL GAIT: ICD-10-CM

## 2025-02-13 DIAGNOSIS — R26.2 DIFFICULTY WALKING: ICD-10-CM

## 2025-02-13 DIAGNOSIS — R53.1 STRENGTH LOSS OF: ICD-10-CM

## 2025-02-13 DIAGNOSIS — Z96.651 H/O TOTAL KNEE REPLACEMENT, RIGHT: Primary | ICD-10-CM

## 2025-02-13 DIAGNOSIS — R29.898 LOSS OF MOVEMENT: ICD-10-CM

## 2025-02-13 NOTE — PROGRESS NOTES
"30-Day / 10-Visit Progress / Discharge Note       [facility] Physical Therapy Milestone  [address] [place] [address]  [phone] (phone)  [phone] (fax)    Patient: Bailee Garza   : 1940  Diagnosis/ICD-10 Code:  H/O total knee replacement, right [Z96.651]  Referring practitioner: TERESA Monge  Date of Initial Visit: Type: THERAPY  Noted: 10/28/2024  Today's Date: 2025  Patient seen for 25 sessions      ICD-10-CM ICD-9-CM   1. H/O total knee replacement, right  Z96.651 V43.65   2. Difficulty walking  R26.2 719.7   3. Loss of movement  R29.898 344.9   4. Abnormal gait  R26.9 781.2   5. Strength loss of  R53.1 780.79         Subjective:     Clinical Progress: improved  Home Program Compliance: Yes  Treatment has included:  therapeutic exercise, manual therapy, neuro-muscular retraining , gait training, and patient education with home exercise program     Subjective   Objective     Therapeutic exercise  Recumbent bike, seat at 4, work load of 5, x 5 minutes  Runner's step, 4\", x 10, B, using column for balance  Lateral step down, 4\", x 10, B  Kalli leg press, seat at 8, 95 lbs., 10 x 3  [place] hip abduction, 15 lbs., 15 x 2  Sit to stand, with booster, x 10    NMR: verbal cues for warming up on the recumbent bike, verbal cues for eccentric control on the [place] hip abduction, verbal/tactile cues for the runner's step, to keep the static knee over the ankle as she places the dynamic foot back to the ground, verbal cues for landing parallel, with the dynamic foot, to the static foot and hitting the ground softly, Verbal cues and demonstration for sit to stand.    Self care: I gave Brigitte final instruction to continue working on her HEP daily, do her strengthening machines for the LE's 2 x per week and the step exercises, 3 x per week.     Functional Outcome Score: KOS 54/80=68%     Assessment & Plan       Assessment  Assessment details: Bailee Garza was seen for 25 " physical therapy sessions for R TKA.  Treatment included therapeutic exercise, manual therapy, therapeutic activity, neuro-muscular retraining , gait training, and patient education with home exercise program . Progress to physical therapy goals was good.  She was discharged to an independent HEP and provided patient education to self-manage condition.      Goals  Plan Goals: STGs to be met in 6 weeks  1. Brigitte progressed therapeutic exercise with low level closed chain PREs. (Met)  2. AROM of the R knee, supine, equals 0-115 degrees of flexion. (Ongoing)  3. She is ambulating up/down 4 steps, with rail and SC, with a step to technique. (Met)     LTGs to be met in 12 weeks  1. AROM of the R knee, supine, equals 0-120 degrees of flexion for improved car transfers, comfort in sitting and general ADL mobility. (met)  2. She is ambulating independently on level surface and going up/down steps, using rail, with a normal gait pattern. (Met)  3. KOS deficit is below 20%. (Not met)  4. Brigitte is independent with self care and a HEP (Met)      Plan  Plan details: DC from skilled physical therapy.            Recommendations: Discharge  Timeframe:  Discharge  Prognosis to achieve goals: good    PT Signature: Deven Nunez PT    [place] License Number: [id number]    Electronically signed by Deven Nunez PT, 25, 10:04 AM EST      Based upon review of the patient's progress and continued therapy plan, it is my medical opinion that Bailee Garza should continue physical therapy treatment at [facility] PHYSICAL THERAPY  [address] [place] [address] [address] [place] [address]  [phone].    Signature: __________________________________  Speedy Sanz APRN    Timed:  Manual Therapy:         mins  83243;  Therapeutic Exercise:    35     mins  52007;     Neuromuscular Sofya:    8    mins  99235;    Therapeutic Activity:          mins  45590;     Gait Trainin     mins  65243;     Ultrasound:            mins  00140;    Self-Care:                 _2____  mins  17833;   Orthotic Fitting (initial)  _____ mins 58908  Orthotic Fitting (follow-up) _____ mins 09641      Untimed:  Electrical Stimulation:           mins  84759 ( );  Traction:           mins  62710;   Dry Needling  (1-2 muscles)         ____  mins 07436 (Self-pay)  Dry Needling (3-4 muscles) ____ mins 20561 (Self-pay)  Dry Needling Trial  ____  mins DRYNDLTRIAL  (No Charge)  Canalith Repositioning  ____ mins 90973        Timed Treatment:   45   mins   Total Treatment:     45   mins

## 2025-02-27 ENCOUNTER — TELEPHONE (OUTPATIENT)
Dept: FAMILY MEDICINE CLINIC | Facility: CLINIC | Age: 85
End: 2025-02-27
Payer: MEDICARE

## 2025-02-27 NOTE — TELEPHONE ENCOUNTER
Called patient and left message.  Patient's name and phone number was on a list of invalid phone numbers for patient's.  Voicemail said patient's first and last name so phone number is valid for patient.

## 2025-03-31 ENCOUNTER — OFFICE VISIT (OUTPATIENT)
Dept: FAMILY MEDICINE CLINIC | Facility: CLINIC | Age: 85
End: 2025-03-31
Payer: MEDICARE

## 2025-03-31 VITALS
DIASTOLIC BLOOD PRESSURE: 62 MMHG | WEIGHT: 109 LBS | SYSTOLIC BLOOD PRESSURE: 120 MMHG | BODY MASS INDEX: 19.31 KG/M2 | OXYGEN SATURATION: 95 % | HEIGHT: 63 IN | RESPIRATION RATE: 16 BRPM | HEART RATE: 76 BPM | TEMPERATURE: 97.8 F

## 2025-03-31 DIAGNOSIS — J06.9 ACUTE URI: Primary | ICD-10-CM

## 2025-03-31 PROCEDURE — 3074F SYST BP LT 130 MM HG: CPT | Performed by: FAMILY MEDICINE

## 2025-03-31 PROCEDURE — 1159F MED LIST DOCD IN RCRD: CPT | Performed by: FAMILY MEDICINE

## 2025-03-31 PROCEDURE — 3078F DIAST BP <80 MM HG: CPT | Performed by: FAMILY MEDICINE

## 2025-03-31 PROCEDURE — 1125F AMNT PAIN NOTED PAIN PRSNT: CPT | Performed by: FAMILY MEDICINE

## 2025-03-31 PROCEDURE — 99213 OFFICE O/P EST LOW 20 MIN: CPT | Performed by: FAMILY MEDICINE

## 2025-03-31 PROCEDURE — 1160F RVW MEDS BY RX/DR IN RCRD: CPT | Performed by: FAMILY MEDICINE

## 2025-03-31 RX ORDER — DOXYCYCLINE HYCLATE 100 MG
100 TABLET ORAL 2 TIMES DAILY
Qty: 14 TABLET | Refills: 0 | Status: SHIPPED | OUTPATIENT
Start: 2025-03-31

## 2025-03-31 RX ORDER — PREDNISONE 10 MG/1
10 TABLET ORAL DAILY
Qty: 5 TABLET | Refills: 0 | Status: SHIPPED | OUTPATIENT
Start: 2025-03-31

## 2025-03-31 NOTE — PROGRESS NOTES
"David Garza is a 85 y.o. female.     CC: Cough    History of Present Illness     Patient of Dr. Araya's comes to see me today reporting a 2 to 3-day history of sore throat, cough, congestion. No f/c/colored sputum.       The following portions of the patient's history were reviewed and updated as appropriate: allergies, current medications, past family history, past medical history, past social history, past surgical history, and problem list.    Review of Systems   Constitutional:  Negative for activity change, chills and fever.   HENT:  Positive for congestion and sore throat.    Respiratory:  Positive for cough.    Cardiovascular:  Negative for chest pain.   Psychiatric/Behavioral:  Negative for dysphoric mood.        /62   Pulse 76   Temp 97.8 °F (36.6 °C) (Oral)   Resp 16   Ht 158.8 cm (62.52\")   Wt 49.4 kg (109 lb)   LMP  (LMP Unknown)   SpO2 95%   BMI 19.61 kg/m²     Objective   Physical Exam  Vitals and nursing note reviewed.   Constitutional:       General: She is not in acute distress.     Appearance: She is well-developed.   HENT:      Right Ear: Tympanic membrane and ear canal normal.      Left Ear: Tympanic membrane and ear canal normal.   Cardiovascular:      Rate and Rhythm: Normal rate and regular rhythm.   Pulmonary:      Effort: Pulmonary effort is normal.      Breath sounds: Wheezing (faint, diffuse) present.   Neurological:      Mental Status: She is alert and oriented to person, place, and time.   Psychiatric:         Behavior: Behavior normal.         Thought Content: Thought content normal.         Assessment & Plan   Diagnoses and all orders for this visit:    1. Acute URI (Primary)  -     doxycycline (VIBRAMYICN) 100 MG tablet; Take 1 tablet by mouth 2 (Two) Times a Day.  Dispense: 14 tablet; Refill: 0  -     predniSONE (DELTASONE) 10 MG tablet; Take 1 tablet by mouth Daily.  Dispense: 5 tablet; Refill: 0                 "

## 2025-04-08 DIAGNOSIS — J06.9 ACUTE URI: ICD-10-CM

## 2025-04-08 RX ORDER — PREDNISONE 10 MG/1
10 TABLET ORAL DAILY
Qty: 5 TABLET | Refills: 0 | Status: SHIPPED | OUTPATIENT
Start: 2025-04-08 | End: 2025-04-14

## 2025-04-08 RX ORDER — DOXYCYCLINE HYCLATE 100 MG
100 TABLET ORAL 2 TIMES DAILY
Qty: 14 TABLET | Refills: 0 | Status: SHIPPED | OUTPATIENT
Start: 2025-04-08 | End: 2025-04-14

## 2025-04-08 NOTE — TELEPHONE ENCOUNTER
Caller: Greg Bailee ELIZABETH    Relationship: Self    Best call back number: [phone]     Requested Prescriptions:   Requested Prescriptions     Pending Prescriptions Disp Refills    predniSONE (DELTASONE) 10 MG tablet 5 tablet 0     Sig: Take 1 tablet by mouth Daily.    doxycycline (VIBRAMYICN) 100 MG tablet     Pharmacy where request should be sent: [facility] [id number] [address] AT [facility] [phone] [facility] [phone] FX     Last office visit with prescribing clinician: 11/25/2024   Last telemedicine visit with prescribing clinician: Visit date not found   Next office visit with prescribing clinician: 11/12/2025     Additional details provided by patient: PATIENT IS STILL HAVING COUGH, CONGESTION AND HER PHLEGM IS STILL A YELLOW COLOR. PLEASE CALL IF DENIED    Does the patient have less than a 3 day supply:  [x] Yes  [] No    Would you like a call back once the refill request has been completed: [] Yes [x] No    If the office needs to give you a call back, can they leave a voicemail: [x] Yes [] No    Vivien Edouard Rep   04/08/25 08:45 EDT

## 2025-04-14 ENCOUNTER — TELEPHONE (OUTPATIENT)
Dept: FAMILY MEDICINE CLINIC | Facility: CLINIC | Age: 85
End: 2025-04-14
Payer: MEDICARE

## 2025-04-14 ENCOUNTER — OFFICE VISIT (OUTPATIENT)
Dept: FAMILY MEDICINE CLINIC | Facility: CLINIC | Age: 85
End: 2025-04-14
Payer: MEDICARE

## 2025-04-14 VITALS
TEMPERATURE: 98.1 F | HEIGHT: 63 IN | WEIGHT: 108.6 LBS | SYSTOLIC BLOOD PRESSURE: 114 MMHG | HEART RATE: 98 BPM | RESPIRATION RATE: 16 BRPM | BODY MASS INDEX: 19.24 KG/M2 | DIASTOLIC BLOOD PRESSURE: 62 MMHG | OXYGEN SATURATION: 100 %

## 2025-04-14 DIAGNOSIS — R05.2 SUBACUTE COUGH: Primary | ICD-10-CM

## 2025-04-14 PROCEDURE — 99214 OFFICE O/P EST MOD 30 MIN: CPT | Performed by: FAMILY MEDICINE

## 2025-04-14 PROCEDURE — 3078F DIAST BP <80 MM HG: CPT | Performed by: FAMILY MEDICINE

## 2025-04-14 PROCEDURE — 1125F AMNT PAIN NOTED PAIN PRSNT: CPT | Performed by: FAMILY MEDICINE

## 2025-04-14 PROCEDURE — 1159F MED LIST DOCD IN RCRD: CPT | Performed by: FAMILY MEDICINE

## 2025-04-14 PROCEDURE — 3074F SYST BP LT 130 MM HG: CPT | Performed by: FAMILY MEDICINE

## 2025-04-14 PROCEDURE — 1160F RVW MEDS BY RX/DR IN RCRD: CPT | Performed by: FAMILY MEDICINE

## 2025-04-14 RX ORDER — METHYLPREDNISOLONE 4 MG/1
TABLET ORAL
Qty: 21 TABLET | Refills: 0 | Status: CANCELLED | OUTPATIENT
Start: 2025-04-14

## 2025-04-14 RX ORDER — METHYLPREDNISOLONE 4 MG/1
TABLET ORAL
Qty: 21 TABLET | Refills: 0 | Status: SHIPPED | OUTPATIENT
Start: 2025-04-14

## 2025-04-14 RX ORDER — AMOXICILLIN AND CLAVULANATE POTASSIUM 500; 125 MG/1; MG/1
1 TABLET, FILM COATED ORAL 2 TIMES DAILY
Qty: 20 TABLET | Refills: 0 | Status: SHIPPED | OUTPATIENT
Start: 2025-04-14

## 2025-04-14 RX ORDER — BENZONATATE 200 MG/1
200 CAPSULE ORAL 3 TIMES DAILY PRN
Qty: 30 CAPSULE | Refills: 1 | Status: CANCELLED | OUTPATIENT
Start: 2025-04-14

## 2025-04-14 NOTE — PROGRESS NOTES
"Subjective   Bailee Garza is a 85 y.o. female.     CC: Mgmt of URI    History of Present Illness     Patient returns today after seeing me on 3/31/2025 with this note:    Patient of Dr. Araya's comes to see me today reporting a 2 to 3-day history of sore throat, cough, congestion. No f/c/colored sputum.     1. Acute URI (Primary)  -     doxycycline (VIBRAMYICN) 100 MG tablet; Take 1 tablet by mouth 2 (Two) Times a Day.  Dispense: 14 tablet; Refill: 0  -     predniSONE (DELTASONE) 10 MG tablet; Take 1 tablet by mouth Daily.  Dispense: 5 tablet; Refill: 0    Patient called in this morning with this message:    PATIENT WAS SEEN ON 3.31.25 FOR COUGHING UP PHLEGM.      SHE HAS BEEN PRESCRIBED ANTIBIOTICS BUT STATES THEY ARE NOT HELPING HER.      SHE STATES SHE IS STILL COUGHING UP PHLEGM BADLY AND IT IS NOW A DARK WHITE COLOR.     Discharge is colored. Lots of fatigue. No dyspnea.           The following portions of the patient's history were reviewed and updated as appropriate: allergies, current medications, past family history, past medical history, past social history, past surgical history, and problem list.    Review of Systems   Constitutional:  Negative for activity change, chills and fever.   HENT:  Positive for congestion.    Respiratory:  Positive for cough.    Cardiovascular:  Negative for chest pain.   Psychiatric/Behavioral:  Negative for dysphoric mood.        /62   Pulse 98   Temp 98.1 °F (36.7 °C) (Oral)   Resp 16   Ht 158.8 cm (62.52\")   Wt 49.3 kg (108 lb 9.6 oz)   LMP  (LMP Unknown)   SpO2 100%   BMI 19.53 kg/m²     Objective   Physical Exam  Vitals and nursing note reviewed.   Constitutional:       General: She is not in acute distress.     Appearance: She is well-developed.   Cardiovascular:      Rate and Rhythm: Normal rate and regular rhythm.   Pulmonary:      Effort: Pulmonary effort is normal.      Breath sounds: Normal breath sounds.   Neurological:      Mental Status: She is " alert and oriented to person, place, and time.   Psychiatric:         Behavior: Behavior normal.         Thought Content: Thought content normal.     CXR: ordered due to continued cough, no comparison, read by me: WNL    Assessment & Plan   Diagnoses and all orders for this visit:    1. Subacute cough (Primary)  Comments:  unresponsive to prior Rx; medication changed  Orders:  -     XR Chest 2 View  -     amoxicillin-clavulanate (Augmentin) 500-125 MG per tablet; Take 1 tablet by mouth 2 (Two) Times a Day.  Dispense: 20 tablet; Refill: 0  -     methylPREDNISolone (Medrol) 4 MG dose pack; follow package directions  Dispense: 21 tablet; Refill: 0

## 2025-04-14 NOTE — TELEPHONE ENCOUNTER
Caller: Bailee Garza    Relationship to patient: Self    Best call back number: [phone]    Patient is needing:     PATIENT WAS SEEN ON 3.31.25 FOR COUGHING UP PHLEGM.     SHE HAS BEEN PRESCRIBED ANTIBIOTICS BUT STATES THEY ARE NOT HELPING HER.     SHE STATES SHE IS STILL COUGHING UP PHLEGM BADLY AND IT IS NOW A DARK WHITE COLOR.     SHE IS REQUESTING A CALL BACK FROM A NURSE ASAP TO DISCUSS WHAT SHE SHOULD DO.     PLEASE ADVISE.

## 2025-04-24 ENCOUNTER — TREATMENT (OUTPATIENT)
Dept: PHYSICAL THERAPY | Facility: CLINIC | Age: 85
End: 2025-04-24
Payer: MEDICARE

## 2025-04-24 DIAGNOSIS — M79.672 CHRONIC PAIN OF BOTH FEET: Primary | ICD-10-CM

## 2025-04-24 DIAGNOSIS — M79.671 CHRONIC PAIN OF BOTH FEET: Primary | ICD-10-CM

## 2025-04-24 DIAGNOSIS — G89.29 CHRONIC PAIN OF BOTH FEET: Primary | ICD-10-CM

## 2025-04-24 NOTE — PROGRESS NOTES
Physical Therapy Initial Evaluation and Plan of Care    [facility] [place] [address]  [phone] (phone)  [phone] (fax)      Patient: Bailee Garza   : 1940  Diagnosis/ICD-10 Code:  Chronic pain of both feet [M79.671, G89.29, M79.672]  Referring practitioner: Eddie Araya M.D.   Date of Initial Visit: 2025  Today's Date: 2025  Patient seen for 1 sessions    Visit Diagnoses:    ICD-10-CM ICD-9-CM   1. Chronic pain of both feet  M79.671 729.5    G89.29 338.29    M79.672               Subjective:  Giovana has been seen in this clinic for R knee replacement, hip issues and feet issues.  She has been issued orthotics in the past and is here today to  a new pair of orthotics that were fabricated from her old impression molds.  Bailee reports that she is noticing the the R knee, which was replaced on 2024, is doing much better at this point.   Bailee's goals with today's visit;  To  the new orthotics and have them checked for fit.     Objective     Observation:  Bailee arrived in the clinic ambulating independently without an assistive device.  Standing, without shoes, she has B hallux valgus and B first MTP bunions.  During the gait cycle her feet have excessive forefoot pronation, collapsing at mid stance, but not a full pes planus, B.      Forefoot flexibility:  B 40-45 degrees of supination    Orthotic check out: In sitting, each orthotic under her feet, Bailee fully plantarflexed each ankle and when placed into her arch the orthotics achieved a full contact fit.  I then trimmed the orthotics to fit her shoes.  I observed her Alvaro's tendon from the rear in standing and they were aligned neutrally.  During the gait cycle the heels both were controlled in good position during the gait cycle.      Assessment & Plan       Assessment  Assessment details: Bailee Garza was seen for 1 physical therapy  sessions for chronic feet pain.  Treatment included orthotic fabrication . Progress to physical therapy goals was good.  She was discharged to an independent [facility] and provided patient education to self-manage condition.      Goals  Plan Goals: STG/LTGs  1. The orthotics attain a full contact fit and she is comfortable during weight bearing. (Met)    Plan  Plan details: Discharged from skilled physical therapy.         Timed:  Manual Therapy:         mins  25209;  Therapeutic Exercise:         mins  93618;     Neuromuscular Sofya:        mins  02879;    Therapeutic Activity:          mins  53816;     Gait Training:              mins  91598;     Ultrasound:             mins  74908;    Iontophoresis            mins 76752  Self Care                     ____ mins 81714  Orthotic Fitting (initial)   ____ mins 23314  Orthotic (follow-up)    15      mins 27748;        Untimed:    Dry Needling  (1-2 muscles)                  mins 21014 (Self-pay)  Dry Needling (3-4 muscles)  ____  47548 (Self-pay)  Dry Needling Trial             DRYNDLTRIAL  (No Charge)  Electrical Stimulation:            mins  76648 ( );  Traction:    ____  mins  89021  Canalith Repositioning  ____ mins 24049    Low Eval     15     Mins  65731  Mod Eval          Mins  96318  High Eval                            Mins  59089    Timed Treatment:   15   mins   Total Treatment:     30   mins    PT SIGNATURE: Deven Nunez PT     License Number: [id number]    Electronically signed by Deven Nunez PT, 04/24/25, 8:36 AM EDT    DATE TREATMENT INITIATED: 4/24/2025    Initial Certification  Certification Period: 7/23/2025  I certify that the therapy services are furnished while this patient is under my care.  The services outlined above are required by this patient, and will be reviewed every 90 days.     PHYSICIAN: Speedy Sanz APRN   NPI: [id number]                                         DATE:     Please sign and return via fax to [phone] Thank you,  [facility].

## 2025-05-28 ENCOUNTER — TELEPHONE (OUTPATIENT)
Dept: FAMILY MEDICINE CLINIC | Facility: CLINIC | Age: 85
End: 2025-05-28
Payer: MEDICARE

## 2025-05-28 NOTE — TELEPHONE ENCOUNTER
ATTEMPTED TO WARM TRANSFER BUT NO ANSWER    Caller: Bailee Garza    Relationship to patient: Self    Best call back number:[phone]      Chief complaint: COLD, FATIGUE, NO ENERGY, A LITTLE COUGH    Type of visit: SAME DAY    Requested date: 5/28/25

## 2025-05-28 NOTE — TELEPHONE ENCOUNTER
Spoke with patient to inform her that our office doesn't have any same day appointment available for today. Advised patient to go to the UC for an evaluation. Patient voiced understanding and had no further questions,

## 2025-05-29 ENCOUNTER — OFFICE VISIT (OUTPATIENT)
Dept: FAMILY MEDICINE CLINIC | Facility: CLINIC | Age: 85
End: 2025-05-29
Payer: MEDICARE

## 2025-05-29 ENCOUNTER — TELEPHONE (OUTPATIENT)
Dept: FAMILY MEDICINE CLINIC | Facility: CLINIC | Age: 85
End: 2025-05-29
Payer: MEDICARE

## 2025-05-29 VITALS
HEART RATE: 109 BPM | HEIGHT: 63 IN | TEMPERATURE: 97.5 F | BODY MASS INDEX: 19.47 KG/M2 | OXYGEN SATURATION: 96 % | SYSTOLIC BLOOD PRESSURE: 130 MMHG | WEIGHT: 109.9 LBS | DIASTOLIC BLOOD PRESSURE: 60 MMHG

## 2025-05-29 DIAGNOSIS — J22 LOWER RESPIRATORY TRACT INFECTION: ICD-10-CM

## 2025-05-29 DIAGNOSIS — R05.3 CHRONIC COUGH: Primary | ICD-10-CM

## 2025-05-29 RX ORDER — CEFTRIAXONE 500 MG/1
500 INJECTION, POWDER, FOR SOLUTION INTRAMUSCULAR; INTRAVENOUS ONCE
Status: DISCONTINUED | OUTPATIENT
Start: 2025-05-29 | End: 2025-05-29 | Stop reason: HOSPADM

## 2025-05-29 RX ORDER — CEFUROXIME AXETIL 500 MG/1
500 TABLET ORAL 2 TIMES DAILY
Qty: 20 TABLET | Refills: 0 | Status: SHIPPED | OUTPATIENT
Start: 2025-05-29 | End: 2025-06-02 | Stop reason: HOSPADM

## 2025-05-29 RX ADMIN — CEFTRIAXONE 500 MG: 500 INJECTION, POWDER, FOR SOLUTION INTRAMUSCULAR; INTRAVENOUS at 12:59

## 2025-05-29 NOTE — PROGRESS NOTES
Subjective   Bailee Garza is a 85 y.o. female.     Cough  Associated symptoms: chills, fever and postnasal drip    Associated symptoms: no chest pain and no shortness of breath    Fatigue  Symptoms include chills, congestion, cough, fatigue, a fever and weakness.    Pertinent negative symptoms include no chest pain.   Pneumonia  She complains of cough. There is no shortness of breath. Associated symptoms include a fever and postnasal drip. Pertinent negatives include no chest pain.      Chief Complaint     Cough (Triggers it when pt takes a deep breath)  Fatigue  Pneumonia (Pt had recovered from it but daughter said it might be walking pneumonia)  She was seen in office in March and Aprl for same cough.  She states cough has improved since then but still persistent.  Spo2 is 96% today. She had chest xray in April that was clear.  Patient feels very lethargic and is a little bit wobbly on her feet.  She had low-grade temp at home of 99 but has been taking Tylenol so it is normal today in office.  She does not feel short of breath with exertion and denies lower extremity swelling.  She reports just feeling extremely fatigued.    The following portions of the patient's history were reviewed and updated as appropriate: allergies, current medications, past family history, past medical history, past social history, past surgical history, and problem list.    Review of Systems   Constitutional:  Positive for chills, fatigue and fever.   HENT:  Positive for congestion and postnasal drip.    Respiratory:  Positive for cough. Negative for shortness of breath.    Cardiovascular:  Negative for chest pain and leg swelling.   Neurological:  Positive for weakness.       Objective   Physical Exam  Vitals and nursing note reviewed.   Constitutional:       Appearance: She is well-developed.   HENT:      Right Ear: Tympanic membrane, ear canal and external ear normal.      Left Ear: Tympanic membrane, ear canal and external ear  normal.      Nose: No mucosal edema.      Mouth/Throat:      Lips: Pink.      Mouth: Mucous membranes are moist.      Pharynx: Postnasal drip present. No pharyngeal swelling or posterior oropharyngeal erythema.   Cardiovascular:      Rate and Rhythm: Normal rate and regular rhythm.   Pulmonary:      Effort: Pulmonary effort is normal.      Breath sounds: Normal breath sounds. No decreased breath sounds, wheezing, rhonchi or rales.   Neurological:      Mental Status: She is oriented to person, place, and time. She is lethargic.   Psychiatric:         Mood and Affect: Mood normal.         Behavior: Behavior normal.         Thought Content: Thought content normal.         Judgment: Judgment normal.     Patient is slightly lethargic during exam but is easily aroused and is oriented.    Assessment & Plan   Diagnoses and all orders for this visit:    1. Chronic cough (Primary)  -     Cancel: XR Chest PA & Lateral  -     XR Chest PA & Lateral    2. Lower respiratory tract infection  -     cefTRIAXone (ROCEPHIN) injection 500 mg    Other orders  -     cefuroxime (CEFTIN) 500 MG tablet; Take 1 tablet by mouth 2 (Two) Times a Day.  Dispense: 20 tablet; Refill: 0        2 view chest x-ray ordered and reviewed by me.  There appears to be some fluid collection in the lingula on the lateral view of the right lung.  There is also developing opacity in the right costophrenic angle.  This was compared to the x-ray she had done on April 14, 2025.      Patient has history of Ketek allergy which caused hives.  Would like to give her azithromycin for atypical bacteria coverage but uncertain of cross sensitivity.  Will give her 500 mg of Rocephin in office and put her on Ceftin 500 mg twice a day for 10 days.  She will schedule follow-up for Monday with either myself or Dr. Araya.  She and daughter were counseled that she should go to ER if symptoms worsen over the weekend.  They verbalized understanding.

## 2025-05-29 NOTE — TELEPHONE ENCOUNTER
Nidhi with [facility] called to transfer over PT daughter Phyllis to schedule an appointment today for her mother. She stated that she is cold to touch, weak, and has discolored skin appearance. I scheduled PT with TERESA Castillo today 5/29/25 at 11:00 am, suggested arrival time at 10:45 am.

## 2025-05-30 ENCOUNTER — TELEPHONE (OUTPATIENT)
Dept: FAMILY MEDICINE CLINIC | Facility: CLINIC | Age: 85
End: 2025-05-30

## 2025-05-30 ENCOUNTER — APPOINTMENT (OUTPATIENT)
Dept: GENERAL RADIOLOGY | Facility: HOSPITAL | Age: 85
End: 2025-05-30
Payer: MEDICARE

## 2025-05-30 ENCOUNTER — HOSPITAL ENCOUNTER (INPATIENT)
Facility: HOSPITAL | Age: 85
LOS: 2 days | Discharge: HOME HEALTH | End: 2025-06-02
Attending: EMERGENCY MEDICINE | Admitting: INTERNAL MEDICINE
Payer: MEDICARE

## 2025-05-30 DIAGNOSIS — I10 ESSENTIAL HYPERTENSION: ICD-10-CM

## 2025-05-30 DIAGNOSIS — J18.9 SEPSIS DUE TO PNEUMONIA: ICD-10-CM

## 2025-05-30 DIAGNOSIS — A48.1 LEGIONELLA PNEUMONIA: Primary | ICD-10-CM

## 2025-05-30 DIAGNOSIS — N17.9 AKI (ACUTE KIDNEY INJURY): ICD-10-CM

## 2025-05-30 DIAGNOSIS — A41.9 SEPSIS DUE TO PNEUMONIA: ICD-10-CM

## 2025-05-30 DIAGNOSIS — J18.9 PNEUMONIA OF RIGHT UPPER LOBE DUE TO INFECTIOUS ORGANISM: ICD-10-CM

## 2025-05-30 PROBLEM — E86.0 DEHYDRATION: Status: ACTIVE | Noted: 2025-05-30

## 2025-05-30 LAB
ALBUMIN SERPL-MCNC: 3.9 G/DL (ref 3.5–5.2)
ALBUMIN/GLOB SERPL: 1.1 G/DL
ALP SERPL-CCNC: 121 U/L (ref 39–117)
ALT SERPL W P-5'-P-CCNC: 74 U/L (ref 1–33)
ANION GAP SERPL CALCULATED.3IONS-SCNC: 15.6 MMOL/L (ref 5–15)
AST SERPL-CCNC: 85 U/L (ref 1–32)
B PARAPERT DNA SPEC QL NAA+PROBE: NOT DETECTED
B PERT DNA SPEC QL NAA+PROBE: NOT DETECTED
BASOPHILS # BLD AUTO: 0.03 10*3/MM3 (ref 0–0.2)
BASOPHILS NFR BLD AUTO: 0.2 % (ref 0–1.5)
BILIRUB SERPL-MCNC: 0.4 MG/DL (ref 0–1.2)
BUN SERPL-MCNC: 41 MG/DL (ref 8–23)
BUN/CREAT SERPL: 26.3 (ref 7–25)
C PNEUM DNA NPH QL NAA+NON-PROBE: NOT DETECTED
CALCIUM SPEC-SCNC: 9.4 MG/DL (ref 8.6–10.5)
CHLORIDE SERPL-SCNC: 97 MMOL/L (ref 98–107)
CO2 SERPL-SCNC: 21.4 MMOL/L (ref 22–29)
CREAT SERPL-MCNC: 1.56 MG/DL (ref 0.57–1)
D-LACTATE SERPL-SCNC: 1.2 MMOL/L (ref 0.5–2)
DEPRECATED RDW RBC AUTO: 46 FL (ref 37–54)
EGFRCR SERPLBLD CKD-EPI 2021: 32.6 ML/MIN/1.73
EOSINOPHIL # BLD AUTO: 0 10*3/MM3 (ref 0–0.4)
EOSINOPHIL NFR BLD AUTO: 0 % (ref 0.3–6.2)
ERYTHROCYTE [DISTWIDTH] IN BLOOD BY AUTOMATED COUNT: 13 % (ref 12.3–15.4)
FLUAV SUBTYP SPEC NAA+PROBE: NOT DETECTED
FLUBV RNA ISLT QL NAA+PROBE: NOT DETECTED
GEN 5 1HR TROPONIN T REFLEX: 27 NG/L
GLOBULIN UR ELPH-MCNC: 3.7 GM/DL
GLUCOSE SERPL-MCNC: 148 MG/DL (ref 65–99)
HADV DNA SPEC NAA+PROBE: NOT DETECTED
HCOV 229E RNA SPEC QL NAA+PROBE: NOT DETECTED
HCOV HKU1 RNA SPEC QL NAA+PROBE: NOT DETECTED
HCOV NL63 RNA SPEC QL NAA+PROBE: NOT DETECTED
HCOV OC43 RNA SPEC QL NAA+PROBE: NOT DETECTED
HCT VFR BLD AUTO: 35.3 % (ref 34–46.6)
HGB BLD-MCNC: 11.3 G/DL (ref 12–15.9)
HMPV RNA NPH QL NAA+NON-PROBE: NOT DETECTED
HPIV1 RNA ISLT QL NAA+PROBE: NOT DETECTED
HPIV2 RNA SPEC QL NAA+PROBE: NOT DETECTED
HPIV3 RNA NPH QL NAA+PROBE: NOT DETECTED
HPIV4 P GENE NPH QL NAA+PROBE: NOT DETECTED
IMM GRANULOCYTES # BLD AUTO: 0.09 10*3/MM3 (ref 0–0.05)
IMM GRANULOCYTES NFR BLD AUTO: 0.6 % (ref 0–0.5)
LYMPHOCYTES # BLD AUTO: 0.52 10*3/MM3 (ref 0.7–3.1)
LYMPHOCYTES NFR BLD AUTO: 3.7 % (ref 19.6–45.3)
M PNEUMO IGG SER IA-ACNC: NOT DETECTED
MCH RBC QN AUTO: 31.5 PG (ref 26.6–33)
MCHC RBC AUTO-ENTMCNC: 32 G/DL (ref 31.5–35.7)
MCV RBC AUTO: 98.3 FL (ref 79–97)
MONOCYTES # BLD AUTO: 0.93 10*3/MM3 (ref 0.1–0.9)
MONOCYTES NFR BLD AUTO: 6.7 % (ref 5–12)
NEUTROPHILS NFR BLD AUTO: 12.41 10*3/MM3 (ref 1.7–7)
NEUTROPHILS NFR BLD AUTO: 88.8 % (ref 42.7–76)
NRBC BLD AUTO-RTO: 0 /100 WBC (ref 0–0.2)
NT-PROBNP SERPL-MCNC: 654 PG/ML (ref 0–1800)
PLATELET # BLD AUTO: 205 10*3/MM3 (ref 140–450)
PMV BLD AUTO: 10.8 FL (ref 6–12)
POTASSIUM SERPL-SCNC: 4.2 MMOL/L (ref 3.5–5.2)
PROCALCITONIN SERPL-MCNC: 1.94 NG/ML (ref 0–0.25)
PROT SERPL-MCNC: 7.6 G/DL (ref 6–8.5)
RBC # BLD AUTO: 3.59 10*6/MM3 (ref 3.77–5.28)
RHINOVIRUS RNA SPEC NAA+PROBE: NOT DETECTED
RSV RNA NPH QL NAA+NON-PROBE: NOT DETECTED
SARS-COV-2 RNA NPH QL NAA+NON-PROBE: NOT DETECTED
SODIUM SERPL-SCNC: 134 MMOL/L (ref 136–145)
TROPONIN T % DELTA: 0
TROPONIN T NUMERIC DELTA: 0 NG/L
TROPONIN T SERPL HS-MCNC: 27 NG/L
WBC NRBC COR # BLD AUTO: 13.98 10*3/MM3 (ref 3.4–10.8)
WHOLE BLOOD HOLD COAG: NORMAL

## 2025-05-30 PROCEDURE — G0378 HOSPITAL OBSERVATION PER HR: HCPCS

## 2025-05-30 PROCEDURE — 0202U NFCT DS 22 TRGT SARS-COV-2: CPT | Performed by: EMERGENCY MEDICINE

## 2025-05-30 PROCEDURE — 25010000002 DOXYCYCLINE 100 MG RECONSTITUTED SOLUTION 1 EACH VIAL: Performed by: INTERNAL MEDICINE

## 2025-05-30 PROCEDURE — 71045 X-RAY EXAM CHEST 1 VIEW: CPT

## 2025-05-30 PROCEDURE — 84145 PROCALCITONIN (PCT): CPT | Performed by: PHYSICIAN ASSISTANT

## 2025-05-30 PROCEDURE — 85025 COMPLETE CBC W/AUTO DIFF WBC: CPT | Performed by: PHYSICIAN ASSISTANT

## 2025-05-30 PROCEDURE — 99285 EMERGENCY DEPT VISIT HI MDM: CPT

## 2025-05-30 PROCEDURE — 83605 ASSAY OF LACTIC ACID: CPT | Performed by: PHYSICIAN ASSISTANT

## 2025-05-30 PROCEDURE — 25010000002 CEFTRIAXONE PER 250 MG: Performed by: EMERGENCY MEDICINE

## 2025-05-30 PROCEDURE — 36415 COLL VENOUS BLD VENIPUNCTURE: CPT

## 2025-05-30 PROCEDURE — 25810000003 LACTATED RINGERS SOLUTION: Performed by: EMERGENCY MEDICINE

## 2025-05-30 PROCEDURE — 83880 ASSAY OF NATRIURETIC PEPTIDE: CPT | Performed by: PHYSICIAN ASSISTANT

## 2025-05-30 PROCEDURE — 87641 MR-STAPH DNA AMP PROBE: CPT | Performed by: INTERNAL MEDICINE

## 2025-05-30 PROCEDURE — 80053 COMPREHEN METABOLIC PANEL: CPT | Performed by: PHYSICIAN ASSISTANT

## 2025-05-30 PROCEDURE — 25810000003 SODIUM CHLORIDE 0.9 % SOLUTION: Performed by: INTERNAL MEDICINE

## 2025-05-30 PROCEDURE — 87040 BLOOD CULTURE FOR BACTERIA: CPT | Performed by: EMERGENCY MEDICINE

## 2025-05-30 PROCEDURE — 84484 ASSAY OF TROPONIN QUANT: CPT | Performed by: PHYSICIAN ASSISTANT

## 2025-05-30 RX ORDER — BISACODYL 10 MG
10 SUPPOSITORY, RECTAL RECTAL DAILY PRN
Status: DISCONTINUED | OUTPATIENT
Start: 2025-05-30 | End: 2025-06-02 | Stop reason: HOSPADM

## 2025-05-30 RX ORDER — ONDANSETRON 4 MG/1
4 TABLET, ORALLY DISINTEGRATING ORAL EVERY 6 HOURS PRN
Status: DISCONTINUED | OUTPATIENT
Start: 2025-05-30 | End: 2025-06-02 | Stop reason: HOSPADM

## 2025-05-30 RX ORDER — ACETAMINOPHEN 325 MG/1
650 TABLET ORAL EVERY 4 HOURS PRN
Status: DISCONTINUED | OUTPATIENT
Start: 2025-05-30 | End: 2025-06-02 | Stop reason: HOSPADM

## 2025-05-30 RX ORDER — ACETAMINOPHEN 650 MG/1
650 SUPPOSITORY RECTAL EVERY 4 HOURS PRN
Status: DISCONTINUED | OUTPATIENT
Start: 2025-05-30 | End: 2025-06-02 | Stop reason: HOSPADM

## 2025-05-30 RX ORDER — SODIUM CHLORIDE 9 MG/ML
100 INJECTION, SOLUTION INTRAVENOUS CONTINUOUS
Status: ACTIVE | OUTPATIENT
Start: 2025-05-30 | End: 2025-06-01

## 2025-05-30 RX ORDER — LEVOTHYROXINE SODIUM 75 UG/1
75 TABLET ORAL
Status: DISCONTINUED | OUTPATIENT
Start: 2025-05-31 | End: 2025-06-02 | Stop reason: HOSPADM

## 2025-05-30 RX ORDER — BISACODYL 5 MG/1
5 TABLET, DELAYED RELEASE ORAL DAILY PRN
Status: DISCONTINUED | OUTPATIENT
Start: 2025-05-30 | End: 2025-06-02 | Stop reason: HOSPADM

## 2025-05-30 RX ORDER — SODIUM CHLORIDE 9 MG/ML
40 INJECTION, SOLUTION INTRAVENOUS AS NEEDED
Status: DISCONTINUED | OUTPATIENT
Start: 2025-05-30 | End: 2025-06-02 | Stop reason: HOSPADM

## 2025-05-30 RX ORDER — SODIUM CHLORIDE 0.9 % (FLUSH) 0.9 %
10 SYRINGE (ML) INJECTION EVERY 12 HOURS SCHEDULED
Status: DISCONTINUED | OUTPATIENT
Start: 2025-05-30 | End: 2025-06-02 | Stop reason: HOSPADM

## 2025-05-30 RX ORDER — ONDANSETRON 2 MG/ML
4 INJECTION INTRAMUSCULAR; INTRAVENOUS EVERY 6 HOURS PRN
Status: DISCONTINUED | OUTPATIENT
Start: 2025-05-30 | End: 2025-06-02 | Stop reason: HOSPADM

## 2025-05-30 RX ORDER — AMOXICILLIN 250 MG
2 CAPSULE ORAL 2 TIMES DAILY PRN
Status: DISCONTINUED | OUTPATIENT
Start: 2025-05-30 | End: 2025-06-02 | Stop reason: HOSPADM

## 2025-05-30 RX ORDER — NITROGLYCERIN 0.4 MG/1
0.4 TABLET SUBLINGUAL
Status: DISCONTINUED | OUTPATIENT
Start: 2025-05-30 | End: 2025-06-02 | Stop reason: HOSPADM

## 2025-05-30 RX ORDER — SODIUM CHLORIDE 0.9 % (FLUSH) 0.9 %
10 SYRINGE (ML) INJECTION AS NEEDED
Status: DISCONTINUED | OUTPATIENT
Start: 2025-05-30 | End: 2025-06-02 | Stop reason: HOSPADM

## 2025-05-30 RX ORDER — PANTOPRAZOLE SODIUM 40 MG/1
40 TABLET, DELAYED RELEASE ORAL
Status: DISCONTINUED | OUTPATIENT
Start: 2025-05-31 | End: 2025-06-02 | Stop reason: HOSPADM

## 2025-05-30 RX ORDER — ACETAMINOPHEN 160 MG/5ML
650 SOLUTION ORAL EVERY 4 HOURS PRN
Status: DISCONTINUED | OUTPATIENT
Start: 2025-05-30 | End: 2025-06-02 | Stop reason: HOSPADM

## 2025-05-30 RX ORDER — POLYETHYLENE GLYCOL 3350 17 G/17G
17 POWDER, FOR SOLUTION ORAL DAILY PRN
Status: DISCONTINUED | OUTPATIENT
Start: 2025-05-30 | End: 2025-06-02 | Stop reason: HOSPADM

## 2025-05-30 RX ADMIN — ACETAMINOPHEN 650 MG: 325 TABLET ORAL at 17:59

## 2025-05-30 RX ADMIN — Medication 10 ML: at 21:12

## 2025-05-30 RX ADMIN — Medication 10 ML: at 14:35

## 2025-05-30 RX ADMIN — SODIUM CHLORIDE 100 ML/HR: 9 INJECTION, SOLUTION INTRAVENOUS at 18:43

## 2025-05-30 RX ADMIN — CEFTRIAXONE 2000 MG: 2 INJECTION, POWDER, FOR SOLUTION INTRAMUSCULAR; INTRAVENOUS at 13:06

## 2025-05-30 RX ADMIN — DOXYCYCLINE 100 MG: 100 INJECTION, POWDER, LYOPHILIZED, FOR SOLUTION INTRAVENOUS at 18:43

## 2025-05-30 RX ADMIN — SODIUM CHLORIDE, POTASSIUM CHLORIDE, SODIUM LACTATE AND CALCIUM CHLORIDE 500 ML: 600; 310; 30; 20 INJECTION, SOLUTION INTRAVENOUS at 13:00

## 2025-05-30 RX ADMIN — SODIUM CHLORIDE, POTASSIUM CHLORIDE, SODIUM LACTATE AND CALCIUM CHLORIDE 1000 ML: 600; 310; 30; 20 INJECTION, SOLUTION INTRAVENOUS at 14:39

## 2025-05-30 NOTE — ED NOTES
"Nursing report ED to floor  Bailee Garza  85 y.o.  female    HPI :  HPI  Stated Reason for Visit: Recent dx with pneumonia. N/V today and generalized weakness  History Obtained From: EMS    Chief Complaint  Chief Complaint   Patient presents with    Weakness - Generalized    Vomiting       Admitting doctor:   Anastacio Banuelos MD    Admitting diagnosis:   The primary encounter diagnosis was Pneumonia of right upper lobe due to infectious organism. A diagnosis of MIKE (acute kidney injury) was also pertinent to this visit.    Code status:   Current Code Status       Date Active Code Status Order ID Comments User Context       Prior            Allergies:   Hydrocodone, Ketek [telithromycin], Nsaids, Paxlovid [nirmatrelvir-ritonavir], Sulfa antibiotics, and Latex    Isolation:   No active isolations    Intake and Output  No intake or output data in the 24 hours ending 05/30/25 1400    Weight:       05/30/25  1154   Weight: 50 kg (110 lb 3.7 oz)       Most recent vitals:   Vitals:    05/30/25 1025 05/30/25 1154 05/30/25 1224 05/30/25 1331   BP: 125/55  135/62 130/59   BP Location:   Left arm    Pulse: 98  101 102   Resp: 18  14 14   Temp: 99 °F (37.2 °C)  98.4 °F (36.9 °C)    SpO2: 94%  94% 93%   Weight:  50 kg (110 lb 3.7 oz)     Height:  160 cm (63\")         Active LDAs/IV Access:   Lines, Drains & Airways       Active LDAs       Name Placement date Placement time Site Days    Peripheral IV 05/30/25 1148 20 G Right Antecubital 05/30/25  1148  Antecubital  less than 1    External Urinary Catheter 05/30/25  1233  --  less than 1                    Labs (abnormal labs have a star):   Labs Reviewed   COMPREHENSIVE METABOLIC PANEL - Abnormal; Notable for the following components:       Result Value    Glucose 148 (*)     BUN 41.0 (*)     Creatinine 1.56 (*)     Sodium 134 (*)     Chloride 97 (*)     CO2 21.4 (*)     ALT (SGPT) 74 (*)     AST (SGOT) 85 (*)     Alkaline Phosphatase 121 (*)     BUN/Creatinine Ratio 26.3 " "(*)     Anion Gap 15.6 (*)     eGFR 32.6 (*)     All other components within normal limits    Narrative:     GFR Categories in Chronic Kidney Disease (CKD)              GFR Category          GFR (mL/min/1.73)    Interpretation  G1                    90 or greater        Normal or high (1)  G2                    60-89                Mild decrease (1)  G3a                   45-59                Mild to moderate decrease  G3b                   30-44                Moderate to severe decrease  G4                    15-29                Severe decrease  G5                    14 or less           Kidney failure    (1)In the absence of evidence of kidney disease, neither GFR category G1 or G2 fulfill the criteria for CKD.    eGFR calculation 2021 CKD-EPI creatinine equation, which does not include race as a factor   PROCALCITONIN - Abnormal; Notable for the following components:    Procalcitonin 1.94 (*)     All other components within normal limits    Narrative:     As a Marker for Sepsis (Non-Neonates):    1. <0.5 ng/mL represents a low risk of severe sepsis and/or septic shock.  2. >2 ng/mL represents a high risk of severe sepsis and/or septic shock.    As a Marker for Lower Respiratory Tract Infections that require antibiotic therapy:    PCT on Admission    Antibiotic Therapy       6-12 Hrs later    >0.5                Strongly Recommended  >0.25 - <0.5        Recommended   0.1 - 0.25          Discouraged              Remeasure/reassess PCT  <0.1                Strongly Discouraged     Remeasure/reassess PCT    As 28 day mortality risk marker: \"Change in Procalcitonin Result\" (>80% or <=80%) if Day 0 (or Day 1) and Day 4 values are available. Refer to http://www.[employer]-pct-calculator.com    Change in PCT <=80%  A decrease of PCT levels below or equal to 80% defines a positive change in PCT test result representing a higher risk for 28-day all-cause mortality of patients diagnosed with severe sepsis for septic " shock.    Change in PCT >80%  A decrease of PCT levels of more than 80% defines a negative change in PCT result representing a lower risk for 28-day all-cause mortality of patients diagnosed with severe sepsis or septic shock.      CBC WITH AUTO DIFFERENTIAL - Abnormal; Notable for the following components:    WBC 13.98 (*)     RBC 3.59 (*)     Hemoglobin 11.3 (*)     MCV 98.3 (*)     Neutrophil % 88.8 (*)     Lymphocyte % 3.7 (*)     Eosinophil % 0.0 (*)     Immature Grans % 0.6 (*)     Neutrophils, Absolute 12.41 (*)     Lymphocytes, Absolute 0.52 (*)     Monocytes, Absolute 0.93 (*)     Immature Grans, Absolute 0.09 (*)     All other components within normal limits   TROPONIN - Abnormal; Notable for the following components:    HS Troponin T 27 (*)     All other components within normal limits    Narrative:     High Sensitive Troponin T Reference Range:  <14.0 ng/L- Negative Female for AMI  <22.0 ng/L- Negative Male for AMI  >=14 - Abnormal Female indicating possible myocardial injury.  >=22 - Abnormal Male indicating possible myocardial injury.   Clinicians would have to utilize clinical acumen, EKG, Troponin, and serial changes to determine if it is an Acute Myocardial Infarction or myocardial injury due to an underlying chronic condition.        HIGH SENSITIVITIY TROPONIN T 1HR - Abnormal; Notable for the following components:    HS Troponin T 27 (*)     All other components within normal limits    Narrative:     High Sensitive Troponin T Reference Range:  <14.0 ng/L- Negative Female for AMI  <22.0 ng/L- Negative Male for AMI  >=14 - Abnormal Female indicating possible myocardial injury.  >=22 - Abnormal Male indicating possible myocardial injury.   Clinicians would have to utilize clinical acumen, EKG, Troponin, and serial changes to determine if it is an Acute Myocardial Infarction or myocardial injury due to an underlying chronic condition.        RESPIRATORY PANEL PCR W/ COVID-19 (SARS-COV-2), NP SWAB IN  UTM/VTP, 2 HR TAT - Normal    Narrative:     In the setting of a positive respiratory panel with a viral infection PLUS a negative procalcitonin without other underlying concern for bacterial infection, consider observing off antibiotics or discontinuation of antibiotics and continue supportive care. If the respiratory panel is positive for atypical bacterial infection (Bordetella pertussis, Chlamydophila pneumoniae, or Mycoplasma pneumoniae), consider antibiotic de-escalation to target atypical bacterial infection.   LACTIC ACID, PLASMA - Normal   BNP (IN-HOUSE) - Normal    Narrative:     This assay is used as an aid in the diagnosis of individuals suspected of having heart failure. It can be used as an aid in the diagnosis of acute decompensated heart failure (ADHF) in patients presenting with signs and symptoms of ADHF to the emergency department (ED). In addition, NT-proBNP of <300 pg/mL indicates ADHF is not likely.    Age Range Result Interpretation  NT-proBNP Concentration (pg/mL:      <50             Positive            >450                   Gray                 300-450                    Negative             <300    50-75           Positive            >900                  Gray                300-900                  Negative            <300      >75             Positive            >1800                  Gray                300-1800                  Negative            <300   BLOOD CULTURE   BLOOD CULTURE   RAINBOW DRAW    Narrative:     The following orders were created for panel order [place] Draw.  Procedure                               Abnormality         Status                     ---------                               -----------         ------                     Light Blue Top[[id number]]                                   Final result                 Please view results for these tests on the individual orders.   URINALYSIS W/ MICROSCOPIC IF INDICATED (NO CULTURE)   CBC AND DIFFERENTIAL     Narrative:     The following orders were created for panel order CBC & Differential.  Procedure                               Abnormality         Status                     ---------                               -----------         ------                     CBC Auto Differential[[id number]]        Abnormal            Final result                 Please view results for these tests on the individual orders.   LIGHT BLUE TOP       EKG:   No orders to display       Meds given in ED:   Medications   sodium chloride 0.9 % flush 10 mL (has no administration in time range)   cefTRIAXone (ROCEPHIN) 2,000 mg in sodium chloride 0.9 % 100 mL MBP (0 mg Intravenous Stopped 5/30/25 1356)   lactated ringers bolus 500 mL (0 mL Intravenous Stopped 5/30/25 1356)       Imaging results:  XR Chest PA & Lateral  Result Date: 5/30/2025  From 5/29/2025 to 5/30/2025, increasing hazy right upper lobe opacity, suspicious for pneumonitis/pneumonia. Recommend imaging follow-up to ensure clearance. No pleural effusion or pneumothorax. Normal size cardiac silhouette. No focal osseous abnormality.  This report was finalized on 5/30/2025 12:03 PM by Dr. Shaquille Tsai M.D on Workstation: [id number]      XR Chest 1 View  Result Date: 5/30/2025  From 5/29/2025 to 5/30/2025, increasing hazy right upper lobe opacity, suspicious for pneumonitis/pneumonia. Recommend imaging follow-up to ensure clearance. No pleural effusion or pneumothorax. Normal size cardiac silhouette. No focal osseous abnormality.  This report was finalized on 5/30/2025 12:03 PM by Dr. Shaquille Tsai M.D on Workstation: [id number]        Ambulatory status:   - bedrest     Social issues:   Social History     Socioeconomic History    Marital status:     Number of children: 3   Tobacco Use    Smoking status: Never     Passive exposure: Never    Smokeless tobacco: Never   Vaping Use    Vaping status: Never Used   Substance and Sexual Activity    Alcohol use: No    Drug use: Never     Sexual activity: Defer       Peripheral Neurovascular  Peripheral Neurovascular (Adult)  Peripheral Neurovascular WDL: WDL    Neuro Cognitive  Neuro Cognitive (Adult)  Cognitive/Neuro/Behavioral WDL: .WDL except, orientation, mood/behavior  Orientation: situation  Mood/Behavior: hypoactive (quiet, withdrawn)    Learning  Learning Assessment  Learning Readiness and Ability: cognitive limitation noted  Education Provided  Person Taught: family member/friend    Respiratory  Respiratory  Airway WDL: WDL  Respiratory WDL  Respiratory WDL: .WDL except, cough  Cough Frequency: frequent  Cough Type: dry    Abdominal Pain  Safety Interventions  Safety Precautions/Falls Reduction: family at bedside, fall reduction program maintained, elopement precautions initiated, treatment room near department station  All Alarms: alarm(s) activated and audible    Pain Assessments  Pain (Adult)  (0-10) Pain Rating: Rest: 0  (0-10) Pain Rating: Activity: 0    NIH Stroke Scale       Zhane Phipps RN  05/30/25 14:00 EDT

## 2025-05-30 NOTE — PLAN OF CARE
Goal Outcome Evaluation:  Plan of Care Reviewed With: patient        Progress: no change  Outcome Evaluation: AOx3-4, RA, Fever 100.8, Tylenol given, Waiting for new IV to begin IV antibiotics, LR bolus completed, call light within reach, bed alarm on.

## 2025-05-30 NOTE — H&P
Patient Name:  Bailee Garza  YOB: 1940  MRN:  [id number]  Admit Date:  5/30/2025  Patient Care Team:  Eddie Araya MD as PCP - General  Eddie Araya MD as PCP - Family Medicine  [facility] as Consulting Physician (Cardiology)  Anupam Ruiz MD as Consulting Physician (Orthopedic Surgery)  Avery Ruiz MD as Consulting Physician (Gastroenterology)  Mac Main MD PhD as Consulting Physician (Hematology and Oncology)  Karli Viveros MD as Consulting Physician (Gastroenterology)  Miriam Gilbert PA-C as Physician Assistant (Gastroenterology)      Subjective   History Present Illness     Chief Complaint   Patient presents with    Weakness - Generalized    Vomiting         Bailee Garza is a 85 y.o. female who presents to the ED c/o acute generalized weakness and cough. She has been dealing with a cough the past few months but more lethargic the past few days. Yesterday at PCP office given IM rocephin and ceftin but continued to feel worse and lethargic and N/V.  Came to [facility] ER. BUN 41/Cr 1.5. Elevated WBC and procal. Given IVFs and ceftriaxone in ER.     History of Present Illness  Review of Systems   Unable to perform ROS: Acuity of condition        Personal History     Past Medical History:   Diagnosis Date    Abnormal CXR 12/18/2017    Adverse reaction to narcotic drug     SEVERE HALLUCINATIONS POST OP LEFT HIP PLACEMENT    Allergies     Arthritis     Arthritis of foot 04/17/2020    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    unspecified    Back pain 11/20/2007    Chronic back pain 07/13/2009    Chronic cough 12/18/2017    CKD (chronic kidney disease)     Closed nondisplaced fracture of lateral malleolus of fibula with delayed healing 01/13/2020    Closed nondisplaced fracture of medial cuneiform of left foot 02/19/2018    Constipation 03/24/2015    unspecified    COVID-19 vaccination refused     COVID-19 virus detected 10/17/2022     Diverticulosis     Dyspepsia 2008    Enteropathogenic Escherichia coli infection 2024    Essential hypertension 2007    Exertional shortness of breath 2017    Fall 2018    Family history of abdominal aortic aneurysm (AAA) 2019    [place] aaa screen limited (04/10/2019 10:32)     Gastrointestinal hemorrhage 2024    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), 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    Hearing loss 2008    History of bone density study 2015    History of pneumonia     2017    History of skin cancer     Hypothyroidism, acquired 2007    Insomnia 2015    unspecified    Intervertebral disc disorder with myelopathy, cervical region 2010    Joint capsule tear 2012    unspecified    OA (osteoarthritis) of hip 2018    OA (osteoarthritis) of knee 2024    Other synovitis and tenosynovitis, right ankle and foot 2020    Peroneal tendonitis, right 2020    Rhinitis, allergic 2007    Right knee pain     Scoliosis     Screening breast examination 2007    Stress 2015    other acute reactions to stress    Stress incontinence     Trochanteric bursitis, left hip 2019    Urticaria 2015     Past Surgical History:   Procedure Laterality Date    BREAST EXCISIONAL BIOPSY Right     50+ years ago    BRONCHOSCOPY N/A 2017    Procedure: BRONCHOSCOPY;  Surgeon: Mario Alanis MD;  Location: [facility] ENDOSCOPY;  Service:     CATARACT EXTRACTION, BILATERAL      COLONOSCOPY      ENDOSCOPY N/A 2024    Procedure: ESOPHAGOGASTRODUODENOSCOPY AT BEDSIDE with gold probe and epi injection;  Surgeon: Karli Viveros MD;  Location: [facility] ENDOSCOPY;  Service: Gastroenterology;  Laterality: N/A;  Pre: GI Bleed  Post: multiple duodenal ulcers, hiatal hernia    HYSTERECTOMY      TOTAL HIP ARTHROPLASTY Left 2018    Procedure: LEFT TOTAL HIP ARTHROPLASTY;  Surgeon: Justen RICE  MD Mackenzie;  Location: [facility] MAIN OR;  Service: Orthopedics    TOTAL HIP ARTHROPLASTY Right 12/13/2022    Procedure: Posterior RIGHT TOTAL HIP ARTHROPLASTY MARCELINO NAVIGATION;  Surgeon: Anupam Ruiz MD;  Location:  [facility];  Service: Orthopedics;  Laterality: Right;    TOTAL KNEE ARTHROPLASTY Right 9/18/2024    Procedure: TOTAL KNEE ARTHROPLASTY;  Surgeon: Jesus Thayer MD;  Location:  [facility];  Service: Orthopedics;  Laterality: Right;     Family History   Problem Relation Age of Onset    Heart disease Mother     Aneurysm Mother     Colon cancer Father     Cancer Sister     Aneurysm Brother     Breast cancer Paternal Grandmother     Asthma Paternal Grandfather     Malig Hyperthermia Neg Hx     Ovarian cancer Neg Hx      Social History     Tobacco Use    Smoking status: Never     Passive exposure: Never    Smokeless tobacco: Never   Vaping Use    Vaping status: Never Used   Substance Use Topics    Alcohol use: No    Drug use: Never     (Not in a hospital admission)    Allergies:    Allergies   Allergen Reactions    Hydrocodone Mental Status Change and Hallucinations    Ketek [Telithromycin] Hives    Nsaids Hives    Paxlovid [Nirmatrelvir-Ritonavir] Mental Status Change     insomnia    Sulfa Antibiotics Hives    Latex Rash       Objective    Objective     Vital Signs  Temp:  [98.4 °F (36.9 °C)-99 °F (37.2 °C)] 98.4 °F (36.9 °C)  Heart Rate:  [] 102  Resp:  [14-18] 14  BP: (125-135)/(55-62) 130/59  SpO2:  [93 %-94 %] 93 %  on   ;   Device (Oxygen Therapy): room air  Body mass index is 19.53 kg/m².    Physical Exam  Vitals and nursing note reviewed.   Constitutional:       General: She is in acute distress.      Appearance: She is well-developed. She is ill-appearing.   HENT:      Head: Normocephalic and atraumatic.   Eyes:      General: No scleral icterus.     Pupils: Pupils are equal, round, and reactive to light.   Cardiovascular:      Rate and Rhythm: Regular rhythm. Tachycardia present.       Heart sounds: No murmur heard.  Pulmonary:      Effort: Pulmonary effort is normal.      Breath sounds: Rhonchi (slight) present.   Abdominal:      General: There is no distension.      Palpations: Abdomen is soft.      Tenderness: There is no abdominal tenderness.   Musculoskeletal:      Cervical back: Normal range of motion and neck supple.   Skin:     General: Skin is warm and dry.      Findings: No rash.     Awake but very drowsy    Results Review:  I reviewed the patient's new clinical results.  Discussed with ED provider.    Lab Results (last 24 hours)       Procedure Component Value Units Date/Time    CBC & Differential [[id number]]  (Abnormal) Collected: 05/30/25 1133    Specimen: Blood Updated: 05/30/25 1158    Narrative:      The following orders were created for panel order CBC & Differential.  Procedure                               Abnormality         Status                     ---------                               -----------         ------                     CBC Auto Differential[[id number]]        Abnormal            Final result                 Please view results for these tests on the individual orders.    Comprehensive Metabolic Panel [[id number]]  (Abnormal) Collected: 05/30/25 1133    Specimen: Blood Updated: 05/30/25 1223     Glucose 148 mg/dL      BUN 41.0 mg/dL      Creatinine 1.56 mg/dL      Sodium 134 mmol/L      Potassium 4.2 mmol/L      Chloride 97 mmol/L      CO2 21.4 mmol/L      Calcium 9.4 mg/dL      Total Protein 7.6 g/dL      Albumin 3.9 g/dL      ALT (SGPT) 74 U/L      AST (SGOT) 85 U/L      Alkaline Phosphatase 121 U/L      Total Bilirubin 0.4 mg/dL      Globulin 3.7 gm/dL      A/G Ratio 1.1 g/dL      BUN/Creatinine Ratio 26.3     Anion Gap 15.6 mmol/L      eGFR 32.6 mL/min/1.73     Narrative:      GFR Categories in Chronic Kidney Disease (CKD)              GFR Category          GFR (mL/min/1.73)    Interpretation  G1                    90 or greater        Normal or high (1)  G2   "                  60-89                Mild decrease (1)  G3a                   45-59                Mild to moderate decrease  G3b                   30-44                Moderate to severe decrease  G4                    15-29                Severe decrease  G5                    14 or less           Kidney failure    (1)In the absence of evidence of kidney disease, neither GFR category G1 or G2 fulfill the criteria for CKD.    eGFR calculation 2021 CKD-EPI creatinine equation, which does not include race as a factor    Procalcitonin [[id number]]  (Abnormal) Collected: 05/30/25 1133    Specimen: Blood Updated: 05/30/25 1229     Procalcitonin 1.94 ng/mL     Narrative:      As a Marker for Sepsis (Non-Neonates):    1. <0.5 ng/mL represents a low risk of severe sepsis and/or septic shock.  2. >2 ng/mL represents a high risk of severe sepsis and/or septic shock.    As a Marker for Lower Respiratory Tract Infections that require antibiotic therapy:    PCT on Admission    Antibiotic Therapy       6-12 Hrs later    >0.5                Strongly Recommended  >0.25 - <0.5        Recommended   0.1 - 0.25          Discouraged              Remeasure/reassess PCT  <0.1                Strongly Discouraged     Remeasure/reassess PCT    As 28 day mortality risk marker: \"Change in Procalcitonin Result\" (>80% or <=80%) if Day 0 (or Day 1) and Day 4 values are available. Refer to http://[email or web address]-pct-calculator.com    Change in PCT <=80%  A decrease of PCT levels below or equal to 80% defines a positive change in PCT test result representing a higher risk for 28-day all-cause mortality of patients diagnosed with severe sepsis for septic shock.    Change in PCT >80%  A decrease of PCT levels of more than 80% defines a negative change in PCT result representing a lower risk for 28-day all-cause mortality of patients diagnosed with severe sepsis or septic shock.       Lactic Acid, Plasma [[id number]]  (Normal) Collected: 05/30/25 1133    " Specimen: Blood Updated: 05/30/25 1214     Lactate 1.2 mmol/L     CBC Auto Differential [[id number]]  (Abnormal) Collected: 05/30/25 1133    Specimen: Blood Updated: 05/30/25 1158     WBC 13.98 10*3/mm3      RBC 3.59 10*6/mm3      Hemoglobin 11.3 g/dL      Hematocrit 35.3 %      MCV 98.3 fL      MCH 31.5 pg      MCHC 32.0 g/dL      RDW 13.0 %      RDW-SD 46.0 fl      MPV 10.8 fL      Platelets 205 10*3/mm3      Neutrophil % 88.8 %      Lymphocyte % 3.7 %      Monocyte % 6.7 %      Eosinophil % 0.0 %      Basophil % 0.2 %      Immature Grans % 0.6 %      Neutrophils, Absolute 12.41 10*3/mm3      Lymphocytes, Absolute 0.52 10*3/mm3      Monocytes, Absolute 0.93 10*3/mm3      Eosinophils, Absolute 0.00 10*3/mm3      Basophils, Absolute 0.03 10*3/mm3      Immature Grans, Absolute 0.09 10*3/mm3      nRBC 0.0 /100 WBC     High Sensitivity Troponin T [[id number]]  (Abnormal) Collected: 05/30/25 1133    Specimen: Blood Updated: 05/30/25 1229     HS Troponin T 27 ng/L     Narrative:      High Sensitive Troponin T Reference Range:  <14.0 ng/L- Negative Female for AMI  <22.0 ng/L- Negative Male for AMI  >=14 - Abnormal Female indicating possible myocardial injury.  >=22 - Abnormal Male indicating possible myocardial injury.   Clinicians would have to utilize clinical acumen, EKG, Troponin, and serial changes to determine if it is an Acute Myocardial Infarction or myocardial injury due to an underlying chronic condition.         BNP [[id number]]  (Normal) Collected: 05/30/25 1133    Specimen: Blood Updated: 05/30/25 1229     proBNP 654.0 pg/mL     Narrative:      This assay is used as an aid in the diagnosis of individuals suspected of having heart failure. It can be used as an aid in the diagnosis of acute decompensated heart failure (ADHF) in patients presenting with signs and symptoms of ADHF to the emergency department (ED). In addition, NT-proBNP of <300 pg/mL indicates ADHF is not likely.    Age Range Result Interpretation   NT-proBNP Concentration (pg/mL:      <50             Positive            >450                   Gray                 300-450                    Negative             <300    50-75           Positive            >900                  Gray                300-900                  Negative            <300      >75             Positive            >1800                  Gray                300-1800                  Negative            <300    Respiratory Panel PCR w/COVID-19(SARS-CoV-2) [facility] In-House, NP Swab in UTM/VTM, 2 HR TAT - Swab, Nasopharynx [[id number]]  (Normal) Collected: 05/30/25 1139    Specimen: Swab from Nasopharynx Updated: 05/30/25 1312     ADENOVIRUS, PCR Not Detected     Coronavirus 229E Not Detected     Coronavirus HKU1 Not Detected     Coronavirus NL63 Not Detected     Coronavirus OC43 Not Detected     COVID19 Not Detected     Human Metapneumovirus Not Detected     Human Rhinovirus/Enterovirus Not Detected     Influenza A PCR Not Detected     Influenza B PCR Not Detected     Parainfluenza Virus 1 Not Detected     Parainfluenza Virus 2 Not Detected     Parainfluenza Virus 3 Not Detected     Parainfluenza Virus 4 Not Detected     RSV, PCR Not Detected     Bordetella pertussis pcr Not Detected     Bordetella parapertussis PCR Not Detected     Chlamydophila pneumoniae PCR Not Detected     Mycoplasma pneumo by PCR Not Detected    Narrative:      In the setting of a positive respiratory panel with a viral infection PLUS a negative procalcitonin without other underlying concern for bacterial infection, consider observing off antibiotics or discontinuation of antibiotics and continue supportive care. If the respiratory panel is positive for atypical bacterial infection (Bordetella pertussis, Chlamydophila pneumoniae, or Mycoplasma pneumoniae), consider antibiotic de-escalation to target atypical bacterial infection.    Blood Culture - Blood, Arm, Right [[id number]] Collected: 05/30/25 1247     Specimen: Blood from Arm, Right Updated: 05/30/25 1250    High Sensitivity Troponin T 1Hr [[id number]]  (Abnormal) Collected: 05/30/25 1242    Specimen: Blood from Arm, Right Updated: 05/30/25 1331     HS Troponin T 27 ng/L      Troponin T Numeric Delta 0 ng/L      Troponin T % Delta 0    Narrative:      High Sensitive Troponin T Reference Range:  <14.0 ng/L- Negative Female for AMI  <22.0 ng/L- Negative Male for AMI  >=14 - Abnormal Female indicating possible myocardial injury.  >=22 - Abnormal Male indicating possible myocardial injury.   Clinicians would have to utilize clinical acumen, EKG, Troponin, and serial changes to determine if it is an Acute Myocardial Infarction or myocardial injury due to an underlying chronic condition.         Blood Culture - Blood, Arm, Left [[id number]] Collected: 05/30/25 1251    Specimen: Blood from Arm, Left Updated: 05/30/25 1257            Imaging Results (Last 24 Hours)       Procedure Component Value Units Date/Time    XR Chest 1 View [[id number]] Collected: 05/30/25 1159     Updated: 05/30/25 1206    Narrative:      XR CHEST 1 VW-, XR CHEST PA AND LATERAL-     INDICATION: Weakness     COMPARISON: Chest radiographs dating back to 12/22/2017       Impression:      From 5/29/2025 to 5/30/2025, increasing hazy right upper  lobe opacity, suspicious for pneumonitis/pneumonia. Recommend imaging  follow-up to ensure clearance. No pleural effusion or pneumothorax.  Normal size cardiac silhouette. No focal osseous abnormality.     This report was finalized on 5/30/2025 12:03 PM by Dr. Shaquille Tsai M.D on Workstation: [id number]               Results for orders placed during the hospital encounter of 09/23/24    Adult Transthoracic Echo Complete W/ Cont if Necessary Per Protocol    Interpretation Summary    Left ventricular systolic function is normal. Calculated left ventricular EF = 64.5%    Left ventricular diastolic function was normal.    Estimated right ventricular systolic  pressure from tricuspid regurgitation is moderately elevated (45-55 mmHg). Calculated right ventricular systolic pressure from tricuspid regurgitation is 47 mmHg.    Mild to moderate pulmonary hypertension is present.      No orders to display        Assessment/Plan     Active Hospital Problems    Diagnosis  POA    **Pneumonia [J18.9]  Yes    Sepsis due to pneumonia [J18.9, A41.9]  Yes    Dehydration [E86.0]  Yes    Stage 3 chronic kidney disease [N18.30]  Yes    Essential hypertension [I10]  Yes     Bailee Garza is a 85 y.o. female who presents to the ED c/o acute generalized weakness and cough. She has been dealing with a cough the past few months but more lethargic the past few days. Yesterday at PCP office given IM rocephin and ceftin but continued to feel worse and lethargic and N/V.  Came to [facility] ER. BUN 41/Cr 1.5. Elevated WBC and procal. Given IVFs and ceftriaxone in ER.     Continue IV ABX, IVFs, follow cultures  Monitor labs. LFTs mildly elevated follow up repeat.  PT/OT/ST consultation. R/o dysphagia as cause for recurrent cough  I discussed the patients findings and my recommendations with patient, family, and consulting provider.    VTE Prophylaxis - SCDs.         Anastacio Banuelos MD  [facility]  05/30/25  14:09 EDT

## 2025-05-30 NOTE — PROGRESS NOTES
Clinical Pharmacy Services: Medication History    Bailee Garza is a 85 y.o. female presenting to [facility] for   Chief Complaint   Patient presents with    Weakness - Generalized    Vomiting       She  has a past medical history of Abnormal CXR (12/18/2017), Adverse reaction to narcotic drug, Allergies, Arthritis, Arthritis of foot (04/17/2020), Arthropathy of pelvic region and thigh (12/13/2012), Back pain (11/20/2007), Chronic back pain (07/13/2009), Chronic cough (12/18/2017), CKD (chronic kidney disease), Closed nondisplaced fracture of lateral malleolus of fibula with delayed healing (01/13/2020), Closed nondisplaced fracture of medial cuneiform of left foot (02/19/2018), Constipation (03/24/2015), COVID-19 vaccination refused, COVID-19 virus detected (10/17/2022), Diverticulosis, Dyspepsia (03/18/2008), Enteropathogenic Escherichia coli infection (09/27/2024), Essential hypertension (11/20/2007), Exertional shortness of breath (12/18/2017), Fall (09/17/2018), Family history of abdominal aortic aneurysm (AAA) (03/06/2019), Gastrointestinal hemorrhage (09/23/2024), Grief reaction (07/05/2011), Hearing loss (03/18/2008), History of bone density study (09/16/2015), History of pneumonia, History of skin cancer, Hypothyroidism, acquired (11/20/2007), Insomnia (01/28/2015), Intervertebral disc disorder with myelopathy, cervical region (07/22/2010), Joint capsule tear (12/13/2012), OA (osteoarthritis) of hip (07/24/2018), OA (osteoarthritis) of knee (08/29/2024), Other synovitis and tenosynovitis, right ankle and foot (02/12/2020), Peroneal tendonitis, right (01/13/2020), Rhinitis, allergic (11/20/2007), Right knee pain, Scoliosis, Screening breast examination (11/20/2007), Stress (04/27/2015), Stress incontinence, Trochanteric bursitis, left hip (02/04/2019), and Urticaria (06/29/2015).    Allergies as of 05/30/2025 - Reviewed 05/30/2025   Allergen Reaction Noted    Hydrocodone Mental Status  Change and Hallucinations 07/30/2018    Ketek [telithromycin] Hives 10/13/2015    Nsaids Hives 10/13/2015    Paxlovid [nirmatrelvir-ritonavir] Mental Status Change 08/01/2024    Sulfa antibiotics Hives 12/29/2017    Latex Rash 05/30/2025       Medication information was obtained from: [facility]   Pharmacy and Phone Number:     Prior to Admission Medications       Prescriptions Last Dose Informant Patient Reported? Taking?    acetaminophen (Tylenol) 325 MG tablet  Other No Yes    Take 2 tablets by mouth Every 6 (Six) Hours As Needed for Mild Pain for up to 40 doses.    cefuroxime (CEFTIN) 500 MG tablet 5/30/2025 Other No Yes    Take 1 tablet by mouth 2 (Two) Times a Day.    hydroCHLOROthiazide 12.5 MG tablet  Pharmacy No Yes    Take 1 tablet by mouth Daily. Indications: High Blood Pressure    metroNIDAZOLE (METROCREAM) 0.75 % cream  Pharmacy Yes Yes    Apply 1 Application topically to the appropriate area as directed As Needed.    omeprazole (priLOSEC) 40 MG capsule  Pharmacy Yes Yes    Take 1 capsule by mouth Daily.    Synthroid 75 MCG tablet  Pharmacy No Yes    Take 1 tablet by mouth Every Morning Before Breakfast. Indications: Underactive Thyroid    valsartan (DIOVAN) 80 MG tablet  Pharmacy No Yes    Take 1 tablet by mouth Daily. Indications: High Blood Pressure    cefTRIAXone (ROCEPHIN) injection 500 mg   No No              Medication notes:     This medication list is complete to the best of my knowledge as of 5/30/2025    Please call if questions.    James Sen  Medication History Technician   [phone]    5/30/2025 12:43 EDT

## 2025-05-30 NOTE — ED PROVIDER NOTES
EMERGENCY DEPARTMENT ENCOUNTER      PCP: Eddie Araya MD  Patient Care Team:  Eddie Araya MD as PCP - General  Eddie Araya MD as PCP - Family Medicine  [facility] Center[facility] as Consulting Physician (Cardiology)  Anupam Ruiz MD as Consulting Physician (Orthopedic Surgery)  Avery Ruiz MD as Consulting Physician (Gastroenterology)  Mac Main MD PhD as Consulting Physician (Hematology and Oncology)  Karli Viveros MD as Consulting Physician (Gastroenterology)  Miriam Gilbert PA-C as Physician Assistant (Gastroenterology)   Independent Historians: Patient    HPI:  Chief Complaint: Generalized weakness, vomiting   A complete HPI/ROS/PMH/PSH/SH/FH are unobtainable due to: None    Chronic or social conditions impacting patient care (social determinants of health): None    Context: Bailee Garza is a 85 y.o. female who presents to the ED c/o acute generalized weakness, vomiting x 3 days. Pt was seen yesterday at outpatient facility and diagnosed with pneumonia. She was treated with IM Rocephin and started on PO antibiotics. Patient has taken single dose of Ceftin but daughter states patient continues to be lethargic, nauseated.     Review of prior external notes and/or external test results outside of this encounter: Reviewed office visit with TERESA Castillo, yesterday 5/29/25. XR suggested developing opacity right costophrenic angle, lingular fluid collection. Treated with IM rocephin and Ceftin due to Keflex allergy.      PAST MEDICAL HISTORY  Active Ambulatory Problems     Diagnosis Date Noted    Chronic midline low back pain without sciatica 07/13/2009    Essential hypertension 11/20/2007    Hearing loss 03/18/2008    Hypothyroidism, acquired 11/20/2007    Insomnia due to medical condition 01/28/2015    IBS (irritable bowel syndrome) 04/25/2013    Chronic nonseasonal allergic rhinitis due to pollen 11/20/2007    Stage 3 chronic kidney disease 12/19/2016    Arthralgia of right  knee 10/11/2017    DDD (degenerative disc disease), lumbosacral 02/07/2018    Scoliosis due to degenerative disease of spine in adult patient 05/30/2018    Status post total replacement of left hip 08/07/2018    Status post total hip replacement, left 02/04/2019    Vitamin D deficiency 03/04/2020    Anemia of unknown etiology 07/29/2022    S/P hip replacement, right 01/11/2023    Syncope 09/23/2024    Status post right knee replacement 09/23/2024    Acute posthemorrhagic anemia 09/27/2024    Helicobacter pylori gastritis 10/16/2024     Resolved Ambulatory Problems     Diagnosis Date Noted    Joint capsule tear 12/13/2012    Back pain 11/20/2007    Constipation 03/24/2015    Depression 01/28/2015    Dyspepsia 03/18/2008    Grief reaction 07/05/2011    Intervertebral disc disorder with myelopathy, cervical region 07/22/2010    Stress 04/27/2015    Screening breast examination 11/20/2007    Urticaria 06/29/2015    Chronic cough 12/18/2017    Abnormal weight loss 12/18/2017    Abnormal CXR 12/18/2017    Exertional shortness of breath 12/18/2017    Pneumonia of both lower lobes due to infectious organism 12/19/2017    HCAP (healthcare-associated pneumonia) 12/29/2017    Pain of left midfoot 01/06/2018    Swelling of foot joint, left 01/06/2018    Foot injury, left, initial encounter 01/06/2018    Arthritis of hip, left 02/05/2018    Primary osteoarthritis of left hip 02/19/2018    Closed nondisplaced fracture of medial cuneiform of left foot 02/19/2018    Left lumbar radiculopathy 05/30/2018    Numbness of left anterior thigh 05/30/2018    Degenerative disc disease, lumbar 05/30/2018    OA (osteoarthritis) of hip 07/24/2018    Fall 09/17/2018    Trochanteric bursitis, left hip 02/04/2019    Hip pain, left 02/04/2019    Family history of abdominal aortic aneurysm (AAA) 03/06/2019    Peroneal tendonitis, right 01/13/2020    Closed nondisplaced fracture of lateral malleolus of fibula with delayed healing 01/13/2020    Other  synovitis and tenosynovitis, right ankle and foot 02/12/2020    Right ankle pain 03/04/2020    Primary localized osteoarthrosis of right lower leg 04/17/2020    Arthritis of foot 04/17/2020    Hip strain, right, initial encounter 05/06/2020    Elevated BUN 05/06/2021    Elevated serum creatinine 05/06/2021    Elevated hemoglobin 05/06/2021    Arthritis of right hip 05/04/2022    COVID-19 virus detected 10/17/2022    OA (osteoarthritis) of knee 08/29/2024    Nausea & vomiting 09/23/2024    Gastrointestinal hemorrhage 09/23/2024    Epilepsy 09/27/2024    Enteropathogenic Escherichia coli infection 09/27/2024     Past Medical History:   Diagnosis Date    Adverse reaction to narcotic drug     Allergies     Arthritis     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    Chronic back pain 07/13/2009    CKD (chronic kidney disease)     COVID-19 vaccination refused     Diverticulosis     History of bone density study 09/16/2015    History of pneumonia     History of skin cancer     Insomnia 01/28/2015    Rhinitis, allergic 11/20/2007    Right knee pain     Scoliosis     Stress incontinence            PAST SURGICAL HISTORY  Past Surgical History:   Procedure Laterality Date    BREAST EXCISIONAL BIOPSY Right     50+ years ago    BRONCHOSCOPY N/A 12/21/2017    Procedure: BRONCHOSCOPY;  Surgeon: Mario Alanis MD;  Location: [facility] ENDOSCOPY;  Service:     CATARACT EXTRACTION, BILATERAL      COLONOSCOPY      ENDOSCOPY N/A 9/24/2024    Procedure: ESOPHAGOGASTRODUODENOSCOPY AT BEDSIDE with gold probe and epi injection;  Surgeon: Karli Viveros MD;  Location: [facility] ENDOSCOPY;  Service: Gastroenterology;  Laterality: N/A;  Pre: GI Bleed  Post: multiple duodenal ulcers, hiatal hernia    HYSTERECTOMY      TOTAL HIP ARTHROPLASTY Left 07/24/2018    Procedure: LEFT TOTAL HIP ARTHROPLASTY;  Surgeon: Justen Mann MD;  Location: [facility] MAIN OR;  Service: Orthopedics    TOTAL HIP ARTHROPLASTY Right 12/13/2022    Procedure:  Posterior RIGHT TOTAL HIP ARTHROPLASTY MARCELINO NAVIGATION;  Surgeon: Anupam Ruiz MD;  Location: [facility] OR [facility];  Service: Orthopedics;  Laterality: Right;    TOTAL KNEE ARTHROPLASTY Right 9/18/2024    Procedure: TOTAL KNEE ARTHROPLASTY;  Surgeon: Jesus Thayer MD;  Location:  [facility] OR [facility];  Service: Orthopedics;  Laterality: Right;         FAMILY HISTORY  Family History   Problem Relation Age of Onset    Heart disease Mother     Aneurysm Mother     Colon cancer Father     Cancer Sister     Aneurysm Brother     Breast cancer Paternal Grandmother     Asthma Paternal Grandfather     Malig Hyperthermia Neg Hx     Ovarian cancer Neg Hx          SOCIAL HISTORY  Social History     Socioeconomic History    Marital status:     Number of children: 3   Tobacco Use    Smoking status: Never     Passive exposure: Never    Smokeless tobacco: Never   Vaping Use    Vaping status: Never Used   Substance and Sexual Activity    Alcohol use: No    Drug use: Never    Sexual activity: Defer         ALLERGIES  Hydrocodone, Ketek [telithromycin], Nsaids, Paxlovid [nirmatrelvir-ritonavir], Sulfa antibiotics, and Latex        REVIEW OF SYSTEMS  Review of Systems   Constitutional:  Positive for chills and fatigue.   Respiratory:  Positive for cough.    Cardiovascular:  Negative for chest pain.   Gastrointestinal:  Positive for nausea and vomiting.   Neurological:  Positive for weakness.        All systems reviewed and negative except for those discussed in HPI.       PHYSICAL EXAM    I have reviewed the triage vital signs and nursing notes.    ED Triage Vitals [05/30/25 1025]   Temp Heart Rate Resp BP SpO2   99 °F (37.2 °C) 98 18 125/55 94 %      Temp src Heart Rate Source Patient Position BP Location FiO2 (%)   -- -- -- -- --       Physical Exam  GENERAL: alert, no acute distress  SKIN: Warm, dry  HENT: Normocephalic, atraumatic  EYES: no scleral icterus  CV: regular rhythm, regular rate  RESPIRATORY: normal effort, lungs  clear  ABDOMEN: soft, nontender, nondistended  MUSCULOSKELETAL: no deformity  NEURO: alert, moves all extremities, follows commands          LAB RESULTS  Labs Reviewed   LEGIONELLA ANTIGEN, URINE - Abnormal; Notable for the following components:       Result Value    LEGIONELLA ANTIGEN, URINE Positive (*)     All other components within normal limits   COMPREHENSIVE METABOLIC PANEL - Abnormal; Notable for the following components:    Glucose 148 (*)     BUN 41.0 (*)     Creatinine 1.56 (*)     Sodium 134 (*)     Chloride 97 (*)     CO2 21.4 (*)     ALT (SGPT) 74 (*)     AST (SGOT) 85 (*)     Alkaline Phosphatase 121 (*)     BUN/Creatinine Ratio 26.3 (*)     Anion Gap 15.6 (*)     eGFR 32.6 (*)     All other components within normal limits    Narrative:     GFR Categories in Chronic Kidney Disease (CKD)              GFR Category          GFR (mL/min/1.73)    Interpretation  G1                    90 or greater        Normal or high (1)  G2                    60-89                Mild decrease (1)  G3a                   45-59                Mild to moderate decrease  G3b                   30-44                Moderate to severe decrease  G4                    15-29                Severe decrease  G5                    14 or less           Kidney failure    (1)In the absence of evidence of kidney disease, neither GFR category G1 or G2 fulfill the criteria for CKD.    eGFR calculation 2021 CKD-EPI creatinine equation, which does not include race as a factor   PROCALCITONIN - Abnormal; Notable for the following components:    Procalcitonin 1.94 (*)     All other components within normal limits    Narrative:     As a Marker for Sepsis (Non-Neonates):    1. <0.5 ng/mL represents a low risk of severe sepsis and/or septic shock.  2. >2 ng/mL represents a high risk of severe sepsis and/or septic shock.    As a Marker for Lower Respiratory Tract Infections that require antibiotic therapy:    PCT on Admission    Antibiotic  "Therapy       6-12 Hrs later    >0.5                Strongly Recommended  >0.25 - <0.5        Recommended   0.1 - 0.25          Discouraged              Remeasure/reassess PCT  <0.1                Strongly Discouraged     Remeasure/reassess PCT    As 28 day mortality risk marker: \"Change in Procalcitonin Result\" (>80% or <=80%) if Day 0 (or Day 1) and Day 4 values are available. Refer to http://[email or web address]-pct-[email or web address]    Change in PCT <=80%  A decrease of PCT levels below or equal to 80% defines a positive change in PCT test result representing a higher risk for 28-day all-cause mortality of patients diagnosed with severe sepsis for septic shock.    Change in PCT >80%  A decrease of PCT levels of more than 80% defines a negative change in PCT result representing a lower risk for 28-day all-cause mortality of patients diagnosed with severe sepsis or septic shock.      URINALYSIS W/ MICROSCOPIC IF INDICATED (NO CULTURE) - Abnormal; Notable for the following components:    Color, UA Dark Yellow (*)     Appearance, UA Turbid (*)     Ketones, UA 15 mg/dL (1+) (*)     Blood, UA Large (3+) (*)     Protein,  mg/dL (2+) (*)     Leuk Esterase, UA Small (1+) (*)     All other components within normal limits   CBC WITH AUTO DIFFERENTIAL - Abnormal; Notable for the following components:    WBC 13.98 (*)     RBC 3.59 (*)     Hemoglobin 11.3 (*)     MCV 98.3 (*)     Neutrophil % 88.8 (*)     Lymphocyte % 3.7 (*)     Eosinophil % 0.0 (*)     Immature Grans % 0.6 (*)     Neutrophils, Absolute 12.41 (*)     Lymphocytes, Absolute 0.52 (*)     Monocytes, Absolute 0.93 (*)     Immature Grans, Absolute 0.09 (*)     All other components within normal limits   TROPONIN - Abnormal; Notable for the following components:    HS Troponin T 27 (*)     All other components within normal limits    Narrative:     High Sensitive Troponin T Reference Range:  <14.0 ng/L- Negative Female for AMI  <22.0 ng/L- Negative Male for AMI  >=14 - " Abnormal Female indicating possible myocardial injury.  >=22 - Abnormal Male indicating possible myocardial injury.   Clinicians would have to utilize clinical acumen, EKG, Troponin, and serial changes to determine if it is an Acute Myocardial Infarction or myocardial injury due to an underlying chronic condition.        HIGH SENSITIVITIY TROPONIN T 1HR - Abnormal; Notable for the following components:    HS Troponin T 27 (*)     All other components within normal limits    Narrative:     High Sensitive Troponin T Reference Range:  <14.0 ng/L- Negative Female for AMI  <22.0 ng/L- Negative Male for AMI  >=14 - Abnormal Female indicating possible myocardial injury.  >=22 - Abnormal Male indicating possible myocardial injury.   Clinicians would have to utilize clinical acumen, EKG, Troponin, and serial changes to determine if it is an Acute Myocardial Infarction or myocardial injury due to an underlying chronic condition.        BASIC METABOLIC PANEL - Abnormal; Notable for the following components:    BUN 31.0 (*)     Creatinine 1.17 (*)     Sodium 135 (*)     CO2 19.0 (*)     BUN/Creatinine Ratio 26.5 (*)     Anion Gap 16.0 (*)     eGFR 46.1 (*)     All other components within normal limits    Narrative:     GFR Categories in Chronic Kidney Disease (CKD)              GFR Category          GFR (mL/min/1.73)    Interpretation  G1                    90 or greater        Normal or high (1)  G2                    60-89                Mild decrease (1)  G3a                   45-59                Mild to moderate decrease  G3b                   30-44                Moderate to severe decrease  G4                    15-29                Severe decrease  G5                    14 or less           Kidney failure    (1)In the absence of evidence of kidney disease, neither GFR category G1 or G2 fulfill the criteria for CKD.    eGFR calculation 2021 CKD-EPI creatinine equation, which does not include race as a factor   CBC WITH  AUTO DIFFERENTIAL - Abnormal; Notable for the following components:    WBC 11.87 (*)     RBC 3.33 (*)     Hemoglobin 10.7 (*)     Hematocrit 32.0 (*)     Neutrophil % 88.6 (*)     Lymphocyte % 4.1 (*)     Eosinophil % 0.0 (*)     Neutrophils, Absolute 10.52 (*)     Lymphocytes, Absolute 0.49 (*)     Immature Grans, Absolute 0.06 (*)     All other components within normal limits   HEPATIC FUNCTION PANEL - Abnormal; Notable for the following components:    Albumin 3.4 (*)     ALT (SGPT) 48 (*)     AST (SGOT) 52 (*)     All other components within normal limits   URINALYSIS, MICROSCOPIC ONLY - Abnormal; Notable for the following components:    RBC, UA Too Numerous to Count (*)     Bacteria, UA 4+ (*)     All other components within normal limits   RESPIRATORY PANEL PCR W/ COVID-19 (SARS-COV-2), NP SWAB IN UTM/VTP, 2 HR TAT - Normal    Narrative:     In the setting of a positive respiratory panel with a viral infection PLUS a negative procalcitonin without other underlying concern for bacterial infection, consider observing off antibiotics or discontinuation of antibiotics and continue supportive care. If the respiratory panel is positive for atypical bacterial infection (Bordetella pertussis, Chlamydophila pneumoniae, or Mycoplasma pneumoniae), consider antibiotic de-escalation to target atypical bacterial infection.   BLOOD CULTURE - Normal   BLOOD CULTURE - Normal   STREP PNEUMO AG, URINE OR CSF - Normal   MRSA SCREEN, PCR - Normal    Narrative:     The negative predictive value of this diagnostic test is high and should only be used to consider de-escalating anti-MRSA therapy. A positive result may indicate colonization with MRSA and must be correlated clinically.   LACTIC ACID, PLASMA - Normal   BNP (IN-HOUSE) - Normal    Narrative:     This assay is used as an aid in the diagnosis of individuals suspected of having heart failure. It can be used as an aid in the diagnosis of acute decompensated heart failure (ADHF)  in patients presenting with signs and symptoms of ADHF to the emergency department (ED). In addition, NT-proBNP of <300 pg/mL indicates ADHF is not likely.    Age Range Result Interpretation  NT-proBNP Concentration (pg/mL:      <50             Positive            >450                   Gray                 300-450                    Negative             <300    50-75           Positive            >900                  Gray                300-900                  Negative            <300      >75             Positive            >1800                  Gray                300-1800                  Negative            <300   RESPIRATORY CULTURE   RAINBOW DRAW    Narrative:     The following orders were created for panel order [place] Draw.  Procedure                               Abnormality         Status                     ---------                               -----------         ------                     Light Blue Top[[id number]]                                   Final result                 Please view results for these tests on the individual orders.   CBC (NO DIFF)   COMPREHENSIVE METABOLIC PANEL   CBC AND DIFFERENTIAL    Narrative:     The following orders were created for panel order CBC & Differential.  Procedure                               Abnormality         Status                     ---------                               -----------         ------                     CBC Auto Differential[[id number]]        Abnormal            Final result                 Please view results for these tests on the individual orders.   LIGHT BLUE TOP       Ordered the above labs and independently reviewed and interpreted the results.        RADIOLOGY  XR Chest 1 View   Final Result   From 5/29/2025 to 5/30/2025, increasing hazy right upper   lobe opacity, suspicious for pneumonitis/pneumonia. Recommend imaging   follow-up to ensure clearance. No pleural effusion or pneumothorax.   Normal size cardiac silhouette. No  focal osseous abnormality.       This report was finalized on 5/30/2025 12:03 PM by Dr. Shaquille Tsai M.D on Workstation: [id number]              I ordered the above noted radiological studies. Independently reviewed and interpreted by me.  See dictation for official radiology interpretation.      PROCEDURES    Procedures      MEDICATIONS GIVEN IN ER    Medications   sodium chloride 0.9 % flush 10 mL (has no administration in time range)   nitroglycerin (NITROSTAT) SL tablet 0.4 mg (has no administration in time range)   sodium chloride 0.9 % flush 10 mL (10 mL Intravenous Given 5/31/25 2005)   sodium chloride 0.9 % flush 10 mL (has no administration in time range)   sodium chloride 0.9 % infusion 40 mL (has no administration in time range)   sodium chloride 0.9 % infusion (100 mL/hr Intravenous New Bag 5/31/25 1628)   sennosides-docusate (PERICOLACE) 8.6-50 MG per tablet 2 tablet (has no administration in time range)     And   polyethylene glycol (MIRALAX) packet 17 g (has no administration in time range)     And   bisacodyl (DULCOLAX) EC tablet 5 mg (has no administration in time range)     And   bisacodyl (DULCOLAX) suppository 10 mg (has no administration in time range)   Potassium Replacement - Follow Nurse / BPA Driven Protocol (has no administration in time range)   Magnesium Standard Dose Replacement - Follow Nurse / BPA Driven Protocol (has no administration in time range)   Phosphorus Replacement - Follow Nurse / BPA Driven Protocol (has no administration in time range)   Calcium Replacement - Follow Nurse / BPA Driven Protocol (has no administration in time range)   ondansetron ODT (ZOFRAN-ODT) disintegrating tablet 4 mg (has no administration in time range)     Or   ondansetron (ZOFRAN) injection 4 mg (has no administration in time range)   acetaminophen (TYLENOL) tablet 650 mg (650 mg Oral Given 5/31/25 2213)     Or   acetaminophen (TYLENOL) 160 MG/5ML oral solution 650 mg ( Oral Not Given:  See Alt  5/31/25 2213)     Or   acetaminophen (TYLENOL) suppository 650 mg ( Rectal Not Given:  See Alt 5/31/25 2213)   pantoprazole (PROTONIX) EC tablet 40 mg (40 mg Oral Given 5/31/25 0655)   levothyroxine (SYNTHROID, LEVOTHROID) tablet 75 mcg (75 mcg Oral Given 5/31/25 0655)   enoxaparin sodium (LOVENOX) syringe 30 mg (30 mg Subcutaneous Given 5/31/25 1628)   levoFLOXacin (LEVAQUIN) 750 mg/150 mL D5W (premix) (LEVAQUIN) 750 mg (750 mg Intravenous New Bag 5/31/25 1628)   cefTRIAXone (ROCEPHIN) 2,000 mg in sodium chloride 0.9 % 100 mL MBP (0 mg Intravenous Stopped 5/30/25 1356)   lactated ringers bolus 500 mL (0 mL Intravenous Stopped 5/30/25 1356)   sepsis fluid LR bolus 1,000 mL (1,000 mL Intravenous New Bag 5/30/25 1439)         PROGRESS, DATA ANALYSIS, CONSULTS, AND MEDICAL DECISION MAKING    All labs have been independently reviewed and interpreted by me.  All radiology studies have been independently reviewed and interpreted by me and discussed with radiologist dictating the report.   EKG's independently reviewed and interpreted by me.  Discussion below represents my analysis of pertinent findings related to patient's condition, differential diagnosis, treatment plan and final disposition.    My differential diagnosis for generalized weakness includes but is not limited to:    Neuromuscular weakness, CVA, Hemorrhagic stroke, Multiple sclerosis, Spinal cord disease:Infection (Epidural abscess), Infarction/ischemia, Trauma (Spinal Cord Syndromes), Inflammation (Transverse Myelitis), Degenerative (Spinal muscular atrophy), Tumor, Peripheral nerve disease: Guillain-Greer syndrome, Toxins (Ciguatera), Diabetic peripheral neuropathy, Organophosphate toxicity, Lambert-Eaton myasthenic syndrome, Rhabdomyolysis, Dermatomyositis, Polymyositis, Alcoholic myopathy, Non-neuromuscular weakness, ACS, Arrhythmia/Syncope, Severe infection/Sepsis, Hypoglycemia, Periodic paralysis (electrolyte disturbance, K, Mg, Ca), Thyrotoxic  periodic paralysis, Respiratory failure, Symptomatic Anemia, Severe dehydration, Hypothyroidism, Polypharmacy, Malignancy          ED Course as of 06/01/25 0028   Fri May 30, 2025   1249 12:49 EDT  Patient presents with increasing weakness.  Patient has worsening pneumonia right upper lobe.  White blood cell count elevated.  Patient also has evidence of MIKE.  Has been given Rocephin here.  Has been given fluids here.  Lactic acid normal.  Has been discussed with Dr. Banuelos who will admit. [SL]      ED Course User Index  [SL] Molina Rawls MD             AS OF 00:28 EDT VITALS:    BP - 108/52  HR - 82  TEMP - 98.4 °F (36.9 °C) (Oral)  O2 SATS - 97%        DIAGNOSIS  Final diagnoses:   Pneumonia of right upper lobe due to infectious organism   MIKE (acute kidney injury)         DISPOSITION  ED Disposition       ED Disposition   Decision to Admit    Condition   --    Comment   Level of Care: Telemetry [5]   Diagnosis: Pneumonia [744722]   Admitting Physician: ALEXAI BANUELOS [[id number]]   Attending Physician: ALEXIA BANUELOS [[id number]]   Is patient appropriate for Inpatient Observation Unit?: No [0]                    Note Disclaimer: At [facility], we believe that sharing information builds trust and better relationships. You are receiving this note because you recently visited [facility]. It is possible you will see health information before a provider has talked with you about it. This kind of information can be easy to misunderstand. To help you fully understand what it means for your health, we urge you to discuss this note with your provider.         Izzy Dumont PA  06/01/25 0028

## 2025-05-30 NOTE — TELEPHONE ENCOUNTER
PATIENT'S DAUGHTER CALLED AND STATES PATIENT WAS SEEN YESTERDAY BY ERICA NICHOLAS. EDITH IS TAKING HER TO [facility] TODAY, SHE IS STILL WEAK, NOT EATING, LIKE IN A DAZE, FEVER .0  HER COLOR IS BETTER THAN YESTERDAY    CALL BACK NUMBER [phone]    EMIGDIO  
Thank you for the update.  I had instructed her to taker her to ER if she is worse.   
136

## 2025-05-30 NOTE — ED PROVIDER NOTES
MD ATTESTATION NOTE    The ESTEFANI and I have discussed this patient's history, physical exam, and treatment plan.  I have reviewed the documentation and personally had a face to face interaction with the patient. I affirm the documentation and agree with the treatment and plan.  The attached note describes my personal findings.        SHARED APC FACE TO FACE: I performed a substantive part of the MDM during the patient's E/M visit. I personally evaluated and examined the patient. I personally made or approved the documented management plan and acknowledge its risk of complications.      Brief HPI: Patient presents for generalized weakness and vomiting.  Patient has been treated for pneumonia in the past.  Was diagnosed with pneumonia yesterday.  Was given dose of IM Rocephin.  Patient has been nauseated and continues to have poor appetite.  Has had low-grade fever.    PHYSICAL EXAM  ED Triage Vitals   Temp Heart Rate Resp BP SpO2   05/30/25 1025 05/30/25 1025 05/30/25 1025 05/30/25 1025 05/30/25 1025   99 °F (37.2 °C) 98 18 125/55 94 %      Temp src Heart Rate Source Patient Position BP Location FiO2 (%)   -- 05/30/25 1224 -- 05/30/25 1224 --    Monitor  Left arm          GENERAL: no acute distress  HENT: nares patent  EYES: no scleral icterus  CV: regular rhythm, normal rate  RESPIRATORY: normal effort, lungs clear  ABDOMEN: soft, nondistended nontender  MUSCULOSKELETAL: no deformity  NEURO: alert, moves all extremities, follows commands  PSYCH:  calm, cooperative  SKIN: warm, dry    Vital signs and nursing notes reviewed.    Chest x-ray dependently interpreted by me and does show pneumonia.    ED Course as of 05/30/25 1253   Fri May 30, 2025   1249 12:49 EDT  Patient presents with increasing weakness.  Patient has worsening pneumonia right upper lobe.  White blood cell count elevated.  Patient also has evidence of MIKE.  Has been given Rocephin here.  Has been given fluids here.  Lactic acid normal.  Has been  discussed with Dr. Banuelos who will admit. [SL]      ED Course User Index  [SL] Molina Rawls MD      Diagnosis Plan   1. Pneumonia of right upper lobe due to infectious organism        2. MIKE (acute kidney injury)             Plan: Admit       Molina Rawls MD  05/30/25 7202

## 2025-05-31 PROBLEM — A48.1 LEGIONELLA PNEUMONIA: Status: ACTIVE | Noted: 2025-05-30

## 2025-05-31 LAB
ALBUMIN SERPL-MCNC: 3.4 G/DL (ref 3.5–5.2)
ALP SERPL-CCNC: 106 U/L (ref 39–117)
ALT SERPL W P-5'-P-CCNC: 48 U/L (ref 1–33)
ANION GAP SERPL CALCULATED.3IONS-SCNC: 16 MMOL/L (ref 5–15)
AST SERPL-CCNC: 52 U/L (ref 1–32)
BACTERIA UR QL AUTO: ABNORMAL /HPF
BASOPHILS # BLD AUTO: 0.01 10*3/MM3 (ref 0–0.2)
BASOPHILS NFR BLD AUTO: 0.1 % (ref 0–1.5)
BILIRUB CONJ SERPL-MCNC: 0.1 MG/DL (ref 0–0.3)
BILIRUB INDIRECT SERPL-MCNC: 0.2 MG/DL
BILIRUB SERPL-MCNC: 0.3 MG/DL (ref 0–1.2)
BILIRUB UR QL STRIP: NEGATIVE
BUN SERPL-MCNC: 31 MG/DL (ref 8–23)
BUN/CREAT SERPL: 26.5 (ref 7–25)
CALCIUM SPEC-SCNC: 8.7 MG/DL (ref 8.6–10.5)
CHLORIDE SERPL-SCNC: 100 MMOL/L (ref 98–107)
CLARITY UR: ABNORMAL
CO2 SERPL-SCNC: 19 MMOL/L (ref 22–29)
COLOR UR: ABNORMAL
CREAT SERPL-MCNC: 1.17 MG/DL (ref 0.57–1)
DEPRECATED RDW RBC AUTO: 42.6 FL (ref 37–54)
EGFRCR SERPLBLD CKD-EPI 2021: 46.1 ML/MIN/1.73
EOSINOPHIL # BLD AUTO: 0 10*3/MM3 (ref 0–0.4)
EOSINOPHIL NFR BLD AUTO: 0 % (ref 0.3–6.2)
ERYTHROCYTE [DISTWIDTH] IN BLOOD BY AUTOMATED COUNT: 12.4 % (ref 12.3–15.4)
GLUCOSE SERPL-MCNC: 99 MG/DL (ref 65–99)
GLUCOSE UR STRIP-MCNC: NEGATIVE MG/DL
HCT VFR BLD AUTO: 32 % (ref 34–46.6)
HGB BLD-MCNC: 10.7 G/DL (ref 12–15.9)
HGB UR QL STRIP.AUTO: ABNORMAL
HYALINE CASTS UR QL AUTO: ABNORMAL /LPF
IMM GRANULOCYTES # BLD AUTO: 0.06 10*3/MM3 (ref 0–0.05)
IMM GRANULOCYTES NFR BLD AUTO: 0.5 % (ref 0–0.5)
KETONES UR QL STRIP: ABNORMAL
L PNEUMO1 AG UR QL IA: POSITIVE
LEUKOCYTE ESTERASE UR QL STRIP.AUTO: ABNORMAL
LYMPHOCYTES # BLD AUTO: 0.49 10*3/MM3 (ref 0.7–3.1)
LYMPHOCYTES NFR BLD AUTO: 4.1 % (ref 19.6–45.3)
MCH RBC QN AUTO: 32.1 PG (ref 26.6–33)
MCHC RBC AUTO-ENTMCNC: 33.4 G/DL (ref 31.5–35.7)
MCV RBC AUTO: 96.1 FL (ref 79–97)
MONOCYTES # BLD AUTO: 0.79 10*3/MM3 (ref 0.1–0.9)
MONOCYTES NFR BLD AUTO: 6.7 % (ref 5–12)
MRSA DNA SPEC QL NAA+PROBE: NORMAL
NEUTROPHILS NFR BLD AUTO: 10.52 10*3/MM3 (ref 1.7–7)
NEUTROPHILS NFR BLD AUTO: 88.6 % (ref 42.7–76)
NITRITE UR QL STRIP: NEGATIVE
NRBC BLD AUTO-RTO: 0 /100 WBC (ref 0–0.2)
PH UR STRIP.AUTO: 5.5 [PH] (ref 5–8)
PLATELET # BLD AUTO: 188 10*3/MM3 (ref 140–450)
PMV BLD AUTO: 11.3 FL (ref 6–12)
POTASSIUM SERPL-SCNC: 4.3 MMOL/L (ref 3.5–5.2)
PROT SERPL-MCNC: 6.7 G/DL (ref 6–8.5)
PROT UR QL STRIP: ABNORMAL
RBC # BLD AUTO: 3.33 10*6/MM3 (ref 3.77–5.28)
RBC # UR STRIP: ABNORMAL /HPF
REF LAB TEST METHOD: ABNORMAL
S PNEUM AG SPEC QL LA: NEGATIVE
SODIUM SERPL-SCNC: 135 MMOL/L (ref 136–145)
SP GR UR STRIP: 1.02 (ref 1–1.03)
SQUAMOUS #/AREA URNS HPF: ABNORMAL /HPF
UROBILINOGEN UR QL STRIP: ABNORMAL
WBC # UR STRIP: ABNORMAL /HPF
WBC NRBC COR # BLD AUTO: 11.87 10*3/MM3 (ref 3.4–10.8)

## 2025-05-31 PROCEDURE — 97165 OT EVAL LOW COMPLEX 30 MIN: CPT

## 2025-05-31 PROCEDURE — 25010000002 DOXYCYCLINE 100 MG RECONSTITUTED SOLUTION 1 EACH VIAL: Performed by: INTERNAL MEDICINE

## 2025-05-31 PROCEDURE — 97530 THERAPEUTIC ACTIVITIES: CPT

## 2025-05-31 PROCEDURE — 36415 COLL VENOUS BLD VENIPUNCTURE: CPT | Performed by: INTERNAL MEDICINE

## 2025-05-31 PROCEDURE — 81001 URINALYSIS AUTO W/SCOPE: CPT | Performed by: PHYSICIAN ASSISTANT

## 2025-05-31 PROCEDURE — 80076 HEPATIC FUNCTION PANEL: CPT | Performed by: INTERNAL MEDICINE

## 2025-05-31 PROCEDURE — 25010000002 LEVOFLOXACIN PER 250 MG: Performed by: INTERNAL MEDICINE

## 2025-05-31 PROCEDURE — 25010000002 ENOXAPARIN PER 10 MG: Performed by: INTERNAL MEDICINE

## 2025-05-31 PROCEDURE — 92610 EVALUATE SWALLOWING FUNCTION: CPT

## 2025-05-31 PROCEDURE — 85025 COMPLETE CBC W/AUTO DIFF WBC: CPT | Performed by: INTERNAL MEDICINE

## 2025-05-31 PROCEDURE — 25010000002 CEFTRIAXONE PER 250 MG: Performed by: INTERNAL MEDICINE

## 2025-05-31 PROCEDURE — 25810000003 SODIUM CHLORIDE 0.9 % SOLUTION: Performed by: INTERNAL MEDICINE

## 2025-05-31 PROCEDURE — 97162 PT EVAL MOD COMPLEX 30 MIN: CPT

## 2025-05-31 PROCEDURE — 80048 BASIC METABOLIC PNL TOTAL CA: CPT | Performed by: INTERNAL MEDICINE

## 2025-05-31 PROCEDURE — 87899 AGENT NOS ASSAY W/OPTIC: CPT | Performed by: INTERNAL MEDICINE

## 2025-05-31 PROCEDURE — 87449 NOS EACH ORGANISM AG IA: CPT | Performed by: INTERNAL MEDICINE

## 2025-05-31 RX ORDER — ENOXAPARIN SODIUM 100 MG/ML
30 INJECTION SUBCUTANEOUS EVERY 24 HOURS
Status: DISCONTINUED | OUTPATIENT
Start: 2025-05-31 | End: 2025-06-02 | Stop reason: HOSPADM

## 2025-05-31 RX ORDER — LEVOFLOXACIN 5 MG/ML
750 INJECTION, SOLUTION INTRAVENOUS
Status: DISCONTINUED | OUTPATIENT
Start: 2025-05-31 | End: 2025-06-02 | Stop reason: HOSPADM

## 2025-05-31 RX ADMIN — ACETAMINOPHEN 650 MG: 325 TABLET ORAL at 22:13

## 2025-05-31 RX ADMIN — CEFTRIAXONE SODIUM 1 G: 1 INJECTION, POWDER, FOR SOLUTION INTRAMUSCULAR; INTRAVENOUS at 12:57

## 2025-05-31 RX ADMIN — LEVOFLOXACIN 750 MG: 5 INJECTION, SOLUTION INTRAVENOUS at 16:28

## 2025-05-31 RX ADMIN — Medication 10 ML: at 20:05

## 2025-05-31 RX ADMIN — DOXYCYCLINE 100 MG: 100 INJECTION, POWDER, LYOPHILIZED, FOR SOLUTION INTRAVENOUS at 03:03

## 2025-05-31 RX ADMIN — PANTOPRAZOLE SODIUM 40 MG: 40 TABLET, DELAYED RELEASE ORAL at 06:55

## 2025-05-31 RX ADMIN — SODIUM CHLORIDE 100 ML/HR: 9 INJECTION, SOLUTION INTRAVENOUS at 16:28

## 2025-05-31 RX ADMIN — LEVOTHYROXINE SODIUM 75 MCG: 0.07 TABLET ORAL at 06:55

## 2025-05-31 RX ADMIN — Medication 10 ML: at 08:58

## 2025-05-31 RX ADMIN — ENOXAPARIN SODIUM 30 MG: 100 INJECTION SUBCUTANEOUS at 16:28

## 2025-05-31 NOTE — PLAN OF CARE
Goal Outcome Evaluation:  Plan of Care Reviewed With: patient           Outcome Evaluation: Pt. is an 85 year old Female admitted to the hospital  with PNA and generalized weakness.  Pt. reports that prior to admission she was independent with functional mobility and used no A.D. for ambulation  Pt. currently presents with decreased strength, decreased balance, and decreased tolerance to functional activity. This PM, pt. able to ambulate 30 feet, CGA x 2, with use of Rwx. Pt. requires Min. assist x 1 for bed mobility and Min. assist x 2 for sit <-> stand transfers.  Verbal/tactile cues given for posture correction and Rwx guidance during ambulation.  Limited in gait distance due to overall fatigue.  Pt. will benefit from skilled inpt. P.T. to address her functional deficits and to assist pt. in regaining her maximum level of independence with functional mobility.    Anticipated Discharge Disposition (PT): home with assist, home with home health (Pending pt. progress)

## 2025-05-31 NOTE — THERAPY EVALUATION
Patient Name: Bailee Garza  : 1940    MRN: [id number]                              Today's Date: 2025       Admit Date: 2025    Visit Dx:     ICD-10-CM ICD-9-CM   1. Pneumonia of right upper lobe due to infectious organism  J18.9 486   2. MIKE (acute kidney injury)  N17.9 584.9     Patient Active Problem List   Diagnosis    Chronic midline low back pain without sciatica    Essential hypertension    Hearing loss    Hypothyroidism, acquired    Insomnia due to medical condition    IBS (irritable bowel syndrome)    Chronic nonseasonal allergic rhinitis due to pollen    Stage 3 chronic kidney disease    Arthralgia of right knee    DDD (degenerative disc disease), lumbosacral    Scoliosis due to degenerative disease of spine in adult patient    Status post total replacement of left hip    Status post total hip replacement, left    Vitamin D deficiency    Anemia of unknown etiology    S/P hip replacement, right    Syncope    Status post right knee replacement    Acute posthemorrhagic anemia    Helicobacter pylori gastritis    Legionella pneumonia    Sepsis due to pneumonia    Dehydration     Past Medical History:   Diagnosis Date    Abnormal CXR 2017    Adverse reaction to narcotic drug     SEVERE HALLUCINATIONS POST OP LEFT HIP PLACEMENT    Allergies     Arthritis     Arthritis of foot 2020    Arthropathy of pelvic region and thigh 2012    unspecified    Back pain 2007    Chronic back pain 2009    Chronic cough 2017    CKD (chronic kidney disease)     Closed nondisplaced fracture of lateral malleolus of fibula with delayed healing 2020    Closed nondisplaced fracture of medial cuneiform of left foot 2018    Constipation 2015    unspecified    COVID-19 vaccination refused     COVID-19 virus detected 10/17/2022    Diverticulosis     Dyspepsia 2008    Enteropathogenic Escherichia coli infection 2024    Essential hypertension 2007     Exertional shortness of breath 2017    Fall 2018    Family history of abdominal aortic aneurysm (AAA) 2019    [place] aaa screen limited (04/10/2019 10:32)     Gastrointestinal hemorrhage 2024    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), 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    Hearing loss 2008    History of bone density study 2015    History of pneumonia     2017    History of skin cancer     Hypothyroidism, acquired 2007    Insomnia 2015    unspecified    Intervertebral disc disorder with myelopathy, cervical region 2010    Joint capsule tear 2012    unspecified    OA (osteoarthritis) of hip 2018    OA (osteoarthritis) of knee 2024    Other synovitis and tenosynovitis, right ankle and foot 2020    Peroneal tendonitis, right 2020    Rhinitis, allergic 2007    Right knee pain     Scoliosis     Screening breast examination 2007    Stress 2015    other acute reactions to stress    Stress incontinence     Trochanteric bursitis, left hip 2019    Urticaria 2015     Past Surgical History:   Procedure Laterality Date    BREAST EXCISIONAL BIOPSY Right     50+ years ago    BRONCHOSCOPY N/A 2017    Procedure: BRONCHOSCOPY;  Surgeon: Mario Alanis MD;  [place] [facility] [place] [facility];  Service:     CATARACT EXTRACTION, BILATERAL      COLONOSCOPY      ENDOSCOPY N/A 2024    Procedure: ESOPHAGOGASTRODUODENOSCOPY AT BEDSIDE with gold probe and epi injection;  Surgeon: Karli Viveros MD;  [place] [facility] [place] [facility];  Service: Gastroenterology;  Laterality: N/A;  Pre: GI Bleed  Post: multiple duodenal ulcers, hiatal hernia    HYSTERECTOMY      TOTAL HIP ARTHROPLASTY Left 2018    Procedure: LEFT TOTAL HIP ARTHROPLASTY;  Surgeon: Justen Mann MD;  [place] [facility] [place] [facility];  Service: Orthopedics    TOTAL HIP ARTHROPLASTY Right 2022    Procedure: Posterior RIGHT TOTAL HIP  ARTHROPLASTY MARCELINO NAVIGATION;  Surgeon: Anupam Ruiz MD;  Location: [facility] [place];  Service: Orthopedics;  Laterality: Right;    TOTAL KNEE ARTHROPLASTY Right 9/18/2024    Procedure: TOTAL KNEE ARTHROPLASTY;  Surgeon: Jesus Thayer MD;  Location: [facility] [place];  Service: Orthopedics;  Laterality: Right;      General Information       Row Name 05/31/25 1243          OT Time and Intention    Subjective Information complains of;weakness;fatigue  -RE     Document Type evaluation  -RE     Mode of Treatment individual therapy  -RE     Patient Effort adequate  -RE     Symptoms Noted During/After Treatment fatigue  -RE       Row Name 05/31/25 1243          General Information    Patient Profile Reviewed yes  -RE     Prior Level of Function independent:  -RE     Existing Precautions/Restrictions fall  -RE     Barriers to Rehab none identified  -RE       Row Name 05/31/25 1243          Living Environment    Current Living Arrangements home  -RE     People in Home child(bhupinder), adult  -RE       Row Name 05/31/25 1243          Cognition    Orientation Status (Cognition) oriented x 3  -RE       Row Name 05/31/25 1243          Safety Issues/Impairments Affecting Functional Mobility    Impairments Affecting Function (Mobility) endurance/activity tolerance  -RE               User Key  (r) = Recorded By, (t) = Taken By, (c) = Cosigned By      Initials Name Provider Type    RE Alfreda King, OTKOFI Occupational Therapist                     Mobility/ADL's       Row Name 05/31/25 1245          Bed Mobility    Comment, (Bed Mobility) Patient unable to attempt due to extreme fatigue.  -RE       Row Name 05/31/25 1245          Functional Mobility    Patient was able to Ambulate no, other medical factors prevent ambulation  -RE     Reason Patient was unable to Ambulate Excessive Weakness  -RE       Row Name 05/31/25 1245          Activities of Daily Living    BADL Assessment/Intervention upper body dressing  -RE       Row Name 05/31/25  1245          Upper Body Dressing Assessment/Training    Comment, (Upper Body Dressing) Dependent with all ADL's due to weakness.  -RE               User Key  (r) = Recorded By, (t) = Taken By, (c) = Cosigned By      Initials Name Provider Type    Alfreda Rosenberg OTR Occupational Therapist                   Obj/Interventions       Row Name 05/31/25 1247          Range of Motion Comprehensive    Comment, General Range of Motion R shoulder with old rotator cuff injury which causes some pain with Shoulder flexion but ROM is intact.  -RE       Row Name 05/31/25 1247          Strength Comprehensive (MMT)    Comment, General Manual Muscle Testing (MMT) Assessment unable to fully evaluate. Endurance was so poor she was unable to continue with testing.  -RE               User Key  (r) = Recorded By, (t) = Taken By, (c) = Cosigned By      Initials Name Provider Type    Alfreda Rosenberg OTR Occupational Therapist                   Goals/Plan       Row Name 05/31/25 1456          Transfer Goal 1 (OT)    Activity/Assistive Device (Transfer Goal 1, OT) toilet;commode, 3-in-1  -RE     [place] Level/Cues Needed (Transfer Goal 1, OT) contact guard required  -RE     Time Frame (Transfer Goal 1, OT) long term goal (LTG);1 week;by discharge  -RE     Progress/Outcome (Transfer Goal 1, OT) continuing progress toward goal  -RE       Row Name 05/31/25 1450          Dressing Goal 1 (OT)    Activity/Device (Dressing Goal 1, OT) lower body dressing  -RE     [place]/Cues Needed (Dressing Goal 1, OT) contact guard required  -RE     Time Frame (Dressing Goal 1, OT) long term goal (LTG);1 week;by discharge  -RE     Progress/Outcome (Dressing Goal 1, OT) continuing progress toward goal  -RE       Row Name 05/31/25 1457          Therapy Assessment/Plan (OT)    Planned Therapy Interventions (OT) activity tolerance training;adaptive equipment training;BADL retraining;patient/caregiver education/training;strengthening  exercise;transfer/mobility retraining  -RE               User Key  (r) = Recorded By, (t) = Taken By, (c) = Cosigned By      Initials Name Provider Type    Alfreda Rosenberg OTR Occupational Therapist                   Clinical Impression       Row Name 05/31/25 6811          Pain Assessment    Pretreatment Pain Rating 0/10 - no pain  -RE     Posttreatment Pain Rating 0/10 - no pain  -RE       Row Name 05/31/25 1254          Plan of Care Review    Progress improving  -RE     Outcome Evaluation Patient was evaluated by inpatient occupational therapy.  Patient was admitted yesterday with pneumonia.  Patient's daughter reports that this is the second time in approximately 6 months that patient has had pneumonia.  Patient complains of significant weakness today.  She was unable to complete a full evaluation today due to her complaints of weakness and fatigue.  Prior to admission patient was independent and was able to perform all ADLs independently and was active including being able to perform yard work.  Patient's shoulder strength is grossly 3+/5 though patient was unable to tolerate a full manual muscle test of her upper extremities.  Due to her complaints of significant weakness we did not try to sit up on the side of the bed.  Patient could benefit from inpatient occupational therapy to increase her functional endurance and activity tolerance as well as improve her independence with ADLs.  -RE       Row Name 05/31/25 1254          Therapy Assessment/Plan (OT)    Rehab Potential (OT) good  -RE     Criteria for Skilled Therapeutic Interventions Met (OT) yes;skilled treatment is necessary  -RE     Therapy Frequency (OT) 5 times/wk  -RE     Predicted Duration of Therapy Intervention (OT) 1 to 2 weeks  -RE       Row Name 05/31/25 6930          Therapy Plan Review/Discharge Plan (OT)    Equipment Needs Upon Discharge (OT) commode chair;tub bench  -RE     Anticipated Discharge Disposition (OT) home with home health;sub  acute care setting  -RE       Row Name 05/31/25 1252          Positioning and Restraints    Pre-Treatment Position in bed  -RE     Post Treatment Position bed  -RE     In Bed supine;exit alarm on;with family/caregiver  -RE               User Key  (r) = Recorded By, (t) = Taken By, (c) = Cosigned By      Initials Name Provider Type    RE Alfreda King OTR Occupational Therapist                   Outcome Measures       Row Name 05/31/25 1457          How much help from another is currently needed...    Putting on and taking off regular lower body clothing? 1  -RE     Bathing (including washing, rinsing, and drying) 1  -RE     Toileting (which includes using toilet bed pan or urinal) 1  -RE     Putting on and taking off regular upper body clothing 1  -RE     Taking care of personal grooming (such as brushing teeth) 2  -RE     Eating meals 3  -RE     AM-PAC 6 Clicks Score (OT) 9  -RE       Row Name 05/31/25 1457          Functional Assessment    Outcome Measure Options AM-PAC 6 Clicks Daily Activity (OT)  -RE               User Key  (r) = Recorded By, (t) = Taken By, (c) = Cosigned By      Initials Name Provider Type    Alfreda Rosenberg OTR Occupational Therapist                    Occupational Therapy Education       Title: PT OT SLP Therapies (In Progress)       Topic: Occupational Therapy (In Progress)       Point: ADL training (Done)       Learning Progress Summary            Patient Acceptance, E,TB, VU by RE at 5/31/2025 1458    Comment: Explained program and plan including the role of occupational therapy.   Family Acceptance, E,TB, VU by RE at 5/31/2025 1458    Comment: Explained program and plan including the role of occupational therapy.                                      User Key       Initials Effective Dates Name Provider Type Discipline    RE 06/16/21 -  Alfreda King OTR Occupational Therapist OT                  OT Recommendation and Plan  Planned Therapy Interventions (OT): activity tolerance  training, adaptive equipment training, BADL retraining, patient/caregiver education/training, strengthening exercise, transfer/mobility retraining  Therapy Frequency (OT): 5 times/wk  Plan of Care Review  Progress: improving  Outcome Evaluation: Patient was evaluated by inpatient occupational therapy.  Patient was admitted yesterday with pneumonia.  Patient's daughter reports that this is the second time in approximately 6 months that patient has had pneumonia.  Patient complains of significant weakness today.  She was unable to complete a full evaluation today due to her complaints of weakness and fatigue.  Prior to admission patient was independent and was able to perform all ADLs independently and was active including being able to perform yard work.  Patient's shoulder strength is grossly 3+/5 though patient was unable to tolerate a full manual muscle test of her upper extremities.  Due to her complaints of significant weakness we did not try to sit up on the side of the bed.  Patient could benefit from inpatient occupational therapy to increase her functional endurance and activity tolerance as well as improve her independence with ADLs.     Time Calculation:   Evaluation Complexity (OT)  Review Occupational Profile/Medical/Therapy History Complexity: brief/low complexity  Assessment, Occupational Performance/Identification of Deficit Complexity: 1-3 performance deficits  Clinical Decision Making Complexity (OT): problem focused assessment/low complexity  Overall Complexity of Evaluation (OT): low complexity     Time Calculation- OT       Row Name 05/31/25 1459             Time Calculation- OT    OT Start Time 0900  -RE      OT Stop Time 0920  -RE      OT Time Calculation (min) 20 min  -RE         Untimed Charges    OT Eval/Re-eval Minutes 20  -RE         Total Minutes    Untimed Charges Total Minutes 20  -RE       Total Minutes 20  -RE                User Key  (r) = Recorded By, (t) = Taken By, (c) = Cosigned  By      Initials Name Provider Type    RE Alfreda King OTR Occupational Therapist                  Therapy Charges for Today       Code Description Service Date Service Provider Modifiers Qty    [id number] HC OT EVAL LOW COMPLEXITY 2 5/31/2025 Alfreda King OTR GO 1          Dictated utilizing Dragon dictation:  Much of this encounter note is an electronic transcription/translation of spoken language to printed text. The electronic translation of spoken language may permit erroneous, or at times, nonsensical words or phrases to be inadvertently transcribed; Although I have reviewed the note for such errors, some may still exist.       EUSEBIO Larry  5/31/2025

## 2025-05-31 NOTE — PROGRESS NOTES
[facility] Clinical Pharmacy Services: [facility]vaquin Consult    Pt Name: Bailee Garza   : 1940  Weight: 50 kg (110 lb 3.7 oz)  Antibiotic: [id number]  Indication: Pneumonia    Relevant clinical data and objective history reviewed:    Past Medical History:   Diagnosis Date    Abnormal CXR 2017    Adverse reaction to narcotic drug     SEVERE HALLUCINATIONS POST OP LEFT HIP PLACEMENT    Allergies     Arthritis     Arthritis of foot 2020    Arthropathy of pelvic region and thigh 2012    unspecified    Back pain 2007    Chronic back pain 2009    Chronic cough 2017    CKD (chronic kidney disease)     Closed nondisplaced fracture of lateral malleolus of fibula with delayed healing 2020    Closed nondisplaced fracture of medial cuneiform of left foot 2018    Constipation 2015    unspecified    COVID-19 vaccination refused     COVID-19 virus detected 10/17/2022    Diverticulosis     Dyspepsia 2008    Enteropathogenic Escherichia coli infection 2024    Essential hypertension 2007    Exertional shortness of breath 2017    Fall 2018    Family history of abdominal aortic aneurysm (AAA) 2019    [place]aa screen limited (04/10/2019 10:32)     Gastrointestinal hemorrhage 2024    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), 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    Hearing loss 2008    History of bone density study 2015    History of pneumonia     2017    History of skin cancer     Hypothyroidism, acquired 2007    Insomnia 2015    unspecified    Intervertebral disc disorder with myelopathy, cervical region 2010    Joint capsule tear 2012    unspecified    OA (osteoarthritis) of hip 2018    OA (osteoarthritis) of knee 2024    Other synovitis and tenosynovitis, right ankle and foot 2020    Peroneal tendonitis, right 2020    Rhinitis, allergic  11/20/2007    Right knee pain     Scoliosis     Screening breast examination 11/20/2007    Stress 04/27/2015    other acute reactions to stress    Stress incontinence     Trochanteric bursitis, left hip 02/04/2019    Urticaria 06/29/2015     Creatinine   Date Value Ref Range Status   05/31/2025 1.17 (H) 0.57 - 1.00 mg/dL Final   05/30/2025 1.56 (H) 0.57 - 1.00 mg/dL Final   11/07/2024 1.06 (H) 0.57 - 1.00 mg/dL Final   10/01/2024 0.94 0.57 - 1.00 mg/dL Final     BUN   Date Value Ref Range Status   05/31/2025 31.0 (H) 8.0 - 23.0 mg/dL Final     Estimated Creatinine Clearance: 28.3 mL/min (A) (by C-G formula based on SCr of 1.17 mg/dL (H)).    Lab Results   Component Value Date    WBC 11.87 (H) 05/31/2025     Temp Readings from Last 3 Encounters:   05/31/25 99 °F (37.2 °C) (Oral)   05/29/25 97.5 °F (36.4 °C) (Temporal)   04/14/25 98.1 °F (36.7 °C) (Oral)      Assessment/Plan  CrCl 28 ml/min  Ordered Levaquin 750 mg IV q48 hr for a total of 7 days. Will monitor and adjust if culture data or pertinent lab values indicate this is best for the patient.     Thank you for this consult and please contact pharmacy with any questions or concerns.     Maeve Matthews [facility]  Clinical Pharmacist

## 2025-05-31 NOTE — PLAN OF CARE
Goal Outcome Evaluation:              Outcome Evaluation: Clinical bedside swallow evaluation completed.     Dysphagia signs: Pt exhibits signs of pharyngeal dysphagia and is at risk of aspiration.     Recommend continue current diet regular textured diet with thin liquids due to patient's selective taste palette and longstanding history of habitual throat clearing, meds whole with thin liquids as tolerated. Compensatory strategies are small bites, slow rate of intake. Aspiration precautions. Oral care BID/PRN as tolerated. Free water protocol following oral care and 30 min before/after meals. Recommend VFSS to assess swallow function for safest and least restrictive diet.               SLP Swallowing Diagnosis: R/O pharyngeal dysphagia (05/31/25 0800)

## 2025-05-31 NOTE — PLAN OF CARE
Goal Outcome Evaluation:           Progress: improving  Outcome Evaluation: Patient was evaluated by inpatient occupational therapy.  Patient was admitted yesterday with pneumonia.  Patient's daughter reports that this is the second time in approximately 6 months that patient has had pneumonia.  Patient complains of significant weakness today.  She was unable to complete a full evaluation today due to her complaints of weakness and fatigue.  Prior to admission patient was independent and was able to perform all ADLs independently and was active including being able to perform yard work.  Patient's shoulder strength is grossly 3+/5 though patient was unable to tolerate a full manual muscle test of her upper extremities.  Due to her complaints of significant weakness we did not try to sit up on the side of the bed.  Patient could benefit from inpatient occupational therapy to increase her functional endurance and activity tolerance as well as improve her independence with ADLs.    Anticipated Discharge Disposition (OT): home with home health, sub acute care setting

## 2025-05-31 NOTE — PROGRESS NOTES
Name: Bailee Garza ADMIT: 2025   : 1940  PCP: Eddie Araya MD    MRN: [id number] LOS: 0 days   AGE/SEX: 85 y.o. female  ROOM: [facility]   Subjective   Chief Complaint   Patient presents with    Weakness - Generalized    Vomiting     Dyspnea fair  Cough fair  More alert  On abx  S/p IVFs      ROS  + f/c  No n/v  No cp/palp  + soa/cough    Objective   Vital Signs  Temp:  [97.9 °F (36.6 °C)-100.8 °F (38.2 °C)] 99 °F (37.2 °C)  Heart Rate:  [89-98] 93  Resp:  [14-18] 18  BP: (114-131)/(47-94) 120/58  SpO2:  [94 %-97 %] 97 %  on   ;   Device (Oxygen Therapy): room air  Body mass index is 19.53 kg/m².    Physical Exam  Constitutional:       General: She is not in acute distress.     Appearance: She is ill-appearing (mildly).   HENT:      Head: Normocephalic and atraumatic.   Eyes:      General: No scleral icterus.  Cardiovascular:      Rate and Rhythm: Regular rhythm.      Heart sounds: Normal heart sounds.   Pulmonary:      Effort: Pulmonary effort is normal. No respiratory distress.      Breath sounds: Rhonchi (slight) present.   Abdominal:      General: There is no distension.      Palpations: Abdomen is soft.   Musculoskeletal:      Cervical back: Neck supple.   Neurological:      Mental Status: She is alert.   Psychiatric:         Behavior: Behavior normal.     More alert today than yesterday     Results Review:       I reviewed the patient's new clinical results.  Results from last 7 days   Lab Units 25  0640 25  1133   WBC 10*3/mm3 11.87* 13.98*   HEMOGLOBIN g/dL 10.7* 11.3*   PLATELETS 10*3/mm3 188 205     Results from last 7 days   Lab Units 25  0640 25  1133   SODIUM mmol/L 135* 134*   POTASSIUM mmol/L 4.3 4.2   CHLORIDE mmol/L 100 97*   CO2 mmol/L 19.0* 21.4*   BUN mg/dL 31.0* 41.0*   CREATININE mg/dL 1.17* 1.56*   GLUCOSE mg/dL 99 148*   Estimated Creatinine Clearance: 28.3 mL/min (A) (by C-G formula based on SCr of 1.17 mg/dL (H)).  Results from last 7 days   Lab  "Units 05/31/25  0640 05/30/25  1133   ALBUMIN g/dL 3.4* 3.9   BILIRUBIN mg/dL 0.3 0.4   ALK PHOS U/L 106 121*   AST (SGOT) U/L 52* 85*   ALT (SGPT) U/L 48* 74*     Results from last 7 days   Lab Units 05/31/25  0640 05/30/25  1133   CALCIUM mg/dL 8.7 9.4   ALBUMIN g/dL 3.4* 3.9     Results from last 7 days   Lab Units 05/30/25  1133   PROCALCITONIN ng/mL 1.94*   LACTATE mmol/L 1.2       Coag     HbA1C   Lab Results   Component Value Date    HGBA1C 5.20 09/24/2024     Infection   Results from last 7 days   Lab Units 05/30/25  1251 05/30/25  1242 05/30/25  1133   BLOODCX  No growth at 24 hours No growth at 24 hours  --    PROCALCITONIN ng/mL  --   --  1.94*     Radiology(recent) XR Chest 1 View  Result Date: 5/30/2025  From 5/29/2025 to 5/30/2025, increasing hazy right upper lobe opacity, suspicious for pneumonitis/pneumonia. Recommend imaging follow-up to ensure clearance. No pleural effusion or pneumothorax. Normal size cardiac silhouette. No focal osseous abnormality.  This report was finalized on 5/30/2025 12:03 PM by Dr. Shaquille Tsai M.D on Workstation: [id number]      HS Troponin T   Date Value Ref Range Status   05/30/2025 27 (H) <14 ng/L Final   05/30/2025 27 (H) <14 ng/L Final     No components found for: \"TSH;2\"    enoxaparin sodium, 30 mg, Subcutaneous, Q24H  levothyroxine, 75 mcg, Oral, QAM AC  pantoprazole, 40 mg, Oral, Q AM  sodium chloride, 10 mL, Intravenous, Q12H      Pharmacy To Dose:,   sodium chloride, 100 mL/hr, Last Rate: 100 mL/hr (05/31/25 0514)    Diet: Regular/House; Fluid Consistency: Thin (IDDSI 0)      Assessment & Plan      Active Hospital Problems    Diagnosis  POA    **Legionella pneumonia [A48.1]  Yes    Sepsis due to pneumonia [J18.9, A41.9]  Yes    Dehydration [E86.0]  Yes    Stage 3 chronic kidney disease [N18.30]  Yes    Essential hypertension [I10]  Yes      Resolved Hospital Problems   No resolved problems to display.       Bailee JOHNSON Greg is a 85 y.o. female who presents to " the ED c/o acute generalized weakness and cough. She has been dealing with a cough the past few months but more lethargic the past few days. Yesterday at PCP office given IM rocephin and ceftin but continued to feel worse and lethargic and N/V.  Came to [facility] ER. BUN 41/Cr 1.5. Elevated WBC and procal. Given IVFs and ceftriaxone in ER.      Legionella PNA. H/o azithromycin allergy. Change abx to levaquin. Renal fx improved but will have pharmacy to dose  Monitor labs. Renal fx improved. Monitor for urinary retention.   PT/OT/ST consultation. R/o dysphagia as cause for recurrent cough  I discussed the patients findings and my recommendations with patient, family, and consulting provider.     VTE Prophylaxis - SCDs.   Add siddhartha Banuelos MD  [facility]  05/31/25  14:47 EDT

## 2025-05-31 NOTE — THERAPY EVALUATION
Acute Care - Speech Language Pathology   Swallow Initial Evaluation [facility]     Patient Name: Bailee Garza  : 1940  MRN: [id number]  Today's Date: 2025               Admit Date: 2025    Visit Dx:     ICD-10-CM ICD-9-CM   1. Pneumonia of right upper lobe due to infectious organism  J18.9 486   2. MIKE (acute kidney injury)  N17.9 584.9     Patient Active Problem List   Diagnosis    Chronic midline low back pain without sciatica    Essential hypertension    Hearing loss    Hypothyroidism, acquired    Insomnia due to medical condition    IBS (irritable bowel syndrome)    Chronic nonseasonal allergic rhinitis due to pollen    Stage 3 chronic kidney disease    Arthralgia of right knee    DDD (degenerative disc disease), lumbosacral    Scoliosis due to degenerative disease of spine in adult patient    Status post total replacement of left hip    Status post total hip replacement, left    Vitamin D deficiency    Anemia of unknown etiology    S/P hip replacement, right    Syncope    Status post right knee replacement    Acute posthemorrhagic anemia    Helicobacter pylori gastritis    Pneumonia    Sepsis due to pneumonia    Dehydration     Past Medical History:   Diagnosis Date    Abnormal CXR 2017    Adverse reaction to narcotic drug     SEVERE HALLUCINATIONS POST OP LEFT HIP PLACEMENT    Allergies     Arthritis     Arthritis of foot 2020    Arthropathy of pelvic region and thigh 2012    unspecified    Back pain 2007    Chronic back pain 2009    Chronic cough 2017    CKD (chronic kidney disease)     Closed nondisplaced fracture of lateral malleolus of fibula with delayed healing 2020    Closed nondisplaced fracture of medial cuneiform of left foot 2018    Constipation 2015    unspecified    COVID-19 vaccination refused     COVID-19 virus detected 10/17/2022    Diverticulosis     Dyspepsia 2008    Enteropathogenic Escherichia coli infection  2024    Essential hypertension 2007    Exertional shortness of breath 2017    Fall 2018    Family history of abdominal aortic aneurysm (AAA) 2019    [place] aaa screen limited (04/10/2019 10:32)     Gastrointestinal hemorrhage 2024    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), 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    Hearing loss 2008    History of bone density study 2015    History of pneumonia     2017    History of skin cancer     Hypothyroidism, acquired 2007    Insomnia 2015    unspecified    Intervertebral disc disorder with myelopathy, cervical region 2010    Joint capsule tear 2012    unspecified    OA (osteoarthritis) of hip 2018    OA (osteoarthritis) of knee 2024    Other synovitis and tenosynovitis, right ankle and foot 2020    Peroneal tendonitis, right 2020    Rhinitis, allergic 2007    Right knee pain     Scoliosis     Screening breast examination 2007    Stress 2015    other acute reactions to stress    Stress incontinence     Trochanteric bursitis, left hip 2019    Urticaria 2015     Past Surgical History:   Procedure Laterality Date    BREAST EXCISIONAL BIOPSY Right     50+ years ago    BRONCHOSCOPY N/A 2017    Procedure: BRONCHOSCOPY;  Surgeon: Mario Alanis MD;  Location: [facility] ENDOSCOPY;  Service:     CATARACT EXTRACTION, BILATERAL      COLONOSCOPY      ENDOSCOPY N/A 2024    Procedure: ESOPHAGOGASTRODUODENOSCOPY AT BEDSIDE with gold probe and epi injection;  Surgeon: Karli Viveros MD;  Location: [facility] ENDOSCOPY;  Service: Gastroenterology;  Laterality: N/A;  Pre: GI Bleed  Post: multiple duodenal ulcers, hiatal hernia    HYSTERECTOMY      TOTAL HIP ARTHROPLASTY Left 2018    Procedure: LEFT TOTAL HIP ARTHROPLASTY;  Surgeon: Justen Mann MD;  Location: [facility] MAIN OR;  Service: Orthopedics    TOTAL HIP ARTHROPLASTY Right  12/13/2022    Procedure: Posterior RIGHT TOTAL HIP ARTHROPLASTY MARECLINO NAVIGATION;  Surgeon: Anupam Ruiz MD;  Location:  [facility];  Service: Orthopedics;  Laterality: Right;    TOTAL KNEE ARTHROPLASTY Right 9/18/2024    Procedure: TOTAL KNEE ARTHROPLASTY;  Surgeon: Jesus Thayer MD;  Location:  [facility];  Service: Orthopedics;  Laterality: Right;       SLP Recommendation and Plan  SLP Swallowing Diagnosis: R/O pharyngeal dysphagia (05/31/25 0800)  SLP Diet Recommendation: regular textures, thin liquids (05/31/25 0800)  Recommended Precautions and Strategies: 1:1 supervision, general aspiration precautions, small bites of food and sips of liquid (05/31/25 0800)  SLP Rec. for Method of Medication Administration: meds whole, with thin liquids, as tolerated (05/31/25 0800)     Monitor for Signs of Aspiration: yes, notify SLP if any concerns (05/31/25 0800)  Recommended Diagnostics: reassess via VFSS (MBS) (05/31/25 0800)  Swallow Criteria for Skilled Therapeutic Interventions Met: demonstrates skilled criteria (05/31/25 0800)     Rehab Potential/Prognosis, Swallowing: good, to achieve stated therapy goals (05/31/25 0800)  Therapy Frequency (Swallow): PRN (05/31/25 0800)  Predicted Duration Therapy Intervention (Days): until discharge (05/31/25 0800)  Oral Care Recommendations: Oral Care BID/PRN (05/31/25 0800)                                        Outcome Evaluation: Clinical bedside swallow evaluation completed. Pt referred to SLP due to diagnosis of suspected aspiration pna. Pt is known to [facility] speech pathology with last tx note significant for baseline throat clearing confounding bedside swallow assessment.     Cognition: Oriented x4    Voice: weak, hoarse    Oral mechanism exam: WFL with some missing teeth    Oral phase: Pt able to self feed ind. Labial seal complete. Able to fully masticate mechanical soft and regular dry solids. no oral residue post swallow.     Pharyngeal phase:  Mildly reduced  hyolaryngeal excursion when palpated. Cough and throat clear following thin liquid via straw, puree, and mechanical soft solids. No cough with initial trial of thin liquid via cup. Pt with significant taste aversion to apple sauce and saltine cracker.     Dysphagia signs: Pt exhibits signs of pharyngeal dysphagia and is at risk of aspiration. Recommend continue current diet regular textured diet with thin liquids due to patient's selective taste palette and longstanding history of habitual throat clearing, meds whole with thin liquids as tolerated. Compensatory strategies are small bites, slow rate of intake. Aspiration precautions. Oral care BID/PRN as tolerated. Free water protocol following oral care and 30 min before/after meals. Recommend VFSS to assess swallow function for safest and least restrictive diet.      SWALLOW EVALUATION (Last 72 Hours)       SLP Adult Swallow Evaluation       Row Name 05/31/25 0800       Rehab Evaluation    Document Type evaluation  -HT    Subjective Information fatigue  -HT    Patient Observations lethargic;agree to therapy  -HT       General Information    Patient Profile Reviewed yes  -HT    Pertinent History Of Current Problem Bailee Garza is a 85 y.o. female who presents to the ED c/o acute generalized weakness and cough. She has been dealing with a cough the past few months but more lethargic the past few days. Pt is known to [facility] speech pathology with habitual throat clearing confounding previous bedside assessments.  -HT    Current Method of Nutrition regular textures;thin liquids  -HT    Precautions/Limitations, Vision WFL;for purposes of eval  -HT    Precautions/Limitations, Hearing WFL;for purposes of eval  -HT    Prior Level of Function-Communication WFL  -HT    Prior Level of Function-Swallowing no diet consistency restrictions  -HT    Plans/Goals Discussed with patient;agreed upon  -HT    Barriers to Rehab resistant to information  -HT    Patient's Goals for  Discharge return home  -HT       Pain    Pretreatment Pain Rating 0/10 - no pain  -HT    Posttreatment Pain Rating 0/10 - no pain  -HT       Oral Motor Structure and Function    Dentition Assessment natural, present and adequate  -HT    Secretion Management WNL/WFL  -HT    Mucosal Quality moist, healthy  -HT    Volitional Swallow WFL  -HT    Volitional Cough weak;reduced respiratory support  -HT       Oral Musculature and Cranial Nerve Assessment    Oral Motor General Assessment WFL  -HT       General Eating/Swallowing Observations    Respiratory Support Currently in Use room air  -HT    Eating/Swallowing Skills self-fed  -HT    Positioning During Eating upright in bed  -HT    Utensils Used spoon;cup;straw  -HT    Consistencies Trialed nectar/syrup-thick liquids;thin liquids;pureed;soft to chew textures;mixed consistency;regular textures  -HT       Clinical Swallow Eval    Clinical Swallow Evaluation Summary Clinical bedside swallow evaluation completed. Pt referred to SLP due to diagnosis of suspected aspiration pna. Pt is known to [facility] speech pathology with last tx note significant for baseline throat clearing confounding bedside swallow assessment.     Cognition: Oriented x4    Voice: weak, hoarse    Oral mechanism exam: WFL with some missing teeth    Oral phase: Pt able to self feed ind. Labial seal complete. Able to fully masticate mechanical soft and regular dry solids. no oral residue post swallow.     Pharyngeal phase:  Mildly reduced hyolaryngeal excursion when palpated. Cough and throat clear following thin liquid via straw, puree, and mechanical soft solids. No cough with initial trial of thin liquid via cup. Pt with significant taste aversion to apple sauce and saltine cracker.     Dysphagia signs: Pt exhibits signs of pharyngeal dysphagia and is at risk of aspiration. Recommend continue current diet regular textured diet with thin liquids due to patient's selective taste palette and longstanding history  of habitual throat clearing, meds whole with thin liquids as tolerated. Compensatory strategies are small bites, slow rate of intake. Aspiration precautions. Oral care BID/PRN as tolerated. Free water protocol following oral care and 30 min before/after meals. Recommend VFSS to assess swallow function for safest and least restrictive diet.   -       Swallowing Quality of Life Assessment    Palatable food preferences Avoid applesauce and saltines  -    Education and counseling provided Signs of aspiration;Risks of aspiration  -       SLP Evaluation Clinical Impression    SLP Swallowing Diagnosis R/O pharyngeal dysphagia  -    Functional Impact risk of aspiration/pneumonia;risk of dehydration;risk of malnutrition  -    Rehab Potential/Prognosis, Swallowing good, to achieve stated therapy goals  -    Swallow Criteria for Skilled Therapeutic Interventions Met demonstrates skilled criteria  -       SLP Treatment Clinical Impressions    Care Plan Review evaluation/treatment results reviewed  -    Care Plan Review, Other Participant(s) son  -       Recommendations    Therapy Frequency (Swallow) PRN  -HT    Predicted Duration Therapy Intervention (Days) until discharge  -    SLP Diet Recommendation regular textures;thin liquids  -    Recommended Diagnostics reassess via VFSS (MBS)  -    Recommended Precautions and Strategies 1:1 supervision;general aspiration precautions;small bites of food and sips of liquid  -    Oral Care Recommendations Oral Care BID/PRN  -    SLP Rec. for Method of Medication Administration meds whole;with thin liquids;as tolerated  -    Monitor for Signs of Aspiration yes;notify SLP if any concerns  -       Swallow Goals (SLP)    Swallow LTGs Swallow Long Term Goal (free text)  -       (LTG) Swallow    (LTG) Swallow Pt will tolerate the safest and least restrictive diet  -    [place] (Swallow Long Term Goal) independently (over 90% accuracy)  -    Time Frame  (Swallow Long Term Goal) by discharge  -HT    Progress/Outcomes (Swallow Long Term Goal) new goal  -HT              User Key  (r) = Recorded By, (t) = Taken By, (c) = Cosigned By      Initials Name Effective Dates    Esperanza Patel SLP 09/14/23 -                     EDUCATION  The patient has been educated in the following areas:   Dysphagia (Swallowing Impairment).        SLP GOALS       Row Name 05/31/25 0800             (LTG) Swallow    (LTG) Swallow Pt will tolerate the safest and least restrictive diet  -HT      [place] (Swallow Long Term Goal) independently (over 90% accuracy)  -HT      Time Frame (Swallow Long Term Goal) by discharge  -HT      Progress/Outcomes (Swallow Long Term Goal) new goal  -HT                User Key  (r) = Recorded By, (t) = Taken By, (c) = Cosigned By      Initials Name Provider Type    Esperanza Patel SLP Speech and Language Pathologist                         Time Calculation:    Time Calculation- SLP       Row Name 05/31/25 1253             Time Calculation- SLP    SLP Start Time 1030  -HT      SLP Received On 05/31/25  -HT         Untimed Charges    [id number] Eval Oral Pharyng Swallow Minutes 60  -HT         Total Minutes    Untimed Charges Total Minutes 60  -HT       Total Minutes 60  -HT                User Key  (r) = Recorded By, (t) = Taken By, (c) = Cosigned By      Initials Name Provider Type    Esperanza Patel SLP Speech and Language Pathologist                    Therapy Charges for Today       Code Description Service Date Service Provider Modifiers Qty    [id number] HC ST EVAL ORAL PHARYNG SWALLOW 4 5/31/2025 Esperanza Raya SLP GN 1                 ABISAI Tijerina  5/31/2025

## 2025-05-31 NOTE — PLAN OF CARE
Problem: Adult Inpatient Plan of Care  Goal: Plan of Care Review  Flowsheets (Taken 5/31/2025 0634)  Progress: no change  Outcome Evaluation: No changes. AOx4. RA O2. Purewick in place. VSS. IV abx given per orders. IVF's running per orders. Makes needs known. No c/o pain or discomfort. Sputum sample pending. No productive cough as of now. Call light within reach.  Plan of Care Reviewed With: patient  Goal: Absence of Hospital-Acquired Illness or Injury  Intervention: Identify and Manage Fall Risk  Flowsheets  Taken 5/31/2025 0633  Safety Promotion/Fall Prevention:   activity supervised   assistive device/personal items within reach   clutter free environment maintained   fall prevention program maintained   lighting adjusted   nonskid shoes/slippers when out of bed   room organization consistent   safety round/check completed  Taken 5/31/2025 0405  Safety Promotion/Fall Prevention:   activity supervised   assistive device/personal items within reach   clutter free environment maintained   fall prevention program maintained   lighting adjusted   nonskid shoes/slippers when out of bed   room organization consistent   safety round/check completed  Taken 5/31/2025 0205  Safety Promotion/Fall Prevention:   activity supervised   assistive device/personal items within reach   clutter free environment maintained   fall prevention program maintained   lighting adjusted   nonskid shoes/slippers when out of bed   room organization consistent   safety round/check completed  Taken 5/31/2025 0043  Safety Promotion/Fall Prevention:   activity supervised   assistive device/personal items within reach   clutter free environment maintained   fall prevention program maintained   lighting adjusted   nonskid shoes/slippers when out of bed   room organization consistent   safety round/check completed  Taken 5/30/2025 2208  Safety Promotion/Fall Prevention:   activity supervised   assistive device/personal items within reach   clutter  free environment maintained   fall prevention program maintained   lighting adjusted   nonskid shoes/slippers when out of bed   room organization consistent   safety round/check completed  Taken 5/30/2025 2010  Safety Promotion/Fall Prevention:   activity supervised   assistive device/personal items within reach   clutter free environment maintained   fall prevention program maintained   lighting adjusted   nonskid shoes/slippers when out of bed   room organization consistent   safety round/check completed  Intervention: Prevent Skin Injury  Flowsheets  Taken 5/31/2025 0633  Body Position: position changed independently  Taken 5/31/2025 0405  Body Position: position changed independently  Taken 5/31/2025 0205  Body Position: position changed independently  Taken 5/31/2025 0043  Body Position: position changed independently  Taken 5/30/2025 2208  Body Position: position changed independently  Taken 5/30/2025 2010  Body Position: position changed independently  Intervention: Prevent Infection  Flowsheets (Taken 5/31/2025 0100 by Amie Renee, PCT)  Infection Prevention:   environmental surveillance performed   hand hygiene promoted   rest/sleep promoted   single patient room provided  Goal: Optimal Comfort and Wellbeing  Intervention: Provide Person-Centered Care  Flowsheets (Taken 5/30/2025 2010)  Trust Relationship/Rapport:   care explained   choices provided   emotional support provided   empathic listening provided   questions answered   questions encouraged   reassurance provided   thoughts/feelings acknowledged  Goal: Readiness for Transition of Care  Intervention: Mutually Develop Transition Plan  Flowsheets  Taken 5/30/2025 1434 by Liseth Persaud, RN  Transportation Anticipated: family or friend will provide  Patient/Family Anticipated Services at Transition: none  Patient/Family Anticipates Transition to: home  Taken 5/30/2025 1431 by Janak Alonzo, RN  Equipment Currently Used at Home: none     Problem:  Skin Injury Risk Increased  Goal: Skin Health and Integrity  Intervention: Optimize Skin Protection  Flowsheets  Taken 5/31/2025 0633  Activity Management: activity encouraged  Head of Bed (HOB) Positioning:   HOB elevated   HOB at 20-30 degrees  Taken 5/31/2025 0405  Activity Management: activity encouraged  Head of Bed (HOB) Positioning: HOB elevated  Taken 5/31/2025 0205  Activity Management: activity encouraged  Head of Bed (HOB) Positioning: HOB elevated  Taken 5/31/2025 0043  Activity Management: activity minimized  Head of Bed (HOB) Positioning: HOB elevated  Taken 5/30/2025 2208  Activity Management: activity minimized  Head of Bed (HOB) Positioning:   HOB elevated   HOB at 20-30 degrees  Taken 5/30/2025 2010  Activity Management: activity minimized  Head of Bed (HOB) Positioning:   HOB elevated   HOB at 20-30 degrees     Problem: Pneumonia  Goal: Effective Oxygenation and Ventilation  Intervention: Promote Airway Secretion Clearance  Flowsheets  Taken 5/31/2025 0633 by Bud Harman RN  Activity Management: activity encouraged  Taken 5/31/2025 0405 by Bud Harman RN  Activity Management: activity encouraged  Taken 5/31/2025 0205 by Bud Harman RN  Activity Management: activity encouraged  Taken 5/31/2025 0043 by Bud Harman RN  Activity Management: activity minimized  Taken 5/30/2025 2208 by Bud Harman RN  Activity Management: activity minimized  Taken 5/30/2025 2010 by Bud Harman RN  Activity Management: activity minimized  Taken 5/30/2025 1439 by Janak Alonzo RN  Cough And Deep Breathing: done with encouragement  Intervention: Optimize Oxygenation and Ventilation  Flowsheets  Taken 5/31/2025 0633  Head of Bed (HOB) Positioning:   HOB elevated   HOB at 20-30 degrees  Taken 5/31/2025 0405  Head of Bed (HOB) Positioning: HOB elevated  Taken 5/31/2025 0205  Head of Bed (HOB) Positioning: HOB elevated  Taken 5/31/2025 0043  Head of Bed (HOB) Positioning: HOB elevated  Taken 5/30/2025 2208  Head of Bed (HOB)  Positioning:   HOB elevated   HOB at 20-30 degrees  Taken 5/30/2025 2010  Head of Bed (HOB) Positioning:   HOB elevated   HOB at 20-30 degrees     Problem: Fall Injury Risk  Goal: Absence of Fall and Fall-Related Injury  Intervention: Identify and Manage Contributors  Flowsheets (Taken 5/31/2025 0634)  Medication Review/Management:   medications reviewed   high-risk medications identified  Intervention: Promote Injury-Free Environment  Flowsheets  Taken 5/31/2025 0633  Safety Promotion/Fall Prevention:   activity supervised   assistive device/personal items within reach   clutter free environment maintained   fall prevention program maintained   lighting adjusted   nonskid shoes/slippers when out of bed   room organization consistent   safety round/check completed  Taken 5/31/2025 0405  Safety Promotion/Fall Prevention:   activity supervised   assistive device/personal items within reach   clutter free environment maintained   fall prevention program maintained   lighting adjusted   nonskid shoes/slippers when out of bed   room organization consistent   safety round/check completed  Taken 5/31/2025 0205  Safety Promotion/Fall Prevention:   activity supervised   assistive device/personal items within reach   clutter free environment maintained   fall prevention program maintained   lighting adjusted   nonskid shoes/slippers when out of bed   room organization consistent   safety round/check completed  Taken 5/31/2025 0043  Safety Promotion/Fall Prevention:   activity supervised   assistive device/personal items within reach   clutter free environment maintained   fall prevention program maintained   lighting adjusted   nonskid shoes/slippers when out of bed   room organization consistent   safety round/check completed  Taken 5/30/2025 2208  Safety Promotion/Fall Prevention:   activity supervised   assistive device/personal items within reach   clutter free environment maintained   fall prevention program maintained    lighting adjusted   nonskid shoes/slippers when out of bed   room organization consistent   safety round/check completed  Taken 5/30/2025 2010  Safety Promotion/Fall Prevention:   activity supervised   assistive device/personal items within reach   clutter free environment maintained   fall prevention program maintained   lighting adjusted   nonskid shoes/slippers when out of bed   room organization consistent   safety round/check completed   Goal Outcome Evaluation:  Plan of Care Reviewed With: patient        Progress: no change  Outcome Evaluation: No changes. AOx4. RA O2. Purewick in place. VSS. IV abx given per orders. IVF's running per orders. Makes needs known. No c/o pain or discomfort. Sputum sample pending. No productive cough as of now. Call light within reach.

## 2025-05-31 NOTE — THERAPY EVALUATION
Patient Name: Bailee Garza  : 1940    MRN: [id number]                              Today's Date: 2025       Admit Date: 2025    Visit Dx:     ICD-10-CM ICD-9-CM   1. Pneumonia of right upper lobe due to infectious organism  J18.9 486   2. MIKE (acute kidney injury)  N17.9 584.9     Patient Active Problem List   Diagnosis    Chronic midline low back pain without sciatica    Essential hypertension    Hearing loss    Hypothyroidism, acquired    Insomnia due to medical condition    IBS (irritable bowel syndrome)    Chronic nonseasonal allergic rhinitis due to pollen    Stage 3 chronic kidney disease    Arthralgia of right knee    DDD (degenerative disc disease), lumbosacral    Scoliosis due to degenerative disease of spine in adult patient    Status post total replacement of left hip    Status post total hip replacement, left    Vitamin D deficiency    Anemia of unknown etiology    S/P hip replacement, right    Syncope    Status post right knee replacement    Acute posthemorrhagic anemia    Helicobacter pylori gastritis    Legionella pneumonia    Sepsis due to pneumonia    Dehydration     Past Medical History:   Diagnosis Date    Abnormal CXR 2017    Adverse reaction to narcotic drug     SEVERE HALLUCINATIONS POST OP LEFT HIP PLACEMENT    Allergies     Arthritis     Arthritis of foot 2020    Arthropathy of pelvic region and thigh 2012    unspecified    Back pain 2007    Chronic back pain 2009    Chronic cough 2017    CKD (chronic kidney disease)     Closed nondisplaced fracture of lateral malleolus of fibula with delayed healing 2020    Closed nondisplaced fracture of medial cuneiform of left foot 2018    Constipation 2015    unspecified    COVID-19 vaccination refused     COVID-19 virus detected 10/17/2022    Diverticulosis     Dyspepsia 2008    Enteropathogenic Escherichia coli infection 2024    Essential hypertension 2007     Exertional shortness of breath 2017    Fall 2018    Family history of abdominal aortic aneurysm (AAA) 2019    [place] aaa screen limited (04/10/2019 10:32)     Gastrointestinal hemorrhage 2024    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), 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    Hearing loss 2008    History of bone density study 2015    History of pneumonia     2017    History of skin cancer     Hypothyroidism, acquired 2007    Insomnia 2015    unspecified    Intervertebral disc disorder with myelopathy, cervical region 2010    Joint capsule tear 2012    unspecified    OA (osteoarthritis) of hip 2018    OA (osteoarthritis) of knee 2024    Other synovitis and tenosynovitis, right ankle and foot 2020    Peroneal tendonitis, right 2020    Rhinitis, allergic 2007    Right knee pain     Scoliosis     Screening breast examination 2007    Stress 2015    other acute reactions to stress    Stress incontinence     Trochanteric bursitis, left hip 2019    Urticaria 2015     Past Surgical History:   Procedure Laterality Date    BREAST EXCISIONAL BIOPSY Right     50+ years ago    BRONCHOSCOPY N/A 2017    Procedure: BRONCHOSCOPY;  Surgeon: Mario Alanis MD;  Location: [facility] ENDOSCOPY;  Service:     CATARACT EXTRACTION, BILATERAL      COLONOSCOPY      ENDOSCOPY N/A 2024    Procedure: ESOPHAGOGASTRODUODENOSCOPY AT BEDSIDE with gold probe and epi injection;  Surgeon: Karli Viveros MD;  Location: [facility] ENDOSCOPY;  Service: Gastroenterology;  Laterality: N/A;  Pre: GI Bleed  Post: multiple duodenal ulcers, hiatal hernia    HYSTERECTOMY      TOTAL HIP ARTHROPLASTY Left 2018    Procedure: LEFT TOTAL HIP ARTHROPLASTY;  Surgeon: Justen Mann MD;  Location: [facility] MAIN OR;  Service: Orthopedics    TOTAL HIP ARTHROPLASTY Right 2022    Procedure: Posterior RIGHT TOTAL HIP  ARTHROPLASTY MARCELINO NAVIGATION;  Surgeon: Anupam Ruiz MD;  Location: [facility] OR [facility] [place];  Service: Orthopedics;  Laterality: Right;    TOTAL KNEE ARTHROPLASTY Right 9/18/2024    Procedure: TOTAL KNEE ARTHROPLASTY;  Surgeon: Jesus Thayer MD;  [facility] [place];  Service: Orthopedics;  Laterality: Right;      General Information       Row Name 05/31/25 1526          Physical Therapy Time and Intention    Document Type evaluation  Pt. admitted with PNA, general weakness  -MS     Mode of Treatment physical therapy;individual therapy  -MS       Row Name 05/31/25 1526          General Information    Patient Profile Reviewed yes  -MS     Prior Level of Function independent:  No use of A.D. prior to admission  -MS     Existing Precautions/Restrictions fall  Exit alarm  -MS     Barriers to Rehab none identified  -MS       Row Name 05/31/25 1526          Cognition    Orientation Status (Cognition) oriented to;person;place  -MS       Row Name 05/31/25 1526          Safety Issues/Impairments Affecting Functional Mobility    Comment, Safety Issues/Impairments (Mobility) Gait belt used for safety.  -MS               User Key  (r) = Recorded By, (t) = Taken By, (c) = Cosigned By      Initials Name Provider Type    MS Cony Shaquille ELIZABETH, PT Physical Therapist                   Mobility       Row Name 05/31/25 1527          Bed Mobility    Bed Mobility supine-sit;sit-supine  -MS     Supine-Sit [place] (Bed Mobility) minimum assist (75% patient effort)  -MS     Sit-Supine [place] (Bed Mobility) minimum assist (75% patient effort)  -MS       Row Name 05/31/25 1527          Sit-Stand Transfer    Sit-Stand [place] (Transfers) minimum assist (75% patient effort);2 person assist  -MS     Assistive Device (Sit-Stand Transfers) walker, front-wheeled  -MS       Row Name 05/31/25 1527          Gait/Stairs (Locomotion)    [place] Level (Gait) contact guard;2 person assist  -MS     Assistive Device (Gait) walker,  front-wheeled  -MS     Distance in Feet (Gait) 30  -MS     Deviations/Abnormal Patterns (Gait) jah decreased  -MS     Bilateral Gait Deviations forward flexed posture  -MS     Comment, (Gait/Stairs) Verbal/tactile cues given for posture correction and Rwx guidance.  -MS               User Key  (r) = Recorded By, (t) = Taken By, (c) = Cosigned By      Initials Name Provider Type    Shaquille Maynard ELIZABETH, PT Physical Therapist                   Obj/Interventions       Row Name 05/31/25 1527          Range of Motion Comprehensive    Comment, General Range of Motion BUE/LE (WFL's)  -MS       Row Name 05/31/25 1527          Strength Comprehensive (MMT)    Comment, General Manual Muscle Testing (MMT) Assessment BUE/LE (3+/5)  -MS               User Key  (r) = Recorded By, (t) = Taken By, (c) = Cosigned By      Initials Name Provider Type    Shaquille Maynard L, PT Physical Therapist                   Goals/Plan       Row Name 05/31/25 1528          Bed Mobility Goal 1 (PT)    Activity/Assistive Device (Bed Mobility Goal 1, PT) bed mobility activities, all  -MS     [place] Level/Cues Needed (Bed Mobility Goal 1, PT) independent  -MS     Time Frame (Bed Mobility Goal 1, PT) long term goal (LTG);1 week  -MS       Row Name 05/31/25 1528          Transfer Goal 1 (PT)    Activity/Assistive Device (Transfer Goal 1, PT) transfers, all  -MS     [place] Level/Cues Needed (Transfer Goal 1, PT) independent  -MS     Time Frame (Transfer Goal 1, PT) long term goal (LTG);1 week  -MS       Row Name 05/31/25 1528          Gait Training Goal 1 (PT)    Activity/Assistive Device (Gait Training Goal 1, PT) gait (walking locomotion)  With or without AAD  -MS     [place] Level (Gait Training Goal 1, PT) standby assist  -MS     Distance (Gait Training Goal 1, PT) 100 feet  -MS     Time Frame (Gait Training Goal 1, PT) long term goal (LTG);1 week  -MS       Row Name 05/31/25 1528          Therapy Assessment/Plan (PT)    Planned  Therapy Interventions (PT) balance training;bed mobility training;gait training;home exercise program;patient/family education;postural re-education;transfer training;strengthening  -MS               User Key  (r) = Recorded By, (t) = Taken By, (c) = Cosigned By      Initials Name Provider Type    Shaquille Maynard, PT Physical Therapist                   Clinical Impression       Row Name 05/31/25 1528          Pain    Pretreatment Pain Rating 0/10 - no pain  -MS     Posttreatment Pain Rating 0/10 - no pain  -MS       Row Name 05/31/25 1528          Plan of Care Review    Plan of Care Reviewed With family;patient  -MS       Row Name 05/31/25 1528          Therapy Assessment/Plan (PT)    Rehab Potential (PT) good  -MS     Criteria for Skilled Interventions Met (PT) skilled treatment is necessary  -MS     Therapy Frequency (PT) 6 times/wk  -MS       Row Name 05/31/25 1528          Positioning and Restraints    Pre-Treatment Position in bed  -MS     Post Treatment Position bed  -MS     In Bed supine;call light within reach;encouraged to call for assist;exit alarm on;with family/caregiver;notified nsg  All lines intact. V.S.S.  -MS               User Key  (r) = Recorded By, (t) = Taken By, (c) = Cosigned By      Initials Name Provider Type    Shaquille Maynard, PT Physical Therapist                   Outcome Measures       Row Name 05/31/25 1529          How much help from another person do you currently need...    Turning from your back to your side while in flat bed without using bedrails? 3  -MS     Moving from lying on back to sitting on the side of a flat bed without bedrails? 3  -MS     Moving to and from a bed to a chair (including a wheelchair)? 3  -MS     Standing up from a chair using your arms (e.g., wheelchair, bedside chair)? 2  -MS     Climbing 3-5 steps with a railing? 2  -MS     To walk in hospital room? 3  -MS     AM-PAC 6 Clicks Score (PT) 16  -MS     Highest Level of Mobility Goal Stand (1 or  More Minutes)-5  -MS       Row Name 05/31/25 1529 05/31/25 1457       Functional Assessment    Outcome Measure Options AM-PAC 6 Clicks Basic Mobility (PT)  -MS AM-PAC 6 Clicks Daily Activity (OT)  -RE              User Key  (r) = Recorded By, (t) = Taken By, (c) = Cosigned By      Initials Name Provider Type    RE Alfreda King OTR Occupational Therapist    MS DonnellyShaquille ohara, PT Physical Therapist                                 Physical Therapy Education       Title: PT OT SLP Therapies (In Progress)       Topic: Physical Therapy (In Progress)       Point: Mobility training (Done)       Learning Progress Summary            Patient Acceptance, E,D, VU,NR by MS at 5/31/2025 1529                      Point: Home exercise program (Not Started)       Learner Progress:  Not documented in this visit.              Point: Body mechanics (Done)       Learning Progress Summary            Patient Acceptance, E,D, VU,NR by MS at 5/31/2025 1529                      Point: Precautions (Done)       Learning Progress Summary            Patient Acceptance, E,D, VU,NR by MS at 5/31/2025 1529                                      User Key       Initials Effective Dates Name Provider Type Discipline    MS 06/16/21 -  Shaquille Donnelly PT Physical Therapist PT                  PT Recommendation and Plan  Planned Therapy Interventions (PT): balance training, bed mobility training, gait training, home exercise program, patient/family education, postural re-education, transfer training, strengthening  Outcome Evaluation: Pt. is an 85 year old Female admitted to the hospital  with PNA and generalized weakness.  Pt. reports that prior to admission she was independent with functional mobility and used no A.D. for ambulation  Pt. currently presents with decreased strength, decreased balance, and decreased tolerance to functional activity. This PM, pt. able to ambulate 30 feet, CGA x 2, with use of Rwx. Pt. requires Min. assist x 1 for bed  mobility and Min. assist x 2 for sit <-> stand transfers.  Verbal/tactile cues given for posture correction and Rwx guidance during ambulation.  Limited in gait distance due to overall fatigue.  Pt. will benefit from skilled inpt. P.T. to address her functional deficits and to assist pt. in regaining her maximum level of independence with functional mobility.     Time Calculation:         PT Charges       Row Name 05/31/25 1532 05/31/25 1501          Time Calculation    Start Time 1345  -MS --     Stop Time 1400  -MS --     Time Calculation (min) 15 min  -MS --     PT Received On 05/31/25  -MS --     PT - Next Appointment 06/02/25  -MS 06/02/25  -RE     PT Goal Re-Cert Due Date 06/07/25  -MS --        Time Calculation- PT    Total Timed Code Minutes- PT 14 minute(s)  -MS --               User Key  (r) = Recorded By, (t) = Taken By, (c) = Cosigned By      Initials Name Provider Type    RE Alfreda King, OTR Occupational Therapist    Shaquille Maynard, PT Physical Therapist                  Therapy Charges for Today       Code Description Service Date Service Provider Modifiers Qty    85369381523 HC PT EVAL MOD COMPLEXITY 2 5/31/2025 Shaquille Donnelly, PT GP 1    80773645410 HC PT THERAPEUTIC ACT EA 15 MIN 5/31/2025 Shaquille Donnelly, PT GP 1    09330733172 HC PT THER SUPP EA 15 MIN 5/31/2025 Shaquille Donnelly, PT GP 1            PT G-Codes  Outcome Measure Options: AM-PAC 6 Clicks Basic Mobility (PT)  AM-PAC 6 Clicks Score (PT): 16  AM-PAC 6 Clicks Score (OT): 9  PT Discharge Summary  Anticipated Discharge Disposition (PT): home with assist, home with home health    Shaquille Donnelly, PT  5/31/2025

## 2025-06-01 LAB
ALBUMIN SERPL-MCNC: 2.6 G/DL (ref 3.5–5.2)
ALBUMIN/GLOB SERPL: 0.9 G/DL
ALP SERPL-CCNC: 83 U/L (ref 39–117)
ALT SERPL W P-5'-P-CCNC: 42 U/L (ref 1–33)
ANION GAP SERPL CALCULATED.3IONS-SCNC: 11.4 MMOL/L (ref 5–15)
AST SERPL-CCNC: 51 U/L (ref 1–32)
BILIRUB SERPL-MCNC: 0.3 MG/DL (ref 0–1.2)
BUN SERPL-MCNC: 22 MG/DL (ref 8–23)
BUN/CREAT SERPL: 21.6 (ref 7–25)
CALCIUM SPEC-SCNC: 7.9 MG/DL (ref 8.6–10.5)
CHLORIDE SERPL-SCNC: 103 MMOL/L (ref 98–107)
CO2 SERPL-SCNC: 18.6 MMOL/L (ref 22–29)
CREAT SERPL-MCNC: 1.02 MG/DL (ref 0.57–1)
DEPRECATED RDW RBC AUTO: 43.6 FL (ref 37–54)
EGFRCR SERPLBLD CKD-EPI 2021: 54.4 ML/MIN/1.73
ERYTHROCYTE [DISTWIDTH] IN BLOOD BY AUTOMATED COUNT: 12.5 % (ref 12.3–15.4)
GLOBULIN UR ELPH-MCNC: 2.9 GM/DL
GLUCOSE SERPL-MCNC: 100 MG/DL (ref 65–99)
HCT VFR BLD AUTO: 29.2 % (ref 34–46.6)
HGB BLD-MCNC: 9.6 G/DL (ref 12–15.9)
MCH RBC QN AUTO: 31.6 PG (ref 26.6–33)
MCHC RBC AUTO-ENTMCNC: 32.9 G/DL (ref 31.5–35.7)
MCV RBC AUTO: 96.1 FL (ref 79–97)
PLATELET # BLD AUTO: 168 10*3/MM3 (ref 140–450)
PMV BLD AUTO: 11.3 FL (ref 6–12)
POTASSIUM SERPL-SCNC: 3.6 MMOL/L (ref 3.5–5.2)
POTASSIUM SERPL-SCNC: 4.7 MMOL/L (ref 3.5–5.2)
PROT SERPL-MCNC: 5.5 G/DL (ref 6–8.5)
RBC # BLD AUTO: 3.04 10*6/MM3 (ref 3.77–5.28)
SODIUM SERPL-SCNC: 133 MMOL/L (ref 136–145)
WBC NRBC COR # BLD AUTO: 6.6 10*3/MM3 (ref 3.4–10.8)

## 2025-06-01 PROCEDURE — 25010000002 ENOXAPARIN PER 10 MG: Performed by: INTERNAL MEDICINE

## 2025-06-01 PROCEDURE — 84132 ASSAY OF SERUM POTASSIUM: CPT | Performed by: INTERNAL MEDICINE

## 2025-06-01 PROCEDURE — 85027 COMPLETE CBC AUTOMATED: CPT | Performed by: INTERNAL MEDICINE

## 2025-06-01 PROCEDURE — 80053 COMPREHEN METABOLIC PANEL: CPT | Performed by: INTERNAL MEDICINE

## 2025-06-01 RX ORDER — POTASSIUM CHLORIDE 1500 MG/1
40 TABLET, EXTENDED RELEASE ORAL EVERY 4 HOURS
Status: COMPLETED | OUTPATIENT
Start: 2025-06-01 | End: 2025-06-01

## 2025-06-01 RX ADMIN — ACETAMINOPHEN 650 MG: 325 TABLET ORAL at 16:55

## 2025-06-01 RX ADMIN — PANTOPRAZOLE SODIUM 40 MG: 40 TABLET, DELAYED RELEASE ORAL at 06:42

## 2025-06-01 RX ADMIN — Medication 10 ML: at 20:02

## 2025-06-01 RX ADMIN — ACETAMINOPHEN 650 MG: 325 TABLET ORAL at 23:44

## 2025-06-01 RX ADMIN — POTASSIUM CHLORIDE 40 MEQ: 1500 TABLET, EXTENDED RELEASE ORAL at 08:33

## 2025-06-01 RX ADMIN — ENOXAPARIN SODIUM 30 MG: 100 INJECTION SUBCUTANEOUS at 16:56

## 2025-06-01 RX ADMIN — Medication 10 ML: at 08:34

## 2025-06-01 RX ADMIN — LEVOTHYROXINE SODIUM 75 MCG: 0.07 TABLET ORAL at 06:42

## 2025-06-01 RX ADMIN — POTASSIUM CHLORIDE 40 MEQ: 1500 TABLET, EXTENDED RELEASE ORAL at 14:08

## 2025-06-01 NOTE — PLAN OF CARE
Goal Outcome Evaluation:  Plan of Care Reviewed With: patient, child        Progress: improving  Outcome Evaluation: Pt A&Ox4, family visited, room air, assist x1 and gait belt to toilet, weekness noted with any exertion, KCL replace this shift, reg diet with thins- unable to swallow large pills, MD aware

## 2025-06-01 NOTE — PLAN OF CARE
Problem: Adult Inpatient Plan of Care  Goal: Plan of Care Review  Flowsheets (Taken 6/1/2025 0543)  Progress: improving  Outcome Evaluation: No changes. Very pleasant. VSS. AOx4. Less lethargic this shift. Medicated for pain per MAR. Makes needs known. Call light within reach.  Plan of Care Reviewed With: patient  Goal: Absence of Hospital-Acquired Illness or Injury  Intervention: Identify and Manage Fall Risk  Flowsheets  Taken 6/1/2025 0300 by Amie Renee PCT  Safety Promotion/Fall Prevention:   safety round/check completed   room organization consistent   nonskid shoes/slippers when out of bed   clutter free environment maintained   assistive device/personal items within reach  Taken 6/1/2025 0238 by Bud Harman, RN  Safety Promotion/Fall Prevention:   activity supervised   assistive device/personal items within reach   clutter free environment maintained   fall prevention program maintained   lighting adjusted   nonskid shoes/slippers when out of bed   room organization consistent   safety round/check completed  Taken 6/1/2025 0014 by Bud Harman, RN  Safety Promotion/Fall Prevention:   activity supervised   assistive device/personal items within reach   clutter free environment maintained   fall prevention program maintained   lighting adjusted   nonskid shoes/slippers when out of bed   room organization consistent   safety round/check completed  Taken 5/31/2025 2213 by Bud Harman, RN  Safety Promotion/Fall Prevention:   activity supervised   assistive device/personal items within reach   clutter free environment maintained   fall prevention program maintained   lighting adjusted   nonskid shoes/slippers when out of bed   room organization consistent   safety round/check completed  Taken 5/31/2025 2040 by Bud Harman, RN  Safety Promotion/Fall Prevention:   activity supervised   assistive device/personal items within reach   clutter free environment maintained   fall prevention program maintained   lighting  adjusted   nonskid shoes/slippers when out of bed   room organization consistent   safety round/check completed  Intervention: Prevent Skin Injury  Flowsheets  Taken 6/1/2025 0300 by Amie Renee PCT  Body Position: position changed independently  Taken 6/1/2025 0238 by Bud Harman RN  Body Position: position changed independently  Taken 6/1/2025 0014 by Bud Harman RN  Body Position: position changed independently  Taken 5/31/2025 2213 by Bud Harman RN  Body Position: position changed independently  Taken 5/31/2025 2040 by Bud Harman RN  Body Position: position changed independently  Intervention: Prevent Infection  Flowsheets (Taken 5/31/2025 2332 by Amie Renee PCT)  Infection Prevention:   environmental surveillance performed   hand hygiene promoted   rest/sleep promoted   single patient room provided  Goal: Optimal Comfort and Wellbeing  Intervention: Provide Person-Centered Care  Flowsheets (Taken 5/31/2025 2040)  Trust Relationship/Rapport:   care explained   choices provided   emotional support provided   empathic listening provided   questions answered   questions encouraged   reassurance provided   thoughts/feelings acknowledged  Goal: Readiness for Transition of Care  Intervention: Mutually Develop Transition Plan  Flowsheets  Taken 5/30/2025 1434 by Liseth Persaud RN  Transportation Anticipated: family or friend will provide  Patient/Family Anticipated Services at Transition: none  Patient/Family Anticipates Transition to: home  Taken 5/30/2025 1431 by Janak Alonzo, RN  Equipment Currently Used at Home: none     Problem: Skin Injury Risk Increased  Goal: Skin Health and Integrity  Intervention: Optimize Skin Protection  Flowsheets  Taken 6/1/2025 0300 by Amie Renee PCT  Activity Management: activity encouraged  Head of Bed (HOB) Positioning: HOB elevated  Taken 6/1/2025 0238 by Bud Harman, RN  Activity Management: activity encouraged  Head of Bed (HOB) Positioning:   HOB elevated   HOB at  20-30 degrees  Taken 6/1/2025 0014 by Bud Harman RN  Activity Management: activity encouraged  Head of Bed (HOB) Positioning: HOB elevated  Taken 5/31/2025 2213 by Bud Harman RN  Activity Management: activity encouraged  Head of Bed (HOB) Positioning:   HOB elevated   HOB at 20-30 degrees  Taken 5/31/2025 2040 by Bud Harman RN  Activity Management: activity encouraged  Head of Bed (HOB) Positioning:   HOB elevated   HOB at 20-30 degrees     Problem: Pneumonia  Goal: Effective Oxygenation and Ventilation  Intervention: Promote Airway Secretion Clearance  Flowsheets  Taken 6/1/2025 0300 by Amie Rneee PCT  Activity Management: activity encouraged  Taken 6/1/2025 0238 by Bud Harman RN  Activity Management: activity encouraged  Taken 6/1/2025 0014 by Bud Harman RN  Activity Management: activity encouraged  Taken 5/31/2025 2213 by Bud Harman RN  Activity Management: activity encouraged  Taken 5/31/2025 2040 by Bud Harman RN  Activity Management: activity encouraged  Taken 5/31/2025 0800 by Janak Alonzo RN  Cough And Deep Breathing: done with encouragement  Intervention: Optimize Oxygenation and Ventilation  Flowsheets  Taken 6/1/2025 0300 by Amie Renee PCT  Head of Bed (HOB) Positioning: HOB elevated  Taken 6/1/2025 0238 by Bud Harman RN  Head of Bed (HOB) Positioning:   HOB elevated   HOB at 20-30 degrees  Taken 6/1/2025 0014 by Bud Harman RN  Head of Bed (HOB) Positioning: HOB elevated  Taken 5/31/2025 2213 by Bud Harman RN  Head of Bed (HOB) Positioning:   HOB elevated   HOB at 20-30 degrees  Taken 5/31/2025 2040 by Bud Harman RN  Head of Bed (HOB) Positioning:   HOB elevated   HOB at 20-30 degrees     Problem: Fall Injury Risk  Goal: Absence of Fall and Fall-Related Injury  Intervention: Identify and Manage Contributors  Flowsheets  Taken 6/1/2025 0238  Medication Review/Management:   medications reviewed   high-risk medications identified  Taken 5/31/2025 2213  Medication Review/Management:    medications reviewed   high-risk medications identified  Taken 5/31/2025 2040  Medication Review/Management:   medications reviewed   high-risk medications identified  Intervention: Promote Injury-Free Environment  Flowsheets  Taken 6/1/2025 0300 by Amie Renee PCT  Safety Promotion/Fall Prevention:   safety round/check completed   room organization consistent   nonskid shoes/slippers when out of bed   clutter free environment maintained   assistive device/personal items within reach  Taken 6/1/2025 0238 by Bud Harman, RN  Safety Promotion/Fall Prevention:   activity supervised   assistive device/personal items within reach   clutter free environment maintained   fall prevention program maintained   lighting adjusted   nonskid shoes/slippers when out of bed   room organization consistent   safety round/check completed  Taken 6/1/2025 0014 by Bud Harman, GOVIND  Safety Promotion/Fall Prevention:   activity supervised   assistive device/personal items within reach   clutter free environment maintained   fall prevention program maintained   lighting adjusted   nonskid shoes/slippers when out of bed   room organization consistent   safety round/check completed  Taken 5/31/2025 2213 by Bud Harman, GOVIND  Safety Promotion/Fall Prevention:   activity supervised   assistive device/personal items within reach   clutter free environment maintained   fall prevention program maintained   lighting adjusted   nonskid shoes/slippers when out of bed   room organization consistent   safety round/check completed  Taken 5/31/2025 2040 by Bud Harman, RN  Safety Promotion/Fall Prevention:   activity supervised   assistive device/personal items within reach   clutter free environment maintained   fall prevention program maintained   lighting adjusted   nonskid shoes/slippers when out of bed   room organization consistent   safety round/check completed     Problem: Sepsis/Septic Shock  Goal: Optimal Coping  Intervention: Support Patient and  Family Response  Flowsheets (Taken 5/31/2025 2040)  Supportive Measures:   active listening utilized   relaxation techniques promoted  Family/Support System Care:   self-care encouraged   support provided  Goal: Absence of Infection Signs and Symptoms  Intervention: Initiate Sepsis Management  Flowsheets (Taken 5/31/2025 2332 by Amie Renee PCT)  Infection Prevention:   environmental surveillance performed   hand hygiene promoted   rest/sleep promoted   single patient room provided  Intervention: Promote Recovery  Flowsheets  Taken 6/1/2025 0300 by Amie Renee PCT  Activity Management: activity encouraged  Taken 6/1/2025 0238 by Bud Harman, RN  Activity Management: activity encouraged  Taken 6/1/2025 0014 by Bud Harman, RN  Activity Management: activity encouraged  Taken 5/31/2025 2213 by Bud Harman RN  Activity Management: activity encouraged  Taken 5/31/2025 2040 by Bud Harman, GOVIND  Activity Management: activity encouraged  Sleep/Rest Enhancement:   awakenings minimized   relaxation techniques promoted   Goal Outcome Evaluation:  Plan of Care Reviewed With: patient        Progress: improving  Outcome Evaluation: No changes. Very pleasant. VSS. AOx4. Less lethargic this shift. Medicated for pain per MAR. Makes needs known. Call light within reach.

## 2025-06-01 NOTE — PROGRESS NOTES
Name: Bailee Garza ADMIT: 2025   : 1940  PCP: Eddie Araya MD    MRN: [id number] LOS: 1 days   AGE/SEX: 85 y.o. female  ROOM: [facility]   Subjective   Chief Complaint   Patient presents with    Weakness - Generalized    Vomiting     Dyspnea fair  Cough better  More alert the past two days  Worked with therapy yesterday, weaker than baseline but getting stronger  On abx changed to levaquin  S/p IVFs  On RA    ROS  - f/c  No n/v  No cp/palp  + soa/cough    Objective   Vital Signs  Temp:  [97.9 °F (36.6 °C)-98.6 °F (37 °C)] 97.9 °F (36.6 °C)  Heart Rate:  [77-82] 77  Resp:  [18] 18  BP: (108-109)/(52-59) 109/59  SpO2:  [95 %] 95 %  on   ;   Device (Oxygen Therapy): room air  Body mass index is 19.53 kg/m².    Physical Exam  Constitutional:       General: She is not in acute distress.     Appearance: She is ill-appearing (mildly).   HENT:      Head: Normocephalic and atraumatic.   Eyes:      General: No scleral icterus.  Cardiovascular:      Rate and Rhythm: Regular rhythm.      Heart sounds: Normal heart sounds.   Pulmonary:      Effort: Pulmonary effort is normal. No respiratory distress.      Breath sounds: No rhonchi (slight).   Abdominal:      General: There is no distension.      Palpations: Abdomen is soft.   Musculoskeletal:      Cervical back: Neck supple.   Neurological:      Mental Status: She is alert.   Psychiatric:         Behavior: Behavior normal.     More alert today than admission     Results Review:       I reviewed the patient's new clinical results.  Results from last 7 days   Lab Units 25  0346 25  0640 25  1133   WBC 10*3/mm3 6.60 11.87* 13.98*   HEMOGLOBIN g/dL 9.6* 10.7* 11.3*   PLATELETS 10*3/mm3 168 188 205     Results from last 7 days   Lab Units 25  0346 25  0640 25  1133   SODIUM mmol/L 133* 135* 134*   POTASSIUM mmol/L 3.6 4.3 4.2   CHLORIDE mmol/L 103 100 97*   CO2 mmol/L 18.6* 19.0* 21.4*   BUN mg/dL 22.0 31.0* 41.0*   CREATININE  "mg/dL 1.02* 1.17* 1.56*   GLUCOSE mg/dL 100* 99 148*   Estimated Creatinine Clearance: 32.4 mL/min (A) (by C-G formula based on SCr of 1.02 mg/dL (H)).  Results from last 7 days   Lab Units 06/01/25  0346 05/31/25  0640 05/30/25  1133   ALBUMIN g/dL 2.6* 3.4* 3.9   BILIRUBIN mg/dL 0.3 0.3 0.4   ALK PHOS U/L 83 106 121*   AST (SGOT) U/L 51* 52* 85*   ALT (SGPT) U/L 42* 48* 74*     Results from last 7 days   Lab Units 06/01/25  0346 05/31/25  0640 05/30/25  1133   CALCIUM mg/dL 7.9* 8.7 9.4   ALBUMIN g/dL 2.6* 3.4* 3.9     Results from last 7 days   Lab Units 05/30/25  1133   PROCALCITONIN ng/mL 1.94*   LACTATE mmol/L 1.2       Coag     HbA1C   Lab Results   Component Value Date    HGBA1C 5.20 09/24/2024     Infection   Results from last 7 days   Lab Units 05/30/25  1251 05/30/25  1242 05/30/25  1133   BLOODCX  No growth at 2 days No growth at 2 days  --    PROCALCITONIN ng/mL  --   --  1.94*     Radiology(recent) No radiology results for the last day    HS Troponin T   Date Value Ref Range Status   05/30/2025 27 (H) <14 ng/L Final   05/30/2025 27 (H) <14 ng/L Final     No components found for: \"TSH;2\"    enoxaparin sodium, 30 mg, Subcutaneous, Q24H  levoFLOXacin, 750 mg, Intravenous, Q48H  levothyroxine, 75 mcg, Oral, QAM AC  pantoprazole, 40 mg, Oral, Q AM  potassium chloride ER, 40 mEq, Oral, Q4H  sodium chloride, 10 mL, Intravenous, Q12H         Diet: Regular/House; Fluid Consistency: Thin (IDDSI 0)      Assessment & Plan      Active Hospital Problems    Diagnosis  POA    **Legionella pneumonia [A48.1]  Yes    Sepsis due to pneumonia [J18.9, A41.9]  Yes    Dehydration [E86.0]  Yes    Stage 3 chronic kidney disease [N18.30]  Yes    Essential hypertension [I10]  Yes      Resolved Hospital Problems   No resolved problems to display.       Bailee Garza is a 85 y.o. female who presents to the ED c/o acute generalized weakness and cough. She has been dealing with a cough the past few months but more lethargic the " past few days. Yesterday at PCP office given IM rocephin and ceftin but continued to feel worse and lethargic and N/V.  Came to [facility] ER. BUN 41/Cr 1.5. Elevated WBC and procal. Given IVFs and ceftriaxone in ER.      Legionella PNA. H/o azithromycin allergy. Changed abx to levaquin. Renal fx improved but will have pharmacy to dose  Monitor labs. Renal fx improved. Monitor for urinary retention. Can stop IVFs now with improved labs  PT/OT have seen.  I discussed the patients findings and my recommendations with patient, family, and consulting provider.     VTE Prophylaxis - SCDs.   lovenox      DW RN  DW family  She is overall improving. Plan to go home with HH but still a little too weak to get up stairs today they want to make sure she is stronger with plans for likely dc to home 6/2.       Anastacio Banuelos MD  [facility]  06/01/25  14:47 EDT

## 2025-06-02 ENCOUNTER — READMISSION MANAGEMENT (OUTPATIENT)
Dept: CALL CENTER | Facility: HOSPITAL | Age: 85
End: 2025-06-02
Payer: MEDICARE

## 2025-06-02 ENCOUNTER — APPOINTMENT (OUTPATIENT)
Dept: GENERAL RADIOLOGY | Facility: HOSPITAL | Age: 85
End: 2025-06-02
Payer: MEDICARE

## 2025-06-02 VITALS
WEIGHT: 110.23 LBS | HEIGHT: 63 IN | RESPIRATION RATE: 18 BRPM | SYSTOLIC BLOOD PRESSURE: 114 MMHG | HEART RATE: 83 BPM | OXYGEN SATURATION: 96 % | TEMPERATURE: 97.5 F | DIASTOLIC BLOOD PRESSURE: 64 MMHG | BODY MASS INDEX: 19.53 KG/M2

## 2025-06-02 LAB
ANION GAP SERPL CALCULATED.3IONS-SCNC: 10 MMOL/L (ref 5–15)
BUN SERPL-MCNC: 19 MG/DL (ref 8–23)
BUN/CREAT SERPL: 18.8 (ref 7–25)
CALCIUM SPEC-SCNC: 9 MG/DL (ref 8.6–10.5)
CHLORIDE SERPL-SCNC: 104 MMOL/L (ref 98–107)
CO2 SERPL-SCNC: 21 MMOL/L (ref 22–29)
CREAT SERPL-MCNC: 1.01 MG/DL (ref 0.57–1)
DEPRECATED RDW RBC AUTO: 42.9 FL (ref 37–54)
EGFRCR SERPLBLD CKD-EPI 2021: 55 ML/MIN/1.73
ERYTHROCYTE [DISTWIDTH] IN BLOOD BY AUTOMATED COUNT: 12.4 % (ref 12.3–15.4)
GLUCOSE SERPL-MCNC: 98 MG/DL (ref 65–99)
HCT VFR BLD AUTO: 31.6 % (ref 34–46.6)
HGB BLD-MCNC: 10.4 G/DL (ref 12–15.9)
MAGNESIUM SERPL-MCNC: 1.9 MG/DL (ref 1.6–2.4)
MCH RBC QN AUTO: 31.3 PG (ref 26.6–33)
MCHC RBC AUTO-ENTMCNC: 32.9 G/DL (ref 31.5–35.7)
MCV RBC AUTO: 95.2 FL (ref 79–97)
PHOSPHATE SERPL-MCNC: 1.5 MG/DL (ref 2.5–4.5)
PLATELET # BLD AUTO: 210 10*3/MM3 (ref 140–450)
PMV BLD AUTO: 11 FL (ref 6–12)
POTASSIUM SERPL-SCNC: 4.5 MMOL/L (ref 3.5–5.2)
RBC # BLD AUTO: 3.32 10*6/MM3 (ref 3.77–5.28)
SODIUM SERPL-SCNC: 135 MMOL/L (ref 136–145)
WBC NRBC COR # BLD AUTO: 5.89 10*3/MM3 (ref 3.4–10.8)

## 2025-06-02 PROCEDURE — 25010000002 LEVOFLOXACIN PER 250 MG: Performed by: INTERNAL MEDICINE

## 2025-06-02 PROCEDURE — 25810000003 SODIUM CHLORIDE 0.9 % SOLUTION: Performed by: HOSPITALIST

## 2025-06-02 PROCEDURE — 80048 BASIC METABOLIC PNL TOTAL CA: CPT | Performed by: INTERNAL MEDICINE

## 2025-06-02 PROCEDURE — 85027 COMPLETE CBC AUTOMATED: CPT | Performed by: INTERNAL MEDICINE

## 2025-06-02 PROCEDURE — 84100 ASSAY OF PHOSPHORUS: CPT | Performed by: INTERNAL MEDICINE

## 2025-06-02 PROCEDURE — 83735 ASSAY OF MAGNESIUM: CPT | Performed by: INTERNAL MEDICINE

## 2025-06-02 RX ORDER — LEVOFLOXACIN 750 MG/1
750 TABLET, FILM COATED ORAL DAILY
Qty: 5 TABLET | Refills: 0 | Status: SHIPPED | OUTPATIENT
Start: 2025-06-02 | End: 2025-06-11

## 2025-06-02 RX ADMIN — PANTOPRAZOLE SODIUM 40 MG: 40 TABLET, DELAYED RELEASE ORAL at 06:52

## 2025-06-02 RX ADMIN — LEVOTHYROXINE SODIUM 75 MCG: 0.07 TABLET ORAL at 06:52

## 2025-06-02 RX ADMIN — SODIUM PHOSPHATE, MONOBASIC, MONOHYDRATE AND SODIUM PHOSPHATE, DIBASIC, ANHYDROUS 15 MMOL: 276; 142 INJECTION, SOLUTION INTRAVENOUS at 09:21

## 2025-06-02 RX ADMIN — LEVOFLOXACIN 750 MG: 5 INJECTION, SOLUTION INTRAVENOUS at 13:38

## 2025-06-02 RX ADMIN — Medication 10 ML: at 09:21

## 2025-06-02 NOTE — SIGNIFICANT NOTE
06/02/25 1220   OTHER   Discipline speech language pathologist   Rehab Time/Intention   Session Not Performed other (see comments)  (Patient refused transport for VFSS. Noted plan for DC this date, can be completed as outpatient if patient agreeable.)

## 2025-06-02 NOTE — CASE MANAGEMENT/SOCIAL WORK
Discharge Planning Assessment  [facility]     Patient Name: Bailee Garza  MRN: [id number]  Today's Date: 6/2/2025    Admit Date: 5/30/2025    Plan: Home with son   Discharge Needs Assessment       Row Name 06/02/25 1151       Living Environment    People in Home child(bhupinder), adult    Name(s) of People in Home Christopher    Current Living Arrangements home    Potentially Unsafe Housing Conditions none    Primary Care Provided by self    Family Caregiver if Needed child(bhupinder), adult    Quality of Family Relationships involved;helpful    Able to Return to Prior Arrangements yes       Resource/Environmental Concerns    Resource/Environmental Concerns home accessibility    Home Accessibility Concerns stairs to access bedroom or bathroom  planng on staying on 1st floor until better.    Transportation Concerns none       Transition Planning    Patient/Family Anticipates Transition to home with family;home with help/services    Patient/Family Anticipated Services at Transition home health care    Transportation Anticipated family or friend will provide       Discharge Needs Assessment    Readmission Within the Last 30 Days no previous admission in last 30 days    Equipment Currently Used at Home shower chair;grab bar;walker, rolling    Anticipated Changes Related to Illness none    Equipment Needed After Discharge none                   Discharge Plan       Row Name 06/02/25 1151       Plan    Plan Home with son    Patient/Family in Agreement with Plan yes    Plan Comments Spoke with pt at bedside, introduced self and explained CCP role and verified the face sheet and pharmacy information.  Pt lives with son Christopher in a 2-level home with 3 EDUARDO, she is staying on 1st level, she is IADL's, uses rwx, s/c and gb, she cannot recall HH history of the and has been to [facility]. She would like HH at [place], notified [facility] RN/Meg of need, she will manage HH needs. Pt plans home with dtr to transport, denies other needs. CCP will  follow - Rody ESPARZA                  Continued Care and Services - Admitted Since 5/30/2025       Home Medical Care       Service Provider Request Status Services Address Phone Fax Patient Preferred    [facility] [address], [address], [place] [address] [phone] --                  Selected Continued Care - Episodes Includes continued care and service providers with selected services from the active episodes listed below          Expected Discharge Date and Time       Expected Discharge Date Expected Discharge Time    Jun 2, 2025            Demographic Summary       Row Name 06/02/25 1151       General Information    Admission Type inpatient                   Functional Status       Row Name 06/02/25 1151       Functional Status    Usual Activity Tolerance excellent    Current Activity Tolerance good       Assessment of Health Literacy    Health Literacy Good       Functional Status, IADL    Medications independent    Meal Preparation independent    Housekeeping independent    Laundry assistive person    Shopping assistive person       Mental Status    General Appearance WDL WDL       Mental Status Summary    Recent Changes in Mental Status/Cognitive Functioning no changes                   Psychosocial    No documentation.                  Abuse/Neglect    No documentation.                  Legal       Row Name 06/02/25 1151       Financial/Legal    Who Manages Finances if Patient Unable children                   Substance Abuse    No documentation.                  Patient Forms    No documentation.                     Rody Edwards RN

## 2025-06-02 NOTE — CASE MANAGEMENT/SOCIAL WORK
Continued Stay Note  [facility]     Patient Name: Bailee Garza  MRN: [id number]  Today's Date: 6/2/2025    Admit Date: 5/30/2025    Plan: Home with [facility]   Discharge Plan       Row Name 06/02/25 1237       Plan    Plan Home with [facility]    Final Note Daughter and patient chose [facility] and referral accepted by [facility] for SN,PT and OT.  Orders obtained from Dr Araya, PCP.      Row Name 06/02/25 1151       Plan    Plan Home with son    Patient/Family in Agreement with Plan yes    Plan Comments Spoke with pt at bedside, introduced self and explained CCP role and verified the face sheet and pharmacy information.  Pt lives with son Christopher in a 2-level home with 3 EDUARDO, she is staying on 1st level, she is IADL's, uses rwx, s/c and gb, she cannot recall HH history of the and has been to [facility]. She would like HH at [place], notified [facility] GOVIND/Meg of need, she will manage HH needs. Pt plans home with dtr to transport, denies other needs. CCP will follow - Rody S.                   Discharge Codes    No documentation.                 Expected Discharge Date and Time       Expected Discharge Date Expected Discharge Time    Jun 2, 2025               Meg Matt RN

## 2025-06-02 NOTE — DISCHARGE SUMMARY
Patient Name: Bailee Garza  : 1940  MRN: [id number]    Date of Admission: 2025  Date of Discharge:  2025  Primary Care Physician: Eddie Araya MD      Chief Complaint:   Weakness - Generalized and Vomiting      Discharge Diagnoses     Active Hospital Problems    Diagnosis  POA    **Legionella pneumonia [A48.1]  Yes    Sepsis due to pneumonia [J18.9, A41.9]  Yes    Dehydration [E86.0]  Yes    Stage 3 chronic kidney disease [N18.30]  Yes    Essential hypertension [I10]  Yes      Resolved Hospital Problems   No resolved problems to display.        Hospital Course     Bailee Garza is a 85 y.o. female who presents to the ED c/o acute generalized weakness and cough. She has been dealing with a cough the past few months but more lethargic the past few days.  The day prior to admission at PCP office given IM rocephin and ceftin but continued to feel worse and lethargic with N/V.  She presented to the emergency room was found to have elevated creatinine and elevated white blood cell count and procalcitonin.  She was given IV fluids and started on IV antibiotics.  Legionella antigen was positive and she was transition to oral Levaquin at discharge.      Day of Discharge     Subjective:  Feels okay today.  Still weak but feels stronger than she has in the last few days.  Denies any new issues or complaints right now she is eager to go home today    Physical Exam:  Temp:  [97.9 °F (36.6 °C)-98.4 °F (36.9 °C)] 98.2 °F (36.8 °C)  Heart Rate:  [65-79] 71  Resp:  [18] 18  BP: ()/(54-66) 106/61  Body mass index is 19.53 kg/m².  Physical Exam  Frail chronic ill-appearing  Consultants     Consult Orders (all) (From admission, onward)       Start     Ordered    25 1233  [facility] (on-call MD unless specified) Details  Once        Specialty:  Hospitalist  Provider:  Anastacio Banuelos MD    25 1232                  Procedures     * Surgery not found *    Imaging Results (All)       Procedure  Component Value Units Date/Time    XR Chest 1 View [[id number]] Collected: 05/30/25 1159     Updated: 05/30/25 1206    Narrative:      XR CHEST 1 VW-, XR CHEST PA AND LATERAL-     INDICATION: Weakness     COMPARISON: Chest radiographs dating back to 12/22/2017       Impression:      From 5/29/2025 to 5/30/2025, increasing hazy right upper  lobe opacity, suspicious for pneumonitis/pneumonia. Recommend imaging  follow-up to ensure clearance. No pleural effusion or pneumothorax.  Normal size cardiac silhouette. No focal osseous abnormality.     This report was finalized on 5/30/2025 12:03 PM by Dr. Shaquille Tsai M.D on Workstation: [id number]             Results for orders placed during the hospital encounter of 09/23/24    Duplex Venous Lower Extremity - Bilateral CAR    Interpretation Summary    Normal bilateral lower extremity venous duplex scan.    Results for orders placed during the hospital encounter of 09/23/24    Adult Transthoracic Echo Complete W/ Cont if Necessary Per Protocol    Interpretation Summary    Left ventricular systolic function is normal. Calculated left ventricular EF = 64.5%    Left ventricular diastolic function was normal.    Estimated right ventricular systolic pressure from tricuspid regurgitation is moderately elevated (45-55 mmHg). Calculated right ventricular systolic pressure from tricuspid regurgitation is 47 mmHg.    Mild to moderate pulmonary hypertension is present.    Pertinent Labs     Results from last 7 days   Lab Units 06/02/25  0434 06/01/25  0346 05/31/25  0640 05/30/25  1133   WBC 10*3/mm3 5.89 6.60 11.87* 13.98*   HEMOGLOBIN g/dL 10.4* 9.6* 10.7* 11.3*   PLATELETS 10*3/mm3 210 168 188 205     Results from last 7 days   Lab Units 06/02/25  0435 06/01/25  1731 06/01/25  0346 05/31/25  0640 05/30/25  1133   SODIUM mmol/L 135*  --  133* 135* 134*   POTASSIUM mmol/L 4.5 4.7 3.6 4.3 4.2   CHLORIDE mmol/L 104  --  103 100 97*   CO2 mmol/L 21.0*  --  18.6* 19.0* 21.4*   BUN mg/dL  "19.0  --  22.0 31.0* 41.0*   CREATININE mg/dL 1.01*  --  1.02* 1.17* 1.56*   GLUCOSE mg/dL 98  --  100* 99 148*   EGFR mL/min/1.73 55.0*  --  54.4* 46.1* 32.6*     Results from last 7 days   Lab Units 06/01/25  0346 05/31/25  0640 05/30/25  1133   ALBUMIN g/dL 2.6* 3.4* 3.9   BILIRUBIN mg/dL 0.3 0.3 0.4   ALK PHOS U/L 83 106 121*   AST (SGOT) U/L 51* 52* 85*   ALT (SGPT) U/L 42* 48* 74*     Results from last 7 days   Lab Units 06/02/25  0435 06/01/25 0346 05/31/25  0640 05/30/25  1133   CALCIUM mg/dL 9.0 7.9* 8.7 9.4   ALBUMIN g/dL  --  2.6* 3.4* 3.9   MAGNESIUM mg/dL 1.9  --   --   --    PHOSPHORUS mg/dL 1.5*  --   --   --        Results from last 7 days   Lab Units 05/30/25  1242 05/30/25  1133   HSTROP T ng/L 27* 27*   PROBNP pg/mL  --  654.0           Invalid input(s): \"LDLCALC\"  Results from last 7 days   Lab Units 05/30/25  2309 05/30/25  1251 05/30/25  1242   BLOODCX   --  No growth at 2 days No growth at 2 days   MRSAPCR  No MRSA Detected  --   --      Results from last 7 days   Lab Units 05/30/25  1139   COVID19  Not Detected       Test Results Pending at Discharge     Pending Results       Procedure [Order ID] Specimen - Date/Time    FL Video Swallow Single Contrast [[id number]]     Respiratory Culture - Sputum, Cough [[id number]]     Specimen: Sputum from Cough               Discharge Details        Discharge Medications        New Medications        Instructions Start Date   levoFLOXacin 750 MG tablet  Commonly known as: LEVAQUIN   750 mg, Oral, Daily             Continue These Medications        Instructions Start Date   acetaminophen 325 MG tablet  Commonly known as: Tylenol   650 mg, Oral, Every 6 Hours PRN      metroNIDAZOLE 0.75 % cream  Commonly known as: METROCREAM   Apply 1 Application topically to the appropriate area as directed As Needed.      omeprazole 40 MG capsule  Commonly known as: priLOSEC   40 mg, Daily      Synthroid 75 MCG tablet  Generic drug: levothyroxine   75 mcg, Oral, Every " Morning Before Breakfast             Stop These Medications      cefuroxime 500 MG tablet  Commonly known as: CEFTIN     hydroCHLOROthiazide 12.5 MG tablet     valsartan 80 MG tablet  Commonly known as: DIOVAN              Allergies   Allergen Reactions    Hydrocodone Mental Status Change and Hallucinations    Ketek [Telithromycin] Hives    Nsaids Hives    Paxlovid [Nirmatrelvir-Ritonavir] Mental Status Change     insomnia    Sulfa Antibiotics Hives    Latex Rash       Discharge Disposition:  Home-Health Care Svc      Discharge Diet:  Diet Order   Procedures    Diet: Regular/House; Fluid Consistency: Thin (IDDSI 0)       Discharge Activity:   Activity Instructions       Activity as Tolerated              CODE STATUS:    Code Status and Medical Interventions: CPR (Attempt to Resuscitate); Full Support   Ordered at: 05/30/25 1412     Code Status (Patient has no pulse and is not breathing):    CPR (Attempt to Resuscitate)     Medical Interventions (Patient has pulse or is breathing):    Full Support       Future Appointments   Date Time Provider Department Center   6/2/2025 12:30 PM [facility] [place] 3 VIDEO SWALLOW BH DAR XRAY [facility]   11/12/2025 10:00 AM Fracisco Araya MD [facility] [id number] [facility]     Additional Instructions for the Follow-ups that You Need to Schedule       Ambulatory Referral to Home Health   As directed      Face to Face Visit Date: 6/2/2025   Follow-up provider for Plan of Care?: I treated the patient in an acute care facility and will not continue treatment after discharge.   Follow-up provider: FRACISCO ARAYA [[id number]]   Reason/Clinical Findings: weka after pneumonia   Describe mobility limitations that make leaving home difficult: not dribvinhg   Nursing/Therapeutic Services Requested: Physical Therapy Occupational Therapy   Frequency: 1 Week 1        Discharge Follow-up with PCP   As directed       Currently Documented PCP:    Fracisco Araya MD    PCP Phone Number:    [phone]     Follow Up Details: 1-2 weeks                Contact information for follow-up providers       Fracisco Araya MD .    Specialty: Family Medicine  Why: 1-2 weeks  Contact information:  [address]  [address]  [facility] [address]  [phone]                       Contact information for after-discharge care       Home Medical Care       [facility] .    Service: Home Rehabilitation  Contact information:  [address], [address]  [facility] [id number] [phone]                                   Additional Instructions for the Follow-ups that You Need to Schedule       Ambulatory Referral to Home Health   As directed      Face to Face Visit Date: 6/2/2025   Follow-up provider for Plan of Care?: I treated the patient in an acute care facility and will not continue treatment after discharge.   Follow-up provider: FRACISCO ARAYA [[id number]]   Reason/Clinical Findings: weka after pneumonia   Describe mobility limitations that make leaving home difficult: not dribvinhg   Nursing/Therapeutic Services Requested: Physical Therapy Occupational Therapy   Frequency: 1 Week 1        Discharge Follow-up with PCP   As directed       Currently Documented PCP:    Fracisco Araya MD    PCP Phone Number:    [phone]     Follow Up Details: 1-2 weeks            Time Spent on Discharge:  Greater than 30 minutes      Anupam Le MD  [facility]  06/02/25  11:45 EDT

## 2025-06-02 NOTE — PROGRESS NOTES
Start PACC Note    Home Health Referral    Evaluated patient and dtr on Home Care and services available. Patient offered choice of available HHC and agreeable to SN,PT,OT services with [facility].    Isolation Precautions: No active isolations    START PATIENT REGISTRATION INFORMATION  Order Information  Order Signing Physician: Anupam Le MD  Service Ordered RN?: Yes  Service Ordered PT?: Yes  Service Ordered OT?: Yes  Service Ordered ST?: No  Service Ordered MSW?: No  Service Ordered HHA?: No  Following Physician: Eddie Araya MD  Following Physician Phone: [phone]  Overseeing Physician: Eddie Araya MD  (Required for Residents Only)  Agreeable to Follow ? Yes  Date/Time of Call 06/02/25 12:34 EDT, Spoke with: per Dr Araya via epic chat gave auth for home health.    Care Coordination  Same Day SOC?: No  Primary Care Physician: Eddie Araya MD  Primary Care Physician Phone: [phone]  Primary Care Physician Address: [address] [place] [address] / [place] [address]  Visit Instructions: N/A  Service Discharge Location Type: Home  Service Facility Name: N/A  Service Floor Facility: N/A  Service Room No: N/A    Demographics  Patient Last Name: Greg  Patient First Name: Bailee  Language/Communication Barrier: none  Service Address: [address] [place] [address]  Service City: [place]  Service State: [place]  Service Zip: [address]  Service Home Phone: [phone]  Other Phone Numbers:   Telephone Information:   Mobile [phone]     Emergency Contact:   Extended Emergency Contact Information  Primary Emergency Contact: Christopher Diaz   [facility]  Home Phone: [phone]  Relation: Son  Secondary Emergency Contact: Phyllis Diaz   [facility]  Mobile Phone: [phone]  Relation: Daughter    Admission Information  Admit Date: 5/30/2025  Patient Status at Discharge: Inpatient  Admitting Diagnosis: Pneumonia [J18.9]  MIKE (acute kidney injury) [N17.9]  Pneumonia of right upper  lobe due to infectious organism [J18.9]  Legionella pneumonia [A48.1]    Caregiver Information  Caregiver First Name:   Caregiver Last Name:   Caregiver Relationship to Patient:   Caregiver Phone Number:   Caregiver Notes:     HITECH  [employer]  No      END PATIENT REGISTRATION INFORMATION    Start PACC Summary    Additional Comments: Call Dtr to schedule all visits, Phyllis listed above.  [phone].    END PACC Summary    Discharge Date: Pending    Referral Source: [facility]    Signed By: Meg Matt RN, 6/2/2025, 12:34 EDT     Date/Time: 06/02/25 12:34 EDT    End PACC Note

## 2025-06-02 NOTE — DISCHARGE PLACEMENT REQUEST
"Bailee Garza (85 y.o. Female)       Date of Birth   1940    Social Security Number       Address   [address] [place] [address]    Home Phone       MRN   [id number]       Mandaen   [employer]    Marital Status                               Admission Date   5/30/2025    Admission Type   Emergency    Admitting Provider   Anastacio Banuelos MD    Attending Provider   Anupam Le MD    Department, Room/Bed   [address], [address]1       Discharge Date       Discharge Disposition   Home-Health Care [facility]    Discharge Destination                                 Attending Provider: Anupam Le MD    Allergies: Hydrocodone, Ketek [Telithromycin], Nsaids, Paxlovid [Nirmatrelvir-ritonavir], Sulfa Antibiotics, Latex    Isolation: None   Infection: None   Code Status: CPR    Ht: 160 cm (63\")   Wt: 50 kg (110 lb 3.7 oz)    Admission Cmt: None   Principal Problem: Legionella pneumonia [A48.1]                   Active Insurance as of 5/30/2025       Primary Coverage       Payor Plan Insurance Group Employer/Plan Group    MEDICARE MEDICARE A & B        Payor Plan Address Payor Plan Phone Number Payor Plan Fax Number Effective Dates    [address] [phone]  3/1/2006 - None Entered    [facility] [address]         Subscriber Name Subscriber Birth Date Member ID       BAILEE GARZA 1940 [id number]               Secondary Coverage       Payor Plan Insurance Group Employer/Plan Group    [facility] SUPP [id number]       Payor Plan Address Payor Plan Phone Number Payor Plan Fax Number Effective Dates    [address]   5/1/2022 - None Entered    [facility] [address]         Subscriber Name Subscriber Birth Date Member ID       BAILEE GARZA 1940 [id number]                     Emergency Contacts        (Rel.) Home Phone Work Phone Mobile Phone    Christopher Diaz (Son) [phone] -- --    Phyllis Diaz (Daughter) -- -- " [phone]

## 2025-06-02 NOTE — OUTREACH NOTE
Prep Survey      Flowsheet Row Responses   [facility] patient discharged from? [place]   Is LACE score < 7 ? No   Eligibility [facility]   Date of Admission 05/30/25   Date of Discharge 06/02/25   Discharge Disposition Home-Health Care [facility]   Discharge diagnosis Sepsis due to pneumonia   Does the patient have one of the following disease processes/diagnoses(primary or secondary)? Sepsis   Does the patient have Home health ordered? Yes   What is the Home health agency?  [facility]   Prep survey completed? Yes            Samantha BARRON - Registered Nurse

## 2025-06-03 ENCOUNTER — TRANSITIONAL CARE MANAGEMENT TELEPHONE ENCOUNTER (OUTPATIENT)
Dept: CALL CENTER | Facility: HOSPITAL | Age: 85
End: 2025-06-03
Payer: MEDICARE

## 2025-06-03 ENCOUNTER — TELEPHONE (OUTPATIENT)
Dept: FAMILY MEDICINE CLINIC | Facility: CLINIC | Age: 85
End: 2025-06-03
Payer: MEDICARE

## 2025-06-03 NOTE — TELEPHONE ENCOUNTER
Diana with [facility] called to get verbal order for social work  
From: Allan Augustin LPN  To: Ember Whitt  Sent: 6/2/2021 2:26 PM EDT  Subject: test results    Chest x-ray is normal no acute issues noted
DC instructions

## 2025-06-03 NOTE — TELEPHONE ENCOUNTER
Patient son called into the office to schedule [facility] follow-up visit patient was discharge 6/2 patient was scheduled and added to the wait list for sooner appt.

## 2025-06-03 NOTE — OUTREACH NOTE
Call Center TCM Note      Flowsheet Row Responses   [facility] patient discharged from? [place]   Does the patient have one of the following disease processes/diagnoses(primary or secondary)? Sepsis   TCM attempt successful? No   Unsuccessful attempts Attempt 1            MOLLY Ly Medical Assistant    6/3/2025, 11:50 EDT

## 2025-06-03 NOTE — OUTREACH NOTE
Call Center TCM Note      Flowsheet Row Responses   [facility] patient discharged from? [place]   Does the patient have one of the following disease processes/diagnoses(primary or secondary)? Sepsis   TCM attempt successful? No   Unsuccessful attempts Attempt 2            MOLLY Ly Medical Assistant    6/3/2025, 15:50 EDT

## 2025-06-03 NOTE — TELEPHONE ENCOUNTER
Diana from [facility] called requesting verbal orders for social work order, pt is living at home with some assistance, but not 24/7. Needs someone to come to home to help her.

## 2025-06-04 ENCOUNTER — TRANSITIONAL CARE MANAGEMENT TELEPHONE ENCOUNTER (OUTPATIENT)
Dept: CALL CENTER | Facility: HOSPITAL | Age: 85
End: 2025-06-04
Payer: MEDICARE

## 2025-06-04 LAB
BACTERIA SPEC AEROBE CULT: NORMAL
BACTERIA SPEC AEROBE CULT: NORMAL

## 2025-06-04 NOTE — OUTREACH NOTE
Call Center TCM Note      Flowsheet Row Responses   [facility] patient discharged from? [place]   Does the patient have one of the following disease processes/diagnoses(primary or secondary)? Sepsis   TCM attempt successful? Yes  [vr for Phyllis Diaz dtr]   Call start time 0853   Call end time 0923   Discharge diagnosis Sepsis due to pneumonia   Person spoke with today (if not patient) and relationship spoke with pt who requested a call to son also, provided permission to speak   Meds reviewed with patient/caregiver? Yes   Is the patient having any side effects they believe may be caused by any medication additions or changes? No   Does the patient have all medications related to this admission filled (includes all antibiotics, inhalers, nebulizers,steroids,etc.) Yes   Is the patient taking all medications as directed (includes completed medication regime)? Yes   Comments TCM FOLLOW UP APPOINTMENT IS 6/11/25@200pm (appt in place at time of call)   Does the patient have an appointment with their PCP within 7-14 days of discharge? Yes   Nursing Interventions Confirmed date/time of appointment   What is the Home health agency?  [facility]   Has home health visited the patient within 72 hours of discharge? Yes   Home health interventions Called Home Health agency   Home health comments Call to HH to check on status of SW and rehab placement,  noted that SW consult placed yesterday. Staff reports that SW visit not scheduled yet and that PT/OT visits planned as pt had declined rehab.  This Rn updated that pt discussed need for rehab now as did her son.  Staff will f/u with son to discuss planning. This RN will consult AMB SW to assist as needed also.   Psychosocial issues? Yes   Psychosocial comments Son reports that pt has ongoing weakness and lacks good, strong, judgement at this time. Reports her car keys have been taken away at this time.  Son has concerns with pt safety at home at this  time due to her condition. Reports HH has visited and SW added to assist with mgmt of pt,  son was understanding  to assist with rehab placement but at this time HH was working on other options.   Did the patient receive a copy of their discharge instructions? Yes   Nursing interventions Reviewed instructions with patient   What is the patient's perception of their health status since discharge? Same  [Pt still with weakness, cough at this time.  Pt is taking atbs as advised.  Encouraged deep breathing and to monitor for worsening resp type s/s.]   Nursing interventions Nurse provided patient education   Is the patient/caregiver able to teach back TIME? T emperature - higher or lower than normal, I nfection - may have signs and symptoms of an infection, M ental Decline - confused, sleepy, difficult to arouse   Nursing interventions Nurse provided patient education   Is patient/caregiver able to teach back steps to recovery at home? Set small, achievable goals for return to baseline health   Is the patient/caregiver able to teach back signs and symptoms of worsening condition: Fever, Shortness of breath/rapid respiratory rate   Is the patient/caregiver able to teach back the hierarchy of who to call/visit for symptoms/problems? PCP, Specialist, Home health nurse, Urgent Care, ED, 911 Yes   TCM call completed? Yes   Call end time 0923   Would this patient benefit from a Referral to [facility] Social Work? Yes  [Rehab placement--may need to collaborate with  for pt rehab placement needs]            KAYODE ANDREW - Registered Nurse    6/4/2025, 09:37 EDT

## 2025-06-04 NOTE — CASE MANAGEMENT/SOCIAL WORK
Case Management Discharge Note      Final Note: Home with [facility] via family transport         Selected Continued Care - Discharged on 6/2/2025 Admission date: 5/30/2025 - Discharge disposition: Home-Health Care c      Destination    No services have been selected for the patient.                Durable Medical Equipment    No services have been selected for the patient.                Dialysis/Infusion    No services have been selected for the patient.                Home Medical Care       Service Provider Services Address Phone Fax Patient Preferred    [facility] [address], [address] [address] [phone] --              Therapy    No services have been selected for the patient.                Community Resources    No services have been selected for the patient.                Community & DME    No services have been selected for the patient.                    Selected Continued Care - Episodes Includes continued care and service providers with selected services from the active episodes listed below               Final Discharge Disposition Code: 06 - home with home health care

## 2025-06-06 ENCOUNTER — TELEPHONE (OUTPATIENT)
Dept: FAMILY MEDICINE CLINIC | Facility: CLINIC | Age: 85
End: 2025-06-06
Payer: MEDICARE

## 2025-06-06 NOTE — TELEPHONE ENCOUNTER
Caller: ROMAIN ASKEW/[facility]    Best call back number: [phone]*  SECURE VOICEMAIL    What was the call regarding: NEED ORDER TO EXTEND OCCUPATIONAL THERAPY EVALUATION TO WEEK OF 6/9/25. PLEASE CALL TO GIVE VERBAL.

## 2025-06-09 NOTE — TELEPHONE ENCOUNTER
Spoke with Leelee to give the verbal okay per Dr. Araya for verbal ORDER TO EXTEND OCCUPATIONAL THERAPY EVALUATION TO WEEK OF 6/9/25

## 2025-06-10 ENCOUNTER — PATIENT OUTREACH (OUTPATIENT)
Dept: CASE MANAGEMENT | Facility: OTHER | Age: 85
End: 2025-06-10
Payer: MEDICARE

## 2025-06-10 DIAGNOSIS — A41.9 SEPSIS DUE TO PNEUMONIA: Primary | ICD-10-CM

## 2025-06-10 DIAGNOSIS — J18.9 SEPSIS DUE TO PNEUMONIA: Primary | ICD-10-CM

## 2025-06-10 DIAGNOSIS — A48.1 LEGIONELLA PNEUMONIA: ICD-10-CM

## 2025-06-10 NOTE — OUTREACH NOTE
AMBULATORY CASE MANAGEMENT NOTE    Names and Relationships of Patient/Support Persons: Contact: Bailee Garza; Relationship: Self -     CCM Interim Update    Spoke with patient this afternoon, verified by name.    ACM introduced self and purpose for call.  Explained CCM and benefits of ACM assistance. - Patient verbalizing that she would like to meet with ACM. We will meet with patient tomorrow during office visit with PCP.    Patient states that she is doing very well. Continues that she does have home health nurse and therapist working with her.     Further states that she does have therapy scheduled for tomorrow. Reaffirming that she is still weak, but does complete daily exercises.    Denies cough, congestion, or wheeze. States that ABT is completed.    No further needs at this time. Next outreach has been scheduled.    Plan:  Check on dietary management of cholesterol  Check on patient healing since discharge.            Education Documentation  No documentation found.        Didi VAUGHN  Ambulatory Case Management    6/10/2025, 12:50 EDT

## 2025-06-11 ENCOUNTER — OFFICE VISIT (OUTPATIENT)
Dept: FAMILY MEDICINE CLINIC | Facility: CLINIC | Age: 85
End: 2025-06-11
Payer: MEDICARE

## 2025-06-11 VITALS
OXYGEN SATURATION: 93 % | BODY MASS INDEX: 19.31 KG/M2 | DIASTOLIC BLOOD PRESSURE: 64 MMHG | SYSTOLIC BLOOD PRESSURE: 120 MMHG | HEART RATE: 84 BPM | WEIGHT: 109 LBS | HEIGHT: 63 IN

## 2025-06-11 DIAGNOSIS — A48.1 LEGIONELLA PNEUMONIA: ICD-10-CM

## 2025-06-11 DIAGNOSIS — Z09 HOSPITAL DISCHARGE FOLLOW-UP: Primary | ICD-10-CM

## 2025-06-11 DIAGNOSIS — M79.672 LEFT FOOT PAIN: ICD-10-CM

## 2025-06-11 NOTE — PROGRESS NOTES
Transitional Care Follow Up Visit  Subjective     CC: Transitional Care Management Visit        Within 48 business hours after discharge our office contacted her via telephone to coordinate her care and needs.      I reviewed and discussed the details of that call along with the discharge summary, hospital problems, inpatient lab results, inpatient diagnostic studies, and consultation reports with Bailee.     Current outpatient and discharge medications have been reconciled for the patient.  Reviewed by: Eddie Araya MD          10/1/2024     7:56 PM 6/2/2025     5:31 PM   Date of TCM Phone Call   [facility]   Date of Admission 9/23/2024 5/30/2025   Date of Discharge 10/1/2024 6/2/2025   Discharge Disposition  [place] [facility] [place]       Risk for Readmission (LACE) Score: 8 (6/2/2025  6:00 AM)      HPI  Patient is here today for hospital follow-up.  Overall she is doing better from her Legionella pneumonia in the left upper lung.  She will need follow-up x-ray in about a month.  All antibiotics have been completed.  Her labs are improving.  Previously elevated white blood cell count is now normal.  Some minor issues on some of the electrolytes but those will be reevaluated as needed.  Some issues with bowel movement irregularities off and on.    Patient has new problem of left foot tenderness after a walk with therapist.  Affecting the left great toe and midfoot area     The patient presents with left foot pain and chronic urinary tract infection but no urine symptoms currently..    She reports left foot pain described as joint strain, onset yesterday, with no history of gout.    Diagnosed with chronic urinary tract infection, reports no dysuria or other urinary symptoms.     Course During Hospital Stay The following information was reviewed by: Eddie Araya MD on 06/11/2025:   Hospital Course      Bailee Garza is a 85 y.o. female who presents to the ED c/o  "acute generalized weakness and cough. She has been dealing with a cough the past few months but more lethargic the past few days.  The day prior to admission at PCP office given IM rocephin and ceftin but continued to feel worse and lethargic with N/V.  She presented to the emergency room was found to have elevated creatinine and elevated white blood cell count and procalcitonin.  She was given IV fluids and started on IV antibiotics.  Legionella antigen was positive and she was transition to oral Levaquin at discharge.   The following portions of the patient's history were reviewed and updated as appropriate: allergies, current medications, past family history, past medical history, past social history, past surgical history, and problem list.     Vitals:    06/11/25 1352   BP: 120/64   Pulse: 84   SpO2: 93%   Weight: 49.4 kg (109 lb)   Height: 160 cm (63\")             Objective   Physical Exam  Vitals reviewed.   Constitutional:       General: She is not in acute distress.  Cardiovascular:      Rate and Rhythm: Normal rate and regular rhythm.      Heart sounds: Normal heart sounds.   Pulmonary:      Effort: Pulmonary effort is normal.      Breath sounds: Normal breath sounds.   Musculoskeletal:        Feet:    Feet:      Comments: Area of tenderness  Neurological:      General: No focal deficit present.      Mental Status: She is alert.   Psychiatric:         Attention and Perception: Attention normal.         Mood and Affect: Mood normal.         Behavior: Behavior normal.          DATA REVIEWED:  The following data was reviewed by: Eddie Araya MD on 06/11/2025:  Phosphorus (06/02/2025 04:35)  Magnesium (06/02/2025 04:35)  Basic Metabolic Panel (06/02/2025 04:35)  CBC (No Diff) (06/02/2025 04:34)  XR Chest 1 View (05/30/2025 11:28)   Results  Left foot x-ray shows no acute fractures      - Imaging (Left foot x-ray):    - No acute fractures       Assessment & Plan  Hospital discharge follow-up  Overall patient " doing better since her recent pneumonia.  Symptoms are gone.  We will have her follow-up with x-ray for clearance of the pneumonia       Legionella pneumonia  Repeat chest x-ray in 1 month  Orders:    XR Chest PA & Lateral (In Office); Future    Left foot pain  Rest, foot elevation, ice as needed. Follow up in 1 week if no better  Orders:    XR Foot 3+ View Left (In Office)       Follow Up     Return in 22 weeks (on 11/12/2025) for next scheduled follow up.

## 2025-06-12 ENCOUNTER — PATIENT OUTREACH (OUTPATIENT)
Dept: CASE MANAGEMENT | Facility: OTHER | Age: 85
End: 2025-06-12
Payer: MEDICARE

## 2025-06-12 NOTE — OUTREACH NOTE
AMBULATORY CASE MANAGEMENT NOTE    Names and Relationships of Patient/Support Persons: Contact: Bailee Garza; Relationship: Self -     CCM Interim Update    Spoke with patient this afternoon, verified by name.    Chart review completed prior to outreach.     Patient voices no c/o shortness of breath, or cough.    Further review of right foot xray complete with patient. Voicing improvement of foot pain today after use of ice and Epsom Salt soak.     Declines further needs at this time. We will defer at this time.    Patient did express gratitude for outreach and assistance.  AC did provide contact information for future healthcare needs.              Education Documentation  No documentation found.        Didi VAUGHN  Ambulatory Case Management    6/12/2025, 14:55 EDT

## 2025-06-19 ENCOUNTER — OFFICE VISIT (OUTPATIENT)
Dept: FAMILY MEDICINE CLINIC | Facility: CLINIC | Age: 85
End: 2025-06-19
Payer: MEDICARE

## 2025-06-19 VITALS
HEIGHT: 63 IN | BODY MASS INDEX: 19.38 KG/M2 | SYSTOLIC BLOOD PRESSURE: 120 MMHG | TEMPERATURE: 97.5 F | OXYGEN SATURATION: 96 % | WEIGHT: 109.4 LBS | DIASTOLIC BLOOD PRESSURE: 56 MMHG | HEART RATE: 82 BPM

## 2025-06-19 DIAGNOSIS — R60.0 BILATERAL LOWER EXTREMITY EDEMA: Primary | ICD-10-CM

## 2025-06-19 PROCEDURE — 3074F SYST BP LT 130 MM HG: CPT | Performed by: NURSE PRACTITIONER

## 2025-06-19 PROCEDURE — 1160F RVW MEDS BY RX/DR IN RCRD: CPT | Performed by: NURSE PRACTITIONER

## 2025-06-19 PROCEDURE — 1159F MED LIST DOCD IN RCRD: CPT | Performed by: NURSE PRACTITIONER

## 2025-06-19 PROCEDURE — 1126F AMNT PAIN NOTED NONE PRSNT: CPT | Performed by: NURSE PRACTITIONER

## 2025-06-19 PROCEDURE — 3078F DIAST BP <80 MM HG: CPT | Performed by: NURSE PRACTITIONER

## 2025-06-19 PROCEDURE — 99213 OFFICE O/P EST LOW 20 MIN: CPT | Performed by: NURSE PRACTITIONER

## 2025-06-19 RX ORDER — HYDROCHLOROTHIAZIDE 12.5 MG/1
12.5 TABLET ORAL DAILY
Qty: 90 TABLET | Refills: 1 | Status: SHIPPED | OUTPATIENT
Start: 2025-06-19

## 2025-06-19 NOTE — PROGRESS NOTES
Subjective   Bailee Garza is a 85 y.o. female.     History of Present Illness   Chief Complaint     Leg Swelling- bilateral feet and ankles since she was seen a week ago.  She was seen on the  by Dr. Araya for hospital follow-up for pneumonia.  She reports noticing some swelling in feet and ankles later that day.  Her valsartan and HCTZ were discontinued in the hospital related to low blood pressure and elevated creatinine.  Her blood pressure is still doing well without medications but she has noticed the swelling since being off of her HCTZ.  The following portions of the patient's history were reviewed and updated as appropriate: allergies, current medications, past family history, past medical history, past social history, past surgical history, and problem list.    Review of Systems   Constitutional:  Negative for unexpected weight change.   Respiratory:  Negative for shortness of breath.    Cardiovascular:  Positive for leg swelling. Negative for chest pain and palpitations.   Psychiatric/Behavioral:  Negative for behavioral problems.        Objective   Physical Exam  Vitals and nursing note reviewed.   Constitutional:       Appearance: Normal appearance. She is well-developed.   Cardiovascular:      Rate and Rhythm: Normal rate and regular rhythm.   Pulmonary:      Effort: Pulmonary effort is normal.      Breath sounds: Normal breath sounds.   Musculoskeletal:      Right lower le+ Edema present.      Left lower le+ Edema present.   Neurological:      Mental Status: She is alert and oriented to person, place, and time.   Psychiatric:         Mood and Affect: Mood normal.         Behavior: Behavior normal.         Thought Content: Thought content normal.         Judgment: Judgment normal.         Assessment & Plan   Diagnoses and all orders for this visit:    1. Bilateral lower extremity edema (Primary)  -     hydroCHLOROthiazide 12.5 MG tablet; Take 1 tablet by mouth Daily.  Dispense: 90  tablet; Refill: 1               Will not restart the valsartan as her blood pressure is still running a little bit lower but will start low dose HCTZ to help with swelling.  She will follow-up at the end of the month when she is due for repeat x-ray.  She will keep track of blood pressure at home.

## 2025-07-30 ENCOUNTER — RESULTS FOLLOW-UP (OUTPATIENT)
Dept: FAMILY MEDICINE CLINIC | Facility: CLINIC | Age: 85
End: 2025-07-30
Payer: MEDICARE

## 2025-07-30 DIAGNOSIS — R93.89 ABNORMAL CHEST X-RAY: Primary | ICD-10-CM

## 2025-07-30 NOTE — PROGRESS NOTES
Please give the patient the following message:  Bailee recent chest x-ray showed mild issues continuing in the right upper lobe.  They recommend CT of the chest.  Make follow-up appointment with me after this repeat test is completed.

## 2025-07-30 NOTE — LETTER
Bailee Garza  [id number] [id number]    July 31, 2025     Dear Ms. Garza:    Below are the results from your recent visit:    Resulted Orders   XR Chest PA & Lateral (In Office)    Narrative    XR CHEST PA AND LATERAL-     Clinical: Follow-up pneumonia     COMPARISON examination 5/30/2025     FINDINGS: There has been partial interval clearing of the right upper  lobe airspace disease. On the lateral projection there appears to be a  residual area of right upper lobe consolidation/atelectasis/partial  collapse. Follow-up computed tomography a consideration. Alternatively  short-term follow-up.     The left lung is clear. Heart size within normal limits. No mediastinal  or hilar abnormality is demonstrated. The remainder is unremarkable.     This report was finalized on 7/30/2025 1:03 PM by Dr. Odin Paulino M.D  on Workstation: [id number]        Bailee recent chest x-ray showed mild issues continuing in the right upper lobe. They recommend CT of the chest. Make follow-up appointment with me after this repeat test is completed.       Sincerely,        Eddie Araya MD

## 2025-08-14 ENCOUNTER — OFFICE VISIT (OUTPATIENT)
Dept: FAMILY MEDICINE CLINIC | Facility: CLINIC | Age: 85
End: 2025-08-14
Payer: MEDICARE

## 2025-08-14 VITALS
OXYGEN SATURATION: 100 % | HEIGHT: 63 IN | SYSTOLIC BLOOD PRESSURE: 132 MMHG | HEART RATE: 85 BPM | BODY MASS INDEX: 19.67 KG/M2 | WEIGHT: 111 LBS | DIASTOLIC BLOOD PRESSURE: 64 MMHG

## 2025-08-14 DIAGNOSIS — E83.39 HYPOPHOSPHATEMIA: ICD-10-CM

## 2025-08-14 DIAGNOSIS — D64.9 ANEMIA OF UNKNOWN ETIOLOGY: ICD-10-CM

## 2025-08-14 DIAGNOSIS — E78.2 MIXED HYPERLIPIDEMIA: ICD-10-CM

## 2025-08-14 DIAGNOSIS — I10 ESSENTIAL HYPERTENSION: Primary | ICD-10-CM

## 2025-08-14 DIAGNOSIS — N18.31 STAGE 3A CHRONIC KIDNEY DISEASE: ICD-10-CM

## 2025-08-14 DIAGNOSIS — E03.9 HYPOTHYROIDISM, ACQUIRED: ICD-10-CM

## 2025-08-14 DIAGNOSIS — E55.9 VITAMIN D DEFICIENCY: ICD-10-CM

## 2025-08-22 ENCOUNTER — TELEPHONE (OUTPATIENT)
Dept: FAMILY MEDICINE CLINIC | Facility: CLINIC | Age: 85
End: 2025-08-22
Payer: MEDICARE

## (undated) DEVICE — APPL CHLORAPREP W/TINT 26ML ORNG

## (undated) DEVICE — GLV SURG SENSICARE POLYISPRN W/ALOE PF LF 9 GRN STRL

## (undated) DEVICE — GLV SURG SENSICARE PI PF LF 7 GRN STRL

## (undated) DEVICE — GLV SURG SENSICARE W/ALOE PF LF 7.5 STRL

## (undated) DEVICE — GLV SURG SIGNATURE ESSENTIAL PF LTX SZ8

## (undated) DEVICE — 3M™ IOBAN™ 2 ANTIMICROBIAL INCISE DRAPE 6640EZ: Brand: IOBAN™ 2

## (undated) DEVICE — BLCK/BITE BLOX W/DENTL/RIM W/STRAP 54F

## (undated) DEVICE — GLV SURG SENSICARE PI MIC PF SZ7 LF STRL

## (undated) DEVICE — SINGLE USE BIOPSY VALVE MAJ-210: Brand: SINGLE USE BIOPSY VALVE (STERILE)

## (undated) DEVICE — HANDPIECE SET WITH COAXIAL HIGH FLOW TIP AND SUCTION TUBE: Brand: INTERPULSE

## (undated) DEVICE — ADAPT SWVL FIBROPTIC BRONCH

## (undated) DEVICE — SUT VIC 3/0 CTI 36IN J944H

## (undated) DEVICE — NEEDLE, QUINCKE, 18GX3.5": Brand: MEDLINE

## (undated) DEVICE — THE CARR-LOCKE INJECTION NEEDLE IS A SINGLE USE, DISPOSABLE, FLEXIBLE SHEATH INJECTION NEEDLE USED FOR THE INJECTION OF VARIOUS TYPES OF MEDIA THROUGH FLEXIBLE ENDOSCOPES.

## (undated) DEVICE — UNDERGLV SURG BIOGEL INDICATOR LF PF 7.5

## (undated) DEVICE — OPTIFOAM GENTLE SA, POSTOP, 4X8: Brand: MEDLINE

## (undated) DEVICE — THE STERILE LIGHT HANDLE COVER IS USED WITH STERIS SURGICAL LIGHTING AND VISUALIZATION SYSTEMS.

## (undated) DEVICE — BITEBLOCK OMNI BLOC

## (undated) DEVICE — PK KN TOTL 40

## (undated) DEVICE — GLV SURG TRIUMPH CLASSIC PF LTX 8 STRL

## (undated) DEVICE — SKIN PREP TRAY 4 COMPARTM TRAY: Brand: MEDLINE INDUSTRIES, INC.

## (undated) DEVICE — SKIN PREP TRAY W/CHG: Brand: MEDLINE INDUSTRIES, INC.

## (undated) DEVICE — INTENDED TO SUPPORT AND MAINTAIN THE POSITION OF AN ANESTHETIZED PATIENT DURING SURGERY: Brand: HERMANTOR XL PINK KNEE POSITIONING PAD

## (undated) DEVICE — TUBING, SUCTION, 1/4" X 10', STRAIGHT: Brand: MEDLINE

## (undated) DEVICE — GLV SURG SENSICARE W/ALOE PF LF 8 STRL

## (undated) DEVICE — SUT VIC 1 CT1 36IN J947H

## (undated) DEVICE — NEEDLE, QUINCKE 22GX3.5": Brand: MEDLINE INDUSTRIES, INC.

## (undated) DEVICE — PK HIP TOTL 40

## (undated) DEVICE — UNDERCAST PADDING: Brand: DEROYAL

## (undated) DEVICE — DUAL CUT SAGITTAL BLADE

## (undated) DEVICE — SYS CLS SKIN PREMIERPRO EXOFINFUSION 22CM

## (undated) DEVICE — SINGLE USE SUCTION VALVE MAJ-209: Brand: SINGLE USE SUCTION VALVE (STERILE)

## (undated) DEVICE — PATIENT RETURN ELECTRODE, SINGLE-USE, CONTACT QUALITY MONITORING, ADULT, WITH 9FT CORD, FOR PATIENTS WEIGING OVER 33LBS. (15KG): Brand: MEGADYNE

## (undated) DEVICE — DRSNG TELFA PAD NONADH STR 1S 3X4IN

## (undated) DEVICE — DRSNG SURESITE WNDW 2.38X2.75

## (undated) DEVICE — SUT VIC 0 CT1 CR8 18IN J840D

## (undated) DEVICE — ADAPT CLN BIOGUARD AIR/H2O DISP

## (undated) DEVICE — CANN O2 ETCO2 FITS ALL CONN CO2 SMPL A/ 7IN DISP LF

## (undated) DEVICE — GLV SURG SENSICARE W/ALOE PF LF 9 STRL

## (undated) DEVICE — SUT VIC 3/0 PS1 27IN J936H

## (undated) DEVICE — SENSR O2 OXIMAX FNGR A/ 18IN NONSTR

## (undated) DEVICE — PILLW ABD SM

## (undated) DEVICE — PREP SOL POVIDONE/IODINE BT 4OZ

## (undated) DEVICE — KT DRN EVAC WND PVC PCH WTROC RND 10F400

## (undated) DEVICE — SOL IRR NACL 0.9PCT 3000ML

## (undated) DEVICE — SUT VIC 3/0 PS2 27IN J427H

## (undated) DEVICE — CANNULA,ADULT,SOFT-TOUCH,7'TUBE,UC: Brand: PENDING

## (undated) DEVICE — INSTRUMENT BATTERY

## (undated) DEVICE — TBG 02 CRUSH RESIST LF CLR 7FT

## (undated) DEVICE — 450 ML BOTTLE OF 0.05% CHLORHEXIDINE GLUCONATE IN 99.95% STERILE WATER FOR IRRIGATION, USP AND APPLICATOR.: Brand: IRRISEPT ANTIMICROBIAL WOUND LAVAGE

## (undated) DEVICE — TRAP FLD MINIVAC MEGADYNE 100ML

## (undated) DEVICE — SYR LUERLOK 30CC

## (undated) DEVICE — KT ORCA ORCAPOD DISP STRL

## (undated) DEVICE — BIPOLAR ELECTROHEMOSTASIS CATHETER: Brand: INJECTION GOLD PROBE

## (undated) DEVICE — ANTIBACTERIAL UNDYED BRAIDED (POLYGLACTIN 910), SYNTHETIC ABSORBABLE SUTURE: Brand: COATED VICRYL

## (undated) DEVICE — ENCORE® LATEX ORTHO SIZE 8, STERILE LATEX POWDER-FREE SURGICAL GLOVE: Brand: ENCORE

## (undated) DEVICE — REFLECTION FLEXIBLE DRILL 25MM: Brand: REFLECTION

## (undated) DEVICE — DRSNG SURESITE WNDW 4X4.5

## (undated) DEVICE — MSK AIRWY LARYNG LMA UNIQUE STD PK SZ4

## (undated) DEVICE — 2108 SERIES SAGITTAL BLADE, NO OFFSET  (12.4 X 1.19 X 82.1MM)

## (undated) DEVICE — MEDI-VAC YANKAUER SUCTION HANDLE W/BULBOUS TIP: Brand: CARDINAL HEALTH

## (undated) DEVICE — APPL DURAPREP IODOPHOR APL 26ML

## (undated) DEVICE — GLV SURG BIOGEL M LTX PF 7 1/2

## (undated) DEVICE — BNDG,ELSTC,MATRIX,STRL,6"X5YD,LF,HOOK&LP: Brand: MEDLINE

## (undated) DEVICE — SUT VICRYL 1 CT1 27IN  JJ40G

## (undated) DEVICE — COVER,MAYO STAND,STERILE: Brand: MEDLINE

## (undated) DEVICE — GLV SURG PREMIERPRO ORTHO LTX PF SZ8.5 BRN

## (undated) DEVICE — TBG PENCL TELESCP MEGADYNE SMOKE EVAC 10FT

## (undated) DEVICE — VITAL SIGNS™ JACKSON-REES CIRCUITS: Brand: VITAL SIGNS™

## (undated) DEVICE — GLV SURG BIOGEL LTX PF 8 1/2

## (undated) DEVICE — ORTHOLOCK(R) EX-PIN 4 MM X 150 MM

## (undated) DEVICE — LN SMPL CO2 SHTRM SD STREAM W/M LUER

## (undated) DEVICE — APPL CHLORAPREP HI/LITE 26ML ORNG

## (undated) DEVICE — TRAP,MUCUS SPECIMEN, 80CC: Brand: MEDLINE

## (undated) DEVICE — PREMIUM WET SKIN PREP TRAY: Brand: MEDLINE INDUSTRIES, INC.

## (undated) DEVICE — ADHS SKIN DERMABOND TOP ADVANCED